# Patient Record
Sex: MALE | Race: OTHER | Employment: OTHER | URBAN - METROPOLITAN AREA
[De-identification: names, ages, dates, MRNs, and addresses within clinical notes are randomized per-mention and may not be internally consistent; named-entity substitution may affect disease eponyms.]

---

## 2017-12-13 ENCOUNTER — OFFICE VISIT (OUTPATIENT)
Dept: FAMILY MEDICINE CLINIC | Age: 57
End: 2017-12-13

## 2017-12-13 VITALS
TEMPERATURE: 97.5 F | HEIGHT: 70 IN | SYSTOLIC BLOOD PRESSURE: 138 MMHG | BODY MASS INDEX: 44.54 KG/M2 | WEIGHT: 311.13 LBS | RESPIRATION RATE: 12 BRPM | HEART RATE: 77 BPM | DIASTOLIC BLOOD PRESSURE: 88 MMHG

## 2017-12-13 DIAGNOSIS — G89.29 CHRONIC BILATERAL LOW BACK PAIN WITHOUT SCIATICA: ICD-10-CM

## 2017-12-13 DIAGNOSIS — L84 CALLUS OF FOOT: ICD-10-CM

## 2017-12-13 DIAGNOSIS — M54.50 CHRONIC BILATERAL LOW BACK PAIN WITHOUT SCIATICA: ICD-10-CM

## 2017-12-13 DIAGNOSIS — Z79.4 TYPE 2 DIABETES MELLITUS WITHOUT COMPLICATION, WITH LONG-TERM CURRENT USE OF INSULIN (HCC): Primary | ICD-10-CM

## 2017-12-13 DIAGNOSIS — I10 ESSENTIAL HYPERTENSION: ICD-10-CM

## 2017-12-13 DIAGNOSIS — Z12.11 SCREEN FOR COLON CANCER: ICD-10-CM

## 2017-12-13 DIAGNOSIS — I42.0 DILATED CARDIOMYOPATHY (HCC): ICD-10-CM

## 2017-12-13 DIAGNOSIS — E78.5 HYPERLIPIDEMIA, UNSPECIFIED HYPERLIPIDEMIA TYPE: ICD-10-CM

## 2017-12-13 DIAGNOSIS — E10.40 TYPE 1 DIABETES MELLITUS WITH DIABETIC NEUROPATHY (HCC): Primary | ICD-10-CM

## 2017-12-13 DIAGNOSIS — Z12.5 SCREENING PSA (PROSTATE SPECIFIC ANTIGEN): ICD-10-CM

## 2017-12-13 DIAGNOSIS — E11.9 TYPE 2 DIABETES MELLITUS WITHOUT COMPLICATION, WITH LONG-TERM CURRENT USE OF INSULIN (HCC): Primary | ICD-10-CM

## 2017-12-13 DIAGNOSIS — E11.9 DIABETES MELLITUS WITHOUT COMPLICATION (HCC): ICD-10-CM

## 2017-12-13 DIAGNOSIS — E11.42 DIABETIC POLYNEUROPATHY ASSOCIATED WITH TYPE 2 DIABETES MELLITUS (HCC): ICD-10-CM

## 2017-12-13 DIAGNOSIS — Z95.0 PACEMAKER: ICD-10-CM

## 2017-12-13 PROBLEM — E11.40 DIABETIC NEUROPATHY ASSOCIATED WITH TYPE 2 DIABETES MELLITUS (HCC): Status: ACTIVE | Noted: 2017-12-13

## 2017-12-13 PROBLEM — I42.9 CARDIOMYOPATHY (HCC): Status: ACTIVE | Noted: 2017-12-13

## 2017-12-13 PROCEDURE — G8484 FLU IMMUNIZE NO ADMIN: HCPCS | Performed by: FAMILY MEDICINE

## 2017-12-13 PROCEDURE — 1036F TOBACCO NON-USER: CPT | Performed by: FAMILY MEDICINE

## 2017-12-13 PROCEDURE — G8427 DOCREV CUR MEDS BY ELIG CLIN: HCPCS | Performed by: FAMILY MEDICINE

## 2017-12-13 PROCEDURE — G8417 CALC BMI ABV UP PARAM F/U: HCPCS | Performed by: FAMILY MEDICINE

## 2017-12-13 PROCEDURE — 3017F COLORECTAL CA SCREEN DOC REV: CPT | Performed by: FAMILY MEDICINE

## 2017-12-13 PROCEDURE — 3046F HEMOGLOBIN A1C LEVEL >9.0%: CPT | Performed by: FAMILY MEDICINE

## 2017-12-13 PROCEDURE — 99204 OFFICE O/P NEW MOD 45 MIN: CPT | Performed by: FAMILY MEDICINE

## 2017-12-13 RX ORDER — PRENATAL VIT 91/IRON/FOLIC/DHA 28-975-200
COMBINATION PACKAGE (EA) ORAL 2 TIMES DAILY
COMMUNITY
End: 2017-12-14 | Stop reason: SDUPTHER

## 2017-12-13 RX ORDER — CALCIUM CARB/VITAMIN D3/VIT K1 500-100-40
1 TABLET,CHEWABLE ORAL DAILY
COMMUNITY
End: 2017-12-14 | Stop reason: SDUPTHER

## 2017-12-13 RX ORDER — GLIPIZIDE 10 MG/1
10 TABLET, FILM COATED, EXTENDED RELEASE ORAL 4 TIMES DAILY
COMMUNITY
End: 2017-12-14 | Stop reason: SDUPTHER

## 2017-12-13 RX ORDER — SERTRALINE HYDROCHLORIDE 100 MG/1
100 TABLET, FILM COATED ORAL DAILY
COMMUNITY
End: 2017-12-14 | Stop reason: SDUPTHER

## 2017-12-13 RX ORDER — INSULIN GLARGINE 100 [IU]/ML
50 INJECTION, SOLUTION SUBCUTANEOUS NIGHTLY
Qty: 3 VIAL | Refills: 5 | Status: SHIPPED | OUTPATIENT
Start: 2017-12-13 | End: 2019-04-30

## 2017-12-13 RX ORDER — SIMVASTATIN 20 MG
20 TABLET ORAL NIGHTLY
COMMUNITY
End: 2017-12-14 | Stop reason: SDUPTHER

## 2017-12-13 RX ORDER — OMEPRAZOLE 20 MG/1
20 CAPSULE, DELAYED RELEASE ORAL DAILY
COMMUNITY
End: 2017-12-14 | Stop reason: SDUPTHER

## 2017-12-13 RX ORDER — ENALAPRIL MALEATE 20 MG/1
20 TABLET ORAL 2 TIMES DAILY
COMMUNITY
End: 2017-12-14 | Stop reason: SDUPTHER

## 2017-12-13 RX ORDER — GABAPENTIN 300 MG/1
300 CAPSULE ORAL 3 TIMES DAILY
COMMUNITY
End: 2017-12-14 | Stop reason: SDUPTHER

## 2017-12-13 RX ORDER — ALBUTEROL SULFATE 2.5 MG/3ML
2.5 SOLUTION RESPIRATORY (INHALATION) 4 TIMES DAILY
COMMUNITY
End: 2017-12-14 | Stop reason: SDUPTHER

## 2017-12-13 RX ORDER — METOPROLOL TARTRATE 50 MG/1
50 TABLET, FILM COATED ORAL 2 TIMES DAILY
COMMUNITY
End: 2017-12-14 | Stop reason: SDUPTHER

## 2017-12-13 RX ORDER — AMLODIPINE BESYLATE 10 MG/1
10 TABLET ORAL NIGHTLY
COMMUNITY
End: 2017-12-14 | Stop reason: SDUPTHER

## 2017-12-13 RX ORDER — FENOFIBRATE 160 MG/1
160 TABLET ORAL NIGHTLY
COMMUNITY
End: 2017-12-14 | Stop reason: SDUPTHER

## 2017-12-13 RX ORDER — FUROSEMIDE 40 MG/1
40 TABLET ORAL DAILY
COMMUNITY
End: 2017-12-14 | Stop reason: SDUPTHER

## 2017-12-13 RX ORDER — ASPIRIN 325 MG
325 TABLET ORAL DAILY
COMMUNITY
End: 2017-12-14 | Stop reason: SDUPTHER

## 2017-12-13 RX ORDER — HYDROXYZINE PAMOATE 50 MG/1
50 CAPSULE ORAL NIGHTLY
COMMUNITY
End: 2017-12-13 | Stop reason: SDUPTHER

## 2017-12-13 RX ORDER — HYDROXYZINE PAMOATE 50 MG/1
50 CAPSULE ORAL NIGHTLY
Qty: 30 CAPSULE | Refills: 5 | Status: SHIPPED | OUTPATIENT
Start: 2017-12-13 | End: 2018-03-14 | Stop reason: SDUPTHER

## 2017-12-13 RX ORDER — DIGOXIN 125 MCG
125 TABLET ORAL DAILY
COMMUNITY
End: 2017-12-14 | Stop reason: SDUPTHER

## 2017-12-13 RX ORDER — INSULIN GLARGINE 100 [IU]/ML
50 INJECTION, SOLUTION SUBCUTANEOUS NIGHTLY
COMMUNITY
End: 2017-12-13 | Stop reason: SDUPTHER

## 2017-12-13 RX ORDER — RANITIDINE 300 MG/1
300 TABLET ORAL DAILY
COMMUNITY
End: 2017-12-14 | Stop reason: SDUPTHER
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
## 2017-12-14 DIAGNOSIS — E11.42 DIABETIC POLYNEUROPATHY ASSOCIATED WITH TYPE 2 DIABETES MELLITUS (HCC): ICD-10-CM

## 2017-12-14 DIAGNOSIS — I10 ESSENTIAL HYPERTENSION: ICD-10-CM

## 2017-12-14 DIAGNOSIS — E11.9 TYPE 2 DIABETES MELLITUS WITHOUT COMPLICATION, WITH LONG-TERM CURRENT USE OF INSULIN (HCC): ICD-10-CM

## 2017-12-14 DIAGNOSIS — E78.5 HYPERLIPIDEMIA, UNSPECIFIED HYPERLIPIDEMIA TYPE: ICD-10-CM

## 2017-12-14 DIAGNOSIS — Z12.5 SCREENING PSA (PROSTATE SPECIFIC ANTIGEN): ICD-10-CM

## 2017-12-14 DIAGNOSIS — I42.0 DILATED CARDIOMYOPATHY (HCC): ICD-10-CM

## 2017-12-14 DIAGNOSIS — Z79.4 TYPE 2 DIABETES MELLITUS WITHOUT COMPLICATION, WITH LONG-TERM CURRENT USE OF INSULIN (HCC): ICD-10-CM

## 2017-12-14 LAB
ALBUMIN SERPL-MCNC: 4.2 G/DL (ref 3.9–4.9)
ALP BLD-CCNC: 82 U/L (ref 35–104)
ALT SERPL-CCNC: 18 U/L (ref 0–41)
ANION GAP SERPL CALCULATED.3IONS-SCNC: 16 MEQ/L (ref 7–13)
AST SERPL-CCNC: 14 U/L (ref 0–40)
BASOPHILS ABSOLUTE: 0.1 K/UL (ref 0–0.2)
BASOPHILS RELATIVE PERCENT: 1.2 %
BILIRUB SERPL-MCNC: 0.4 MG/DL (ref 0–1.2)
BUN BLDV-MCNC: 18 MG/DL (ref 6–20)
CALCIUM SERPL-MCNC: 10.1 MG/DL (ref 8.6–10.2)
CHLORIDE BLD-SCNC: 98 MEQ/L (ref 98–107)
CHOLESTEROL, TOTAL: 142 MG/DL (ref 0–199)
CO2: 29 MEQ/L (ref 22–29)
CREAT SERPL-MCNC: 1.24 MG/DL (ref 0.7–1.2)
CREATININE URINE: 157 MG/DL
DIGOXIN LEVEL: 1.2 NG/ML (ref 0.8–2)
EOSINOPHILS ABSOLUTE: 0.6 K/UL (ref 0–0.7)
EOSINOPHILS RELATIVE PERCENT: 6.3 %
GFR AFRICAN AMERICAN: >60
GFR NON-AFRICAN AMERICAN: 59.5
GLOBULIN: 2.7 G/DL (ref 2.3–3.5)
GLUCOSE BLD-MCNC: 160 MG/DL (ref 74–109)
HBA1C MFR BLD: 11.6 % (ref 4.8–5.9)
HCT VFR BLD CALC: 39.5 % (ref 42–52)
HDLC SERPL-MCNC: 30 MG/DL (ref 40–59)
HEMOGLOBIN: 13 G/DL (ref 14–18)
LDL CHOLESTEROL CALCULATED: 73 MG/DL (ref 0–129)
LYMPHOCYTES ABSOLUTE: 3.4 K/UL (ref 1–4.8)
LYMPHOCYTES RELATIVE PERCENT: 36.3 %
MCH RBC QN AUTO: 29.3 PG (ref 27–31.3)
MCHC RBC AUTO-ENTMCNC: 32.8 % (ref 33–37)
MCV RBC AUTO: 89.1 FL (ref 80–100)
MICROALBUMIN UR-MCNC: 14 MG/DL
MICROALBUMIN/CREAT UR-RTO: 89.2 MG/G (ref 0–30)
MONOCYTES ABSOLUTE: 0.7 K/UL (ref 0.2–0.8)
MONOCYTES RELATIVE PERCENT: 8 %
NEUTROPHILS ABSOLUTE: 4.5 K/UL (ref 1.4–6.5)
NEUTROPHILS RELATIVE PERCENT: 48.2 %
PDW BLD-RTO: 14.3 % (ref 11.5–14.5)
PLATELET # BLD: 376 K/UL (ref 130–400)
POTASSIUM SERPL-SCNC: 4.6 MEQ/L (ref 3.5–5.1)
PROSTATE SPECIFIC ANTIGEN: 0.38 NG/ML (ref 0–5.4)
RBC # BLD: 4.43 M/UL (ref 4.7–6.1)
SODIUM BLD-SCNC: 143 MEQ/L (ref 132–144)
TOTAL PROTEIN: 6.9 G/DL (ref 6.4–8.1)
TRIGL SERPL-MCNC: 197 MG/DL (ref 0–200)
WBC # BLD: 9.4 K/UL (ref 4.8–10.8)

## 2017-12-14 RX ORDER — GABAPENTIN 300 MG/1
300 CAPSULE ORAL 3 TIMES DAILY
Qty: 90 CAPSULE | Refills: 1 | Status: SHIPPED | OUTPATIENT
Start: 2017-12-14 | End: 2018-03-14 | Stop reason: SDUPTHER

## 2017-12-14 RX ORDER — GLIPIZIDE 10 MG/1
10 TABLET, FILM COATED, EXTENDED RELEASE ORAL 4 TIMES DAILY
Qty: 90 TABLET | Refills: 1 | Status: SHIPPED | OUTPATIENT
Start: 2017-12-14 | End: 2017-12-22 | Stop reason: SDUPTHER

## 2017-12-14 RX ORDER — ALBUTEROL SULFATE 2.5 MG/3ML
2.5 SOLUTION RESPIRATORY (INHALATION) 4 TIMES DAILY
Qty: 120 EACH | Refills: 5 | Status: SHIPPED | OUTPATIENT
Start: 2017-12-14 | End: 2018-03-14 | Stop reason: SDUPTHER

## 2017-12-14 RX ORDER — RANITIDINE 300 MG/1
300 TABLET ORAL DAILY
Qty: 90 TABLET | Refills: 1 | Status: SHIPPED | OUTPATIENT
Start: 2017-12-14 | End: 2018-03-14 | Stop reason: SDUPTHER

## 2017-12-14 RX ORDER — ENALAPRIL MALEATE 20 MG/1
20 TABLET ORAL 2 TIMES DAILY
Qty: 90 TABLET | Refills: 1 | Status: SHIPPED | OUTPATIENT
Start: 2017-12-14 | End: 2018-03-14 | Stop reason: SDUPTHER

## 2017-12-14 RX ORDER — FENOFIBRATE 160 MG/1
160 TABLET ORAL NIGHTLY
Qty: 90 TABLET | Refills: 1 | Status: SHIPPED | OUTPATIENT
Start: 2017-12-14 | End: 2018-03-14 | Stop reason: SDUPTHER

## 2017-12-14 RX ORDER — SERTRALINE HYDROCHLORIDE 100 MG/1
100 TABLET, FILM COATED ORAL DAILY
Qty: 90 TABLET | Refills: 1 | Status: SHIPPED | OUTPATIENT
Start: 2017-12-14 | End: 2018-03-14 | Stop reason: SDUPTHER

## 2017-12-14 RX ORDER — CALCIUM CARB/VITAMIN D3/VIT K1 500-100-40
TABLET,CHEWABLE ORAL
Qty: 100 EACH | Refills: 1 | Status: SHIPPED | OUTPATIENT
Start: 2017-12-14

## 2017-12-14 RX ORDER — DIGOXIN 125 MCG
125 TABLET ORAL DAILY
Qty: 90 TABLET | Refills: 1 | Status: SHIPPED | OUTPATIENT
Start: 2017-12-14 | End: 2018-03-14 | Stop reason: SDUPTHER

## 2017-12-14 RX ORDER — AMLODIPINE BESYLATE 10 MG/1
10 TABLET ORAL NIGHTLY
Qty: 90 TABLET | Refills: 1 | Status: SHIPPED | OUTPATIENT
Start: 2017-12-14 | End: 2018-03-14 | Stop reason: SDUPTHER

## 2017-12-14 RX ORDER — METOPROLOL TARTRATE 50 MG/1
50 TABLET, FILM COATED ORAL 2 TIMES DAILY
Qty: 180 TABLET | Refills: 1 | Status: SHIPPED | OUTPATIENT
Start: 2017-12-14 | End: 2018-03-14 | Stop reason: SDUPTHER

## 2017-12-14 RX ORDER — FUROSEMIDE 40 MG/1
40 TABLET ORAL DAILY
Qty: 180 TABLET | Refills: 1 | Status: SHIPPED | OUTPATIENT
Start: 2017-12-14 | End: 2018-03-14 | Stop reason: SDUPTHER

## 2017-12-14 RX ORDER — PRENATAL VIT 91/IRON/FOLIC/DHA 28-975-200
COMBINATION PACKAGE (EA) ORAL 2 TIMES DAILY
Qty: 1 TUBE | Refills: 3 | Status: SHIPPED | OUTPATIENT
Start: 2017-12-14 | End: 2018-03-14 | Stop reason: SDUPTHER

## 2017-12-14 RX ORDER — OMEPRAZOLE 20 MG/1
20 CAPSULE, DELAYED RELEASE ORAL DAILY
Qty: 90 CAPSULE | Refills: 1 | Status: SHIPPED | OUTPATIENT
Start: 2017-12-14 | End: 2018-03-14 | Stop reason: SDUPTHER

## 2017-12-14 RX ORDER — SIMVASTATIN 20 MG
20 TABLET ORAL NIGHTLY
Qty: 90 TABLET | Refills: 1 | Status: SHIPPED | OUTPATIENT
Start: 2017-12-14 | End: 2018-03-14 | Stop reason: SDUPTHER

## 2017-12-14 RX ORDER — ASPIRIN 325 MG
325 TABLET ORAL DAILY
Qty: 90 TABLET | Refills: 1 | Status: SHIPPED | OUTPATIENT
Start: 2017-12-14 | End: 2018-03-14 | Stop reason: SDUPTHER

## 2017-12-19 ENCOUNTER — OFFICE VISIT (OUTPATIENT)
Dept: CARDIOLOGY | Age: 57
End: 2017-12-19

## 2017-12-19 ENCOUNTER — HOSPITAL ENCOUNTER (OUTPATIENT)
Dept: CARDIOLOGY | Age: 57
Discharge: HOME | End: 2017-12-19
Payer: MEDICARE

## 2017-12-19 VITALS
RESPIRATION RATE: 16 BRPM | HEIGHT: 70 IN | OXYGEN SATURATION: 97 % | BODY MASS INDEX: 44.81 KG/M2 | DIASTOLIC BLOOD PRESSURE: 82 MMHG | SYSTOLIC BLOOD PRESSURE: 136 MMHG | HEART RATE: 73 BPM | WEIGHT: 313 LBS

## 2017-12-19 DIAGNOSIS — I10 ESSENTIAL HYPERTENSION: ICD-10-CM

## 2017-12-19 DIAGNOSIS — Z95.810 AICD (AUTOMATIC CARDIOVERTER/DEFIBRILLATOR) PRESENT: ICD-10-CM

## 2017-12-19 DIAGNOSIS — E66.01 MORBID OBESITY WITH BMI OF 40.0-44.9, ADULT (HCC): ICD-10-CM

## 2017-12-19 DIAGNOSIS — Z95.0 PACEMAKER: Primary | ICD-10-CM

## 2017-12-19 DIAGNOSIS — E78.2 MIXED HYPERLIPIDEMIA: ICD-10-CM

## 2017-12-19 DIAGNOSIS — E66.01 MORBID OBESITY (HCC): ICD-10-CM

## 2017-12-19 DIAGNOSIS — I42.0 DILATED CARDIOMYOPATHY (HCC): ICD-10-CM

## 2017-12-19 PROCEDURE — G8417 CALC BMI ABV UP PARAM F/U: HCPCS | Performed by: INTERNAL MEDICINE

## 2017-12-19 PROCEDURE — 93282 PRGRMG EVAL IMPLANTABLE DFB: CPT

## 2017-12-19 PROCEDURE — G8427 DOCREV CUR MEDS BY ELIG CLIN: HCPCS | Performed by: INTERNAL MEDICINE

## 2017-12-19 PROCEDURE — 93000 ELECTROCARDIOGRAM COMPLETE: CPT | Performed by: INTERNAL MEDICINE

## 2017-12-19 PROCEDURE — 3017F COLORECTAL CA SCREEN DOC REV: CPT | Performed by: INTERNAL MEDICINE

## 2017-12-19 PROCEDURE — G8484 FLU IMMUNIZE NO ADMIN: HCPCS | Performed by: INTERNAL MEDICINE

## 2017-12-19 PROCEDURE — 1036F TOBACCO NON-USER: CPT | Performed by: INTERNAL MEDICINE

## 2017-12-19 PROCEDURE — 99203 OFFICE O/P NEW LOW 30 MIN: CPT | Performed by: INTERNAL MEDICINE

## 2017-12-19 NOTE — PROGRESS NOTES
Chief Complaint   Patient presents with   Daron Correa Cardiologist     referred by Dr Izaiah Staton [employer]--[employer]       12-19-17: Patient presents for initial medical evaluation. Patient is followed on a regular basis by Dr. Moisés Bernal DO. Hx of NICMP s/p [employer] AICD placement in 2008 in [facility].   S/pECHO in 11-11-10 with EF of 25%, LV and LAE, abnormal DD. States he had a LHC 10 years ago and no PCI was performed. Denies any AICD shocks. states he had an AICD check 5-6 months ago and battery was at 35% he states. He has not had it checked since. Pt denies chest pain,   nausea, vomiting, diarrhea, constipation, motor weakness, insomnia, weight loss, syncope, dizziness, lightheadedness, palpitations, PND, orthopnea, or claudication. He is compliant with meds. No nitro use. BP and hr are good. CAD is stable. No LE discoloration or ulcers. No CHF type symptoms. Lipid profile is normal. Denies any SOB or fatigue. Does have LE edema.            Patient Active Problem List   Diagnosis    Type 2 diabetes mellitus without complication ([address]    Diabetes mellitus ([address]    Hyperlipidemia    Hypertension    Callus of foot    Chronic bilateral low back pain without sciatica    Cardiomyopathy ([address]    Pacemaker    Diabetic neuropathy associated with type 2 diabetes mellitus ([address]    Morbid obesity ([address]       Past Surgical History:   Procedure Laterality Date    CARDIAC DEFIBRILLATOR PLACEMENT  12/2008       Social History     Social History    Marital status:      Spouse name: N/A    Number of children: N/A    Years of education: N/A     Social History Main Topics    Smoking status: Former Smoker     Quit date: 12/1/2008    Smokeless tobacco: Never Used    Alcohol use No    Drug use: Unknown    Sexual activity: Not Asked     Other Topics Concern    None     Social History Narrative    None       Family History   Problem Relation Age of Onset    (VISTARIL) 50 MG capsule Take 1 capsule by mouth nightly 30 capsule 5     No current facility-administered medications for this visit. Coreg [carvedilol]    Review of Systems:  General ROS: negative  Psychological ROS: negative  Hematological and Lymphatic ROS: No history of blood clots or bleeding disorder. Respiratory ROS: no cough, shortness of breath, or wheezing  Cardiovascular ROS: no chest pain or dyspnea on exertion  Gastrointestinal ROS: no abdominal pain, change in bowel habits, or black or bloody stools  Genito-Urinary ROS: no dysuria, trouble voiding, or hematuria  Musculoskeletal ROS: negative  Neurological ROS: no TIA or stroke symptoms  Dermatological ROS: negative    VITALS:  Blood pressure 136/82, pulse 73, resp. rate 16, height 5' 10\" (1.778 m), weight (!) 313 lb (142 kg), SpO2 97 %. Body mass index is 44.91 kg/m². Physical Examination:  General appearance - alert, well appearing, and in no distress  Mental status - alert, oriented to person, place, and time  Neck - Neck is supple, no JVD or carotid bruits. No thyromegaly or adenopathy. Chest - clear to auscultation, no wheezes, rales or rhonchi, symmetric air entry  Heart - normal rate, regular rhythm, normal S1, S2, no murmurs, rubs, clicks or gallops  Abdomen - soft, nontender, nondistended, no masses or organomegaly  Neurological - alert, oriented, normal speech, no focal findings or movement disorder noted  Extremities - peripheral pulses normal, no pedal edema, no clubbing or cyanosis  Skin - normal coloration and turgor, no rashes, no suspicious skin lesions noted      EKG: normal sinus rhythm, nonspecific ST and T waves changes    Orders Placed This Encounter   Procedures    EKG 12 Lead       ASSESSMENT:    1. Pacemaker  EKG 12 Lead   2. Dilated cardiomyopathy ([address]     3. Essential hypertension     4. Mixed hyperlipidemia     5.  Morbid obesity ([address]           PLAN:     Patient will need to continue to follow up with you for their general medical care and return to see me in the office in 3 months. As always, aggressive risk factor modification is strongly recommended. We should adhere to the [address] guidelines for HTN management and the NCEP ATP III guidelines for LDL-C management. Cardiac diet is always recommended with low fat, cholesterol, calories and sodium. Continue medications at current doses. [facility]. Check EKG. The importance of daily weights, dietary sodium restriction, and contact with cardiology if weight is increased more than 3 lbs in any 48 hour period was stressed. The patient has been advised to contact us if they experience progressive SOB, orthopnea, PND or progressive edema. Patient was advised and encouraged to check blood pressure at home or at a pharmacy, maintain a logbook, and also call us back if blood pressure are above the target ranges or if it is low. Patient clearly understands and agrees to the instructions. We will need to continue to monitor muscle and liver enzymes, BUN, CR, and electrolytes. Details of medical condition explained and patient was warned about adverse consequences of uncontrolled medical conditions and possible side effects of prescribed medications.

## 2017-12-21 ENCOUNTER — TELEPHONE (OUTPATIENT)
Dept: FAMILY MEDICINE CLINIC | Age: 57
End: 2017-12-21

## 2017-12-21 NOTE — TELEPHONE ENCOUNTER
Unfortunately there is nothing I can do about that, he should take it twice a day before breakfast and dinner,, and increase his Lantus to 55 units each day, check his sugars twice a day and report back to us in in 2 weeks what his sugars are doing

## 2017-12-22 ENCOUNTER — TELEPHONE (OUTPATIENT)
Dept: FAMILY MEDICINE CLINIC | Age: 57
End: 2017-12-22

## 2017-12-22 DIAGNOSIS — E11.9 TYPE 2 DIABETES MELLITUS WITHOUT COMPLICATION, WITH LONG-TERM CURRENT USE OF INSULIN (HCC): ICD-10-CM

## 2017-12-22 DIAGNOSIS — Z79.4 TYPE 2 DIABETES MELLITUS WITHOUT COMPLICATION, WITH LONG-TERM CURRENT USE OF INSULIN (HCC): ICD-10-CM

## 2017-12-22 RX ORDER — GLIPIZIDE 10 MG/1
10 TABLET, FILM COATED, EXTENDED RELEASE ORAL 2 TIMES DAILY
Qty: 180 TABLET | Refills: 1 | Status: SHIPPED | OUTPATIENT
Start: 2017-12-22 | End: 2018-03-11

## 2018-01-11 ENCOUNTER — OFFICE VISIT (OUTPATIENT)
Dept: FAMILY MEDICINE CLINIC | Age: 58
End: 2018-01-11

## 2018-01-11 VITALS
TEMPERATURE: 96.6 F | SYSTOLIC BLOOD PRESSURE: 134 MMHG | BODY MASS INDEX: 45.03 KG/M2 | HEIGHT: 70 IN | HEART RATE: 63 BPM | OXYGEN SATURATION: 95 % | DIASTOLIC BLOOD PRESSURE: 84 MMHG | WEIGHT: 314.56 LBS | RESPIRATION RATE: 12 BRPM

## 2018-01-11 DIAGNOSIS — E66.01 MORBID OBESITY WITH BMI OF 45.0-49.9, ADULT (HCC): ICD-10-CM

## 2018-01-11 DIAGNOSIS — I10 ESSENTIAL HYPERTENSION: ICD-10-CM

## 2018-01-11 DIAGNOSIS — F41.9 ANXIETY: ICD-10-CM

## 2018-01-11 DIAGNOSIS — E78.2 MIXED HYPERLIPIDEMIA: ICD-10-CM

## 2018-01-11 DIAGNOSIS — E66.01 MORBID OBESITY (HCC): ICD-10-CM

## 2018-01-11 DIAGNOSIS — Z12.11 SCREEN FOR COLON CANCER: ICD-10-CM

## 2018-01-11 DIAGNOSIS — Z79.4 TYPE 2 DIABETES MELLITUS WITHOUT COMPLICATION, WITH LONG-TERM CURRENT USE OF INSULIN (HCC): Primary | ICD-10-CM

## 2018-01-11 DIAGNOSIS — E11.42 DIABETIC POLYNEUROPATHY ASSOCIATED WITH TYPE 2 DIABETES MELLITUS (HCC): ICD-10-CM

## 2018-01-11 DIAGNOSIS — E11.9 TYPE 2 DIABETES MELLITUS WITHOUT COMPLICATION, WITH LONG-TERM CURRENT USE OF INSULIN (HCC): Primary | ICD-10-CM

## 2018-01-11 DIAGNOSIS — I42.0 DILATED CARDIOMYOPATHY (HCC): ICD-10-CM

## 2018-01-11 LAB — HBA1C MFR BLD: 9.5 %

## 2018-01-11 PROCEDURE — 1036F TOBACCO NON-USER: CPT | Performed by: FAMILY MEDICINE

## 2018-01-11 PROCEDURE — G8417 CALC BMI ABV UP PARAM F/U: HCPCS | Performed by: FAMILY MEDICINE

## 2018-01-11 PROCEDURE — 83036 HEMOGLOBIN GLYCOSYLATED A1C: CPT | Performed by: FAMILY MEDICINE

## 2018-01-11 PROCEDURE — 3017F COLORECTAL CA SCREEN DOC REV: CPT | Performed by: FAMILY MEDICINE

## 2018-01-11 PROCEDURE — 99214 OFFICE O/P EST MOD 30 MIN: CPT | Performed by: FAMILY MEDICINE

## 2018-01-11 PROCEDURE — G8484 FLU IMMUNIZE NO ADMIN: HCPCS | Performed by: FAMILY MEDICINE

## 2018-01-11 PROCEDURE — 3046F HEMOGLOBIN A1C LEVEL >9.0%: CPT | Performed by: FAMILY MEDICINE

## 2018-01-11 PROCEDURE — G8427 DOCREV CUR MEDS BY ELIG CLIN: HCPCS | Performed by: FAMILY MEDICINE

## 2018-01-11 RX ORDER — LANCETS 30 GAUGE
EACH MISCELLANEOUS
Qty: 100 EACH | Refills: 3 | Status: SHIPPED | OUTPATIENT
Start: 2018-01-11 | End: 2018-10-11 | Stop reason: SDUPTHER

## 2018-01-11 NOTE — PROGRESS NOTES
(ZANTAC) 300 MG tablet Take 1 tablet by mouth daily 90 tablet 1    amLODIPine (NORVASC) 10 MG tablet Take 1 tablet by mouth nightly 90 tablet 1    metoprolol tartrate (LOPRESSOR) 50 MG tablet Take 1 tablet by mouth 2 times daily 180 tablet 1    simvastatin (ZOCOR) 20 MG tablet Take 1 tablet by mouth nightly 90 tablet 1    aspirin 325 MG tablet Take 1 tablet by mouth daily 90 tablet 1    albuterol (PROVENTIL) (2.5 MG/3ML) 0.083% nebulizer solution Take 3 mLs by nebulization 4 times daily 120 each 5    Insulin Syringe-Needle U-100 (INSULIN SYRINGE 1CC/31GX5/16\") 31G X 5/16\" 1 ML MISC Use once daily to administer Lantus 100 each 1    terbinafine (LAMISIL) 1 % cream Apply topically 2 times daily Apply topically 2 times daily. 1 Tube 3    insulin glargine (LANTUS) 100 UNIT/ML injection vial Inject 50 Units into the skin nightly 3 vial 5    hydrOXYzine (VISTARIL) 50 MG capsule Take 1 capsule by mouth nightly 30 capsule 5     No facility-administered encounter medications on file as of 1/11/2018. Social History     Social History    Marital status:      Spouse name: N/A    Number of children: N/A    Years of education: N/A     Occupational History    Not on file.      Social History Main Topics    Smoking status: Former Smoker     Quit date: 12/1/2008    Smokeless tobacco: Never Used    Alcohol use No    Drug use: Unknown    Sexual activity: Not on file     Other Topics Concern    Not on file     Social History Narrative    No narrative on file     Family History   Problem Relation Age of Onset    Heart Disease Mother     Kidney Disease Mother     Hypertension Mother     Diabetes Mother     No Known Problems Sister     No Known Problems Brother     No Known Problems Brother      Past Medical History:   Diagnosis Date    Diabetes mellitus ([address]     Hyperlipidemia     Hypertension     Morbid obesity ([address] 12/19/2017     Past Surgical History:   Procedure Laterality Date    CARDIAC DEFIBRILLATOR PLACEMENT  2008         REVIEW OF SYSTEMS:   Patient seen today for exam.  Denies any problems with hearing, headaches or vision. Denies any shortness of breath, chest pain, nausea or vomiting. No black stool, no blood in the stool. No heartburn. Denies any problems with constipation or diarrhea either. No dysuria type symptoms. Objective:     /84 (Site: Right Arm, Position: Sitting, Cuff Size: Large Adult)   Pulse 63   Temp 96.6 °F (35.9 °C) (Temporal)   Resp 12   Ht 5' 10\" (1.778 m)   Wt (!) 314 lb 9 oz (142.7 kg)   SpO2 95%   BMI 45.14 kg/m²     Physical Exam      O:  Alert and active male in no acute distress  HEENT:  TMs clear. Pharynx neg. Nares clear, no drainage noted  Neck supple/ no adenopathy   HEART:  RRR without murmur/ no carotid bruits  LUNGS:  Clear to auscultation bilaterally, no wheeze or rhonchi noted  THYROID: neg masses or nodularity  ABDOMEN:  Soft x4. Bowel sounds positive. No masses or organomegaly,  Negative tenderness, guarding or rebound. EXTR:  Without edema./ good pulses bilat    Neurologic exam unremarkable. DTRs in upper and lower extremities within normal limits. Full strength noted    Skin- no lesions noted       DIABETIC FOOT EXAM:  Bilateral feet were examined here today with normal pulses anteriorly and posteriorly at the dorsalis pedis. No callus, fissuring or sores noted. Sensation intact. Fine pin prick testing was decreased    Assessment:      1. Type 2 diabetes mellitus without complication, with long-term current use of insulin ([facility]) improving POCT glycosylated hemoglobin (Hb A1C)   2. Morbid obesity ([address] active    3. Dilated cardiomyopathy ([address] stable    4. Screen for colon cancer  COLOGUARD             Plan:        Orders Placed This Encounter   Medications    glucose blood VI test strips (TRUE METRIX BLOOD GLUCOSE TEST) strip     Si each by In Vitro route 3 times daily As needed.      Dispense:  100 each Refill:  3    Lancets MISC     Sig: Use TID     Dispense:  100 each     Refill:  3     Orders Placed This Encounter   Procedures    COLOGUARD     This test is performed by an external laboratory and is used for result attachment only. Please fill out the appropriate paperwork required by the processing laboratory. See [email or web address] for further information.      Standing Status:   Future     Standing Expiration Date:   1/11/2019    POCT glycosylated hemoglobin (Hb A1C)           Health Maintenance Due   Topic Date Due    Hepatitis C screen  1960    Diabetic foot exam  02/01/1968    Diabetic retinal exam  02/01/1968    HIV screen  02/01/1973    DTaP/Tdap/Td vaccine (1 - Tdap) 02/01/1977    Pneumococcal med risk (1 of 1 - PPSV23) 02/01/1977    Colon cancer screen colonoscopy  02/01/2008             Controlled Substances Monitoring:              If anything worsens or changes please call us at once , follow-up in the office as planned,

## 2018-01-29 ENCOUNTER — OFFICE VISIT (OUTPATIENT)
Dept: FAMILY MEDICINE CLINIC | Age: 58
End: 2018-01-29
Payer: MEDICARE

## 2018-01-29 VITALS
HEIGHT: 71 IN | WEIGHT: 308.8 LBS | SYSTOLIC BLOOD PRESSURE: 144 MMHG | HEART RATE: 75 BPM | BODY MASS INDEX: 43.23 KG/M2 | DIASTOLIC BLOOD PRESSURE: 80 MMHG | RESPIRATION RATE: 20 BRPM | OXYGEN SATURATION: 98 % | TEMPERATURE: 98.3 F

## 2018-01-29 DIAGNOSIS — J20.9 BRONCHITIS, ACUTE, WITH BRONCHOSPASM: Primary | ICD-10-CM

## 2018-01-29 DIAGNOSIS — L84 CALLUS OF FOOT: ICD-10-CM

## 2018-01-29 DIAGNOSIS — S91.311A FOOT LACERATION, RIGHT, INITIAL ENCOUNTER: ICD-10-CM

## 2018-01-29 PROCEDURE — G8484 FLU IMMUNIZE NO ADMIN: HCPCS | Performed by: NURSE PRACTITIONER

## 2018-01-29 PROCEDURE — G8417 CALC BMI ABV UP PARAM F/U: HCPCS | Performed by: NURSE PRACTITIONER

## 2018-01-29 PROCEDURE — G8427 DOCREV CUR MEDS BY ELIG CLIN: HCPCS | Performed by: NURSE PRACTITIONER

## 2018-01-29 PROCEDURE — 1036F TOBACCO NON-USER: CPT | Performed by: NURSE PRACTITIONER

## 2018-01-29 PROCEDURE — 99213 OFFICE O/P EST LOW 20 MIN: CPT | Performed by: NURSE PRACTITIONER

## 2018-01-29 PROCEDURE — 3017F COLORECTAL CA SCREEN DOC REV: CPT | Performed by: NURSE PRACTITIONER

## 2018-01-29 RX ORDER — BENZONATATE 100 MG/1
100 CAPSULE ORAL 3 TIMES DAILY PRN
Qty: 21 CAPSULE | Refills: 0 | Status: SHIPPED | OUTPATIENT
Start: 2018-01-29 | End: 2018-02-05

## 2018-01-29 RX ORDER — BLOOD-GLUCOSE METER
1 KIT MISCELLANEOUS 3 TIMES DAILY
Qty: 1 KIT | Refills: 5 | Status: SHIPPED | OUTPATIENT
Start: 2018-01-29 | End: 2018-10-11 | Stop reason: SDUPTHER

## 2018-01-29 RX ORDER — DOXYCYCLINE HYCLATE 100 MG
100 TABLET ORAL 2 TIMES DAILY
Qty: 14 TABLET | Refills: 0 | Status: SHIPPED | OUTPATIENT
Start: 2018-01-29 | End: 2018-02-05

## 2018-01-29 RX ORDER — METHYLPREDNISOLONE 4 MG/1
TABLET ORAL
Qty: 1 KIT | Refills: 0 | Status: SHIPPED | OUTPATIENT
Start: 2018-01-29 | End: 2018-04-11 | Stop reason: ALTCHOICE

## 2018-01-29 ASSESSMENT — ENCOUNTER SYMPTOMS
ABDOMINAL PAIN: 0
VOMITING: 0
SORE THROAT: 0
CHANGE IN BOWEL HABIT: 0
VISUAL CHANGE: 0
COUGH: 1
NAUSEA: 0
SWOLLEN GLANDS: 0

## 2018-01-29 NOTE — PROGRESS NOTES
Subjective  Naa Lemus, 59 y.o. male presents today with:  Chief Complaint   Patient presents with    Foot Injury     Right Foot, Broke open and has been bleeding Pain from toe to sore in the bend, Skin is so dry it cracked        Foot Pain   The current episode started yesterday. The problem occurs constantly. The problem has been unchanged. Associated symptoms include coughing (minor). Pertinent negatives include no abdominal pain, anorexia, arthralgias, change in bowel habit, chest pain, chills, congestion, diaphoresis, fatigue, fever, headaches, joint swelling, myalgias, nausea, neck pain, numbness, rash, sore throat, swollen glands, urinary symptoms, vertigo, visual change, vomiting or weakness. The symptoms are aggravated by walking. Treatments tried: triple antibiotic ointment. The treatment provided mild relief. Review of Systems   Constitutional: Negative for chills, diaphoresis, fatigue and fever. HENT: Negative for congestion and sore throat. Respiratory: Positive for cough (minor). Cardiovascular: Negative for chest pain. Gastrointestinal: Negative for abdominal pain, anorexia, change in bowel habit, nausea and vomiting. Musculoskeletal: Negative for arthralgias, joint swelling, myalgias and neck pain. Skin: Positive for wound (dry callus cracked and bleeding since yesterday). Negative for rash. Neurological: Negative for vertigo, weakness, numbness and headaches. Past Medical History:   Diagnosis Date    Diabetes mellitus ([facility] [address]     Hyperlipidemia     Hypertension     Morbid obesity ([facility] [address] 12/19/2017     Past Surgical History:   Procedure Laterality Date    CARDIAC DEFIBRILLATOR PLACEMENT  12/2008     Social History     Social History    Marital status:      Spouse name: N/A    Number of children: N/A    Years of education: N/A     Occupational History    Not on file.      Social History Main Topics    Smoking status: Former Smoker     Quit date: 12/1/2008    Smokeless tobacco: Never Used    Alcohol use No    Drug use: Unknown    Sexual activity: Not on file     Other Topics Concern    Not on file     Social History Narrative    No narrative on file     Family History   Problem Relation Age of Onset    Heart Disease Mother     Kidney Disease Mother     Hypertension Mother     Diabetes Mother     No Known Problems Sister     No Known Problems Brother     No Known Problems Brother      Allergies   Allergen Reactions    Coreg [Carvedilol] Other (See Comments)     \"hyper\", rapid pulse     Current Outpatient Prescriptions   Medication Sig Dispense Refill    Blood Glucose Monitoring Suppl (ONE TOUCH ULTRA MINI) w/Device KIT 1 kit by Does not apply route three times daily 1 kit 5    mupirocin (BACTROBAN) 2 % ointment Apply topically 2 times daily. 1 Tube 0    doxycycline hyclate (VIBRA-TABS) 100 MG tablet Take 1 tablet by mouth 2 times daily for 7 days 14 tablet 0    benzonatate (TESSALON) 100 MG capsule Take 1 capsule by mouth 3 times daily as needed for Cough 21 capsule 0    methylPREDNISolone (MEDROL DOSEPACK) 4 MG tablet Take by mouth. 1 kit 0    glucose blood VI test strips (TRUE METRIX BLOOD GLUCOSE TEST) strip 1 each by In Vitro route 3 times daily As needed.  100 each 3    Lancets MISC Use  each 3    glipiZIDE (GLUCOTROL XL) 10 MG extended release tablet Take 1 tablet by mouth 2 times daily 180 tablet 1    metFORMIN (GLUCOPHAGE) 1000 MG tablet Take 1 tablet by mouth 2 times daily (with meals) 180 tablet 1    enalapril (VASOTEC) 20 MG tablet Take 1 tablet by mouth 2 times daily 90 tablet 1    gabapentin (NEURONTIN) 300 MG capsule Take 1 capsule by mouth 3 times daily 90 capsule 1    omeprazole (PRILOSEC) 20 MG delayed release capsule Take 1 capsule by mouth daily 90 capsule 1    digoxin (LANOXIN) 125 MCG tablet Take 1 tablet by mouth daily 90 tablet 1    furosemide (LASIX) 40 MG tablet Take 1 tablet by mouth daily 180 tablet 1    sertraline (ZOLOFT) 100 MG tablet Take 1 tablet by mouth daily 90 tablet 1    fenofibrate 160 MG tablet Take 1 tablet by mouth nightly 90 tablet 1    ranitidine (ZANTAC) 300 MG tablet Take 1 tablet by mouth daily 90 tablet 1    amLODIPine (NORVASC) 10 MG tablet Take 1 tablet by mouth nightly 90 tablet 1    metoprolol tartrate (LOPRESSOR) 50 MG tablet Take 1 tablet by mouth 2 times daily 180 tablet 1    simvastatin (ZOCOR) 20 MG tablet Take 1 tablet by mouth nightly 90 tablet 1    aspirin 325 MG tablet Take 1 tablet by mouth daily 90 tablet 1    albuterol (PROVENTIL) (2.5 MG/3ML) 0.083% nebulizer solution Take 3 mLs by nebulization 4 times daily 120 each 5    Insulin Syringe-Needle U-100 (INSULIN SYRINGE 1CC/31GX5/16\") 31G X 5/16\" 1 ML MISC Use once daily to administer Lantus 100 each 1    terbinafine (LAMISIL) 1 % cream Apply topically 2 times daily Apply topically 2 times daily. 1 Tube 3    insulin glargine (LANTUS) 100 UNIT/ML injection vial Inject 50 Units into the skin nightly 3 vial 5    hydrOXYzine (VISTARIL) 50 MG capsule Take 1 capsule by mouth nightly 30 capsule 5     No current facility-administered medications for this visit. PMH, Surgical Hx, Family Hx, and Social Hx reviewed and updated. Health Maintenance reviewed. Objective    Vitals:    01/29/18 1337   BP: (!) 144/80   Pulse: 75   Resp: 20   Temp: 98.3 °F (36.8 °C)   SpO2: 98%   Weight: (!) 308 lb 12.8 oz (140.1 kg)   Height: 5' 11\" (1.803 m)       Physical Exam   Constitutional: He is oriented to person, place, and time. He appears well-developed and well-nourished. No distress. HENT:   Head: Normocephalic and atraumatic. Right Ear: Hearing, external ear and ear canal normal. Tympanic membrane is bulging. Left Ear: Hearing, external ear and ear canal normal. Tympanic membrane is bulging. Nose: Mucosal edema present.    Mouth/Throat: Uvula is midline and mucous membranes are normal. Posterior oropharyngeal erythema

## 2018-03-11 ENCOUNTER — HOSPITAL ENCOUNTER (EMERGENCY)
Age: 58
Discharge: HOME | End: 2018-03-12
Payer: MEDICARE

## 2018-03-11 ENCOUNTER — APPOINTMENT (OUTPATIENT)
Dept: GENERAL RADIOLOGY | Age: 58
End: 2018-03-11
Payer: MEDICARE

## 2018-03-11 DIAGNOSIS — Z79.4 TYPE 2 DIABETES MELLITUS WITHOUT COMPLICATION, WITH LONG-TERM CURRENT USE OF INSULIN (HCC): ICD-10-CM

## 2018-03-11 DIAGNOSIS — E11.9 TYPE 2 DIABETES MELLITUS WITHOUT COMPLICATION, WITH LONG-TERM CURRENT USE OF INSULIN (HCC): ICD-10-CM

## 2018-03-11 DIAGNOSIS — L03.115 CELLULITIS OF RIGHT FOOT: Primary | ICD-10-CM

## 2018-03-11 LAB
ALBUMIN SERPL-MCNC: 4.1 G/DL (ref 3.9–4.9)
ALP BLD-CCNC: 77 U/L (ref 35–104)
ALT SERPL-CCNC: 13 U/L (ref 0–41)
ANION GAP SERPL CALCULATED.3IONS-SCNC: 13 MEQ/L (ref 7–13)
AST SERPL-CCNC: 11 U/L (ref 0–40)
BASOPHILS ABSOLUTE: 0 K/UL (ref 0–0.2)
BASOPHILS RELATIVE PERCENT: 0.4 %
BILIRUB SERPL-MCNC: 0.2 MG/DL (ref 0–1.2)
BUN BLDV-MCNC: 17 MG/DL (ref 8–23)
CALCIUM SERPL-MCNC: 9.5 MG/DL (ref 8.6–10.2)
CHLORIDE BLD-SCNC: 98 MEQ/L (ref 98–107)
CO2: 26 MEQ/L (ref 22–29)
CREAT SERPL-MCNC: 1.17 MG/DL (ref 0.7–1.2)
EOSINOPHILS ABSOLUTE: 0.5 K/UL (ref 0–0.7)
EOSINOPHILS RELATIVE PERCENT: 4.5 %
GFR AFRICAN AMERICAN: >60
GFR NON-AFRICAN AMERICAN: >60
GLOBULIN: 2.6 G/DL (ref 2.3–3.5)
GLUCOSE BLD-MCNC: 288 MG/DL (ref 74–109)
HCT VFR BLD CALC: 36.6 % (ref 42–52)
HEMOGLOBIN: 12 G/DL (ref 14–18)
LYMPHOCYTES ABSOLUTE: 2.9 K/UL (ref 1–4.8)
LYMPHOCYTES RELATIVE PERCENT: 28.4 %
MCH RBC QN AUTO: 28.8 PG (ref 27–31.3)
MCHC RBC AUTO-ENTMCNC: 32.9 % (ref 33–37)
MCV RBC AUTO: 87.7 FL (ref 80–100)
MONOCYTES ABSOLUTE: 0.5 K/UL (ref 0.2–0.8)
MONOCYTES RELATIVE PERCENT: 5.1 %
NEUTROPHILS ABSOLUTE: 6.3 K/UL (ref 1.4–6.5)
NEUTROPHILS RELATIVE PERCENT: 61.6 %
PDW BLD-RTO: 15.5 % (ref 11.5–14.5)
PLATELET # BLD: 382 K/UL (ref 130–400)
POTASSIUM SERPL-SCNC: 4.6 MEQ/L (ref 3.5–5.1)
RBC # BLD: 4.18 M/UL (ref 4.7–6.1)
SODIUM BLD-SCNC: 137 MEQ/L (ref 132–144)
TOTAL PROTEIN: 6.7 G/DL (ref 6.4–8.1)
URIC ACID, SERUM: 5.6 MG/DL (ref 3.4–7)
WBC # BLD: 10.2 K/UL (ref 4.8–10.8)

## 2018-03-11 PROCEDURE — 96365 THER/PROPH/DIAG IV INF INIT: CPT

## 2018-03-11 PROCEDURE — 73630 X-RAY EXAM OF FOOT: CPT

## 2018-03-11 PROCEDURE — 36415 COLL VENOUS BLD VENIPUNCTURE: CPT

## 2018-03-11 PROCEDURE — 87040 BLOOD CULTURE FOR BACTERIA: CPT

## 2018-03-11 PROCEDURE — 6360000002 HC RX W HCPCS: Performed by: PHYSICIAN ASSISTANT

## 2018-03-11 PROCEDURE — 99283 EMERGENCY DEPT VISIT LOW MDM: CPT

## 2018-03-11 PROCEDURE — 96375 TX/PRO/DX INJ NEW DRUG ADDON: CPT

## 2018-03-11 PROCEDURE — 84550 ASSAY OF BLOOD/URIC ACID: CPT

## 2018-03-11 PROCEDURE — 2580000003 HC RX 258: Performed by: PHYSICIAN ASSISTANT

## 2018-03-11 PROCEDURE — 29540 STRAPPING ANKLE &/FOOT: CPT

## 2018-03-11 PROCEDURE — 80053 COMPREHEN METABOLIC PANEL: CPT

## 2018-03-11 PROCEDURE — 85025 COMPLETE CBC W/AUTO DIFF WBC: CPT

## 2018-03-11 RX ORDER — GINSENG 100 MG
CAPSULE ORAL
Qty: 1 TUBE | Refills: 0 | Status: ON HOLD | OUTPATIENT
Start: 2018-03-11 | End: 2020-04-23

## 2018-03-11 RX ORDER — GLIPIZIDE 10 MG/1
20 TABLET ORAL
Qty: 120 TABLET | Refills: 0 | Status: SHIPPED | OUTPATIENT
Start: 2018-03-11 | End: 2018-03-14 | Stop reason: SDUPTHER

## 2018-03-11 RX ORDER — SULFAMETHOXAZOLE AND TRIMETHOPRIM 800; 160 MG/1; MG/1
1 TABLET ORAL 2 TIMES DAILY
Qty: 20 TABLET | Refills: 0 | Status: SHIPPED | OUTPATIENT
Start: 2018-03-11 | End: 2018-03-21

## 2018-03-11 RX ORDER — ONDANSETRON 2 MG/ML
4 INJECTION INTRAMUSCULAR; INTRAVENOUS ONCE
Status: COMPLETED | OUTPATIENT
Start: 2018-03-11 | End: 2018-03-11

## 2018-03-11 RX ORDER — MORPHINE SULFATE 4 MG/ML
4 INJECTION, SOLUTION INTRAMUSCULAR; INTRAVENOUS ONCE
Status: COMPLETED | OUTPATIENT
Start: 2018-03-11 | End: 2018-03-11

## 2018-03-11 RX ORDER — HYDROCODONE BITARTRATE AND ACETAMINOPHEN 5; 325 MG/1; MG/1
1 TABLET ORAL EVERY 6 HOURS PRN
Qty: 12 TABLET | Refills: 0 | Status: SHIPPED | OUTPATIENT
Start: 2018-03-11 | End: 2018-03-14

## 2018-03-11 RX ADMIN — VANCOMYCIN HYDROCHLORIDE 1000 MG: 1 INJECTION, POWDER, LYOPHILIZED, FOR SOLUTION INTRAVENOUS at 22:03

## 2018-03-11 RX ADMIN — MORPHINE SULFATE 4 MG: 4 INJECTION, SOLUTION INTRAMUSCULAR; INTRAVENOUS at 22:03

## 2018-03-11 RX ADMIN — ONDANSETRON 4 MG: 2 INJECTION INTRAMUSCULAR; INTRAVENOUS at 22:03

## 2018-03-11 ASSESSMENT — ENCOUNTER SYMPTOMS
COLOR CHANGE: 0
ABDOMINAL PAIN: 0
APNEA: 0
EYE PAIN: 0
TROUBLE SWALLOWING: 0
ALLERGIC/IMMUNOLOGIC NEGATIVE: 1
SHORTNESS OF BREATH: 0

## 2018-03-11 ASSESSMENT — PAIN DESCRIPTION - ORIENTATION
ORIENTATION: RIGHT
ORIENTATION: RIGHT

## 2018-03-11 ASSESSMENT — PAIN DESCRIPTION - PAIN TYPE
TYPE: ACUTE PAIN
TYPE: ACUTE PAIN

## 2018-03-11 ASSESSMENT — PAIN DESCRIPTION - FREQUENCY: FREQUENCY: CONTINUOUS

## 2018-03-11 ASSESSMENT — PAIN DESCRIPTION - LOCATION
LOCATION: FOOT
LOCATION: FOOT

## 2018-03-11 ASSESSMENT — PAIN SCALES - GENERAL
PAINLEVEL_OUTOF10: 6

## 2018-03-11 ASSESSMENT — PAIN DESCRIPTION - DESCRIPTORS: DESCRIPTORS: CRAMPING;SHARP

## 2018-03-12 VITALS
HEIGHT: 71 IN | SYSTOLIC BLOOD PRESSURE: 166 MMHG | HEART RATE: 62 BPM | TEMPERATURE: 98.1 F | DIASTOLIC BLOOD PRESSURE: 80 MMHG | RESPIRATION RATE: 18 BRPM | OXYGEN SATURATION: 98 % | BODY MASS INDEX: 43.4 KG/M2 | WEIGHT: 310 LBS

## 2018-03-12 NOTE — ED PROVIDER NOTES
MEDICAL HISTORY     Past Medical History:   Diagnosis Date    Diabetes mellitus ([facility] [address]     Hyperlipidemia     Hypertension     Morbid obesity ([facility] [address] 12/19/2017         SURGICAL HISTORY       Past Surgical History:   Procedure Laterality Date    CARDIAC DEFIBRILLATOR PLACEMENT  12/2008         CURRENT MEDICATIONS       Previous Medications    ALBUTEROL (PROVENTIL) (2.5 MG/3ML) 0.083% NEBULIZER SOLUTION    Take 3 mLs by nebulization 4 times daily    AMLODIPINE (NORVASC) 10 MG TABLET    Take 1 tablet by mouth nightly    ASPIRIN 325 MG TABLET    Take 1 tablet by mouth daily    BLOOD GLUCOSE MONITORING SUPPL (ONE TOUCH ULTRA MINI) W/DEVICE KIT    1 kit by Does not apply route three times daily    DIGOXIN (LANOXIN) 125 MCG TABLET    Take 1 tablet by mouth daily    ENALAPRIL (VASOTEC) 20 MG TABLET    Take 1 tablet by mouth 2 times daily    FENOFIBRATE 160 MG TABLET    Take 1 tablet by mouth nightly    FUROSEMIDE (LASIX) 40 MG TABLET    Take 1 tablet by mouth daily    GABAPENTIN (NEURONTIN) 300 MG CAPSULE    Take 1 capsule by mouth 3 times daily    GLUCOSE BLOOD VI TEST STRIPS (TRUE METRIX BLOOD GLUCOSE TEST) STRIP    1 each by In Vitro route 3 times daily As needed. HYDROXYZINE (VISTARIL) 50 MG CAPSULE    Take 1 capsule by mouth nightly    INSULIN GLARGINE (LANTUS) 100 UNIT/ML INJECTION VIAL    Inject 50 Units into the skin nightly    INSULIN SYRINGE-NEEDLE U-100 (INSULIN SYRINGE 1CC/31GX5/16\") 31G X 5/16\" 1 ML MISC    Use once daily to administer Lantus    LANCETS MISC    Use TID    METFORMIN (GLUCOPHAGE) 1000 MG TABLET    Take 1 tablet by mouth 2 times daily (with meals)    METHYLPREDNISOLONE (MEDROL DOSEPACK) 4 MG TABLET    Take by mouth.     METOPROLOL TARTRATE (LOPRESSOR) 50 MG TABLET    Take 1 tablet by mouth 2 times daily    OMEPRAZOLE (PRILOSEC) 20 MG DELAYED RELEASE CAPSULE    Take 1 capsule by mouth daily    RANITIDINE (ZANTAC) 300 MG TABLET    Take 1 tablet by mouth daily    SERTRALINE (ZOLOFT) 100 MG referred to PCP for outpatient follow-up and advised to return for any fevers or constitutional symptoms    MDM    PROCEDURES:    Procedures      FINAL IMPRESSION      1. Cellulitis of right foot    2. Type 2 diabetes mellitus without complication, with long-term current use of insulin [facility])          DISPOSITION/PLAN   DISPOSITION Decision To Discharge 03/11/2018 11:35:52 PM      PATIENT REFERRED TO:  Tammi Bryson DO  [address] [address]  [phone]    Call in 1 day        DISCHARGE MEDICATIONS:  New Prescriptions    BACITRACIN 500 UNIT/GM OINTMENT    Apply topically 2 times daily. GLIPIZIDE (GLUCOTROL) 10 MG TABLET    Take 2 tablets by mouth 2 times daily (before meals)    HYDROCODONE-ACETAMINOPHEN (NORCO) 5-325 MG PER TABLET    Take 1 tablet by mouth every 6 hours as needed for Pain for up to 3 days . Take lowest dose possible to manage pain.     SULFAMETHOXAZOLE-TRIMETHOPRIM (BACTRIM DS) 800-160 MG PER TABLET    Take 1 tablet by mouth 2 times daily for 10 days       (Please note that portions of this note were completed with a voice recognition program.  Efforts were made to edit the dictations but occasionally words are mis-transcribed.)    MEIR Leon PA-C  03/11/18 [id number]

## 2018-03-14 ENCOUNTER — OFFICE VISIT (OUTPATIENT)
Dept: FAMILY MEDICINE CLINIC | Age: 58
End: 2018-03-14
Payer: MEDICARE

## 2018-03-14 VITALS
RESPIRATION RATE: 20 BRPM | SYSTOLIC BLOOD PRESSURE: 140 MMHG | WEIGHT: 315 LBS | HEART RATE: 67 BPM | TEMPERATURE: 97.9 F | DIASTOLIC BLOOD PRESSURE: 80 MMHG | BODY MASS INDEX: 43.93 KG/M2

## 2018-03-14 DIAGNOSIS — Z79.4 TYPE 2 DIABETES MELLITUS WITHOUT COMPLICATION, WITH LONG-TERM CURRENT USE OF INSULIN (HCC): ICD-10-CM

## 2018-03-14 DIAGNOSIS — L03.115 CELLULITIS OF RIGHT FOOT: Primary | ICD-10-CM

## 2018-03-14 DIAGNOSIS — E78.5 HYPERLIPIDEMIA, UNSPECIFIED HYPERLIPIDEMIA TYPE: ICD-10-CM

## 2018-03-14 DIAGNOSIS — E11.42 DIABETIC POLYNEUROPATHY ASSOCIATED WITH TYPE 2 DIABETES MELLITUS (HCC): ICD-10-CM

## 2018-03-14 DIAGNOSIS — I10 ESSENTIAL HYPERTENSION: ICD-10-CM

## 2018-03-14 DIAGNOSIS — I42.0 DILATED CARDIOMYOPATHY (HCC): ICD-10-CM

## 2018-03-14 DIAGNOSIS — E11.9 TYPE 2 DIABETES MELLITUS WITHOUT COMPLICATION, WITH LONG-TERM CURRENT USE OF INSULIN (HCC): ICD-10-CM

## 2018-03-14 LAB — HBA1C MFR BLD: 7.7 %

## 2018-03-14 PROCEDURE — 99214 OFFICE O/P EST MOD 30 MIN: CPT | Performed by: FAMILY MEDICINE

## 2018-03-14 PROCEDURE — G8417 CALC BMI ABV UP PARAM F/U: HCPCS | Performed by: FAMILY MEDICINE

## 2018-03-14 PROCEDURE — 3045F PR MOST RECENT HEMOGLOBIN A1C LEVEL 7.0-9.0%: CPT | Performed by: FAMILY MEDICINE

## 2018-03-14 PROCEDURE — 1036F TOBACCO NON-USER: CPT | Performed by: FAMILY MEDICINE

## 2018-03-14 PROCEDURE — G8484 FLU IMMUNIZE NO ADMIN: HCPCS | Performed by: FAMILY MEDICINE

## 2018-03-14 PROCEDURE — 3017F COLORECTAL CA SCREEN DOC REV: CPT | Performed by: FAMILY MEDICINE

## 2018-03-14 PROCEDURE — G8427 DOCREV CUR MEDS BY ELIG CLIN: HCPCS | Performed by: FAMILY MEDICINE

## 2018-03-14 PROCEDURE — 83036 HEMOGLOBIN GLYCOSYLATED A1C: CPT | Performed by: FAMILY MEDICINE

## 2018-03-14 RX ORDER — ALBUTEROL SULFATE 2.5 MG/3ML
2.5 SOLUTION RESPIRATORY (INHALATION) 4 TIMES DAILY
Qty: 120 EACH | Refills: 5 | Status: SHIPPED | OUTPATIENT
Start: 2018-03-14 | End: 2018-10-11 | Stop reason: SDUPTHER

## 2018-03-14 RX ORDER — GABAPENTIN 300 MG/1
300 CAPSULE ORAL 3 TIMES DAILY
Qty: 90 CAPSULE | Refills: 1 | Status: SHIPPED | OUTPATIENT
Start: 2018-03-14 | End: 2018-10-11 | Stop reason: SDUPTHER

## 2018-03-14 RX ORDER — SERTRALINE HYDROCHLORIDE 100 MG/1
100 TABLET, FILM COATED ORAL DAILY
Qty: 90 TABLET | Refills: 1 | Status: SHIPPED | OUTPATIENT
Start: 2018-03-14 | End: 2018-09-16 | Stop reason: SDUPTHER

## 2018-03-14 RX ORDER — PRENATAL VIT 91/IRON/FOLIC/DHA 28-975-200
COMBINATION PACKAGE (EA) ORAL 2 TIMES DAILY
Qty: 1 TUBE | Refills: 3 | Status: SHIPPED | OUTPATIENT
Start: 2018-03-14 | End: 2018-10-11 | Stop reason: SDUPTHER

## 2018-03-14 RX ORDER — RANITIDINE 300 MG/1
300 TABLET ORAL DAILY
Qty: 90 TABLET | Refills: 1 | Status: SHIPPED | OUTPATIENT
Start: 2018-03-14 | End: 2018-10-11 | Stop reason: SDUPTHER

## 2018-03-14 RX ORDER — FUROSEMIDE 40 MG/1
40 TABLET ORAL DAILY
Qty: 180 TABLET | Refills: 1 | Status: SHIPPED | OUTPATIENT
Start: 2018-03-14 | End: 2018-10-11 | Stop reason: SDUPTHER

## 2018-03-14 RX ORDER — OMEPRAZOLE 20 MG/1
20 CAPSULE, DELAYED RELEASE ORAL DAILY
Qty: 90 CAPSULE | Refills: 1 | Status: SHIPPED | OUTPATIENT
Start: 2018-03-14 | End: 2020-02-11 | Stop reason: SDUPTHER

## 2018-03-14 RX ORDER — CALCIUM CARB/VITAMIN D3/VIT K1 500-100-40
TABLET,CHEWABLE ORAL
Qty: 100 EACH | Refills: 1 | Status: CANCELLED | OUTPATIENT
Start: 2018-03-14

## 2018-03-14 RX ORDER — GLIPIZIDE 10 MG/1
20 TABLET ORAL
Qty: 360 TABLET | Refills: 1 | Status: SHIPPED | OUTPATIENT
Start: 2018-03-14 | End: 2018-09-06 | Stop reason: SDUPTHER

## 2018-03-14 RX ORDER — DIGOXIN 125 MCG
125 TABLET ORAL DAILY
Qty: 90 TABLET | Refills: 1 | Status: SHIPPED | OUTPATIENT
Start: 2018-03-14 | End: 2018-09-06 | Stop reason: SDUPTHER

## 2018-03-14 RX ORDER — HYDROXYZINE PAMOATE 50 MG/1
50 CAPSULE ORAL NIGHTLY
Qty: 90 CAPSULE | Refills: 2 | Status: SHIPPED | OUTPATIENT
Start: 2018-03-14 | End: 2018-10-11 | Stop reason: SDUPTHER

## 2018-03-14 RX ORDER — FENOFIBRATE 160 MG/1
160 TABLET ORAL NIGHTLY
Qty: 90 TABLET | Refills: 1 | Status: SHIPPED | OUTPATIENT
Start: 2018-03-14 | End: 2018-09-06 | Stop reason: SDUPTHER

## 2018-03-14 RX ORDER — INSULIN GLARGINE 100 [IU]/ML
50 INJECTION, SOLUTION SUBCUTANEOUS NIGHTLY
Qty: 3 VIAL | Refills: 5 | Status: CANCELLED | OUTPATIENT
Start: 2018-03-14

## 2018-03-14 RX ORDER — ENALAPRIL MALEATE 20 MG/1
20 TABLET ORAL 2 TIMES DAILY
Qty: 90 TABLET | Refills: 1 | Status: SHIPPED | OUTPATIENT
Start: 2018-03-14 | End: 2018-07-11 | Stop reason: SDUPTHER

## 2018-03-14 RX ORDER — METOPROLOL TARTRATE 50 MG/1
50 TABLET, FILM COATED ORAL 2 TIMES DAILY
Qty: 180 TABLET | Refills: 1 | Status: SHIPPED | OUTPATIENT
Start: 2018-03-14 | End: 2018-10-11 | Stop reason: SDUPTHER

## 2018-03-14 RX ORDER — AMOXICILLIN AND CLAVULANATE POTASSIUM 875; 125 MG/1; MG/1
1 TABLET, FILM COATED ORAL 2 TIMES DAILY
Qty: 20 TABLET | Refills: 0 | Status: SHIPPED | OUTPATIENT
Start: 2018-03-14 | End: 2018-03-24

## 2018-03-14 RX ORDER — AMLODIPINE BESYLATE 10 MG/1
10 TABLET ORAL NIGHTLY
Qty: 90 TABLET | Refills: 1 | Status: SHIPPED | OUTPATIENT
Start: 2018-03-14 | End: 2018-09-16 | Stop reason: SDUPTHER

## 2018-03-14 RX ORDER — SIMVASTATIN 20 MG
20 TABLET ORAL NIGHTLY
Qty: 90 TABLET | Refills: 1 | Status: SHIPPED | OUTPATIENT
Start: 2018-03-14 | End: 2018-10-11 | Stop reason: SDUPTHER

## 2018-03-14 RX ORDER — ASPIRIN 325 MG
325 TABLET ORAL DAILY
Qty: 90 TABLET | Refills: 1 | Status: SHIPPED | OUTPATIENT
Start: 2018-03-14 | End: 2019-05-07 | Stop reason: DRUGHIGH

## 2018-03-14 NOTE — PROGRESS NOTES
mouth nightly 90 tablet 1    metoprolol tartrate (LOPRESSOR) 50 MG tablet Take 1 tablet by mouth 2 times daily 180 tablet 1    simvastatin (ZOCOR) 20 MG tablet Take 1 tablet by mouth nightly 90 tablet 1    albuterol (PROVENTIL) (2.5 MG/3ML) 0.083% nebulizer solution Take 3 mLs by nebulization 4 times daily 120 each 5    aspirin 325 MG tablet Take 1 tablet by mouth daily 90 tablet 1    hydrOXYzine (VISTARIL) 50 MG capsule Take 1 capsule by mouth nightly 90 capsule 2    terbinafine (LAMISIL) 1 % cream Apply topically 2 times daily Apply topically 2 times daily. 1 Tube 3    amoxicillin-clavulanate (AUGMENTIN) 875-125 MG per tablet Take 1 tablet by mouth 2 times daily for 10 days 20 tablet 0    insulin glargine (LANTUS SOLOSTAR) 100 UNIT/ML injection pen Inject 55 Units into the skin nightly 10 pen 3    Insulin Pen Needle (B-D UF III MINI PEN NEEDLES) 31G X 5 MM MISC 1 each by Does not apply route daily 100 each 3    sulfamethoxazole-trimethoprim (BACTRIM DS) 800-160 MG per tablet Take 1 tablet by mouth 2 times daily for 10 days 20 tablet 0    [] HYDROcodone-acetaminophen (NORCO) 5-325 MG per tablet Take 1 tablet by mouth every 6 hours as needed for Pain for up to 3 days . Take lowest dose possible to manage pain. 12 tablet 0    bacitracin 500 UNIT/GM ointment Apply topically 2 times daily. 1 Tube 0    [DISCONTINUED] glipiZIDE (GLUCOTROL) 10 MG tablet Take 2 tablets by mouth 2 times daily (before meals) 120 tablet 0    Blood Glucose Monitoring Suppl (ONE TOUCH ULTRA MINI) w/Device KIT 1 kit by Does not apply route three times daily 1 kit 5    methylPREDNISolone (MEDROL DOSEPACK) 4 MG tablet Take by mouth. 1 kit 0    Lancets MISC Use  each 3    [DISCONTINUED] glucose blood VI test strips (TRUE METRIX BLOOD GLUCOSE TEST) strip 1 each by In Vitro route 3 times daily As needed.  100 each 3    Insulin Syringe-Needle U-100 (INSULIN SYRINGE 1CC/31GX5/16\") 31G X 5/16\" 1 ML MISC Use once daily to Number of children: N/A    Years of education: N/A     Occupational History    Not on file. Social History Main Topics    Smoking status: Former Smoker     Quit date: 12/1/2008    Smokeless tobacco: Never Used    Alcohol use No    Drug use: Unknown    Sexual activity: Not on file     Other Topics Concern    Not on file     Social History Narrative    No narrative on file     Family History   Problem Relation Age of Onset    Heart Disease Mother     Kidney Disease Mother     Hypertension Mother     Diabetes Mother     No Known Problems Sister     No Known Problems Brother     No Known Problems Brother      Past Medical History:   Diagnosis Date    [facility] mellitus ([facility] [address]     Hyperlipidemia     Hypertension     Morbid obesity ([facility] [address] 12/19/2017     Past Surgical History:   Procedure Laterality Date    CARDIAC DEFIBRILLATOR PLACEMENT  12/2008         REVIEW OF SYSTEMS:   Patient seen today for exam.  Denies any problems with hearing, headaches or vision. Denies any shortness of breath, chest pain, nausea or vomiting. No black stool, no blood in the stool. No heartburn. Denies any problems with constipation or diarrhea either. No dysuria type symptoms. Objective:     BP (!) 140/80   Pulse 67   Temp 97.9 °F (36.6 °C) (Temporal)   Resp 20   Wt (!) 315 lb (142.9 kg)   BMI 43.93 kg/m²     Physical Exam        O:  Alert and active male in no acute distress  HEENT:  TMs clear. Pharynx neg. Nares clear, no drainage noted  Neck supple/ no adenopathy   HEART:  RRR without murmur/ no carotid bruits  LUNGS:  Clear to auscultation bilaterally, no wheeze or rhonchi noted  THYROID: neg masses or nodularity  ABDOMEN:  Soft x4. Bowel sounds positive. No masses or organomegaly,  Negative tenderness, guarding or rebound. EXTR:  Without edema./ good pulses bilat    Neurologic exam unremarkable. DTRs in upper and lower extremities within normal limits.   Full strength noted    Skin- no PEN NEEDLES) 31G X 5 MM MISC     Si each by Does not apply route daily     Dispense:  100 each     Refill:  3     Orders Placed This Encounter   Procedures    POCT glycosylated hemoglobin (Hb A1C)           Health Maintenance Due   Topic Date Due    Hepatitis C screen  1958    Diabetic foot exam  1968    Diabetic retinal exam  1968    HIV screen  1973    DTaP/Tdap/Td vaccine (1 - Tdap) 1977    Pneumococcal med risk (1 of 1 - PPSV23) 1977    Shingles Vaccine (1 of 2 - 2 Dose Series) 2008    Colon cancer screen colonoscopy  2008             Controlled Substances Monitoring:                 at this time we'll go ahead and add Augmentin to his regimen to get complete coverage      His family does understand with her  here today for worsening emergent wants his sugars are under much better control                    If anything worsens or changes please call us at once , follow-up in the office as planned,

## 2018-03-17 LAB
BLOOD CULTURE, ROUTINE: NORMAL
CULTURE, BLOOD 2: NORMAL

## 2018-03-19 DIAGNOSIS — Z12.11 SCREEN FOR COLON CANCER: ICD-10-CM

## 2018-04-11 ENCOUNTER — OFFICE VISIT (OUTPATIENT)
Dept: FAMILY MEDICINE CLINIC | Age: 58
End: 2018-04-11
Payer: MEDICARE

## 2018-04-11 VITALS
OXYGEN SATURATION: 99 % | BODY MASS INDEX: 44.1 KG/M2 | WEIGHT: 315 LBS | HEART RATE: 77 BPM | SYSTOLIC BLOOD PRESSURE: 138 MMHG | RESPIRATION RATE: 15 BRPM | HEIGHT: 71 IN | DIASTOLIC BLOOD PRESSURE: 88 MMHG

## 2018-04-11 DIAGNOSIS — I10 ESSENTIAL HYPERTENSION: Primary | ICD-10-CM

## 2018-04-11 DIAGNOSIS — M79.671 FOOT PAIN, RIGHT: ICD-10-CM

## 2018-04-11 PROCEDURE — 1036F TOBACCO NON-USER: CPT | Performed by: FAMILY MEDICINE

## 2018-04-11 PROCEDURE — G8427 DOCREV CUR MEDS BY ELIG CLIN: HCPCS | Performed by: FAMILY MEDICINE

## 2018-04-11 PROCEDURE — 3017F COLORECTAL CA SCREEN DOC REV: CPT | Performed by: FAMILY MEDICINE

## 2018-04-11 PROCEDURE — 99213 OFFICE O/P EST LOW 20 MIN: CPT | Performed by: FAMILY MEDICINE

## 2018-04-11 PROCEDURE — G8417 CALC BMI ABV UP PARAM F/U: HCPCS | Performed by: FAMILY MEDICINE

## 2018-04-11 PROCEDURE — G8510 SCR DEP NEG, NO PLAN REQD: HCPCS | Performed by: FAMILY MEDICINE

## 2018-04-11 ASSESSMENT — PATIENT HEALTH QUESTIONNAIRE - PHQ9
2. FEELING DOWN, DEPRESSED OR HOPELESS: 0
SUM OF ALL RESPONSES TO PHQ QUESTIONS 1-9: 0
1. LITTLE INTEREST OR PLEASURE IN DOING THINGS: 0
SUM OF ALL RESPONSES TO PHQ9 QUESTIONS 1 & 2: 0

## 2018-04-16 ENCOUNTER — HOSPITAL ENCOUNTER (OUTPATIENT)
Dept: CARDIOLOGY | Age: 58
Discharge: HOME | End: 2018-04-16
Payer: MEDICARE

## 2018-04-16 PROCEDURE — 93282 PRGRMG EVAL IMPLANTABLE DFB: CPT

## 2018-04-17 ENCOUNTER — OFFICE VISIT (OUTPATIENT)
Dept: CARDIOLOGY CLINIC | Age: 58
End: 2018-04-17
Payer: MEDICARE

## 2018-04-17 VITALS
OXYGEN SATURATION: 96 % | WEIGHT: 315 LBS | HEART RATE: 68 BPM | HEIGHT: 71 IN | BODY MASS INDEX: 44.1 KG/M2 | SYSTOLIC BLOOD PRESSURE: 120 MMHG | DIASTOLIC BLOOD PRESSURE: 80 MMHG

## 2018-04-17 DIAGNOSIS — I42.0 DILATED CARDIOMYOPATHY (HCC): ICD-10-CM

## 2018-04-17 DIAGNOSIS — Z95.0 PACEMAKER: Primary | ICD-10-CM

## 2018-04-17 DIAGNOSIS — I10 ESSENTIAL HYPERTENSION: ICD-10-CM

## 2018-04-17 DIAGNOSIS — G47.33 OSA (OBSTRUCTIVE SLEEP APNEA): ICD-10-CM

## 2018-04-17 DIAGNOSIS — E78.2 MIXED HYPERLIPIDEMIA: ICD-10-CM

## 2018-04-17 DIAGNOSIS — E66.01 MORBID OBESITY (HCC): ICD-10-CM

## 2018-04-17 PROCEDURE — 1036F TOBACCO NON-USER: CPT | Performed by: INTERNAL MEDICINE

## 2018-04-17 PROCEDURE — 3017F COLORECTAL CA SCREEN DOC REV: CPT | Performed by: INTERNAL MEDICINE

## 2018-04-17 PROCEDURE — G8417 CALC BMI ABV UP PARAM F/U: HCPCS | Performed by: INTERNAL MEDICINE

## 2018-04-17 PROCEDURE — G8427 DOCREV CUR MEDS BY ELIG CLIN: HCPCS | Performed by: INTERNAL MEDICINE

## 2018-04-17 PROCEDURE — 99214 OFFICE O/P EST MOD 30 MIN: CPT | Performed by: INTERNAL MEDICINE

## 2018-07-11 ENCOUNTER — OFFICE VISIT (OUTPATIENT)
Dept: FAMILY MEDICINE CLINIC | Age: 58
End: 2018-07-11
Payer: MEDICARE

## 2018-07-11 VITALS
BODY MASS INDEX: 44.1 KG/M2 | HEIGHT: 71 IN | RESPIRATION RATE: 12 BRPM | WEIGHT: 315 LBS | TEMPERATURE: 96.3 F | DIASTOLIC BLOOD PRESSURE: 82 MMHG | HEART RATE: 69 BPM | SYSTOLIC BLOOD PRESSURE: 130 MMHG

## 2018-07-11 DIAGNOSIS — E66.01 MORBID OBESITY (HCC): ICD-10-CM

## 2018-07-11 DIAGNOSIS — I10 ESSENTIAL HYPERTENSION: ICD-10-CM

## 2018-07-11 DIAGNOSIS — E11.9 TYPE 2 DIABETES MELLITUS WITHOUT COMPLICATION, WITH LONG-TERM CURRENT USE OF INSULIN (HCC): Primary | ICD-10-CM

## 2018-07-11 DIAGNOSIS — H00.14 CHALAZION OF LEFT UPPER EYELID: ICD-10-CM

## 2018-07-11 DIAGNOSIS — Z79.4 TYPE 2 DIABETES MELLITUS WITHOUT COMPLICATION, WITH LONG-TERM CURRENT USE OF INSULIN (HCC): Primary | ICD-10-CM

## 2018-07-11 DIAGNOSIS — E78.2 MIXED HYPERLIPIDEMIA: ICD-10-CM

## 2018-07-11 PROCEDURE — 99214 OFFICE O/P EST MOD 30 MIN: CPT | Performed by: FAMILY MEDICINE

## 2018-07-11 PROCEDURE — 1036F TOBACCO NON-USER: CPT | Performed by: FAMILY MEDICINE

## 2018-07-11 PROCEDURE — G8417 CALC BMI ABV UP PARAM F/U: HCPCS | Performed by: FAMILY MEDICINE

## 2018-07-11 PROCEDURE — G8427 DOCREV CUR MEDS BY ELIG CLIN: HCPCS | Performed by: FAMILY MEDICINE

## 2018-07-11 PROCEDURE — 2022F DILAT RTA XM EVC RTNOPTHY: CPT | Performed by: FAMILY MEDICINE

## 2018-07-11 PROCEDURE — 3045F PR MOST RECENT HEMOGLOBIN A1C LEVEL 7.0-9.0%: CPT | Performed by: FAMILY MEDICINE

## 2018-07-11 PROCEDURE — 3017F COLORECTAL CA SCREEN DOC REV: CPT | Performed by: FAMILY MEDICINE

## 2018-07-11 RX ORDER — ENALAPRIL MALEATE 20 MG/1
20 TABLET ORAL 2 TIMES DAILY
Qty: 180 TABLET | Refills: 1 | Status: SHIPPED | OUTPATIENT
Start: 2018-07-11 | End: 2018-10-11 | Stop reason: SDUPTHER

## 2018-07-11 RX ORDER — POLYMYXIN B SULFATE AND TRIMETHOPRIM 1; 10000 MG/ML; [USP'U]/ML
1 SOLUTION OPHTHALMIC EVERY 6 HOURS
Qty: 1 BOTTLE | Refills: 1 | Status: SHIPPED | OUTPATIENT
Start: 2018-07-11 | End: 2018-07-18

## 2018-07-11 NOTE — PROGRESS NOTES
Subjective:      Patient ID: Paulo Schwab is a 59 y.o. male. Chief Complaint   Patient presents with    Diabetes     Taking all medications as prescribed. checks glucose at home daily. Tries to watch diet    Hypertension     Does not check BP at home. Watches salt intake    Hyperlipidemia     Walks for exercise    Foreign Body in Eye     left eye x 2 weeks. States it feels like there is sand in the eye. Mild discomfort present. HPI    Here today follow-up his diabetes doing well taking all his medications sugars have been 140s 150 range does try to watch his carbohydrates and sugar also is followed with blood pressure doing well with that does watch his salt intake      Take his medications as listed he is exercising a bit but his cholesterol comes down      Also his left eye last week selective something in there mild discomfort does not work contact has not done any work we could do anything in her no other shortness breath chest pain nausea vomiting                  Allergies   Allergen Reactions    Coreg [Carvedilol] Other (See Comments)     \"hyper\", rapid pulse     Outpatient Encounter Prescriptions as of 7/11/2018   Medication Sig Dispense Refill    enalapril (VASOTEC) 20 MG tablet Take 1 tablet by mouth 2 times daily 180 tablet 1    trimethoprim-polymyxin b (POLYTRIM) 58261-0.1 UNIT/ML-% ophthalmic solution Place 1 drop into both eyes every 6 hours for 7 days 1 Bottle 1    glipiZIDE (GLUCOTROL) 10 MG tablet Take 2 tablets by mouth 2 times daily (before meals) 360 tablet 1    glucose blood VI test strips (TRUE METRIX BLOOD GLUCOSE TEST) strip 1 each by In Vitro route 3 times daily As needed.  100 each 3    metFORMIN (GLUCOPHAGE) 1000 MG tablet Take 1 tablet by mouth 2 times daily (with meals) 180 tablet 1    omeprazole (PRILOSEC) 20 MG delayed release capsule Take 1 capsule by mouth daily 90 capsule 1    digoxin (LANOXIN) 125 MCG tablet Take 1 tablet by mouth daily 90 tablet 1    medications on file as of 7/11/2018. Social History     Social History    Marital status:      Spouse name: N/A    Number of children: N/A    Years of education: N/A     Occupational History    Not on file. Social History Main Topics    Smoking status: Former Smoker     Packs/day: 1.00     Years: 25.00     Quit date: 12/1/2008    Smokeless tobacco: Never Used    Alcohol use No    Drug use: Unknown    Sexual activity: Not on file     Other Topics Concern    Not on file     Social History Narrative    No narrative on file     Family History   Problem Relation Age of Onset    Heart Disease Mother     Kidney Disease Mother     Hypertension Mother     Diabetes Mother     No Known Problems Sister     No Known Problems Brother     No Known Problems Brother      Past Medical History:   Diagnosis Date    [facility] ([facility] [address]     Hyperlipidemia     Hypertension     Morbid obesity ([facility] [address] 12/19/2017     Past Surgical History:   Procedure Laterality Date    CARDIAC DEFIBRILLATOR PLACEMENT  12/2008         REVIEW OF SYSTEMS:   Patient seen today for exam.  Denies any problems with hearing, headaches or vision. Denies any shortness of breath, chest pain, nausea or vomiting. No black stool, no blood in the stool. No heartburn. Denies any problems with constipation or diarrhea either. No dysuria type symptoms. Objective:     /82 (Site: Right Arm, Position: Sitting, Cuff Size: Large Adult)   Pulse 69   Temp 96.3 °F (35.7 °C) (Temporal)   Resp 12   Ht 5' 11\" (1.803 m)   Wt (!) 315 lb 6 oz (143.1 kg)   BMI 43.99 kg/m²     Physical Exam        O:  Alert and active male in no acute distress  HEENT:  TMs clear. Pharynx neg. Nares clear, no drainage noted  Neck supple/ no adenopathy   HEART:  RRR without murmur/ no carotid bruits  LUNGS:  Clear to auscultation bilaterally, no wheeze or rhonchi noted  THYROID: neg masses or nodularity  ABDOMEN:  Soft x4.   Bowel sounds positive. No masses or organomegaly,  Negative tenderness, guarding or rebound. EXTR:  Without edema./ good pulses bilat    Neurologic exam unremarkable. DTRs in upper and lower extremities within normal limits. Full strength noted    Skin- no lesions noted    left eye unremarkable small chilies he noted     DIABETIC FOOT EXAM:  Bilateral feet were examined here today with normal pulses anteriorly and posteriorly at the dorsalis pedis. No callus, fissuring or sores noted. Sensation intact. Fine pin prick testing was within normal limits    Assessment:       Diagnosis Orders   1. Type 2 diabetes mellitus without complication, with long-term current use of insulin ([facility])  enalapril (VASOTEC) 20 MG tablet   2. Mixed hyperlipidemia     3. Essential hypertension     4. Morbid obesity ([address]     5. Chalazion of left upper eyelid               Plan:        Orders Placed This Encounter   Medications    enalapril (VASOTEC) 20 MG tablet     Sig: Take 1 tablet by mouth 2 times daily     Dispense:  180 tablet     Refill:  1    trimethoprim-polymyxin b (POLYTRIM) 94965-5.1 UNIT/ML-% ophthalmic solution     Sig: Place 1 drop into both eyes every 6 hours for 7 days     Dispense:  1 Bottle     Refill:  1     No orders of the defined types were placed in this encounter. Health Maintenance Due   Topic Date Due    Hepatitis C screen  1958    Diabetic foot exam  02/01/1968    Diabetic retinal exam  02/01/1968    HIV screen  02/01/1973    DTaP/Tdap/Td vaccine (1 - Tdap) 02/01/1977    Pneumococcal med risk (1 of 1 - PPSV23) 02/01/1977    Shingles Vaccine (1 of 2 - 2 Dose Series) 02/01/2008        will keep this area clean and dry as I will use some shampoo soaks is reassured her sugars are doing well with refills medications will continue things and see us in 4 months      Controlled Substances Monitoring:     No flowsheet data found.         If anything worsens or changes please call us at once , follow-up in the office as planned,

## 2018-08-15 ENCOUNTER — HOSPITAL ENCOUNTER (OUTPATIENT)
Dept: CARDIOLOGY | Age: 58
Discharge: HOME | End: 2018-08-15
Payer: MEDICARE

## 2018-08-15 PROCEDURE — 93282 PRGRMG EVAL IMPLANTABLE DFB: CPT

## 2018-09-06 DIAGNOSIS — Z79.4 TYPE 2 DIABETES MELLITUS WITHOUT COMPLICATION, WITH LONG-TERM CURRENT USE OF INSULIN (HCC): ICD-10-CM

## 2018-09-06 DIAGNOSIS — I42.0 DILATED CARDIOMYOPATHY (HCC): ICD-10-CM

## 2018-09-06 DIAGNOSIS — E11.9 TYPE 2 DIABETES MELLITUS WITHOUT COMPLICATION, WITH LONG-TERM CURRENT USE OF INSULIN (HCC): ICD-10-CM

## 2018-09-06 RX ORDER — GLIPIZIDE 10 MG/1
TABLET ORAL
Qty: 360 TABLET | Refills: 1 | Status: SHIPPED | OUTPATIENT
Start: 2018-09-06 | End: 2018-10-11 | Stop reason: SDUPTHER

## 2018-09-06 RX ORDER — DIGOXIN 125 UG/1
TABLET ORAL
Qty: 90 TABLET | Refills: 1 | Status: SHIPPED | OUTPATIENT
Start: 2018-09-06 | End: 2018-10-11 | Stop reason: SDUPTHER

## 2018-09-07 RX ORDER — FENOFIBRATE 160 MG/1
TABLET ORAL
Qty: 90 TABLET | Refills: 1 | Status: SHIPPED | OUTPATIENT
Start: 2018-09-07 | End: 2018-10-11 | Stop reason: SDUPTHER

## 2018-09-16 DIAGNOSIS — I42.0 DILATED CARDIOMYOPATHY (HCC): ICD-10-CM

## 2018-09-18 RX ORDER — AMLODIPINE BESYLATE 10 MG/1
10 TABLET ORAL NIGHTLY
Qty: 90 TABLET | Refills: 0 | Status: SHIPPED | OUTPATIENT
Start: 2018-09-18 | End: 2018-10-11 | Stop reason: SDUPTHER

## 2018-09-18 RX ORDER — SERTRALINE HYDROCHLORIDE 100 MG/1
100 TABLET, FILM COATED ORAL DAILY
Qty: 90 TABLET | Refills: 0 | Status: SHIPPED | OUTPATIENT
Start: 2018-09-18 | End: 2018-10-11 | Stop reason: SDUPTHER

## 2018-10-11 ENCOUNTER — OFFICE VISIT (OUTPATIENT)
Dept: FAMILY MEDICINE CLINIC | Age: 58
End: 2018-10-11
Payer: MEDICARE

## 2018-10-11 VITALS
OXYGEN SATURATION: 98 % | BODY MASS INDEX: 44.1 KG/M2 | WEIGHT: 315 LBS | RESPIRATION RATE: 16 BRPM | DIASTOLIC BLOOD PRESSURE: 88 MMHG | TEMPERATURE: 97.6 F | SYSTOLIC BLOOD PRESSURE: 134 MMHG | HEIGHT: 71 IN | HEART RATE: 72 BPM

## 2018-10-11 DIAGNOSIS — R06.83 SNORING: ICD-10-CM

## 2018-10-11 DIAGNOSIS — E11.9 TYPE 2 DIABETES MELLITUS WITHOUT COMPLICATION, WITH LONG-TERM CURRENT USE OF INSULIN (HCC): Primary | ICD-10-CM

## 2018-10-11 DIAGNOSIS — I10 ESSENTIAL HYPERTENSION: ICD-10-CM

## 2018-10-11 DIAGNOSIS — I42.0 DILATED CARDIOMYOPATHY (HCC): ICD-10-CM

## 2018-10-11 DIAGNOSIS — R53.83 MALAISE AND FATIGUE: ICD-10-CM

## 2018-10-11 DIAGNOSIS — R53.81 MALAISE AND FATIGUE: ICD-10-CM

## 2018-10-11 DIAGNOSIS — Z79.4 TYPE 2 DIABETES MELLITUS WITHOUT COMPLICATION, WITH LONG-TERM CURRENT USE OF INSULIN (HCC): Primary | ICD-10-CM

## 2018-10-11 DIAGNOSIS — E78.5 HYPERLIPIDEMIA, UNSPECIFIED HYPERLIPIDEMIA TYPE: ICD-10-CM

## 2018-10-11 DIAGNOSIS — E11.42 DIABETIC POLYNEUROPATHY ASSOCIATED WITH TYPE 2 DIABETES MELLITUS (HCC): ICD-10-CM

## 2018-10-11 PROCEDURE — 2022F DILAT RTA XM EVC RTNOPTHY: CPT | Performed by: FAMILY MEDICINE

## 2018-10-11 PROCEDURE — G8427 DOCREV CUR MEDS BY ELIG CLIN: HCPCS | Performed by: FAMILY MEDICINE

## 2018-10-11 PROCEDURE — 1036F TOBACCO NON-USER: CPT | Performed by: FAMILY MEDICINE

## 2018-10-11 PROCEDURE — 99214 OFFICE O/P EST MOD 30 MIN: CPT | Performed by: FAMILY MEDICINE

## 2018-10-11 PROCEDURE — G8417 CALC BMI ABV UP PARAM F/U: HCPCS | Performed by: FAMILY MEDICINE

## 2018-10-11 PROCEDURE — 3045F PR MOST RECENT HEMOGLOBIN A1C LEVEL 7.0-9.0%: CPT | Performed by: FAMILY MEDICINE

## 2018-10-11 PROCEDURE — 3017F COLORECTAL CA SCREEN DOC REV: CPT | Performed by: FAMILY MEDICINE

## 2018-10-11 PROCEDURE — G8484 FLU IMMUNIZE NO ADMIN: HCPCS | Performed by: FAMILY MEDICINE

## 2018-10-11 RX ORDER — INSULIN GLARGINE 100 [IU]/ML
50 INJECTION, SOLUTION SUBCUTANEOUS NIGHTLY
Qty: 3 VIAL | Refills: 5 | Status: CANCELLED | OUTPATIENT
Start: 2018-10-11

## 2018-10-11 RX ORDER — SIMVASTATIN 20 MG
20 TABLET ORAL NIGHTLY
Qty: 90 TABLET | Refills: 1 | Status: SHIPPED | OUTPATIENT
Start: 2018-10-11 | End: 2019-03-20 | Stop reason: SDUPTHER

## 2018-10-11 RX ORDER — CALCIUM CARB/VITAMIN D3/VIT K1 500-100-40
TABLET,CHEWABLE ORAL
Qty: 100 EACH | Refills: 1 | Status: CANCELLED | OUTPATIENT
Start: 2018-10-11

## 2018-10-11 RX ORDER — GINSENG 100 MG
CAPSULE ORAL
Qty: 1 TUBE | Refills: 0 | Status: CANCELLED | OUTPATIENT
Start: 2018-10-11

## 2018-10-11 RX ORDER — FUROSEMIDE 40 MG/1
40 TABLET ORAL DAILY
Qty: 180 TABLET | Refills: 1 | Status: SHIPPED | OUTPATIENT
Start: 2018-10-11 | End: 2019-05-07 | Stop reason: SDUPTHER

## 2018-10-11 RX ORDER — ALBUTEROL SULFATE 2.5 MG/3ML
2.5 SOLUTION RESPIRATORY (INHALATION) 4 TIMES DAILY
Qty: 120 EACH | Refills: 5 | Status: ON HOLD | OUTPATIENT
Start: 2018-10-11 | End: 2020-04-23

## 2018-10-11 RX ORDER — AMLODIPINE BESYLATE 10 MG/1
10 TABLET ORAL NIGHTLY
Qty: 90 TABLET | Refills: 0 | Status: SHIPPED | OUTPATIENT
Start: 2018-10-11 | End: 2019-03-04 | Stop reason: SDUPTHER

## 2018-10-11 RX ORDER — ENALAPRIL MALEATE 20 MG/1
20 TABLET ORAL 2 TIMES DAILY
Qty: 180 TABLET | Refills: 1 | Status: SHIPPED | OUTPATIENT
Start: 2018-10-11 | End: 2019-05-07 | Stop reason: SDUPTHER

## 2018-10-11 RX ORDER — GABAPENTIN 300 MG/1
300 CAPSULE ORAL 3 TIMES DAILY
Qty: 90 CAPSULE | Refills: 2 | Status: SHIPPED | OUTPATIENT
Start: 2018-10-11 | End: 2020-02-11

## 2018-10-11 RX ORDER — PRENATAL VIT 91/IRON/FOLIC/DHA 28-975-200
COMBINATION PACKAGE (EA) ORAL 2 TIMES DAILY
Qty: 1 TUBE | Refills: 3 | Status: ON HOLD | OUTPATIENT
Start: 2018-10-11

## 2018-10-11 RX ORDER — OMEPRAZOLE 20 MG/1
20 CAPSULE, DELAYED RELEASE ORAL DAILY
Qty: 90 CAPSULE | Refills: 1 | Status: CANCELLED | OUTPATIENT
Start: 2018-10-11

## 2018-10-11 RX ORDER — FENOFIBRATE 160 MG/1
TABLET ORAL
Qty: 90 TABLET | Refills: 1 | Status: SHIPPED | OUTPATIENT
Start: 2018-10-11 | End: 2019-11-06 | Stop reason: SDUPTHER

## 2018-10-11 RX ORDER — SERTRALINE HYDROCHLORIDE 100 MG/1
100 TABLET, FILM COATED ORAL DAILY
Qty: 90 TABLET | Refills: 0 | Status: SHIPPED | OUTPATIENT
Start: 2018-10-11 | End: 2019-03-04 | Stop reason: SDUPTHER

## 2018-10-11 RX ORDER — BLOOD-GLUCOSE METER
1 KIT MISCELLANEOUS 3 TIMES DAILY
Qty: 1 KIT | Refills: 5 | Status: ON HOLD | OUTPATIENT
Start: 2018-10-11

## 2018-10-11 RX ORDER — GABAPENTIN 300 MG/1
300 CAPSULE ORAL 3 TIMES DAILY
Qty: 90 CAPSULE | Refills: 1 | Status: SHIPPED | OUTPATIENT
Start: 2018-10-11 | End: 2018-10-11 | Stop reason: SDUPTHER

## 2018-10-11 RX ORDER — METOPROLOL TARTRATE 50 MG/1
50 TABLET, FILM COATED ORAL 2 TIMES DAILY
Qty: 180 TABLET | Refills: 1 | Status: SHIPPED | OUTPATIENT
Start: 2018-10-11 | End: 2019-05-07 | Stop reason: SDUPTHER

## 2018-10-11 RX ORDER — LANCETS 30 GAUGE
EACH MISCELLANEOUS
Qty: 100 EACH | Refills: 3 | Status: ON HOLD | OUTPATIENT
Start: 2018-10-11

## 2018-10-11 RX ORDER — HYDROXYZINE PAMOATE 50 MG/1
50 CAPSULE ORAL NIGHTLY
Qty: 90 CAPSULE | Refills: 2 | Status: SHIPPED | OUTPATIENT
Start: 2018-10-11 | End: 2020-02-11

## 2018-10-11 RX ORDER — RANITIDINE 300 MG/1
300 TABLET ORAL DAILY
Qty: 90 TABLET | Refills: 1 | Status: SHIPPED | OUTPATIENT
Start: 2018-10-11 | End: 2019-03-19

## 2018-10-11 RX ORDER — GLIPIZIDE 10 MG/1
TABLET ORAL
Qty: 360 TABLET | Refills: 1 | Status: SHIPPED | OUTPATIENT
Start: 2018-10-11 | End: 2019-08-15

## 2018-10-11 RX ORDER — DIGOXIN 125 MCG
TABLET ORAL
Qty: 90 TABLET | Refills: 1 | Status: SHIPPED | OUTPATIENT
Start: 2018-10-11 | End: 2019-05-07 | Stop reason: SDUPTHER

## 2018-10-11 NOTE — PROGRESS NOTES
of 10/11/2018. Social History     Social History    Marital status:      Spouse name: N/A    Number of children: N/A    Years of education: N/A     Occupational History    Not on file. Social History Main Topics    Smoking status: Former Smoker     Packs/day: 1.00     Years: 25.00     Quit date: 12/1/2008    Smokeless tobacco: Never Used    Alcohol use No    Drug use: Unknown    Sexual activity: Not on file     Other Topics Concern    Not on file     Social History Narrative    No narrative on file     Family History   Problem Relation Age of Onset    Heart Disease Mother     Kidney Disease Mother     Hypertension Mother     Diabetes Mother     No Known Problems Sister     No Known Problems Brother     No Known Problems Brother      Past Medical History:   Diagnosis Date    Diabetes mellitus ([facility] [address]     Hyperlipidemia     Hypertension     Morbid obesity ([facility] [address] 12/19/2017     Past Surgical History:   Procedure Laterality Date    CARDIAC DEFIBRILLATOR PLACEMENT  12/2008         REVIEW OF SYSTEMS:   Patient seen today for exam.  Denies any problems with hearing, headaches or vision. Denies any shortness of breath, chest pain, nausea or vomiting. No black stool, no blood in thestool. No heartburn. Denies any problems with constipation or diarrhea either. No dysuria type symptoms. Objective:     /88   Pulse 72   Temp 97.6 °F (36.4 °C) (Temporal)   Resp 16   Ht 5' 11\" (1.803 m)   Wt (!) 315 lb 6.4 oz (143.1 kg)   SpO2 98%   BMI 43.99 kg/m²     Physical Exam        O:  Alert and active male in no acute distress  HEENT:  TMs clear. Pharynx neg. Nares clear, no drainage noted  Neck supple/ no adenopathy   HEART:  RRR without murmur/ no carotid bruits  LUNGS:  Clear to auscultation bilaterally, no wheeze or rhonchi noted  THYROID: neg masses or nodularity  ABDOMEN:  Soft x4. Bowel sounds positive.  No masses or organomegaly,  Negative tenderness, guarding or rebound. EXTR:  Without edema./ good pulses bilat    Neurologic exam unremarkable. DTRs in upper and lower extremities within normal limits. Full strength noted    Skin- no lesions noted       DIABETIC FOOT EXAM:  Bilateral feet were examined here today with normal pulses anteriorly and posteriorly at the dorsalis pedis. No callus, fissuring or sores noted. Sensation intact. Fine pin prick testing was within normal limits    Assessment:       Diagnosis Orders   1. Type 2 diabetes mellitus without complication, with long-term current use of insulin ([facility])  metFORMIN (GLUCOPHAGE) 1000 MG tablet    enalapril (VASOTEC) 20 MG tablet    albuterol (PROVENTIL) (2.5 MG/3ML) 0.083% nebulizer solution    hydrOXYzine (VISTARIL) 50 MG capsule     DIABETES FOOT EXAM   2. Dilated cardiomyopathy ([facility])  amLODIPine (NORVASC) 10 MG tablet    sertraline (ZOLOFT) 100 MG tablet    fenofibrate 160 MG tablet    digoxin (DIGOX) 125 MCG tablet    furosemide (LASIX) 40 MG tablet    ranitidine (ZANTAC) 300 MG tablet   3. Diabetic polyneuropathy associated with type 2 diabetes mellitus ([facility])  terbinafine (LAMISIL) 1 % cream    gabapentin (NEURONTIN) 300 MG capsule    DISCONTINUED: gabapentin (NEURONTIN) 300 MG capsule   4. Essential hypertension  metoprolol tartrate (LOPRESSOR) 50 MG tablet   5. Hyperlipidemia, unspecified hyperlipidemia type  simvastatin (ZOCOR) 20 MG tablet   6. Snoring     7.  Malaise and fatigue               Plan:        Orders Placed This Encounter   Medications    amLODIPine (NORVASC) 10 MG tablet     Sig: Take 1 tablet by mouth nightly     Dispense:  90 tablet     Refill:  0    sertraline (ZOLOFT) 100 MG tablet     Sig: Take 1 tablet by mouth daily     Dispense:  90 tablet     Refill:  0    fenofibrate 160 MG tablet     Sig: TAKE 1 TABLET BY MOUTH EVERY NIGHT     Dispense:  90 tablet     Refill:  1    digoxin (DIGOX) 125 MCG tablet     Sig: TAKE 1 TABLET BY MOUTH ONCE DAILY     Dispense:  90 tablet Refill:  1    metFORMIN (GLUCOPHAGE) 1000 MG tablet     Sig: TAKE 1 TABLET BY MOUTH TWICE DAILY, WITH MEALS     Dispense:  180 tablet     Refill:  1    glipiZIDE (GLUCOTROL) 10 MG tablet     Sig: TAKE 2 TABLETS BY MOUTH TWICE DAILY BEFORE MEALS     Dispense:  360 tablet     Refill:  1    enalapril (VASOTEC) 20 MG tablet     Sig: Take 1 tablet by mouth 2 times daily     Dispense:  180 tablet     Refill:  1    blood glucose test strips (TRUE METRIX BLOOD GLUCOSE TEST) strip     Si each by In Vitro route 3 times daily As needed. Dispense:  100 each     Refill:  3    DISCONTD: gabapentin (NEURONTIN) 300 MG capsule     Sig: Take 1 capsule by mouth 3 times daily for 30 days. .     Dispense:  90 capsule     Refill:  1    furosemide (LASIX) 40 MG tablet     Sig: Take 1 tablet by mouth daily     Dispense:  180 tablet     Refill:  1    ranitidine (ZANTAC) 300 MG tablet     Sig: Take 1 tablet by mouth daily     Dispense:  90 tablet     Refill:  1    metoprolol tartrate (LOPRESSOR) 50 MG tablet     Sig: Take 1 tablet by mouth 2 times daily     Dispense:  180 tablet     Refill:  1    simvastatin (ZOCOR) 20 MG tablet     Sig: Take 1 tablet by mouth nightly     Dispense:  90 tablet     Refill:  1    albuterol (PROVENTIL) (2.5 MG/3ML) 0.083% nebulizer solution     Sig: Take 3 mLs by nebulization 4 times daily     Dispense:  120 each     Refill:  5    hydrOXYzine (VISTARIL) 50 MG capsule     Sig: Take 1 capsule by mouth nightly     Dispense:  90 capsule     Refill:  2    terbinafine (LAMISIL) 1 % cream     Sig: Apply topically 2 times daily Apply topically 2 times daily.      Dispense:  1 Tube     Refill:  3    insulin glargine (LANTUS SOLOSTAR) 100 UNIT/ML injection pen     Sig: Inject 55 Units into the skin nightly     Dispense:  10 pen     Refill:  3    Insulin Pen Needle (B-D UF III MINI PEN NEEDLES) 31G X 5 MM MISC     Si each by Does not apply route daily     Dispense:  100 each     Refill:  3    Blood

## 2018-11-02 ENCOUNTER — HOSPITAL ENCOUNTER (OUTPATIENT)
Dept: SLEEP CENTER | Age: 58
Discharge: HOME | End: 2018-11-04
Payer: MEDICARE

## 2018-11-02 PROCEDURE — 95811 POLYSOM 6/>YRS CPAP 4/> PARM: CPT | Performed by: INTERNAL MEDICINE

## 2018-11-02 PROCEDURE — 95811 POLYSOM 6/>YRS CPAP 4/> PARM: CPT

## 2018-11-21 ENCOUNTER — TELEPHONE (OUTPATIENT)
Dept: PHARMACY | Facility: CLINIC | Age: 58
End: 2018-11-21

## 2018-11-21 NOTE — TELEPHONE ENCOUNTER
CLINICAL PHARMACY: [facility]    Per [facility]:   ENALAPRIL TAB 20 MG was last filled on 10/7/18 for a 90-day supply billed thru patient's [place] insurance    No patient out reach planned at this time.     Laxmi Lagos, 2019 PharmD Candidate

## 2018-12-12 ENCOUNTER — HOSPITAL ENCOUNTER (OUTPATIENT)
Dept: CARDIOLOGY | Age: 58
Discharge: HOME | End: 2018-12-12
Payer: MEDICARE

## 2018-12-12 PROCEDURE — 93282 PRGRMG EVAL IMPLANTABLE DFB: CPT

## 2019-03-04 DIAGNOSIS — I42.0 DILATED CARDIOMYOPATHY (HCC): ICD-10-CM

## 2019-03-04 RX ORDER — SERTRALINE HYDROCHLORIDE 100 MG/1
100 TABLET, FILM COATED ORAL DAILY
Qty: 90 TABLET | Refills: 0 | Status: SHIPPED | OUTPATIENT
Start: 2019-03-04 | End: 2019-05-07 | Stop reason: SDUPTHER

## 2019-03-04 RX ORDER — AMLODIPINE BESYLATE 10 MG/1
TABLET ORAL
Qty: 90 TABLET | Refills: 0 | Status: SHIPPED | OUTPATIENT
Start: 2019-03-04 | End: 2019-05-07 | Stop reason: SDUPTHER

## 2019-03-14 ENCOUNTER — TELEPHONE (OUTPATIENT)
Dept: FAMILY MEDICINE CLINIC | Age: 59
End: 2019-03-14

## 2019-03-18 DIAGNOSIS — I42.0 DILATED CARDIOMYOPATHY (HCC): ICD-10-CM

## 2019-03-19 ENCOUNTER — TELEPHONE (OUTPATIENT)
Dept: FAMILY MEDICINE CLINIC | Age: 59
End: 2019-03-19

## 2019-03-19 RX ORDER — RANITIDINE 300 MG/1
300 TABLET ORAL DAILY
Qty: 90 TABLET | Refills: 0 | Status: SHIPPED | OUTPATIENT
Start: 2019-03-19 | End: 2019-08-23 | Stop reason: SDUPTHER

## 2019-03-20 ENCOUNTER — TELEPHONE (OUTPATIENT)
Dept: PHARMACY | Facility: CLINIC | Age: 59
End: 2019-03-20

## 2019-03-20 DIAGNOSIS — E78.5 HYPERLIPIDEMIA, UNSPECIFIED HYPERLIPIDEMIA TYPE: ICD-10-CM

## 2019-03-20 RX ORDER — SIMVASTATIN 20 MG
TABLET ORAL
Qty: 90 TABLET | Refills: 0 | Status: SHIPPED | OUTPATIENT
Start: 2019-03-20 | End: 2019-04-30

## 2019-03-27 ENCOUNTER — TELEPHONE (OUTPATIENT)
Dept: FAMILY MEDICINE CLINIC | Age: 59
End: 2019-03-27

## 2019-04-10 ENCOUNTER — HOSPITAL ENCOUNTER (OUTPATIENT)
Dept: CARDIOLOGY | Age: 59
Discharge: HOME | End: 2019-04-10
Payer: MEDICARE

## 2019-04-10 PROCEDURE — 93282 PRGRMG EVAL IMPLANTABLE DFB: CPT

## 2019-04-25 ENCOUNTER — HOSPITAL ENCOUNTER (EMERGENCY)
Age: 59
Discharge: HOME | End: 2019-04-25
Attending: EMERGENCY MEDICINE
Payer: MEDICARE

## 2019-04-25 VITALS
WEIGHT: 310 LBS | TEMPERATURE: 98.1 F | DIASTOLIC BLOOD PRESSURE: 79 MMHG | HEIGHT: 71 IN | SYSTOLIC BLOOD PRESSURE: 185 MMHG | HEART RATE: 65 BPM | OXYGEN SATURATION: 98 % | BODY MASS INDEX: 43.4 KG/M2 | RESPIRATION RATE: 18 BRPM

## 2019-04-25 DIAGNOSIS — L84 CALLUS OF FOOT: Primary | ICD-10-CM

## 2019-04-25 PROCEDURE — 99283 EMERGENCY DEPT VISIT LOW MDM: CPT

## 2019-04-25 RX ORDER — NAPROXEN 500 MG/1
500 TABLET ORAL 2 TIMES DAILY
Qty: 20 TABLET | Refills: 0 | Status: ON HOLD | OUTPATIENT
Start: 2019-04-25 | End: 2019-06-12 | Stop reason: HOSPADM

## 2019-04-25 RX ORDER — CEPHALEXIN 500 MG/1
1000 CAPSULE ORAL 2 TIMES DAILY
Qty: 40 CAPSULE | Refills: 0 | Status: SHIPPED | OUTPATIENT
Start: 2019-04-25 | End: 2019-04-30 | Stop reason: ALTCHOICE

## 2019-04-25 ASSESSMENT — PAIN DESCRIPTION - LOCATION: LOCATION: FOOT

## 2019-04-25 ASSESSMENT — ENCOUNTER SYMPTOMS
BACK PAIN: 0
COUGH: 0
SHORTNESS OF BREATH: 0
ABDOMINAL PAIN: 0

## 2019-04-25 ASSESSMENT — PAIN DESCRIPTION - ORIENTATION: ORIENTATION: RIGHT

## 2019-04-25 ASSESSMENT — PAIN DESCRIPTION - FREQUENCY: FREQUENCY: CONTINUOUS

## 2019-04-25 ASSESSMENT — PAIN SCALES - GENERAL: PAINLEVEL_OUTOF10: 9

## 2019-04-25 ASSESSMENT — PAIN DESCRIPTION - PAIN TYPE: TYPE: ACUTE PAIN

## 2019-04-25 ASSESSMENT — PAIN DESCRIPTION - DESCRIPTORS: DESCRIPTORS: ACHING;TENDER

## 2019-04-25 NOTE — ED TRIAGE NOTES
Pt has a callus on the inner aspect of th bottom of his right foot, c/o pain , no redness or edema noted sensation and movEment intact, pulses palpable, pt is ambulatory with discomfort. Kaye Couch

## 2019-04-25 NOTE — ED PROVIDER NOTES
[address] [facility] ED  eMERGENCY dEPARTMENT eNCOUnter      Pt Name: Paul Box  MRN: [id number]  [place] 1960  Date of evaluation: 4/25/2019  Provider: Mirta Severe, APRN - CNP      HISTORY OF PRESENT ILLNESS    Paul Box is a 62 y.o. male who presents to the Emergency Department with R great toe callus that has become painful with ambulation and touch x 2 days. He denies any fever, nausea. He is able to ambulate without difficulty. Pain is moderate with pressure on the foot only. REVIEW OF SYSTEMS       Review of Systems   Constitutional: Negative for fever. HENT: Negative for congestion. Respiratory: Negative for cough and shortness of breath. Cardiovascular: Negative for chest pain. Gastrointestinal: Negative for abdominal pain. Genitourinary: Negative for dysuria. Musculoskeletal: Negative for arthralgias and back pain. Skin: Negative for rash. Painful Callous to R foot   All other systems reviewed and are negative. PAST MEDICAL HISTORY     Past Medical History:   Diagnosis Date    Diabetes mellitus ([facility] [address]     Hyperlipidemia     Hypertension     Morbid obesity ([facility] [address] 12/19/2017         SURGICAL HISTORY       Past Surgical History:   Procedure Laterality Date    CARDIAC DEFIBRILLATOR PLACEMENT  12/2008         CURRENT MEDICATIONS       Previous Medications    ALBUTEROL (PROVENTIL) (2.5 MG/3ML) 0.083% NEBULIZER SOLUTION    Take 3 mLs by nebulization 4 times daily    AMLODIPINE (NORVASC) 10 MG TABLET    TAKE 1 TABLET BY MOUTH EVERY NIGHT    ASPIRIN 325 MG TABLET    Take 1 tablet by mouth daily    BACITRACIN 500 UNIT/GM OINTMENT    Apply topically 2 times daily. BLOOD GLUCOSE MONITORING SUPPL (ONE TOUCH ULTRA MINI) W/DEVICE KIT    1 kit by Does not apply route three times daily    BLOOD GLUCOSE TEST STRIPS (TRUE METRIX BLOOD GLUCOSE TEST) STRIP    1 each by In Vitro route 3 times daily As needed.     DIGOXIN ([address] [facility]) 125 MCG TABLET    TAKE 1 TABLET BY MOUTH ONCE DAILY    ENALAPRIL (VASOTEC) 20 MG TABLET    Take 1 tablet by mouth 2 times daily    FENOFIBRATE 160 MG TABLET    TAKE 1 TABLET BY MOUTH EVERY NIGHT    FUROSEMIDE (LASIX) 40 MG TABLET    Take 1 tablet by mouth daily    GABAPENTIN (NEURONTIN) 300 MG CAPSULE    Take 1 capsule by mouth 3 times daily for 30 days. Cydne Hari GLIPIZIDE (GLUCOTROL) 10 MG TABLET    TAKE 2 TABLETS BY MOUTH TWICE DAILY BEFORE MEALS    HYDROXYZINE (VISTARIL) 50 MG CAPSULE    Take 1 capsule by mouth nightly    INSULIN GLARGINE (LANTUS SOLOSTAR) 100 UNIT/ML INJECTION PEN    Inject 55 Units into the skin nightly    INSULIN GLARGINE (LANTUS) 100 UNIT/ML INJECTION VIAL    Inject 50 Units into the skin nightly    INSULIN PEN NEEDLE (B-D UF III MINI PEN NEEDLES) 31G X 5 MM MISC    1 each by Does not apply route daily    INSULIN SYRINGE-NEEDLE U-100 (INSULIN SYRINGE 1CC/31GX5/16\") 31G X 5/16\" 1 ML MISC    Use once daily to administer Lantus    LANCETS MISC    Use TID    METFORMIN (GLUCOPHAGE) 1000 MG TABLET    TAKE 1 TABLET BY MOUTH TWICE DAILY, WITH MEALS    METOPROLOL TARTRATE (LOPRESSOR) 50 MG TABLET    Take 1 tablet by mouth 2 times daily    OMEPRAZOLE (PRILOSEC) 20 MG DELAYED RELEASE CAPSULE    Take 1 capsule by mouth daily    RANITIDINE (ZANTAC) 300 MG TABLET    TAKE 1 TABLET BY MOUTH DAILY    SERTRALINE (ZOLOFT) 100 MG TABLET    TAKE 1 TABLET BY MOUTH DAILY    SIMVASTATIN (ZOCOR) 20 MG TABLET    TAKE 1 TABLET BY MOUTH EVERY NIGHT    TERBINAFINE (LAMISIL) 1 % CREAM    Apply topically 2 times daily Apply topically 2 times daily.        ALLERGIES     Coreg [carvedilol]    FAMILY HISTORY       Family History   Problem Relation Age of Onset    Heart Disease Mother     Kidney Disease Mother     Hypertension Mother     Diabetes Mother     No Known Problems Sister     No Known Problems Brother     No Known Problems Brother           SOCIAL HISTORY       Social History     Socioeconomic History    Marital status:      Spouse name: None    Number of children: None    Years of education: None    Highest education level: None   Occupational History    None   Social Needs    Financial resource strain: None    Food insecurity:     Worry: None     Inability: None    Transportation needs:     Medical: None     Non-medical: None   Tobacco Use    Smoking status: Former Smoker     Packs/day: 1.00     Years: 25.00     Pack years: 25.00     Last attempt to quit: 12/1/2008     Years since quitting: 10.4    Smokeless tobacco: Never Used   Substance and Sexual Activity    Alcohol use: No    Drug use: None    Sexual activity: None   Lifestyle    Physical activity:     Days per week: None     Minutes per session: None    Stress: None   Relationships    Social connections:     Talks on phone: None     Gets together: None     Attends Bahai service: None     Active member of club or organization: None     Attends meetings of clubs or organizations: None     Relationship status: None    Intimate partner violence:     Fear of current or ex partner: None     Emotionally abused: None     Physically abused: None     Forced sexual activity: None   Other Topics Concern    None   Social History Narrative    None       SCREENINGS      @FLOW([id number])@      PHYSICAL EXAM    (up to 7 for level 4, 8 or more for level 5)     ED Triage Vitals [04/25/19 1200]   BP Temp Temp Source Pulse Resp SpO2 Height Weight   (!) 185/79 98.1 °F (36.7 °C) Oral 65 18 98 % 5' 11\" (1.803 m) (!) 310 lb (140.6 kg)       Physical Exam   Constitutional: He is oriented to person, place, and time. He appears well-developed and well-nourished. HENT:   Head: Normocephalic and atraumatic. Right Ear: External ear normal.   Left Ear: External ear normal.   Mouth/Throat: Oropharynx is clear and moist.   Eyes: Pupils are equal, round, and reactive to light. Conjunctivae and EOM are normal.   Neck: Normal range of motion. Neck supple.    Cardiovascular: Normal rate and regular

## 2019-04-30 ENCOUNTER — HOSPITAL ENCOUNTER (OUTPATIENT)
Dept: GENERAL RADIOLOGY | Age: 59
Discharge: HOME | End: 2019-05-02
Payer: MEDICARE

## 2019-04-30 ENCOUNTER — OFFICE VISIT (OUTPATIENT)
Dept: FAMILY MEDICINE CLINIC | Age: 59
End: 2019-04-30
Payer: MEDICARE

## 2019-04-30 VITALS
BODY MASS INDEX: 44.1 KG/M2 | DIASTOLIC BLOOD PRESSURE: 84 MMHG | RESPIRATION RATE: 20 BRPM | OXYGEN SATURATION: 97 % | HEART RATE: 61 BPM | TEMPERATURE: 97.7 F | HEIGHT: 71 IN | SYSTOLIC BLOOD PRESSURE: 154 MMHG | WEIGHT: 315 LBS

## 2019-04-30 DIAGNOSIS — E11.628 DIABETIC FOOT INFECTION (HCC): Chronic | ICD-10-CM

## 2019-04-30 DIAGNOSIS — I42.0 DILATED CARDIOMYOPATHY (HCC): ICD-10-CM

## 2019-04-30 DIAGNOSIS — E11.9 TYPE 2 DIABETES MELLITUS WITHOUT COMPLICATION, WITH LONG-TERM CURRENT USE OF INSULIN (HCC): ICD-10-CM

## 2019-04-30 DIAGNOSIS — E11.42 DIABETIC POLYNEUROPATHY ASSOCIATED WITH TYPE 2 DIABETES MELLITUS (HCC): Primary | ICD-10-CM

## 2019-04-30 DIAGNOSIS — Z95.810 ICD (IMPLANTABLE CARDIOVERTER-DEFIBRILLATOR) IN PLACE: Chronic | ICD-10-CM

## 2019-04-30 DIAGNOSIS — I10 ESSENTIAL HYPERTENSION: ICD-10-CM

## 2019-04-30 DIAGNOSIS — F32.A ANXIETY AND DEPRESSION: Chronic | ICD-10-CM

## 2019-04-30 DIAGNOSIS — L08.9 DIABETIC FOOT INFECTION (HCC): Chronic | ICD-10-CM

## 2019-04-30 DIAGNOSIS — I50.22 CHRONIC SYSTOLIC HEART FAILURE (HCC): Chronic | ICD-10-CM

## 2019-04-30 DIAGNOSIS — Z79.4 TYPE 2 DIABETES MELLITUS WITHOUT COMPLICATION, WITH LONG-TERM CURRENT USE OF INSULIN (HCC): ICD-10-CM

## 2019-04-30 DIAGNOSIS — K21.9 GASTROESOPHAGEAL REFLUX DISEASE WITHOUT ESOPHAGITIS: Chronic | ICD-10-CM

## 2019-04-30 DIAGNOSIS — E78.5 HYPERLIPIDEMIA, UNSPECIFIED HYPERLIPIDEMIA TYPE: ICD-10-CM

## 2019-04-30 DIAGNOSIS — E66.01 MORBID OBESITY (HCC): ICD-10-CM

## 2019-04-30 DIAGNOSIS — F41.9 ANXIETY AND DEPRESSION: Chronic | ICD-10-CM

## 2019-04-30 LAB
ALBUMIN SERPL-MCNC: 3.9 G/DL (ref 3.5–4.6)
ALP BLD-CCNC: 77 U/L (ref 35–104)
ALT SERPL-CCNC: 17 U/L (ref 0–41)
ANION GAP SERPL CALCULATED.3IONS-SCNC: 12 MEQ/L (ref 9–15)
AST SERPL-CCNC: 14 U/L (ref 0–40)
BASOPHILS ABSOLUTE: 0.1 K/UL (ref 0–0.2)
BASOPHILS RELATIVE PERCENT: 1 %
BILIRUB SERPL-MCNC: <0.2 MG/DL (ref 0.2–0.7)
BUN BLDV-MCNC: 22 MG/DL (ref 8–23)
C-REACTIVE PROTEIN: 12.9 MG/L (ref 0–5)
CALCIUM SERPL-MCNC: 9.4 MG/DL (ref 8.5–9.9)
CHLORIDE BLD-SCNC: 100 MEQ/L (ref 95–107)
CHOLESTEROL, FASTING: 113 MG/DL (ref 0–199)
CO2: 28 MEQ/L (ref 20–31)
CREAT SERPL-MCNC: 1.49 MG/DL (ref 0.7–1.2)
CREATININE URINE: 104.8 MG/DL
EOSINOPHILS ABSOLUTE: 0.3 K/UL (ref 0–0.7)
EOSINOPHILS RELATIVE PERCENT: 3.8 %
GFR AFRICAN AMERICAN: 58
GFR NON-AFRICAN AMERICAN: 47.9
GLOBULIN: 2.9 G/DL (ref 2.3–3.5)
GLUCOSE BLD-MCNC: 165 MG/DL (ref 70–99)
HBA1C MFR BLD: 9.3 % (ref 4.8–5.9)
HCT VFR BLD CALC: 35.6 % (ref 42–52)
HDLC SERPL-MCNC: 27 MG/DL (ref 40–59)
HEMOGLOBIN: 11.6 G/DL (ref 14–18)
LDL CHOLESTEROL CALCULATED: 39 MG/DL (ref 0–129)
LYMPHOCYTES ABSOLUTE: 2.5 K/UL (ref 1–4.8)
LYMPHOCYTES RELATIVE PERCENT: 27.5 %
MCH RBC QN AUTO: 28.2 PG (ref 27–31.3)
MCHC RBC AUTO-ENTMCNC: 32.5 % (ref 33–37)
MCV RBC AUTO: 87 FL (ref 80–100)
MICROALBUMIN UR-MCNC: 106.9 MG/DL
MICROALBUMIN/CREAT UR-RTO: 1020 MG/G (ref 0–30)
MONOCYTES ABSOLUTE: 0.7 K/UL (ref 0.2–0.8)
MONOCYTES RELATIVE PERCENT: 7.9 %
NEUTROPHILS ABSOLUTE: 5.5 K/UL (ref 1.4–6.5)
NEUTROPHILS RELATIVE PERCENT: 59.8 %
PDW BLD-RTO: 15.4 % (ref 11.5–14.5)
PLATELET # BLD: 389 K/UL (ref 130–400)
POTASSIUM SERPL-SCNC: 4.4 MEQ/L (ref 3.4–4.9)
RBC # BLD: 4.1 M/UL (ref 4.7–6.1)
SEDIMENTATION RATE, ERYTHROCYTE: 15 MM (ref 0–20)
SODIUM BLD-SCNC: 140 MEQ/L (ref 135–144)
TOTAL PROTEIN: 6.8 G/DL (ref 6.3–8)
TRIGLYCERIDE, FASTING: 237 MG/DL (ref 0–150)
WBC # BLD: 9.2 K/UL (ref 4.8–10.8)

## 2019-04-30 PROCEDURE — 3017F COLORECTAL CA SCREEN DOC REV: CPT | Performed by: FAMILY MEDICINE

## 2019-04-30 PROCEDURE — G8417 CALC BMI ABV UP PARAM F/U: HCPCS | Performed by: FAMILY MEDICINE

## 2019-04-30 PROCEDURE — G8427 DOCREV CUR MEDS BY ELIG CLIN: HCPCS | Performed by: FAMILY MEDICINE

## 2019-04-30 PROCEDURE — 73630 X-RAY EXAM OF FOOT: CPT

## 2019-04-30 PROCEDURE — 1036F TOBACCO NON-USER: CPT | Performed by: FAMILY MEDICINE

## 2019-04-30 PROCEDURE — 2022F DILAT RTA XM EVC RTNOPTHY: CPT | Performed by: FAMILY MEDICINE

## 2019-04-30 PROCEDURE — 99215 OFFICE O/P EST HI 40 MIN: CPT | Performed by: FAMILY MEDICINE

## 2019-04-30 PROCEDURE — 3046F HEMOGLOBIN A1C LEVEL >9.0%: CPT | Performed by: FAMILY MEDICINE

## 2019-04-30 RX ORDER — FUROSEMIDE 40 MG/1
40 TABLET ORAL DAILY
Qty: 90 TABLET | Status: CANCELLED | OUTPATIENT
Start: 2019-04-30

## 2019-04-30 RX ORDER — GLIPIZIDE 10 MG/1
TABLET ORAL
Qty: 180 TABLET | Status: CANCELLED | OUTPATIENT
Start: 2019-04-30

## 2019-04-30 RX ORDER — HYDROXYZINE PAMOATE 50 MG/1
50 CAPSULE ORAL NIGHTLY
Qty: 90 CAPSULE | Status: CANCELLED | OUTPATIENT
Start: 2019-04-30

## 2019-04-30 RX ORDER — NAPROXEN 500 MG/1
500 TABLET ORAL 2 TIMES DAILY
Status: CANCELLED | OUTPATIENT
Start: 2019-04-30 | End: 2019-05-10

## 2019-04-30 RX ORDER — DIGOXIN 125 MCG
TABLET ORAL
Qty: 90 TABLET | Status: CANCELLED | OUTPATIENT
Start: 2019-04-30

## 2019-04-30 RX ORDER — FENOFIBRATE 160 MG/1
TABLET ORAL
Qty: 90 TABLET | Status: CANCELLED | OUTPATIENT
Start: 2019-04-30

## 2019-04-30 RX ORDER — CLINDAMYCIN HYDROCHLORIDE 150 MG/1
450 CAPSULE ORAL 3 TIMES DAILY
Qty: 63 CAPSULE | Refills: 0 | Status: SHIPPED | OUTPATIENT
Start: 2019-04-30 | End: 2019-05-07

## 2019-04-30 RX ORDER — SIMVASTATIN 20 MG
TABLET ORAL
Qty: 90 TABLET | Status: CANCELLED | OUTPATIENT
Start: 2019-04-30

## 2019-04-30 RX ORDER — ENALAPRIL MALEATE 20 MG/1
20 TABLET ORAL 2 TIMES DAILY
Qty: 180 TABLET | Status: CANCELLED | OUTPATIENT
Start: 2019-04-30

## 2019-04-30 RX ORDER — CIPROFLOXACIN 500 MG/1
500 TABLET, FILM COATED ORAL 2 TIMES DAILY
Qty: 14 TABLET | Refills: 0 | Status: SHIPPED | OUTPATIENT
Start: 2019-04-30 | End: 2019-05-07

## 2019-04-30 RX ORDER — METOPROLOL TARTRATE 50 MG/1
50 TABLET, FILM COATED ORAL 2 TIMES DAILY
Qty: 180 TABLET | Status: CANCELLED | OUTPATIENT
Start: 2019-04-30

## 2019-04-30 RX ORDER — AMLODIPINE BESYLATE 10 MG/1
TABLET ORAL
Qty: 90 TABLET | Status: CANCELLED | OUTPATIENT
Start: 2019-04-30

## 2019-04-30 RX ORDER — SERTRALINE HYDROCHLORIDE 100 MG/1
100 TABLET, FILM COATED ORAL DAILY
Qty: 90 TABLET | Status: CANCELLED | OUTPATIENT
Start: 2019-04-30

## 2019-04-30 RX ORDER — OMEPRAZOLE 20 MG/1
20 CAPSULE, DELAYED RELEASE ORAL DAILY
Qty: 90 CAPSULE | Status: CANCELLED | OUTPATIENT
Start: 2019-04-30

## 2019-04-30 RX ORDER — GABAPENTIN 300 MG/1
300 CAPSULE ORAL 3 TIMES DAILY
Status: CANCELLED | OUTPATIENT
Start: 2019-04-30 | End: 2019-05-30

## 2019-04-30 RX ORDER — ATORVASTATIN CALCIUM 40 MG/1
40 TABLET, FILM COATED ORAL DAILY
Qty: 30 TABLET | Refills: 3 | Status: SHIPPED | OUTPATIENT
Start: 2019-04-30 | End: 2019-09-22 | Stop reason: SDUPTHER

## 2019-04-30 RX ORDER — RANITIDINE 300 MG/1
300 TABLET ORAL DAILY
Qty: 90 TABLET | Status: CANCELLED | OUTPATIENT
Start: 2019-04-30

## 2019-04-30 NOTE — PROGRESS NOTES
Subjective  Carmelita Lemus, 62 y.o. male presents today with:  Chief Complaint   Patient presents with    Established New Doctor     This is a former patient of Dr. Ricky Anderson here to establish care. He states that he went to the ER 2 days ago for his right foot pain. They gave him two medications.  Diabetes     Patient is due for his 6 month follow up.  Foot Pain     Patient has pain in his right foot. He states he also has sores on the foot. HPI    This is a new patient to me. I have reviewed the past medical and surgical history, social history and family history provided. I have reviewed  medication, previous testing and working diagnoses. I have reviewed the allergies and health maintenance information and correlated it into my decision making for this patient for care, diagnostics, consultations and treatment for today's visit. Went to ER last Thursday for inflamed right foot. Was given Keflex and Naprosyn for DM foot infection. Now is draining malodorous, green pus. Had fever one day prior to going to ER. Has DM and his sugars have been 130-150 but a few weeks ago his sugars were in the 500s. States he takes his Glucotrol and metformin regularly and gives himself Lantus 45 units every night. No hypo-or hyperglycemic symptoms. Last A1c was checked over one year ago. States the language barrier is a significant impediment to him obtaining routine care. He has dilated cardiomyopathy with an ejection fraction of approximately 20%. He has not been seeing cardiology regularly though he states he's been taking his statin, antihypertensives, digoxin on a regular basis. He states he does not have chest pain, shortness of breath, palpitations, edema. Patient is here for f/u HTN. Is compliant with meds and has no side effects from them. Avoids added salt. Tries to eat healthy. Does not exercise.   No other questions and or concerns for today's visit      Review of Systems No fevers, chills, sweats. No unintended weight loss. No abdominal pain, nausea, vomiting, diarrhea, constipation, bloody stools, black tarry stools. No rashes. No swollen glands.         Past Medical History:   Diagnosis Date    Anxiety and depression 4/30/2019    Cardiomyopathy ([facility]     Chronic systolic heart failure ([facility] 4/30/2019    Diabetes mellitus ([facility]     Diabetic neuropathy associated with type 2 diabetes mellitus ([facility] 12/13/2017    Heart failure, NYHA class 4 ([facility]     Hyperlipidemia     Hypertension     ICD (implantable cardioverter-defibrillator) in place 4/30/2019    Morbid obesity ([facility] 12/19/2017    LON (obstructive sleep apnea)      Past Surgical History:   Procedure Laterality Date    CARDIAC DEFIBRILLATOR PLACEMENT  12/2008     Social History     Socioeconomic History    Marital status:      Spouse name: Not on file    Number of children: Not on file    Years of education: Not on file    Highest education level: Not on file   Occupational History    Not on file   Social Needs    Financial resource strain: Not on file    Food insecurity:     Worry: Not on file     Inability: Not on file    Transportation needs:     Medical: Not on file     Non-medical: Not on file   Tobacco Use    Smoking status: Former Smoker     Packs/day: 1.00     Years: 25.00     Pack years: 25.00     Last attempt to quit: 12/1/2008     Years since quitting: 10.4    Smokeless tobacco: Never Used   Substance and Sexual Activity    Alcohol use: No    Drug use: Not on file    Sexual activity: Not on file   Lifestyle    Physical activity:     Days per week: Not on file     Minutes per session: Not on file    Stress: Not on file   Relationships    Social connections:     Talks on phone: Not on file     Gets together: Not on file     Attends Sabianist service: Not on file     Active member of club or organization: Not on file     Attends meetings of clubs or organizations: Not on file MG tablet Take 1 tablet by mouth daily 180 tablet 1    metoprolol tartrate (LOPRESSOR) 50 MG tablet Take 1 tablet by mouth 2 times daily 180 tablet 1    albuterol (PROVENTIL) (2.5 MG/3ML) 0.083% nebulizer solution Take 3 mLs by nebulization 4 times daily 120 each 5    hydrOXYzine (VISTARIL) 50 MG capsule Take 1 capsule by mouth nightly 90 capsule 2    terbinafine (LAMISIL) 1 % cream Apply topically 2 times daily Apply topically 2 times daily. 1 Tube 3    insulin glargine (LANTUS SOLOSTAR) 100 UNIT/ML injection pen Inject 55 Units into the skin nightly 10 pen 3    Insulin Pen Needle (B-D UF III MINI PEN NEEDLES) 31G X 5 MM MISC 1 each by Does not apply route daily 100 each 3    Blood Glucose Monitoring Suppl (ONE TOUCH ULTRA MINI) w/Device KIT 1 kit by Does not apply route three times daily 1 kit 5    Lancets MISC Use  each 3    gabapentin (NEURONTIN) 300 MG capsule Take 1 capsule by mouth 3 times daily for 30 days. . 90 capsule 2    omeprazole (PRILOSEC) 20 MG delayed release capsule Take 1 capsule by mouth daily 90 capsule 1    aspirin 325 MG tablet Take 1 tablet by mouth daily (Patient taking differently: Take 81 mg by mouth daily ) 90 tablet 1    bacitracin 500 UNIT/GM ointment Apply topically 2 times daily. 1 Tube 0    Insulin Syringe-Needle U-100 (INSULIN SYRINGE 1CC/31GX5/16\") 31G X 5/16\" 1 ML MISC Use once daily to administer Lantus 100 each 1     No current facility-administered medications for this visit. PMH, Surgical Hx, Family Hx, and Social Hxreviewed and updated. Health Maintenance reviewed.     Objective    Vitals:    04/30/19 1435 04/30/19 1457   BP: (!) 146/86 (!) 154/84   Site: Left Upper Arm Left Upper Arm   Position: Sitting Sitting   Cuff Size: Large Adult Large Adult   Pulse: 61    Resp: 20    Temp: 97.7 °F (36.5 °C)    TempSrc: Temporal    SpO2: 97%    Weight: (!) 330 lb (149.7 kg)    Height: 5' 11\" (1.803 m)         Physical Exam   Constitutional: He is oriented to person, place, and time. Obese, no acute distress   HENT:   Head: Normocephalic and atraumatic. Eyes: Conjunctivae are normal.   Neck: Normal range of motion. Neck supple. Carotid bruit is not present. No thyromegaly present. Cardiovascular: Normal rate, regular rhythm, S1 normal, S2 normal and normal heart sounds. Sounds normal but somewhat distant   Pulmonary/Chest: Effort normal. He has no wheezes. He has no rales. Musculoskeletal: He exhibits no edema (BLE). Neurological: He is alert and oriented to person, place, and time. Skin: Skin is warm and dry. Right plantar 1st  MTP with 4cm x 4cm x 0.75 cm callus with some cracking and with intradermal, clear fluid collection and TTP lateral first MTP   Psychiatric: He has a normal mood and affect. Lab Results   Component Value Date    LABA1C 9.3 (H) 04/30/2019    LABA1C 7.7 03/14/2018    LABA1C 9.5 01/11/2018     Lab Results   Component Value Date    LABMICR 14.00 (H) 12/14/2017    CREATININE 1.49 (H) 04/30/2019     Lab Results   Component Value Date    ALT 17 04/30/2019    AST 14 04/30/2019     Lab Results   Component Value Date    CHOL 142 12/14/2017    TRIG 197 12/14/2017    HDL 27 (L) 04/30/2019    LDLCALC 39 04/30/2019        Assessment & Plan   Visit Diagnoses and Associated Orders     Diabetic polyneuropathy associated with type 2 diabetes mellitus ([facility]    -  Primary    Worse, poor control. Continue Lantus, Glucotrol, metformin until hemoglobin A1c the results are back. Reviewed healthy, low carb diet. Type 2 diabetes mellitus without complication, with long-term current use of insulin ([facility])        Worse, poor control. See above. Will continue Neurontin if refills are required. Meanwhile stressed importance of med compliance and routine follow-up.     Hemoglobin A1c [[id number] Custom]   - Future Order    Microalbumin / creatinine urine ratio [[id number] Custom]   - Future Order    Lipid, Fasting [[id number] Custom]   - Future Order Comprehensive Metabolic Panel [[id number] Custom]   - Future Order    atorvastatin (LIPITOR) 40 MG tablet [19177]           Essential hypertension        Stable and well-controlled on amlodipine and enalapril and metoprolol. Microalbumin / creatinine urine ratio [[id number] Custom]   - Future Order    Comprehensive Metabolic Panel [[id number] Custom]   - Future Order         Dilated cardiomyopathy ([facility]        Stable and well-controlled on antihypertensives as well as digoxin. Followed by cardiology who will refill cardiac meds. Digoxin level ordered. Digitoxin Level [[id number] Custom]   - Future Order         Hyperlipidemia, unspecified hyperlipidemia type        Changed to atorvastatin given need for moderate to high intensity dosing and limited med-med interactions. Lipid, Fasting [[id number] Custom]   - Future Order    Comprehensive Metabolic Panel [[id number] Custom]   - Future Order    atorvastatin (LIPITOR) 40 MG tablet [[id number]]           Morbid obesity ([facility]        Worse, poor control. Comprehensive Metabolic Panel [[id number] Custom]   - Future Order         Chronic systolic heart failure (HCC)        Stable, good control. See above. ICD (implantable cardioverter-defibrillator) in place        Has recently been interrogated. Patient believes he has at least 6 months of battery life left. Anxiety and depression        On Zoloft and hydroxyzine. Was prescribed long ago in [facility].         Diabetic foot infection ([facility]        Worse, fair condition. Given presumed uncontrolled diabetes and worsening condition on Keflex, will change to clindamycin and Cipro.   X-ray and referral to pod    Sedimentation Rate [[id number] Custom]   - Future Order    C-Reactive Protein [[id number] Custom]   - Future Order    CBC With Auto Differential [[id number] Custom]   - Future Order    XR FOOT RIGHT (MIN 3 VIEWS) [[id number] Custom]   - Future Order    clindamycin (CLEOCIN) 150 MG capsule [1740]      ciprofloxacin (CIPRO) 500 MG tablet [[id number]] TIMUR Stafford DPM, Podiatry, [place] [[id number] Custom]           Gastroesophageal reflux disease without esophagitis                   Reviewed complications of obesity. Discussed healthy, low carb diet and need for moderately rigorous exercise 45 minutes 5 days a week.     Orders Placed This Encounter   Procedures    XR FOOT RIGHT (MIN 3 VIEWS)     Standing Status:   Future     Number of Occurrences:   1     Standing Expiration Date:   4/30/2020     Order Specific Question:   Reason for exam:     Answer:   DM foopt infection plantar 1st MTP    Hemoglobin A1c     Standing Status:   Future     Number of Occurrences:   1     Standing Expiration Date:   4/29/2020    Microalbumin / creatinine urine ratio     Standing Status:   Future     Number of Occurrences:   1     Standing Expiration Date:   4/29/2020    Digitoxin Level     Standing Status:   Future     Number of Occurrences:   1     Standing Expiration Date:   4/30/2020    Lipid, Fasting     Standing Status:   Future     Number of Occurrences:   1     Standing Expiration Date:   4/30/2020    Comprehensive Metabolic Panel     Standing Status:   Future     Number of Occurrences:   1     Standing Expiration Date:   7/30/2019    Sedimentation Rate     Standing Status:   Future     Number of Occurrences:   1     Standing Expiration Date:   4/30/2020    C-Reactive Protein     Standing Status:   Future     Number of Occurrences:   1     Standing Expiration Date:   4/30/2020    CBC With Auto Differential     Standing Status:   Future     Number of Occurrences:   1     Standing Expiration Date:   4/30/2020    TIMUR Stafford DPM, [facility]     Referral Priority:   Urgent     Referral Type:   Eval and Treat     Referral Reason:   Specialty Services Required     Referred to Provider:   Adam Espinal DPM     Requested Specialty:   Podiatry     Number of Visits Requested:   1     Orders Placed This Encounter   Medications    atorvastatin (LIPITOR) 40 MG tablet     Sig: Take 1 tablet by mouth daily     Dispense:  30 tablet     Refill:  3    clindamycin (CLEOCIN) 150 MG capsule     Sig: Take 3 capsules by mouth 3 times daily for 7 days     Dispense:  63 capsule     Refill:  0    ciprofloxacin (CIPRO) 500 MG tablet     Sig: Take 1 tablet by mouth 2 times daily for 7 days     Dispense:  14 tablet     Refill:  0     Medications Discontinued During This Encounter   Medication Reason    insulin glargine (LANTUS) 100 UNIT/ML injection vial LIST CLEANUP    simvastatin (ZOCOR) 20 MG tablet LIST CLEANUP    cephALEXin (KEFLEX) 500 MG capsule Therapy completed     Return in about 1 week (around 5/7/2019) for DM, HTN, Obesity, HLD, Mood Disorder. Quality & Risk Score Accuracy    Last edited 04/30/19 20:25 EDT by Brianna Moore MD           Controlled Substances Monitoring:     No flowsheet data found.     Brianna Moore MD

## 2019-05-03 LAB — DIGITOXIN LVL: <9 NG/ML (ref 10–30)

## 2019-05-07 ENCOUNTER — OFFICE VISIT (OUTPATIENT)
Dept: CARDIOLOGY CLINIC | Age: 59
End: 2019-05-07
Payer: MEDICARE

## 2019-05-07 VITALS
DIASTOLIC BLOOD PRESSURE: 84 MMHG | OXYGEN SATURATION: 98 % | HEIGHT: 71 IN | RESPIRATION RATE: 20 BRPM | SYSTOLIC BLOOD PRESSURE: 128 MMHG | BODY MASS INDEX: 44.1 KG/M2 | HEART RATE: 65 BPM | WEIGHT: 315 LBS

## 2019-05-07 DIAGNOSIS — Z95.0 PACEMAKER: ICD-10-CM

## 2019-05-07 DIAGNOSIS — I50.22 CHRONIC SYSTOLIC HEART FAILURE (HCC): Chronic | ICD-10-CM

## 2019-05-07 DIAGNOSIS — E66.01 MORBID OBESITY (HCC): ICD-10-CM

## 2019-05-07 DIAGNOSIS — Z79.4 TYPE 2 DIABETES MELLITUS WITHOUT COMPLICATION, WITH LONG-TERM CURRENT USE OF INSULIN (HCC): ICD-10-CM

## 2019-05-07 DIAGNOSIS — I42.0 DILATED CARDIOMYOPATHY (HCC): ICD-10-CM

## 2019-05-07 DIAGNOSIS — I10 ESSENTIAL HYPERTENSION: Primary | ICD-10-CM

## 2019-05-07 DIAGNOSIS — L97.511 SKIN ULCER OF RIGHT FOOT, LIMITED TO BREAKDOWN OF SKIN (HCC): ICD-10-CM

## 2019-05-07 DIAGNOSIS — I10 ESSENTIAL HYPERTENSION: ICD-10-CM

## 2019-05-07 DIAGNOSIS — G47.33 OSA (OBSTRUCTIVE SLEEP APNEA): ICD-10-CM

## 2019-05-07 DIAGNOSIS — E11.9 TYPE 2 DIABETES MELLITUS WITHOUT COMPLICATION, WITH LONG-TERM CURRENT USE OF INSULIN (HCC): ICD-10-CM

## 2019-05-07 DIAGNOSIS — Z95.810 ICD (IMPLANTABLE CARDIOVERTER-DEFIBRILLATOR) IN PLACE: Chronic | ICD-10-CM

## 2019-05-07 PROCEDURE — 99214 OFFICE O/P EST MOD 30 MIN: CPT | Performed by: INTERNAL MEDICINE

## 2019-05-07 PROCEDURE — 3017F COLORECTAL CA SCREEN DOC REV: CPT | Performed by: INTERNAL MEDICINE

## 2019-05-07 PROCEDURE — 93000 ELECTROCARDIOGRAM COMPLETE: CPT | Performed by: INTERNAL MEDICINE

## 2019-05-07 PROCEDURE — G8427 DOCREV CUR MEDS BY ELIG CLIN: HCPCS | Performed by: INTERNAL MEDICINE

## 2019-05-07 PROCEDURE — 1036F TOBACCO NON-USER: CPT | Performed by: INTERNAL MEDICINE

## 2019-05-07 PROCEDURE — G8417 CALC BMI ABV UP PARAM F/U: HCPCS | Performed by: INTERNAL MEDICINE

## 2019-05-07 RX ORDER — ASPIRIN 81 MG/1
81 TABLET ORAL DAILY
Qty: 90 TABLET | Refills: 3 | Status: ON HOLD | OUTPATIENT
Start: 2019-05-07 | End: 2019-06-12 | Stop reason: HOSPADM

## 2019-05-07 RX ORDER — SERTRALINE HYDROCHLORIDE 100 MG/1
100 TABLET, FILM COATED ORAL DAILY
Qty: 90 TABLET | Refills: 3 | Status: SHIPPED | OUTPATIENT
Start: 2019-05-07 | End: 2020-02-11 | Stop reason: SDUPTHER

## 2019-05-07 RX ORDER — DIGOXIN 125 MCG
TABLET ORAL
Qty: 90 TABLET | Refills: 3 | Status: SHIPPED | OUTPATIENT
Start: 2019-05-07 | End: 2019-08-22

## 2019-05-07 RX ORDER — ENALAPRIL MALEATE 20 MG/1
20 TABLET ORAL 2 TIMES DAILY
Qty: 180 TABLET | Refills: 3 | Status: ON HOLD | OUTPATIENT
Start: 2019-05-07 | End: 2019-08-08 | Stop reason: HOSPADM

## 2019-05-07 RX ORDER — FUROSEMIDE 40 MG/1
40 TABLET ORAL DAILY
Qty: 90 TABLET | Refills: 3 | Status: ON HOLD | OUTPATIENT
Start: 2019-05-07 | End: 2019-08-08 | Stop reason: SDUPTHER

## 2019-05-07 RX ORDER — METOPROLOL TARTRATE 50 MG/1
50 TABLET, FILM COATED ORAL 2 TIMES DAILY
Qty: 180 TABLET | Refills: 3 | Status: SHIPPED | OUTPATIENT
Start: 2019-05-07 | End: 2019-05-16

## 2019-05-07 RX ORDER — MAGNESIUM HYDROXIDE 1200 MG/15ML
LIQUID ORAL
Refills: 5 | COMMUNITY
Start: 2019-05-02 | End: 2022-04-26

## 2019-05-07 RX ORDER — AMLODIPINE BESYLATE 10 MG/1
TABLET ORAL
Qty: 90 TABLET | Refills: 3 | Status: ON HOLD | OUTPATIENT
Start: 2019-05-07 | End: 2019-08-08 | Stop reason: SDUPTHER

## 2019-05-07 NOTE — PROGRESS NOTES
palpitations, PND, orthopnea, or claudication. No AICD shocks. BP and hr are good. CAD is stable. No LE discoloration or ulcers. No LE edema. No CHF type symptoms. Lipid profile is normal. No recent hospitalization. No change in meds. S/p AICD check which was normal in 4/19. States he got a sleep study.      EKG with NSR    Patient Active Problem List   Diagnosis    Diabetes mellitus ([facility]    Hyperlipidemia    Hypertension    Callus of foot    Chronic bilateral low back pain without sciatica    [facility]    Pacemaker    Diabetic neuropathy associated with type 2 diabetes mellitus ([facility]    Morbid obesity ([facility]    LON (obstructive sleep apnea)    Chronic systolic heart failure ([facility]    ICD (implantable cardioverter-defibrillator) in place    Anxiety and depression    Diabetic foot infection ([facility]    Gastroesophageal reflux disease without esophagitis       Past Surgical History:   Procedure Laterality Date    CARDIAC DEFIBRILLATOR PLACEMENT  12/2008       Social History     Socioeconomic History    Marital status:      Spouse name: None    Number of children: None    Years of education: None    Highest education level: None   Occupational History    None   Social Needs    Financial resource strain: None    Food insecurity:     Worry: None     Inability: None    Transportation needs:     Medical: None     Non-medical: None   Tobacco Use    Smoking status: Former Smoker     Packs/day: 1.00     Years: 25.00     Pack years: 25.00     Last attempt to quit: 12/1/2008     Years since quitting: 10.4    Smokeless tobacco: Never Used   Substance and Sexual Activity    Alcohol use: No    Drug use: None    Sexual activity: None   Lifestyle    Physical activity:     Days per week: None     Minutes per session: None    Stress: None   Relationships    Social connections:     Talks on phone: None     Gets together: None     Attends Anabaptist service: None     Active member of club or organization: None     Attends meetings of clubs or organizations: None     Relationship status: None    Intimate partner violence:     Fear of current or ex partner: None     Emotionally abused: None     Physically abused: None     Forced sexual activity: None   Other Topics Concern    None   Social History Narrative    None       Family History   Problem Relation Age of Onset    Heart Disease Mother     Kidney Disease Mother     Hypertension Mother     Diabetes Mother     No Known Problems Sister     No Known Problems Brother     No Known Problems Brother        Current Outpatient Medications   Medication Sig Dispense Refill    mupirocin (BACTROBAN) 2 % ointment APPLY TO WOUNDS D  3    sodium chloride 0.9 % irrigation CLEASE WOUND WITH DRESSING CHANGES  5    atorvastatin (LIPITOR) 40 MG tablet Take 1 tablet by mouth daily 30 tablet 3    clindamycin (CLEOCIN) 150 MG capsule Take 3 capsules by mouth 3 times daily for 7 days 63 capsule 0    ciprofloxacin (CIPRO) 500 MG tablet Take 1 tablet by mouth 2 times daily for 7 days 14 tablet 0    naproxen (NAPROSYN) 500 MG tablet Take 1 tablet by mouth 2 times daily for 20 doses 20 tablet 0    ranitidine (ZANTAC) 300 MG tablet TAKE 1 TABLET BY MOUTH DAILY 90 tablet 0    amLODIPine (NORVASC) 10 MG tablet TAKE 1 TABLET BY MOUTH EVERY NIGHT 90 tablet 0    sertraline (ZOLOFT) 100 MG tablet TAKE 1 TABLET BY MOUTH DAILY 90 tablet 0    fenofibrate 160 MG tablet TAKE 1 TABLET BY MOUTH EVERY NIGHT 90 tablet 1    digoxin (DIGOX) 125 MCG tablet TAKE 1 TABLET BY MOUTH ONCE DAILY 90 tablet 1    metFORMIN (GLUCOPHAGE) 1000 MG tablet TAKE 1 TABLET BY MOUTH TWICE DAILY, WITH MEALS 180 tablet 1    glipiZIDE (GLUCOTROL) 10 MG tablet TAKE 2 TABLETS BY MOUTH TWICE DAILY BEFORE MEALS 360 tablet 1    enalapril (VASOTEC) 20 MG tablet Take 1 tablet by mouth 2 times daily 180 tablet 1    blood glucose test strips (TRUE METRIX BLOOD GLUCOSE TEST) strip 1 each by In Vitro route 3 times daily As needed. 100 each 3    furosemide (LASIX) 40 MG tablet Take 1 tablet by mouth daily 180 tablet 1    metoprolol tartrate (LOPRESSOR) 50 MG tablet Take 1 tablet by mouth 2 times daily 180 tablet 1    albuterol (PROVENTIL) (2.5 MG/3ML) 0.083% nebulizer solution Take 3 mLs by nebulization 4 times daily 120 each 5    hydrOXYzine (VISTARIL) 50 MG capsule Take 1 capsule by mouth nightly 90 capsule 2    terbinafine (LAMISIL) 1 % cream Apply topically 2 times daily Apply topically 2 times daily. 1 Tube 3    insulin glargine (LANTUS SOLOSTAR) 100 UNIT/ML injection pen Inject 55 Units into the skin nightly 10 pen 3    Insulin Pen Needle (B-D UF III MINI PEN NEEDLES) 31G X 5 MM MISC 1 each by Does not apply route daily 100 each 3    Blood Glucose Monitoring Suppl (ONE TOUCH ULTRA MINI) w/Device KIT 1 kit by Does not apply route three times daily 1 kit 5    Lancets MISC Use  each 3    gabapentin (NEURONTIN) 300 MG capsule Take 1 capsule by mouth 3 times daily for 30 days. . 90 capsule 2    omeprazole (PRILOSEC) 20 MG delayed release capsule Take 1 capsule by mouth daily 90 capsule 1    aspirin 325 MG tablet Take 1 tablet by mouth daily (Patient taking differently: Take 81 mg by mouth daily ) 90 tablet 1    bacitracin 500 UNIT/GM ointment Apply topically 2 times daily. 1 Tube 0    Insulin Syringe-Needle U-100 (INSULIN SYRINGE 1CC/31GX5/16\") 31G X 5/16\" 1 ML MISC Use once daily to administer Lantus 100 each 1     No current facility-administered medications for this visit. Coreg [carvedilol]    Review of Systems:  General ROS: negative  Psychological ROS: negative  Hematological and Lymphatic ROS: No history of blood clots or bleeding disorder.    Respiratory ROS: no cough, shortness of breath, or wheezing  Cardiovascular ROS: no chest pain or dyspnea on exertion  Gastrointestinal ROS: no abdominal pain, change in bowel habits, or black or bloody stools  Genito-Urinary ROS: no dysuria, clinic for AICD checks    Check EKG    Multiple signs and symptoms suggestive of sleep apnea, will consider sleep study after cardiac evaluation is completed    Check 2D Echo for LV function, PA pressures, wall motion abnormalities and any significant valvular disease. Will obtain b/l lower extremity arterial dupplex ultrasound to rule out significant stenosis. The importance of daily weights, dietary sodium restriction, and contact with cardiology if weight is increased more than 3 lbs in any 48 hour period was stressed. The patient has been advised to contact us if they experience progressive SOB, orthopnea, PND or progressive edema. Patient was advised and encouraged to check blood pressure at home or at a pharmacy, maintain a logbook, and also call us back if blood pressure are above the target ranges or if it is low. Patient clearly understands and agrees to the instructions. We will need to continue to monitor muscle and liver enzymes, BUN, CR, and electrolytes. Details of medical condition explained and patient was warned about adverse consequences of uncontrolled medical conditions and possible side effects of prescribed medications.

## 2019-05-16 ENCOUNTER — HOSPITAL ENCOUNTER (OUTPATIENT)
Dept: WOUND CARE | Age: 59
Discharge: HOME | End: 2019-05-16
Payer: MEDICARE

## 2019-05-16 ENCOUNTER — OFFICE VISIT (OUTPATIENT)
Dept: FAMILY MEDICINE CLINIC | Age: 59
End: 2019-05-16
Payer: MEDICARE

## 2019-05-16 VITALS
OXYGEN SATURATION: 95 % | RESPIRATION RATE: 16 BRPM | HEIGHT: 71 IN | HEART RATE: 74 BPM | WEIGHT: 315 LBS | DIASTOLIC BLOOD PRESSURE: 78 MMHG | BODY MASS INDEX: 44.1 KG/M2 | SYSTOLIC BLOOD PRESSURE: 150 MMHG | TEMPERATURE: 98.5 F

## 2019-05-16 VITALS
SYSTOLIC BLOOD PRESSURE: 184 MMHG | TEMPERATURE: 98.4 F | DIASTOLIC BLOOD PRESSURE: 83 MMHG | RESPIRATION RATE: 18 BRPM | HEART RATE: 75 BPM

## 2019-05-16 DIAGNOSIS — Z79.4 TYPE 2 DIABETES MELLITUS WITH DIABETIC NEUROPATHY, WITH LONG-TERM CURRENT USE OF INSULIN (HCC): Primary | ICD-10-CM

## 2019-05-16 DIAGNOSIS — L97.511 NON-PRESSURE CHRONIC ULCER OF OTHER PART OF RIGHT FOOT LIMITED TO BREAKDOWN OF SKIN (HCC): ICD-10-CM

## 2019-05-16 DIAGNOSIS — E11.40 TYPE 2 DIABETES MELLITUS WITH DIABETIC NEUROPATHY, WITH LONG-TERM CURRENT USE OF INSULIN (HCC): Primary | ICD-10-CM

## 2019-05-16 DIAGNOSIS — E11.628 DIABETIC FOOT INFECTION (HCC): Chronic | ICD-10-CM

## 2019-05-16 DIAGNOSIS — Z23 NEED FOR PNEUMOCOCCAL VACCINATION: ICD-10-CM

## 2019-05-16 DIAGNOSIS — L08.9 DIABETIC FOOT INFECTION (HCC): Chronic | ICD-10-CM

## 2019-05-16 DIAGNOSIS — I10 ESSENTIAL HYPERTENSION: ICD-10-CM

## 2019-05-16 DIAGNOSIS — L85.1 KERATODERMA OF FOOT, ACQUIRED: ICD-10-CM

## 2019-05-16 PROCEDURE — 3046F HEMOGLOBIN A1C LEVEL >9.0%: CPT | Performed by: FAMILY MEDICINE

## 2019-05-16 PROCEDURE — 2022F DILAT RTA XM EVC RTNOPTHY: CPT | Performed by: FAMILY MEDICINE

## 2019-05-16 PROCEDURE — G8417 CALC BMI ABV UP PARAM F/U: HCPCS | Performed by: FAMILY MEDICINE

## 2019-05-16 PROCEDURE — 99214 OFFICE O/P EST MOD 30 MIN: CPT | Performed by: FAMILY MEDICINE

## 2019-05-16 PROCEDURE — 99211 OFF/OP EST MAY X REQ PHY/QHP: CPT

## 2019-05-16 PROCEDURE — G8427 DOCREV CUR MEDS BY ELIG CLIN: HCPCS | Performed by: FAMILY MEDICINE

## 2019-05-16 PROCEDURE — 3017F COLORECTAL CA SCREEN DOC REV: CPT | Performed by: FAMILY MEDICINE

## 2019-05-16 PROCEDURE — G0009 ADMIN PNEUMOCOCCAL VACCINE: HCPCS | Performed by: FAMILY MEDICINE

## 2019-05-16 PROCEDURE — 1036F TOBACCO NON-USER: CPT | Performed by: FAMILY MEDICINE

## 2019-05-16 PROCEDURE — 90732 PPSV23 VACC 2 YRS+ SUBQ/IM: CPT | Performed by: FAMILY MEDICINE

## 2019-05-16 RX ORDER — METOPROLOL TARTRATE 50 MG/1
75 TABLET, FILM COATED ORAL 2 TIMES DAILY
Qty: 180 TABLET | Refills: 3 | Status: ON HOLD | OUTPATIENT
Start: 2019-05-16 | End: 2019-08-08 | Stop reason: HOSPADM

## 2019-05-16 RX ORDER — AMMONIUM LACTATE 12 G/100G
CREAM TOPICAL
Qty: 385 G | Refills: 4 | Status: SHIPPED | OUTPATIENT
Start: 2019-05-16 | End: 2020-11-10

## 2019-05-16 ASSESSMENT — PATIENT HEALTH QUESTIONNAIRE - PHQ9
1. LITTLE INTEREST OR PLEASURE IN DOING THINGS: 1
2. FEELING DOWN, DEPRESSED OR HOPELESS: 1
SUM OF ALL RESPONSES TO PHQ QUESTIONS 1-9: 2
SUM OF ALL RESPONSES TO PHQ9 QUESTIONS 1 & 2: 2
SUM OF ALL RESPONSES TO PHQ QUESTIONS 1-9: 2

## 2019-05-16 NOTE — PROGRESS NOTES
[facility] [address]   Progress Note and Procedure Note      Hernando Lemus  MEDICAL RECORD NUMBER:  [id number]  AGE: 62 y.o. GENDER: male  : 1960  EPISODE DATE:  2019    Subjective:     Chief Complaint   Patient presents with    Wound Check     right foot wound         HISTORY of PRESENT ILLNESS HPI     Guy Manuel is a 62 y.o. male who presents today for wound/ulcer evaluation. History of Wound Context: Patient presents with Grade 0 diabetic ulceration sub first metatarsal head right foot. Previous treatment has consisted of mupiricin and a bandage. Open ulcer is now healed. States he has very little pain associated with it. States diabetes is good and last HgbA1c 9.4.   Wound/Ulcer Pain Timing/Severity: intermittent, mild  Quality of pain: aching  Severity:  1 / 10   Modifying Factors: None  Associated Signs/Symptoms: none    Ulcer Identification:  Ulcer Type: diabetic  Contributing Factors: diabetes    Wound: N/A        PAST MEDICAL HISTORY        Diagnosis Date    Anxiety and depression 2019    Cardiomyopathy ([address]     Chronic systolic heart failure ([address] 2019    Diabetes mellitus ([address]     Diabetic neuropathy associated with type 2 diabetes mellitus ([address] 2017    Heart failure, NYHA class 4 ([address]     Hyperlipidemia     Hypertension     ICD (implantable cardioverter-defibrillator) in place 2019    Morbid obesity ([address] 2017    LON (obstructive sleep apnea)        PAST SURGICAL HISTORY    Past Surgical History:   Procedure Laterality Date    CARDIAC DEFIBRILLATOR PLACEMENT  2008       FAMILY HISTORY    Family History   Problem Relation Age of Onset    Heart Disease Mother     Kidney Disease Mother     Hypertension Mother     Diabetes Mother     No Known Problems Sister     No Known Problems Brother     No Known Problems Brother        SOCIAL HISTORY    Social History     Tobacco Use    Smoking status: Former Smoker Packs/day: 1.00     Years: 25.00     Pack years: 25.00     Last attempt to quit: 12/1/2008     Years since quitting: 10.4    Smokeless tobacco: Never Used   Substance Use Topics    Alcohol use: No    Drug use: Not on file       ALLERGIES    Allergies   Allergen Reactions    Coreg [Carvedilol] Other (See Comments)     \"hyper\", rapid pulse       MEDICATIONS    Current Outpatient Medications on File Prior to Encounter   Medication Sig Dispense Refill    metoprolol tartrate (LOPRESSOR) 50 MG tablet Take 1.5 tablets by mouth 2 times daily 180 tablet 3    SITagliptin (JANUVIA) 100 MG tablet Take 1 tablet by mouth daily 90 tablet 3    mupirocin (BACTROBAN) 2 % ointment APPLY TO WOUNDS D  3    sodium chloride 0.9 % irrigation CLEASE WOUND WITH DRESSING CHANGES  5    amLODIPine (NORVASC) 10 MG tablet TAKE 1 TABLET BY MOUTH EVERY NIGHT 90 tablet 3    digoxin (DIGOX) 125 MCG tablet TAKE 1 TABLET BY MOUTH ONCE DAILY 90 tablet 3    enalapril (VASOTEC) 20 MG tablet Take 1 tablet by mouth 2 times daily 180 tablet 3    furosemide (LASIX) 40 MG tablet Take 1 tablet by mouth daily 90 tablet 3    sertraline (ZOLOFT) 100 MG tablet Take 1 tablet by mouth daily 90 tablet 3    aspirin EC 81 MG EC tablet Take 1 tablet by mouth daily 90 tablet 3    atorvastatin (LIPITOR) 40 MG tablet Take 1 tablet by mouth daily 30 tablet 3    naproxen (NAPROSYN) 500 MG tablet Take 1 tablet by mouth 2 times daily for 20 doses 20 tablet 0    ranitidine (ZANTAC) 300 MG tablet TAKE 1 TABLET BY MOUTH DAILY 90 tablet 0    fenofibrate 160 MG tablet TAKE 1 TABLET BY MOUTH EVERY NIGHT 90 tablet 1    metFORMIN (GLUCOPHAGE) 1000 MG tablet TAKE 1 TABLET BY MOUTH TWICE DAILY, WITH MEALS 180 tablet 1    glipiZIDE (GLUCOTROL) 10 MG tablet TAKE 2 TABLETS BY MOUTH TWICE DAILY BEFORE MEALS 360 tablet 1    blood glucose test strips (TRUE METRIX BLOOD GLUCOSE TEST) strip 1 each by In Vitro route 3 times daily As needed.  100 each 3    albuterol intact. Deep tendon reflexes of the lower extremities are intact and symmetrical bilaterally. Sensation normal to touch and vibration. Sensation intact to 10 gram monofilament in extremities. Musculoskeletal:  Gait and station stable. Muscle strength +5/5 to all extrinsic muscles to the foot bilateral.  Full range of motion of ankle, subtalar, and midtarsal joints noted without crepitation. No cyanosis, clubbing or edema noted. Dermatological:  Wound description noted in wound assessment. Otherwise, skin is warm, dry, and well-hydrated with normal turgor, texture, and pigmentation. Psychiatric:  Judgement and insight intact. Short and long term memory intact. No evidence of depression, anxiety, or agitation. Patient is calm, cooperative, and communicative. Appropriate interactions and affect. Assessment:      Patient Active Problem List   Diagnosis Code    Diabetes mellitus ([facility] E11.9    Hyperlipidemia E78.5    Hypertension I10    Callus of foot L84    Chronic bilateral low back pain without sciatica M54.5, G89.29    Cardiomyopathy ([facility]) I42.9    Pacemaker Z95.0    Diabetic neuropathy associated with type 2 diabetes mellitus ([facility] E11.40    Morbid obesity ([facility]) E66.01    LON (obstructive sleep apnea) G47.33    Chronic systolic heart failure ([facility]) I50.22    ICD (implantable cardioverter-defibrillator) in place Z95.810    Anxiety and depression F41.9, F32.9    Diabetic foot infection ([facility]) E11.628, L08.9    Gastroesophageal reflux disease without esophagitis K21.9    Keratoderma of foot, acquired L85.1    Non-pressure chronic ulcer of other part of right foot limited to breakdown of skin ([facility] L97.511        Procedure Note  Indications:  Based on my examination of this patient's wound(s)/ulcer(s) today, debridement is not required to promote healing and evaluate the wound base.       Plan:     Hyperkeratotic tissue debrided and no open ulceration is noted at this time.  Recommended using prescription cream twice daily to moisturize the skin. Treatment Note please see attached Discharge Instructions    In my professional opinion this patient would benefit from HBO Therapy: No    Written patient dismissal instructions given to patient and signed by patient or POA. Discharge Instructions       Wound Clinic Physician Orders and Discharge [address] [facility]  [address] [facility], [facility] [address] [place] [place]  Telephone: [phone]    NAME:  Jeffrey Alva OF BIRTH:  1960  MEDICAL RECORD NUMBER:  [id number]  DATE:  5/16/2019    Congratulations!! You have completed your treatment. 1. Return to your Primary Care Physician for all your health issues. 2. Resume your ordinary activities as tolerated. 3. Take your medications as prescribed by your primary care physician. 4. Check your skin daily for cracks, bruises, sores, or dryness. Use a moisturizer as needed. 5. Clean and dry your skin, using mild soap and warm water (not hot). 6. Avoid alcohol and caffeine and do not smoke. 7. Maintain a nutritious diet. 8. Avoid pressure on your wound site. Keep your legs elevated above the level of the heart whenever possible. 9. Continue to use wraps/stockings/compression as prescribed. 10. Replace compression stockings every four to six months as needed to ensure proper fit. 11. Wear well-fitting shoes and leg garments. 12. Use cream on callus area on right plantar foot twice daily    THANK YOU FOR ALLOWING US TO SERVE YOU.  PLEASE CALL IF YOU DEVELOP ANOTHER WOUND. [phone]     Electronically signed by Pina Carter DPM on 5/16/2019 at 2:58 PM                                   Electronically signed by Pina Carter DPM on 5/16/2019 at 2:59 PM

## 2019-05-16 NOTE — CODE DOCUMENTATION
[id number] [facility] Billing Sheet. Josr Lemus  AGE: 62 y.o.    GENDER: male  : 1960  TODAY'S DATE:  2019    ICD-10 CODES  Active Hospital Problems    Diagnosis Date Noted    Keratoderma of foot, acquired [L85.1] 2019    Non-pressure chronic ulcer of other part of right foot limited to breakdown of skin [facility]) [L97.511] 2019    Diabetic neuropathy associated with type 2 diabetes mellitus ([facility] [E11.40] 2017    Diabetes mellitus ([facility] [E11.9]        PHYSICIAN PROCEDURES    CPT CODE  87673      Electronically signed by Pina Carter DPM on 2019 at 3:04 PM

## 2019-05-16 NOTE — PROGRESS NOTES
Subjective  Waleska Lemus, 62 y.o. male presents today with:  Chief Complaint   Patient presents with    Diabetes     has an ulcer on the right foot, completed ATB would like a wound check.  Hypertension     denies chest pain and headache.  Hyperlipidemia    Obesity    Mood Swings           HPI    DM Foot infection - no pain or drainage; has completed abx and using 2 creams rx'd by podiatry who did partial debridement of callus and who will be seeing him again this afternoon. Patient is here for DM f/u. Sugars have been poorly controlled but patient has not been experiencing hyper- or hypoglycemic symptoms. Compliance with meds is good and there are no side effects. Patient exercises rarely and tries to eat healthy but acknowledges high consumption of carbs. Is up to date on foot and eye exams and immunizations. No other questions and or concerns for today's visit      Review of Systems  No fevers, chills, sweats. No unintended weight loss. No abdominal pain, nausea, vomiting, diarrhea, constipation, bloody stools, black tarry stools. No rashes. No swollen glands.       Past Medical History:   Diagnosis Date    Anxiety and depression 4/30/2019    Cardiomyopathy ([address]     Chronic systolic heart failure ([address] 4/30/2019    Diabetes mellitus ([address]     Diabetic neuropathy associated with type 2 diabetes mellitus ([address] 12/13/2017    Heart failure, NYHA class 4 ([address]     Hyperlipidemia     Hypertension     ICD (implantable cardioverter-defibrillator) in place 4/30/2019    Morbid obesity ([address] 12/19/2017    LON (obstructive sleep apnea)      Past Surgical History:   Procedure Laterality Date    CARDIAC DEFIBRILLATOR PLACEMENT  12/2008     Social History     Socioeconomic History    Marital status:      Spouse name: Not on file    Number of children: Not on file    Years of education: Not on file    Highest education level: Not on file   Occupational History    Not on file Social Needs    Financial resource strain: Not on file    Food insecurity:     Worry: Not on file     Inability: Not on file    Transportation needs:     Medical: Not on file     Non-medical: Not on file   Tobacco Use    Smoking status: Former Smoker     Packs/day: 1.00     Years: 25.00     Pack years: 25.00     Last attempt to quit: 12/1/2008     Years since quitting: 10.4    Smokeless tobacco: Never Used   Substance and Sexual Activity    Alcohol use: No    Drug use: Not on file    Sexual activity: Not on file   Lifestyle    Physical activity:     Days per week: Not on file     Minutes per session: Not on file    Stress: Not on file   Relationships    Social connections:     Talks on phone: Not on file     Gets together: Not on file     Attends Confucianism service: Not on file     Active member of club or organization: Not on file     Attends meetings of clubs or organizations: Not on file     Relationship status: Not on file    Intimate partner violence:     Fear of current or ex partner: Not on file     Emotionally abused: Not on file     Physically abused: Not on file     Forced sexual activity: Not on file   Other Topics Concern    Not on file   Social History Narrative    Not on file     Family History   Problem Relation Age of Onset    Heart Disease Mother     Kidney Disease Mother     Hypertension Mother     Diabetes Mother     No Known Problems Sister     No Known Problems Brother     No Known Problems Brother      Allergies   Allergen Reactions    Coreg [Carvedilol] Other (See Comments)     \"hyper\", rapid pulse     Current Outpatient Medications   Medication Sig Dispense Refill    metoprolol tartrate (LOPRESSOR) 50 MG tablet Take 1.5 tablets by mouth 2 times daily 180 tablet 3    SITagliptin (JANUVIA) 100 MG tablet Take 1 tablet by mouth daily 90 tablet 3    mupirocin (BACTROBAN) 2 % ointment APPLY TO WOUNDS D  3    sodium chloride 0.9 % irrigation CLEASE WOUND WITH DRESSING CHANGES  5    amLODIPine (NORVASC) 10 MG tablet TAKE 1 TABLET BY MOUTH EVERY NIGHT 90 tablet 3    digoxin (DIGOX) 125 MCG tablet TAKE 1 TABLET BY MOUTH ONCE DAILY 90 tablet 3    enalapril (VASOTEC) 20 MG tablet Take 1 tablet by mouth 2 times daily 180 tablet 3    furosemide (LASIX) 40 MG tablet Take 1 tablet by mouth daily 90 tablet 3    sertraline (ZOLOFT) 100 MG tablet Take 1 tablet by mouth daily 90 tablet 3    aspirin EC 81 MG EC tablet Take 1 tablet by mouth daily 90 tablet 3    atorvastatin (LIPITOR) 40 MG tablet Take 1 tablet by mouth daily 30 tablet 3    ranitidine (ZANTAC) 300 MG tablet TAKE 1 TABLET BY MOUTH DAILY 90 tablet 0    fenofibrate 160 MG tablet TAKE 1 TABLET BY MOUTH EVERY NIGHT 90 tablet 1    metFORMIN (GLUCOPHAGE) 1000 MG tablet TAKE 1 TABLET BY MOUTH TWICE DAILY, WITH MEALS 180 tablet 1    glipiZIDE (GLUCOTROL) 10 MG tablet TAKE 2 TABLETS BY MOUTH TWICE DAILY BEFORE MEALS 360 tablet 1    blood glucose test strips (TRUE METRIX BLOOD GLUCOSE TEST) strip 1 each by In Vitro route 3 times daily As needed. 100 each 3    albuterol (PROVENTIL) (2.5 MG/3ML) 0.083% nebulizer solution Take 3 mLs by nebulization 4 times daily 120 each 5    hydrOXYzine (VISTARIL) 50 MG capsule Take 1 capsule by mouth nightly 90 capsule 2    terbinafine (LAMISIL) 1 % cream Apply topically 2 times daily Apply topically 2 times daily. 1 Tube 3    insulin glargine (LANTUS SOLOSTAR) 100 UNIT/ML injection pen Inject 55 Units into the skin nightly 10 pen 3    Insulin Pen Needle (B-D UF III MINI PEN NEEDLES) 31G X 5 MM MISC 1 each by Does not apply route daily 100 each 3    Blood Glucose Monitoring Suppl (ONE TOUCH ULTRA MINI) w/Device KIT 1 kit by Does not apply route three times daily 1 kit 5    Lancets MISC Use  each 3    omeprazole (PRILOSEC) 20 MG delayed release capsule Take 1 capsule by mouth daily 90 capsule 1    bacitracin 500 UNIT/GM ointment Apply topically 2 times daily.  1 Tube 0    Insulin Syringe-Needle U-100 (INSULIN SYRINGE 1CC/31GX5/16\") 31G X 5/16\" 1 ML MISC Use once daily to administer Lantus 100 each 1    naproxen (NAPROSYN) 500 MG tablet Take 1 tablet by mouth 2 times daily for 20 doses 20 tablet 0    gabapentin (NEURONTIN) 300 MG capsule Take 1 capsule by mouth 3 times daily for 30 days. . 90 capsule 2     No current facility-administered medications for this visit. PMH, Surgical Hx, Family Hx, and Social Hxreviewed and updated. Health Maintenance reviewed. Objective    Vitals:    05/16/19 1012 05/16/19 1018   BP: (!) 154/80 (!) 150/78   Pulse: 74    Resp: 16    Temp: 98.5 °F (36.9 °C)    SpO2: 95%    Weight: (!) 323 lb (146.5 kg)    Height: 5' 11\" (1.803 m)         Physical Exam   Constitutional: He is oriented to person, place, and time. Obese, no acute distress   HENT:   Head: Normocephalic and atraumatic. Eyes: Conjunctivae are normal.   Neck: Normal range of motion. Neck supple. Carotid bruit is not present. No thyromegaly present. Cardiovascular: Normal rate, regular rhythm, S1 normal, S2 normal and normal heart sounds. Sounds normal but somewhat distant   Pulmonary/Chest: Effort normal. He has no wheezes. He has no rales. Musculoskeletal: He exhibits no edema (BLE). Neurological: He is alert and oriented to person, place, and time. Skin: Skin is warm and dry. Right plantar 1st  MTP with 4cm x 4cm x 0.75 cm callus with some cracking; blister resolved   Psychiatric: He has a normal mood and affect.          Lab Results   Component Value Date    LABA1C 9.3 (H) 04/30/2019    LABA1C 7.7 03/14/2018    LABA1C 9.5 01/11/2018     Lab Results   Component Value Date    LABMICR 106.90 (H) 04/30/2019    CREATININE 1.49 (H) 04/30/2019     Lab Results   Component Value Date    ALT 17 04/30/2019    AST 14 04/30/2019     Lab Results   Component Value Date    CHOL 142 12/14/2017    TRIG 197 12/14/2017    HDL 27 (L) 04/30/2019    LDLCALC 39 04/30/2019        Assessment & Plan Medication Reason    metoprolol tartrate (LOPRESSOR) 50 MG tablet      Return in about 1 month (around 6/13/2019) for HTN, DM. Controlled Substances Monitoring:     No flowsheet data found.     Rolanda Severe, MD

## 2019-05-29 ENCOUNTER — APPOINTMENT (OUTPATIENT)
Dept: GENERAL RADIOLOGY | Age: 59
DRG: 660 | End: 2019-05-29

## 2019-05-29 ENCOUNTER — HOSPITAL ENCOUNTER (INPATIENT)
Age: 59
LOS: 2 days | Discharge: HOME | DRG: 660 | End: 2019-05-31
Attending: INTERNAL MEDICINE | Admitting: INTERNAL MEDICINE
Payer: MEDICARE

## 2019-05-29 ENCOUNTER — APPOINTMENT (OUTPATIENT)
Dept: CT IMAGING | Age: 59
DRG: 660 | End: 2019-05-29

## 2019-05-29 DIAGNOSIS — R73.9 HYPERGLYCEMIA: ICD-10-CM

## 2019-05-29 DIAGNOSIS — E87.20 LACTIC ACIDOSIS: ICD-10-CM

## 2019-05-29 DIAGNOSIS — I42.0 DILATED CARDIOMYOPATHY (HCC): ICD-10-CM

## 2019-05-29 DIAGNOSIS — N20.1 URETERIC STONE: Primary | ICD-10-CM

## 2019-05-29 DIAGNOSIS — R00.0 TACHYCARDIA: ICD-10-CM

## 2019-05-29 DIAGNOSIS — N13.2 HYDRONEPHROSIS WITH URINARY OBSTRUCTION DUE TO URETERAL CALCULUS: ICD-10-CM

## 2019-05-29 PROBLEM — R42 DIZZINESS: Status: ACTIVE | Noted: 2019-05-29

## 2019-05-29 LAB
ALBUMIN SERPL-MCNC: 3.8 G/DL (ref 3.5–4.6)
ALP BLD-CCNC: 77 U/L (ref 35–104)
ALT SERPL-CCNC: 12 U/L (ref 0–41)
AMPHETAMINE SCREEN, URINE: NORMAL
ANION GAP SERPL CALCULATED.3IONS-SCNC: 12 MEQ/L (ref 9–15)
AST SERPL-CCNC: 12 U/L (ref 0–40)
BACTERIA: NEGATIVE /HPF
BARBITURATE SCREEN URINE: NORMAL
BASOPHILS ABSOLUTE: 0 K/UL (ref 0–0.2)
BASOPHILS RELATIVE PERCENT: 0.2 %
BENZODIAZEPINE SCREEN, URINE: NORMAL
BETA-HYDROXYBUTYRATE: 1.8 MG/DL (ref 0.2–2.8)
BILIRUB SERPL-MCNC: 0.3 MG/DL (ref 0.2–0.7)
BILIRUBIN URINE: NEGATIVE
BLOOD, URINE: NEGATIVE
BUN BLDV-MCNC: 30 MG/DL (ref 8–23)
CALCIUM SERPL-MCNC: 9.8 MG/DL (ref 8.5–9.9)
CANNABINOID SCREEN URINE: NORMAL
CHLORIDE BLD-SCNC: 101 MEQ/L (ref 95–107)
CHP ED QC CHECK: YES
CLARITY: CLEAR
CO2: 24 MEQ/L (ref 20–31)
COCAINE METABOLITE SCREEN URINE: NORMAL
COLOR: YELLOW
CREAT SERPL-MCNC: 1.79 MG/DL (ref 0.7–1.2)
D DIMER: 1.07 MG/L FEU (ref 0–0.5)
EKG ATRIAL RATE: 102 BPM
EKG ATRIAL RATE: 117 BPM
EKG Q-T INTERVAL: 330 MS
EKG Q-T INTERVAL: 332 MS
EKG QRS DURATION: 100 MS
EKG QRS DURATION: 102 MS
EKG QTC CALCULATION (BAZETT): 466 MS
EKG QTC CALCULATION (BAZETT): 494 MS
EKG R AXIS: -27 DEGREES
EKG R AXIS: -6 DEGREES
EKG T AXIS: 38 DEGREES
EKG T AXIS: 69 DEGREES
EKG VENTRICULAR RATE: 120 BPM
EKG VENTRICULAR RATE: 133 BPM
EOSINOPHILS ABSOLUTE: 0.6 K/UL (ref 0–0.7)
EOSINOPHILS RELATIVE PERCENT: 3.8 %
EPITHELIAL CELLS, UA: ABNORMAL /HPF (ref 0–5)
GFR AFRICAN AMERICAN: 46.9
GFR NON-AFRICAN AMERICAN: 38.8
GLOBULIN: 3.3 G/DL (ref 2.3–3.5)
GLUCOSE BLD-MCNC: 263 MG/DL
GLUCOSE BLD-MCNC: 263 MG/DL (ref 60–115)
GLUCOSE BLD-MCNC: 356 MG/DL (ref 70–99)
GLUCOSE URINE: >=1000 MG/DL
HCT VFR BLD CALC: 40.4 % (ref 42–52)
HEMOGLOBIN: 13.5 G/DL (ref 14–18)
HYALINE CASTS: ABNORMAL /HPF (ref 0–5)
KETONES, URINE: NEGATIVE MG/DL
LACTIC ACID: 1.7 MMOL/L (ref 0.5–2.2)
LACTIC ACID: 3.3 MMOL/L (ref 0.5–2.2)
LEUKOCYTE ESTERASE, URINE: NEGATIVE
LYMPHOCYTES ABSOLUTE: 3.5 K/UL (ref 1–4.8)
LYMPHOCYTES RELATIVE PERCENT: 24.1 %
Lab: NORMAL
MAGNESIUM: 2 MG/DL (ref 1.7–2.4)
MCH RBC QN AUTO: 28.8 PG (ref 27–31.3)
MCHC RBC AUTO-ENTMCNC: 33.4 % (ref 33–37)
MCV RBC AUTO: 86.1 FL (ref 80–100)
MONOCYTES ABSOLUTE: 1 K/UL (ref 0.2–0.8)
MONOCYTES RELATIVE PERCENT: 6.6 %
NEUTROPHILS ABSOLUTE: 9.6 K/UL (ref 1.4–6.5)
NEUTROPHILS RELATIVE PERCENT: 65.3 %
NITRITE, URINE: NEGATIVE
OPIATE SCREEN URINE: NORMAL
PDW BLD-RTO: 15.7 % (ref 11.5–14.5)
PERFORMED ON: ABNORMAL
PH UA: 5 (ref 5–9)
PHENCYCLIDINE SCREEN URINE: NORMAL
PLATELET # BLD: 417 K/UL (ref 130–400)
POTASSIUM SERPL-SCNC: 4.4 MEQ/L (ref 3.4–4.9)
PROTEIN UA: 100 MG/DL
RBC # BLD: 4.7 M/UL (ref 4.7–6.1)
RBC UA: ABNORMAL /HPF (ref 0–2)
SODIUM BLD-SCNC: 137 MEQ/L (ref 135–144)
SPECIFIC GRAVITY UA: 1.04 (ref 1–1.03)
TOTAL CK: 69 U/L (ref 0–190)
TOTAL PROTEIN: 7.1 G/DL (ref 6.3–8)
TROPONIN: <0.01 NG/ML (ref 0–0.01)
TSH REFLEX: 0.98 UIU/ML (ref 0.44–3.86)
URINE REFLEX TO CULTURE: YES
UROBILINOGEN, URINE: 0.2 E.U./DL
WBC # BLD: 14.6 K/UL (ref 4.8–10.8)
WBC UA: ABNORMAL /HPF (ref 0–5)

## 2019-05-29 PROCEDURE — 82550 ASSAY OF CK (CPK): CPT

## 2019-05-29 PROCEDURE — 2500000003 HC RX 250 WO HCPCS: Performed by: NURSE PRACTITIONER

## 2019-05-29 PROCEDURE — 1210000000 HC MED SURG R&B

## 2019-05-29 PROCEDURE — 96361 HYDRATE IV INFUSION ADD-ON: CPT

## 2019-05-29 PROCEDURE — 81001 URINALYSIS AUTO W/SCOPE: CPT

## 2019-05-29 PROCEDURE — 71045 X-RAY EXAM CHEST 1 VIEW: CPT

## 2019-05-29 PROCEDURE — 99285 EMERGENCY DEPT VISIT HI MDM: CPT

## 2019-05-29 PROCEDURE — 82010 KETONE BODYS QUAN: CPT

## 2019-05-29 PROCEDURE — 2580000003 HC RX 258: Performed by: INTERNAL MEDICINE

## 2019-05-29 PROCEDURE — 6360000004 HC RX CONTRAST MEDICATION: Performed by: NURSE PRACTITIONER

## 2019-05-29 PROCEDURE — 96365 THER/PROPH/DIAG IV INF INIT: CPT

## 2019-05-29 PROCEDURE — 83735 ASSAY OF MAGNESIUM: CPT

## 2019-05-29 PROCEDURE — 96375 TX/PRO/DX INJ NEW DRUG ADDON: CPT

## 2019-05-29 PROCEDURE — 6370000000 HC RX 637 (ALT 250 FOR IP): Performed by: PHYSICIAN ASSISTANT

## 2019-05-29 PROCEDURE — 93005 ELECTROCARDIOGRAM TRACING: CPT | Performed by: NURSE PRACTITIONER

## 2019-05-29 PROCEDURE — 84484 ASSAY OF TROPONIN QUANT: CPT

## 2019-05-29 PROCEDURE — 93010 ELECTROCARDIOGRAM REPORT: CPT | Performed by: INTERNAL MEDICINE

## 2019-05-29 PROCEDURE — 84443 ASSAY THYROID STIM HORMONE: CPT

## 2019-05-29 PROCEDURE — 94664 DEMO&/EVAL PT USE INHALER: CPT

## 2019-05-29 PROCEDURE — 74176 CT ABD & PELVIS W/O CONTRAST: CPT

## 2019-05-29 PROCEDURE — 6360000002 HC RX W HCPCS: Performed by: NURSE PRACTITIONER

## 2019-05-29 PROCEDURE — 85379 FIBRIN DEGRADATION QUANT: CPT

## 2019-05-29 PROCEDURE — 87040 BLOOD CULTURE FOR BACTERIA: CPT

## 2019-05-29 PROCEDURE — 6370000000 HC RX 637 (ALT 250 FOR IP): Performed by: INTERNAL MEDICINE

## 2019-05-29 PROCEDURE — 2580000003 HC RX 258: Performed by: NURSE PRACTITIONER

## 2019-05-29 PROCEDURE — 36415 COLL VENOUS BLD VENIPUNCTURE: CPT

## 2019-05-29 PROCEDURE — 85025 COMPLETE CBC W/AUTO DIFF WBC: CPT

## 2019-05-29 PROCEDURE — 80053 COMPREHEN METABOLIC PANEL: CPT

## 2019-05-29 PROCEDURE — 96374 THER/PROPH/DIAG INJ IV PUSH: CPT

## 2019-05-29 PROCEDURE — 87086 URINE CULTURE/COLONY COUNT: CPT

## 2019-05-29 PROCEDURE — 83605 ASSAY OF LACTIC ACID: CPT

## 2019-05-29 PROCEDURE — 80307 DRUG TEST PRSMV CHEM ANLYZR: CPT

## 2019-05-29 PROCEDURE — 71275 CT ANGIOGRAPHY CHEST: CPT

## 2019-05-29 RX ORDER — SODIUM CHLORIDE 0.9 % (FLUSH) 0.9 %
10 SYRINGE (ML) INJECTION PRN
Status: DISCONTINUED | OUTPATIENT
Start: 2019-05-29 | End: 2019-05-31 | Stop reason: HOSPADM

## 2019-05-29 RX ORDER — DEXTROSE MONOHYDRATE 50 MG/ML
100 INJECTION, SOLUTION INTRAVENOUS PRN
Status: DISCONTINUED | OUTPATIENT
Start: 2019-05-29 | End: 2019-05-31 | Stop reason: HOSPADM

## 2019-05-29 RX ORDER — PANTOPRAZOLE SODIUM 40 MG/1
40 TABLET, DELAYED RELEASE ORAL
Status: DISCONTINUED | OUTPATIENT
Start: 2019-05-29 | End: 2019-05-31 | Stop reason: HOSPADM

## 2019-05-29 RX ORDER — NICOTINE POLACRILEX 4 MG
15 LOZENGE BUCCAL PRN
Status: DISCONTINUED | OUTPATIENT
Start: 2019-05-29 | End: 2019-05-31 | Stop reason: HOSPADM

## 2019-05-29 RX ORDER — 0.9 % SODIUM CHLORIDE 0.9 %
1000 INTRAVENOUS SOLUTION INTRAVENOUS ONCE
Status: COMPLETED | OUTPATIENT
Start: 2019-05-29 | End: 2019-05-29

## 2019-05-29 RX ORDER — ENALAPRIL MALEATE 20 MG/1
20 TABLET ORAL 2 TIMES DAILY
Status: DISCONTINUED | OUTPATIENT
Start: 2019-05-29 | End: 2019-05-31 | Stop reason: HOSPADM

## 2019-05-29 RX ORDER — INSULIN GLARGINE 100 [IU]/ML
50 INJECTION, SOLUTION SUBCUTANEOUS NIGHTLY
Status: DISCONTINUED | OUTPATIENT
Start: 2019-05-29 | End: 2019-05-31 | Stop reason: HOSPADM

## 2019-05-29 RX ORDER — MORPHINE SULFATE 4 MG/ML
4 INJECTION, SOLUTION INTRAMUSCULAR; INTRAVENOUS EVERY 4 HOURS PRN
Status: DISCONTINUED | OUTPATIENT
Start: 2019-05-29 | End: 2019-05-29 | Stop reason: SDUPTHER

## 2019-05-29 RX ORDER — SODIUM CHLORIDE 0.9 % (FLUSH) 0.9 %
10 SYRINGE (ML) INJECTION PRN
Status: DISCONTINUED | OUTPATIENT
Start: 2019-05-29 | End: 2019-05-29 | Stop reason: SDUPTHER

## 2019-05-29 RX ORDER — DIGOXIN 125 MCG
125 TABLET ORAL DAILY
Status: DISCONTINUED | OUTPATIENT
Start: 2019-05-29 | End: 2019-05-31 | Stop reason: HOSPADM

## 2019-05-29 RX ORDER — HYDROCODONE BITARTRATE AND ACETAMINOPHEN 5; 325 MG/1; MG/1
2 TABLET ORAL EVERY 4 HOURS PRN
Status: DISCONTINUED | OUTPATIENT
Start: 2019-05-29 | End: 2019-05-31 | Stop reason: HOSPADM

## 2019-05-29 RX ORDER — ACETAMINOPHEN 325 MG/1
650 TABLET ORAL EVERY 4 HOURS PRN
Status: DISCONTINUED | OUTPATIENT
Start: 2019-05-29 | End: 2019-05-31 | Stop reason: HOSPADM

## 2019-05-29 RX ORDER — ALBUTEROL SULFATE 2.5 MG/3ML
2.5 SOLUTION RESPIRATORY (INHALATION) EVERY 4 HOURS PRN
Status: DISCONTINUED | OUTPATIENT
Start: 2019-05-29 | End: 2019-05-31 | Stop reason: HOSPADM

## 2019-05-29 RX ORDER — SODIUM CHLORIDE 0.9 % (FLUSH) 0.9 %
10 SYRINGE (ML) INJECTION EVERY 12 HOURS SCHEDULED
Status: DISCONTINUED | OUTPATIENT
Start: 2019-05-29 | End: 2019-05-29 | Stop reason: SDUPTHER

## 2019-05-29 RX ORDER — DEXTROSE MONOHYDRATE 25 G/50ML
12.5 INJECTION, SOLUTION INTRAVENOUS PRN
Status: DISCONTINUED | OUTPATIENT
Start: 2019-05-29 | End: 2019-05-31 | Stop reason: HOSPADM

## 2019-05-29 RX ORDER — MORPHINE SULFATE 2 MG/ML
2 INJECTION, SOLUTION INTRAMUSCULAR; INTRAVENOUS
Status: DISCONTINUED | OUTPATIENT
Start: 2019-05-29 | End: 2019-05-31 | Stop reason: HOSPADM

## 2019-05-29 RX ORDER — SODIUM CHLORIDE 9 MG/ML
INJECTION, SOLUTION INTRAVENOUS CONTINUOUS
Status: DISCONTINUED | OUTPATIENT
Start: 2019-05-29 | End: 2019-05-29 | Stop reason: SDUPTHER

## 2019-05-29 RX ORDER — GABAPENTIN 300 MG/1
300 CAPSULE ORAL 3 TIMES DAILY
Status: DISCONTINUED | OUTPATIENT
Start: 2019-05-29 | End: 2019-05-31 | Stop reason: HOSPADM

## 2019-05-29 RX ORDER — SODIUM CHLORIDE 9 MG/ML
INJECTION, SOLUTION INTRAVENOUS CONTINUOUS
Status: DISCONTINUED | OUTPATIENT
Start: 2019-05-29 | End: 2019-05-31

## 2019-05-29 RX ORDER — HYDROXYZINE PAMOATE 50 MG/1
50 CAPSULE ORAL NIGHTLY
Status: DISCONTINUED | OUTPATIENT
Start: 2019-05-29 | End: 2019-05-29 | Stop reason: CLARIF

## 2019-05-29 RX ORDER — HYDROCODONE BITARTRATE AND ACETAMINOPHEN 5; 325 MG/1; MG/1
1 TABLET ORAL EVERY 4 HOURS PRN
Status: DISCONTINUED | OUTPATIENT
Start: 2019-05-29 | End: 2019-05-31 | Stop reason: HOSPADM

## 2019-05-29 RX ORDER — METOPROLOL TARTRATE 5 MG/5ML
5 INJECTION INTRAVENOUS ONCE
Status: COMPLETED | OUTPATIENT
Start: 2019-05-29 | End: 2019-05-29

## 2019-05-29 RX ORDER — MORPHINE SULFATE 4 MG/ML
4 INJECTION, SOLUTION INTRAMUSCULAR; INTRAVENOUS
Status: DISCONTINUED | OUTPATIENT
Start: 2019-05-29 | End: 2019-05-31 | Stop reason: HOSPADM

## 2019-05-29 RX ORDER — ACETAMINOPHEN 325 MG/1
650 TABLET ORAL EVERY 4 HOURS PRN
Status: DISCONTINUED | OUTPATIENT
Start: 2019-05-29 | End: 2019-05-29 | Stop reason: SDUPTHER

## 2019-05-29 RX ORDER — ONDANSETRON 2 MG/ML
4 INJECTION INTRAMUSCULAR; INTRAVENOUS EVERY 6 HOURS PRN
Status: DISCONTINUED | OUTPATIENT
Start: 2019-05-29 | End: 2019-05-29 | Stop reason: SDUPTHER

## 2019-05-29 RX ORDER — OMEPRAZOLE 20 MG/1
20 CAPSULE, DELAYED RELEASE ORAL DAILY
Status: DISCONTINUED | OUTPATIENT
Start: 2019-05-29 | End: 2019-05-29 | Stop reason: CLARIF

## 2019-05-29 RX ORDER — ALBUTEROL SULFATE 2.5 MG/3ML
2.5 SOLUTION RESPIRATORY (INHALATION) 4 TIMES DAILY
Status: DISCONTINUED | OUTPATIENT
Start: 2019-05-29 | End: 2019-05-29

## 2019-05-29 RX ORDER — SODIUM CHLORIDE 0.9 % (FLUSH) 0.9 %
10 SYRINGE (ML) INJECTION EVERY 12 HOURS SCHEDULED
Status: DISCONTINUED | OUTPATIENT
Start: 2019-05-29 | End: 2019-05-31 | Stop reason: HOSPADM

## 2019-05-29 RX ORDER — ATORVASTATIN CALCIUM 40 MG/1
40 TABLET, FILM COATED ORAL DAILY
Status: DISCONTINUED | OUTPATIENT
Start: 2019-05-29 | End: 2019-05-31 | Stop reason: HOSPADM

## 2019-05-29 RX ORDER — SIMVASTATIN 20 MG
20 TABLET ORAL NIGHTLY
Status: ON HOLD | COMMUNITY
End: 2019-05-31 | Stop reason: HOSPADM

## 2019-05-29 RX ORDER — AMLODIPINE BESYLATE 10 MG/1
10 TABLET ORAL DAILY
Status: DISCONTINUED | OUTPATIENT
Start: 2019-05-29 | End: 2019-05-31 | Stop reason: HOSPADM

## 2019-05-29 RX ORDER — TAMSULOSIN HYDROCHLORIDE 0.4 MG/1
0.4 CAPSULE ORAL DAILY
Status: DISCONTINUED | OUTPATIENT
Start: 2019-05-29 | End: 2019-05-31 | Stop reason: HOSPADM

## 2019-05-29 RX ORDER — ONDANSETRON 2 MG/ML
4 INJECTION INTRAMUSCULAR; INTRAVENOUS EVERY 6 HOURS PRN
Status: DISCONTINUED | OUTPATIENT
Start: 2019-05-29 | End: 2019-05-31 | Stop reason: HOSPADM

## 2019-05-29 RX ORDER — POTASSIUM CHLORIDE 7.45 MG/ML
10 INJECTION INTRAVENOUS PRN
Status: DISCONTINUED | OUTPATIENT
Start: 2019-05-29 | End: 2019-05-31 | Stop reason: HOSPADM

## 2019-05-29 RX ORDER — HYDROXYZINE HYDROCHLORIDE 25 MG/1
50 TABLET, FILM COATED ORAL NIGHTLY
Status: DISCONTINUED | OUTPATIENT
Start: 2019-05-29 | End: 2019-05-31 | Stop reason: HOSPADM

## 2019-05-29 RX ORDER — SODIUM CHLORIDE 0.9 % (FLUSH) 0.9 %
10 SYRINGE (ML) INJECTION ONCE
Status: COMPLETED | OUTPATIENT
Start: 2019-05-29 | End: 2019-05-29

## 2019-05-29 RX ORDER — POTASSIUM CHLORIDE 1.5 G/1.77G
40 POWDER, FOR SOLUTION ORAL PRN
Status: DISCONTINUED | OUTPATIENT
Start: 2019-05-29 | End: 2019-05-31 | Stop reason: HOSPADM

## 2019-05-29 RX ORDER — 0.9 % SODIUM CHLORIDE 0.9 %
2000 INTRAVENOUS SOLUTION INTRAVENOUS ONCE
Status: COMPLETED | OUTPATIENT
Start: 2019-05-29 | End: 2019-05-29

## 2019-05-29 RX ORDER — POTASSIUM CHLORIDE 20 MEQ/1
40 TABLET, EXTENDED RELEASE ORAL PRN
Status: DISCONTINUED | OUTPATIENT
Start: 2019-05-29 | End: 2019-05-31 | Stop reason: HOSPADM

## 2019-05-29 RX ORDER — MAGNESIUM SULFATE 1 G/100ML
1 INJECTION INTRAVENOUS PRN
Status: DISCONTINUED | OUTPATIENT
Start: 2019-05-29 | End: 2019-05-31 | Stop reason: HOSPADM

## 2019-05-29 RX ADMIN — PANTOPRAZOLE SODIUM 40 MG: 40 TABLET, DELAYED RELEASE ORAL at 23:41

## 2019-05-29 RX ADMIN — DIGOXIN 125 MCG: 125 TABLET ORAL at 23:41

## 2019-05-29 RX ADMIN — METOPROLOL TARTRATE 5 MG: 1 INJECTION, SOLUTION INTRAVENOUS at 16:12

## 2019-05-29 RX ADMIN — SODIUM CHLORIDE: 9 INJECTION, SOLUTION INTRAVENOUS at 23:49

## 2019-05-29 RX ADMIN — SERTRALINE HYDROCHLORIDE 100 MG: 50 TABLET ORAL at 23:41

## 2019-05-29 RX ADMIN — AMLODIPINE BESYLATE 10 MG: 10 TABLET ORAL at 23:41

## 2019-05-29 RX ADMIN — GABAPENTIN 300 MG: 300 CAPSULE ORAL at 23:40

## 2019-05-29 RX ADMIN — CEFTRIAXONE SODIUM 1 G: 1 INJECTION, POWDER, FOR SOLUTION INTRAMUSCULAR; INTRAVENOUS at 18:18

## 2019-05-29 RX ADMIN — HYDROXYZINE HYDROCHLORIDE 50 MG: 25 TABLET, FILM COATED ORAL at 23:40

## 2019-05-29 RX ADMIN — ATORVASTATIN CALCIUM 40 MG: 40 TABLET, FILM COATED ORAL at 23:40

## 2019-05-29 RX ADMIN — TAMSULOSIN HYDROCHLORIDE 0.4 MG: 0.4 CAPSULE ORAL at 23:41

## 2019-05-29 RX ADMIN — METOPROLOL TARTRATE 75 MG: 25 TABLET, FILM COATED ORAL at 23:40

## 2019-05-29 RX ADMIN — IOPAMIDOL 100 ML: 755 INJECTION, SOLUTION INTRAVENOUS at 14:38

## 2019-05-29 RX ADMIN — ENALAPRIL MALEATE 20 MG: 20 TABLET ORAL at 23:42

## 2019-05-29 RX ADMIN — INSULIN GLARGINE 50 UNITS: 100 INJECTION, SOLUTION SUBCUTANEOUS at 23:42

## 2019-05-29 RX ADMIN — SODIUM CHLORIDE 1000 ML: 9 INJECTION, SOLUTION INTRAVENOUS at 15:31

## 2019-05-29 RX ADMIN — Medication 10 ML: at 14:39

## 2019-05-29 RX ADMIN — SODIUM CHLORIDE 1000 ML: 9 INJECTION, SOLUTION INTRAVENOUS at 13:28

## 2019-05-29 RX ADMIN — SODIUM CHLORIDE 2000 ML: 9 INJECTION, SOLUTION INTRAVENOUS at 16:37

## 2019-05-29 ASSESSMENT — ENCOUNTER SYMPTOMS
BACK PAIN: 0
RHINORRHEA: 0
VOMITING: 0
ABDOMINAL PAIN: 1
SORE THROAT: 0
PHOTOPHOBIA: 0
COUGH: 0
SHORTNESS OF BREATH: 0
EYE PAIN: 0
NAUSEA: 0
DIARRHEA: 0

## 2019-05-29 NOTE — ED TRIAGE NOTES
Arrived home feeling dizzy. He wanted to go to the bathroom and almost fainted. The blood sugar was not taken, but assuming it was low sugar, pt's family gave him a bunch of sugar. Pt has recently started [employer] [place] for his diabetes. He started to get a stomach ache and get sweaty. Per pt's family, pt looked like he was going to loose consciousness three times so pt's family kept giving him sugar until he came back.  phone used.

## 2019-05-29 NOTE — ED NOTES
Pt resting in bed with no signs of distress. Visitors at bedside.      Antonette Coronel RN  05/29/19 [id number]

## 2019-05-29 NOTE — ED NOTES
Per Erum Kern NP-  NO blood thinners, stent likely tomorrow with URO 5/30/19     Rakan Monterroso RN  05/29/19 [id number]

## 2019-05-29 NOTE — ED NOTES
Bed: 10  Expected date:   Expected time:   Means of arrival:   Comments:     Robson Serna RN  05/29/19 [id number]

## 2019-05-29 NOTE — ED PROVIDER NOTES
dysuria. Musculoskeletal: Negative for back pain. Skin: Negative for rash. Neurological: Positive for dizziness. Negative for light-headedness and headaches. Psychiatric/Behavioral: Negative. All other systems reviewed and are negative. Except as noted above the remainder of the review of systems was reviewed and negative.        PAST MEDICAL HISTORY     Past Medical History:   Diagnosis Date    Anxiety and depression 4/30/2019    Cardiomyopathy ([facility]     Chronic systolic heart failure ([facility] 4/30/2019    Diabetes mellitus ([facility]     Diabetic neuropathy associated with type 2 diabetes mellitus ([facility] 12/13/2017    Heart failure, NYHA class 4 ([facility]     Hyperlipidemia     Hypertension     ICD (implantable cardioverter-defibrillator) in place 4/30/2019    Morbid obesity ([facility] 12/19/2017    LON (obstructive sleep apnea)      Past Surgical History:   Procedure Laterality Date    CARDIAC DEFIBRILLATOR PLACEMENT  12/2008     Social History     Socioeconomic History    Marital status:      Spouse name: Not on file    Number of children: Not on file    Years of education: Not on file    Highest education level: Not on file   Occupational History    Not on file   Social Needs    Financial resource strain: Not on file    Food insecurity:     Worry: Not on file     Inability: Not on file    Transportation needs:     Medical: Not on file     Non-medical: Not on file   Tobacco Use    Smoking status: Former Smoker     Packs/day: 1.00     Years: 25.00     Pack years: 25.00     Last attempt to quit: 12/1/2008     Years since quitting: 10.4    Smokeless tobacco: Never Used   Substance and Sexual Activity    Alcohol use: No    Drug use: Not on file    Sexual activity: Not on file   Lifestyle    Physical activity:     Days per week: Not on file     Minutes per session: Not on file    Stress: Not on file   Relationships    Social connections:     Talks on phone: Not on file     Gets together: Not on file     Attends [employer] service: Not on file     Active member of club or organization: Not on file     Attends meetings of clubs or organizations: Not on file     Relationship status: Not on file    Intimate partner violence:     Fear of current or ex partner: Not on file     Emotionally abused: Not on file     Physically abused: Not on file     Forced sexual activity: Not on file   Other Topics Concern    Not on file   Social History Narrative    Not on file       SCREENINGS      @EMWL([id number])@      PHYSICAL EXAM    (up to 7 for level 4, 8 or more for level 5)     ED Triage Vitals   BP Temp Temp Source Pulse Resp SpO2 Height Weight   05/29/19 1232 05/29/19 1232 05/29/19 1232 05/29/19 1232 05/29/19 1546 05/29/19 1232 05/29/19 1232 05/29/19 1232   (!) 112/96 97.7 °F (36.5 °C) Oral 130 18 99 % 5' 11\" (1.803 m) (!) 323 lb (146.5 kg)       Physical Exam   Constitutional: He is oriented to person, place, and time. He appears well-developed and well-nourished. He is active. No distress. HENT:   Head: Normocephalic and atraumatic. Mouth/Throat: Mucous membranes are normal.   Eyes: Conjunctivae and lids are normal.   Neck: Normal range of motion. Neck supple. No JVD or Bruit. Cardiovascular: Regular rhythm, normal heart sounds, intact distal pulses and normal pulses. Tachycardia present. Pulmonary/Chest: Effort normal and breath sounds normal.   No respiratory distress. No conversational dyspnea. No stridor or grunting or accessory muscle usage. Lungs sounds are clear with good air exchange. No rhonchi rales or wheezes. Abdominal: Soft. Normal appearance and bowel sounds are normal. There is no tenderness. on exam the abdomen is soft, non-tender with good bowel sounds. There is no rebound, rigidity or guarding. There is no CVA tenderness. There is no abdominal or flank ecchymosis. Lymphadenopathy:     He has no cervical adenopathy.    Neurological: He is alert and oriented Result Value    WBC 14.6 (*)     Hemoglobin 13.5 (*)     Hematocrit 40.4 (*)     RDW 15.7 (*)     Platelets 206 (*)     Neutrophils # 9.6 (*)     Monocytes # 1.0 (*)     All other components within normal limits   COMPREHENSIVE METABOLIC PANEL - Abnormal; Notable for the following components:    Glucose 356 (*)     BUN 30 (*)     CREATININE 1.79 (*)     GFR Non- 38.8 (*)     GFR  46.9 (*)     All other components within normal limits   URINE RT REFLEX TO CULTURE - Abnormal; Notable for the following components:    Glucose, Ur >=1000 (*)     Protein,  (*)     All other components within normal limits   LACTIC ACID, PLASMA - Abnormal; Notable for the following components:    Lactic Acid 3.3 (*)     All other components within normal limits    Narrative:     CALL  [facility] tel. [phone],  Lactic acid results called to and read back by Haritha Mao, 05/29/2019  14:16, by Daphney Pereira   D-DIMER, QUANTITATIVE - Abnormal; Notable for the following components:    D-Dimer, Quant 1.07 (*)     All other components within normal limits    Narrative:     CALL  [facility] tel. [phone],  D dimer results called to and read back by Haritha Mao, 05/29/2019 14:15,  by [address] [place] [address] - Abnormal; Notable for the following components:    RBC, UA 3-5 (*)     All other components within normal limits   CULTURE BLOOD #2   CULTURE BLOOD #1   URINE CULTURE   BETA-HYDROXYBUTYRATE   TROPONIN   CK   MAGNESIUM   URINE DRUG SCREEN   TSH WITH REFLEX   LACTIC ACID, PLASMA   POCT GLUCOSE       All other labs were within normal range or not returned as of this dictation.     EMERGENCY DEPARTMENT COURSE and DIFFERENTIAL DIAGNOSIS/MDM:   Vitals:    Vitals:    05/29/19 1232 05/29/19 1546 05/29/19 1643 05/29/19 1750   BP: (!) 112/96 117/86  (!) 139/96   Pulse: 130  114 117   Resp:  18  18   Temp: 97.7 °F (36.5 °C)      TempSrc: Oral      SpO2: 99% 96%  97%   Weight: (!) 323 lb (146.5 kg) Height: 5' 11\" (1.803 m)               MDM pt reexamined. He does have leukocytosis, lactic acidosis, hyperglycemia but no DKA. He continues to be tachycardic. Dose of lopresor IV provided given missed dose this am.  Given the above findings I will obtain ct abdomen pelvis for evaluation. Abdomen is reexamined and without active complaints. Ct demonstrates ureteral stone of 1.3cm. This is discussed with Urology. They request no thinners due to likely stent tomorrow. I feel the patient given the above findings he would benefit from further evaluation and management. He agrees with POC. Dr. Sterling Kawasaki consulted and agrees to accept pt to his service. He is aware of urology plan for stent tomorrow and the request of no thinners. Patient will [facility] to [facility] or symptoms of distress or decompensation. He does remain tachycardic. CRITICAL CARE TIME       CONSULTS:  IP CONSULT TO UROLOGY  IP CONSULT TO HOSPITALIST    PROCEDURES:  Unless otherwise noted below, none     Procedures    FINAL IMPRESSION      1. Ureteric stone    2. Hydronephrosis with urinary obstruction due to ureteral calculus    3. Lactic acidosis    4. Hyperglycemia    5. Tachycardia          DISPOSITION/PLAN   DISPOSITION Decision To Admit 05/29/2019 05:51:46 PM      PATIENT REFERRED TO:  No follow-up provider specified.     DISCHARGE MEDICATIONS:  New Prescriptions    No medications on file          (Please notethat portions of this note were completed with a voice recognition program.  Efforts were made to edit the dictations but occasionally words are mis-transcribed.)    JESUS Claire CNP (electronically signed)  Attending Emergency Physician          JESUS Claire CNP  05/29/19 1412 [facility], APRN - CNP  05/29/19 1812

## 2019-05-29 NOTE — H&P
[place] [id number] MEDICINE    HISTORY AND PHYSICAL EXAM    PATIENT NAME:  Vern Lemus    MRN:  [id number]  SERVICE DATE:  5/29/2019   SERVICE TIME:  7:31 PM    Primary Care Physician: Saurav Delvalle MD         SUBJECTIVE  CHIEF COMPLAINT:  Presyncope, abd pain    HPI:  This is a 62 y.o. male who presents with sudden onset abd pain associated with dizziness and sweating. Pt denies any radiation of this pain to flank or back. He further denies hematochezia/melena, hematuria, CP,palp, SOB, FCD. CT abd reflects severe L hydroureteronephrosis and calculi present in L ureter  Urology consulted from ED. Adonay Avalos       PAST MEDICAL HISTORY:    Past Medical History:   Diagnosis Date    Anxiety and depression 4/30/2019    Cardiomyopathy ([address]     Chronic systolic heart failure ([address] 4/30/2019    Diabetes mellitus ([address]     Diabetic neuropathy associated with type 2 diabetes mellitus ([address] 12/13/2017    Heart failure, NYHA class 4 ([address]     Hyperlipidemia     Hypertension     ICD (implantable cardioverter-defibrillator) in place 4/30/2019    Morbid obesity ([address] 12/19/2017    LON (obstructive sleep apnea)      PAST SURGICAL HISTORY:    Past Surgical History:   Procedure Laterality Date    CARDIAC DEFIBRILLATOR PLACEMENT  12/2008     FAMILY HISTORY:    Family History   Problem Relation Age of Onset    Heart Disease Mother     Kidney Disease Mother     Hypertension Mother     Diabetes Mother     No Known Problems Sister     No Known Problems Brother     No Known Problems Brother      SOCIAL HISTORY:    Social History     Socioeconomic History    Marital status:      Spouse name: Not on file    Number of children: Not on file    Years of education: Not on file    Highest education level: Not on file   Occupational History    Not on file   Social Needs    Financial resource strain: Not on file    Food insecurity:     Worry: Not on file     Inability: Not on file   [place] needs: Medical: Not on file     Non-medical: Not on file   Tobacco Use    Smoking status: Former Smoker     Packs/day: 1.00     Years: 25.00     Pack years: 25.00     Last attempt to quit: 12/1/2008     Years since quitting: 10.4    Smokeless tobacco: Never Used   Substance and Sexual Activity    Alcohol use: No    Drug use: Not on file    Sexual activity: Not on file   Lifestyle    Physical activity:     Days per week: Not on file     Minutes per session: Not on file    Stress: Not on file   Relationships    Social connections:     Talks on phone: Not on file     Gets together: Not on file     Attends Christianity service: Not on file     Active member of club or organization: Not on file     Attends meetings of clubs or organizations: Not on file     Relationship status: Not on file    Intimate partner violence:     Fear of current or ex partner: Not on file     Emotionally abused: Not on file     Physically abused: Not on file     Forced sexual activity: Not on file   Other Topics Concern    Not on file   Social History Narrative    Not on file     MEDICATIONS:   Prior to Admission medications    Medication Sig Start Date End Date Taking?  Authorizing Provider   simvastatin (ZOCOR) 20 MG tablet Take 20 mg by mouth nightly   Yes Historical Provider, MD   metoprolol tartrate (LOPRESSOR) 50 MG tablet Take 1.5 tablets by mouth 2 times daily  Patient taking differently: Take 50 mg by mouth 2 times daily  5/16/19  Yes Glo Flores MD   SITagliptin (JANUVIA) 100 MG tablet Take 1 tablet by mouth daily 5/16/19  Yes Glo Flores MD   amLODIPine (NORVASC) 10 MG tablet TAKE 1 TABLET BY MOUTH EVERY NIGHT 5/7/19  Yes Norman Ly, DO   digoxin (DIGOX) 125 MCG tablet TAKE 1 TABLET BY MOUTH ONCE DAILY 5/7/19  Yes Norman Ly, DO   enalapril (VASOTEC) 20 MG tablet Take 1 tablet by mouth 2 times daily 5/7/19  Yes Norman Ly, DO   furosemide (LASIX) 40 MG tablet Take 1 tablet by mouth daily 5/7/19  Yes Norman Ly, DO   sertraline (ZOLOFT) 100 MG tablet Take 1 tablet by mouth daily 5/7/19  Yes Norman Ly, DO   ranitidine (ZANTAC) 300 MG tablet TAKE 1 TABLET BY MOUTH DAILY 3/19/19  Yes JESUS Machado CNP   fenofibrate 160 MG tablet TAKE 1 TABLET BY MOUTH EVERY NIGHT 10/11/18  Yes Shelton Bailey DO   metFORMIN (GLUCOPHAGE) 1000 MG tablet TAKE 1 TABLET BY MOUTH TWICE DAILY, WITH MEALS 10/11/18  Yes Shelton Bailey DO   glipiZIDE (GLUCOTROL) 10 MG tablet TAKE 2 TABLETS BY MOUTH TWICE DAILY BEFORE MEALS 10/11/18  Yes Shelton Bailey DO   insulin glargine (LANTUS SOLOSTAR) 100 UNIT/ML injection pen Inject 55 Units into the skin nightly  Patient taking differently: Inject 50 Units into the skin nightly  10/11/18  Yes Shelton Bailey DO   ammonium lactate (LAC-HYDRIN) 12 % cream Apply topically as needed. 5/16/19   Marielle Marsh DPM   mupirocin (BACTROBAN) 2 % ointment APPLY TO WOUNDS D 5/2/19   Historical Provider, MD   sodium chloride 0.9 % irrigation [place] CHANGES 5/2/19   Historical Provider, MD   aspirin EC 81 MG EC tablet Take 1 tablet by mouth daily  Patient taking differently: Take 325 mg by mouth daily  5/7/19   Norman Ly, DO   atorvastatin (LIPITOR) 40 MG tablet Take 1 tablet by mouth daily 4/30/19   Glo Flores MD   naproxen (NAPROSYN) 500 MG tablet Take 1 tablet by mouth 2 times daily for 20 doses 4/25/19 5/16/19  JESUS Spencer CNP   blood glucose test strips (TRUE METRIX BLOOD GLUCOSE TEST) strip 1 each by In Vitro route 3 times daily As needed. 10/11/18   Shelton Bailey DO   albuterol (PROVENTIL) (2.5 MG/3ML) 0.083% nebulizer solution Take 3 mLs by nebulization 4 times daily 10/11/18   Juan Antonio Brumfield DO   hydrOXYzine (VISTARIL) 50 MG capsule Take 1 capsule by mouth nightly 10/11/18   Juan Antonio Brumfield DO   terbinafine (LAMISIL) 1 % cream Apply topically 2 times daily Apply topically 2 times daily.  10/11/18 Shelton Bailey, DO   Insulin Pen Needle (B-D UF III MINI PEN NEEDLES) 31G X 5 MM MISC 1 each by Does not apply route daily 10/11/18   Joe Lagunas DO   Blood Glucose Monitoring Suppl (ONE TOUCH ULTRA MINI) w/Device KIT 1 kit by Does not apply route three times daily 10/11/18   Joe Lagunas DO   Lancets MISC Use TID 10/11/18   Joe Lagunas DO   gabapentin (NEURONTIN) 300 MG capsule Take 1 capsule by mouth 3 times daily for 30 days. . 10/11/18 5/16/19  Joe Lagunas DO   omeprazole (PRILOSEC) 20 MG delayed release capsule Take 1 capsule by mouth daily 3/14/18   Joe Lagunas DO   bacitracin 500 UNIT/GM ointment Apply topically 2 times daily. 3/11/18   Juliet Perez PA-C   Insulin Syringe-Needle U-100 (INSULIN SYRINGE 1CC/31GX5/16\") 31G X 5/16\" 1 ML MISC Use once daily to administer Lantus 12/14/17   Joe Lagunas DO       ALLERGIES: Coreg [carvedilol]    REVIEW OF SYSTEM:   ROS as noted in HPI, 12 point ROS reviewed and otherwise negative.     OBJECTIVE  PHYSICAL EXAM: BP (!) 139/96   Pulse 117   Temp 97.7 °F (36.5 °C) (Oral)   Resp 18   Ht 5' 11\" (1.803 m)   Wt (!) 323 lb (146.5 kg)   SpO2 97%   BMI 45.05 kg/m²     CONSTITUTIONAL:  alert and mild distress  EYES:  pupils equal, round and reactive to light, sclera clear and conjunctiva normal  ENT:  normocepalic, without obvious abnormality, atraumatic, poor dentition, oral pharynx with moist mucus membranes  NECK:  supple, symmetrical, trachea midline, skin normal and no carotid bruits  BACK:  symmetric, no curvature and no costal vertebral tenderness  LUNGS:  no increased work of breathing and clear to auscultation  CARDIOVASCULAR:  normal apical pulses and normal S1 and S2  ABDOMEN:  normal bowel sounds and non-tender  MUSCULOSKELETAL:  there is no redness, warmth, or swelling of the joints  full range of motion noted  NEUROLOGIC:  Mental Status Exam:  Level of Alertness:   awake  Orientation:   person, place, time  Cranial Nerves: cranial nerves II-XII are grossly intact  SKIN:  normal skin color, texture, turgor    DATA:     Diagnostic tests reviewed for today's visit:    Most recent labs and imaging results reviewed.      LABS:    Recent Results (from the past 24 hour(s))   Urinalysis Reflex to Culture    Collection Time: 05/29/19 12:45 PM   Result Value Ref Range    Color, UA Yellow Straw/Yellow    Clarity, UA Clear Clear    Glucose, Ur >=1000 (A) Negative mg/dL    Bilirubin Urine Negative Negative    Ketones, Urine Negative Negative mg/dL    Specific Gravity, UA 1.040 1.005 - 1.030    Blood, Urine Negative Negative    pH, UA 5.0 5.0 - 9.0    Protein,  (A) Negative mg/dL    Urobilinogen, Urine 0.2 <2.0 E.U./dL    Nitrite, Urine Negative Negative    Leukocyte Esterase, Urine Negative Negative    Urine Reflex to Culture YES    Microscopic Urinalysis    Collection Time: 05/29/19 12:45 PM   Result Value Ref Range    RBC, UA 3-5 (A) 0 - 2 /HPF    Bacteria, UA Negative /HPF    Hyaline Casts, UA 5-10 0 - 5 /HPF    WBC, UA 3-5 0 - 5 /HPF    Epi Cells 0-2 0 - 5 /HPF   EKG 12 Lead    Collection Time: 05/29/19  1:11 PM   Result Value Ref Range    Ventricular Rate 133 BPM    Atrial Rate 102 BPM    QRS Duration 100 ms    Q-T Interval 332 ms    QTc Calculation (Bazett) 494 ms    R Axis -27 degrees    T Axis 69 degrees   Beta-Hydroxybutyrate    Collection Time: 05/29/19  1:35 PM   Result Value Ref Range    Beta-Hydroxybutyrate 1.8 0.2 - 2.8 mg/dL   CBC Auto Differential    Collection Time: 05/29/19  1:36 PM   Result Value Ref Range    WBC 14.6 (H) 4.8 - 10.8 K/uL    RBC 4.70 4.70 - 6.10 M/uL    Hemoglobin 13.5 (L) 14.0 - 18.0 g/dL    Hematocrit 40.4 (L) 42.0 - 52.0 %    MCV 86.1 80.0 - 100.0 fL    MCH 28.8 27.0 - 31.3 pg    MCHC 33.4 33.0 - 37.0 %    RDW 15.7 (H) 11.5 - 14.5 %    Platelets 153 (H) 577 - 400 K/uL    Neutrophils % 65.3 %    Lymphocytes % 24.1 %    Monocytes % 6.6 %    Eosinophils % 3.8 %    Basophils % 0.2 %    Neutrophils # 9.6 (H) 1.4 - 6.5 K/uL    Lymphocytes # 3.5 1.0 - 4.8 K/uL    Monocytes # 1.0 (H) 0.2 - 0.8 K/uL    Eosinophils # 0.6 0.0 - 0.7 K/uL    Basophils # 0.0 0.0 - 0.2 K/uL   Comprehensive Metabolic Panel    Collection Time: 05/29/19  1:36 PM   Result Value Ref Range    Sodium 137 135 - 144 mEq/L    Potassium 4.4 3.4 - 4.9 mEq/L    Chloride 101 95 - 107 mEq/L    CO2 24 20 - 31 mEq/L    Anion Gap 12 9 - 15 mEq/L    Glucose 356 (H) 70 - 99 mg/dL    BUN 30 (H) 8 - 23 mg/dL    CREATININE 1.79 (H) 0.70 - 1.20 mg/dL    GFR Non-African American 38.8 (L) >60    GFR  46.9 (L) >60    Calcium 9.8 8.5 - 9.9 mg/dL    Total Protein 7.1 6.3 - 8.0 g/dL    Alb 3.8 3.5 - 4.6 g/dL    Total Bilirubin 0.3 0.2 - 0.7 mg/dL    Alkaline Phosphatase 77 35 - 104 U/L    ALT 12 0 - 41 U/L    AST 12 0 - 40 U/L    Globulin 3.3 2.3 - 3.5 g/dL   Troponin    Collection Time: 05/29/19  1:36 PM   Result Value Ref Range    Troponin <0.010 0.000 - 0.010 ng/mL   CK    Collection Time: 05/29/19  1:36 PM   Result Value Ref Range    Total CK 69 0 - 190 U/L   Magnesium    Collection Time: 05/29/19  1:36 PM   Result Value Ref Range    Magnesium 2.0 1.7 - 2.4 mg/dL   Lactic Acid, Plasma    Collection Time: 05/29/19  1:36 PM   Result Value Ref Range    Lactic Acid 3.3 (HH) 0.5 - 2.2 mmol/L   D-Dimer, Quantitative    Collection Time: 05/29/19  1:36 PM   Result Value Ref Range    D-Dimer, Quant 1.07 (HH) 0.00 - 0.50 mg/L FEU   TSH with Reflex    Collection Time: 05/29/19  1:36 PM   Result Value Ref Range    TSH 0.980 0.440 - 3.860 uIU/mL   Urine Drug Screen    Collection Time: 05/29/19  3:30 PM   Result Value Ref Range    Amphetamine Screen, Urine Neg Negative <1000 ng/mL    Barbiturate Screen, Ur Neg Negative < 200 ng/mL    Benzodiazepine Screen, Urine Neg Negative < 200 ng/mL    Cannabinoid Scrn, Ur Neg Negative < 50 ng/mL    Cocaine Metabolite Screen, Urine Neg Negative < 300 ng/mL    Opiate Scrn, Ur Neg Negative < 300 ng/mL    PCP Screen, Urine Neg Negative < 25 ng/mL    Drug Screen Comment: see below    Lactic Acid, Plasma    Collection Time: 05/29/19  5:21 PM   Result Value Ref Range    Lactic Acid 1.7 0.5 - 2.2 mmol/L   POCT Glucose    Collection Time: 05/29/19  5:39 PM   Result Value Ref Range    POC Glucose 263 (H) 60 - 115 mg/dl    Performed on ACCU-CHEK    POCT Glucose    Collection Time: 05/29/19  5:55 PM   Result Value Ref Range    Glucose 263 mg/dL    QC OK? yes        IMAGING:  Ct Abdomen Pelvis Wo Contrast Additional Contrast? None    Result Date: 5/29/2019  Examination: CT ABDOMEN PELVIS WO CONTRAST Indication:  Abdominal pain. Near syncope. Technique: Multiple serial axial images was performed through the abdomen and pelvis without intravenous or oral administration of contrast. However scan was obtained after patient had undergone CT PE study. .   Images were reconstructed in the axial and coronal and sagittal planes. Comparison:  No comparison is available. Findings: The visualized basal lungs show no focal parenchymal abnormalities. The liver, gallbladder, spleen, pancreas, adrenals, are unremarkable. The right kidney shows mild perinephric stranding. No hydronephrosis hydroureter. The pelvic calyceal system is old contrast. No pelvic caliectasis. There is left perinephric stranding. There is severe left hydronephrosis and hydroureter to the proximal third of the left ureter where there is a 1.3 cm cm calculi of approximately 1071 Hounsfield units. The bladder shows no gross filling defects. Large and small bowel show no sign of obstruction. The appendix is visualized. No periappendiceal stranding. No diverticulitis. There are small bilateral inguinal hernias containing mesenteric fat No free air. No free fluid. The visualized abdominal aorta is of normal size and caliber. No significant retroperitoneal adenopathy. Show multilevel degenerative change with bridging osteophytes.      1. THERE IS SEVERE LEFT-SIDED HYDRONEPHROSIS AND HYDROURETER WITH A CALCULI IN THE PROXIMAL THIRD OF THE LEFT URETER. OTHER FINDINGS AS DETAILED ABOVE All CT scans at this facility use dose modulation, iterative reconstruction, and/or weight based dosing when appropriate to reduce radiation dose to as low as reasonably achievable. Cta Chest W Wo Contrast    Result Date: 5/29/2019  EXAM: CT SCAN OF THE THORAX WITH CONTRAST/PE PROTOCOL/CTA CHEST COMPARISON: NONE AVAILABLE REASON FOR EXAMINATION:  DIABETIC PATIENT WITH DIZZINESS, NEAR SYNCOPE, DYSPNEA, EVALUATE FOR POSSIBLE PULMONARY EMBOLISM, HISTORY OF CARDIAC ARRHYTHMIA WITH ICD DEVICE IN SITU TECHNIQUE: Helical CTA was performed through the chest utilizing 100 cc of Isovue 370 intravenous contrast.  Images were obtained with bolus tracking in order to opacify the pulmonary arteries. Thick section coronal MIP 3D reconstructions were performed  on a separate workstation. FINDINGS:  There is no CTA evidence of a pulmonary embolism. There is no CTA evidence of a thoracic aorta aneurysm or dissection. There is an ICD device in the left anterior chest wall with electrodes in the region of the RA and RV. There is borderline cardiomegaly. There is no pericardial effusion. There is no mediastinal mass or adenopathy. There is no hilar adenopathy and there is no axillary adenopathy. There are no pulmonary infiltrates or pleural effusions. There is degenerative bone spurring throughout the thoracic spine. Remainder of the CTA scan of the thorax is unremarkable. 1. NEGATIVE CTA SCAN OF THE THORAX WITH CONTRAST. 2. NO CTA EVIDENCE OF A PULMONARY THROMBOEMBOLIC DISEASE. 3. NO CTA EVIDENCE OF A THORACIC AORTA ANEURYSM OR DISSECTION. 4. LUNGS APPEAR CLEAR WITHOUT A PULMONARY INFILTRATE OR PLEURAL EFFUSION. All CT scans at this facility use dose modulation, iterative reconstruction, and/or weight based dosing when appropriate to reduce radiation dose to as low as reasonably achievable.      Xr Chest Portable    Result Date: 5/29/2019  EXAMINATION: PORTABLE CHEST X-RAY FROM 5/29/2019 AT 13:06 HOURS CLINICAL HISTORY: HISTORY CARDIAC ARRHYTHMIA AND ICD DEVICE, DIZZINESS AND NEAR-SYNCOPE COMPARISONS: None available. FINDINGS: There is borderline cardiomegaly. There is an ICD device superimposed on the upper left hemithorax with electrodes in the RA and RV. Lungs appear clear without a pulmonary infiltrate or pleural effusion. There is pleural scarring in the left CP  angle. Visualized bony thorax and remainder of the chest unremarkable. 1. BORDERLINE CARDIOMEGALY WITH AN ICD DEVICE IN SITU. 2. LUNGS APPEAR CLEAR WITHOUT A PULMONARY INFILTRATE OR PLEURAL EFFUSION.       VTE Prophylaxis: SCDs    ASSESSMENT AND PLAN  Dizziness   Hydroureteronephrosis  HTN - poorly controlled  DMII  HTN  HLD  Chronic CHF/CMO s/p AICD  LON      Admit inpt w/tele  Consult urology  Ortho vs now and in am  Gentle hydration  flomax 0.4mg qd, strain all urine  Glucose checks w/SSI, check a1c  2decho   Prn hydralazine  Cbc, bmp, Mg in am          Plan of care discussed with: patient    SIGNATURE: Justina Hashimoto, [place]  DATE: May 29, 2019  TIME: 7:31 PM     Dr. Anu Flynn DO - supervising physician

## 2019-05-30 ENCOUNTER — APPOINTMENT (OUTPATIENT)
Dept: GENERAL RADIOLOGY | Age: 59
DRG: 660 | End: 2019-05-30

## 2019-05-30 ENCOUNTER — ANESTHESIA (OUTPATIENT)
Dept: OPERATING ROOM | Age: 59
DRG: 660 | End: 2019-05-30

## 2019-05-30 ENCOUNTER — ANESTHESIA EVENT (OUTPATIENT)
Dept: OPERATING ROOM | Age: 59
DRG: 660 | End: 2019-05-30

## 2019-05-30 VITALS — SYSTOLIC BLOOD PRESSURE: 98 MMHG | DIASTOLIC BLOOD PRESSURE: 56 MMHG | OXYGEN SATURATION: 98 % | TEMPERATURE: 96.3 F

## 2019-05-30 LAB
ALBUMIN SERPL-MCNC: 3.4 G/DL (ref 3.5–4.6)
ALP BLD-CCNC: 58 U/L (ref 35–104)
ALT SERPL-CCNC: 10 U/L (ref 0–41)
ANION GAP SERPL CALCULATED.3IONS-SCNC: 9 MEQ/L (ref 9–15)
AST SERPL-CCNC: 9 U/L (ref 0–40)
BASOPHILS ABSOLUTE: 0.2 K/UL (ref 0–0.2)
BASOPHILS RELATIVE PERCENT: 2.2 %
BILIRUB SERPL-MCNC: <0.2 MG/DL (ref 0.2–0.7)
BUN BLDV-MCNC: 24 MG/DL (ref 8–23)
CALCIUM SERPL-MCNC: 8.8 MG/DL (ref 8.5–9.9)
CHLORIDE BLD-SCNC: 110 MEQ/L (ref 95–107)
CO2: 24 MEQ/L (ref 20–31)
CREAT SERPL-MCNC: 1.55 MG/DL (ref 0.7–1.2)
DIGOXIN LEVEL: 0.8 NG/ML (ref 0.8–2)
EOSINOPHILS ABSOLUTE: 0.5 K/UL (ref 0–0.7)
EOSINOPHILS RELATIVE PERCENT: 5.3 %
GFR AFRICAN AMERICAN: 55.4
GFR NON-AFRICAN AMERICAN: 45.8
GLOBULIN: 2.4 G/DL (ref 2.3–3.5)
GLUCOSE BLD-MCNC: 120 MG/DL (ref 60–115)
GLUCOSE BLD-MCNC: 155 MG/DL (ref 70–99)
GLUCOSE BLD-MCNC: 259 MG/DL (ref 60–115)
GLUCOSE BLD-MCNC: 369 MG/DL (ref 60–115)
HCT VFR BLD CALC: 32.3 % (ref 42–52)
HEMOGLOBIN: 10.8 G/DL (ref 14–18)
LYMPHOCYTES ABSOLUTE: 3.4 K/UL (ref 1–4.8)
LYMPHOCYTES RELATIVE PERCENT: 37.1 %
MCH RBC QN AUTO: 29.4 PG (ref 27–31.3)
MCHC RBC AUTO-ENTMCNC: 33.6 % (ref 33–37)
MCV RBC AUTO: 87.7 FL (ref 80–100)
MONOCYTES ABSOLUTE: 0.8 K/UL (ref 0.2–0.8)
MONOCYTES RELATIVE PERCENT: 8.8 %
NEUTROPHILS ABSOLUTE: 4.2 K/UL (ref 1.4–6.5)
NEUTROPHILS RELATIVE PERCENT: 46.6 %
PDW BLD-RTO: 15.5 % (ref 11.5–14.5)
PERFORMED ON: ABNORMAL
PLATELET # BLD: 340 K/UL (ref 130–400)
POTASSIUM REFLEX MAGNESIUM: 3.8 MEQ/L (ref 3.4–4.9)
RBC # BLD: 3.68 M/UL (ref 4.7–6.1)
SODIUM BLD-SCNC: 143 MEQ/L (ref 135–144)
TOTAL PROTEIN: 5.8 G/DL (ref 6.3–8)
WBC # BLD: 9.1 K/UL (ref 4.8–10.8)

## 2019-05-30 PROCEDURE — C1758 CATHETER, URETERAL: HCPCS | Performed by: UROLOGY

## 2019-05-30 PROCEDURE — 2709999900 HC NON-CHARGEABLE SUPPLY: Performed by: UROLOGY

## 2019-05-30 PROCEDURE — 6370000000 HC RX 637 (ALT 250 FOR IP): Performed by: UROLOGY

## 2019-05-30 PROCEDURE — 52330 CYSTOSCOPY AND TREATMENT: CPT | Performed by: UROLOGY

## 2019-05-30 PROCEDURE — 1210000000 HC MED SURG R&B

## 2019-05-30 PROCEDURE — 2580000003 HC RX 258: Performed by: PHYSICIAN ASSISTANT

## 2019-05-30 PROCEDURE — C1894 INTRO/SHEATH, NON-LASER: HCPCS | Performed by: UROLOGY

## 2019-05-30 PROCEDURE — 2580000003 HC RX 258: Performed by: INTERNAL MEDICINE

## 2019-05-30 PROCEDURE — 85025 COMPLETE CBC W/AUTO DIFF WBC: CPT

## 2019-05-30 PROCEDURE — 80053 COMPREHEN METABOLIC PANEL: CPT

## 2019-05-30 PROCEDURE — 36415 COLL VENOUS BLD VENIPUNCTURE: CPT

## 2019-05-30 PROCEDURE — 0T778DZ DILATION OF LEFT URETER WITH INTRALUMINAL DEVICE, VIA NATURAL OR ARTIFICIAL OPENING ENDOSCOPIC: ICD-10-PCS | Performed by: UROLOGY

## 2019-05-30 PROCEDURE — 3600000004 HC SURGERY LEVEL 4 BASE: Performed by: UROLOGY

## 2019-05-30 PROCEDURE — 6360000002 HC RX W HCPCS: Performed by: STUDENT IN AN ORGANIZED HEALTH CARE EDUCATION/TRAINING PROGRAM

## 2019-05-30 PROCEDURE — 7100000000 HC PACU RECOVERY - FIRST 15 MIN: Performed by: UROLOGY

## 2019-05-30 PROCEDURE — 52332 CYSTOSCOPY AND TREATMENT: CPT | Performed by: UROLOGY

## 2019-05-30 PROCEDURE — 7100000001 HC PACU RECOVERY - ADDTL 15 MIN: Performed by: UROLOGY

## 2019-05-30 PROCEDURE — 6370000000 HC RX 637 (ALT 250 FOR IP): Performed by: PHYSICIAN ASSISTANT

## 2019-05-30 PROCEDURE — 2500000003 HC RX 250 WO HCPCS: Performed by: NURSE ANESTHETIST, CERTIFIED REGISTERED

## 2019-05-30 PROCEDURE — 3700000001 HC ADD 15 MINUTES (ANESTHESIA): Performed by: UROLOGY

## 2019-05-30 PROCEDURE — C2625 STENT, NON-COR, TEM W/DEL SY: HCPCS | Performed by: UROLOGY

## 2019-05-30 PROCEDURE — 3209999900 FLUORO FOR SURGICAL PROCEDURES

## 2019-05-30 PROCEDURE — 3700000000 HC ANESTHESIA ATTENDED CARE: Performed by: UROLOGY

## 2019-05-30 PROCEDURE — BT1FZZZ FLUOROSCOPY OF LEFT KIDNEY, URETER AND BLADDER: ICD-10-PCS | Performed by: UROLOGY

## 2019-05-30 PROCEDURE — 99221 1ST HOSP IP/OBS SF/LOW 40: CPT | Performed by: UROLOGY

## 2019-05-30 PROCEDURE — 6360000004 HC RX CONTRAST MEDICATION: Performed by: UROLOGY

## 2019-05-30 PROCEDURE — 80162 ASSAY OF DIGOXIN TOTAL: CPT

## 2019-05-30 PROCEDURE — 6370000000 HC RX 637 (ALT 250 FOR IP): Performed by: INTERNAL MEDICINE

## 2019-05-30 PROCEDURE — C1769 GUIDE WIRE: HCPCS | Performed by: UROLOGY

## 2019-05-30 PROCEDURE — 3600000014 HC SURGERY LEVEL 4 ADDTL 15MIN: Performed by: UROLOGY

## 2019-05-30 PROCEDURE — 6360000002 HC RX W HCPCS: Performed by: NURSE ANESTHETIST, CERTIFIED REGISTERED

## 2019-05-30 PROCEDURE — 93010 ELECTROCARDIOGRAM REPORT: CPT | Performed by: INTERNAL MEDICINE

## 2019-05-30 PROCEDURE — 2580000003 HC RX 258: Performed by: NURSE ANESTHETIST, CERTIFIED REGISTERED

## 2019-05-30 PROCEDURE — 2580000003 HC RX 258: Performed by: UROLOGY

## 2019-05-30 DEVICE — STENT URET 6FR L28CM POLYMER BLEND PH FREE COAT GRADUAL TAPR: Type: IMPLANTABLE DEVICE | Site: URETER | Status: FUNCTIONAL

## 2019-05-30 RX ORDER — 0.9 % SODIUM CHLORIDE 0.9 %
500 INTRAVENOUS SOLUTION INTRAVENOUS ONCE
Status: COMPLETED | OUTPATIENT
Start: 2019-05-30 | End: 2019-05-30

## 2019-05-30 RX ORDER — MAGNESIUM HYDROXIDE 1200 MG/15ML
LIQUID ORAL PRN
Status: DISCONTINUED | OUTPATIENT
Start: 2019-05-30 | End: 2019-05-30 | Stop reason: ALTCHOICE

## 2019-05-30 RX ORDER — ONDANSETRON 2 MG/ML
4 INJECTION INTRAMUSCULAR; INTRAVENOUS
Status: DISCONTINUED | OUTPATIENT
Start: 2019-05-30 | End: 2019-05-30 | Stop reason: HOSPADM

## 2019-05-30 RX ORDER — METOCLOPRAMIDE HYDROCHLORIDE 5 MG/ML
10 INJECTION INTRAMUSCULAR; INTRAVENOUS
Status: DISCONTINUED | OUTPATIENT
Start: 2019-05-30 | End: 2019-05-30 | Stop reason: HOSPADM

## 2019-05-30 RX ORDER — MIDAZOLAM HYDROCHLORIDE 1 MG/ML
INJECTION INTRAMUSCULAR; INTRAVENOUS PRN
Status: DISCONTINUED | OUTPATIENT
Start: 2019-05-30 | End: 2019-05-30 | Stop reason: SDUPTHER

## 2019-05-30 RX ORDER — LIDOCAINE HYDROCHLORIDE 20 MG/ML
INJECTION, SOLUTION INFILTRATION; PERINEURAL PRN
Status: DISCONTINUED | OUTPATIENT
Start: 2019-05-30 | End: 2019-05-30 | Stop reason: SDUPTHER

## 2019-05-30 RX ORDER — DEXAMETHASONE SODIUM PHOSPHATE 10 MG/ML
INJECTION INTRAMUSCULAR; INTRAVENOUS PRN
Status: DISCONTINUED | OUTPATIENT
Start: 2019-05-30 | End: 2019-05-30 | Stop reason: SDUPTHER

## 2019-05-30 RX ORDER — PROPOFOL 10 MG/ML
INJECTION, EMULSION INTRAVENOUS PRN
Status: DISCONTINUED | OUTPATIENT
Start: 2019-05-30 | End: 2019-05-30 | Stop reason: SDUPTHER

## 2019-05-30 RX ORDER — HYDRALAZINE HYDROCHLORIDE 20 MG/ML
10 INJECTION INTRAMUSCULAR; INTRAVENOUS EVERY 6 HOURS PRN
Status: DISCONTINUED | OUTPATIENT
Start: 2019-05-30 | End: 2019-05-31 | Stop reason: HOSPADM

## 2019-05-30 RX ORDER — FENTANYL CITRATE 50 UG/ML
50 INJECTION, SOLUTION INTRAMUSCULAR; INTRAVENOUS EVERY 10 MIN PRN
Status: DISCONTINUED | OUTPATIENT
Start: 2019-05-30 | End: 2019-05-30 | Stop reason: HOSPADM

## 2019-05-30 RX ORDER — DIPHENHYDRAMINE HYDROCHLORIDE 50 MG/ML
12.5 INJECTION INTRAMUSCULAR; INTRAVENOUS
Status: DISCONTINUED | OUTPATIENT
Start: 2019-05-30 | End: 2019-05-30 | Stop reason: HOSPADM

## 2019-05-30 RX ORDER — HYDROCODONE BITARTRATE AND ACETAMINOPHEN 5; 325 MG/1; MG/1
2 TABLET ORAL PRN
Status: DISCONTINUED | OUTPATIENT
Start: 2019-05-30 | End: 2019-05-30 | Stop reason: HOSPADM

## 2019-05-30 RX ORDER — FENTANYL CITRATE 50 UG/ML
INJECTION, SOLUTION INTRAMUSCULAR; INTRAVENOUS PRN
Status: DISCONTINUED | OUTPATIENT
Start: 2019-05-30 | End: 2019-05-30 | Stop reason: SDUPTHER

## 2019-05-30 RX ORDER — CHLORHEXIDINE GLUCONATE 4 G/100ML
SOLUTION TOPICAL PRN
Status: DISCONTINUED | OUTPATIENT
Start: 2019-05-30 | End: 2019-05-30 | Stop reason: ALTCHOICE

## 2019-05-30 RX ORDER — HYDROCODONE BITARTRATE AND ACETAMINOPHEN 5; 325 MG/1; MG/1
1 TABLET ORAL PRN
Status: DISCONTINUED | OUTPATIENT
Start: 2019-05-30 | End: 2019-05-30 | Stop reason: HOSPADM

## 2019-05-30 RX ORDER — ONDANSETRON 2 MG/ML
INJECTION INTRAMUSCULAR; INTRAVENOUS PRN
Status: DISCONTINUED | OUTPATIENT
Start: 2019-05-30 | End: 2019-05-30 | Stop reason: SDUPTHER

## 2019-05-30 RX ORDER — SODIUM CHLORIDE 9 MG/ML
INJECTION, SOLUTION INTRAVENOUS CONTINUOUS PRN
Status: DISCONTINUED | OUTPATIENT
Start: 2019-05-30 | End: 2019-05-30 | Stop reason: SDUPTHER

## 2019-05-30 RX ORDER — MEPERIDINE HYDROCHLORIDE 25 MG/ML
12.5 INJECTION INTRAMUSCULAR; INTRAVENOUS; SUBCUTANEOUS EVERY 5 MIN PRN
Status: DISCONTINUED | OUTPATIENT
Start: 2019-05-30 | End: 2019-05-30 | Stop reason: HOSPADM

## 2019-05-30 RX ADMIN — MIDAZOLAM HYDROCHLORIDE 2 MG: 1 INJECTION, SOLUTION INTRAMUSCULAR; INTRAVENOUS at 12:05

## 2019-05-30 RX ADMIN — ENALAPRIL MALEATE 20 MG: 20 TABLET ORAL at 21:42

## 2019-05-30 RX ADMIN — INSULIN LISPRO 3 UNITS: 100 INJECTION, SOLUTION INTRAVENOUS; SUBCUTANEOUS at 00:02

## 2019-05-30 RX ADMIN — INSULIN GLARGINE 50 UNITS: 100 INJECTION, SOLUTION SUBCUTANEOUS at 22:35

## 2019-05-30 RX ADMIN — GABAPENTIN 300 MG: 300 CAPSULE ORAL at 21:42

## 2019-05-30 RX ADMIN — HYDROCODONE BITARTRATE AND ACETAMINOPHEN 2 TABLET: 5; 325 TABLET ORAL at 15:41

## 2019-05-30 RX ADMIN — FENTANYL CITRATE 50 MCG: 50 INJECTION, SOLUTION INTRAMUSCULAR; INTRAVENOUS at 13:34

## 2019-05-30 RX ADMIN — SODIUM CHLORIDE 500 ML: 9 INJECTION, SOLUTION INTRAVENOUS at 21:40

## 2019-05-30 RX ADMIN — FENTANYL CITRATE 50 MCG: 50 INJECTION, SOLUTION INTRAMUSCULAR; INTRAVENOUS at 13:21

## 2019-05-30 RX ADMIN — Medication 10 ML: at 21:47

## 2019-05-30 RX ADMIN — INSULIN LISPRO 5 UNITS: 100 INJECTION, SOLUTION INTRAVENOUS; SUBCUTANEOUS at 22:35

## 2019-05-30 RX ADMIN — ONDANSETRON 4 MG: 2 INJECTION INTRAMUSCULAR; INTRAVENOUS at 12:31

## 2019-05-30 RX ADMIN — PROPOFOL 300 MG: 10 INJECTION, EMULSION INTRAVENOUS at 12:18

## 2019-05-30 RX ADMIN — METOPROLOL TARTRATE 75 MG: 25 TABLET, FILM COATED ORAL at 09:40

## 2019-05-30 RX ADMIN — DEXAMETHASONE SODIUM PHOSPHATE 4 MG: 10 INJECTION INTRAMUSCULAR; INTRAVENOUS at 12:20

## 2019-05-30 RX ADMIN — METOPROLOL TARTRATE 75 MG: 25 TABLET, FILM COATED ORAL at 21:42

## 2019-05-30 RX ADMIN — ATORVASTATIN CALCIUM 40 MG: 40 TABLET, FILM COATED ORAL at 21:42

## 2019-05-30 RX ADMIN — SODIUM CHLORIDE: 9 INJECTION, SOLUTION INTRAVENOUS at 12:05

## 2019-05-30 RX ADMIN — FENTANYL CITRATE 50 MCG: 50 INJECTION, SOLUTION INTRAMUSCULAR; INTRAVENOUS at 12:18

## 2019-05-30 RX ADMIN — HYDROXYZINE HYDROCHLORIDE 50 MG: 25 TABLET, FILM COATED ORAL at 21:41

## 2019-05-30 RX ADMIN — LIDOCAINE HYDROCHLORIDE 50 MG: 20 INJECTION, SOLUTION INFILTRATION; PERINEURAL at 12:18

## 2019-05-30 RX ADMIN — SODIUM CHLORIDE: 9 INJECTION, SOLUTION INTRAVENOUS at 10:25

## 2019-05-30 ASSESSMENT — PULMONARY FUNCTION TESTS
PIF_VALUE: 13
PIF_VALUE: 12
PIF_VALUE: 14
PIF_VALUE: 2
PIF_VALUE: 12
PIF_VALUE: 2
PIF_VALUE: 12
PIF_VALUE: 14
PIF_VALUE: 4
PIF_VALUE: 12
PIF_VALUE: 1
PIF_VALUE: 23
PIF_VALUE: 14
PIF_VALUE: 0
PIF_VALUE: 1
PIF_VALUE: 12
PIF_VALUE: 13
PIF_VALUE: 1
PIF_VALUE: 12
PIF_VALUE: 13
PIF_VALUE: 1
PIF_VALUE: 0
PIF_VALUE: 4
PIF_VALUE: 12
PIF_VALUE: 2
PIF_VALUE: 1
PIF_VALUE: 0
PIF_VALUE: 14
PIF_VALUE: 14
PIF_VALUE: 0
PIF_VALUE: 1
PIF_VALUE: 13
PIF_VALUE: 12
PIF_VALUE: 1
PIF_VALUE: 2
PIF_VALUE: 12
PIF_VALUE: 1
PIF_VALUE: 12

## 2019-05-30 ASSESSMENT — PAIN SCALES - GENERAL
PAINLEVEL_OUTOF10: 0
PAINLEVEL_OUTOF10: 5
PAINLEVEL_OUTOF10: 5
PAINLEVEL_OUTOF10: 7

## 2019-05-30 ASSESSMENT — PAIN - FUNCTIONAL ASSESSMENT: PAIN_FUNCTIONAL_ASSESSMENT: 0-10

## 2019-05-30 NOTE — CARE COORDINATION
JEAN-CLAUDEW spoke with pt and his wife regarding home needs. Pt says that he ambulates well inside and only uses a cane when he is out side or walking long distances. Pt's wife helps him as needed at home. He plans to go home at DC and does not anticipate any DC needs.

## 2019-05-30 NOTE — ANESTHESIA POSTPROCEDURE EVALUATION
Department of Anesthesiology  Postprocedure Note    Patient: Vin Lancaster  MRN: [id number]  YOB: 1960  Date of evaluation: 5/30/2019  Time:  12:56 PM     Procedure Summary     Date:  05/30/19 Room / Location:  [facility]    Anesthesia Start:  1209 Anesthesia Stop:      Procedure:  CYSTOSCOPY LEFT DOUBLE J STENT PLACEMENT ROOM 287 (Left ) Diagnosis:  (INPATIENT)    Surgeon:  Taylor Whitlock MD Responsible Provider:  Katelin Thomas MD    Anesthesia Type:  general ASA Status:  4          Anesthesia Type: general    Abbey Phase I:      Abbey Phase II:      Last vitals: Reviewed and per EMR flowsheets.        Anesthesia Post Evaluation    Patient location during evaluation: PACU  Patient participation: complete - patient participated  Level of consciousness: awake  Pain score: 0  Nausea & Vomiting: no vomiting and no nausea  Complications: no  Cardiovascular status: hemodynamically stable  Respiratory status: acceptable and face mask  Hydration status: stable

## 2019-05-30 NOTE — PLAN OF CARE
Problem:  Activity:  Goal: Risk for activity intolerance will decrease  Description  Risk for activity intolerance will decrease  Outcome: Ongoing     Problem: Coping:  Goal: Ability to adjust to condition or change in health will improve  Description  Ability to adjust to condition or change in health will improve  Outcome: Ongoing     Problem: Fluid Volume:  Goal: Ability to maintain a balanced intake and output will improve  Description  Ability to maintain a balanced intake and output will improve  Outcome: Ongoing     Problem: Health Behavior:  Goal: Ability to identify and utilize available resources and services will improve  Description  Ability to identify and utilize available resources and services will improve  Outcome: Ongoing  Goal: Ability to manage health-related needs will improve  Description  Ability to manage health-related needs will improve  Outcome: Ongoing  Goal: Will modify at least one risk factor affecting health status  Description  Will modify at least one risk factor affecting health status  Outcome: Ongoing  Goal: Identification of resources available to assist in meeting health care needs will improve  Description  Identification of resources available to assist in meeting health care needs will improve  Outcome: Ongoing     Problem: Metabolic:  Goal: Ability to maintain appropriate glucose levels will improve  Description  Ability to maintain appropriate glucose levels will improve  Outcome: Ongoing     Problem: Nutritional:  Goal: Maintenance of adequate nutrition will improve  Description  Maintenance of adequate nutrition will improve  Outcome: Ongoing  Goal: Progress toward achieving an optimal weight will improve  Description  Progress toward achieving an optimal weight will improve  Outcome: Ongoing     Problem: Physical Regulation:  Goal: Complications related to the disease process, condition or treatment will be avoided or minimized  Description  Complications related to the disease process, condition or treatment will be avoided or minimized  Outcome: Ongoing  Goal: Diagnostic test results will improve  Description  Diagnostic test results will improve  Outcome: Ongoing     Problem: Skin Integrity:  Goal: Risk for impaired skin integrity will decrease  Description  Risk for impaired skin integrity will decrease  Outcome: Ongoing     Problem: Tissue Perfusion:  Goal: Adequacy of tissue perfusion will improve  Description  Adequacy of tissue perfusion will improve  Outcome: Ongoing     Problem: Cardiac:  Goal: Ability to maintain vital signs within normal range will improve  Description  Ability to maintain vital signs within normal range will improve  Outcome: Ongoing  Goal: Cardiovascular alteration will improve  Description  Cardiovascular alteration will improve  Outcome: Ongoing

## 2019-05-30 NOTE — PROGRESS NOTES
[employer]  [id number] used for interpretation. Patient's questions were answered and he verbalized understanding.

## 2019-05-30 NOTE — ANESTHESIA PRE PROCEDURE
1 TABLET BY MOUTH TWICE DAILY, WITH MEALS 10/11/18  Yes Shelton Bailey DO   glipiZIDE (GLUCOTROL) 10 MG tablet TAKE 2 TABLETS BY MOUTH TWICE DAILY BEFORE MEALS 10/11/18  Yes Shelton Bailey DO   blood glucose test strips (TRUE METRIX BLOOD GLUCOSE TEST) strip 1 each by In Vitro route 3 times daily As needed. 10/11/18  Yes Shelton Bailey DO   albuterol (PROVENTIL) (2.5 MG/3ML) 0.083% nebulizer solution Take 3 mLs by nebulization 4 times daily 10/11/18  Yes Shelton Bailey DO   hydrOXYzine (VISTARIL) 50 MG capsule Take 1 capsule by mouth nightly 10/11/18  Yes Shelton Bailey DO   terbinafine (LAMISIL) 1 % cream Apply topically 2 times daily Apply topically 2 times daily. 10/11/18  Yes Shelton Bailey DO   insulin glargine (LANTUS SOLOSTAR) 100 UNIT/ML injection pen Inject 55 Units into the skin nightly  Patient taking differently: Inject 50 Units into the skin nightly  10/11/18  Yes Shelton Bailey DO   Insulin Pen Needle (B-D UF III MINI PEN NEEDLES) 31G X 5 MM MISC 1 each by Does not apply route daily 10/11/18  Yes Hanna Benoit, DO   Lancets MISC Use TID 10/11/18  Yes Shelton Bailey DO   omeprazole (PRILOSEC) 20 MG delayed release capsule Take 1 capsule by mouth daily 3/14/18  Yes Shelton Bailey DO   bacitracin 500 UNIT/GM ointment Apply topically 2 times daily.  3/11/18  Yes Rossana Bobby PA-C   Insulin Syringe-Needle U-100 (INSULIN SYRINGE 1CC/31GX5/16\") 31G X 5/16\" 1 ML MISC Use once daily to administer Lantus 12/14/17  Yes Hanna Benoit, DO   mupirocin (BACTROBAN) 2 % ointment APPLY TO WOUNDS D 5/2/19   Historical Provider, MD   sodium chloride 0.9 % irrigation [place] CHANGES 5/2/19   Historical Provider, MD   naproxen (NAPROSYN) 500 MG tablet Take 1 tablet by mouth 2 times daily for 20 doses 4/25/19 5/16/19  JESUS Doran Ala, CNP   Blood Glucose Monitoring Suppl (ONE TOUCH ULTRA MINI) w/Device KIT 1 kit by Does not apply route three times daily 10/11/18   Yesenia Long DO Leo   gabapentin (NEURONTIN) 300 MG capsule Take 1 capsule by mouth 3 times daily for 30 days. . 10/11/18 5/16/19  Willie Key DO       Current medications:    Current Facility-Administered Medications   Medication Dose Route Frequency Provider Last Rate Last Dose    hydrALAZINE (APRESOLINE) injection 10 mg  10 mg Intravenous Q6H PRN Pam Rising, DO        sodium chloride flush 0.9 % injection 10 mL  10 mL Intravenous 2 times per day Yazid R Jeromy, DO        sodium chloride flush 0.9 % injection 10 mL  10 mL Intravenous PRN Pam Rising, DO        magnesium hydroxide (MILK OF MAGNESIA) 400 MG/5ML suspension 30 mL  30 mL Oral Daily PRN [place] Jdir Jeromy, DO        ondansetron [facility]) injection 4 mg  4 mg Intravenous Q6H PRN Pam Rising, DO        magnesium sulfate 1 g in dextrose 5% 100 mL IVPB  1 g Intravenous PRN Pam Rising, DO        potassium chloride (KLOR-CON M) extended release tablet 40 mEq  40 mEq Oral PRN Pam Rising, DO        Or    potassium chloride (KLOR-CON) packet 40 mEq  40 mEq Oral PRN Pam Rising, DO        Or    potassium chloride 10 mEq/100 mL IVPB (Peripheral Line)  10 mEq Intravenous PRN Pam Rising, DO        acetaminophen (TYLENOL) tablet 650 mg  650 mg Oral Q4H PRN Uli Pinzon, DO        0.9 % sodium chloride infusion   Intravenous Continuous Pam Rising,  mL/hr at 05/30/19 1025      HYDROcodone-acetaminophen (NORCO) 5-325 MG per tablet 1 tablet  1 tablet Oral Q4H PRN Bradley Choe PA-C        Or    HYDROcodone-acetaminophen (NORCO) 5-325 MG per tablet 2 tablet  2 tablet Oral Q4H PRN Bradley Choe PA-C        morphine (PF) injection 2 mg  2 mg Intravenous Q2H PRN Bradley Choe PA-C        Or    morphine injection 4 mg  4 mg Intravenous Q2H PRN Bradley Choe PA-C        tamsulosin (FLOMAX) capsule 0.4 mg  0.4 mg Oral Daily Bradley Choe PA-C   0.4 mg at 05/29/19 1821    insulin lispro (HUMALOG) injection E78.5    Hypertension I10    Callus of foot L84    Chronic bilateral low back pain without sciatica M54.5, G89.29    Cardiomyopathy ([facility]) I42.9    Pacemaker Z95.0    Diabetic neuropathy associated with type 2 diabetes mellitus (HCC) E11.40    Morbid obesity ([facility]) E66.01    LON (obstructive sleep apnea) G47.33    Chronic systolic heart failure (HCC) I50.22    ICD (implantable cardioverter-defibrillator) in place Z95.810    Anxiety and depression F41.9, F32.9    Diabetic foot infection (HCC) E11.628, L08.9    Gastroesophageal reflux disease without esophagitis K21.9    Keratoderma of foot, acquired L85.1    Non-pressure chronic ulcer of other part of right foot limited to breakdown of skin ([address] L97.511    Dizziness R42       Past Medical History:        Diagnosis Date    Anxiety and depression 4/30/2019    Cardiomyopathy ([address]     Chronic systolic heart failure ([address] 4/30/2019    Diabetes mellitus ([address]     Diabetic neuropathy associated with type 2 diabetes mellitus ([address] 12/13/2017    Heart failure, NYHA class 4 ([address]     Hyperlipidemia     Hypertension     ICD (implantable cardioverter-defibrillator) in place 4/30/2019    Morbid obesity ([address] 12/19/2017    LON (obstructive sleep apnea)        Past Surgical History:        Procedure Laterality Date    CARDIAC DEFIBRILLATOR PLACEMENT  12/2008       Social History:    Social History     Tobacco Use    Smoking status: Former Smoker     Packs/day: 1.00     Years: 25.00     Pack years: 25.00     Last attempt to quit: 12/1/2008     Years since quitting: 10.4    Smokeless tobacco: Never Used   Substance Use Topics    Alcohol use:  No                                Counseling given: Not Answered      Vital Signs (Current):   Vitals:    05/29/19 2345 05/30/19 0615 05/30/19 1004 05/30/19 1025   BP: (!) 159/117  (!) 203/87 (!) 180/85   Pulse: 84  68    Resp: 16  20    Temp:   97 °F (36.1 °C)    TempSrc:   Temporal    SpO2:   97%    Weight:  (!) 323 lb (146.5 kg)     Height:                                                  BP Readings from Last 3 Encounters:   05/30/19 (!) 180/85   05/16/19 (!) 184/83   05/16/19 (!) 150/78       NPO Status: Time of last liquid consumption: 2300                        Time of last solid consumption: 2300                        Date of last liquid consumption: 05/22/19                        Date of last solid food consumption: 05/29/19    BMI:   Wt Readings from Last 3 Encounters:   05/30/19 (!) 323 lb (146.5 kg)   05/16/19 (!) 323 lb (146.5 kg)   05/07/19 (!) 328 lb (148.8 kg)     Body mass index is 45.05 kg/m².     CBC:   Lab Results   Component Value Date    WBC 9.1 05/30/2019    RBC 3.68 05/30/2019    HGB 10.8 05/30/2019    HCT 32.3 05/30/2019    MCV 87.7 05/30/2019    RDW 15.5 05/30/2019     05/30/2019       CMP:   Lab Results   Component Value Date     05/30/2019    K 3.8 05/30/2019     05/30/2019    CO2 24 05/30/2019    BUN 24 05/30/2019    CREATININE 1.55 05/30/2019    GFRAA 55.4 05/30/2019    LABGLOM 45.8 05/30/2019    GLUCOSE 155 05/30/2019    PROT 5.8 05/30/2019    CALCIUM 8.8 05/30/2019    BILITOT <0.2 05/30/2019    ALKPHOS 58 05/30/2019    AST 9 05/30/2019    ALT 10 05/30/2019       POC Tests:   Recent Labs     05/30/19  1021   POCGLU 120*       Coags: No results found for: PROTIME, INR, APTT    HCG (If Applicable): No results found for: PREGTESTUR, PREGSERUM, HCG, HCGQUANT     ABGs: No results found for: PHART, PO2ART, OWQ4WSL, YUS9TGB, BEART, [id number]     Type & Screen (If Applicable):  No results found for: LABABO, [address]    Anesthesia Evaluation  Patient summary reviewed and Nursing notes reviewed no history of anesthetic complications:   Airway: Mallampati: II  TM distance: >3 FB   Neck ROM: full  Mouth opening: > = 3 FB Dental: normal exam         Pulmonary:normal exam  breath sounds clear to auscultation  (+) sleep apnea:                             Cardiovascular:  Exercise tolerance: good (>4 METS),   (+) hypertension:, pacemaker: pacemaker and AICD, CHF:, hyperlipidemia      ECG reviewed  Rhythm: regular  Rate: normal           Beta Blocker:  Dose within 24 Hrs      ROS comment: Accelerated Junctional rhythm with retrograde conduction  Nonspecific ST abnormality  Abnormal ECG  When compared with ECG of 29-MAY-2019 13:11,  Junctional rhythm has replaced Sinus rhythm    Routine single lead ICD analysis. Denies syncopal episodes or shocks. 0 episodes since 12/2/18. Gd pacer capture, sense, and lead impedance. Normal VVI function. No changes made. Battery status OK. Scheduled for analysis three to four months per physician order. Neuro/Psych:   Negative Neuro/Psych ROS              GI/Hepatic/Renal:   (+) GERD:, renal disease: kidney stones, morbid obesity         ROS comment: BMI 45. Endo/Other:    (+) DiabetesType II DM, , blood dyscrasia: anemia:., .          Pt had PAT visit. Abdominal:           Vascular: negative vascular ROS. Anesthesia Plan      general     ASA 4     (LMA)  Induction: intravenous. MIPS: Postoperative opioids intended and Prophylactic antiemetics administered. Anesthetic plan and risks discussed with patient. Plan discussed with CRNA.     Attending anesthesiologist reviewed and agrees with Bernardo Aceves DO   5/30/2019

## 2019-05-30 NOTE — PROGRESS NOTES
[facility] AT [place] Respiratory Therapy Evaluation   Current Order:  Albuterol QID      Home Regimen: None      Ordering Physician: Jeromy  Re-evaluation Date:  N/A     Diagnosis: Kidney stone      Patient Status: Stable / Unstable + Physician notified    The following MDI Criteria must be met in order to convert aerosol to MDI with spacer. If unable to meet, MDI will be converted to aerosol:  []  Patient able to demonstrate the ability to use MDI effectively  []  Patient alert and cooperative  []  Patient able to take deep breath with 5-10 second hold  []  Medication(s) available in this delivery method   []  Peak flow greater than or equal to 200 ml/min            Current Order Substituted To  (same drug, same frequency)   Aerosol to MDI [] Albuterol Sulfate 0.083% unit dose by aerosol Albuterol Sulfate MDI 2 puffs by inhalation with spacer    [] Levalbuterol 1.25 mg unit dose by aerosol Levalbuterol MDI 2 puffs by inhalation with spacer    [] Levalbuterol 0.63 mg unit dose by aerosol Levalbuterol MDI 2 puffs by inhalation with spacer    [] Ipratropium Bromide 0.02% unit dose by aerosol Ipratropium Bromide MDI 2 puffs by inhalation with spacer    [] Duoneb (Ipratropium + Albuterol) unit dose by aerosol Ipratropium MDI + Albuterol MDI 2 puffs by inhalation w/spacer   MDI to Aerosol [] Albuterol Sulfate MDI Albuterol Sulfate 0.083% unit dose by aerosol    [] Levalbuterol MDI 2 puffs by inhalation Levalbuterol 1.25 mg unit dose by aerosol    [] Ipratropium Bromide MDI by inhalation Ipratropium Bromide 0.02% unit dose by aerosol    [] Combivent (Ipratropium + Albuterol) MDI by inhalation Duoneb (Ipratropium + Albuterol) unit dose by aerosol   Treatment Assessment [Frequency/Schedule]:  Change frequency to: _____Albuterol Q4 PRN_____________________________________________per Protocol, P&T, MEC      Points 0 1 2 3 4   Pulmonary Status  Non-Smoker  []   Smoking history   < 20 pack years  []   Smoking history  ?  20 pack

## 2019-05-30 NOTE — H&P
And was seen, examined and discussed with WU Quiros. He is Taiwanese-speaking gentleman who presented with the emergency room with dizziness. His labs and imaging are suggestive of dehydration with mild AK I due to possibly severe left-sided hydronephrosis. Vitals:    05/29/19 1750   BP: (!) 139/96   Pulse: 117   Resp: 18   Temp:    SpO2: 97%     RRR, clear lungs, neuro intact    Labs reviewed   Imaging reviewed    # Obstructive uropathy due to kidney stone/severe left-sided hydronephrosis  - urology consulted    - pain control     # WOOD POA  - IVF, he is making urine, no abd pain.  No arzate is needed    # UTI POA  - rocephin  - follow up urine culture     # elevated d dimer  - negative CTA chest     Full note to follow by WU Quiros

## 2019-05-30 NOTE — BRIEF OP NOTE
Brief Postoperative Note  ______________________________________________________________    Patient: Guy Manuel  YOB: 1960  MRN: [id number]  Date of Procedure: 5/30/2019    Pre-Op Diagnosis: INPATIENT    Post-Op Diagnosis: Same       Procedure(s):  CYSTOSCOPY/STONE MANIPULATION? LEFT DOUBLE J STENT PLACEMENT     Anesthesia: General    Surgeon(s):  Yolanda Cantu MD    Assistant:       Estimated Blood Loss (mL): less than 50     Complications: None    Specimens:   * No specimens in log *    Implants:  * No implants in log *      Drains: * No LDAs found *    Findings: obstructing stone    Yolanda Cantu MD  Date: 5/30/2019  Time: 12:43 PM

## 2019-05-30 NOTE — PROGRESS NOTES
1850: Patient arrived to floor from ER accompanied by wife. 1940: Patient is a Welsh speaking only gentleman, no family in the room at this time. Admission started using a  via the tablet. 2008: Dr Kia Walters at the bedside, communication with patient via interperter over the tablet. 2030: WU Anderson at the bedside. Communication with the patient via  over the tablet. 2103: Admission assessment complete. Patient is alert and oriented x 4. Calm and cooperative. Orthostatic BP complete. 0130: NPO orders received, explained why the patient has to be NPO to the patient and his wife. Also explained to the patient that we need to collect his urine to check for kidney stones and how we do this. Patient and wife verbalize understanding.

## 2019-05-31 VITALS
HEIGHT: 71 IN | TEMPERATURE: 97.3 F | BODY MASS INDEX: 44.1 KG/M2 | SYSTOLIC BLOOD PRESSURE: 168 MMHG | RESPIRATION RATE: 18 BRPM | HEART RATE: 69 BPM | DIASTOLIC BLOOD PRESSURE: 72 MMHG | OXYGEN SATURATION: 99 % | WEIGHT: 315 LBS

## 2019-05-31 LAB
ANION GAP SERPL CALCULATED.3IONS-SCNC: 8 MEQ/L (ref 9–15)
BASOPHILS ABSOLUTE: 0.1 K/UL (ref 0–0.2)
BASOPHILS RELATIVE PERCENT: 1.1 %
BUN BLDV-MCNC: 22 MG/DL (ref 8–23)
CALCIUM SERPL-MCNC: 8.6 MG/DL (ref 8.5–9.9)
CHLORIDE BLD-SCNC: 105 MEQ/L (ref 95–107)
CO2: 22 MEQ/L (ref 20–31)
CREAT SERPL-MCNC: 1.34 MG/DL (ref 0.7–1.2)
EOSINOPHILS ABSOLUTE: 0 K/UL (ref 0–0.7)
EOSINOPHILS RELATIVE PERCENT: 0.4 %
GFR AFRICAN AMERICAN: >60
GFR NON-AFRICAN AMERICAN: 54.1
GLUCOSE BLD-MCNC: 216 MG/DL (ref 60–115)
GLUCOSE BLD-MCNC: 255 MG/DL (ref 60–115)
GLUCOSE BLD-MCNC: 256 MG/DL (ref 60–115)
GLUCOSE BLD-MCNC: 267 MG/DL (ref 70–99)
HCT VFR BLD CALC: 30.7 % (ref 42–52)
HEMOGLOBIN: 10.5 G/DL (ref 14–18)
LV EF: 50 %
LVEF MODALITY: NORMAL
LYMPHOCYTES ABSOLUTE: 1.8 K/UL (ref 1–4.8)
LYMPHOCYTES RELATIVE PERCENT: 21.3 %
MCH RBC QN AUTO: 29.5 PG (ref 27–31.3)
MCHC RBC AUTO-ENTMCNC: 34.1 % (ref 33–37)
MCV RBC AUTO: 86.7 FL (ref 80–100)
MONOCYTES ABSOLUTE: 0.7 K/UL (ref 0.2–0.8)
MONOCYTES RELATIVE PERCENT: 8.6 %
NEUTROPHILS ABSOLUTE: 5.9 K/UL (ref 1.4–6.5)
NEUTROPHILS RELATIVE PERCENT: 68.6 %
PDW BLD-RTO: 15.2 % (ref 11.5–14.5)
PERFORMED ON: ABNORMAL
PLATELET # BLD: 334 K/UL (ref 130–400)
POTASSIUM REFLEX MAGNESIUM: 4.2 MEQ/L (ref 3.4–4.9)
RBC # BLD: 3.55 M/UL (ref 4.7–6.1)
SODIUM BLD-SCNC: 135 MEQ/L (ref 135–144)
URINE CULTURE, ROUTINE: NORMAL
WBC # BLD: 8.6 K/UL (ref 4.8–10.8)

## 2019-05-31 PROCEDURE — 80048 BASIC METABOLIC PNL TOTAL CA: CPT

## 2019-05-31 PROCEDURE — 2700000000 HC OXYGEN THERAPY PER DAY

## 2019-05-31 PROCEDURE — 96376 TX/PRO/DX INJ SAME DRUG ADON: CPT

## 2019-05-31 PROCEDURE — 6360000002 HC RX W HCPCS: Performed by: INTERNAL MEDICINE

## 2019-05-31 PROCEDURE — 96375 TX/PRO/DX INJ NEW DRUG ADDON: CPT

## 2019-05-31 PROCEDURE — 85025 COMPLETE CBC W/AUTO DIFF WBC: CPT

## 2019-05-31 PROCEDURE — 6370000000 HC RX 637 (ALT 250 FOR IP): Performed by: PHYSICIAN ASSISTANT

## 2019-05-31 PROCEDURE — 93306 TTE W/DOPPLER COMPLETE: CPT

## 2019-05-31 PROCEDURE — 6370000000 HC RX 637 (ALT 250 FOR IP): Performed by: INTERNAL MEDICINE

## 2019-05-31 PROCEDURE — 99222 1ST HOSP IP/OBS MODERATE 55: CPT | Performed by: INTERNAL MEDICINE

## 2019-05-31 PROCEDURE — 36415 COLL VENOUS BLD VENIPUNCTURE: CPT

## 2019-05-31 PROCEDURE — 2580000003 HC RX 258: Performed by: INTERNAL MEDICINE

## 2019-05-31 RX ORDER — HYDRALAZINE HYDROCHLORIDE 20 MG/ML
10 INJECTION INTRAMUSCULAR; INTRAVENOUS ONCE
Status: COMPLETED | OUTPATIENT
Start: 2019-05-31 | End: 2019-05-31

## 2019-05-31 RX ORDER — TAMSULOSIN HYDROCHLORIDE 0.4 MG/1
0.4 CAPSULE ORAL DAILY
Qty: 30 CAPSULE | Refills: 3 | Status: SHIPPED | OUTPATIENT
Start: 2019-06-01 | End: 2020-02-11 | Stop reason: ALTCHOICE

## 2019-05-31 RX ORDER — CEPHALEXIN 500 MG/1
500 CAPSULE ORAL 4 TIMES DAILY
Qty: 28 CAPSULE | Refills: 0 | Status: SHIPPED | OUTPATIENT
Start: 2019-05-31 | End: 2019-06-07

## 2019-05-31 RX ADMIN — ENALAPRIL MALEATE 20 MG: 20 TABLET ORAL at 11:34

## 2019-05-31 RX ADMIN — AMLODIPINE BESYLATE 10 MG: 10 TABLET ORAL at 11:43

## 2019-05-31 RX ADMIN — HYDRALAZINE HYDROCHLORIDE 10 MG: 20 INJECTION INTRAMUSCULAR; INTRAVENOUS at 16:14

## 2019-05-31 RX ADMIN — DIGOXIN 125 MCG: 125 TABLET ORAL at 11:38

## 2019-05-31 RX ADMIN — PANTOPRAZOLE SODIUM 40 MG: 40 TABLET, DELAYED RELEASE ORAL at 06:17

## 2019-05-31 RX ADMIN — METOPROLOL TARTRATE 75 MG: 25 TABLET, FILM COATED ORAL at 11:39

## 2019-05-31 RX ADMIN — SERTRALINE HYDROCHLORIDE 100 MG: 50 TABLET ORAL at 11:34

## 2019-05-31 RX ADMIN — SODIUM CHLORIDE: 9 INJECTION, SOLUTION INTRAVENOUS at 00:14

## 2019-05-31 RX ADMIN — GABAPENTIN 300 MG: 300 CAPSULE ORAL at 11:40

## 2019-05-31 RX ADMIN — HYDRALAZINE HYDROCHLORIDE 10 MG: 20 INJECTION INTRAMUSCULAR; INTRAVENOUS at 06:17

## 2019-05-31 RX ADMIN — Medication 10 ML: at 11:44

## 2019-05-31 RX ADMIN — TAMSULOSIN HYDROCHLORIDE 0.4 MG: 0.4 CAPSULE ORAL at 11:38

## 2019-05-31 ASSESSMENT — ENCOUNTER SYMPTOMS
CHEST TIGHTNESS: 0
RESPIRATORY NEGATIVE: 1
NAUSEA: 0
COUGH: 0
GASTROINTESTINAL NEGATIVE: 1
SHORTNESS OF BREATH: 0
WHEEZING: 0
EYES NEGATIVE: 1
BLOOD IN STOOL: 0
STRIDOR: 0

## 2019-05-31 NOTE — PLAN OF CARE
Nutrition Problem: Increased nutrient needs  Intervention: Food and/or Nutrient Delivery: Continue current diet  Nutritional Goals: Continued po intake >75% of meals

## 2019-05-31 NOTE — PROGRESS NOTES
Physician Progress Note    2019   12:04 PM    Name:  Guy Manuel  MRN:    [id number]     IP Day: 2     Admit Date: 2019 12:31 PM  PCP: Sage Gibbons MD    Code Status:  Full Code    Subjective:      no new events. Feels well.      Physical Examination:      Vitals:  BP (!) 199/73   Pulse 64   Temp 98.1 °F (36.7 °C) (Oral)   Resp 18   Ht 5' 11\" (1.803 m)   Wt (!) 323 lb (146.5 kg)   SpO2 96%   BMI 45.05 kg/m²   Temp (24hrs), Av.6 °F (35.9 °C), Min:96.3 °F (35.7 °C), Max:98.1 °F (36.7 °C)      General appearance: alert, cooperative and no distress  Mental Status: oriented to person, place and time and normal affect  Lungs: clear to auscultation bilaterally, normal effort  Heart: regular rate and rhythm, no murmur  Abdomen: soft, nontender, nondistended, bowel sounds present, no masses  Extremities: no edema, redness, tenderness in the calves  Skin: no gross lesions, rashes    Data:     Labs:  Recent Labs     19  0539 19  0605   WBC 9.1 8.6   HGB 10.8* 10.5*    334     Recent Labs     19  0539 19  0605    135   K 3.8 4.2   * 105   CO2 24 22   BUN 24* 22   CREATININE 1.55* 1.34*   GLUCOSE 155* 267*     Recent Labs     19  1336 19  0539   AST 12 9   ALT 12 10   BILITOT 0.3 <0.2   ALKPHOS 77 58       Current Facility-Administered Medications   Medication Dose Route Frequency Provider Last Rate Last Dose    hydrALAZINE (APRESOLINE) injection 10 mg  10 mg Intravenous Q6H PRN Pam Rising, DO   10 mg at 19 0617    sodium chloride flush 0.9 % injection 10 mL  10 mL Intravenous 2 times per day Pam Rising, DO   10 mL at 19 1144    sodium chloride flush 0.9 % injection 10 mL  10 mL Intravenous PRN Pam Rising, DO        magnesium hydroxide (MILK OF MAGNESIA) 400 MG/5ML suspension 30 mL  30 mL Oral Daily PRN Uli Pinzon DO        ondansetron [facility]) injection 4 mg  4 mg Intravenous Q6H PRN Uli Arreola Jeromy, DO        magnesium sulfate 1 g in dextrose 5% 100 mL IVPB  1 g Intravenous PRN Pitney Praveena, DO        potassium chloride (KLOR-CON M) extended release tablet 40 mEq  40 mEq Oral PRN Pitney Praveena, DO        Or    potassium chloride (KLOR-CON) packet 40 mEq  40 mEq Oral PRN Pitney Praveena, DO        Or    potassium chloride 10 mEq/100 mL IVPB (Peripheral Line)  10 mEq Intravenous PRN Pitney Praveena, DO        acetaminophen (TYLENOL) tablet 650 mg  650 mg Oral Q4H PRN Pitney Praveena, DO        HYDROcodone-acetaminophen (NORCO) 5-325 MG per tablet 1 tablet  1 tablet Oral Q4H PRN Dewayne Vázquez PA-C        Or    HYDROcodone-acetaminophen (NORCO) 5-325 MG per tablet 2 tablet  2 tablet Oral Q4H PRN Dewayne Vázquez PA-C   2 tablet at 05/30/19 1541    morphine (PF) injection 2 mg  2 mg Intravenous Q2H PRN Dewayne Vázquez PA-C        Or    morphine injection 4 mg  4 mg Intravenous Q2H PRN Dewayne Vázquez PA-C        tamsulosin (FLOMAX) capsule 0.4 mg  0.4 mg Oral Daily Dewayne Vázquez PA-C   0.4 mg at 05/31/19 1138    insulin lispro (HUMALOG) injection vial 0-12 Units  0-12 Units Subcutaneous TID WC Dewayne Vázquez PA-C        insulin lispro (HUMALOG) injection vial 0-6 Units  0-6 Units Subcutaneous Nightly Dewayne Vázquez PA-C   5 Units at 05/30/19 2235    glucose (GLUTOSE) 40 % oral gel 15 g  15 g Oral PRN Dewayne Vázquez PA-C        dextrose 50 % solution 12.5 g  12.5 g Intravenous PRN Dewyane Vázquez PA-C        glucagon (rDNA) injection 1 mg  1 mg Intramuscular PRN Dewayne Vázquez PA-C        dextrose 5 % solution  100 mL/hr Intravenous PRN Dewayne Vázquez PA-C        amLODIPine (NORVASC) tablet 10 mg  10 mg Oral Daily Pitney Praveena, DO   10 mg at 05/31/19 1143    atorvastatin (LIPITOR) tablet 40 mg  40 mg Oral Daily Pitney Praveena, DO   40 mg at 05/30/19 2142    digoxin (LANOXIN) tablet 125 mcg  125 mcg Oral Daily Jerry Grissom DO   125 mcg at 05/31/19 1135    enalapril (VASOTEC) tablet 20 mg  20 mg Oral BID Gabby Méndez DO   20 mg at 05/31/19 1134    gabapentin (NEURONTIN) capsule 300 mg  300 mg Oral TID Gabby Méndez DO   300 mg at 05/31/19 1140    insulin glargine (LANTUS) injection vial 50 Units  50 Units Subcutaneous Nightly Gabby Méndez DO   50 Units at 05/30/19 2235    metoprolol tartrate (LOPRESSOR) tablet 75 mg  75 mg Oral BID Gabby Méndez DO   75 mg at 05/31/19 1139    sertraline (ZOLOFT) tablet 100 mg  100 mg Oral Daily Gabby Méndez DO   100 mg at 05/31/19 1134    hydrOXYzine (ATARAX) tablet 50 mg  50 mg Oral Nightly Gabby Méndez DO   50 mg at 05/30/19 2141    pantoprazole (PROTONIX) tablet 40 mg  40 mg Oral QAM AC Yazid R Jeromy, DO   40 mg at 05/31/19 0617    albuterol (PROVENTIL) nebulizer solution 2.5 mg  2.5 mg Nebulization Q4H PRN Gabby Méndez DO         Assessment and Plan: Active problems:    1. S/p cystoscopy w/ureteral stent placement 2/2 L hydroureteronephrosis- follow up with Dr Ambrocio [facility] for 1 week, flomax   2. HTN, uncontrolled -  , continue monitoring, will dc if better   3. WOOD/CKD  - slightly improved/stable  4. Leukocytosis - resolved  5. DMII - improved  6. Dizziness - no reported dizziness since admission  7. CAD s/p AICD  8.   Hx chronic CHF         DISCHARGE PLANNING  Pending improvement in BP, dc later today if BP is better and cardiology ok with dc        Electronically signed by Gabby Méndez DO on 5/31/2019 at 12:04 PM

## 2019-05-31 NOTE — DISCHARGE SUMMARY
Alert,awake and oriented. NL CN. Symetrical tone and reflexes. Psychiatric: Alert and oriented, thought content appropriate, normal insight  Capillary Refill: Brisk,< 3 seconds   Peripheral Pulses: +2 palpable, equal bilaterally     Patient was seen by the following consultants while admitted to [facility]:   Consults:  [address] [facility] HOSPITALIST  IP CONSULT TO UROLOGY  IP CONSULT TO UROLOGY  IP CONSULT TO CARDIOLOGY    Significant Diagnostic Studies:    Ct Abdomen Pelvis Wo Contrast Additional Contrast? None    Result Date: 5/29/2019  Examination: CT ABDOMEN PELVIS WO CONTRAST Indication:  Abdominal pain. Near syncope. Technique: Multiple serial axial images was performed through the abdomen and pelvis without intravenous or oral administration of contrast. However scan was obtained after patient had undergone CT PE study. .   Images were reconstructed in the axial and coronal and sagittal planes. Comparison:  No comparison is available. Findings: The visualized basal lungs show no focal parenchymal abnormalities. The liver, gallbladder, spleen, pancreas, adrenals, are unremarkable. The right kidney shows mild perinephric stranding. No hydronephrosis hydroureter. The pelvic calyceal system is old contrast. No pelvic caliectasis. There is left perinephric stranding. There is severe left hydronephrosis and hydroureter to the proximal third of the left ureter where there is a 1.3 cm cm calculi of approximately 1071 Hounsfield units. The bladder shows no gross filling defects. Large and small bowel show no sign of obstruction. The appendix is visualized. No periappendiceal stranding. No diverticulitis. There are small bilateral inguinal hernias containing mesenteric fat No free air. No free fluid. The visualized abdominal aorta is of normal size and caliber. No significant retroperitoneal adenopathy. Show multilevel degenerative change with bridging osteophytes.      1. THERE IS SEVERE LEFT-SIDED HYDRONEPHROSIS AND HYDROURETER WITH A CALCULI IN THE PROXIMAL THIRD OF THE LEFT URETER. OTHER FINDINGS AS DETAILED ABOVE All CT scans at this facility use dose modulation, iterative reconstruction, and/or weight based dosing when appropriate to reduce radiation dose to as low as reasonably achievable. Cta Chest W Wo Contrast    Result Date: 5/29/2019  EXAM: CT SCAN OF THE THORAX WITH CONTRAST/PE PROTOCOL/CTA CHEST COMPARISON: NONE AVAILABLE REASON FOR EXAMINATION:  DIABETIC PATIENT WITH DIZZINESS, NEAR SYNCOPE, DYSPNEA, EVALUATE FOR POSSIBLE PULMONARY EMBOLISM, HISTORY OF CARDIAC ARRHYTHMIA WITH ICD DEVICE IN SITU TECHNIQUE: Helical CTA was performed through the chest utilizing 100 cc of Isovue 370 intravenous contrast.  Images were obtained with bolus tracking in order to opacify the pulmonary arteries. Thick section coronal MIP 3D reconstructions were performed  on a separate workstation. FINDINGS:  There is no CTA evidence of a pulmonary embolism. There is no CTA evidence of a thoracic aorta aneurysm or dissection. There is an ICD device in the left anterior chest wall with electrodes in the region of the RA and RV. There is borderline cardiomegaly. There is no pericardial effusion. There is no mediastinal mass or adenopathy. There is no hilar adenopathy and there is no axillary adenopathy. There are no pulmonary infiltrates or pleural effusions. There is degenerative bone spurring throughout the thoracic spine. Remainder of the CTA scan of the thorax is unremarkable. 1. NEGATIVE CTA SCAN OF THE THORAX WITH CONTRAST. 2. NO CTA EVIDENCE OF A PULMONARY THROMBOEMBOLIC DISEASE. 3. NO CTA EVIDENCE OF A THORACIC AORTA ANEURYSM OR DISSECTION. 4. LUNGS APPEAR CLEAR WITHOUT A PULMONARY INFILTRATE OR PLEURAL EFFUSION.  All CT scans at this facility use dose modulation, iterative reconstruction, and/or weight based dosing when appropriate to reduce radiation dose to as low as reasonably glucose test strips (TRUE METRIX BLOOD GLUCOSE TEST) strip  1 each by In Vitro route 3 times daily As needed. cephALEXin (KEFLEX) 500 MG capsule  Take 1 capsule by mouth 4 times daily for 7 days             digoxin (DIGOX) 125 MCG tablet  TAKE 1 TABLET BY MOUTH ONCE DAILY             enalapril (VASOTEC) 20 MG tablet  Take 1 tablet by mouth 2 times daily             fenofibrate 160 MG tablet  TAKE 1 TABLET BY MOUTH EVERY NIGHT             furosemide (LASIX) 40 MG tablet  Take 1 tablet by mouth daily             gabapentin (NEURONTIN) 300 MG capsule  Take 1 capsule by mouth 3 times daily for 30 days. Guadalupe Hollie              glipiZIDE (GLUCOTROL) 10 MG tablet  TAKE 2 TABLETS BY MOUTH TWICE DAILY BEFORE MEALS             hydrOXYzine (VISTARIL) 50 MG capsule  Take 1 capsule by mouth nightly             insulin glargine (LANTUS SOLOSTAR) 100 UNIT/ML injection pen  Inject 55 Units into the skin nightly             Insulin Pen Needle (B-D UF III MINI PEN NEEDLES) 31G X 5 MM MISC  1 each by Does not apply route daily             Insulin Syringe-Needle U-100 (INSULIN SYRINGE 1CC/31GX5/16\") 31G X 5/16\" 1 ML MISC  Use once daily to administer Lantus             Lancets MISC  Use TID             metFORMIN (GLUCOPHAGE) 1000 MG tablet  TAKE 1 TABLET BY MOUTH TWICE DAILY, WITH MEALS             metoprolol tartrate (LOPRESSOR) 50 MG tablet  Take 1.5 tablets by mouth 2 times daily             mupirocin (BACTROBAN) 2 % ointment  APPLY TO WOUNDS D             naproxen (NAPROSYN) 500 MG tablet  Take 1 tablet by mouth 2 times daily for 20 doses             omeprazole (PRILOSEC) 20 MG delayed release capsule  Take 1 capsule by mouth daily             ranitidine (ZANTAC) 300 MG tablet  TAKE 1 TABLET BY MOUTH DAILY             sertraline (ZOLOFT) 100 MG tablet  Take 1 tablet by mouth daily             SITagliptin (JANUVIA) 100 MG tablet  Take 1 tablet by mouth daily             sodium chloride 0.9 % irrigation  CLEASE WOUND WITH DRESSING CHANGES             tamsulosin (FLOMAX) 0.4 MG capsule  Take 1 capsule by mouth daily             terbinafine (LAMISIL) 1 % cream  Apply topically 2 times daily Apply topically 2 times daily. Disposition:   If discharged to Home, Any [facility] AT [facility] needs that were indicated and/or required as been addressed and set up by Social Work. Condition at discharge: Pt was medically stable at the time of discharge. Activity: activity as tolerated    Total time taken for discharging this patient: 40 minutes. Greater than 70% of time was spent focused exclusively on this patient. Time was taken to review chart, discuss plans with consultants, reconciling medications, discussing plan answering questions with patient.      Barbara Camp  5/31/2019, 3:31 PM  ----------------------------------------------------------------------------------------------------------------------    Bishop Vanessa Lemus

## 2019-05-31 NOTE — CONSULTS
[address]                      [address], [address] [facility]                                  CONSULTATION    PATIENT NAME: Maddie Coelho                 :        1960  MED REC NO:   [id number]                            ROOM:       E408  ACCOUNT NO:   [de-identified]                           ADMIT DATE: 2019  PROVIDER:     Sirisha Acosta MD    CONSULT DATE:  2019    REASON FOR CONSULTATION:  Left ureteral calculus with hydronephrosis. HISTORY OF PRESENT ILLNESS:  The patient is a 59-year-old male admitted  to the emergency room for severe left renal colic. The patient was  admitted for hydration and pain control. The patient is currently being  seen for possible stent placement. PAST MEDICAL HISTORY:  Significant for cardiomyopathy, anxiety,  depression, diabetes, heart failure, hypertension, morbid obesity and  obstructive sleep apnea. PAST SURGICAL HISTORY:  Significant for cardiac defibrillator placement. FAMILY HISTORY:  Significant for heart disease and kidney disease. SOCIAL HISTORY:  The patient is a former smoker and he quit in . MEDICATIONS:  Include Zocor, metoprolol, Januvia, Norvasc, Vasotec,  digoxin, sertraline, enalapril, metformin, glipizide, insulin, aspirin,  Lipitor, Naprosyn, Proventil, and omeprazole. ALLERGIES:  COREG. REVIEW OF SYSTEMS:  The patient has never had a kidney stone before. PHYSICAL EXAMINATION:  GENERAL:  Shows an elderly male not in distress. Speaking the patient  through . HEAD AND NECK:  Exam is within normal limits. LUNGS:  No evidence of shortness of breath. HEART:  Regular rhythm and rate. ABDOMEN:  Obese. MUSCULOSKELETAL:  Normal.  NEUROLOGIC:  The patient is intact. GENITOURINARY:  Penile exam shows uncircumcised penis with normal  meatus. Testes are within normal limits. Prostate exam shows enlarged  prostate 30 gm with no nodules.     CT scan shows

## 2019-05-31 NOTE — PROGRESS NOTES
Nutrition Assessment    Type and Reason for Visit: Initial, Positive Nutrition Screen(Weight loss, decreased appetite)    Nutrition Recommendations: Continue current diet    Nutrition Assessment: UTD nutrition status pta. Pt Greek speaking and receiving pt care at visit. Per EMR weights no weight loss. Not at nutrition risk due to good po intake (<75%). Will continue to monitor. Malnutrition Assessment:  · Malnutrition Status: No malnutrition  · Context: Chronic illness  · Findings of the 6 clinical characteristics of malnutrition (Minimum of 2 out of 6 clinical characteristics is required to make the diagnosis of moderate or severe Protein Calorie Malnutrition based on AND/ASPEN Guidelines):  1. Energy Intake-Unable to assess, Unable to assess    2. Weight Loss-No significant weight loss,    3. Fat Loss-No significant subcutaneous fat loss,    4. Muscle Loss-No significant muscle mass loss,    5. Fluid Accumulation-No significant fluid accumulation,    6.  Strength-Not measured    Nutrition Risk Level: Moderate    Nutrient Needs:  · Estimated Daily Total Kcal: 9994-7667 (12-13 kcals/kg)  · Estimated Daily Protein (g): 156-164 (2-2.1 g/kg)  · Estimated Daily Total Fluid (ml/day): 1900 mL (1 mL/kcal)    Nutrition Diagnosis:   · Problem: Increased nutrient needs  · Etiology: related to Catabolic illness     Signs and symptoms:  as evidenced by Other (Comment)(CHF)    Objective Information:  · Nutrition-Focused Physical Findings: 0.9% NaCl @100%. NO edema per nsg. BM 5/30. POC Glucose 120-369.   · Wound Type: None  · Current Nutrition Therapies:  · Oral Diet Orders: Cardiac, Carb Control 5 Carbs/Meal, 2gm Sodium   · Oral Diet intake: %  · Oral Nutrition Supplement (ONS) Orders: None  · Anthropometric Measures:  · Ht: 5' 11\" (180.3 cm)   · Current Body Wt: 323 lb (146.5 kg)(5/30)  · Admission Body Wt: 323 lb (146.5 kg)(5/29 Stated)  · Usual Body Wt: 310 lb (140.6 kg)(4/25 Stated; 10/2018 315 lbs.;

## 2019-05-31 NOTE — OP NOTE
[address]                      [address], [address] [facility]                                OPERATIVE REPORT    PATIENT NAME: Moisés Joya                 :        1960  MED REC NO:   [id number]                            ROOM:       [id number]  ACCOUNT NO:   [de-identified]                           ADMIT DATE: 2019  PROVIDER:     Dillon Johnston MD    DATE OF PROCEDURE:  2019    PREOPERATIVE DIAGNOSES:  Left mid ureteral calculus with severe  hydronephrosis and obstruction. POSTOPERATIVE DIAGNOSES:  Left mid ureteral calculus with severe  hydronephrosis and obstruction. OPERATION PERFORMED: Cystoscopy, manipulation of left ureteral stone  without removal, left retrograde pyelogram, left double-J stent  placement. SURGEON:  Dillon Johnston MD    ANESTHESIA:  General.    INDICATIONS:  The patient 68-year-old male admitted with severe nausea,  vomiting and severe renal colic secondary to a 9-mm stone mid ureter  with severe hydronephrosis noted on CT. The patient is being taken to  operating room due to intractable pain and severe hydronephrosis with  stent placement. The patient will undergo shockwave lithotripsy at a  future date. OPERATIVE NOTE:  The patient received preoperative IV antibiotics  Rocephin 1 gm IV. He was taken to the operating room, placed on the  operating room table in dorsal lithotomy position after initiation of  general anesthetic. Cystoscopy was performed. The penile urethra was  within normal limits. Prostatic urethra showed mild enlargement. Examination of bladder showed no evidence of tumors, stones, and/or  other abnormalities. The patient had a prominent median bar. Then a  left retrograde pyelogram was obtained which showed severe obstruction  secondary to a 9-mm stone in the proximal third of the ureter. Multiple  attempts at initially placing a wire failed due to impaction of stone.    Then the ureteral

## 2019-05-31 NOTE — DISCHARGE SUMMARY
Hospital Medicine Discharge Summary    Vin Lancaster  :  1960  MRN:  [id number]    Admit date:  2019  Discharge date:  2019    Admitting Physician:  Frederick Wilkinson DO  Primary Care Physician:  Sofia Glasgow MD      Discharge Diagnoses: Active Problems:    Dizziness    Ureteric stone  Resolved Problems:    * No resolved hospital problems. *    Chief Complaint   Patient presents with    Other     high/low blood sugar, abd pain, chest pain     Hospital Course:   Vin Lancaster is a 62 y.o. male that was admitted and treated at [facility] for the following medical issues: Active Problems:    Dizziness    Ureteric stone  Resolved Problems:    * No resolved hospital problems. *        ***  Pt was discharge in a stable condition. Exam on discharge: ***  BP (!) 199/73   Pulse 64   Temp 98.1 °F (36.7 °C) (Oral)   Resp 18   Ht 5' 11\" (1.803 m)   Wt (!) 323 lb (146.5 kg)   SpO2 96%   BMI 45.05 kg/m²   General appearance: No apparent distress, appears stated age and cooperative. HEENT: Pupils equal, round, and reactive to light. Conjunctivae/corneas clear. Neck: Supple, with full range of motion. No jugular venous distention. Trachea midline. Respiratory:  Normal respiratory effort. Clear to auscultation, bilaterally without Rales/Wheezes/Rhonchi. Cardiovascular: Regular rate and rhythm with normal S1/S2 without murmurs, rubs or gallops. Abdomen: Soft, non-tender, non-distended with normal bowel sounds. Musculoskeletal: No clubbing, cyanosis or edema bilaterally. Full range of motion without deformity. Skin: Skin color, texture, turgor normal.  No rashes or lesions. Neuro: Non Focal. Symetrical motor and tone. Nl Comprehension, Alert,awake and oriented. NL CN. Symetrical tone and reflexes.   Psychiatric: Alert and oriented, thought content appropriate, normal insight  Capillary Refill: Brisk,< 3 seconds   Peripheral Pulses: +2 palpable, equal reconstruction, and/or weight based dosing when appropriate to reduce radiation dose to as low as reasonably achievable. Cta Chest W Wo Contrast    Result Date: 5/29/2019  EXAM: CT SCAN OF THE THORAX WITH CONTRAST/PE PROTOCOL/CTA CHEST COMPARISON: NONE AVAILABLE REASON FOR EXAMINATION:  DIABETIC PATIENT WITH DIZZINESS, NEAR SYNCOPE, DYSPNEA, EVALUATE FOR POSSIBLE PULMONARY EMBOLISM, HISTORY OF CARDIAC ARRHYTHMIA WITH ICD DEVICE IN SITU TECHNIQUE: Helical CTA was performed through the chest utilizing 100 cc of Isovue 370 intravenous contrast.  Images were obtained with bolus tracking in order to opacify the pulmonary arteries. Thick section coronal MIP 3D reconstructions were performed  on a separate workstation. FINDINGS:  There is no CTA evidence of a pulmonary embolism. There is no CTA evidence of a thoracic aorta aneurysm or dissection. There is an ICD device in the left anterior chest wall with electrodes in the region of the RA and RV. There is borderline cardiomegaly. There is no pericardial effusion. There is no mediastinal mass or adenopathy. There is no hilar adenopathy and there is no axillary adenopathy. There are no pulmonary infiltrates or pleural effusions. There is degenerative bone spurring throughout the thoracic spine. Remainder of the CTA scan of the thorax is unremarkable. 1. NEGATIVE CTA SCAN OF THE THORAX WITH CONTRAST. 2. NO CTA EVIDENCE OF A PULMONARY THROMBOEMBOLIC DISEASE. 3. NO CTA EVIDENCE OF A THORACIC AORTA ANEURYSM OR DISSECTION. 4. LUNGS APPEAR CLEAR WITHOUT A PULMONARY INFILTRATE OR PLEURAL EFFUSION. All CT scans at this facility use dose modulation, iterative reconstruction, and/or weight based dosing when appropriate to reduce radiation dose to as low as reasonably achievable.      Xr Chest Portable    Result Date: 5/29/2019  EXAMINATION: PORTABLE CHEST X-RAY FROM 5/29/2019 AT 13:06 HOURS CLINICAL HISTORY: HISTORY CARDIAC ARRHYTHMIA AND ICD DEVICE, DIZZINESS AND enalapril (VASOTEC) 20 MG tablet  Take 1 tablet by mouth 2 times daily             fenofibrate 160 MG tablet  TAKE 1 TABLET BY MOUTH EVERY NIGHT             furosemide (LASIX) 40 MG tablet  Take 1 tablet by mouth daily             gabapentin (NEURONTIN) 300 MG capsule  Take 1 capsule by mouth 3 times daily for 30 days. Gaston Baptiste glipiZIDE (GLUCOTROL) 10 MG tablet  TAKE 2 TABLETS BY MOUTH TWICE DAILY BEFORE MEALS             hydrOXYzine (VISTARIL) 50 MG capsule  Take 1 capsule by mouth nightly             insulin glargine (LANTUS SOLOSTAR) 100 UNIT/ML injection pen  Inject 55 Units into the skin nightly             Insulin Pen Needle (B-D UF III MINI PEN NEEDLES) 31G X 5 MM MISC  1 each by Does not apply route daily             Insulin Syringe-Needle U-100 (INSULIN SYRINGE 1CC/31GX5/16\") 31G X 5/16\" 1 ML MISC  Use once daily to administer Lantus             Lancets MISC  Use TID             metFORMIN (GLUCOPHAGE) 1000 MG tablet  TAKE 1 TABLET BY MOUTH TWICE DAILY, WITH MEALS             metoprolol tartrate (LOPRESSOR) 50 MG tablet  Take 1.5 tablets by mouth 2 times daily             mupirocin (BACTROBAN) 2 % ointment  APPLY TO WOUNDS D             naproxen (NAPROSYN) 500 MG tablet  Take 1 tablet by mouth 2 times daily for 20 doses             omeprazole (PRILOSEC) 20 MG delayed release capsule  Take 1 capsule by mouth daily             ranitidine (ZANTAC) 300 MG tablet  TAKE 1 TABLET BY MOUTH DAILY             sertraline (ZOLOFT) 100 MG tablet  Take 1 tablet by mouth daily             SITagliptin (JANUVIA) 100 MG tablet  Take 1 tablet by mouth daily             sodium chloride 0.9 % irrigation  CLEASE WOUND WITH DRESSING CHANGES             tamsulosin (FLOMAX) 0.4 MG capsule  Take 1 capsule by mouth daily             terbinafine (LAMISIL) 1 % cream  Apply topically 2 times daily Apply topically 2 times daily.                  Disposition:   If discharged to Home, Any [facility] AT [facility] needs that were indicated and/or required as been addressed and set up by Social Work. Condition at discharge: Pt was medically stable at the time of discharge. Activity: {discharge activity:98319}    Total time taken for discharging this patient: 40 minutes. Greater than 70% of time was spent focused exclusively on this patient. Time was taken to review chart, discuss plans with consultants, reconciling medications, discussing plan answering questions with patient.      Marquise Porter  5/31/2019, 12:02 PM  ----------------------------------------------------------------------------------------------------------------------    Sarabjit Lemus

## 2019-05-31 NOTE — CONSULTS
Consults    Patient Name: Avery Rangel Date: 2019 12:31 PM  MR #: [id number]  : 1960    Attending Physician: Paris Bryant DO  Reason for consult:  Tachycardia    History of Presenting Illness:      Tash Lin is a 62 y.o. male on hospital day 2 with a history of . History Obtained From:  patient, electronic medical record    Admitted with Left Renal Stone and received Left Ureteral Stent. Feels much better. No Abd pain. Sitting up in bed eating breakfast.     Denies cp nor sob. Has been taking all CV meds    Has NICM and ICD in place. ECG shows Accelerated Junctional Tachycardia 120-130. Pt was unaware. He is in SR on Telemetry now.   History:      EKG:   Past Medical History:   Diagnosis Date    Anxiety and depression 2019    Cardiomyopathy ([address]     Chronic systolic heart failure ([address] 2019    Diabetes mellitus ([address]     Diabetic neuropathy associated with type 2 diabetes mellitus ([address] 2017    Heart failure, NYHA class 4 ([address]     Hyperlipidemia     Hypertension     ICD (implantable cardioverter-defibrillator) in place 2019    Morbid obesity ([address] 2017    LON (obstructive sleep apnea)      Past Surgical History:   Procedure Laterality Date    CARDIAC DEFIBRILLATOR PLACEMENT  2008       Family History  Family History   Problem Relation Age of Onset    Heart Disease Mother     Kidney Disease Mother     Hypertension Mother     Diabetes Mother     No Known Problems Sister     No Known Problems Brother     No Known Problems Brother      [] Unable to obtain due to ventilated and/ or neurologic status    Social History     Socioeconomic History    Marital status:      Spouse name: Not on file    Number of children: Not on file    Years of education: Not on file    Highest education level: Not on file   Occupational History    Not on file   Social Needs    Financial resource strain: Not on file    Food insecurity: Worry: Not on file     Inability: Not on file    Transportation needs:     Medical: Not on file     Non-medical: Not on file   Tobacco Use    Smoking status: Former Smoker     Packs/day: 1.00     Years: 25.00     Pack years: 25.00     Last attempt to quit: 12/1/2008     Years since quitting: 10.5    Smokeless tobacco: Never Used   Substance and Sexual Activity    Alcohol use: No    Drug use: Never    Sexual activity: Yes     Partners: Female   Lifestyle    Physical activity:     Days per week: Not on file     Minutes per session: Not on file    Stress: Not on file   Relationships    Social connections:     Talks on phone: Not on file     Gets together: Not on file     Attends [employer] service: Not on file     Active member of club or organization: Not on file     Attends meetings of clubs or organizations: Not on file     Relationship status: Not on file    Intimate partner violence:     Fear of current or ex partner: Not on file     Emotionally abused: Not on file     Physically abused: Not on file     Forced sexual activity: Not on file   Other Topics Concern    Not on file   Social History Narrative    Not on file      [] Unable to obtain due to ventilated and/ or neurologic status      Home Medications:      Medications Prior to Admission: simvastatin (ZOCOR) 20 MG tablet, Take 20 mg by mouth nightly  metoprolol tartrate (LOPRESSOR) 50 MG tablet, Take 1.5 tablets by mouth 2 times daily (Patient taking differently: Take 50 mg by mouth 2 times daily )  SITagliptin (JANUVIA) 100 MG tablet, Take 1 tablet by mouth daily  ammonium lactate (LAC-HYDRIN) 12 % cream, Apply topically as needed.   amLODIPine (NORVASC) 10 MG tablet, TAKE 1 TABLET BY MOUTH EVERY NIGHT  digoxin (DIGOX) 125 MCG tablet, TAKE 1 TABLET BY MOUTH ONCE DAILY  enalapril (VASOTEC) 20 MG tablet, Take 1 tablet by mouth 2 times daily  furosemide (LASIX) 40 MG tablet, Take 1 tablet by mouth daily  sertraline (ZOLOFT) 100 MG tablet, Take 1 tablet by mouth daily  aspirin EC 81 MG EC tablet, Take 1 tablet by mouth daily (Patient taking differently: Take 325 mg by mouth daily )  atorvastatin (LIPITOR) 40 MG tablet, Take 1 tablet by mouth daily  ranitidine (ZANTAC) 300 MG tablet, TAKE 1 TABLET BY MOUTH DAILY  fenofibrate 160 MG tablet, TAKE 1 TABLET BY MOUTH EVERY NIGHT  metFORMIN (GLUCOPHAGE) 1000 MG tablet, TAKE 1 TABLET BY MOUTH TWICE DAILY, WITH MEALS  glipiZIDE (GLUCOTROL) 10 MG tablet, TAKE 2 TABLETS BY MOUTH TWICE DAILY BEFORE MEALS  blood glucose test strips (TRUE METRIX BLOOD GLUCOSE TEST) strip, 1 each by In Vitro route 3 times daily As needed. albuterol (PROVENTIL) (2.5 MG/3ML) 0.083% nebulizer solution, Take 3 mLs by nebulization 4 times daily  hydrOXYzine (VISTARIL) 50 MG capsule, Take 1 capsule by mouth nightly  terbinafine (LAMISIL) 1 % cream, Apply topically 2 times daily Apply topically 2 times daily. insulin glargine (LANTUS SOLOSTAR) 100 UNIT/ML injection pen, Inject 55 Units into the skin nightly (Patient taking differently: Inject 50 Units into the skin nightly )  Insulin Pen Needle (B-D UF III MINI PEN NEEDLES) 31G X 5 MM MISC, 1 each by Does not apply route daily  Lancets MISC, Use TID  omeprazole (PRILOSEC) 20 MG delayed release capsule, Take 1 capsule by mouth daily  bacitracin 500 UNIT/GM ointment, Apply topically 2 times daily. Insulin Syringe-Needle U-100 (INSULIN SYRINGE 1CC/31GX5/16\") 31G X 5/16\" 1 ML MISC, Use once daily to administer Lantus  mupirocin (BACTROBAN) 2 % ointment, APPLY TO WOUNDS D  sodium chloride 0.9 % irrigation, CLEASE WOUND WITH DRESSING CHANGES  naproxen (NAPROSYN) 500 MG tablet, Take 1 tablet by mouth 2 times daily for 20 doses  Blood Glucose Monitoring Suppl (ONE TOUCH ULTRA MINI) w/Device KIT, 1 kit by Does not apply route three times daily  gabapentin (NEURONTIN) 300 MG capsule, Take 1 capsule by mouth 3 times daily for 30 days. .    Current Hospital Medications:     Scheduled Meds:   sodium chloride flush  10 mL Intravenous 2 times per day    tamsulosin  0.4 mg Oral Daily    insulin lispro  0-12 Units Subcutaneous TID WC    insulin lispro  0-6 Units Subcutaneous Nightly    amLODIPine  10 mg Oral Daily    atorvastatin  40 mg Oral Daily    digoxin  125 mcg Oral Daily    enalapril  20 mg Oral BID    gabapentin  300 mg Oral TID    insulin glargine  50 Units Subcutaneous Nightly    metoprolol tartrate  75 mg Oral BID    sertraline  100 mg Oral Daily    hydrOXYzine  50 mg Oral Nightly    pantoprazole  40 mg Oral QAM AC     Continuous Infusions:   sodium chloride 100 mL/hr at 05/31/19 0014    dextrose       PRN Meds:.hydrALAZINE, sodium chloride flush, magnesium hydroxide, ondansetron, magnesium sulfate, potassium chloride **OR** potassium alternative oral replacement **OR** potassium chloride, acetaminophen, HYDROcodone 5 mg - acetaminophen **OR** HYDROcodone 5 mg - acetaminophen, morphine **OR** morphine, glucose, dextrose, glucagon (rDNA), dextrose, albuterol  .  sodium chloride 100 mL/hr at 05/31/19 0014    dextrose          Allergies: Allergies   Allergen Reactions    Coreg [Carvedilol] Other (See Comments)     \"hyper\", rapid pulse        Review of Systems:       Review of Systems   Constitutional: Negative. Negative for diaphoresis and fatigue. HENT: Negative. Eyes: Negative. Respiratory: Negative. Negative for cough, chest tightness, shortness of breath, wheezing and stridor. Cardiovascular: Negative. Negative for chest pain, palpitations and leg swelling. Gastrointestinal: Negative. Negative for blood in stool and nausea. Genitourinary: Negative. Musculoskeletal: Negative. Skin: Negative. Neurological: Negative. Negative for dizziness, syncope, weakness and light-headedness. Hematological: Negative. Psychiatric/Behavioral: Negative.           Objective Findings:     Vitals:BP (!) 199/75   Pulse 79   Temp 98.1 °F (36.7 °C) (Oral)   Resp 18 Ht 5' 11\" (1.803 m)   Wt (!) 323 lb (146.5 kg)   SpO2 96%   BMI 45.05 kg/m²      Physical Examination:    Physical Exam   Constitutional: He appears healthy. No distress. HENT:   Normal cephalic and Atraumatic   Eyes: Pupils are equal, round, and reactive to light. Neck: Normal range of motion and thyroid normal. Neck supple. No JVD present. No neck adenopathy. No thyromegaly present. Cardiovascular: Normal rate, regular rhythm, normal heart sounds, intact distal pulses and normal pulses. Pulmonary/Chest: Effort normal and breath sounds normal. He has no wheezes. He has no rales. He exhibits no tenderness. Abdominal: Soft. Bowel sounds are normal. There is no tenderness. Musculoskeletal: Normal range of motion. He exhibits no edema or tenderness. Neurological: He is alert and oriented to person, place, and time. Skin: Skin is warm. No cyanosis. Nails show no clubbing.          Results/ Medications reviewed 5/31/2019, 8:50 AM     Laboratory, Microbiology, Pathology, Radiology, Cardiology, Medications and Transcriptions reviewed  Scheduled Meds:   sodium chloride flush  10 mL Intravenous 2 times per day    tamsulosin  0.4 mg Oral Daily    insulin lispro  0-12 Units Subcutaneous TID WC    insulin lispro  0-6 Units Subcutaneous Nightly    amLODIPine  10 mg Oral Daily    atorvastatin  40 mg Oral Daily    digoxin  125 mcg Oral Daily    enalapril  20 mg Oral BID    gabapentin  300 mg Oral TID    insulin glargine  50 Units Subcutaneous Nightly    metoprolol tartrate  75 mg Oral BID    sertraline  100 mg Oral Daily    hydrOXYzine  50 mg Oral Nightly    pantoprazole  40 mg Oral QAM AC     Continuous Infusions:   sodium chloride 100 mL/hr at 05/31/19 0014    dextrose         Recent Labs     05/29/19  1336 05/30/19  0539 05/31/19  0605   WBC 14.6* 9.1 8.6   HGB 13.5* 10.8* 10.5*   HCT 40.4* 32.3* 30.7*   MCV 86.1 87.7 86.7   * 340 334     Recent Labs     05/29/19  1336 05/30/19  0539 05/31/19  0605    143 135   K 4.4 3.8 4.2    110* 105   CO2 24 24 22   BUN 30* 24* 22   CREATININE 1.79* 1.55* 1.34*     Recent Labs     05/29/19  1336 05/30/19  0539   AST 12 9   ALT 12 10   BILITOT 0.3 <0.2   ALKPHOS 77 58     No results for input(s): LIPASE, AMYLASE in the last 72 hours. Recent Labs     05/29/19  1336 05/30/19  0539   PROT 7.1 5.8*     Ct Abdomen Pelvis Wo Contrast Additional Contrast? None    Result Date: 5/29/2019  Examination: CT ABDOMEN PELVIS WO CONTRAST Indication:  Abdominal pain. Near syncope. Technique: Multiple serial axial images was performed through the abdomen and pelvis without intravenous or oral administration of contrast. However scan was obtained after patient had undergone CT PE study. .   Images were reconstructed in the axial and coronal and sagittal planes. Comparison:  No comparison is available. Findings: The visualized basal lungs show no focal parenchymal abnormalities. The liver, gallbladder, spleen, pancreas, adrenals, are unremarkable. The right kidney shows mild perinephric stranding. No hydronephrosis hydroureter. The pelvic calyceal system is old contrast. No pelvic caliectasis. There is left perinephric stranding. There is severe left hydronephrosis and hydroureter to the proximal third of the left ureter where there is a 1.3 cm cm calculi of approximately 1071 Hounsfield units. The bladder shows no gross filling defects. Large and small bowel show no sign of obstruction. The appendix is visualized. No periappendiceal stranding. No diverticulitis. There are small bilateral inguinal hernias containing mesenteric fat No free air. No free fluid. The visualized abdominal aorta is of normal size and caliber. No significant retroperitoneal adenopathy. Show multilevel degenerative change with bridging osteophytes. 1. THERE IS SEVERE LEFT-SIDED HYDRONEPHROSIS AND HYDROURETER WITH A CALCULI IN THE PROXIMAL THIRD OF THE LEFT URETER.  OTHER FINDINGS AS DETAILED ABOVE All CT scans at this facility use dose modulation, iterative reconstruction, and/or weight based dosing when appropriate to reduce radiation dose to as low as reasonably achievable. Cta Chest W Wo Contrast    Result Date: 5/29/2019  EXAM: CT SCAN OF THE THORAX WITH CONTRAST/PE PROTOCOL/CTA CHEST COMPARISON: NONE AVAILABLE REASON FOR EXAMINATION:  DIABETIC PATIENT WITH DIZZINESS, NEAR SYNCOPE, DYSPNEA, EVALUATE FOR POSSIBLE PULMONARY EMBOLISM, HISTORY OF CARDIAC ARRHYTHMIA WITH ICD DEVICE IN SITU TECHNIQUE: Helical CTA was performed through the chest utilizing 100 cc of Isovue 370 intravenous contrast.  Images were obtained with bolus tracking in order to opacify the pulmonary arteries. Thick section coronal MIP 3D reconstructions were performed  on a separate workstation. FINDINGS:  There is no CTA evidence of a pulmonary embolism. There is no CTA evidence of a thoracic aorta aneurysm or dissection. There is an ICD device in the left anterior chest wall with electrodes in the region of the RA and RV. There is borderline cardiomegaly. There is no pericardial effusion. There is no mediastinal mass or adenopathy. There is no hilar adenopathy and there is no axillary adenopathy. There are no pulmonary infiltrates or pleural effusions. There is degenerative bone spurring throughout the thoracic spine. Remainder of the CTA scan of the thorax is unremarkable. 1. NEGATIVE CTA SCAN OF THE THORAX WITH CONTRAST. 2. NO CTA EVIDENCE OF A PULMONARY THROMBOEMBOLIC DISEASE. 3. NO CTA EVIDENCE OF A THORACIC AORTA ANEURYSM OR DISSECTION. 4. LUNGS APPEAR CLEAR WITHOUT A PULMONARY INFILTRATE OR PLEURAL EFFUSION. All CT scans at this facility use dose modulation, iterative reconstruction, and/or weight based dosing when appropriate to reduce radiation dose to as low as reasonably achievable.      Xr Chest Portable    Result Date: 5/29/2019  EXAMINATION: PORTABLE CHEST X-RAY FROM 5/29/2019 AT 13:06 HOURS CLINICAL HISTORY: HISTORY CARDIAC ARRHYTHMIA AND ICD DEVICE, DIZZINESS AND NEAR-SYNCOPE COMPARISONS: None available. FINDINGS: There is borderline cardiomegaly. There is an ICD device superimposed on the upper left hemithorax with electrodes in the RA and RV. Lungs appear clear without a pulmonary infiltrate or pleural effusion. There is pleural scarring in the left CP  angle. Visualized bony thorax and remainder of the chest unremarkable. 1. BORDERLINE CARDIOMEGALY WITH AN ICD DEVICE IN SITU. 2. LUNGS APPEAR CLEAR WITHOUT A PULMONARY INFILTRATE OR PLEURAL EFFUSION. Fluoro For Surgical Procedures    Result Date: 5/30/2019  FLUORO FOR SURGICAL PROCEDURES : 5/30/2019 7:15 AM CLINICAL HISTORY:  Stent Placement . COMPARISON: None available. Intraoperative fluoroscopy was provided for Dr. Pavan fofana. A total of 59.5 seconds of fluoroscopy was used, with 7 fluoroscopic stills saved. No diagnostic images were obtained. Please see Dr. Alvarado Abarca notes for completeness. Active Hospital Problems    Diagnosis Date Noted    Ureteric stone [N20.1]      Priority: Low    Dizziness [R42] 05/29/2019     Priority: Low         Impression/Plan:   1. Renal Calculi s/p Stent  2. WOOD- resolving  3. Hydronephrosis due to #1  4. Accelerated Junctional Tachycardia - resolved. 5. NICM -ICD  6. Echo ordered- will review. 7. HTN uncontrolled- resume home meds. Thank you for allowing us to participate in the care of this patient. Will continue to follow. Please call if questions or concerns arise.     Electronically signed by Marsha Salvador MD on 5/31/2019 at 8:50 AM

## 2019-06-03 LAB
BLOOD CULTURE, ROUTINE: NORMAL
CULTURE, BLOOD 2: NORMAL

## 2019-06-06 ENCOUNTER — OFFICE VISIT (OUTPATIENT)
Dept: UROLOGY | Age: 59
End: 2019-06-06
Payer: MEDICARE

## 2019-06-06 ENCOUNTER — HOSPITAL ENCOUNTER (OUTPATIENT)
Dept: GENERAL RADIOLOGY | Age: 59
Discharge: HOME | End: 2019-06-08
Payer: MEDICARE

## 2019-06-06 VITALS
HEART RATE: 79 BPM | BODY MASS INDEX: 44.1 KG/M2 | SYSTOLIC BLOOD PRESSURE: 144 MMHG | HEIGHT: 71 IN | DIASTOLIC BLOOD PRESSURE: 72 MMHG | WEIGHT: 315 LBS

## 2019-06-06 DIAGNOSIS — N20.0 CALCULUS OF KIDNEY: Primary | ICD-10-CM

## 2019-06-06 DIAGNOSIS — N20.0 CALCULUS OF KIDNEY: ICD-10-CM

## 2019-06-06 PROCEDURE — G8417 CALC BMI ABV UP PARAM F/U: HCPCS | Performed by: UROLOGY

## 2019-06-06 PROCEDURE — G8427 DOCREV CUR MEDS BY ELIG CLIN: HCPCS | Performed by: UROLOGY

## 2019-06-06 PROCEDURE — 99214 OFFICE O/P EST MOD 30 MIN: CPT | Performed by: UROLOGY

## 2019-06-06 PROCEDURE — 1036F TOBACCO NON-USER: CPT | Performed by: UROLOGY

## 2019-06-06 PROCEDURE — 1111F DSCHRG MED/CURRENT MED MERGE: CPT | Performed by: UROLOGY

## 2019-06-06 PROCEDURE — 3017F COLORECTAL CA SCREEN DOC REV: CPT | Performed by: UROLOGY

## 2019-06-06 PROCEDURE — 74018 RADEX ABDOMEN 1 VIEW: CPT

## 2019-06-06 NOTE — PROGRESS NOTES
1 % cream Apply topically 2 times daily Apply topically 2 times daily. 1 Tube 3    insulin glargine (LANTUS SOLOSTAR) 100 UNIT/ML injection pen Inject 55 Units into the skin nightly (Patient taking differently: Inject 50 Units into the skin nightly ) 10 pen 3    Insulin Pen Needle (B-D UF III MINI PEN NEEDLES) 31G X 5 MM MISC 1 each by Does not apply route daily 100 each 3    Lancets MISC Use  each 3    gabapentin (NEURONTIN) 300 MG capsule Take 1 capsule by mouth 3 times daily for 30 days. . 90 capsule 2    omeprazole (PRILOSEC) 20 MG delayed release capsule Take 1 capsule by mouth daily 90 capsule 1    Insulin Syringe-Needle U-100 (INSULIN SYRINGE 1CC/31GX5/16\") 31G X 5/16\" 1 ML MISC Use once daily to administer Lantus 100 each 1     No current facility-administered medications for this visit. Coreg [carvedilol]  All reviewed and verified by Dr William Garcia on today's visit    PSA   Date Value Ref Range Status   12/14/2017 0.38 0.00 - 5.40 ng/mL Final     Comment:     When the Total PSA is between 3.00 and 10.00 ng/mL, consider  requesting a Free PSA to aid in diagnosis. No results found for this visit on 06/06/19. Physical Exam  Vitals:    06/06/19 1549   BP: (!) 144/72   Pulse: 79   Weight: (!) 323 lb (146.5 kg)   Height: 5' 11\" (1.803 m)     Constitutional: patient is oriented to person, place, and time. patient appears well-developed. obese male communicated with through  on phone. Ears: Adequate hearing/no hearing loss  Head: Normocephalic. Atraumatic  Neck: Normal range of motion. Cardiovascular: Normal rate, BP reviewed. normal rhythm  Pulmonary/Chest: Normal respiratory effort  no shortness of breath  Abdominal: Not distended. No abdominal distention large pannus  Urologic Exam  CT reviewed. KUB reviewed shows stone mid ureter with stent in normal position. Urologic exam otherwise unchanged . Musculoskeletal: Normal range of motion. Ambulatory.   Extremities: No edema   Neurological: Intact no deficits   Skin: Skin is warm and dry. No lesions. No rashes   Psychiatric:  Pleasant and cooperative. Assessment/Medical Necessity-Decision Making  Left mid ureteral calculus  Plan  Left shockwave lithotripsy  All risks and benefits described in detail  No plan on removing stent until all fragments at past  Greater than 50% of 35 minutes spent consulting patient face-to-face  Orders Placed This Encounter   Procedures    XR ABDOMEN (KUB) (SINGLE AP VIEW)     Standing Status:   Future     Number of Occurrences:   1     Standing Expiration Date:   6/6/2020     Order Specific Question:   Reason for exam:     Answer:   left kidney     No orders of the defined types were placed in this encounter. Selina Duncan MD       Please note this report has been partially produced using speech recognition software  And may cause contain errors related to that system including grammar, punctuation and spelling as well as words and phrases that may seem inappropriate. If there are questions or concerns please feel free to contact me to clarify.

## 2019-06-12 ENCOUNTER — ANESTHESIA EVENT (OUTPATIENT)
Dept: OPERATING ROOM | Age: 59
End: 2019-06-12
Payer: MEDICARE

## 2019-06-12 ENCOUNTER — APPOINTMENT (OUTPATIENT)
Dept: GENERAL RADIOLOGY | Age: 59
End: 2019-06-12
Attending: UROLOGY
Payer: MEDICARE

## 2019-06-12 ENCOUNTER — TELEPHONE (OUTPATIENT)
Dept: UROLOGY | Age: 59
End: 2019-06-12

## 2019-06-12 ENCOUNTER — ANESTHESIA (OUTPATIENT)
Dept: OPERATING ROOM | Age: 59
End: 2019-06-12
Payer: MEDICARE

## 2019-06-12 ENCOUNTER — HOSPITAL ENCOUNTER (OUTPATIENT)
Age: 59
Setting detail: OUTPATIENT SURGERY
Discharge: HOME | End: 2019-06-12
Attending: UROLOGY | Admitting: UROLOGY
Payer: MEDICARE

## 2019-06-12 VITALS
HEIGHT: 71 IN | HEART RATE: 67 BPM | TEMPERATURE: 98 F | OXYGEN SATURATION: 94 % | BODY MASS INDEX: 44.1 KG/M2 | RESPIRATION RATE: 20 BRPM | SYSTOLIC BLOOD PRESSURE: 162 MMHG | WEIGHT: 315 LBS | DIASTOLIC BLOOD PRESSURE: 77 MMHG

## 2019-06-12 VITALS — DIASTOLIC BLOOD PRESSURE: 71 MMHG | OXYGEN SATURATION: 99 % | SYSTOLIC BLOOD PRESSURE: 135 MMHG | TEMPERATURE: 97.7 F

## 2019-06-12 DIAGNOSIS — N20.0 KIDNEY STONE: Primary | ICD-10-CM

## 2019-06-12 LAB
GLUCOSE BLD-MCNC: 157 MG/DL (ref 60–115)
GLUCOSE BLD-MCNC: 162 MG/DL (ref 60–115)
PERFORMED ON: ABNORMAL
PERFORMED ON: ABNORMAL

## 2019-06-12 PROCEDURE — 74018 RADEX ABDOMEN 1 VIEW: CPT

## 2019-06-12 PROCEDURE — 2580000003 HC RX 258: Performed by: NURSE PRACTITIONER

## 2019-06-12 PROCEDURE — 7100000001 HC PACU RECOVERY - ADDTL 15 MIN: Performed by: UROLOGY

## 2019-06-12 PROCEDURE — 6370000000 HC RX 637 (ALT 250 FOR IP): Performed by: ANESTHESIOLOGY

## 2019-06-12 PROCEDURE — 3700000001 HC ADD 15 MINUTES (ANESTHESIA): Performed by: UROLOGY

## 2019-06-12 PROCEDURE — 7100000011 HC PHASE II RECOVERY - ADDTL 15 MIN: Performed by: UROLOGY

## 2019-06-12 PROCEDURE — 3700000000 HC ANESTHESIA ATTENDED CARE: Performed by: UROLOGY

## 2019-06-12 PROCEDURE — 7100000000 HC PACU RECOVERY - FIRST 15 MIN: Performed by: UROLOGY

## 2019-06-12 PROCEDURE — 7100000010 HC PHASE II RECOVERY - FIRST 15 MIN: Performed by: UROLOGY

## 2019-06-12 PROCEDURE — 2500000003 HC RX 250 WO HCPCS: Performed by: NURSE ANESTHETIST, CERTIFIED REGISTERED

## 2019-06-12 PROCEDURE — 50590 FRAGMENTING OF KIDNEY STONE: CPT | Performed by: UROLOGY

## 2019-06-12 PROCEDURE — 2709999900 HC NON-CHARGEABLE SUPPLY: Performed by: UROLOGY

## 2019-06-12 PROCEDURE — 3600000014 HC SURGERY LEVEL 4 ADDTL 15MIN: Performed by: UROLOGY

## 2019-06-12 PROCEDURE — 6360000002 HC RX W HCPCS: Performed by: NURSE ANESTHETIST, CERTIFIED REGISTERED

## 2019-06-12 PROCEDURE — 3600000004 HC SURGERY LEVEL 4 BASE: Performed by: UROLOGY

## 2019-06-12 RX ORDER — MIDAZOLAM HYDROCHLORIDE 1 MG/ML
INJECTION INTRAMUSCULAR; INTRAVENOUS PRN
Status: DISCONTINUED | OUTPATIENT
Start: 2019-06-12 | End: 2019-06-12 | Stop reason: SDUPTHER

## 2019-06-12 RX ORDER — HYDROCODONE BITARTRATE AND ACETAMINOPHEN 5; 325 MG/1; MG/1
1 TABLET ORAL PRN
Status: COMPLETED | OUTPATIENT
Start: 2019-06-12 | End: 2019-06-12

## 2019-06-12 RX ORDER — HYDROCODONE BITARTRATE AND ACETAMINOPHEN 5; 325 MG/1; MG/1
2 TABLET ORAL PRN
Status: COMPLETED | OUTPATIENT
Start: 2019-06-12 | End: 2019-06-12

## 2019-06-12 RX ORDER — METOCLOPRAMIDE HYDROCHLORIDE 5 MG/ML
10 INJECTION INTRAMUSCULAR; INTRAVENOUS
Status: DISCONTINUED | OUTPATIENT
Start: 2019-06-12 | End: 2019-06-12 | Stop reason: HOSPADM

## 2019-06-12 RX ORDER — MEPERIDINE HYDROCHLORIDE 25 MG/ML
12.5 INJECTION INTRAMUSCULAR; INTRAVENOUS; SUBCUTANEOUS EVERY 5 MIN PRN
Status: DISCONTINUED | OUTPATIENT
Start: 2019-06-12 | End: 2019-06-12 | Stop reason: HOSPADM

## 2019-06-12 RX ORDER — DIPHENHYDRAMINE HYDROCHLORIDE 50 MG/ML
12.5 INJECTION INTRAMUSCULAR; INTRAVENOUS
Status: DISCONTINUED | OUTPATIENT
Start: 2019-06-12 | End: 2019-06-12 | Stop reason: HOSPADM

## 2019-06-12 RX ORDER — PROPOFOL 10 MG/ML
INJECTION, EMULSION INTRAVENOUS PRN
Status: DISCONTINUED | OUTPATIENT
Start: 2019-06-12 | End: 2019-06-12 | Stop reason: SDUPTHER

## 2019-06-12 RX ORDER — SODIUM CHLORIDE 0.9 % (FLUSH) 0.9 %
10 SYRINGE (ML) INJECTION PRN
Status: DISCONTINUED | OUTPATIENT
Start: 2019-06-12 | End: 2019-06-12 | Stop reason: HOSPADM

## 2019-06-12 RX ORDER — FENTANYL CITRATE 50 UG/ML
50 INJECTION, SOLUTION INTRAMUSCULAR; INTRAVENOUS EVERY 10 MIN PRN
Status: DISCONTINUED | OUTPATIENT
Start: 2019-06-12 | End: 2019-06-12 | Stop reason: HOSPADM

## 2019-06-12 RX ORDER — LIDOCAINE HYDROCHLORIDE 20 MG/ML
INJECTION, SOLUTION INTRAVENOUS PRN
Status: DISCONTINUED | OUTPATIENT
Start: 2019-06-12 | End: 2019-06-12 | Stop reason: SDUPTHER

## 2019-06-12 RX ORDER — SODIUM CHLORIDE 0.9 % (FLUSH) 0.9 %
10 SYRINGE (ML) INJECTION EVERY 12 HOURS SCHEDULED
Status: DISCONTINUED | OUTPATIENT
Start: 2019-06-12 | End: 2019-06-12 | Stop reason: HOSPADM

## 2019-06-12 RX ORDER — ONDANSETRON 2 MG/ML
4 INJECTION INTRAMUSCULAR; INTRAVENOUS
Status: DISCONTINUED | OUTPATIENT
Start: 2019-06-12 | End: 2019-06-12 | Stop reason: HOSPADM

## 2019-06-12 RX ORDER — SODIUM CHLORIDE, SODIUM LACTATE, POTASSIUM CHLORIDE, CALCIUM CHLORIDE 600; 310; 30; 20 MG/100ML; MG/100ML; MG/100ML; MG/100ML
INJECTION, SOLUTION INTRAVENOUS CONTINUOUS
Status: DISCONTINUED | OUTPATIENT
Start: 2019-06-12 | End: 2019-06-12 | Stop reason: HOSPADM

## 2019-06-12 RX ORDER — EPHEDRINE SULFATE/0.9% NACL/PF 50 MG/5 ML
SYRINGE (ML) INTRAVENOUS PRN
Status: DISCONTINUED | OUTPATIENT
Start: 2019-06-12 | End: 2019-06-12 | Stop reason: SDUPTHER

## 2019-06-12 RX ORDER — LIDOCAINE HYDROCHLORIDE 10 MG/ML
1 INJECTION, SOLUTION EPIDURAL; INFILTRATION; INTRACAUDAL; PERINEURAL
Status: DISCONTINUED | OUTPATIENT
Start: 2019-06-12 | End: 2019-06-12 | Stop reason: HOSPADM

## 2019-06-12 RX ORDER — ONDANSETRON 2 MG/ML
INJECTION INTRAMUSCULAR; INTRAVENOUS PRN
Status: DISCONTINUED | OUTPATIENT
Start: 2019-06-12 | End: 2019-06-12 | Stop reason: SDUPTHER

## 2019-06-12 RX ORDER — FENTANYL CITRATE 50 UG/ML
INJECTION, SOLUTION INTRAMUSCULAR; INTRAVENOUS PRN
Status: DISCONTINUED | OUTPATIENT
Start: 2019-06-12 | End: 2019-06-12 | Stop reason: SDUPTHER

## 2019-06-12 RX ADMIN — SODIUM CHLORIDE, POTASSIUM CHLORIDE, SODIUM LACTATE AND CALCIUM CHLORIDE: 600; 310; 30; 20 INJECTION, SOLUTION INTRAVENOUS at 07:38

## 2019-06-12 RX ADMIN — PROPOFOL 200 MG: 10 INJECTION, EMULSION INTRAVENOUS at 07:43

## 2019-06-12 RX ADMIN — SODIUM CHLORIDE, POTASSIUM CHLORIDE, SODIUM LACTATE AND CALCIUM CHLORIDE: 600; 310; 30; 20 INJECTION, SOLUTION INTRAVENOUS at 06:51

## 2019-06-12 RX ADMIN — LIDOCAINE HYDROCHLORIDE 100 MG: 20 INJECTION, SOLUTION INTRAVENOUS at 07:43

## 2019-06-12 RX ADMIN — Medication 5 MG: at 08:08

## 2019-06-12 RX ADMIN — MIDAZOLAM HYDROCHLORIDE 1 MG: 1 INJECTION, SOLUTION INTRAMUSCULAR; INTRAVENOUS at 07:38

## 2019-06-12 RX ADMIN — HYDROCODONE BITARTRATE AND ACETAMINOPHEN 2 TABLET: 5; 325 TABLET ORAL at 10:12

## 2019-06-12 RX ADMIN — FENTANYL CITRATE 25 MCG: 50 INJECTION, SOLUTION INTRAMUSCULAR; INTRAVENOUS at 08:37

## 2019-06-12 RX ADMIN — ONDANSETRON 4 MG: 2 INJECTION INTRAMUSCULAR; INTRAVENOUS at 07:54

## 2019-06-12 RX ADMIN — Medication 5 MG: at 08:26

## 2019-06-12 ASSESSMENT — PULMONARY FUNCTION TESTS
PIF_VALUE: 7
PIF_VALUE: 6
PIF_VALUE: 5
PIF_VALUE: 6
PIF_VALUE: 5
PIF_VALUE: 17
PIF_VALUE: 5
PIF_VALUE: 6
PIF_VALUE: 14
PIF_VALUE: 8
PIF_VALUE: 2
PIF_VALUE: 15
PIF_VALUE: 6
PIF_VALUE: 15
PIF_VALUE: 16
PIF_VALUE: 1
PIF_VALUE: 17
PIF_VALUE: 18
PIF_VALUE: 17
PIF_VALUE: 16
PIF_VALUE: 4
PIF_VALUE: 6
PIF_VALUE: 8
PIF_VALUE: 7
PIF_VALUE: 15
PIF_VALUE: 6
PIF_VALUE: 7
PIF_VALUE: 14
PIF_VALUE: 6
PIF_VALUE: 15
PIF_VALUE: 6
PIF_VALUE: 15
PIF_VALUE: 7
PIF_VALUE: 0
PIF_VALUE: 17
PIF_VALUE: 7
PIF_VALUE: 16
PIF_VALUE: 17
PIF_VALUE: 17
PIF_VALUE: 6
PIF_VALUE: 8
PIF_VALUE: 7
PIF_VALUE: 1
PIF_VALUE: 1
PIF_VALUE: 10
PIF_VALUE: 17
PIF_VALUE: 16
PIF_VALUE: 1
PIF_VALUE: 17
PIF_VALUE: 7
PIF_VALUE: 6
PIF_VALUE: 11
PIF_VALUE: 16
PIF_VALUE: 1
PIF_VALUE: 1
PIF_VALUE: 17

## 2019-06-12 ASSESSMENT — PAIN SCALES - GENERAL
PAINLEVEL_OUTOF10: 7
PAINLEVEL_OUTOF10: 0

## 2019-06-12 ASSESSMENT — PAIN DESCRIPTION - DESCRIPTORS: DESCRIPTORS: DISCOMFORT

## 2019-06-12 ASSESSMENT — PAIN - FUNCTIONAL ASSESSMENT: PAIN_FUNCTIONAL_ASSESSMENT: 0-10

## 2019-06-12 NOTE — ANESTHESIA PRE PROCEDURE
Department of Anesthesiology  Preprocedure Note       Name:  Bisi Delacruz   Age:  62 y.o.  :  1960                                          MRN:  [id number]         Date:  2019      Surgeon: Benson Perla):  Luis Felipe Bonilla MD    Procedure: LEFT ESWL, RECENT ADMISSION, KUB ON ADMIT Param Sherman # [phone]) (Left )    Medications prior to admission:   Prior to Admission medications    Medication Sig Start Date End Date Taking? Authorizing Provider   gabapentin (NEURONTIN) 300 MG capsule TK 1 C PO TID 19  Yes Historical Provider, MD   tamsulosin (FLOMAX) 0.4 MG capsule Take 1 capsule by mouth daily 19  Yes Inés Rendon,    metoprolol tartrate (LOPRESSOR) 50 MG tablet Take 1.5 tablets by mouth 2 times daily  Patient taking differently: Take 50 mg by mouth 2 times daily  19  Yes Rafaela Reyes MD   SITagliptin (JANUVIA) 100 MG tablet Take 1 tablet by mouth daily 19  Yes Rafaela Reyes MD   ammonium lactate (LAC-HYDRIN) 12 % cream Apply topically as needed.  19  Yes Gifty Wells DPM   mupirocin (BACTROBAN) 2 % ointment APPLY TO WOUNDS D 19  Yes Historical Provider, MD   amLODIPine (NORVASC) 10 MG tablet TAKE 1 TABLET BY MOUTH EVERY NIGHT 19  Yes Norman Ly, DO   digoxin (DIGOX) 125 MCG tablet TAKE 1 TABLET BY MOUTH ONCE DAILY 19  Yes Norman Ly, DO   enalapril (VASOTEC) 20 MG tablet Take 1 tablet by mouth 2 times daily 19  Yes Norman Ly, DO   furosemide (LASIX) 40 MG tablet Take 1 tablet by mouth daily 19  Yes Norman Ly, DO   atorvastatin (LIPITOR) 40 MG tablet Take 1 tablet by mouth daily 19  Yes Rafaela Reyes MD   naproxen (NAPROSYN) 500 MG tablet Take 1 tablet by mouth 2 times daily for 20 doses 19 Yes JESUS Doshi - CNP   ranitidine (ZANTAC) 300 MG tablet TAKE 1 TABLET BY MOUTH DAILY 3/19/19  Yes JESUS Mensah - CNP   fenofibrate 160 MG tablet TAKE 1 TABLET BY MOUTH EVERY NIGHT 10/11/18  Yes Shelton Bailey DO   metFORMIN (GLUCOPHAGE) 1000 MG tablet TAKE 1 TABLET BY MOUTH TWICE DAILY, WITH MEALS 10/11/18  Yes Shelton Bailey DO   glipiZIDE (GLUCOTROL) 10 MG tablet TAKE 2 TABLETS BY MOUTH TWICE DAILY BEFORE MEALS 10/11/18  Yes Shelton Bailey DO   blood glucose test strips (TRUE METRIX BLOOD GLUCOSE TEST) strip 1 each by In Vitro route 3 times daily As needed. 10/11/18  Yes Shelton Bailey DO   terbinafine (LAMISIL) 1 % cream Apply topically 2 times daily Apply topically 2 times daily. 10/11/18  Yes Shelton Bailey DO   insulin glargine (LANTUS SOLOSTAR) 100 UNIT/ML injection pen Inject 55 Units into the skin nightly  Patient taking differently: Inject 50 Units into the skin nightly  10/11/18  Yes Shelton Bailey DO   Insulin Pen Needle (B-D UF III MINI PEN NEEDLES) 31G X 5 MM MISC 1 each by Does not apply route daily 10/11/18  Yes Shelton Bailey DO   Blood Glucose Monitoring Suppl (ONE TOUCH ULTRA MINI) w/Device KIT 1 kit by Does not apply route three times daily 10/11/18  Yes Marlyn Rodarte, DO   Lancets MISC Use TID 10/11/18  Yes Shelton Bailey DO   omeprazole (PRILOSEC) 20 MG delayed release capsule Take 1 capsule by mouth daily 3/14/18  Yes Shelton Bailey DO   bacitracin 500 UNIT/GM ointment Apply topically 2 times daily.  3/11/18  Yes Janell Zhao PA-C   Insulin Syringe-Needle U-100 (INSULIN SYRINGE 1CC/31GX5/16\") 31G X 5/16\" 1 ML MISC Use once daily to administer Lantus 12/14/17  Yes Shelton Bailey, DO   sodium chloride 0.9 % irrigation [facility] CHANGES 5/2/19   Historical Provider, MD   sertraline (ZOLOFT) 100 MG tablet Take 1 tablet by mouth daily 5/7/19   Norman Ly, DO   aspirin EC 81 MG EC tablet Take 1 tablet by mouth daily  Patient taking differently: Take 325 mg by mouth daily  5/7/19   Norman Ly, DO   albuterol (PROVENTIL) (2.5 MG/3ML) 0.083% nebulizer solution Take 3 mLs by nebulization 4 times daily 10/11/18   Shelton Bailey DO   hydrOXYzine (VISTARIL) 50 MG capsule Take 1 capsule by mouth nightly 10/11/18   Will Sanders DO   gabapentin (NEURONTIN) 300 MG capsule Take 1 capsule by mouth 3 times daily for 30 days. . 10/11/18 5/16/19  Will Sanders DO       Current medications:    Current Facility-Administered Medications   Medication Dose Route Frequency Provider Last Rate Last Dose    lactated ringers infusion   Intravenous Continuous JESUS Cartwright -  mL/hr at 06/12/19 0651      sodium chloride flush 0.9 % injection 10 mL  10 mL Intravenous 2 times per day Peg Malcolm APRN - CNP        sodium chloride flush 0.9 % injection 10 mL  10 mL Intravenous PRN Peg Gold, APRN - CNP        lidocaine PF 1 % injection 1 mL  1 mL Intradermal Once PRN Peg Gold APRN - CNP           Allergies:     Allergies   Allergen Reactions    Coreg [Carvedilol] Other (See Comments)     \"hyper\", rapid pulse       Problem List:    Patient Active Problem List   Diagnosis Code    Diabetes mellitus ([address] E11.9    Hyperlipidemia E78.5    Hypertension I10    Callus of foot L84    Chronic bilateral low back pain without sciatica M54.5, G89.29    Cardiomyopathy ([address] I42.9    Pacemaker Z95.0    Diabetic neuropathy associated with type 2 diabetes mellitus ([address] E11.40    Morbid obesity ([address] E66.01    LON (obstructive sleep apnea) G47.33    Chronic systolic heart failure (HCC) I50.22    ICD (implantable cardioverter-defibrillator) in place Z95.810    Anxiety and depression F41.9, F32.9    Diabetic foot infection (HCC) E11.628, L08.9    Gastroesophageal reflux disease without esophagitis K21.9    Keratoderma of foot, acquired L85.1    Non-pressure chronic ulcer of other part of right foot limited to breakdown of skin ([address] L97.511    Dizziness R42    Ureteric stone N20.1       Past Medical History:        Diagnosis Date    Anxiety and depression 4/30/2019    Cardiomyopathy ([address]  Chronic systolic heart failure ([facility] 4/30/2019    Diabetes mellitus [facility]     Diabetic neuropathy associated with type 2 diabetes mellitus ([facility] 12/13/2017    Heart failure, NYHA class 4 ([facility]     Hyperlipidemia     Hypertension     ICD (implantable cardioverter-defibrillator) in place 4/30/2019    Morbid obesity ([facility] 12/19/2017    LON (obstructive sleep apnea)        Past Surgical History:        Procedure Laterality Date    CARDIAC DEFIBRILLATOR PLACEMENT  12/2008    CYSTOSCOPY INSERTION / REMOVAL STENT / STONE Left 5/30/2019    CYSTOSCOPY LEFT DOUBLE J STENT PLACEMENT ROOM 287 performed by Maximo Reyes MD at [place] [address] History:    Social History     Tobacco Use    Smoking status: Former Smoker     Packs/day: 1.00     Years: 25.00     Pack years: 25.00     Last attempt to quit: 12/1/2008     Years since quitting: 10.5    Smokeless tobacco: Never Used   Substance Use Topics    Alcohol use: No                                Counseling given: Not Answered      Vital Signs (Current):   Vitals:    06/12/19 0635   BP: (!) 176/88   Pulse: 79   Resp: 20   Temp: 98 °F (36.7 °C)   TempSrc: Temporal   SpO2: 97%   Weight: (!) 323 lb (146.5 kg)   Height: 5' 11\" (1.803 m)                                              BP Readings from Last 3 Encounters:   06/12/19 (!) 176/88   06/06/19 (!) 144/72   05/31/19 (!) 168/72       NPO Status: Time of last liquid consumption: 2230                        Time of last solid consumption: 1700                        Date of last liquid consumption: 06/11/19                        Date of last solid food consumption: 06/11/19    BMI:   Wt Readings from Last 3 Encounters:   06/12/19 (!) 323 lb (146.5 kg)   06/06/19 (!) 323 lb (146.5 kg)   05/30/19 (!) 323 lb (146.5 kg)     Body mass index is 45.05 kg/m².     CBC:   Lab Results   Component Value Date    WBC 8.6 05/31/2019    RBC 3.55 05/31/2019    HGB 10.5 05/31/2019    HCT 30.7 05/31/2019    MCV 86.7 05/31/2019 RDW 15.2 05/31/2019     05/31/2019       CMP:   Lab Results   Component Value Date     05/31/2019    K 4.2 05/31/2019     05/31/2019    CO2 22 05/31/2019    BUN 22 05/31/2019    CREATININE 1.34 05/31/2019    GFRAA >60.0 05/31/2019    LABGLOM 54.1 05/31/2019    GLUCOSE 267 05/31/2019    PROT 5.8 05/30/2019    CALCIUM 8.6 05/31/2019    BILITOT <0.2 05/30/2019    ALKPHOS 58 05/30/2019    AST 9 05/30/2019    ALT 10 05/30/2019       POC Tests:   Recent Labs     06/12/19  0644   POCGLU 162*       Coags: No results found for: PROTIME, INR, APTT    HCG (If Applicable): No results found for: PREGTESTUR, PREGSERUM, HCG, HCGQUANT     ABGs: No results found for: PHART, PO2ART, KRE9DZY, ZRH0NJS, BEART, N6ELPBEH     Type & Screen (If Applicable):  No results found for: LABABO, LABRH    Anesthesia Evaluation    Airway: Mallampati: II  TM distance: >3 FB   Neck ROM: full  Mouth opening: > = 3 FB Dental:      Comment: MANY MISSING TEETH    Pulmonary:normal exam    (+) sleep apnea: on CPAP,                             Cardiovascular:    (+) hypertension:, pacemaker: AICD,       ECG reviewed      Echocardiogram reviewed         Beta Blocker:  Dose within 24 Hrs      ROS comment: Hx OF cardiomyopathy; EF was 20%; now 50%     Neuro/Psych:   Negative Neuro/Psych ROS              GI/Hepatic/Renal:   (+) GERD: well controlled, renal disease: kidney stones and CRI,           Endo/Other:    (+) DiabetesType II DM, well controlled, using insulin, . Abdominal:           Vascular: negative vascular ROS. Anesthesia Plan      general     ASA 4     (LMA)  Induction: intravenous. MIPS: Postoperative opioids intended and Prophylactic antiemetics administered. Anesthetic plan and risks discussed with patient [facility] AN ). Plan discussed with CRNA.     Attending anesthesiologist reviewed and agrees with Pre Eval content              Dali Cordero MD 6/12/2019

## 2019-06-12 NOTE — BRIEF OP NOTE
Brief Postoperative Note  ______________________________________________________________    Patient: Celio Lemus  YOB: 1960  MRN: [id number]  Date of Procedure: 6/12/2019    Pre-Op Diagnosis: LEFT URETERAL CALCULUS    Post-Op Diagnosis: Same       Procedure(s):  LEFT ESWL (CONF # [id number])    Anesthesia: General    Surgeon(s):  Taylor Whitlock MD    Assistant:       Estimated Blood Loss (mL): less than 50     Complications: None    Specimens:   * No specimens in log *    Implants:  * No implants in log *      Drains: * No LDAs found *    Findings: good fragmentation stent left in    Taylor Whitlock MD  Date: 6/12/2019  Time: 8:47 AM

## 2019-06-12 NOTE — ANESTHESIA POSTPROCEDURE EVALUATION
Department of Anesthesiology  Postprocedure Note    Patient: Delon Bundy  MRN: [id number]  YOB: 1960  Date of evaluation: 6/12/2019  Time:  8:42 AM     Procedure Summary     Date:  06/12/19 Room / Location:  [facility] OR 08 / [facility] OR    Anesthesia Start:  0008 Anesthesia Stop:  6021    Procedure:  LEFT ESWL (CONF # [id number]) (Left ) Diagnosis:  (LEFT URETERAL CALCULUS)    Surgeon:  Minna Andrade MD Responsible Provider:  Caden Last MD    Anesthesia Type:  general ASA Status:  4          Anesthesia Type: general    Abbey Phase I:      Abbey Phase II:      Last vitals: Reviewed and per EMR flowsheets.        Anesthesia Post Evaluation    Patient location during evaluation: PACU  Patient participation: complete - patient participated  Level of consciousness: awake  Pain score: 0  Airway patency: patent  Nausea & Vomiting: no nausea and no vomiting  Complications: no  Cardiovascular status: hemodynamically stable  Respiratory status: acceptable  Hydration status: euvolemic

## 2019-06-13 NOTE — OP NOTE
Cherie [facility] [address] [place] [address]                                OPERATIVE REPORT    PATIENT NAME: Sherren Mars                 :        1960  MED REC NO:   [id number]                            ROOM:  ACCOUNT NO:   [de-identified]                           ADMIT DATE: 2019  PROVIDER:     Baldo Rey MD    DATE OF PROCEDURE:  2019    PREOPERATIVE DIAGNOSES:  Indwelling left double-J stent with mid left  ureteral calculus. POSTOPERATIVE DIAGNOSIS:  Indwelling left double-J stent with mid left  ureteral calculus. OPERATION PERFORMED:  Left shockwave lithotripsy. SURGEON:  Baldo Rey MD    ANESTHESIA:  General.    INDICATIONS:  The patient is a 78-year-old male who underwent emergent  double-J stent placement for mid ureteral calculus with hydronephrosis 9  mm in size on 2019. The patient now comes in for shockwave  lithotripsy. Our plan is to keep the stent in and evaluate the patient  after shockwave lithotripsy for stone burden. The stent will remain in  place. OPERATIVE NOTE:  The patient was brought into the operating room, placed  on the operating table in supine position. He was placed under general  anesthetic. Then the fluoroscopy was used to isolate the stone. The  lithotripsy treatment probe was placed on the left anterior abdomen. Then 3000 shocks of various kilovoltages were given with good  fragmentation of stone. The patient tolerated procedure well. The  patient's family was consulted after surgery with  service.         Umer Sotelo MD    D: 2019 8:51:42       T: 2019 8:58:04     [id number]  Job#: [id number]     Doc#: [id number]    CC:

## 2019-06-27 ENCOUNTER — OFFICE VISIT (OUTPATIENT)
Dept: UROLOGY | Age: 59
End: 2019-06-27
Payer: MEDICARE

## 2019-06-27 ENCOUNTER — HOSPITAL ENCOUNTER (OUTPATIENT)
Dept: GENERAL RADIOLOGY | Age: 59
Discharge: HOME | End: 2019-06-29
Payer: MEDICARE

## 2019-06-27 VITALS
SYSTOLIC BLOOD PRESSURE: 128 MMHG | HEART RATE: 75 BPM | DIASTOLIC BLOOD PRESSURE: 74 MMHG | WEIGHT: 315 LBS | HEIGHT: 71 IN | BODY MASS INDEX: 44.1 KG/M2

## 2019-06-27 DIAGNOSIS — N20.0 KIDNEY STONE: ICD-10-CM

## 2019-06-27 DIAGNOSIS — N20.0 KIDNEY STONE: Primary | ICD-10-CM

## 2019-06-27 LAB
BILIRUBIN, POC: ABNORMAL
BLOOD URINE, POC: ABNORMAL
CLARITY, POC: CLEAR
COLOR, POC: YELLOW
GLUCOSE URINE, POC: ABNORMAL
KETONES, POC: ABNORMAL
LEUKOCYTE EST, POC: ABNORMAL
NITRITE, POC: ABNORMAL
PH, POC: 5.5
PROTEIN, POC: ABNORMAL
SPECIFIC GRAVITY, POC: 1.01
UROBILINOGEN, POC: 1

## 2019-06-27 PROCEDURE — 81003 URINALYSIS AUTO W/O SCOPE: CPT | Performed by: UROLOGY

## 2019-06-27 PROCEDURE — 74018 RADEX ABDOMEN 1 VIEW: CPT

## 2019-06-27 PROCEDURE — 99024 POSTOP FOLLOW-UP VISIT: CPT | Performed by: UROLOGY

## 2019-06-27 NOTE — PROGRESS NOTES
Postop check after shockwave lithotripsy  Minimal stone fracturing  Continues to have large stone burden  Will undergo ureteroscopy with holmium laser 7/10/2019  Dr Brit Tatum

## 2019-07-10 ENCOUNTER — ANESTHESIA (OUTPATIENT)
Dept: OPERATING ROOM | Age: 59
End: 2019-07-10
Payer: MEDICARE

## 2019-07-10 ENCOUNTER — APPOINTMENT (OUTPATIENT)
Dept: GENERAL RADIOLOGY | Age: 59
End: 2019-07-10
Attending: UROLOGY
Payer: MEDICARE

## 2019-07-10 ENCOUNTER — ANESTHESIA EVENT (OUTPATIENT)
Dept: OPERATING ROOM | Age: 59
End: 2019-07-10
Payer: MEDICARE

## 2019-07-10 ENCOUNTER — HOSPITAL ENCOUNTER (OUTPATIENT)
Age: 59
Setting detail: OUTPATIENT SURGERY
Discharge: HOME | End: 2019-07-10
Attending: UROLOGY | Admitting: UROLOGY
Payer: MEDICARE

## 2019-07-10 VITALS — SYSTOLIC BLOOD PRESSURE: 115 MMHG | DIASTOLIC BLOOD PRESSURE: 77 MMHG | OXYGEN SATURATION: 94 %

## 2019-07-10 VITALS
DIASTOLIC BLOOD PRESSURE: 72 MMHG | HEIGHT: 71 IN | SYSTOLIC BLOOD PRESSURE: 142 MMHG | OXYGEN SATURATION: 98 % | TEMPERATURE: 97.9 F | HEART RATE: 66 BPM | BODY MASS INDEX: 44.1 KG/M2 | WEIGHT: 315 LBS | RESPIRATION RATE: 20 BRPM

## 2019-07-10 LAB
ANION GAP SERPL CALCULATED.3IONS-SCNC: 13 MEQ/L (ref 9–15)
BUN BLDV-MCNC: 31 MG/DL (ref 8–23)
CALCIUM SERPL-MCNC: 10 MG/DL (ref 8.5–9.9)
CHLORIDE BLD-SCNC: 103 MEQ/L (ref 95–107)
CO2: 24 MEQ/L (ref 20–31)
CREAT SERPL-MCNC: 1.84 MG/DL (ref 0.7–1.2)
GFR AFRICAN AMERICAN: 45.4
GFR NON-AFRICAN AMERICAN: 37.5
GLUCOSE BLD-MCNC: 126 MG/DL (ref 60–115)
GLUCOSE BLD-MCNC: 149 MG/DL (ref 70–99)
GLUCOSE BLD-MCNC: 155 MG/DL (ref 60–115)
HCT VFR BLD CALC: 37.4 % (ref 42–52)
HEMOGLOBIN: 12.4 G/DL (ref 14–18)
MCH RBC QN AUTO: 28.6 PG (ref 27–31.3)
MCHC RBC AUTO-ENTMCNC: 33.2 % (ref 33–37)
MCV RBC AUTO: 86.2 FL (ref 80–100)
PDW BLD-RTO: 15.6 % (ref 11.5–14.5)
PERFORMED ON: ABNORMAL
PERFORMED ON: ABNORMAL
PLATELET # BLD: 341 K/UL (ref 130–400)
POTASSIUM SERPL-SCNC: 4.3 MEQ/L (ref 3.4–4.9)
RBC # BLD: 4.34 M/UL (ref 4.7–6.1)
SODIUM BLD-SCNC: 140 MEQ/L (ref 135–144)
WBC # BLD: 9.5 K/UL (ref 4.8–10.8)

## 2019-07-10 PROCEDURE — 6370000000 HC RX 637 (ALT 250 FOR IP): Performed by: ANESTHESIOLOGY

## 2019-07-10 PROCEDURE — C1894 INTRO/SHEATH, NON-LASER: HCPCS | Performed by: UROLOGY

## 2019-07-10 PROCEDURE — C1769 GUIDE WIRE: HCPCS | Performed by: UROLOGY

## 2019-07-10 PROCEDURE — 80048 BASIC METABOLIC PNL TOTAL CA: CPT

## 2019-07-10 PROCEDURE — 3600000014 HC SURGERY LEVEL 4 ADDTL 15MIN: Performed by: UROLOGY

## 2019-07-10 PROCEDURE — 82365 CALCULUS SPECTROSCOPY: CPT

## 2019-07-10 PROCEDURE — 2580000003 HC RX 258: Performed by: UROLOGY

## 2019-07-10 PROCEDURE — 7100000000 HC PACU RECOVERY - FIRST 15 MIN: Performed by: UROLOGY

## 2019-07-10 PROCEDURE — 2709999900 HC NON-CHARGEABLE SUPPLY: Performed by: UROLOGY

## 2019-07-10 PROCEDURE — 85027 COMPLETE CBC AUTOMATED: CPT

## 2019-07-10 PROCEDURE — 3209999900 FLUORO FOR SURGICAL PROCEDURES

## 2019-07-10 PROCEDURE — 6360000002 HC RX W HCPCS: Performed by: NURSE ANESTHETIST, CERTIFIED REGISTERED

## 2019-07-10 PROCEDURE — C2625 STENT, NON-COR, TEM W/DEL SY: HCPCS | Performed by: UROLOGY

## 2019-07-10 PROCEDURE — 3700000000 HC ANESTHESIA ATTENDED CARE: Performed by: UROLOGY

## 2019-07-10 PROCEDURE — 2500000003 HC RX 250 WO HCPCS: Performed by: NURSE ANESTHETIST, CERTIFIED REGISTERED

## 2019-07-10 PROCEDURE — 2580000003 HC RX 258: Performed by: NURSE PRACTITIONER

## 2019-07-10 PROCEDURE — 6360000002 HC RX W HCPCS: Performed by: UROLOGY

## 2019-07-10 PROCEDURE — 7100000001 HC PACU RECOVERY - ADDTL 15 MIN: Performed by: UROLOGY

## 2019-07-10 PROCEDURE — 7100000010 HC PHASE II RECOVERY - FIRST 15 MIN: Performed by: UROLOGY

## 2019-07-10 PROCEDURE — 52356 CYSTO/URETERO W/LITHOTRIPSY: CPT | Performed by: UROLOGY

## 2019-07-10 PROCEDURE — 3600000004 HC SURGERY LEVEL 4 BASE: Performed by: UROLOGY

## 2019-07-10 PROCEDURE — 74018 RADEX ABDOMEN 1 VIEW: CPT

## 2019-07-10 PROCEDURE — 6360000004 HC RX CONTRAST MEDICATION: Performed by: UROLOGY

## 2019-07-10 PROCEDURE — 3700000001 HC ADD 15 MINUTES (ANESTHESIA): Performed by: UROLOGY

## 2019-07-10 PROCEDURE — 7100000011 HC PHASE II RECOVERY - ADDTL 15 MIN: Performed by: UROLOGY

## 2019-07-10 PROCEDURE — 2720000010 HC SURG SUPPLY STERILE: Performed by: UROLOGY

## 2019-07-10 PROCEDURE — C1758 CATHETER, URETERAL: HCPCS | Performed by: UROLOGY

## 2019-07-10 PROCEDURE — 6370000000 HC RX 637 (ALT 250 FOR IP): Performed by: UROLOGY

## 2019-07-10 DEVICE — STENT URET 6FR L26CM POLYMER BLEND PH FREE COAT GRADUAL TAPR: Type: IMPLANTABLE DEVICE | Site: URETER | Status: FUNCTIONAL

## 2019-07-10 RX ORDER — ROCURONIUM BROMIDE 10 MG/ML
INJECTION, SOLUTION INTRAVENOUS PRN
Status: DISCONTINUED | OUTPATIENT
Start: 2019-07-10 | End: 2019-07-10 | Stop reason: SDUPTHER

## 2019-07-10 RX ORDER — PROPOFOL 10 MG/ML
INJECTION, EMULSION INTRAVENOUS PRN
Status: DISCONTINUED | OUTPATIENT
Start: 2019-07-10 | End: 2019-07-10 | Stop reason: SDUPTHER

## 2019-07-10 RX ORDER — SODIUM CHLORIDE 0.9 % (FLUSH) 0.9 %
10 SYRINGE (ML) INJECTION PRN
Status: DISCONTINUED | OUTPATIENT
Start: 2019-07-10 | End: 2019-07-10 | Stop reason: HOSPADM

## 2019-07-10 RX ORDER — FENTANYL CITRATE 50 UG/ML
INJECTION, SOLUTION INTRAMUSCULAR; INTRAVENOUS PRN
Status: DISCONTINUED | OUTPATIENT
Start: 2019-07-10 | End: 2019-07-10 | Stop reason: SDUPTHER

## 2019-07-10 RX ORDER — MAGNESIUM HYDROXIDE 1200 MG/15ML
LIQUID ORAL PRN
Status: DISCONTINUED | OUTPATIENT
Start: 2019-07-10 | End: 2019-07-10 | Stop reason: ALTCHOICE

## 2019-07-10 RX ORDER — ONDANSETRON 2 MG/ML
INJECTION INTRAMUSCULAR; INTRAVENOUS PRN
Status: DISCONTINUED | OUTPATIENT
Start: 2019-07-10 | End: 2019-07-10 | Stop reason: SDUPTHER

## 2019-07-10 RX ORDER — HYDROCODONE BITARTRATE AND ACETAMINOPHEN 5; 325 MG/1; MG/1
1 TABLET ORAL PRN
Status: COMPLETED | OUTPATIENT
Start: 2019-07-10 | End: 2019-07-10

## 2019-07-10 RX ORDER — ONDANSETRON 2 MG/ML
4 INJECTION INTRAMUSCULAR; INTRAVENOUS
Status: DISCONTINUED | OUTPATIENT
Start: 2019-07-10 | End: 2019-07-10 | Stop reason: HOSPADM

## 2019-07-10 RX ORDER — HYDROCODONE BITARTRATE AND ACETAMINOPHEN 5; 325 MG/1; MG/1
2 TABLET ORAL PRN
Status: COMPLETED | OUTPATIENT
Start: 2019-07-10 | End: 2019-07-10

## 2019-07-10 RX ORDER — FENTANYL CITRATE 50 UG/ML
50 INJECTION, SOLUTION INTRAMUSCULAR; INTRAVENOUS EVERY 10 MIN PRN
Status: DISCONTINUED | OUTPATIENT
Start: 2019-07-10 | End: 2019-07-10 | Stop reason: HOSPADM

## 2019-07-10 RX ORDER — METOCLOPRAMIDE HYDROCHLORIDE 5 MG/ML
10 INJECTION INTRAMUSCULAR; INTRAVENOUS
Status: DISCONTINUED | OUTPATIENT
Start: 2019-07-10 | End: 2019-07-10 | Stop reason: HOSPADM

## 2019-07-10 RX ORDER — LIDOCAINE HYDROCHLORIDE 10 MG/ML
1 INJECTION, SOLUTION EPIDURAL; INFILTRATION; INTRACAUDAL; PERINEURAL
Status: DISCONTINUED | OUTPATIENT
Start: 2019-07-10 | End: 2019-07-10 | Stop reason: HOSPADM

## 2019-07-10 RX ORDER — SODIUM CHLORIDE 0.9 % (FLUSH) 0.9 %
10 SYRINGE (ML) INJECTION EVERY 12 HOURS SCHEDULED
Status: DISCONTINUED | OUTPATIENT
Start: 2019-07-10 | End: 2019-07-10 | Stop reason: HOSPADM

## 2019-07-10 RX ORDER — SODIUM CHLORIDE, SODIUM LACTATE, POTASSIUM CHLORIDE, CALCIUM CHLORIDE 600; 310; 30; 20 MG/100ML; MG/100ML; MG/100ML; MG/100ML
INJECTION, SOLUTION INTRAVENOUS CONTINUOUS
Status: DISCONTINUED | OUTPATIENT
Start: 2019-07-10 | End: 2019-07-10 | Stop reason: HOSPADM

## 2019-07-10 RX ORDER — MEPERIDINE HYDROCHLORIDE 25 MG/ML
12.5 INJECTION INTRAMUSCULAR; INTRAVENOUS; SUBCUTANEOUS EVERY 5 MIN PRN
Status: DISCONTINUED | OUTPATIENT
Start: 2019-07-10 | End: 2019-07-10 | Stop reason: HOSPADM

## 2019-07-10 RX ORDER — LIDOCAINE HYDROCHLORIDE 20 MG/ML
INJECTION, SOLUTION INFILTRATION; PERINEURAL PRN
Status: DISCONTINUED | OUTPATIENT
Start: 2019-07-10 | End: 2019-07-10 | Stop reason: SDUPTHER

## 2019-07-10 RX ORDER — DIPHENHYDRAMINE HYDROCHLORIDE 50 MG/ML
12.5 INJECTION INTRAMUSCULAR; INTRAVENOUS
Status: DISCONTINUED | OUTPATIENT
Start: 2019-07-10 | End: 2019-07-10 | Stop reason: HOSPADM

## 2019-07-10 RX ORDER — DEXAMETHASONE SODIUM PHOSPHATE 4 MG/ML
INJECTION, SOLUTION INTRA-ARTICULAR; INTRALESIONAL; INTRAMUSCULAR; INTRAVENOUS; SOFT TISSUE PRN
Status: DISCONTINUED | OUTPATIENT
Start: 2019-07-10 | End: 2019-07-10 | Stop reason: SDUPTHER

## 2019-07-10 RX ORDER — GLYCOPYRROLATE 1 MG/5 ML
SYRINGE (ML) INTRAVENOUS PRN
Status: DISCONTINUED | OUTPATIENT
Start: 2019-07-10 | End: 2019-07-10 | Stop reason: SDUPTHER

## 2019-07-10 RX ORDER — CHLORHEXIDINE GLUCONATE 4 G/100ML
SOLUTION TOPICAL PRN
Status: DISCONTINUED | OUTPATIENT
Start: 2019-07-10 | End: 2019-07-10 | Stop reason: ALTCHOICE

## 2019-07-10 RX ORDER — CEPHALEXIN 500 MG/1
500 CAPSULE ORAL EVERY 12 HOURS
Qty: 20 CAPSULE | Refills: 0 | Status: SHIPPED | OUTPATIENT
Start: 2019-07-10 | End: 2019-08-02 | Stop reason: ALTCHOICE

## 2019-07-10 RX ORDER — MIDAZOLAM HYDROCHLORIDE 1 MG/ML
INJECTION INTRAMUSCULAR; INTRAVENOUS PRN
Status: DISCONTINUED | OUTPATIENT
Start: 2019-07-10 | End: 2019-07-10 | Stop reason: SDUPTHER

## 2019-07-10 RX ADMIN — SUGAMMADEX 300 MG: 100 INJECTION, SOLUTION INTRAVENOUS at 11:35

## 2019-07-10 RX ADMIN — SODIUM CHLORIDE, POTASSIUM CHLORIDE, SODIUM LACTATE AND CALCIUM CHLORIDE: 600; 310; 30; 20 INJECTION, SOLUTION INTRAVENOUS at 07:31

## 2019-07-10 RX ADMIN — SODIUM CHLORIDE, POTASSIUM CHLORIDE, SODIUM LACTATE AND CALCIUM CHLORIDE: 600; 310; 30; 20 INJECTION, SOLUTION INTRAVENOUS at 10:51

## 2019-07-10 RX ADMIN — ROCURONIUM BROMIDE 10 MG: 10 INJECTION INTRAVENOUS at 11:19

## 2019-07-10 RX ADMIN — PHENYLEPHRINE HYDROCHLORIDE 50 MCG: 10 INJECTION INTRAVENOUS at 11:18

## 2019-07-10 RX ADMIN — ROCURONIUM BROMIDE 50 MG: 10 INJECTION INTRAVENOUS at 09:59

## 2019-07-10 RX ADMIN — FENTANYL CITRATE 50 MCG: 50 INJECTION, SOLUTION INTRAMUSCULAR; INTRAVENOUS at 09:59

## 2019-07-10 RX ADMIN — HYDROCODONE BITARTRATE AND ACETAMINOPHEN 2 TABLET: 5; 325 TABLET ORAL at 13:20

## 2019-07-10 RX ADMIN — PROPOFOL 200 MG: 10 INJECTION, EMULSION INTRAVENOUS at 09:59

## 2019-07-10 RX ADMIN — ONDANSETRON 4 MG: 2 INJECTION INTRAMUSCULAR; INTRAVENOUS at 11:30

## 2019-07-10 RX ADMIN — PROPOFOL 50 MG: 10 INJECTION, EMULSION INTRAVENOUS at 10:59

## 2019-07-10 RX ADMIN — ROCURONIUM BROMIDE 40 MG: 10 INJECTION INTRAVENOUS at 11:00

## 2019-07-10 RX ADMIN — LIDOCAINE HYDROCHLORIDE 60 MG: 20 INJECTION, SOLUTION INFILTRATION; PERINEURAL at 09:59

## 2019-07-10 RX ADMIN — PHENYLEPHRINE HYDROCHLORIDE 50 MCG: 10 INJECTION INTRAVENOUS at 11:24

## 2019-07-10 RX ADMIN — Medication 0.2 MG: at 10:48

## 2019-07-10 RX ADMIN — GENTAMICIN SULFATE 120 MG: 40 INJECTION, SOLUTION INTRAMUSCULAR; INTRAVENOUS at 10:04

## 2019-07-10 RX ADMIN — FENTANYL CITRATE 50 MCG: 50 INJECTION, SOLUTION INTRAMUSCULAR; INTRAVENOUS at 10:59

## 2019-07-10 RX ADMIN — DEXAMETHASONE SODIUM PHOSPHATE 4 MG: 4 INJECTION, SOLUTION INTRA-ARTICULAR; INTRALESIONAL; INTRAMUSCULAR; INTRAVENOUS; SOFT TISSUE at 10:04

## 2019-07-10 RX ADMIN — MIDAZOLAM HYDROCHLORIDE 2 MG: 1 INJECTION, SOLUTION INTRAMUSCULAR; INTRAVENOUS at 09:55

## 2019-07-10 ASSESSMENT — PULMONARY FUNCTION TESTS
PIF_VALUE: 26
PIF_VALUE: 26
PIF_VALUE: 28
PIF_VALUE: 1
PIF_VALUE: 26
PIF_VALUE: 28
PIF_VALUE: 1
PIF_VALUE: 27
PIF_VALUE: 21
PIF_VALUE: 26
PIF_VALUE: 26
PIF_VALUE: 1
PIF_VALUE: 1
PIF_VALUE: 28
PIF_VALUE: 29
PIF_VALUE: 27
PIF_VALUE: 28
PIF_VALUE: 25
PIF_VALUE: 26
PIF_VALUE: 26
PIF_VALUE: 27
PIF_VALUE: 26
PIF_VALUE: 21
PIF_VALUE: 26
PIF_VALUE: 27
PIF_VALUE: 12
PIF_VALUE: 5
PIF_VALUE: 29
PIF_VALUE: 26
PIF_VALUE: 33
PIF_VALUE: 29
PIF_VALUE: 25
PIF_VALUE: 30
PIF_VALUE: 19
PIF_VALUE: 26
PIF_VALUE: 29
PIF_VALUE: 26
PIF_VALUE: 30
PIF_VALUE: 28
PIF_VALUE: 26
PIF_VALUE: 28
PIF_VALUE: 26
PIF_VALUE: 28
PIF_VALUE: 26
PIF_VALUE: 28
PIF_VALUE: 26
PIF_VALUE: 27
PIF_VALUE: 26
PIF_VALUE: 25
PIF_VALUE: 19
PIF_VALUE: 26
PIF_VALUE: 34
PIF_VALUE: 26
PIF_VALUE: 28
PIF_VALUE: 26
PIF_VALUE: 25
PIF_VALUE: 29
PIF_VALUE: 26
PIF_VALUE: 28
PIF_VALUE: 26
PIF_VALUE: 26
PIF_VALUE: 28
PIF_VALUE: 1
PIF_VALUE: 26
PIF_VALUE: 25
PIF_VALUE: 1
PIF_VALUE: 25
PIF_VALUE: 28
PIF_VALUE: 7
PIF_VALUE: 26
PIF_VALUE: 26
PIF_VALUE: 29
PIF_VALUE: 25
PIF_VALUE: 26
PIF_VALUE: 25
PIF_VALUE: 26
PIF_VALUE: 25
PIF_VALUE: 35
PIF_VALUE: 30
PIF_VALUE: 26
PIF_VALUE: 27
PIF_VALUE: 26
PIF_VALUE: 28
PIF_VALUE: 1
PIF_VALUE: 29
PIF_VALUE: 27
PIF_VALUE: 26
PIF_VALUE: 28
PIF_VALUE: 26
PIF_VALUE: 25

## 2019-07-10 ASSESSMENT — PAIN - FUNCTIONAL ASSESSMENT: PAIN_FUNCTIONAL_ASSESSMENT: 0-10

## 2019-07-10 ASSESSMENT — PAIN SCALES - GENERAL
PAINLEVEL_OUTOF10: 6
PAINLEVEL_OUTOF10: 4

## 2019-07-10 ASSESSMENT — PAIN DESCRIPTION - PAIN TYPE: TYPE: SURGICAL PAIN

## 2019-07-10 NOTE — ANESTHESIA POSTPROCEDURE EVALUATION
Department of Anesthesiology  Postprocedure Note    Patient: Gavin Silva  MRN: [id number]  YOB: 1960  Date of evaluation: 7/10/2019  Time:  11:52 AM     Procedure Summary     Date:  07/10/19 Room / Location:  [facility]    Anesthesia Start:  8108 Anesthesia Stop:      Procedures:       LEFT FLEXIBLE URETEROSCOPY, CYSTOSCOPY RETROGRADE (Left )      HOLMIUM LASER LITHOTRIPSY, STONE EXTRACTION, LT URETERAL STENT INSERTION. (N/A ) Diagnosis:  (LEFT URETERAL CALCULUS WITH STENT)    Surgeon:  Orlin Vilchis MD Responsible Provider:  Patsy Garnica DO    Anesthesia Type:  general ASA Status:  4          Anesthesia Type: general    Abbey Phase I: Abbey Score: 8    Abbey Phase II:      Last vitals: Reviewed and per EMR flowsheets.        Anesthesia Post Evaluation    Patient location during evaluation: PACU  Patient participation: complete - patient participated  Level of consciousness: awake and awake and alert  Pain score: 0  Airway patency: patent  Nausea & Vomiting: no nausea and no vomiting  Complications: no  Cardiovascular status: blood pressure returned to baseline and hemodynamically stable  Respiratory status: acceptable  Hydration status: euvolemic

## 2019-07-10 NOTE — ANESTHESIA PRE PROCEDURE
MOUTH DAILY 3/19/19   JESUS Ornelas - CNP   fenofibrate 160 MG tablet TAKE 1 TABLET BY MOUTH EVERY NIGHT 10/11/18   Jean Hill DO   metFORMIN (GLUCOPHAGE) 1000 MG tablet TAKE 1 TABLET BY MOUTH TWICE DAILY, WITH MEALS 10/11/18   Shelton Bailey DO   glipiZIDE (GLUCOTROL) 10 MG tablet TAKE 2 TABLETS BY MOUTH TWICE DAILY BEFORE MEALS 10/11/18   Shelton Bailey DO   blood glucose test strips (TRUE METRIX BLOOD GLUCOSE TEST) strip 1 each by In Vitro route 3 times daily As needed. 10/11/18   Shelton Bailey DO   albuterol (PROVENTIL) (2.5 MG/3ML) 0.083% nebulizer solution Take 3 mLs by nebulization 4 times daily 10/11/18   Jean Hill DO   hydrOXYzine (VISTARIL) 50 MG capsule Take 1 capsule by mouth nightly 10/11/18   Jean Hill DO   terbinafine (LAMISIL) 1 % cream Apply topically 2 times daily Apply topically 2 times daily. 10/11/18   Shelton Bailey DO   insulin glargine (LANTUS SOLOSTAR) 100 UNIT/ML injection pen Inject 55 Units into the skin nightly  Patient taking differently: Inject 50 Units into the skin nightly  10/11/18   Jean Hill DO   Insulin Pen Needle (B-D UF III MINI PEN NEEDLES) 31G X 5 MM MISC 1 each by Does not apply route daily 10/11/18   Jean Hill DO   Blood Glucose Monitoring Suppl (ONE TOUCH ULTRA MINI) w/Device KIT 1 kit by Does not apply route three times daily 10/11/18   Jean Hill DO   Lancets MISC Use TID 10/11/18   Jean Hill DO   gabapentin (NEURONTIN) 300 MG capsule Take 1 capsule by mouth 3 times daily for 30 days. . 10/11/18 5/16/19  Jean Hill DO   omeprazole (PRILOSEC) 20 MG delayed release capsule Take 1 capsule by mouth daily 3/14/18   Jean Hill DO   bacitracin 500 UNIT/GM ointment Apply topically 2 times daily.  3/11/18   David Simmons PA-C   Insulin Syringe-Needle U-100 (INSULIN SYRINGE 1CC/31GX5/16\") 31G X 5/16\" 1 ML MISC Use once daily to administer Lantus 12/14/17   Jean Hill DO       Current 07/10/2019     07/10/2019       CMP:   Lab Results   Component Value Date     05/31/2019    K 4.2 05/31/2019     05/31/2019    CO2 22 05/31/2019    BUN 22 05/31/2019    CREATININE 1.34 05/31/2019    GFRAA >60.0 05/31/2019    LABGLOM 54.1 05/31/2019    GLUCOSE 267 05/31/2019    PROT 5.8 05/30/2019    CALCIUM 8.6 05/31/2019    BILITOT <0.2 05/30/2019    ALKPHOS 58 05/30/2019    AST 9 05/30/2019    ALT 10 05/30/2019       POC Tests:   Recent Labs     07/10/19  0725   POCGLU 155*       Coags: No results found for: PROTIME, INR, APTT    HCG (If Applicable): No results found for: PREGTESTUR, PREGSERUM, HCG, HCGQUANT     ABGs: No results found for: PHART, PO2ART, AUN9RIL, FQB0DMW, BEART, Q3UUVVMM     Type & Screen (If Applicable):  No results found for: LABABO, LABRH    Anesthesia Evaluation    Airway: Mallampati: II  TM distance: >3 FB   Neck ROM: full  Mouth opening: > = 3 FB Dental:          Pulmonary: breath sounds clear to auscultation  (+) sleep apnea:                             Cardiovascular:    (+) hypertension:, pacemaker:,       NYHA Classification: II  ECG reviewed  Rhythm: regular  Rate: normal           Beta Blocker:  Dose within 24 Hrs         Neuro/Psych:               GI/Hepatic/Renal:   (+) GERD:,           Endo/Other:    (+) Diabetes, . Abdominal:           Vascular:                                        Anesthesia Plan      general     ASA 4       Induction: intravenous. MIPS: Prophylactic antiemetics administered. Plan discussed with CRNA.                   MANE Rodney 106, DO   7/10/2019

## 2019-07-11 NOTE — OP NOTE
Cherie Wolfe [address]                      [address] [place], [address] [facility]                                OPERATIVE REPORT    PATIENT NAME: Sherrian Sandifer                 :        1960  MED REC NO:   [id number]                            ROOM:  ACCOUNT NO:   [de-identified]                           ADMIT DATE: 07/10/2019  PROVIDER:     Javi Arredondo MD    DATE OF PROCEDURE:  07/10/2019    PREOPERATIVE DIAGNOSIS:  Left mid ureteral calculus with hydronephrosis. POSTOPERATIVE DIAGNOSIS:  Left mid ureteral calculus with  hydronephrosis. OPERATION PERFORMED:  Left ureteroscopy with holmium laser lithotripsy,  and extraction of calculi, left double-J stent placement, cystoscopy  (complex case greater than 90 minutes). SURGEON:  Javi Arredondo MD    ANESTHESIA:  General.    INDICATIONS:  The patient is a 61-year-old male, morbidly obese, which  poses significant technical problems for treatment of a stone in the mid  ureter. The patient had an emergent double J stent placement  approximately 1 month ago and was treated with shockwave lithotripsy. However, due to the patient's habitus, there was minimal success with  this shockwave lithotripsy. There was significant stone burden after  treatment with shockwave lithotripsy. The patient will undergo  ureteroscopy with holmium laser lithotripsy and extraction of calculi. The patient understands risks and benefits as explained to him through  the  in the office and will proceed. OPERATIVE NOTE:  The patient received p.o. antibiotics prior to  procedure and IV antibiotics on admission to short stay. He was taken  back to the operating room, placed on operating table in dorsal  lithotomy position after initiation of general anesthetic. A 21-Faroese  cystoscope was used to perform a cystoscopy and removal of the left  double-J stent.   At this time, ureteral work sheath was introduced into  the distal ureter

## 2019-07-12 ENCOUNTER — TELEPHONE (OUTPATIENT)
Dept: UROLOGY | Age: 59
End: 2019-07-12

## 2019-07-12 DIAGNOSIS — N20.0 KIDNEY STONE: Primary | ICD-10-CM

## 2019-07-13 LAB
CALCULI COMPOSITION: NORMAL
MASS: 129 MG
STONE DESCRIPTION: NORMAL
STONE NUMBER: NORMAL
STONE SIZE: NORMAL MM

## 2019-07-29 ENCOUNTER — HOSPITAL ENCOUNTER (OUTPATIENT)
Dept: GENERAL RADIOLOGY | Age: 59
Discharge: HOME | End: 2019-07-31
Payer: MEDICARE

## 2019-07-29 DIAGNOSIS — N20.0 KIDNEY STONE: ICD-10-CM

## 2019-07-29 PROCEDURE — 74018 RADEX ABDOMEN 1 VIEW: CPT

## 2019-08-02 ENCOUNTER — PROCEDURE VISIT (OUTPATIENT)
Dept: UROLOGY | Age: 59
End: 2019-08-02
Payer: MEDICARE

## 2019-08-02 VITALS
BODY MASS INDEX: 44.1 KG/M2 | SYSTOLIC BLOOD PRESSURE: 138 MMHG | DIASTOLIC BLOOD PRESSURE: 78 MMHG | HEART RATE: 73 BPM | HEIGHT: 71 IN | WEIGHT: 315 LBS

## 2019-08-02 DIAGNOSIS — N20.0 KIDNEY STONE: Primary | ICD-10-CM

## 2019-08-02 LAB
BILIRUBIN, POC: ABNORMAL
BLOOD URINE, POC: ABNORMAL
CLARITY, POC: CLEAR
COLOR, POC: YELLOW
GLUCOSE URINE, POC: ABNORMAL
KETONES, POC: ABNORMAL
LEUKOCYTE EST, POC: ABNORMAL
NITRITE, POC: ABNORMAL
PH, POC: 5.5
PROTEIN, POC: 100
SPECIFIC GRAVITY, POC: 1.01
UROBILINOGEN, POC: 1

## 2019-08-02 PROCEDURE — 52310 CYSTOSCOPY AND TREATMENT: CPT | Performed by: UROLOGY

## 2019-08-02 PROCEDURE — 81003 URINALYSIS AUTO W/O SCOPE: CPT | Performed by: UROLOGY

## 2019-08-02 RX ORDER — SULFAMETHOXAZOLE AND TRIMETHOPRIM 400; 80 MG/1; MG/1
1 TABLET ORAL ONCE
Qty: 1 TABLET | Refills: 0 | COMMUNITY
Start: 2019-08-02 | End: 2019-08-02

## 2019-08-04 ENCOUNTER — APPOINTMENT (OUTPATIENT)
Dept: CT IMAGING | Age: 59
DRG: 690 | End: 2019-08-04
Payer: MEDICARE

## 2019-08-04 ENCOUNTER — OFFICE VISIT (OUTPATIENT)
Dept: FAMILY MEDICINE CLINIC | Age: 59
End: 2019-08-04
Payer: MEDICARE

## 2019-08-04 ENCOUNTER — HOSPITAL ENCOUNTER (INPATIENT)
Age: 59
LOS: 3 days | Discharge: HOME | DRG: 690 | End: 2019-08-08
Attending: INTERNAL MEDICINE | Admitting: INTERNAL MEDICINE
Payer: MEDICARE

## 2019-08-04 VITALS
HEIGHT: 71 IN | OXYGEN SATURATION: 98 % | TEMPERATURE: 100.5 F | DIASTOLIC BLOOD PRESSURE: 74 MMHG | SYSTOLIC BLOOD PRESSURE: 134 MMHG | BODY MASS INDEX: 44.1 KG/M2 | HEART RATE: 84 BPM | RESPIRATION RATE: 18 BRPM | WEIGHT: 315 LBS

## 2019-08-04 DIAGNOSIS — R10.9 FLANK PAIN: ICD-10-CM

## 2019-08-04 DIAGNOSIS — R30.0 DYSURIA: ICD-10-CM

## 2019-08-04 DIAGNOSIS — N39.0 URINARY TRACT INFECTION WITH HEMATURIA, SITE UNSPECIFIED: Primary | ICD-10-CM

## 2019-08-04 DIAGNOSIS — N10 ACUTE PYELONEPHRITIS: Primary | ICD-10-CM

## 2019-08-04 DIAGNOSIS — D72.829 LEUKOCYTOSIS, UNSPECIFIED TYPE: ICD-10-CM

## 2019-08-04 DIAGNOSIS — R31.9 URINARY TRACT INFECTION WITH HEMATURIA, SITE UNSPECIFIED: Primary | ICD-10-CM

## 2019-08-04 DIAGNOSIS — I42.0 DILATED CARDIOMYOPATHY (HCC): ICD-10-CM

## 2019-08-04 DIAGNOSIS — N12 PYELONEPHRITIS: ICD-10-CM

## 2019-08-04 LAB
ALBUMIN SERPL-MCNC: 4 G/DL (ref 3.5–4.6)
ALP BLD-CCNC: 65 U/L (ref 35–104)
ALT SERPL-CCNC: 15 U/L (ref 0–41)
ANION GAP SERPL CALCULATED.3IONS-SCNC: 11 MEQ/L (ref 9–15)
AST SERPL-CCNC: 14 U/L (ref 0–40)
BACTERIA: NEGATIVE /HPF
BANDED NEUTROPHILS RELATIVE PERCENT: 14 %
BASOPHILS ABSOLUTE: 0.3 K/UL (ref 0–0.2)
BASOPHILS RELATIVE PERCENT: 1 %
BILIRUB SERPL-MCNC: 0.6 MG/DL (ref 0.2–0.7)
BILIRUBIN URINE: NEGATIVE
BILIRUBIN, POC: NEGATIVE
BLOOD URINE, POC: ABNORMAL
BLOOD, URINE: ABNORMAL
BUN BLDV-MCNC: 28 MG/DL (ref 8–23)
CALCIUM SERPL-MCNC: 9.6 MG/DL (ref 8.5–9.9)
CHLORIDE BLD-SCNC: 99 MEQ/L (ref 95–107)
CHP ED QC CHECK: YES
CLARITY, POC: ABNORMAL
CLARITY: ABNORMAL
CO2: 25 MEQ/L (ref 20–31)
COLOR, POC: ABNORMAL
COLOR: YELLOW
CREAT SERPL-MCNC: 1.93 MG/DL (ref 0.7–1.2)
DOHLE BODIES: ABNORMAL
EOSINOPHILS ABSOLUTE: 0 K/UL (ref 0–0.7)
EOSINOPHILS RELATIVE PERCENT: 0 %
EPITHELIAL CELLS, UA: ABNORMAL /HPF (ref 0–5)
GFR AFRICAN AMERICAN: 43
GFR NON-AFRICAN AMERICAN: 35.5
GLOBULIN: 3.2 G/DL (ref 2.3–3.5)
GLUCOSE BLD-MCNC: 183 MG/DL (ref 60–115)
GLUCOSE BLD-MCNC: 184 MG/DL
GLUCOSE BLD-MCNC: 190 MG/DL (ref 70–99)
GLUCOSE URINE, POC: NEGATIVE
GLUCOSE URINE: NEGATIVE MG/DL
HCT VFR BLD CALC: 34.6 % (ref 42–52)
HEMOGLOBIN: 11.6 G/DL (ref 14–18)
HYALINE CASTS: ABNORMAL /HPF (ref 0–5)
KETONES, POC: NEGATIVE
KETONES, URINE: NEGATIVE MG/DL
LACTIC ACID: 1.7 MMOL/L (ref 0.5–2.2)
LACTIC ACID: 1.9 MMOL/L (ref 0.5–2.2)
LEUKOCYTE EST, POC: ABNORMAL
LEUKOCYTE ESTERASE, URINE: ABNORMAL
LIPASE: 34 U/L (ref 12–95)
LYMPHOCYTES ABSOLUTE: 1.7 K/UL (ref 1–4.8)
LYMPHOCYTES RELATIVE PERCENT: 6 %
MCH RBC QN AUTO: 28.7 PG (ref 27–31.3)
MCHC RBC AUTO-ENTMCNC: 33.5 % (ref 33–37)
MCV RBC AUTO: 85.5 FL (ref 80–100)
METAMYELOCYTES RELATIVE PERCENT: 1 %
MONOCYTES ABSOLUTE: 0.6 K/UL (ref 0.2–0.8)
MONOCYTES RELATIVE PERCENT: 1.8 %
NEUTROPHILS ABSOLUTE: 25.1 K/UL (ref 1.4–6.5)
NEUTROPHILS RELATIVE PERCENT: 76 %
NITRITE, POC: NEGATIVE
NITRITE, URINE: NEGATIVE
PDW BLD-RTO: 15.4 % (ref 11.5–14.5)
PERFORMED ON: ABNORMAL
PH UA: 5.5 (ref 5–9)
PH, POC: 5.5
PLATELET # BLD: 313 K/UL (ref 130–400)
PLATELET SLIDE REVIEW: NORMAL
POC CREATININE WHOLE BLOOD: 2.1
POTASSIUM SERPL-SCNC: 4.8 MEQ/L (ref 3.4–4.9)
PROTEIN UA: >=300 MG/DL
PROTEIN, POC: ABNORMAL
RBC # BLD: 4.04 M/UL (ref 4.7–6.1)
RBC # BLD: NORMAL 10*6/UL
RBC UA: >100 /HPF (ref 0–5)
SMUDGE CELLS: 1.8
SODIUM BLD-SCNC: 135 MEQ/L (ref 135–144)
SPECIFIC GRAVITY UA: 1.02 (ref 1–1.03)
SPECIFIC GRAVITY, POC: 1.01
TOTAL PROTEIN: 7.2 G/DL (ref 6.3–8)
URINE REFLEX TO CULTURE: YES
UROBILINOGEN, POC: NEGATIVE
UROBILINOGEN, URINE: 1 E.U./DL
VACUOLATED NEUTROPHILS: PRESENT
WBC # BLD: 27.6 K/UL (ref 4.8–10.8)
WBC UA: >100 /HPF (ref 0–5)

## 2019-08-04 PROCEDURE — 83605 ASSAY OF LACTIC ACID: CPT

## 2019-08-04 PROCEDURE — 80053 COMPREHEN METABOLIC PANEL: CPT

## 2019-08-04 PROCEDURE — 6360000002 HC RX W HCPCS: Performed by: NURSE PRACTITIONER

## 2019-08-04 PROCEDURE — 81003 URINALYSIS AUTO W/O SCOPE: CPT | Performed by: NURSE PRACTITIONER

## 2019-08-04 PROCEDURE — G0378 HOSPITAL OBSERVATION PER HR: HCPCS

## 2019-08-04 PROCEDURE — 96367 TX/PROPH/DG ADDL SEQ IV INF: CPT

## 2019-08-04 PROCEDURE — 81001 URINALYSIS AUTO W/SCOPE: CPT

## 2019-08-04 PROCEDURE — 2580000003 HC RX 258: Performed by: NURSE PRACTITIONER

## 2019-08-04 PROCEDURE — 85025 COMPLETE CBC W/AUTO DIFF WBC: CPT

## 2019-08-04 PROCEDURE — 83690 ASSAY OF LIPASE: CPT

## 2019-08-04 PROCEDURE — 87086 URINE CULTURE/COLONY COUNT: CPT

## 2019-08-04 PROCEDURE — 36415 COLL VENOUS BLD VENIPUNCTURE: CPT

## 2019-08-04 PROCEDURE — 74176 CT ABD & PELVIS W/O CONTRAST: CPT

## 2019-08-04 PROCEDURE — 99285 EMERGENCY DEPT VISIT HI MDM: CPT

## 2019-08-04 PROCEDURE — 96365 THER/PROPH/DIAG IV INF INIT: CPT

## 2019-08-04 PROCEDURE — 96375 TX/PRO/DX INJ NEW DRUG ADDON: CPT

## 2019-08-04 PROCEDURE — 87040 BLOOD CULTURE FOR BACTERIA: CPT

## 2019-08-04 PROCEDURE — 99212 OFFICE O/P EST SF 10 MIN: CPT | Performed by: NURSE PRACTITIONER

## 2019-08-04 RX ORDER — 0.9 % SODIUM CHLORIDE 0.9 %
1000 INTRAVENOUS SOLUTION INTRAVENOUS ONCE
Status: COMPLETED | OUTPATIENT
Start: 2019-08-04 | End: 2019-08-04

## 2019-08-04 RX ORDER — KETOROLAC TROMETHAMINE 30 MG/ML
30 INJECTION, SOLUTION INTRAMUSCULAR; INTRAVENOUS ONCE
Status: COMPLETED | OUTPATIENT
Start: 2019-08-04 | End: 2019-08-04

## 2019-08-04 RX ORDER — CIPROFLOXACIN 2 MG/ML
400 INJECTION, SOLUTION INTRAVENOUS ONCE
Status: COMPLETED | OUTPATIENT
Start: 2019-08-04 | End: 2019-08-05

## 2019-08-04 RX ORDER — ONDANSETRON 2 MG/ML
4 INJECTION INTRAMUSCULAR; INTRAVENOUS ONCE
Status: COMPLETED | OUTPATIENT
Start: 2019-08-04 | End: 2019-08-04

## 2019-08-04 RX ADMIN — CEFTRIAXONE SODIUM 1 G: 1 INJECTION, POWDER, FOR SOLUTION INTRAMUSCULAR; INTRAVENOUS at 21:13

## 2019-08-04 RX ADMIN — SODIUM CHLORIDE 1000 ML: 9 INJECTION, SOLUTION INTRAVENOUS at 19:03

## 2019-08-04 RX ADMIN — ONDANSETRON 4 MG: 2 INJECTION INTRAMUSCULAR; INTRAVENOUS at 19:02

## 2019-08-04 RX ADMIN — CIPROFLOXACIN 400 MG: 2 INJECTION, SOLUTION INTRAVENOUS at 22:43

## 2019-08-04 RX ADMIN — KETOROLAC TROMETHAMINE 30 MG: 30 INJECTION, SOLUTION INTRAMUSCULAR at 19:02

## 2019-08-04 ASSESSMENT — ENCOUNTER SYMPTOMS
BACK PAIN: 0
COUGH: 0
EYES NEGATIVE: 1
TROUBLE SWALLOWING: 0
NAUSEA: 0
VOMITING: 0
ABDOMINAL PAIN: 1
VOMITING: 0
ABDOMINAL DISTENTION: 0
SHORTNESS OF BREATH: 0
COLOR CHANGE: 0
WHEEZING: 0
SORE THROAT: 0
CHEST TIGHTNESS: 0
DIARRHEA: 0
SORE THROAT: 0
DIARRHEA: 0
GASTROINTESTINAL NEGATIVE: 1
WHEEZING: 0
ABDOMINAL PAIN: 0
BACK PAIN: 1
NAUSEA: 0
RESPIRATORY NEGATIVE: 1
CONSTIPATION: 0
RHINORRHEA: 0
SHORTNESS OF BREATH: 0
CONSTIPATION: 0

## 2019-08-04 ASSESSMENT — PAIN SCALES - GENERAL
PAINLEVEL_OUTOF10: 0
PAINLEVEL_OUTOF10: 6
PAINLEVEL_OUTOF10: 6

## 2019-08-04 NOTE — ED TRIAGE NOTES
PT to ed from home via traige with c/o bilateral flank pain, radiating to adbomen. Pt is Surinamese speaking, son at the bedside. PT reports that he has pain with urination and blood in his urine, onset last night. Pt is AOx3, calm and cooperative. Abdomen is large, soft and non destended. Pt resps are even and unlabored. Skin is WDI.  MSP's intact

## 2019-08-05 DIAGNOSIS — N39.0 URINARY TRACT INFECTION WITH HEMATURIA, SITE UNSPECIFIED: ICD-10-CM

## 2019-08-05 DIAGNOSIS — R31.9 URINARY TRACT INFECTION WITH HEMATURIA, SITE UNSPECIFIED: ICD-10-CM

## 2019-08-05 LAB
ANION GAP SERPL CALCULATED.3IONS-SCNC: 11 MEQ/L (ref 9–15)
BASOPHILS ABSOLUTE: 0.3 K/UL (ref 0–0.2)
BASOPHILS RELATIVE PERCENT: 1.4 %
BUN BLDV-MCNC: 36 MG/DL (ref 8–23)
CALCIUM SERPL-MCNC: 8.9 MG/DL (ref 8.5–9.9)
CHLORIDE BLD-SCNC: 97 MEQ/L (ref 95–107)
CO2: 23 MEQ/L (ref 20–31)
CREAT SERPL-MCNC: 2.54 MG/DL (ref 0.7–1.2)
EOSINOPHILS ABSOLUTE: 0.1 K/UL (ref 0–0.7)
EOSINOPHILS RELATIVE PERCENT: 0.3 %
GFR AFRICAN AMERICAN: 31.3
GFR AFRICAN AMERICAN: 39
GFR NON-AFRICAN AMERICAN: 25.9
GFR NON-AFRICAN AMERICAN: 32
GLUCOSE BLD-MCNC: 250 MG/DL (ref 60–115)
GLUCOSE BLD-MCNC: 258 MG/DL (ref 60–115)
GLUCOSE BLD-MCNC: 300 MG/DL (ref 60–115)
GLUCOSE BLD-MCNC: 323 MG/DL (ref 70–99)
GLUCOSE BLD-MCNC: 366 MG/DL (ref 60–115)
GLUCOSE BLD-MCNC: 419 MG/DL (ref 60–115)
HBA1C MFR BLD: 9 % (ref 4.8–5.9)
HCT VFR BLD CALC: 30.2 % (ref 42–52)
HEMOGLOBIN: 10.1 G/DL (ref 14–18)
LYMPHOCYTES ABSOLUTE: 1.2 K/UL (ref 1–4.8)
LYMPHOCYTES RELATIVE PERCENT: 4.7 %
MCH RBC QN AUTO: 28.8 PG (ref 27–31.3)
MCHC RBC AUTO-ENTMCNC: 33.4 % (ref 33–37)
MCV RBC AUTO: 86.3 FL (ref 80–100)
MONOCYTES ABSOLUTE: 1.2 K/UL (ref 0.2–0.8)
MONOCYTES RELATIVE PERCENT: 5 %
NEUTROPHILS ABSOLUTE: 21.6 K/UL (ref 1.4–6.5)
NEUTROPHILS RELATIVE PERCENT: 88.6 %
PDW BLD-RTO: 16 % (ref 11.5–14.5)
PERFORMED ON: ABNORMAL
PLATELET # BLD: 283 K/UL (ref 130–400)
POC CREATININE: 2.1 MG/DL (ref 0.8–1.3)
POC SAMPLE TYPE: ABNORMAL
POTASSIUM SERPL-SCNC: 4.4 MEQ/L (ref 3.4–4.9)
RBC # BLD: 3.5 M/UL (ref 4.7–6.1)
SODIUM BLD-SCNC: 131 MEQ/L (ref 135–144)
WBC # BLD: 24.4 K/UL (ref 4.8–10.8)

## 2019-08-05 PROCEDURE — 6370000000 HC RX 637 (ALT 250 FOR IP): Performed by: INTERNAL MEDICINE

## 2019-08-05 PROCEDURE — 94760 N-INVAS EAR/PLS OXIMETRY 1: CPT

## 2019-08-05 PROCEDURE — 6360000002 HC RX W HCPCS: Performed by: INTERNAL MEDICINE

## 2019-08-05 PROCEDURE — 1210000000 HC MED SURG R&B

## 2019-08-05 PROCEDURE — 99222 1ST HOSP IP/OBS MODERATE 55: CPT | Performed by: UROLOGY

## 2019-08-05 PROCEDURE — 80048 BASIC METABOLIC PNL TOTAL CA: CPT

## 2019-08-05 PROCEDURE — 36415 COLL VENOUS BLD VENIPUNCTURE: CPT

## 2019-08-05 PROCEDURE — 2580000003 HC RX 258: Performed by: INTERNAL MEDICINE

## 2019-08-05 PROCEDURE — 96372 THER/PROPH/DIAG INJ SC/IM: CPT

## 2019-08-05 PROCEDURE — 85025 COMPLETE CBC W/AUTO DIFF WBC: CPT

## 2019-08-05 PROCEDURE — 83036 HEMOGLOBIN GLYCOSYLATED A1C: CPT

## 2019-08-05 RX ORDER — NICOTINE POLACRILEX 4 MG
15 LOZENGE BUCCAL PRN
Status: DISCONTINUED | OUTPATIENT
Start: 2019-08-05 | End: 2019-08-08 | Stop reason: HOSPADM

## 2019-08-05 RX ORDER — RANITIDINE 300 MG/1
300 TABLET ORAL DAILY
Status: DISCONTINUED | OUTPATIENT
Start: 2019-08-05 | End: 2019-08-08 | Stop reason: HOSPADM

## 2019-08-05 RX ORDER — MORPHINE SULFATE 2 MG/ML
0.5 INJECTION, SOLUTION INTRAMUSCULAR; INTRAVENOUS EVERY 4 HOURS PRN
Status: DISCONTINUED | OUTPATIENT
Start: 2019-08-05 | End: 2019-08-08 | Stop reason: HOSPADM

## 2019-08-05 RX ORDER — ATORVASTATIN CALCIUM 40 MG/1
40 TABLET, FILM COATED ORAL DAILY
Status: DISCONTINUED | OUTPATIENT
Start: 2019-08-05 | End: 2019-08-08 | Stop reason: HOSPADM

## 2019-08-05 RX ORDER — PANTOPRAZOLE SODIUM 40 MG/1
40 TABLET, DELAYED RELEASE ORAL
Status: DISCONTINUED | OUTPATIENT
Start: 2019-08-05 | End: 2019-08-08 | Stop reason: HOSPADM

## 2019-08-05 RX ORDER — DEXTROSE MONOHYDRATE 25 G/50ML
12.5 INJECTION, SOLUTION INTRAVENOUS PRN
Status: DISCONTINUED | OUTPATIENT
Start: 2019-08-05 | End: 2019-08-08 | Stop reason: HOSPADM

## 2019-08-05 RX ORDER — TAMSULOSIN HYDROCHLORIDE 0.4 MG/1
0.4 CAPSULE ORAL DAILY
Status: DISCONTINUED | OUTPATIENT
Start: 2019-08-05 | End: 2019-08-08 | Stop reason: HOSPADM

## 2019-08-05 RX ORDER — ACETAMINOPHEN 325 MG/1
650 TABLET ORAL EVERY 6 HOURS PRN
Status: DISCONTINUED | OUTPATIENT
Start: 2019-08-05 | End: 2019-08-08 | Stop reason: HOSPADM

## 2019-08-05 RX ORDER — FENOFIBRATE 160 MG/1
160 TABLET ORAL DAILY
Status: COMPLETED | OUTPATIENT
Start: 2019-08-05 | End: 2019-08-05

## 2019-08-05 RX ORDER — ENALAPRIL MALEATE 20 MG/1
20 TABLET ORAL 2 TIMES DAILY
Status: DISCONTINUED | OUTPATIENT
Start: 2019-08-05 | End: 2019-08-08 | Stop reason: HOSPADM

## 2019-08-05 RX ORDER — INSULIN GLARGINE 100 [IU]/ML
50 INJECTION, SOLUTION SUBCUTANEOUS NIGHTLY
Status: DISCONTINUED | OUTPATIENT
Start: 2019-08-05 | End: 2019-08-08 | Stop reason: HOSPADM

## 2019-08-05 RX ORDER — METOPROLOL TARTRATE 50 MG/1
50 TABLET, FILM COATED ORAL 2 TIMES DAILY
Status: DISCONTINUED | OUTPATIENT
Start: 2019-08-05 | End: 2019-08-08 | Stop reason: HOSPADM

## 2019-08-05 RX ORDER — DEXTROSE MONOHYDRATE 50 MG/ML
100 INJECTION, SOLUTION INTRAVENOUS PRN
Status: DISCONTINUED | OUTPATIENT
Start: 2019-08-05 | End: 2019-08-08 | Stop reason: HOSPADM

## 2019-08-05 RX ORDER — FAMOTIDINE 20 MG/1
40 TABLET, FILM COATED ORAL DAILY
Status: DISCONTINUED | OUTPATIENT
Start: 2019-08-05 | End: 2019-08-05

## 2019-08-05 RX ORDER — DIGOXIN 125 MCG
125 TABLET ORAL DAILY
Status: DISCONTINUED | OUTPATIENT
Start: 2019-08-05 | End: 2019-08-08 | Stop reason: HOSPADM

## 2019-08-05 RX ORDER — GABAPENTIN 300 MG/1
300 CAPSULE ORAL 3 TIMES DAILY
Status: DISCONTINUED | OUTPATIENT
Start: 2019-08-05 | End: 2019-08-08 | Stop reason: HOSPADM

## 2019-08-05 RX ORDER — AMLODIPINE BESYLATE 10 MG/1
10 TABLET ORAL NIGHTLY
Status: DISCONTINUED | OUTPATIENT
Start: 2019-08-05 | End: 2019-08-08 | Stop reason: HOSPADM

## 2019-08-05 RX ORDER — ONDANSETRON 2 MG/ML
4 INJECTION INTRAMUSCULAR; INTRAVENOUS EVERY 6 HOURS PRN
Status: DISCONTINUED | OUTPATIENT
Start: 2019-08-05 | End: 2019-08-08 | Stop reason: HOSPADM

## 2019-08-05 RX ORDER — SERTRALINE HYDROCHLORIDE 100 MG/1
100 TABLET, FILM COATED ORAL DAILY
Status: DISCONTINUED | OUTPATIENT
Start: 2019-08-05 | End: 2019-08-08 | Stop reason: HOSPADM

## 2019-08-05 RX ADMIN — GABAPENTIN 300 MG: 300 CAPSULE ORAL at 21:22

## 2019-08-05 RX ADMIN — METOPROLOL TARTRATE 50 MG: 50 TABLET, FILM COATED ORAL at 10:11

## 2019-08-05 RX ADMIN — METOPROLOL TARTRATE 50 MG: 50 TABLET, FILM COATED ORAL at 21:22

## 2019-08-05 RX ADMIN — ENALAPRIL MALEATE 20 MG: 20 TABLET ORAL at 21:47

## 2019-08-05 RX ADMIN — DIGOXIN 125 MCG: 125 TABLET ORAL at 10:20

## 2019-08-05 RX ADMIN — ATORVASTATIN CALCIUM 40 MG: 40 TABLET, FILM COATED ORAL at 09:56

## 2019-08-05 RX ADMIN — ACETAMINOPHEN 650 MG: 325 TABLET ORAL at 12:04

## 2019-08-05 RX ADMIN — ENOXAPARIN SODIUM 40 MG: 40 INJECTION SUBCUTANEOUS at 10:06

## 2019-08-05 RX ADMIN — INSULIN LISPRO 6 UNITS: 100 INJECTION, SOLUTION INTRAVENOUS; SUBCUTANEOUS at 09:57

## 2019-08-05 RX ADMIN — ENALAPRIL MALEATE 20 MG: 20 TABLET ORAL at 09:56

## 2019-08-05 RX ADMIN — INSULIN GLARGINE 50 UNITS: 100 INJECTION, SOLUTION SUBCUTANEOUS at 21:46

## 2019-08-05 RX ADMIN — ACETAMINOPHEN 650 MG: 325 TABLET ORAL at 03:37

## 2019-08-05 RX ADMIN — SERTRALINE HYDROCHLORIDE 100 MG: 100 TABLET, FILM COATED ORAL at 09:57

## 2019-08-05 RX ADMIN — INSULIN LISPRO 12 UNITS: 100 INJECTION, SOLUTION INTRAVENOUS; SUBCUTANEOUS at 17:32

## 2019-08-05 RX ADMIN — FENOFIBRATE 160 MG: 160 TABLET ORAL at 10:11

## 2019-08-05 RX ADMIN — GABAPENTIN 300 MG: 300 CAPSULE ORAL at 09:56

## 2019-08-05 RX ADMIN — RANITIDINE 300 MG: 300 TABLET ORAL at 09:56

## 2019-08-05 RX ADMIN — PANTOPRAZOLE SODIUM 40 MG: 40 TABLET, DELAYED RELEASE ORAL at 06:10

## 2019-08-05 RX ADMIN — TAMSULOSIN HYDROCHLORIDE 0.4 MG: 0.4 CAPSULE ORAL at 09:57

## 2019-08-05 RX ADMIN — CEFTRIAXONE SODIUM 1 G: 1 INJECTION, POWDER, FOR SOLUTION INTRAMUSCULAR; INTRAVENOUS at 21:15

## 2019-08-05 RX ADMIN — AMLODIPINE BESYLATE 10 MG: 10 TABLET ORAL at 21:21

## 2019-08-05 RX ADMIN — MORPHINE SULFATE 0.5 MG: 2 INJECTION, SOLUTION INTRAMUSCULAR; INTRAVENOUS at 21:45

## 2019-08-05 RX ADMIN — GABAPENTIN 300 MG: 300 CAPSULE ORAL at 15:36

## 2019-08-05 ASSESSMENT — PAIN DESCRIPTION - PAIN TYPE
TYPE: ACUTE PAIN
TYPE: ACUTE PAIN

## 2019-08-05 ASSESSMENT — PAIN DESCRIPTION - ORIENTATION
ORIENTATION: RIGHT;LEFT;LOWER
ORIENTATION: LOWER
ORIENTATION: LOWER;RIGHT;LEFT
ORIENTATION: LOWER

## 2019-08-05 ASSESSMENT — PAIN DESCRIPTION - DIRECTION
RADIATING_TOWARDS: ABD
RADIATING_TOWARDS: ABDOMEN

## 2019-08-05 ASSESSMENT — PAIN SCALES - GENERAL
PAINLEVEL_OUTOF10: 7
PAINLEVEL_OUTOF10: 6
PAINLEVEL_OUTOF10: 6
PAINLEVEL_OUTOF10: 7
PAINLEVEL_OUTOF10: 6

## 2019-08-05 ASSESSMENT — PAIN DESCRIPTION - LOCATION
LOCATION: BACK

## 2019-08-05 NOTE — ED PROVIDER NOTES
ROOM 287 performed by Jose Mendez MD at [address] [place] [address] LITHOTRIPSY Left 6/12/2019    LEFT ESWL (CONF # [id number]) performed by Jose Mendez MD at [address] [facility] LITHOTRIPSY N/A 7/10/2019    HOLMIUM LASER LITHOTRIPSY, STONE EXTRACTION, LT URETERAL STENT INSERTION.  performed by Jose Mendez MD at [address] Left 7/10/2019    LEFT FLEXIBLE URETEROSCOPY, CYSTOSCOPY RETROGRADE performed by Jose Mendez MD at [address] [place] [address] History     Socioeconomic History    Marital status:      Spouse name: None    Number of children: None    Years of education: None    Highest education level: None   Occupational History    None   Social Needs    Financial resource strain: None    Food insecurity:     Worry: None     Inability: None    Transportation needs:     Medical: None     Non-medical: None   Tobacco Use    Smoking status: Former Smoker     Packs/day: 1.00     Years: 25.00     Pack years: 25.00     Last attempt to quit: 12/1/2008     Years since quitting: 10.6    Smokeless tobacco: Never Used   Substance and Sexual Activity    Alcohol use: No    Drug use: Never    Sexual activity: Yes     Partners: Female   Lifestyle    Physical activity:     Days per week: None     Minutes per session: None    Stress: None   Relationships    Social connections:     Talks on phone: None     Gets together: None     Attends Scientology service: None     Active member of club or organization: None     Attends meetings of clubs or organizations: None     Relationship status: None    Intimate partner violence:     Fear of current or ex partner: None     Emotionally abused: None     Physically abused: None     Forced sexual activity: None   Other Topics Concern    None   Social History Narrative    None       SCREENINGS             PHYSICAL EXAM    (up to 7 for level 4, 8 or more for level 5)     ED Triage Vitals [08/04/19 1709]   BP Temp Temp Source Pulse Resp SpO2 Height Weight   (!) 145/104 Temp:       TempSrc:       SpO2: 98%   96%   Weight:       Height:                MDM patient presents is afebrile nontoxic appearance complaining of fevers symptoms with chills and sweats no tachycardia obvious discomfort in the bilateral flanks low back and lower abdomen. This is reproducible on palpation. Due to patient having recent procedure labs ordered for further evaluation as well as a CT of the abdomen, IV contrast was removed as the patient's creatinine level was elevated. Patient's lab work is significant for finding of a significant leukocytosis with bands cells as well as a CT finding of a marked hydronephrosis and ureteral dilatation on the left. Patient was given fluids and IV Toradol and states that his pain was significantly reduced by this medication and he is feeling much better. I advised the patient of the lab findings and concern for bilateral pyelonephrosis as well as left hydronephrosis. Call was placed to the urologist Dr Vera López, and after further consideration since the patient could be admitted requested to be admitted to hospitalist and he will remain on consult. I believe this to be the patient's best benefit as the leukocytosis is of great concern as well as the hydronephrosis and concern for patient becoming more uncomfortable. Patient states that he agrees he would like to stay in the hospital as he is starting to not feel well again feeling somewhat feverish but there is no notable fever this time. He states that the pain is mild and does not yet need anything for pain but he is concerned that this will continue to increase overnight. Plan is to admit to hospitalist for further evaluation and treatment for appearance of bilateral acute pyelonephritis as well as left hydronephrosis and concern for the possible sepsis reaction. Patient provided with IV fluids as well as IV antibiotic Rocephin and Cipro.   Hospitalist has accepted this patient for acute pyelonephritis

## 2019-08-05 NOTE — H&P
Hospital Medicine  History and Physical    Patient:  Jacob Pereira  MRN: [id number]    CHIEF COMPLAINT:    Chief Complaint   Patient presents with    Back Pain     low right sided  back pain that radiates to the right abdomen since yesterday. fever and chill. denies n/v/d. blood in  urine with burning       History Obtained From:  patient, electronic medical record  Primary Care Physician: Kirit Wilson MD    HISTORY OF PRESENT ILLNESS: Translation services used for HPI  The patient is a 62 y.o. male with history of unknown cardiomyopathy status post AICD placement, type 2 diabetes complicated by neuropathy, hypertension, hyperlipidemia, LON, morbid obesity, previous obstructive uropathy presenting to the emergency department with complaints of fevers back pain suprapubic pain starting 3 days ago. Patient was treated approximately 1 month ago with ureteral stenting for retained ureteral calculus, the patient also received lithotripsy and eventual stone extraction by urology. The left ureteral stent was removed 3 days ago by urology without apparent complications. Patient notes that after he went home he developed worsening flank pain which radiated to the suprapubic area he also noted fevers chills and fatigue. Symptoms progressively worsened over the next 3 days so he came back to the emergency department for evaluation. On arrival to emergency department in the emergency department he is afebrile. Vitals are stable basic metabolic panel shows a BUN of 28 creatinine 1.93 glucose 184 white count is noted 27.6 with significant bandemia, hemoglobin 11.6 platelets 670. CT scan was performed in the emergency department showing marked hydronephrosis on the left with ureteral dilatation with flat fat stranding around the kidneys bilaterally suggesting pyelonephritis.   Urology was notified and did clear the patient for discharge home with outpatient follow-up however the patient's worsening 10 MG tablet TAKE 1 TABLET BY MOUTH EVERY NIGHT 5/7/19  Yes Norman Ly, DO   enalapril (VASOTEC) 20 MG tablet Take 1 tablet by mouth 2 times daily 5/7/19  Yes Norman Ly, DO   furosemide (LASIX) 40 MG tablet Take 1 tablet by mouth daily 5/7/19  Yes Norman TRENT Ly, DO   sertraline (ZOLOFT) 100 MG tablet Take 1 tablet by mouth daily 5/7/19  Yes Norman TRENT Ly, DO   atorvastatin (LIPITOR) 40 MG tablet Take 1 tablet by mouth daily 4/30/19  Yes Hector Soliman MD   ranitidine (ZANTAC) 300 MG tablet TAKE 1 TABLET BY MOUTH DAILY 3/19/19  Yes JESUS Amato - CNP   fenofibrate 160 MG tablet TAKE 1 TABLET BY MOUTH EVERY NIGHT 10/11/18  Yes Shelton Bailey DO   metFORMIN (GLUCOPHAGE) 1000 MG tablet TAKE 1 TABLET BY MOUTH TWICE DAILY, WITH MEALS 10/11/18  Yes Shelton Bailey DO   glipiZIDE (GLUCOTROL) 10 MG tablet TAKE 2 TABLETS BY MOUTH TWICE DAILY BEFORE MEALS 10/11/18  Yes Shelton Bailey DO   insulin glargine (LANTUS SOLOSTAR) 100 UNIT/ML injection pen Inject 55 Units into the skin nightly  Patient taking differently: Inject 50 Units into the skin nightly  10/11/18  Yes Shelton Bailey DO   ammonium lactate (LAC-HYDRIN) 12 % cream Apply topically as needed. 5/16/19   Nic Saldana DPM   mupirocin (BACTROBAN) 2 % ointment APPLY TO WOUNDS D 5/2/19   Historical Provider, MD   sodium chloride 0.9 % irrigation [place] CHANGES 5/2/19   Historical Provider, MD   digoxin (DIGOX) 125 MCG tablet TAKE 1 TABLET BY MOUTH ONCE DAILY 5/7/19   Norman Ly, DO   blood glucose test strips (TRUE METRIX BLOOD GLUCOSE TEST) strip 1 each by In Vitro route 3 times daily As needed.  10/11/18   Shelton Bailey DO   albuterol (PROVENTIL) (2.5 MG/3ML) 0.083% nebulizer solution Take 3 mLs by nebulization 4 times daily 10/11/18   Uma Curiel DO   hydrOXYzine (VISTARIL) 50 MG capsule Take 1 capsule by mouth nightly 10/11/18   Shelton Bailey DO   terbinafine (LAMISIL) 1 % cream Apply topically 2 times daily Apply topically 2 times daily. 10/11/18   Shelton Bailey, DO   Insulin Pen Needle (B-D UF III MINI PEN NEEDLES) 31G X 5 MM MISC 1 each by Does not apply route daily 10/11/18   [facility], DO   Blood Glucose Monitoring Suppl (ONE TOUCH ULTRA MINI) w/Device KIT 1 kit by Does not apply route three times daily 10/11/18   Yoshiie Crete, DO   Lancets MISC Use TID 10/11/18   [facility], DO   gabapentin (NEURONTIN) 300 MG capsule Take 1 capsule by mouth 3 times daily for 30 days. . 10/11/18 5/16/19  Dorsie Preston, DO   omeprazole (PRILOSEC) 20 MG delayed release capsule Take 1 capsule by mouth daily 3/14/18   [facility], DO   bacitracin 500 UNIT/GM ointment Apply topically 2 times daily. 3/11/18   Libby Oliveira PA-C   Insulin Syringe-Needle U-100 (INSULIN SYRINGE 1CC/31GX5/16\") 31G X 5/16\" 1 ML MISC Use once daily to administer Lantus 12/14/17   Dorsie Preston, DO       Allergies:  Coreg [carvedilol]    Social History:   TOBACCO:   reports that he quit smoking about 10 years ago. He has a 25.00 pack-year smoking history. He has never used smokeless tobacco.  ETOH:   reports that he does not drink alcohol. OCCUPATION: none    Family History:       Problem Relation Age of Onset    Heart Disease Mother     Kidney Disease Mother     Hypertension Mother     Diabetes Mother     No Known Problems Sister     No Known Problems Brother     No Known Problems Brother        REVIEW OF SYSTEMS:  Ten systems reviewed and negative except for as above. Physical Exam:    Vitals: BP (!) 152/58   Pulse 89   Temp 98.1 °F (36.7 °C) (Oral)   Resp 18   Ht 5' 11\" (1.803 m)   Wt (!) 321 lb 12.8 oz (146 kg)   SpO2 96%   BMI 44.88 kg/m²   Constitutional: alert, appears stated age and cooperative. Morbidly obese. Skin: Skin color, texture, turgor normal. No rashes or lesions  Eyes:Eye: Normal external eye, conjunctiva, VANESSA.   ENT: Head: Normocephalic, no lesions, without obvious abnormality. Neck: no adenopathy, no carotid bruit, no JVD, supple, symmetrical, trachea midline and thyroid not enlarged, symmetric, no tenderness/mass/nodules  Respiratory: clear to auscultation bilaterally habitus mildly obscures exam but no wheezes rales or rhonchi  Cardiovascular: regular rate and rhythm, S1, S2 normal, no murmur, click, rub or gallop  Gastrointestinal: soft, non-tender; bowel sounds normal; no masses, habitus does mildly obscure exam  Genitourinary: Deferred  Musculoskeletal:extremities normal, atraumatic, no cyanosis or edema  Neurologic: Mental status AAOx3 No facial asymmetry or droop. Normal muscle strength b/l. Psychiatric: Appropriate mood and affect. Good insight and judgement  Hematologic: No obvious bruising or bleeding    Recent Labs     08/04/19  1830   WBC 27.6*   HGB 11.6*        Recent Labs     08/04/19  1830 08/04/19  1852 08/04/19  1857     --   --    K 4.8  --   --    CL 99  --   --    CO2 25  --   --    BUN 28*  --   --    CREATININE 1.93*  --  2.1*   GLUCOSE 190* 184  --    AST 14  --   --    ALT 15  --   --    BILITOT 0.6  --   --    ALKPHOS 65  --   --      URINALYSIS:  Recent Labs     08/04/19  1526 08/04/19  1743 08/04/19  1754   NITRITE negative  --   --    COLORU dark yellow Yellow  --    PHUR 5.5 5.5  --    WBCUA  --   --  >100*   RBCUA  --   --  >100*   BACTERIA  --   --  Negative   CLARITYU cloudy CLOUDY*  --    SPECGRAV 1.015 1.020  --    LEUKOCYTESUR 3+* LARGE*  --    UROBILINOGEN  --  1.0  --    BILIRUBINUR negative Negative  --    BLOODU 3+* LARGE*  --    GLUCOSEU negative Negative  --      -----------------------------------------------------------------   No results found. Assessment and Plan   1. Intractable lower back and flank pain following stent removal: Pain improved with IV Toradol x1. No further doses due to CKD, morphine 0.5 mg IV as needed for pain. Consult urology.   Ceftriaxone coverage until microscopic urinalysis and urine

## 2019-08-06 LAB
ANION GAP SERPL CALCULATED.3IONS-SCNC: 10 MEQ/L (ref 9–15)
BASOPHILS ABSOLUTE: 0.1 K/UL (ref 0–0.2)
BASOPHILS RELATIVE PERCENT: 0.7 %
BUN BLDV-MCNC: 38 MG/DL (ref 8–23)
CALCIUM SERPL-MCNC: 9 MG/DL (ref 8.5–9.9)
CHLORIDE BLD-SCNC: 103 MEQ/L (ref 95–107)
CO2: 23 MEQ/L (ref 20–31)
CREAT SERPL-MCNC: 2.57 MG/DL (ref 0.7–1.2)
EOSINOPHILS ABSOLUTE: 0.6 K/UL (ref 0–0.7)
EOSINOPHILS RELATIVE PERCENT: 2.7 %
GFR AFRICAN AMERICAN: 30.9
GFR NON-AFRICAN AMERICAN: 25.5
GLUCOSE BLD-MCNC: 233 MG/DL (ref 60–115)
GLUCOSE BLD-MCNC: 252 MG/DL (ref 70–99)
GLUCOSE BLD-MCNC: 271 MG/DL (ref 60–115)
GLUCOSE BLD-MCNC: 321 MG/DL (ref 60–115)
GLUCOSE BLD-MCNC: 328 MG/DL (ref 60–115)
HCT VFR BLD CALC: 29.9 % (ref 42–52)
HEMOGLOBIN: 10.1 G/DL (ref 14–18)
LACTIC ACID: 1 MMOL/L (ref 0.5–2.2)
LYMPHOCYTES ABSOLUTE: 1.8 K/UL (ref 1–4.8)
LYMPHOCYTES RELATIVE PERCENT: 8.8 %
MCH RBC QN AUTO: 29.3 PG (ref 27–31.3)
MCHC RBC AUTO-ENTMCNC: 33.8 % (ref 33–37)
MCV RBC AUTO: 86.7 FL (ref 80–100)
MONOCYTES ABSOLUTE: 1 K/UL (ref 0.2–0.8)
MONOCYTES RELATIVE PERCENT: 4.7 %
NEUTROPHILS ABSOLUTE: 16.9 K/UL (ref 1.4–6.5)
NEUTROPHILS RELATIVE PERCENT: 83.1 %
PDW BLD-RTO: 16 % (ref 11.5–14.5)
PERFORMED ON: ABNORMAL
PLATELET # BLD: 265 K/UL (ref 130–400)
POTASSIUM REFLEX MAGNESIUM: 4.2 MEQ/L (ref 3.4–4.9)
RBC # BLD: 3.45 M/UL (ref 4.7–6.1)
SODIUM BLD-SCNC: 136 MEQ/L (ref 135–144)
URINE CULTURE, ROUTINE: NORMAL
WBC # BLD: 20.3 K/UL (ref 4.8–10.8)

## 2019-08-06 PROCEDURE — 83605 ASSAY OF LACTIC ACID: CPT

## 2019-08-06 PROCEDURE — 36415 COLL VENOUS BLD VENIPUNCTURE: CPT

## 2019-08-06 PROCEDURE — 85025 COMPLETE CBC W/AUTO DIFF WBC: CPT

## 2019-08-06 PROCEDURE — 1210000000 HC MED SURG R&B

## 2019-08-06 PROCEDURE — 2580000003 HC RX 258: Performed by: INTERNAL MEDICINE

## 2019-08-06 PROCEDURE — 6360000002 HC RX W HCPCS: Performed by: INTERNAL MEDICINE

## 2019-08-06 PROCEDURE — 6370000000 HC RX 637 (ALT 250 FOR IP): Performed by: INTERNAL MEDICINE

## 2019-08-06 PROCEDURE — 80048 BASIC METABOLIC PNL TOTAL CA: CPT

## 2019-08-06 RX ORDER — SODIUM CHLORIDE 9 MG/ML
INJECTION, SOLUTION INTRAVENOUS CONTINUOUS
Status: DISCONTINUED | OUTPATIENT
Start: 2019-08-06 | End: 2019-08-08 | Stop reason: HOSPADM

## 2019-08-06 RX ADMIN — PANTOPRAZOLE SODIUM 40 MG: 40 TABLET, DELAYED RELEASE ORAL at 11:51

## 2019-08-06 RX ADMIN — GABAPENTIN 300 MG: 300 CAPSULE ORAL at 18:28

## 2019-08-06 RX ADMIN — INSULIN LISPRO 8 UNITS: 100 INJECTION, SOLUTION INTRAVENOUS; SUBCUTANEOUS at 18:26

## 2019-08-06 RX ADMIN — METOPROLOL TARTRATE 50 MG: 50 TABLET, FILM COATED ORAL at 11:53

## 2019-08-06 RX ADMIN — AMLODIPINE BESYLATE 10 MG: 10 TABLET ORAL at 20:49

## 2019-08-06 RX ADMIN — CEFTRIAXONE SODIUM 1 G: 1 INJECTION, POWDER, FOR SOLUTION INTRAMUSCULAR; INTRAVENOUS at 20:42

## 2019-08-06 RX ADMIN — ENOXAPARIN SODIUM 40 MG: 40 INJECTION SUBCUTANEOUS at 11:51

## 2019-08-06 RX ADMIN — DIGOXIN 125 MCG: 125 TABLET ORAL at 12:04

## 2019-08-06 RX ADMIN — METOPROLOL TARTRATE 50 MG: 50 TABLET, FILM COATED ORAL at 20:49

## 2019-08-06 RX ADMIN — ATORVASTATIN CALCIUM 40 MG: 40 TABLET, FILM COATED ORAL at 20:52

## 2019-08-06 RX ADMIN — GABAPENTIN 300 MG: 300 CAPSULE ORAL at 11:52

## 2019-08-06 RX ADMIN — SERTRALINE HYDROCHLORIDE 100 MG: 100 TABLET, FILM COATED ORAL at 11:51

## 2019-08-06 RX ADMIN — ACETAMINOPHEN 650 MG: 325 TABLET ORAL at 18:27

## 2019-08-06 RX ADMIN — ACETAMINOPHEN 650 MG: 325 TABLET ORAL at 12:04

## 2019-08-06 RX ADMIN — RANITIDINE 300 MG: 300 TABLET ORAL at 11:53

## 2019-08-06 RX ADMIN — INSULIN GLARGINE 50 UNITS: 100 INJECTION, SOLUTION SUBCUTANEOUS at 20:42

## 2019-08-06 RX ADMIN — GABAPENTIN 300 MG: 300 CAPSULE ORAL at 20:51

## 2019-08-06 RX ADMIN — SODIUM CHLORIDE: 9 INJECTION, SOLUTION INTRAVENOUS at 12:29

## 2019-08-06 RX ADMIN — INSULIN LISPRO 6 UNITS: 100 INJECTION, SOLUTION INTRAVENOUS; SUBCUTANEOUS at 12:05

## 2019-08-06 RX ADMIN — TAMSULOSIN HYDROCHLORIDE 0.4 MG: 0.4 CAPSULE ORAL at 11:54

## 2019-08-06 ASSESSMENT — PAIN SCALES - GENERAL
PAINLEVEL_OUTOF10: 5
PAINLEVEL_OUTOF10: 8
PAINLEVEL_OUTOF10: 0

## 2019-08-06 NOTE — CARE COORDINATION
PATIENT FROM HOME WITH S.O.- PLAN REMAINS TO RETURN THE SAME.  BUN/ CREAT HAVE ELEVATED AND PATIENT NOW ON IV FLUIDS/ NO D/C TODAY PER MD/ LH

## 2019-08-07 LAB
ANION GAP SERPL CALCULATED.3IONS-SCNC: 11 MEQ/L (ref 9–15)
BASOPHILS ABSOLUTE: 0.1 K/UL (ref 0–0.2)
BASOPHILS RELATIVE PERCENT: 0.7 %
BUN BLDV-MCNC: 35 MG/DL (ref 8–23)
CALCIUM SERPL-MCNC: 9.5 MG/DL (ref 8.5–9.9)
CHLORIDE BLD-SCNC: 104 MEQ/L (ref 95–107)
CO2: 23 MEQ/L (ref 20–31)
CREAT SERPL-MCNC: 2.33 MG/DL (ref 0.7–1.2)
EOSINOPHILS ABSOLUTE: 0.6 K/UL (ref 0–0.7)
EOSINOPHILS RELATIVE PERCENT: 5.4 %
GFR AFRICAN AMERICAN: 34.6
GFR NON-AFRICAN AMERICAN: 28.6
GLUCOSE BLD-MCNC: 193 MG/DL (ref 70–99)
GLUCOSE BLD-MCNC: 213 MG/DL (ref 60–115)
GLUCOSE BLD-MCNC: 221 MG/DL (ref 60–115)
GLUCOSE BLD-MCNC: 283 MG/DL (ref 60–115)
GLUCOSE BLD-MCNC: 293 MG/DL (ref 60–115)
HCT VFR BLD CALC: 33 % (ref 42–52)
HEMOGLOBIN: 11 G/DL (ref 14–18)
LYMPHOCYTES ABSOLUTE: 1.9 K/UL (ref 1–4.8)
LYMPHOCYTES RELATIVE PERCENT: 16.4 %
MCH RBC QN AUTO: 29.1 PG (ref 27–31.3)
MCHC RBC AUTO-ENTMCNC: 33.3 % (ref 33–37)
MCV RBC AUTO: 87.2 FL (ref 80–100)
MONOCYTES ABSOLUTE: 0.8 K/UL (ref 0.2–0.8)
MONOCYTES RELATIVE PERCENT: 7 %
NEUTROPHILS ABSOLUTE: 8.3 K/UL (ref 1.4–6.5)
NEUTROPHILS RELATIVE PERCENT: 70.5 %
PDW BLD-RTO: 16.1 % (ref 11.5–14.5)
PERFORMED ON: ABNORMAL
PLATELET # BLD: 330 K/UL (ref 130–400)
POTASSIUM REFLEX MAGNESIUM: 4.7 MEQ/L (ref 3.4–4.9)
RBC # BLD: 3.79 M/UL (ref 4.7–6.1)
SODIUM BLD-SCNC: 138 MEQ/L (ref 135–144)
URINE CULTURE, ROUTINE: NORMAL
WBC # BLD: 11.8 K/UL (ref 4.8–10.8)

## 2019-08-07 PROCEDURE — 85025 COMPLETE CBC W/AUTO DIFF WBC: CPT

## 2019-08-07 PROCEDURE — 6370000000 HC RX 637 (ALT 250 FOR IP): Performed by: INTERNAL MEDICINE

## 2019-08-07 PROCEDURE — 80048 BASIC METABOLIC PNL TOTAL CA: CPT

## 2019-08-07 PROCEDURE — 6360000002 HC RX W HCPCS: Performed by: INTERNAL MEDICINE

## 2019-08-07 PROCEDURE — 2580000003 HC RX 258: Performed by: INTERNAL MEDICINE

## 2019-08-07 PROCEDURE — 36415 COLL VENOUS BLD VENIPUNCTURE: CPT

## 2019-08-07 PROCEDURE — 1210000000 HC MED SURG R&B

## 2019-08-07 RX ADMIN — MORPHINE SULFATE 0.5 MG: 2 INJECTION, SOLUTION INTRAMUSCULAR; INTRAVENOUS at 10:28

## 2019-08-07 RX ADMIN — INSULIN LISPRO 6 UNITS: 100 INJECTION, SOLUTION INTRAVENOUS; SUBCUTANEOUS at 17:54

## 2019-08-07 RX ADMIN — RANITIDINE 300 MG: 300 TABLET ORAL at 10:05

## 2019-08-07 RX ADMIN — SERTRALINE HYDROCHLORIDE 100 MG: 100 TABLET, FILM COATED ORAL at 09:52

## 2019-08-07 RX ADMIN — METOPROLOL TARTRATE 50 MG: 50 TABLET, FILM COATED ORAL at 22:48

## 2019-08-07 RX ADMIN — ENOXAPARIN SODIUM 40 MG: 40 INJECTION SUBCUTANEOUS at 10:03

## 2019-08-07 RX ADMIN — SODIUM CHLORIDE: 9 INJECTION, SOLUTION INTRAVENOUS at 22:45

## 2019-08-07 RX ADMIN — INSULIN LISPRO 4 UNITS: 100 INJECTION, SOLUTION INTRAVENOUS; SUBCUTANEOUS at 12:49

## 2019-08-07 RX ADMIN — DIGOXIN 125 MCG: 125 TABLET ORAL at 13:40

## 2019-08-07 RX ADMIN — AMLODIPINE BESYLATE 10 MG: 10 TABLET ORAL at 22:35

## 2019-08-07 RX ADMIN — ATORVASTATIN CALCIUM 40 MG: 40 TABLET, FILM COATED ORAL at 10:02

## 2019-08-07 RX ADMIN — MORPHINE SULFATE 0.5 MG: 2 INJECTION, SOLUTION INTRAMUSCULAR; INTRAVENOUS at 22:18

## 2019-08-07 RX ADMIN — METOPROLOL TARTRATE 50 MG: 50 TABLET, FILM COATED ORAL at 10:04

## 2019-08-07 RX ADMIN — SODIUM CHLORIDE: 9 INJECTION, SOLUTION INTRAVENOUS at 13:22

## 2019-08-07 RX ADMIN — INSULIN LISPRO 4 UNITS: 100 INJECTION, SOLUTION INTRAVENOUS; SUBCUTANEOUS at 10:07

## 2019-08-07 RX ADMIN — INSULIN GLARGINE 50 UNITS: 100 INJECTION, SOLUTION SUBCUTANEOUS at 22:40

## 2019-08-07 RX ADMIN — SODIUM CHLORIDE: 9 INJECTION, SOLUTION INTRAVENOUS at 01:30

## 2019-08-07 RX ADMIN — PANTOPRAZOLE SODIUM 40 MG: 40 TABLET, DELAYED RELEASE ORAL at 06:31

## 2019-08-07 RX ADMIN — GABAPENTIN 300 MG: 300 CAPSULE ORAL at 10:00

## 2019-08-07 RX ADMIN — CEFTRIAXONE SODIUM 1 G: 1 INJECTION, POWDER, FOR SOLUTION INTRAMUSCULAR; INTRAVENOUS at 22:35

## 2019-08-07 RX ADMIN — TAMSULOSIN HYDROCHLORIDE 0.4 MG: 0.4 CAPSULE ORAL at 10:07

## 2019-08-07 RX ADMIN — GABAPENTIN 300 MG: 300 CAPSULE ORAL at 22:37

## 2019-08-07 RX ADMIN — GABAPENTIN 300 MG: 300 CAPSULE ORAL at 13:41

## 2019-08-07 ASSESSMENT — PAIN DESCRIPTION - FREQUENCY: FREQUENCY: CONTINUOUS

## 2019-08-07 ASSESSMENT — PAIN SCALES - GENERAL
PAINLEVEL_OUTOF10: 5
PAINLEVEL_OUTOF10: 2
PAINLEVEL_OUTOF10: 8
PAINLEVEL_OUTOF10: 6

## 2019-08-07 ASSESSMENT — ENCOUNTER SYMPTOMS
EYE DISCHARGE: 0
ABDOMINAL DISTENTION: 0
APNEA: 0

## 2019-08-07 ASSESSMENT — PAIN DESCRIPTION - PAIN TYPE: TYPE: ACUTE PAIN

## 2019-08-07 ASSESSMENT — PAIN DESCRIPTION - ORIENTATION: ORIENTATION: RIGHT;LEFT

## 2019-08-07 ASSESSMENT — PAIN DESCRIPTION - LOCATION: LOCATION: ABDOMEN

## 2019-08-07 ASSESSMENT — PAIN DESCRIPTION - DESCRIPTORS: DESCRIPTORS: CONSTANT;STABBING

## 2019-08-07 NOTE — CONSULTS
terbinafine (LAMISIL) 1 % cream Apply topically 2 times daily Apply topically 2 times daily. 1 Tube 3    Insulin Pen Needle (B-D UF III MINI PEN NEEDLES) 31G X 5 MM MISC 1 each by Does not apply route daily 100 each 3    Blood Glucose Monitoring Suppl (ONE TOUCH ULTRA MINI) w/Device KIT 1 kit by Does not apply route three times daily 1 kit 5    Lancets MISC Use  each 3    gabapentin (NEURONTIN) 300 MG capsule Take 1 capsule by mouth 3 times daily for 30 days. . 90 capsule 2    omeprazole (PRILOSEC) 20 MG delayed release capsule Take 1 capsule by mouth daily 90 capsule 1    bacitracin 500 UNIT/GM ointment Apply topically 2 times daily.  1 Tube 0    Insulin Syringe-Needle U-100 (INSULIN SYRINGE 1CC/31GX5/16\") 31G X 5/16\" 1 ML MISC Use once daily to administer Lantus 100 each 1       Allergies:  Coreg [carvedilol]    Social History:    Social History     Socioeconomic History    Marital status:      Spouse name: Not on file    Number of children: Not on file    Years of education: Not on file    Highest education level: Not on file   Occupational History    Not on file   Social Needs    Financial resource strain: Not on file    Food insecurity:     Worry: Not on file     Inability: Not on file    Transportation needs:     Medical: Not on file     Non-medical: Not on file   Tobacco Use    Smoking status: Former Smoker     Packs/day: 1.00     Years: 25.00     Pack years: 25.00     Last attempt to quit: 12/1/2008     Years since quitting: 10.6    Smokeless tobacco: Never Used   Substance and Sexual Activity    Alcohol use: No    Drug use: Never    Sexual activity: Yes     Partners: Female   Lifestyle    Physical activity:     Days per week: Not on file     Minutes per session: Not on file    Stress: Not on file   Relationships    Social connections:     Talks on phone: Not on file     Gets together: Not on file     Attends Muslim service: Not on file     Active member of club or organization: Not on file     Attends meetings of clubs or organizations: Not on file     Relationship status: Not on file    Intimate partner violence:     Fear of current or ex partner: Not on file     Emotionally abused: Not on file     Physically abused: Not on file     Forced sexual activity: Not on file   Other Topics Concern    Not on file   Social History Narrative    Not on file       Family History:   Family History   Problem Relation Age of Onset    Heart Disease Mother     Kidney Disease Mother     Hypertension Mother     Diabetes Mother     No Known Problems Sister     No Known Problems Brother     No Known Problems Brother        Review of Systems:   Review of Systems   Constitutional: Negative for activity change. HENT: Negative for congestion. Eyes: Negative for discharge. Respiratory: Negative for apnea. Cardiovascular: Negative for chest pain. Gastrointestinal: Negative for abdominal distention. Genitourinary: Positive for difficulty urinating. Musculoskeletal: Negative for arthralgias. Allergic/Immunologic: Negative for environmental allergies. Neurological: Negative for dizziness. Hematological: Negative for adenopathy. Psychiatric/Behavioral: Negative for agitation. Physical exam:   Constitutional:  VITALS:  BP (!) 168/77   Pulse 67   Temp 98.4 °F (36.9 °C) (Oral)   Resp 18   Ht 5' 11\" (1.803 m)   Wt (!) 321 lb 12.8 oz (146 kg)   SpO2 96%   BMI 44.88 kg/m²   Gen: alert, awake, nad  Skin: no rash, turgor wnl  Heent:  eomi, mmm  Neck: no bruits or jvd noted, thyroid normal  Lungs:  Normal expansion. Clear to auscultation. No rales, rhonchi, or wheezing. Heart:  Heart sounds are normal.  Regular rate and rhythm without murmur, gallop or rub. Abdomen:  +bs, soft, nt, nd, no hepatosplenomegaly   Extremities: Extremities warm to touch, pink, with no edema.   Psychiatric: mood and affect appropriate; judgement and insight intact  Musculoskeletal:

## 2019-08-08 VITALS
BODY MASS INDEX: 44.1 KG/M2 | DIASTOLIC BLOOD PRESSURE: 67 MMHG | RESPIRATION RATE: 18 BRPM | HEART RATE: 65 BPM | OXYGEN SATURATION: 97 % | WEIGHT: 315 LBS | TEMPERATURE: 98.2 F | SYSTOLIC BLOOD PRESSURE: 167 MMHG | HEIGHT: 71 IN

## 2019-08-08 LAB
ANION GAP SERPL CALCULATED.3IONS-SCNC: 10 MEQ/L (ref 9–15)
BASOPHILS ABSOLUTE: 0.1 K/UL (ref 0–0.2)
BASOPHILS RELATIVE PERCENT: 1 %
BUN BLDV-MCNC: 27 MG/DL (ref 8–23)
CALCIUM SERPL-MCNC: 8.9 MG/DL (ref 8.5–9.9)
CHLORIDE BLD-SCNC: 108 MEQ/L (ref 95–107)
CO2: 23 MEQ/L (ref 20–31)
CREAT SERPL-MCNC: 1.77 MG/DL (ref 0.7–1.2)
EOSINOPHILS ABSOLUTE: 0.5 K/UL (ref 0–0.7)
EOSINOPHILS RELATIVE PERCENT: 5.9 %
GFR AFRICAN AMERICAN: 47.5
GFR NON-AFRICAN AMERICAN: 39.2
GLUCOSE BLD-MCNC: 184 MG/DL (ref 70–99)
GLUCOSE BLD-MCNC: 193 MG/DL (ref 60–115)
GLUCOSE BLD-MCNC: 208 MG/DL (ref 60–115)
GLUCOSE BLD-MCNC: 228 MG/DL (ref 60–115)
HCT VFR BLD CALC: 29.9 % (ref 42–52)
HEMOGLOBIN: 10.2 G/DL (ref 14–18)
LYMPHOCYTES ABSOLUTE: 1.8 K/UL (ref 1–4.8)
LYMPHOCYTES RELATIVE PERCENT: 19.5 %
MCH RBC QN AUTO: 29.6 PG (ref 27–31.3)
MCHC RBC AUTO-ENTMCNC: 34 % (ref 33–37)
MCV RBC AUTO: 87.1 FL (ref 80–100)
MONOCYTES ABSOLUTE: 0.9 K/UL (ref 0.2–0.8)
MONOCYTES RELATIVE PERCENT: 9.8 %
NEUTROPHILS ABSOLUTE: 5.8 K/UL (ref 1.4–6.5)
NEUTROPHILS RELATIVE PERCENT: 63.8 %
PDW BLD-RTO: 16 % (ref 11.5–14.5)
PERFORMED ON: ABNORMAL
PLATELET # BLD: 332 K/UL (ref 130–400)
POTASSIUM REFLEX MAGNESIUM: 4.6 MEQ/L (ref 3.4–4.9)
RBC # BLD: 3.43 M/UL (ref 4.7–6.1)
SODIUM BLD-SCNC: 141 MEQ/L (ref 135–144)
WBC # BLD: 9.1 K/UL (ref 4.8–10.8)

## 2019-08-08 PROCEDURE — 80048 BASIC METABOLIC PNL TOTAL CA: CPT

## 2019-08-08 PROCEDURE — 85025 COMPLETE CBC W/AUTO DIFF WBC: CPT

## 2019-08-08 PROCEDURE — 36415 COLL VENOUS BLD VENIPUNCTURE: CPT

## 2019-08-08 PROCEDURE — 6360000002 HC RX W HCPCS: Performed by: INTERNAL MEDICINE

## 2019-08-08 PROCEDURE — 6370000000 HC RX 637 (ALT 250 FOR IP): Performed by: INTERNAL MEDICINE

## 2019-08-08 RX ORDER — FUROSEMIDE 40 MG/1
40 TABLET ORAL DAILY
Qty: 90 TABLET | Refills: 3 | Status: SHIPPED | OUTPATIENT
Start: 2019-08-08 | End: 2020-02-11 | Stop reason: SDUPTHER

## 2019-08-08 RX ORDER — HYDROCODONE BITARTRATE AND ACETAMINOPHEN 5; 325 MG/1; MG/1
1 TABLET ORAL EVERY 6 HOURS PRN
Qty: 20 TABLET | Refills: 0 | Status: SHIPPED | OUTPATIENT
Start: 2019-08-08 | End: 2019-08-13

## 2019-08-08 RX ORDER — CIPROFLOXACIN 250 MG/1
250 TABLET, FILM COATED ORAL 2 TIMES DAILY
Qty: 6 TABLET | Refills: 0 | Status: SHIPPED | OUTPATIENT
Start: 2019-08-08 | End: 2019-08-11

## 2019-08-08 RX ORDER — AMLODIPINE BESYLATE 10 MG/1
10 TABLET ORAL DAILY
Qty: 90 TABLET | Refills: 3
Start: 2019-08-08 | End: 2020-02-11 | Stop reason: SDUPTHER

## 2019-08-08 RX ORDER — METOPROLOL TARTRATE 50 MG/1
50 TABLET, FILM COATED ORAL 2 TIMES DAILY
Qty: 60 TABLET | Refills: 3 | Status: SHIPPED | OUTPATIENT
Start: 2019-08-08 | End: 2020-06-24 | Stop reason: SDUPTHER

## 2019-08-08 RX ADMIN — DIGOXIN 125 MCG: 125 TABLET ORAL at 10:43

## 2019-08-08 RX ADMIN — SERTRALINE HYDROCHLORIDE 100 MG: 100 TABLET, FILM COATED ORAL at 10:44

## 2019-08-08 RX ADMIN — ATORVASTATIN CALCIUM 40 MG: 40 TABLET, FILM COATED ORAL at 10:49

## 2019-08-08 RX ADMIN — MORPHINE SULFATE 0.5 MG: 2 INJECTION, SOLUTION INTRAMUSCULAR; INTRAVENOUS at 11:09

## 2019-08-08 RX ADMIN — INSULIN LISPRO 2 UNITS: 100 INJECTION, SOLUTION INTRAVENOUS; SUBCUTANEOUS at 13:28

## 2019-08-08 RX ADMIN — TAMSULOSIN HYDROCHLORIDE 0.4 MG: 0.4 CAPSULE ORAL at 10:47

## 2019-08-08 RX ADMIN — INSULIN LISPRO 4 UNITS: 100 INJECTION, SOLUTION INTRAVENOUS; SUBCUTANEOUS at 10:47

## 2019-08-08 RX ADMIN — INSULIN LISPRO 4 UNITS: 100 INJECTION, SOLUTION INTRAVENOUS; SUBCUTANEOUS at 17:12

## 2019-08-08 RX ADMIN — RANITIDINE 300 MG: 300 TABLET ORAL at 10:49

## 2019-08-08 RX ADMIN — PANTOPRAZOLE SODIUM 40 MG: 40 TABLET, DELAYED RELEASE ORAL at 07:42

## 2019-08-08 RX ADMIN — METOPROLOL TARTRATE 50 MG: 50 TABLET, FILM COATED ORAL at 10:49

## 2019-08-08 RX ADMIN — ENOXAPARIN SODIUM 40 MG: 40 INJECTION SUBCUTANEOUS at 10:44

## 2019-08-08 RX ADMIN — GABAPENTIN 300 MG: 300 CAPSULE ORAL at 10:47

## 2019-08-08 RX ADMIN — GABAPENTIN 300 MG: 300 CAPSULE ORAL at 16:39

## 2019-08-08 ASSESSMENT — PAIN SCALES - GENERAL: PAINLEVEL_OUTOF10: 8

## 2019-08-08 NOTE — PROGRESS NOTES
PRN Earmon Yosef Sedar, DO        dextrose 5 % solution  100 mL/hr Intravenous PRN Earmon [place] Sedar, DO        insulin lispro (HUMALOG) injection vial 0-12 Units  0-12 Units Subcutaneous TID WC Earmon [place] Sedar, DO   8 Units at 08/06/19 1826    insulin lispro (HUMALOG) injection vial 0-6 Units  0-6 Units Subcutaneous Nightly Earmon [place] Sedar, DO   4 Units at 08/06/19 2043    cefTRIAXone (ROCEPHIN) 1 g IVPB in 50 mL D5W minibag  1 g Intravenous Q24H Earmon Yosef Sedar, DO   Stopped at 08/06/19 2212    acetaminophen (TYLENOL) tablet 650 mg  650 mg Oral Q6H PRN Earmon Yosef Sedar, DO   650 mg at 08/06/19 1827    morphine (PF) injection 0.5 mg  0.5 mg Intravenous Q4H PRN Earmon Yosef Sedar, DO   0.5 mg at 08/05/19 2145    amLODIPine (NORVASC) tablet 10 mg  10 mg Oral Nightly Earmon [place] Sedar, DO   10 mg at 08/06/19 2049    atorvastatin (LIPITOR) tablet 40 mg  40 mg Oral Daily Eyvonne Punt D Sedar, DO   40 mg at 08/06/19 2052    digoxin (LANOXIN) tablet 125 mcg  125 mcg Oral Daily Earmon Yosef Sedar, DO   125 mcg at 08/06/19 1204    [Held by provider] enalapril (VASOTEC) tablet 20 mg  20 mg Oral BID Earmon [place] Sedar, DO   20 mg at 08/05/19 2147    gabapentin (NEURONTIN) capsule 300 mg  300 mg Oral TID Earmon Yosef Sedar, DO   300 mg at 08/06/19 2051    insulin glargine (LANTUS) injection vial 50 Units  50 Units Subcutaneous Nightly Earmon Yosef Sedar, DO   50 Units at 08/06/19 2042    pantoprazole (PROTONIX) tablet 40 mg  40 mg Oral QAM AC Josue D Sedar, DO   40 mg at 08/07/19 0631    sertraline (ZOLOFT) tablet 100 mg  100 mg Oral Daily Earmon [place] Sedar, DO   100 mg at 08/06/19 1151    tamsulosin (FLOMAX) capsule 0.4 mg  0.4 mg Oral Daily Earmon Yosef Sedar, DO   0.4 mg at 08/06/19 1154    metoprolol tartrate (LOPRESSOR) tablet 50 mg  50 mg Oral BID Earmon [place] Sedar, DO   50 mg at 08/06/19 2049    ranitidine (ZANTAC) tablet 300 mg  300 mg Oral Daily Abril NIEVES Sedar, DO   300 mg at 08/06/19 1153    SITagliptin (JANUVIA) tablet 100 mg  100 mg Oral Daily Earmon [place] Sedar, DO   100 mg at 08/06/19
Active problems:    # pyelonephritis   - resume Rocephin. F/u urine culture. Doing better, no fever, back pain improved     # WOOD on CKD3  - start mild hydration, monitor for resp symptoms given h/o CMP  - recheck in am     # s/p removal or recent ureteral stent   - urology consulted     # DM  - resume insulin, monitor     # BPH  - resume home meds     # h/p CMP  - euvolemic.  Resume home meds     DVT/PPx        DISCHARGE PLANNING  Dc home tomorrow if Cr is better, baseline Cr 1.7        Electronically signed by Jerrell Ross DO on 8/6/2019 at 10:17 AM
rubs  Abdomen: soft, non-tender, non-distended  Ext: no cyanosis, no peripheral edema  Neuro: alert and oriented, no gross abnormalities  Psych: normal mood and affect  Skin: no rash      Electronically signed by Mandie Doty MD

## 2019-08-08 NOTE — DISCHARGE SUMMARY
Glucose Monitoring Suppl (ONE TOUCH ULTRA MINI) w/Device KIT 1 kit by Does not apply route three times daily  Qty: 1 kit, Refills: 5      Lancets MISC Use TID  Qty: 100 each, Refills: 3      gabapentin (NEURONTIN) 300 MG capsule Take 1 capsule by mouth 3 times daily for 30 days. Bevelyn Bolaños: 90 capsule, Refills: 2    Associated Diagnoses: Diabetic polyneuropathy associated with type 2 diabetes mellitus (HCC)      omeprazole (PRILOSEC) 20 MG delayed release capsule Take 1 capsule by mouth daily  Qty: 90 capsule, Refills: 1    Associated Diagnoses: Diabetic polyneuropathy associated with type 2 diabetes mellitus (HCC)      bacitracin 500 UNIT/GM ointment Apply topically 2 times daily. Qty: 1 Tube, Refills: 0      Insulin Syringe-Needle U-100 (INSULIN SYRINGE 1CC/31GX5/16\") 31G X 5/16\" 1 ML MISC Use once daily to administer Lantus  Qty: 100 each, Refills: 1    Associated Diagnoses: Type 2 diabetes mellitus without complication, with long-term current use of insulin ([facility])         STOP taking these medications       gabapentin (NEURONTIN) 300 MG capsule Comments:   Reason for Stopping:         enalapril (VASOTEC) 20 MG tablet Comments:   Reason for Stopping:         metFORMIN (GLUCOPHAGE) 1000 MG tablet Comments:   Reason for Stopping:             Activity: activity as tolerated  Diet: diabetic diet  Wound Care: none needed    Follow-up with Dr. Casey Donald in 1 week, nephrology 1 month.     DC time 35 minutes    Signed:  Electronically signed by Kendall Escalona MD on 8/8/2019 at 3:52 PM

## 2019-08-09 ENCOUNTER — CARE COORDINATION (OUTPATIENT)
Dept: CASE MANAGEMENT | Age: 59
End: 2019-08-09

## 2019-08-10 LAB
BLOOD CULTURE, ROUTINE: NORMAL
CULTURE, BLOOD 2: NORMAL

## 2019-08-15 ENCOUNTER — HOSPITAL ENCOUNTER (OUTPATIENT)
Dept: ULTRASOUND IMAGING | Age: 59
Discharge: HOME | End: 2019-08-17
Payer: MEDICARE

## 2019-08-15 ENCOUNTER — OFFICE VISIT (OUTPATIENT)
Dept: FAMILY MEDICINE CLINIC | Age: 59
End: 2019-08-15
Payer: MEDICARE

## 2019-08-15 VITALS
HEIGHT: 71 IN | BODY MASS INDEX: 44.1 KG/M2 | WEIGHT: 315 LBS | TEMPERATURE: 98.5 F | HEART RATE: 70 BPM | SYSTOLIC BLOOD PRESSURE: 136 MMHG | RESPIRATION RATE: 15 BRPM | OXYGEN SATURATION: 98 % | DIASTOLIC BLOOD PRESSURE: 82 MMHG

## 2019-08-15 DIAGNOSIS — Z79.4 TYPE 2 DIABETES MELLITUS WITH DIABETIC NEUROPATHY, WITH LONG-TERM CURRENT USE OF INSULIN (HCC): ICD-10-CM

## 2019-08-15 DIAGNOSIS — E11.22 CKD STAGE 3 DUE TO TYPE 2 DIABETES MELLITUS (HCC): Chronic | ICD-10-CM

## 2019-08-15 DIAGNOSIS — N12 PYELONEPHRITIS: Primary | ICD-10-CM

## 2019-08-15 DIAGNOSIS — F32.A ANXIETY AND DEPRESSION: Chronic | ICD-10-CM

## 2019-08-15 DIAGNOSIS — I10 ESSENTIAL HYPERTENSION: ICD-10-CM

## 2019-08-15 DIAGNOSIS — N18.9 ACUTE KIDNEY INJURY SUPERIMPOSED ON CKD (HCC): ICD-10-CM

## 2019-08-15 DIAGNOSIS — N17.9 ACUTE KIDNEY INJURY SUPERIMPOSED ON CKD (HCC): ICD-10-CM

## 2019-08-15 DIAGNOSIS — N18.30 CKD STAGE 3 DUE TO TYPE 2 DIABETES MELLITUS (HCC): Chronic | ICD-10-CM

## 2019-08-15 DIAGNOSIS — E11.40 TYPE 2 DIABETES MELLITUS WITH DIABETIC NEUROPATHY, WITH LONG-TERM CURRENT USE OF INSULIN (HCC): ICD-10-CM

## 2019-08-15 DIAGNOSIS — F41.9 ANXIETY AND DEPRESSION: Chronic | ICD-10-CM

## 2019-08-15 DIAGNOSIS — L97.511 SKIN ULCER OF RIGHT FOOT, LIMITED TO BREAKDOWN OF SKIN (HCC): ICD-10-CM

## 2019-08-15 LAB
ANION GAP SERPL CALCULATED.3IONS-SCNC: 9 MEQ/L (ref 9–15)
BUN BLDV-MCNC: 21 MG/DL (ref 8–23)
CALCIUM SERPL-MCNC: 9.8 MG/DL (ref 8.5–9.9)
CHLORIDE BLD-SCNC: 100 MEQ/L (ref 95–107)
CO2: 28 MEQ/L (ref 20–31)
CREAT SERPL-MCNC: 1.56 MG/DL (ref 0.7–1.2)
GFR AFRICAN AMERICAN: 54.9
GFR NON-AFRICAN AMERICAN: 45.4
GLUCOSE BLD-MCNC: 354 MG/DL (ref 70–99)
POTASSIUM SERPL-SCNC: 4.7 MEQ/L (ref 3.4–4.9)
SODIUM BLD-SCNC: 137 MEQ/L (ref 135–144)

## 2019-08-15 PROCEDURE — 93925 LOWER EXTREMITY STUDY: CPT

## 2019-08-15 PROCEDURE — 99496 TRANSJ CARE MGMT HIGH F2F 7D: CPT | Performed by: FAMILY MEDICINE

## 2019-08-15 PROCEDURE — 1111F DSCHRG MED/CURRENT MED MERGE: CPT | Performed by: FAMILY MEDICINE

## 2019-08-15 RX ORDER — GLIPIZIDE 10 MG/1
TABLET ORAL
Qty: 180 TABLET | Refills: 3 | Status: SHIPPED | OUTPATIENT
Start: 2019-08-15 | End: 2020-02-11 | Stop reason: SDUPTHER

## 2019-08-15 NOTE — PROGRESS NOTES
Therapy completed    glipiZIDE (GLUCOTROL) 10 MG tablet     insulin glargine (LANTUS SOLOSTAR) 100 UNIT/ML injection pen      Return in about 2 months (around 10/15/2019) for DM. Quality & Risk Score Accuracy    Last edited 08/15/19 14:56 EDT by Lisbeth Boothe MD           Controlled Substance Monitoring:    Acute and Chronic Pain Monitoring:   No flowsheet data found.         Lisbeth Boothe MD

## 2019-08-22 ENCOUNTER — OFFICE VISIT (OUTPATIENT)
Dept: CARDIOLOGY CLINIC | Age: 59
End: 2019-08-22
Payer: MEDICARE

## 2019-08-22 VITALS
WEIGHT: 315 LBS | BODY MASS INDEX: 44.1 KG/M2 | SYSTOLIC BLOOD PRESSURE: 140 MMHG | HEIGHT: 71 IN | DIASTOLIC BLOOD PRESSURE: 80 MMHG | OXYGEN SATURATION: 96 % | HEART RATE: 70 BPM

## 2019-08-22 DIAGNOSIS — I50.22 CHRONIC SYSTOLIC HEART FAILURE (HCC): Primary | Chronic | ICD-10-CM

## 2019-08-22 DIAGNOSIS — E11.22 CKD STAGE 3 DUE TO TYPE 2 DIABETES MELLITUS (HCC): Chronic | ICD-10-CM

## 2019-08-22 DIAGNOSIS — E78.2 MIXED HYPERLIPIDEMIA: ICD-10-CM

## 2019-08-22 DIAGNOSIS — E66.01 MORBID OBESITY (HCC): ICD-10-CM

## 2019-08-22 DIAGNOSIS — G47.33 OSA (OBSTRUCTIVE SLEEP APNEA): ICD-10-CM

## 2019-08-22 DIAGNOSIS — Z95.0 PACEMAKER: ICD-10-CM

## 2019-08-22 DIAGNOSIS — E11.628 DIABETIC FOOT INFECTION (HCC): Chronic | ICD-10-CM

## 2019-08-22 DIAGNOSIS — N18.30 CKD STAGE 3 DUE TO TYPE 2 DIABETES MELLITUS (HCC): Chronic | ICD-10-CM

## 2019-08-22 DIAGNOSIS — I10 ESSENTIAL HYPERTENSION: ICD-10-CM

## 2019-08-22 DIAGNOSIS — L08.9 DIABETIC FOOT INFECTION (HCC): Chronic | ICD-10-CM

## 2019-08-22 PROCEDURE — 99214 OFFICE O/P EST MOD 30 MIN: CPT | Performed by: INTERNAL MEDICINE

## 2019-08-22 PROCEDURE — G8427 DOCREV CUR MEDS BY ELIG CLIN: HCPCS | Performed by: INTERNAL MEDICINE

## 2019-08-22 PROCEDURE — 1036F TOBACCO NON-USER: CPT | Performed by: INTERNAL MEDICINE

## 2019-08-22 PROCEDURE — 2022F DILAT RTA XM EVC RTNOPTHY: CPT | Performed by: INTERNAL MEDICINE

## 2019-08-22 PROCEDURE — 1111F DSCHRG MED/CURRENT MED MERGE: CPT | Performed by: INTERNAL MEDICINE

## 2019-08-22 PROCEDURE — G8417 CALC BMI ABV UP PARAM F/U: HCPCS | Performed by: INTERNAL MEDICINE

## 2019-08-22 PROCEDURE — 3017F COLORECTAL CA SCREEN DOC REV: CPT | Performed by: INTERNAL MEDICINE

## 2019-08-22 PROCEDURE — 3045F PR MOST RECENT HEMOGLOBIN A1C LEVEL 7.0-9.0%: CPT | Performed by: INTERNAL MEDICINE

## 2019-08-22 RX ORDER — ISOSORBIDE MONONITRATE 30 MG/1
30 TABLET, EXTENDED RELEASE ORAL DAILY
Qty: 30 TABLET | Refills: 5 | Status: SHIPPED | OUTPATIENT
Start: 2019-08-22 | End: 2020-03-09

## 2019-08-22 NOTE — PROGRESS NOTES
kidney injury superimposed on CKD [facility])       Past Surgical History:   Procedure Laterality Date    CARDIAC DEFIBRILLATOR PLACEMENT  12/2008    CYSTOSCOPY INSERTION / REMOVAL STENT / STONE Left 5/30/2019    CYSTOSCOPY LEFT DOUBLE J STENT PLACEMENT ROOM 287 performed by Kelsey Kohler MD at [address] [place] [address] LITHOTRIPSY Left 6/12/2019    LEFT ESWL (CONF # [id number]) performed by Kelsey Kohler MD at [address] [place] [address] LITHOTRIPSY N/A 7/10/2019    HOLMIUM LASER LITHOTRIPSY, STONE EXTRACTION, LT URETERAL STENT INSERTION.  performed by Kelsey Kohler MD at [address] [place] [address] Left 7/10/2019    LEFT FLEXIBLE URETEROSCOPY, CYSTOSCOPY RETROGRADE [facility] History     Socioeconomic History    Marital status:      Spouse name: Not on file    Number of children: Not on file    Years of education: Not on file    Highest education level: Not on file   Occupational History    Not on file   Social Needs    Financial resource strain: Not on file    Food insecurity:     Worry: Not on file     Inability: Not on file    Transportation needs:     Medical: Not on file     Non-medical: Not on file   Tobacco Use    Smoking status: Former Smoker     Packs/day: 1.00     Years: 25.00     Pack years: 25.00     Last attempt to quit: 12/1/2008     Years since quitting: 10.7    Smokeless tobacco: Never Used   Substance and Sexual Activity    Alcohol use: No    Drug use: Never    Sexual activity: Yes     Partners: Female   Lifestyle    Physical activity:     Days per week: Not on file     Minutes per session: Not on file    Stress: Not on file   Relationships    Social connections:     Talks on phone: Not on file     Gets together: Not on file     Attends [employer] service: Not on file     Active member of club or organization: Not on file     Attends meetings of clubs or organizations: Not on file     Relationship status: Not on file    Intimate partner violence:     Fear 3 times daily As needed. 100 each 3    albuterol (PROVENTIL) (2.5 MG/3ML) 0.083% nebulizer solution Take 3 mLs by nebulization 4 times daily 120 each 5    hydrOXYzine (VISTARIL) 50 MG capsule Take 1 capsule by mouth nightly 90 capsule 2    terbinafine (LAMISIL) 1 % cream Apply topically 2 times daily Apply topically 2 times daily. 1 Tube 3    Insulin Pen Needle (B-D UF III MINI PEN NEEDLES) 31G X 5 MM MISC 1 each by Does not apply route daily 100 each 3    Blood Glucose Monitoring Suppl (ONE TOUCH ULTRA MINI) w/Device KIT 1 kit by Does not apply route three times daily 1 kit 5    Lancets MISC Use  each 3    omeprazole (PRILOSEC) 20 MG delayed release capsule Take 1 capsule by mouth daily 90 capsule 1    bacitracin 500 UNIT/GM ointment Apply topically 2 times daily. 1 Tube 0    Insulin Syringe-Needle U-100 (INSULIN SYRINGE 1CC/31GX5/16\") 31G X 5/16\" 1 ML MISC Use once daily to administer Lantus 100 each 1    gabapentin (NEURONTIN) 300 MG capsule Take 1 capsule by mouth 3 times daily for 30 days. . 90 capsule 2     No current facility-administered medications for this visit. Coreg [carvedilol]    Review of Systems:  General ROS: negative  Psychological ROS: negative  Hematological and Lymphatic ROS: No history of blood clots or bleeding disorder. Respiratory ROS: no cough, shortness of breath, or wheezing  Cardiovascular ROS: no chest pain or dyspnea on exertion  Gastrointestinal ROS: no abdominal pain, change in bowel habits, or black or bloody stools  Genito-Urinary ROS: no dysuria, trouble voiding, or hematuria  Musculoskeletal ROS: negative  Neurological ROS: no TIA or stroke symptoms  Dermatological ROS: negative    VITALS:  Blood pressure (!) 140/80, pulse 70, height 5' 11\" (1.803 m), weight (!) 317 lb (143.8 kg), SpO2 96 %. Body mass index is 44.21 kg/m².     Physical Examination:  General appearance - alert, well appearing, and in no distress  Mental status - alert, oriented to person,

## 2019-08-23 DIAGNOSIS — I42.0 DILATED CARDIOMYOPATHY (HCC): ICD-10-CM

## 2019-08-26 RX ORDER — RANITIDINE 300 MG/1
TABLET ORAL
Qty: 90 TABLET | Refills: 0 | Status: SHIPPED | OUTPATIENT
Start: 2019-08-26 | End: 2019-11-21 | Stop reason: SDUPTHER

## 2019-10-04 ENCOUNTER — HOSPITAL ENCOUNTER (OUTPATIENT)
Dept: CARDIOLOGY | Age: 59
Discharge: HOME | End: 2019-10-04
Payer: MEDICARE

## 2019-10-04 PROCEDURE — 93282 PRGRMG EVAL IMPLANTABLE DFB: CPT

## 2019-11-05 DIAGNOSIS — I42.0 DILATED CARDIOMYOPATHY (HCC): ICD-10-CM

## 2019-11-06 RX ORDER — FENOFIBRATE 160 MG/1
TABLET ORAL
Qty: 90 TABLET | Refills: 4 | Status: SHIPPED | OUTPATIENT
Start: 2019-11-06 | End: 2020-02-11 | Stop reason: SDUPTHER

## 2020-01-15 ENCOUNTER — HOSPITAL ENCOUNTER (OUTPATIENT)
Dept: CARDIOLOGY | Age: 60
Discharge: HOME | End: 2020-01-15
Payer: MEDICARE

## 2020-01-15 PROCEDURE — 93282 PRGRMG EVAL IMPLANTABLE DFB: CPT

## 2020-01-17 ENCOUNTER — HOSPITAL ENCOUNTER (OUTPATIENT)
Dept: WOUND CARE | Age: 60
Discharge: HOME | End: 2020-01-17
Payer: MEDICARE

## 2020-01-17 ENCOUNTER — HOSPITAL ENCOUNTER (OUTPATIENT)
Dept: GENERAL RADIOLOGY | Age: 60
Discharge: HOME | End: 2020-01-19
Payer: MEDICARE

## 2020-01-17 VITALS
TEMPERATURE: 97.8 F | SYSTOLIC BLOOD PRESSURE: 160 MMHG | RESPIRATION RATE: 20 BRPM | DIASTOLIC BLOOD PRESSURE: 84 MMHG | HEART RATE: 67 BPM

## 2020-01-17 PROBLEM — E11.621 DIABETIC ULCER OF RIGHT MIDFOOT ASSOCIATED WITH TYPE 2 DIABETES MELLITUS, WITH BONE INVOLVEMENT WITHOUT EVIDENCE OF NECROSIS (HCC): Status: ACTIVE | Noted: 2020-01-17

## 2020-01-17 PROBLEM — L97.416 DIABETIC ULCER OF RIGHT MIDFOOT ASSOCIATED WITH TYPE 2 DIABETES MELLITUS, WITH BONE INVOLVEMENT WITHOUT EVIDENCE OF NECROSIS (HCC): Status: ACTIVE | Noted: 2020-01-17

## 2020-01-17 PROBLEM — L97.519 TYPE 2 DIABETES MELLITUS WITH RIGHT DIABETIC FOOT ULCER (HCC): Status: ACTIVE | Noted: 2020-01-17

## 2020-01-17 PROBLEM — E11.621 TYPE 2 DIABETES MELLITUS WITH RIGHT DIABETIC FOOT ULCER (HCC): Status: ACTIVE | Noted: 2020-01-17

## 2020-01-17 PROCEDURE — 71046 X-RAY EXAM CHEST 2 VIEWS: CPT

## 2020-01-17 PROCEDURE — 99204 OFFICE O/P NEW MOD 45 MIN: CPT | Performed by: SURGERY

## 2020-01-17 PROCEDURE — 99213 OFFICE O/P EST LOW 20 MIN: CPT

## 2020-01-17 NOTE — PLAN OF CARE
Problem: Blood Glucose:  Goal: Ability to maintain appropriate glucose levels will improve  Description  Ability to maintain appropriate glucose levels will improve  Outcome: Ongoing     Problem: Wound:  Goal: Will show signs of wound healing; wound closure and no evidence of infection  Description  Will show signs of wound healing; wound closure and no evidence of infection  Outcome: Ongoing

## 2020-01-17 NOTE — PROGRESS NOTES
smoking history. He has never used smokeless tobacco. He reports that he does not drink alcohol or use drugs. His current medication list consists of     Current Outpatient Medications on File Prior to Encounter   Medication Sig Dispense Refill    ranitidine (ZANTAC) 300 MG tablet TAKE 1 TABLET BY MOUTH EVERY DAY 30 tablet 0    fenofibrate 160 MG tablet TAKE 1 TABLET BY MOUTH EVERY NIGHT 90 tablet 4    atorvastatin (LIPITOR) 40 MG tablet TAKE 1 TABLET BY MOUTH DAILY 30 tablet 5    isosorbide mononitrate (IMDUR) 30 MG extended release tablet Take 1 tablet by mouth daily 30 tablet 5    SITagliptin (JANUVIA) 50 MG tablet Take 1 tablet by mouth daily 90 tablet 3    glipiZIDE (GLUCOTROL) 10 MG tablet TAKE 1 TABLET BY MOUTH TWICE DAILY BEFORE MEALS 180 tablet 3    insulin glargine (LANTUS SOLOSTAR) 100 UNIT/ML injection pen Inject 55 Units into the skin nightly 10 pen 3    metoprolol tartrate (LOPRESSOR) 50 MG tablet Take 1 tablet by mouth 2 times daily 60 tablet 3    amLODIPine (NORVASC) 10 MG tablet Take 1 tablet by mouth daily TAKE 1 TABLET BY MOUTH EVERY NIGHT 90 tablet 3    furosemide (LASIX) 40 MG tablet Take 1 tablet by mouth daily Hold until seen by PCP for followup 90 tablet 3    tamsulosin (FLOMAX) 0.4 MG capsule Take 1 capsule by mouth daily 30 capsule 3    ammonium lactate (LAC-HYDRIN) 12 % cream Apply topically as needed. 385 g 4    mupirocin (BACTROBAN) 2 % ointment APPLY TO WOUNDS D  3    sodium chloride 0.9 % irrigation CLEASE WOUND WITH DRESSING CHANGES  5    sertraline (ZOLOFT) 100 MG tablet Take 1 tablet by mouth daily 90 tablet 3    albuterol (PROVENTIL) (2.5 MG/3ML) 0.083% nebulizer solution Take 3 mLs by nebulization 4 times daily 120 each 5    hydrOXYzine (VISTARIL) 50 MG capsule Take 1 capsule by mouth nightly 90 capsule 2    terbinafine (LAMISIL) 1 % cream Apply topically 2 times daily Apply topically 2 times daily.  1 Tube 3    gabapentin (NEURONTIN) 300 MG capsule Take 1 capsule by mouth 3 times daily for 30 days. . 90 capsule 2    omeprazole (PRILOSEC) 20 MG delayed release capsule Take 1 capsule by mouth daily 90 capsule 1    bacitracin 500 UNIT/GM ointment Apply topically 2 times daily. 1 Tube 0    blood glucose test strips (TRUE METRIX BLOOD GLUCOSE TEST) strip 1 each by In Vitro route 3 times daily As needed. 100 each 3    Insulin Pen Needle (B-D UF III MINI PEN NEEDLES) 31G X 5 MM MISC 1 each by Does not apply route daily 100 each 3    Blood Glucose Monitoring Suppl (ONE TOUCH ULTRA MINI) w/Device KIT 1 kit by Does not apply route three times daily 1 kit 5    Lancets MISC Use  each 3    Insulin Syringe-Needle U-100 (INSULIN SYRINGE 1CC/31GX5/16\") 31G X 5/16\" 1 ML MISC Use once daily to administer Lantus 100 each 1     No current facility-administered medications on file prior to encounter. Allergies: Coreg [carvedilol]    Pertinent items from the review of systems are discussed in the HPI; the remainder of the ROS was reviewed and is negative.      Objective:     Vitals:    01/17/20 1330   BP: (!) 160/84   Pulse: 67   Resp: 20   Temp: 97.8 °F (36.6 °C)     General Appearance: alert and oriented to person, place and time, well developed and well- nourished, in no acute distress  Skin: warm and dry, no rash or erythema  Head: normocephalic and atraumatic  Eyes: pupils equal, round, and reactive to light, extraocular eye movements intact, conjunctivae normal  ENT: tympanic membrane, external ear and ear canal normal bilaterally, nose without deformity, nasal mucosa and turbinates normal without polyps  Neck: supple and non-tender without mass, no thyromegaly or thyroid nodules, no cervical lymphadenopathy  Pulmonary/Chest: clear to auscultation bilaterally- no wheezes, rales or rhonchi, normal air movement, no respiratory distress  Cardiovascular: normal rate, regular rhythm, normal S1 and S2, no murmurs, rubs, clicks, or gallops, distal pulses intact, no carotid bruits  Abdomen: soft, non-tender, non-distended, normal bowel sounds, no masses or organomegaly  Extremities: no cyanosis, clubbing or edema  Musculoskeletal: normal range of motion, no joint swelling, deformity or tenderness  Neurologic: reflexes normal and symmetric, no cranial nerve deficit, gait, coordination and speech normal.   Wound(s): clean. Wound/Ulcer measurements are in the wound/ulcer documentation flowsheet. Right foot ram grade 3 diabetic ulcer that probes to bone.      Wound Care Documentation:  Wound 01/17/20 Foot Right;Plantar #1  (Active)   Wound Image   1/17/2020  1:33 PM   Wound Diabetic Ram 3 1/17/2020  1:33 PM   Offloading for Diabetic Foot Ulcers Post op shoe 1/17/2020  2:46 PM   Wound Cleansed Rinsed/Irrigated with saline 1/17/2020  1:33 PM   Wound Length (cm) 1.2 cm 1/17/2020  1:33 PM   Wound Width (cm) 1 cm 1/17/2020  1:33 PM   Wound Depth (cm) 0.5 cm 1/17/2020  1:33 PM   Wound Surface Area (cm^2) 1.2 cm^2 1/17/2020  1:33 PM   Wound Volume (cm^3) 0.6 cm^3 1/17/2020  1:33 PM   Undermining Starts ___ O'Clock 12 1/17/2020  1:33 PM   Undermining Ends___ O'Clock 12 1/17/2020  1:33 PM   Undermining Maxium Distance (cm) 0.5 1/17/2020  1:33 PM   Wound Assessment Red 1/17/2020  1:33 PM   Drainage Amount Moderate 1/17/2020  1:33 PM   Drainage Description Serosanguinous 1/17/2020  1:33 PM   Odor None 1/17/2020  1:33 PM   Margins Defined edges 1/17/2020  1:33 PM   Mei-wound Assessment Calloused 1/17/2020  1:33 PM   Non-staged Wound Description Full thickness 1/17/2020  1:33 PM   Red%Wound Bed 100 1/17/2020  1:33 PM   Number of days: 0       CXR: No  EKG: No  LABS:  CBC:   Lab Results   Component Value Date    WBC 8.5 09/06/2019    RBC 4.10 09/06/2019    HGB 11.6 09/06/2019    HCT 36.0 09/06/2019    MCV 87.8 09/06/2019    MCH 28.4 09/06/2019    MCHC 32.3 09/06/2019    RDW 16.2 09/06/2019     09/06/2019     CMP:    Lab Results   Component Value Date     09/06/2019    K 4.1 Items Addressed This Visit     Type 2 diabetes mellitus with right diabetic foot ulcer ([address]    Diabetic ulcer of right midfoot associated with type 2 diabetes mellitus, with bone involvement without evidence of necrosis ([address]      Other Visit Diagnoses     At risk for shortness of breath    -  Primary    Relevant Orders    XR CHEST (SINGLE VIEW FRONTAL)          We also discussed the inherent risks of HBOT, including confinement anxiety, otic-dental-sinus barotrauma, hypoglycemia in diabetes, seizure, progressive but usually reversible myopia, round-window or oval-window blowout during a vigorous Valsalva maneuver, ulnar nerve paresthesias, pulmonary oxygen toxicity if inter-treatment FIO2 is > 40%, gas embolism, respiratory arrest in CO2 retainers, pneumothorax, cardiac arrest, and fire & explosion. Plan:   I believe HBOT is indicated as an important adjunctive therapy in this case because of Mr. Jovana Lemus's refractory condition, to speed healing and to decrease the chance of serious complications. Due to the potential adverse effects of HBO therapy, I reviewed some particular aspects of Mr. Jovana Lemus's medical history. There is no history of idiopathic epilepsy, secondary seizures, stroke, CNS surgery or bleeding, recent head trauma, frequent hypoglycemia, diabetic retinopathy or retinal detachment surgery, untreated cataracts, optic neuritis, bleomycin-associated pulmonary fibrosis or recent bleomycin use, recent pulmonary lobectomy or pneumonectomy, obstructive or bullous lung disease, TB, radiation therapy to the head and neck, otosclerosis surgery, congenital spherocytosis or vitamin E deficiency.  In addition there is no current fever, upper respiratory infection or sinus obstruction, tracheostomy, decompensated CHF, decompensated asthma, untreated pneumothorax or untreated hyperthyroidism; no current use of high-doses of narcotic analgesics, corticosteroids, parenteral beta-lactams, vitamin C

## 2020-01-27 RX ORDER — ATORVASTATIN CALCIUM 40 MG/1
40 TABLET, FILM COATED ORAL DAILY
Qty: 30 TABLET | Refills: 0 | Status: SHIPPED | OUTPATIENT
Start: 2020-01-27 | End: 2020-02-11 | Stop reason: SDUPTHER

## 2020-01-30 ENCOUNTER — HOSPITAL ENCOUNTER (OUTPATIENT)
Dept: WOUND CARE | Age: 60
Discharge: HOME | End: 2020-01-30
Payer: MEDICARE

## 2020-01-30 ENCOUNTER — HOSPITAL ENCOUNTER (OUTPATIENT)
Dept: GENERAL RADIOLOGY | Age: 60
Discharge: HOME | End: 2020-02-01
Payer: MEDICARE

## 2020-01-30 VITALS
RESPIRATION RATE: 20 BRPM | TEMPERATURE: 97.8 F | HEART RATE: 70 BPM | SYSTOLIC BLOOD PRESSURE: 159 MMHG | DIASTOLIC BLOOD PRESSURE: 83 MMHG

## 2020-01-30 PROBLEM — L03.115 CELLULITIS OF RIGHT FOOT: Status: ACTIVE | Noted: 2020-01-30

## 2020-01-30 PROBLEM — L97.512 NON-PRESSURE CHRONIC ULCER OF OTHER PART OF RIGHT FOOT WITH FAT LAYER EXPOSED (HCC): Status: ACTIVE | Noted: 2020-01-30

## 2020-01-30 PROCEDURE — 87205 SMEAR GRAM STAIN: CPT

## 2020-01-30 PROCEDURE — 87186 SC STD MICRODIL/AGAR DIL: CPT

## 2020-01-30 PROCEDURE — 11042 DBRDMT SUBQ TIS 1ST 20SQCM/<: CPT

## 2020-01-30 PROCEDURE — 87070 CULTURE OTHR SPECIMN AEROBIC: CPT

## 2020-01-30 PROCEDURE — 87077 CULTURE AEROBIC IDENTIFY: CPT

## 2020-01-30 PROCEDURE — 73630 X-RAY EXAM OF FOOT: CPT

## 2020-01-30 PROCEDURE — 99213 OFFICE O/P EST LOW 20 MIN: CPT

## 2020-01-30 RX ORDER — MAGNESIUM HYDROXIDE 1200 MG/15ML
LIQUID ORAL
Qty: 1000 ML | Refills: 5 | Status: SHIPPED | OUTPATIENT
Start: 2020-01-30 | End: 2020-02-11 | Stop reason: ALTCHOICE

## 2020-01-30 RX ORDER — GENTAMICIN SULFATE 1 MG/G
OINTMENT TOPICAL
Qty: 15 G | Refills: 3 | Status: SHIPPED | OUTPATIENT
Start: 2020-01-30 | End: 2022-04-26

## 2020-01-30 NOTE — CODE DOCUMENTATION
[address] [employer] [facility] Billing Sheet. Josr Lemus  AGE: 62 y.o.    GENDER: male  : 1960  TODAY'S DATE:  2020    ICD-10  [facility] Problems    Diagnosis Date Noted    Non-pressure chronic ulcer of other part of right foot with fat layer exposed ([facility] [L97.512] 2020    Cellulitis of right foot [L03.115] 2020    Type 2 diabetes mellitus with right diabetic foot ulcer ([facility] [U95.183, L97.519] 2020       PHYSICIAN PROCEDURES    CPT CODE  67610  Mod 25  85116      Electronically signed by Jeny Cole DPM on 2020 at 3:04 PM

## 2020-01-30 NOTE — PROGRESS NOTES
[facility]                                                   Progress Note and Procedure Note      Annel Lemus  MEDICAL RECORD NUMBER:  [id number]  AGE: 62 y.o. GENDER: male  : 1960  EPISODE DATE:  2020    Subjective:     Chief Complaint   Patient presents with    Wound Check     right plantar foot         HISTORY of PRESENT ILLNESS HPI     Meryle Cozier is a 62 y.o. male who presents today for wound/ulcer evaluation. History of Wound Context: Patient presents for follow-up of right foot diabetic ulceration. He has seen Dr. Leila Tobin 2 weeks ago and is scheduled for HBO starting next week. He is concerned about the cost of his co-pays. Denies fever, chills, nausea or vomiting. States diabetes is controlled and last HgbA1c was 9. He has been in [place] and has just been using an ointment to treat the wound.   Wound/Ulcer Pain Timing/Severity: intermittent, mild  Quality of pain: burning  Severity:  1 / 10   Modifying Factors: None  Associated Signs/Symptoms: none    Ulcer Identification:  Ulcer Type: diabetic  Contributing Factors: diabetes and chronic pressure    Wound: N/A        PAST MEDICAL HISTORY        Diagnosis Date    Anxiety and depression 2019    Cardiomyopathy ([address]     Chronic systolic heart failure ([address] 2019    Diabetes mellitus ([address]     Diabetic neuropathy associated with type 2 diabetes mellitus ([address] 2017    Heart failure, NYHA class 4 ([address]     Hyperlipidemia     Hypertension     ICD (implantable cardioverter-defibrillator) in place 2019    Morbid obesity ([address] 2017    LON (obstructive sleep apnea)        PAST SURGICAL HISTORY    Past Surgical History:   Procedure Laterality Date    CARDIAC DEFIBRILLATOR PLACEMENT  2008    CYSTOSCOPY INSERTION / REMOVAL STENT / STONE Left 2019    CYSTOSCOPY LEFT DOUBLE J STENT PLACEMENT ROOM 287 performed by Gerhard Valero MD at [address] [place] [address] LITHOTRIPSY Left 2019    LEFT ESWL (CONF # [id number]) performed by Neil Diaz MD at [address] [facility] LITHOTRIPSY N/A 7/10/2019    HOLMIUM LASER LITHOTRIPSY, STONE EXTRACTION, LT URETERAL STENT INSERTION.  performed by Neil Diaz MD at [address] Left 7/10/2019    LEFT FLEXIBLE URETEROSCOPY, CYSTOSCOPY RETROGRADE performed by Neil Diaz MD at [address] [facility] HISTORY    Family History   Problem Relation Age of Onset    Heart Disease Mother     Kidney Disease Mother     Hypertension Mother     Diabetes Mother     No Known Problems Sister     No Known Problems Brother     No Known Problems Brother        SOCIAL HISTORY    Social History     Tobacco Use    Smoking status: Former Smoker     Packs/day: 1.00     Years: 25.00     Pack years: 25.00     Last attempt to quit: 2008     Years since quittin.1    Smokeless tobacco: Never Used   Substance Use Topics    Alcohol use: No    Drug use: Never       ALLERGIES    Allergies   Allergen Reactions    Coreg [Carvedilol] Other (See Comments)     \"hyper\", rapid pulse       MEDICATIONS    Current Outpatient Medications on File Prior to Encounter   Medication Sig Dispense Refill    atorvastatin (LIPITOR) 40 MG tablet TAKE 1 TABLET BY MOUTH DAILY 30 tablet 0    ranitidine (ZANTAC) 300 MG tablet TAKE 1 TABLET BY MOUTH EVERY DAY 30 tablet 0    fenofibrate 160 MG tablet TAKE 1 TABLET BY MOUTH EVERY NIGHT 90 tablet 4    isosorbide mononitrate (IMDUR) 30 MG extended release tablet Take 1 tablet by mouth daily 30 tablet 5    SITagliptin (JANUVIA) 50 MG tablet Take 1 tablet by mouth daily 90 tablet 3    glipiZIDE (GLUCOTROL) 10 MG tablet TAKE 1 TABLET BY MOUTH TWICE DAILY BEFORE MEALS 180 tablet 3    insulin glargine (LANTUS SOLOSTAR) 100 UNIT/ML injection pen Inject 55 Units into the skin nightly 10 pen 3    metoprolol tartrate (LOPRESSOR) 50 MG tablet Take 1 tablet by mouth 2 times daily 60 tablet 3    amLODIPine (NORVASC) 10 MG tablet Take 1 tablet by mouth daily TAKE 1 TABLET BY MOUTH EVERY NIGHT 90 tablet 3    furosemide (LASIX) 40 MG tablet Take 1 tablet by mouth daily Hold until seen by PCP for followup 90 tablet 3    tamsulosin (FLOMAX) 0.4 MG capsule Take 1 capsule by mouth daily 30 capsule 3    ammonium lactate (LAC-HYDRIN) 12 % cream Apply topically as needed. 385 g 4    mupirocin (BACTROBAN) 2 % ointment APPLY TO WOUNDS D  3    sodium chloride 0.9 % irrigation CLEASE WOUND WITH DRESSING CHANGES  5    sertraline (ZOLOFT) 100 MG tablet Take 1 tablet by mouth daily 90 tablet 3    blood glucose test strips (TRUE METRIX BLOOD GLUCOSE TEST) strip 1 each by In Vitro route 3 times daily As needed. 100 each 3    albuterol (PROVENTIL) (2.5 MG/3ML) 0.083% nebulizer solution Take 3 mLs by nebulization 4 times daily 120 each 5    hydrOXYzine (VISTARIL) 50 MG capsule Take 1 capsule by mouth nightly 90 capsule 2    terbinafine (LAMISIL) 1 % cream Apply topically 2 times daily Apply topically 2 times daily. 1 Tube 3    Insulin Pen Needle (B-D UF III MINI PEN NEEDLES) 31G X 5 MM MISC 1 each by Does not apply route daily 100 each 3    Blood Glucose Monitoring Suppl (ONE TOUCH ULTRA MINI) w/Device KIT 1 kit by Does not apply route three times daily 1 kit 5    Lancets MISC Use  each 3    gabapentin (NEURONTIN) 300 MG capsule Take 1 capsule by mouth 3 times daily for 30 days. . 90 capsule 2    omeprazole (PRILOSEC) 20 MG delayed release capsule Take 1 capsule by mouth daily 90 capsule 1    bacitracin 500 UNIT/GM ointment Apply topically 2 times daily. 1 Tube 0    Insulin Syringe-Needle U-100 (INSULIN SYRINGE 1CC/31GX5/16\") 31G X 5/16\" 1 ML MISC Use once daily to administer Lantus 100 each 1     No current facility-administered medications on file prior to encounter. REVIEW OF SYSTEMS    Pertinent items are noted in HPI.     Objective:      BP (!) 159/83   Pulse 70   Temp 97.8 °F (36.6 °C) (Temporal)   Resp 20     Wt Readings from Last 3 Encounters:   08/22/19 (!) 317 lb (143.8 kg)   08/15/19 (!) 318 lb 12.8 oz (144.6 kg)   08/05/19 (!) 321 lb 12.8 oz (146 kg)       PHYSICAL EXAM    Constitutional:   Well nourished and well developed. Appears neat and clean. Patient is alert, oriented x3, and in no apparent distress. Respiratory:  Respiratory effort is easy and symmetric bilaterally. Rate is normal at rest and on room air. Vascular:  Pedal Pulses is palpable and audible with doppler. Capillary refill is <3 sec to digits bilateral.  Extremities negative for pitting edema. Neurological:  Cranial nerves grossly intact. Deep tendon reflexes of the lower extremities are intact and symmetrical bilaterally. Sensation diminished to touch and vibration. Sensation absent to 10 gram monofilament in extremities. Musculoskeletal:  Gait and station stable. Muscle strength +5/5 to all extrinsic muscles to the foot bilateral.  Full range of motion of ankle, subtalar, and midtarsal joints noted without crepitation. No cyanosis, clubbing or edema noted. Dermatological:  Wound description noted in wound assessment. Ulcer has mixed granular and fibrous base with large hyperkeratotic rim. Small serous drainage noted with some malodor. The wound does not probe deep today and there is no sign of deep abscess. Otherwise, skin is warm, dry, and well-hydrated with normal turgor, texture, and pigmentation. Psychiatric:  Judgement and insight intact. Short and long term memory intact. No evidence of depression, anxiety, or agitation. Patient is calm, cooperative, and communicative. Appropriate interactions and affect.         Assessment:      Patient Active Problem List   Diagnosis Code    Diabetes mellitus ([facility] E11.9    Hyperlipidemia E78.5    Hypertension I10    Callus of foot L84    Chronic bilateral low back pain without sciatica M54.5, G89.29    Cardiomyopathy (HCC) I42.9    Pacemaker Z95.0    Diabetic neuropathy associated with

## 2020-02-01 LAB
GRAM STAIN RESULT: ABNORMAL
ORGANISM: ABNORMAL
WOUND/ABSCESS: ABNORMAL
WOUND/ABSCESS: ABNORMAL

## 2020-02-06 RX ORDER — CIPROFLOXACIN 500 MG/1
500 TABLET, FILM COATED ORAL 2 TIMES DAILY
Qty: 20 TABLET | Refills: 0 | Status: SHIPPED | OUTPATIENT
Start: 2020-02-06 | End: 2020-02-16

## 2020-02-11 ENCOUNTER — OFFICE VISIT (OUTPATIENT)
Dept: FAMILY MEDICINE CLINIC | Age: 60
End: 2020-02-11
Payer: MEDICARE

## 2020-02-11 VITALS
SYSTOLIC BLOOD PRESSURE: 132 MMHG | BODY MASS INDEX: 44.1 KG/M2 | WEIGHT: 315 LBS | HEART RATE: 67 BPM | TEMPERATURE: 97.1 F | DIASTOLIC BLOOD PRESSURE: 78 MMHG | HEIGHT: 71 IN | RESPIRATION RATE: 16 BRPM | OXYGEN SATURATION: 98 %

## 2020-02-11 DIAGNOSIS — Z79.4 TYPE 2 DIABETES MELLITUS WITH DIABETIC NEUROPATHY, WITH LONG-TERM CURRENT USE OF INSULIN (HCC): ICD-10-CM

## 2020-02-11 DIAGNOSIS — E11.40 TYPE 2 DIABETES MELLITUS WITH DIABETIC NEUROPATHY, WITH LONG-TERM CURRENT USE OF INSULIN (HCC): ICD-10-CM

## 2020-02-11 PROBLEM — E55.9 VITAMIN D DEFICIENCY: Chronic | Status: ACTIVE | Noted: 2020-02-11

## 2020-02-11 PROBLEM — N12 PYELONEPHRITIS: Status: RESOLVED | Noted: 2019-08-04 | Resolved: 2020-02-11

## 2020-02-11 PROBLEM — N17.9 ACUTE KIDNEY INJURY SUPERIMPOSED ON CKD (HCC): Status: RESOLVED | Noted: 2019-08-15 | Resolved: 2020-02-11

## 2020-02-11 PROBLEM — N18.9 ACUTE KIDNEY INJURY SUPERIMPOSED ON CKD (HCC): Status: RESOLVED | Noted: 2019-08-15 | Resolved: 2020-02-11

## 2020-02-11 LAB — HBA1C MFR BLD: 7.4 % (ref 4.8–5.9)

## 2020-02-11 PROCEDURE — 3017F COLORECTAL CA SCREEN DOC REV: CPT | Performed by: FAMILY MEDICINE

## 2020-02-11 PROCEDURE — G8427 DOCREV CUR MEDS BY ELIG CLIN: HCPCS | Performed by: FAMILY MEDICINE

## 2020-02-11 PROCEDURE — 2022F DILAT RTA XM EVC RTNOPTHY: CPT | Performed by: FAMILY MEDICINE

## 2020-02-11 PROCEDURE — G8484 FLU IMMUNIZE NO ADMIN: HCPCS | Performed by: FAMILY MEDICINE

## 2020-02-11 PROCEDURE — 1036F TOBACCO NON-USER: CPT | Performed by: FAMILY MEDICINE

## 2020-02-11 PROCEDURE — 99215 OFFICE O/P EST HI 40 MIN: CPT | Performed by: FAMILY MEDICINE

## 2020-02-11 PROCEDURE — G8417 CALC BMI ABV UP PARAM F/U: HCPCS | Performed by: FAMILY MEDICINE

## 2020-02-11 PROCEDURE — 3046F HEMOGLOBIN A1C LEVEL >9.0%: CPT | Performed by: FAMILY MEDICINE

## 2020-02-11 RX ORDER — LISINOPRIL 10 MG/1
TABLET ORAL
Qty: 30 TABLET | Status: CANCELLED | OUTPATIENT
Start: 2020-02-11

## 2020-02-11 RX ORDER — METOPROLOL TARTRATE 50 MG/1
50 TABLET, FILM COATED ORAL 2 TIMES DAILY
Qty: 60 TABLET | Refills: 3 | Status: CANCELLED | OUTPATIENT
Start: 2020-02-11

## 2020-02-11 RX ORDER — FENOFIBRATE 160 MG/1
TABLET ORAL
Qty: 90 TABLET | Refills: 4 | Status: SHIPPED | OUTPATIENT
Start: 2020-02-11 | End: 2020-08-10 | Stop reason: SDUPTHER

## 2020-02-11 RX ORDER — FUROSEMIDE 40 MG/1
40 TABLET ORAL DAILY
Qty: 90 TABLET | Refills: 4 | Status: SHIPPED | OUTPATIENT
Start: 2020-02-11 | End: 2020-08-10 | Stop reason: SDUPTHER

## 2020-02-11 RX ORDER — DIGOXIN 125 MCG
TABLET ORAL
COMMUNITY
Start: 2020-01-20 | End: 2020-02-11 | Stop reason: ALTCHOICE

## 2020-02-11 RX ORDER — SERTRALINE HYDROCHLORIDE 100 MG/1
100 TABLET, FILM COATED ORAL DAILY
Qty: 90 TABLET | Refills: 2 | Status: SHIPPED | OUTPATIENT
Start: 2020-02-11 | End: 2020-08-10 | Stop reason: SDUPTHER

## 2020-02-11 RX ORDER — OMEPRAZOLE 20 MG/1
20 CAPSULE, DELAYED RELEASE ORAL DAILY
Qty: 90 CAPSULE | Refills: 4 | Status: SHIPPED | OUTPATIENT
Start: 2020-02-11 | End: 2020-08-10 | Stop reason: SDUPTHER

## 2020-02-11 RX ORDER — HYDROXYZINE PAMOATE 50 MG/1
50 CAPSULE ORAL NIGHTLY
Qty: 90 CAPSULE | Refills: 2 | Status: CANCELLED | OUTPATIENT
Start: 2020-02-11

## 2020-02-11 RX ORDER — TAMSULOSIN HYDROCHLORIDE 0.4 MG/1
0.4 CAPSULE ORAL DAILY
Qty: 30 CAPSULE | Refills: 3 | Status: CANCELLED | OUTPATIENT
Start: 2020-02-11

## 2020-02-11 RX ORDER — AMLODIPINE BESYLATE 10 MG/1
10 TABLET ORAL DAILY
Qty: 90 TABLET | Refills: 4 | Status: SHIPPED | OUTPATIENT
Start: 2020-02-11 | End: 2020-08-06

## 2020-02-11 RX ORDER — ENALAPRIL MALEATE 20 MG/1
TABLET ORAL
COMMUNITY
Start: 2020-01-20 | End: 2020-02-11

## 2020-02-11 RX ORDER — LISINOPRIL 10 MG/1
10 TABLET ORAL DAILY
COMMUNITY
Start: 2019-11-22 | End: 2020-11-10

## 2020-02-11 RX ORDER — ATORVASTATIN CALCIUM 40 MG/1
40 TABLET, FILM COATED ORAL DAILY
Qty: 90 TABLET | Refills: 4 | Status: SHIPPED | OUTPATIENT
Start: 2020-02-11 | End: 2020-08-10 | Stop reason: SDUPTHER

## 2020-02-11 RX ORDER — ISOSORBIDE MONONITRATE 30 MG/1
30 TABLET, EXTENDED RELEASE ORAL DAILY
Qty: 30 TABLET | Refills: 5 | Status: CANCELLED | OUTPATIENT
Start: 2020-02-11

## 2020-02-11 RX ORDER — RANITIDINE 300 MG/1
300 TABLET ORAL NIGHTLY
Qty: 90 TABLET | Refills: 4 | Status: ON HOLD | OUTPATIENT
Start: 2020-02-11 | End: 2020-04-23

## 2020-02-11 RX ORDER — ERGOCALCIFEROL 1.25 MG/1
50000 CAPSULE ORAL WEEKLY
Status: ON HOLD | COMMUNITY
Start: 2020-02-07

## 2020-02-11 RX ORDER — GABAPENTIN 300 MG/1
300 CAPSULE ORAL 3 TIMES DAILY
Qty: 90 CAPSULE | Refills: 2 | Status: CANCELLED | OUTPATIENT
Start: 2020-02-11 | End: 2020-03-12

## 2020-02-11 RX ORDER — GLIPIZIDE 10 MG/1
TABLET ORAL
Qty: 180 TABLET | Refills: 4 | Status: SHIPPED | OUTPATIENT
Start: 2020-02-11 | End: 2020-08-10 | Stop reason: SDUPTHER

## 2020-02-11 NOTE — PROGRESS NOTES
obesity ([address] 2017    LON (obstructive sleep apnea)     Pyelonephritis 2019    Ureteral calculus     Ureteric stone      Past Surgical History:   Procedure Laterality Date    CARDIAC DEFIBRILLATOR PLACEMENT  2008    CYSTOSCOPY INSERTION / REMOVAL STENT / STONE Left 2019    CYSTOSCOPY LEFT DOUBLE J STENT PLACEMENT ROOM 287 performed by Van Cortez MD at [address] [place] [address] LITHOTRIPSY Left 2019    LEFT ESWL (CONF # [id number]) performed by Van Cortez MD at [address] [place] [address] LITHOTRIPSY N/A 7/10/2019    HOLMIUM LASER LITHOTRIPSY, STONE EXTRACTION, LT URETERAL STENT INSERTION.  performed by Van Cortez MD at [address] [place] [address] Left 7/10/2019    LEFT FLEXIBLE URETEROSCOPY, CYSTOSCOPY RETROGRADE performed by Van Cortez MD at [address] [place] [address] History     Socioeconomic History    Marital status:      Spouse name: Not on file    Number of children: Not on file    Years of education: Not on file    Highest education level: Not on file   Occupational History    Not on file   Social Needs    Financial resource strain: Not on file    Food insecurity:     Worry: Not on file     Inability: Not on file    Transportation needs:     Medical: Not on file     Non-medical: Not on file   Tobacco Use    Smoking status: Former Smoker     Packs/day: 1.00     Years: 25.00     Pack years: 25.00     Last attempt to quit: 2008     Years since quittin.2    Smokeless tobacco: Never Used   Substance and Sexual Activity    Alcohol use: No    Drug use: Never    Sexual activity: Yes     Partners: Female   Lifestyle    Physical activity:     Days per week: Not on file     Minutes per session: Not on file    Stress: Not on file   Relationships    Social connections:     Talks on phone: Not on file     Gets together: Not on file     Attends Jew service: Not on file     Active member of club or organization: Not on file     Attends meetings of clubs or organizations: Not tablet by mouth daily     Dispense:  90 tablet     Refill:  2     NEEDS APPOINTMENT FOR REFILLS    SITagliptin (JANUVIA) 50 MG tablet     Sig: Take 1 tablet by mouth daily     Dispense:  90 tablet     Refill:  4    atorvastatin (LIPITOR) 40 MG tablet     Sig: Take 1 tablet by mouth daily     Dispense:  90 tablet     Refill:  4    amLODIPine (NORVASC) 10 MG tablet     Sig: Take 1 tablet by mouth daily TAKE 1 TABLET BY MOUTH EVERY NIGHT     Dispense:  90 tablet     Refill:  4     NEEDS APPOINTMENT FOR REFILLS    fenofibrate 160 MG tablet     Sig: TAKE 1 TABLET BY MOUTH EVERY NIGHT     Dispense:  90 tablet     Refill:  4    furosemide (LASIX) 40 MG tablet     Sig: Take 1 tablet by mouth daily Hold until seen by PCP for followup     Dispense:  90 tablet     Refill:  4     Medications Discontinued During This Encounter   Medication Reason    hydrOXYzine (VISTARIL) 50 MG capsule LIST CLEANUP    digoxin (LANOXIN) 125 MCG tablet Therapy completed    sodium chloride 0.9 % irrigation Therapy completed    tamsulosin (FLOMAX) 0.4 MG capsule Therapy completed    gabapentin (NEURONTIN) 300 MG capsule LIST CLEANUP    glipiZIDE (GLUCOTROL) 10 MG tablet REORDER    insulin glargine (LANTUS SOLOSTAR) 100 UNIT/ML injection pen REORDER    omeprazole (PRILOSEC) 20 MG delayed release capsule REORDER    ranitidine (ZANTAC) 300 MG tablet REORDER    sertraline (ZOLOFT) 100 MG tablet REORDER    SITagliptin (JANUVIA) 50 MG tablet REORDER    atorvastatin (LIPITOR) 40 MG tablet REORDER    amLODIPine (NORVASC) 10 MG tablet REORDER    fenofibrate 160 MG tablet REORDER    furosemide (LASIX) 40 MG tablet REORDER    enalapril (VASOTEC) 20 MG tablet LIST CLEANUP     Reviewed with the patient: all disease processes, current clinical status, medications, activities and diet.      Side effects, adverse effects of the medication prescribed today, as well as treatment plan/ rationale and result expectations have been discussed with the

## 2020-02-13 ENCOUNTER — HOSPITAL ENCOUNTER (OUTPATIENT)
Dept: WOUND CARE | Age: 60
Discharge: HOME | End: 2020-02-13
Payer: MEDICARE

## 2020-02-13 VITALS
TEMPERATURE: 98.2 F | WEIGHT: 315 LBS | BODY MASS INDEX: 44.1 KG/M2 | HEIGHT: 71 IN | RESPIRATION RATE: 16 BRPM | DIASTOLIC BLOOD PRESSURE: 83 MMHG | HEART RATE: 68 BPM | SYSTOLIC BLOOD PRESSURE: 154 MMHG

## 2020-02-13 PROCEDURE — 11042 DBRDMT SUBQ TIS 1ST 20SQCM/<: CPT

## 2020-02-13 NOTE — PROGRESS NOTES
Kilo Ferrera MD at [address] [place] [address] LITHOTRIPSY Left 2019    LEFT ESWL (CONF # [id number]) performed by Phill Gottron, MD at [address] [place] [address] LITHOTRIPSY N/A 7/10/2019    HOLMIUM LASER LITHOTRIPSY, STONE EXTRACTION, LT URETERAL STENT INSERTION.  performed by Phill Gottron, MD at [facility] Left 7/10/2019    LEFT FLEXIBLE URETEROSCOPY, CYSTOSCOPY RETROGRADE performed by Phill Gottron, MD at [address] [place] [address] HISTORY    Family History   Problem Relation Age of Onset    Heart Disease Mother     Kidney Disease Mother     Hypertension Mother     Diabetes Mother     No Known Problems Sister     No Known Problems Brother     No Known Problems Brother        SOCIAL HISTORY    Social History     Tobacco Use    Smoking status: Former Smoker     Packs/day: 1.00     Years: 25.00     Pack years: 25.00     Last attempt to quit: 2008     Years since quittin.2    Smokeless tobacco: Never Used   Substance Use Topics    Alcohol use: No    Drug use: Never       ALLERGIES    Allergies   Allergen Reactions    Coreg [Carvedilol] Other (See Comments)     \"hyper\", rapid pulse       MEDICATIONS    Current Outpatient Medications on File Prior to Encounter   Medication Sig Dispense Refill    lisinopril (PRINIVIL;ZESTRIL) 10 MG tablet TK 1 T PO QD      vitamin D (ERGOCALCIFEROL) 1.25 MG (04680 UT) CAPS capsule TK 1 C PO 1 TIME A WK      glipiZIDE (GLUCOTROL) 10 MG tablet TAKE 1 TABLET BY MOUTH TWICE DAILY BEFORE MEALS 180 tablet 4    insulin glargine (LANTUS SOLOSTAR) 100 UNIT/ML injection pen Inject 55 Units into the skin nightly 10 pen 4    omeprazole (PRILOSEC) 20 MG delayed release capsule Take 1 capsule by mouth daily 90 capsule 4    ranitidine (ZANTAC) 300 MG tablet Take 1 tablet by mouth nightly 90 tablet 4    sertraline (ZOLOFT) 100 MG tablet Take 1 tablet by mouth daily 90 tablet 2    SITagliptin (JANUVIA) 50 MG tablet Take 1 tablet by mouth daily 90 tablet 4    atorvastatin (LIPITOR) 40 E11.40    Morbid obesity ([facility] E66.01    LON (obstructive sleep apnea) G47.33    Chronic systolic heart failure ([facility]) I50.22    ICD (implantable cardioverter-defibrillator) in place Z95.810    Anxiety and depression F41.9, F32.9    Diabetic foot infection ([facility]) E11.628, L08.9    Gastroesophageal reflux disease without esophagitis K21.9    Keratoderma of foot, acquired L85.1    Non-pressure chronic ulcer of other part of right foot limited to breakdown of skin ([address] L97.511    Dizziness R42    CKD stage 3 due to type 2 diabetes mellitus ([facility]) E11.22, N18.3    Type 2 diabetes mellitus with right diabetic foot ulcer ([facility]) E11.621, L97.519    Diabetic ulcer of right midfoot associated with type 2 diabetes mellitus, with bone involvement without evidence of necrosis ([facility]) E11.621, L97.416    Non-pressure chronic ulcer of other part of right foot with fat layer exposed ([address] L97.512    Cellulitis of right foot L03.115    Vitamin D deficiency E55.9        Procedure Note  Indications:  Based on my examination of this patient's wound(s)/ulcer(s) today, debridement is required to promote healing and evaluate the wound base. Performed by: Jo-Ann Eid DPM    Consent obtained:  Yes    Time out taken:  Yes    Pain Control:         Debridement:Excisional Debridement    Using #15 blade scalpel the wound(s)/ulcer(s) was/were sharply debrided down through and including the removal of subcutaneous tissue.         Devitalized Tissue Debrided:  fibrin, necrotic/eschar and callus    Pre Debridement Measurements:  Are located in the [place]  Documentation Flow Sheet    Wound/Ulcer #: 1    Post Debridement Measurements:  Wound/Ulcer Descriptions are Pre Debridement except measurements:    Wound 01/17/20 Foot Right;Plantar #1  (Active)   Wound Image   2/13/2020  2:06 PM   Wound Diabetic Ram 2 2/13/2020  2:06 PM   Offloading for Diabetic Foot Ulcers Post op shoe 1/17/2020  2:46 PM   Dressing/Treatment Antibacterial ointment;Collagen with Ag;Dry dressing 1/30/2020  2:50 PM   Wound Cleansed Rinsed/Irrigated with saline 2/13/2020  2:06 PM   Wound Length (cm) 1 cm 2/13/2020  2:06 PM   Wound Width (cm) 0.9 cm 2/13/2020  2:06 PM   Wound Depth (cm) 0.1 cm 2/13/2020  2:06 PM   Wound Surface Area (cm^2) 0.9 cm^2 2/13/2020  2:06 PM   Change in Wound Size % (l*w) 25 2/13/2020  2:06 PM   Wound Volume (cm^3) 0.09 cm^3 2/13/2020  2:06 PM   Wound Healing % 85 2/13/2020  2:06 PM   Post-Procedure Length (cm) 1 cm 2/13/2020  2:26 PM   Post-Procedure Width (cm) 0.9 cm 2/13/2020  2:26 PM   Post-Procedure Depth (cm) 0.1 cm 2/13/2020  2:26 PM   Post-Procedure Surface Area (cm^2) 0.9 cm^2 2/13/2020  2:26 PM   Post-Procedure Volume (cm^3) 0.09 cm^3 2/13/2020  2:26 PM   Undermining Starts ___ O'Clock 12 2/13/2020  2:06 PM   Undermining Ends___ O'Clock 11.9 2/13/2020  2:06 PM   Undermining Maxium Distance (cm) 0.1 2/13/2020  2:06 PM   Wound Assessment Red 1/17/2020  1:33 PM   Drainage Amount Scant 2/13/2020  2:06 PM   Drainage Description Serosanguinous 2/13/2020  2:06 PM   Odor None 2/13/2020  2:06 PM   Margins Defined edges 2/13/2020  2:06 PM   Mei-wound Assessment Calloused 2/13/2020  2:06 PM   Non-staged Wound Description Full thickness 2/13/2020  2:06 PM   Red%Wound Bed 100 2/13/2020  2:06 PM   Number of days: 27       Incision 06/12/19 Back Left (Active)   Number of days: 246       Incision 07/10/19 Penis (Active)   Number of days: 218       Percent of Wound/Ulcer Debrided: 100%    Total Surface Area Debrided:  0.09 sq cm     Diabetic/Pressure/Non Pressure Ulcers:  Ulcer: Diabetic ulcer, fat layer exposed      Bleeding:  Minimal    Hemostasis Achieved:  by pressure    Procedural Pain:  0  / 10     Post Procedural Pain:  0 / 10     Response to treatment:  Well tolerated by patient. Plan: Will continue with gentamicin and Paula every other day since he is improving. New plastazote added to surgical shoe.   Will JOSSELINE Wells on 2/13/2020 at 2:34 PM          Electronically signed by Maranda Garner DPM on 2/13/2020 at 2:35 PM

## 2020-02-13 NOTE — CODE DOCUMENTATION
[address] [facility] Billing Sheet. Josr Lemus  AGE: 56 y.o.    GENDER: male  : 1960  TODAY'S DATE:  2020    ICD-10  [facility] Problems    Diagnosis Date Noted    Non-pressure chronic ulcer of other part of right foot with fat layer exposed ([facility] [L97.512] 2020    Type 2 diabetes mellitus with right diabetic foot ulcer ([facility] [O98.264, L97.519] 2020    Diabetic neuropathy associated with type 2 diabetes mellitus ([address] [E11.40] 2017    Diabetes mellitus ([address] [E11.9]        PHYSICIAN PROCEDURES    CPT CODE  82822      Electronically signed by Ryan Ash DPM on 2020 at 2:41 PM

## 2020-02-20 ENCOUNTER — HOSPITAL ENCOUNTER (OUTPATIENT)
Dept: CARDIOLOGY | Age: 60
Discharge: HOME | End: 2020-02-20
Payer: MEDICARE

## 2020-02-20 PROCEDURE — 93282 PRGRMG EVAL IMPLANTABLE DFB: CPT

## 2020-02-27 ENCOUNTER — HOSPITAL ENCOUNTER (OUTPATIENT)
Dept: WOUND CARE | Age: 60
Discharge: HOME | End: 2020-02-27
Payer: MEDICARE

## 2020-02-27 VITALS
DIASTOLIC BLOOD PRESSURE: 74 MMHG | BODY MASS INDEX: 44.1 KG/M2 | HEIGHT: 71 IN | WEIGHT: 315 LBS | SYSTOLIC BLOOD PRESSURE: 139 MMHG | HEART RATE: 77 BPM | RESPIRATION RATE: 16 BRPM | TEMPERATURE: 96.6 F

## 2020-02-27 PROCEDURE — 11042 DBRDMT SUBQ TIS 1ST 20SQCM/<: CPT

## 2020-02-27 NOTE — DISCHARGE INSTR - COC
Continuity of Care Form    Patient Name: Snehal Ruff   :  1960  MRN:  [id number]    Admit date:  2020  Discharge date:  ***    Code Status Order: Prior   Advance Directives:   [facility] Documentation     Date/Time Healthcare Directive Type of Healthcare Directive Copy in [facility] [address] Agent's Name Healthcare Agent's Phone Number    20 1430  No, patient does not have an advance directive for healthcare treatment -- -- -- -- --          Admitting Physician:  No admitting provider for patient encounter. PCP: Tomasz Quinones MD    Discharging Nurse: [facility] Unit/Room#: No information available for this encounter. Discharging Unit Phone Number: ***    Emergency Contact:   Extended Emergency Contact Information  Primary Emergency Contact: Annel Briones, [address] Phone: [phone]  Work Phone: [phone]  Mobile Phone: [phone]  Relation: Other  Secondary Emergency Contact: [address] [place] [address] Phone: [phone]  Mobile Phone: [phone]  Relation: Other   needed? No    Past Surgical History:  Past Surgical History:   Procedure Laterality Date    CARDIAC DEFIBRILLATOR PLACEMENT  2008    CYSTOSCOPY INSERTION / REMOVAL STENT / STONE Left 2019    CYSTOSCOPY LEFT DOUBLE J STENT PLACEMENT ROOM [facility] performed by Demian Avery MD at [address] [place] [address] LITHOTRIPSY Left 2019    LEFT ESWL (CONF # [id number]) performed by Demian Avery MD at [address] [place] [address] LITHOTRIPSY N/A 7/10/2019    HOLMIUM LASER LITHOTRIPSY, STONE EXTRACTION, LT URETERAL STENT INSERTION.  performed by Demian Avery MD at [address] [place] [address] Left 7/10/2019    LEFT FLEXIBLE URETEROSCOPY, CYSTOSCOPY RETROGRADE performed by Demian A[facility] MD at [facility]       Immunization History:   Immunization History   Administered Date(s) Administered    Pneumococcal Polysaccharide ([id number]) 05/16/2019       Active Problems:  Patient Active Problem List   Diagnosis Code    Diabetes mellitus ([facility] E11.9    Hyperlipidemia E78.5    Hypertension I10    Chronic bilateral low back pain without sciatica M54.5, G89.29    Cardiomyopathy ([facility] I42.9    Pacemaker Z95.0    Diabetic neuropathy associated with type 2 diabetes mellitus ([facility] E11.40    Morbid obesity ([facility]) E66.01    LON (obstructive sleep apnea) G47.33    Chronic systolic heart failure ([facility]) I50.22    ICD (implantable cardioverter-defibrillator) in place Z95.810    Anxiety and depression F41.9, F32.9    Diabetic foot infection ([facility]) E11.628, L08.9    Gastroesophageal reflux disease without esophagitis K21.9    Keratoderma of foot, acquired L85.1    Non-pressure chronic ulcer of other part of right foot limited to breakdown of skin ([facility]) L97.511    Dizziness R42    CKD stage 3 due to type 2 diabetes mellitus ([facility]) E11.22, N18.3    Type 2 diabetes mellitus with right diabetic foot ulcer ([facility] E11.621, L97.519    Diabetic ulcer of right midfoot associated with type 2 diabetes mellitus, with bone involvement without evidence of necrosis ([facility]) E11.621, L97.416    Non-pressure chronic ulcer of other part of right foot with fat layer exposed ([facility] L97.512    Cellulitis of right foot L03.115    Vitamin D deficiency E55.9       Isolation/Infection:   Isolation          No Isolation        Patient Infection Status     None to display          Nurse Assessment:  Last Vital Signs: /74   Pulse 77   Temp 96.6 °F (35.9 °C) (Temporal)   Resp 16   Ht 5' 11\" (1.803 m)   Wt (!) 340 lb (154.2 kg)   BMI 47.42 kg/m²     Last documented pain score (0-10 scale):    Last Weight:   Wt Readings from Last 1 Encounters:   02/27/20 (!) 340 lb (154.2 kg)     Mental Status:  {IP PT MENTAL STATUS:20030}    IV Access:  {[facility] IV ACCESS:[id number]}    Nursing Mobility/ADLs:  Walking   {[facility] BNQS:[id number]}  Transfer  {[facility]

## 2020-02-27 NOTE — PROGRESS NOTES
[facility] [address]                                                   Progress Note and Procedure Note      Jonothan Olszewski [place]  MEDICAL RECORD NUMBER:  [id number]  AGE: 56 y.o. GENDER: male  : 1960  EPISODE DATE:  2020    Subjective:     Chief Complaint   Patient presents with    Wound Check     right foot         HISTORY of PRESENT ILLNESS HPI     Carolina Cornell is a 56 y.o. male who presents today with his son for wound/ulcer evaluation. History of Wound Context: Diabetic grade 2 ulcer sub 1st Metatarsal head right foot is improving. He is using gentamicin and halie every other day and using plastazote in post-op shoe. Denies fever, chills, nausea or vomiting. States diabetes has been controlled but was 200mg/dl this morning.   Wound/Ulcer Pain Timing/Severity: none  Quality of pain: N/A  Severity:  0 / 10   Modifying Factors: None  Associated Signs/Symptoms: none    Ulcer Identification:  Ulcer Type: diabetic  Contributing Factors: diabetes, poor glucose control and chronic pressure    Wound: N/A        PAST MEDICAL HISTORY        Diagnosis Date    Acute kidney injury superimposed on CKD ([address] 8/15/2019    Anxiety and depression 2019    Cardiomyopathy ([address]     Chronic systolic heart failure ([address] 2019    Diabetes mellitus ([address]     Diabetic neuropathy associated with type 2 diabetes mellitus ([address] 2017    Heart failure, NYHA class 4 ([address]     Hyperlipidemia     Hypertension     ICD (implantable cardioverter-defibrillator) in place 2019    Left ureteral calculus     Morbid obesity ([address] 2017    LON (obstructive sleep apnea)     Pyelonephritis 2019    Ureteral calculus     Ureteric stone        PAST SURGICAL HISTORY    Past Surgical History:   Procedure Laterality Date    CARDIAC DEFIBRILLATOR PLACEMENT  2008    CYSTOSCOPY INSERTION / REMOVAL STENT / STONE Left 2019    CYSTOSCOPY LEFT DOUBLE J STENT PLACEMENT ROOM 287 performed by failure ([address] I50.22    ICD (implantable cardioverter-defibrillator) in place Z95.810    Anxiety and depression F41.9, F32.9    Diabetic foot infection (HCC) E11.628, L08.9    Gastroesophageal reflux disease without esophagitis K21.9    Keratoderma of foot, acquired L85.1    Non-pressure chronic ulcer of other part of right foot limited to breakdown of skin ([address] L97.511    Dizziness R42    CKD stage 3 due to type 2 diabetes mellitus (HCC) E11.22, N18.3    Type 2 diabetes mellitus with right diabetic foot ulcer ([address] E11.621, L97.519    Diabetic ulcer of right midfoot associated with type 2 diabetes mellitus, with bone involvement without evidence of necrosis (HCC) E11.621, L97.416    Non-pressure chronic ulcer of other part of right foot with fat layer exposed ([address] L97.512    Cellulitis of right foot L03.115    Vitamin D deficiency E55.9        Procedure Note  Indications:  Based on my examination of this patient's wound(s)/ulcer(s) today, debridement is required to promote healing and evaluate the wound base. Performed by: Elie Riley DPM    Consent obtained:  Yes    Time out taken:  Yes    Pain Control: Anesthetic  Anesthetic: None       Debridement:Excisional Debridement    Using #15 blade scalpel the wound(s)/ulcer(s) was/were sharply debrided down through and including the removal of subcutaneous tissue.         Devitalized Tissue Debrided:  fibrin, necrotic/eschar and callus    Pre Debridement Measurements:  Are located in the [place]  Documentation Flow Sheet    Wound/Ulcer #: 1    Post Debridement Measurements:  Wound/Ulcer Descriptions are Pre Debridement except measurements:    Wound 01/17/20 Foot Right;Plantar #1  (Active)   Wound Image   2/27/2020  2:31 PM   Wound Diabetic Ram 2 2/27/2020  2:31 PM   Offloading for Diabetic Foot Ulcers Post op shoe 2/27/2020  2:55 PM   Dressing/Treatment Antibacterial ointment;Collagen with Ag 2/27/2020  2:55 PM   Wound Cleansed Rinsed/Irrigated with saline 2/27/2020  2:31 PM   Wound Length (cm) 0.6 cm 2/27/2020  2:31 PM   Wound Width (cm) 0.6 cm 2/27/2020  2:31 PM   Wound Depth (cm) 0.1 cm 2/27/2020  2:31 PM   Wound Surface Area (cm^2) 0.36 cm^2 2/27/2020  2:31 PM   Change in Wound Size % (l*w) 70 2/27/2020  2:31 PM   Wound Volume (cm^3) 0.04 cm^3 2/27/2020  2:31 PM   Wound Healing % 93 2/27/2020  2:31 PM   Post-Procedure Length (cm) 0.6 cm 2/27/2020  2:46 PM   Post-Procedure Width (cm) 0.6 cm 2/27/2020  2:46 PM   Post-Procedure Depth (cm) 0.1 cm 2/27/2020  2:46 PM   Post-Procedure Surface Area (cm^2) 0.36 cm^2 2/27/2020  2:46 PM   Post-Procedure Volume (cm^3) 0.04 cm^3 2/27/2020  2:46 PM   Undermining Starts ___ O'Clock 12 2/27/2020  2:31 PM   Undermining Ends___ O'Clock 11.9 2/27/2020  2:31 PM   Undermining Maxium Distance (cm) 0.2 2/27/2020  2:31 PM   Wound Assessment Red 1/17/2020  1:33 PM   Drainage Amount Scant 2/27/2020  2:31 PM   Drainage Description Serosanguinous 2/27/2020  2:31 PM   Odor None 2/27/2020  2:31 PM   Margins Defined edges 2/27/2020  2:31 PM   Mei-wound Assessment Calloused;Dry 2/27/2020  2:31 PM   Non-staged Wound Description Full thickness 2/27/2020  2:31 PM   Red%Wound Bed 100 2/27/2020  2:31 PM   Number of days: 41       Incision 06/12/19 Back Left (Active)   Number of days: 260       Incision 07/10/19 Penis (Active)   Number of days: 232       Percent of Wound/Ulcer Debrided: 100%    Total Surface Area Debrided:  0.36 sq cm     Diabetic/Pressure/Non Pressure Ulcers:  Ulcer: Diabetic ulcer, fat layer exposed      Bleeding:  Minimal    Hemostasis Achieved:  by pressure    Procedural Pain:  0  / 10     Post Procedural Pain:  0 / 10     Response to treatment:  Well tolerated by patient. Plan: Will continue with current dressing changes using gentamicin ointment and halie. Will re-evaluate in 2 weeks. Plastazote re-inforced today to better off-load the area.       Treatment Note please see attached Discharge Instructions    In my professional opinion this patient would benefit from HBO Therapy: No    Written patient dismissal instructions given to patient and signed by patient or POA. Discharge Instructions         Discharge Instructions           Discharge [facility]   Physician Orders and Discharge [address]  [facility] [address] [place] [address]  Telephone: [phone]      [phone]        NAME: Juve Lemus  DATE OF BIRTH:  1960  MEDICAL RECORD NUMBER:  [id number]     Home Care/Facility:     Wound Location:  Right Plantar Foot     Dressing orders: 1. Cleanse wound(s) with normal saline.  Thin layer of Gentamicin  2. Apply dry KARISHMA  Or equivalent to wound bed. 3. Moisten KARISHMA with a few drops of normal saline. 4. Cover with 4x4's and wrap with gauze (ghulam or kerlix)  5. Change  Every other day or Monday, Wednesday, and Friday     Compression:     Offloading Device: Surgical Shoe with plastizote to offload     Other Instructions:       Keep all dressings clean, dry and intact.  Keep pressure off the wound(s) at all times.      Follow up visit   2 Weeks  March 12, 2020 @  2:30     Please give 24 hour notice if unable to keep appointment. [phone]     If you experience any of the following, please call the Wound Care Service at  [phone] or go to the nearest emergency room.        *Increase in pain         *Temperature over 101           *Increase in drainage from your wound or a foul odor  *Uncontrolled swelling            *Need for compression bandage changes due to slippage, breakthrough drainage     PLEASE NOTE: IF YOU ARE UNABLE TO OBTAIN WOUND SUPPLIES, CONTINUE TO USE THE SUPPLIES YOU HAVE AVAILABLE UNTIL YOU ARE ABLE TO [facility].  IT IS MOST IMPORTANT TO KEEP THE WOUND COVERED AT ALL TIMES  Electronically signed by Mirta Maher DPM on 2/27/2020 at 2:54

## 2020-03-06 ENCOUNTER — OFFICE VISIT (OUTPATIENT)
Dept: CARDIOLOGY CLINIC | Age: 60
End: 2020-03-06
Payer: MEDICARE

## 2020-03-06 ENCOUNTER — HOSPITAL ENCOUNTER (OUTPATIENT)
Dept: CARDIOLOGY | Age: 60
Discharge: HOME | End: 2020-03-06
Payer: MEDICARE

## 2020-03-06 VITALS
OXYGEN SATURATION: 96 % | SYSTOLIC BLOOD PRESSURE: 130 MMHG | WEIGHT: 315 LBS | BODY MASS INDEX: 47.7 KG/M2 | HEART RATE: 70 BPM | DIASTOLIC BLOOD PRESSURE: 60 MMHG

## 2020-03-06 PROCEDURE — 1036F TOBACCO NON-USER: CPT | Performed by: INTERNAL MEDICINE

## 2020-03-06 PROCEDURE — G8484 FLU IMMUNIZE NO ADMIN: HCPCS | Performed by: INTERNAL MEDICINE

## 2020-03-06 PROCEDURE — 3051F HG A1C>EQUAL 7.0%<8.0%: CPT | Performed by: INTERNAL MEDICINE

## 2020-03-06 PROCEDURE — 93282 PRGRMG EVAL IMPLANTABLE DFB: CPT

## 2020-03-06 PROCEDURE — G8427 DOCREV CUR MEDS BY ELIG CLIN: HCPCS | Performed by: INTERNAL MEDICINE

## 2020-03-06 PROCEDURE — G8417 CALC BMI ABV UP PARAM F/U: HCPCS | Performed by: INTERNAL MEDICINE

## 2020-03-06 PROCEDURE — 2022F DILAT RTA XM EVC RTNOPTHY: CPT | Performed by: INTERNAL MEDICINE

## 2020-03-06 PROCEDURE — 99214 OFFICE O/P EST MOD 30 MIN: CPT | Performed by: INTERNAL MEDICINE

## 2020-03-06 PROCEDURE — 3017F COLORECTAL CA SCREEN DOC REV: CPT | Performed by: INTERNAL MEDICINE

## 2020-03-06 NOTE — PROGRESS NOTES
Chief Complaint   Patient presents with    6 Month Follow-Up    Cardiomyopathy    Congestive Heart Failure    Pacemaker Problem     PATIENT NEEDS BATTERY REPLACED PER PACEMAKER CLINIC -- THEY ARE SENDING REPORT TO [facility]       12-19-17: Patient presents for initial medical evaluation. Patient is followed on a regular basis by Dr. Niraj Mary MD. Hx of NICMP s/p [employer] AICD placement in 2008 in [place]. S/pECHO in 11-11-10 with EF of 25%, LV and LAE, abnormal DD. States he had a LHC 10 years ago and no PCI was performed. Denies any AICD shocks. states he had an AICD check 5-6 months ago and battery was at 35% he states. He has not had it checked since. Pt denies chest pain,   nausea, vomiting, diarrhea, constipation, motor weakness, insomnia, weight loss, syncope, dizziness, lightheadedness, palpitations, PND, orthopnea, or claudication. He is compliant with meds. No nitro use. BP and hr are good. CAD is stable. No LE discoloration or ulcers. No CHF type symptoms. Lipid profile is normal. Denies any SOB or fatigue. Does have LE edema. 4-17-18: Pt denies chest pain, dyspnea, dyspnea on exertion, change in exercise capacity, fatigue,  nausea, vomiting, diarrhea, constipation, motor weakness, insomnia, weight loss, syncope, dizziness, lightheadedness, palpitations, PND, orthopnea, or claudication. No nitro use. BP and hr are good. CAD is stable. No LE discoloration or ulcers. No LE edema. No CHF type symptoms. Lipid profile is normal. No recent hospitalization. No change in meds. s/p AICD check and no issues. Sleeps on 2-3 pillows which is normal for him he does snore loud at night per wife. No hx of sleep study. On ASA, statin, bblocker, ACEI, digoxin, lasix. 5-7-19: has a right foot plantar foot infection for the past week, following with dr. Mary Anne hector. He was given abx. No hx of PAD or LE vascular procedures.  Pt denies chest pain, dyspnea, dyspnea on exertion, change in exercise capacity, fatigue,  nausea, vomiting, diarrhea, constipation, motor weakness, insomnia, weight loss, syncope, dizziness, lightheadedness, palpitations, PND, orthopnea, or claudication. No AICD shocks. BP and hr are good. CAD is stable. No LE discoloration or ulcers. No LE edema. No CHF type symptoms. Lipid profile is normal. No recent hospitalization. No change in meds. S/p AICD check which was normal in 4/19. States he got a sleep study. EKG with NSR    8-22-19: was hospitalized at [facility] 3 times recently for renal stones, uncontrolled DM/BP. Was taken off of ACEI. Has stage III renal failure. Has right foot pain, will follow up with podiatry. No open wounds. Pt denies chest pain, dyspnea, nausea, vomiting, diarrhea, constipation, motor weakness, insomnia, weight loss, syncope, dizziness, lightheadedness, palpitations, PND, orthopnea. 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 Hx of NICMP, s/p AICD, no shocks. Now has recovered CMP per most recent echo. ECHO 5/19  Summary   ICD wire noted in RA/RV   Normal left ventricle structure and function.   Left ventricular ejection fraction is visually estimated at 50%.  E/A flow reversal noted. Suggestive of diastolic dysfunction. 3-6-20: S/P AICD check and device is at [facility], he was hearing beeping. Has right foot pain, will follow up with podiatry. 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 States wound is doing ok but still not completely healed for over a year now. his BS is under control. Hx of NICMP, s/p AICD, no shocks. Now has recovered CMP per most recent echo. Pt denies chest pain, dyspnea, dyspnea on exertion, change in exercise capacity, fatigue,  nausea, vomiting, diarrhea, constipation, motor weakness, insomnia, weight loss, syncope, dizziness, lightheadedness, palpitations, PND, orthopnea, or claudication. No nitro use. BP and hr are good.  No LE Spouse name: Not on file    Number of children: Not on file    Years of education: Not on file    Highest education level: Not on file   Occupational History    Not on file   Social Needs    Financial resource strain: Not on file    Food insecurity:     Worry: Not on file     Inability: Not on file    Transportation needs:     Medical: Not on file     Non-medical: Not on file   Tobacco Use    Smoking status: Former Smoker     Packs/day: 1.00     Years: 25.00     Pack years: 25.00     Last attempt to quit: 2008     Years since quittin.2    Smokeless tobacco: Never Used   Substance and Sexual Activity    Alcohol use: No    Drug use: Never    Sexual activity: Yes     Partners: Female   Lifestyle    Physical activity:     Days per week: Not on file     Minutes per session: Not on file    Stress: Not on file   Relationships    Social connections:     Talks on phone: Not on file     Gets together: Not on file     Attends [employer] service: Not on file     Active member of club or organization: Not on file     Attends meetings of clubs or organizations: Not on file     Relationship status: Not on file    Intimate partner violence:     Fear of current or ex partner: Not on file     Emotionally abused: Not on file     Physically abused: Not on file     Forced sexual activity: Not on file   Other Topics Concern    Not on file   Social History Narrative    Not on file       Family History   Problem Relation Age of Onset    Heart Disease Mother     Kidney Disease Mother     Hypertension Mother     Diabetes Mother     No Known Problems Sister     No Known Problems Brother     No Known Problems Brother        Current Outpatient Medications   Medication Sig Dispense Refill    lisinopril (PRINIVIL;ZESTRIL) 10 MG tablet TK 1 T PO QD      vitamin D (ERGOCALCIFEROL) 1.25 MG (20205 UT) CAPS capsule TK 1 C PO 1 TIME A WK      glipiZIDE (GLUCOTROL) 10 MG tablet TAKE 1 TABLET BY MOUTH TWICE DAILY 1. ICD (implantable cardioverter-defibrillator) in place  External Referral to Cardiology   2. Mixed hyperlipidemia     3. CKD stage 3 due to type 2 diabetes mellitus ([facility]     4. LON (obstructive sleep apnea)     5. Morbid obesity ([facility]     6. Dilated cardiomyopathy ([facility]     7. Chronic systolic heart failure ([facility]     8. PAD (peripheral artery disease) (HCC)  IR Angiogram Extremity Bilateral         PLAN:     Patient will need to continue to follow up with you for their general medical care and return to see me in the office in 3 months. As always, aggressive risk factor modification is strongly recommended. We should adhere to the [address] VII guidelines for HTN management and the NCEP ATP III guidelines for LDL-C management. Cardiac diet is always recommended with low fat, cholesterol, calories and sodium. Continue medications at current doses. Will schedule patient for bilateral lower extremity angiogram to rule out obstructive PAD given non healing right foot wound. Will need fluid hydration. Continue with device clinic for AICD checks    Check EKG    Multiple signs and symptoms suggestive of sleep apnea, will consider sleep study after cardiac evaluation is completed    PAD monitoring in future    Follow up with podiatry. No nephrotoxic agents. Refer to EP for AICD replacement. The importance of daily weights, dietary sodium restriction, and contact with cardiology if weight is increased more than 3 lbs in any 48 hour period was stressed. The patient has been advised to contact us if they experience progressive SOB, orthopnea, PND or progressive edema. Patient was advised and encouraged to check blood pressure at home or at a pharmacy, maintain a logbook, and also call us back if blood pressure are above the target ranges or if it is low. Patient clearly understands and agrees to the instructions.      We will need to continue to monitor muscle and liver enzymes, BUN, CR, and electrolytes. Details of medical condition explained and patient was warned about adverse consequences of uncontrolled medical conditions and possible side effects of prescribed medications.

## 2020-03-09 ENCOUNTER — CLINICAL DOCUMENTATION (OUTPATIENT)
Dept: CARDIOLOGY CLINIC | Age: 60
End: 2020-03-09

## 2020-03-09 NOTE — TELEPHONE ENCOUNTER
Pharmacy requesting medication refill.  Please approve or deny this request.    Rx requested:  Requested Prescriptions     Pending Prescriptions Disp Refills    isosorbide mononitrate (IMDUR) 30 MG extended release tablet [Pharmacy Med Name: ISOSORBIDE MONONITRATE 30MG ER TABS] 30 tablet 5     Sig: TAKE 1 TABLET BY MOUTH DAILY         Last Office Visit:   3/6/2020      Next Visit Date:  Future Appointments   Date Time Provider [facility]   3/12/2020  2:30 PM Bessy Shahid, [address] [place] [address]   3/16/2020 12:30 PM [place] ULTRASOUND 1 [facility] ULTRA [facility] Fac RAD   3/16/2020 12:30 PM [facility] CATH LAB RM 2 [address] [place] [address]   5/5/2020  5:30 PM Yesy Corrigan MD [address] [place] [address]

## 2020-03-10 RX ORDER — ISOSORBIDE MONONITRATE 30 MG/1
30 TABLET, EXTENDED RELEASE ORAL DAILY
Qty: 90 TABLET | Refills: 3 | Status: SHIPPED | OUTPATIENT
Start: 2020-03-10 | End: 2021-02-01 | Stop reason: SDUPTHER

## 2020-03-12 ENCOUNTER — HOSPITAL ENCOUNTER (OUTPATIENT)
Dept: WOUND CARE | Age: 60
Discharge: HOME | End: 2020-03-12
Payer: MEDICARE

## 2020-03-12 VITALS
SYSTOLIC BLOOD PRESSURE: 156 MMHG | TEMPERATURE: 98.7 F | DIASTOLIC BLOOD PRESSURE: 72 MMHG | HEART RATE: 73 BPM | RESPIRATION RATE: 18 BRPM

## 2020-03-12 PROCEDURE — 11042 DBRDMT SUBQ TIS 1ST 20SQCM/<: CPT

## 2020-03-12 NOTE — PLAN OF CARE
Problem: Blood Glucose:  Goal: Ability to maintain appropriate glucose levels will improve  Description: Ability to maintain appropriate glucose levels will improve  Outcome: Ongoing     Problem: Wound:  Goal: Will show signs of wound healing; wound closure and no evidence of infection  Description: Will show signs of wound healing; wound closure and no evidence of infection  Outcome: Ongoing

## 2020-03-12 NOTE — CODE DOCUMENTATION
[id number] [facility] Billing Sheet. Josr Lemus  AGE: 56 y.o.    GENDER: male  : 1960  TODAY'S DATE:  3/12/2020    ICD-10  [facility] Street Problems    Diagnosis Date Noted    Non-pressure chronic ulcer of other part of right foot with fat layer exposed ([facility] [L97.512] 2020    Type 2 diabetes mellitus with right diabetic foot ulcer ([facility] [O59.761, L97.519] 2020    Diabetic neuropathy associated with type 2 diabetes mellitus ([facility] [E11.40] 2017       PHYSICIAN PROCEDURES    CPT CODE  86343      Electronically signed by Shahzad Baker DPM on 3/12/2020 at 3:11 PM

## 2020-03-12 NOTE — PROGRESS NOTES
[facility] [address]                                                   Progress Note and Procedure Note      Sunny Lemus  MEDICAL RECORD NUMBER:  [id number]  AGE: 56 y.o. GENDER: male  : 1960  EPISODE DATE:  3/12/2020    Subjective:     Chief Complaint   Patient presents with    Wound Check     right foot         HISTORY of PRESENT ILLNESS HPI     Barbara Garcia is a 56 y.o. male who presents today for wound/ulcer evaluation. History of Wound Context: Grade 2 diabetic right foot ulcer is much improved today. Denies fever, chills, nausea or vomiting. He is using gentamicin ointment and Paula every other day and using his surgical shoe with plastazote insert to off-load the area.   Wound/Ulcer Pain Timing/Severity: none  Quality of pain: N/A  Severity:  0 / 10   Modifying Factors: None  Associated Signs/Symptoms: none    Ulcer Identification:  Ulcer Type: diabetic  Contributing Factors: diabetes and chronic pressure    Wound: N/A        PAST MEDICAL HISTORY        Diagnosis Date    Acute kidney injury superimposed on CKD ([address] 8/15/2019    Anxiety and depression 2019    Cardiomyopathy ([address]     Chronic systolic heart failure ([address] 2019    Diabetes mellitus ([address]     Diabetic neuropathy associated with type 2 diabetes mellitus ([address] 2017    Heart failure, NYHA class 4 ([address]     Hyperlipidemia     Hypertension     ICD (implantable cardioverter-defibrillator) in place 2019    Left ureteral calculus     Morbid obesity ([address] 2017    LON (obstructive sleep apnea)     Pyelonephritis 2019    Ureteral calculus     Ureteric stone        PAST SURGICAL HISTORY    Past Surgical History:   Procedure Laterality Date    CARDIAC DEFIBRILLATOR PLACEMENT  2008    CYSTOSCOPY INSERTION / REMOVAL STENT / STONE Left 2019    CYSTOSCOPY LEFT DOUBLE J STENT PLACEMENT ROOM 287 performed by Adrianna Sidhu MD at [address] [place] [address] LITHOTRIPSY Left 2019    LEFT ESWL [facility] # [id number]) performed by Kristine Sheehan MD at [address] LITHOTRIPSY N/A 7/10/2019    HOLMIUM LASER LITHOTRIPSY, STONE EXTRACTION, LT URETERAL STENT INSERTION.  performed by Kristine Sheehan MD at [address] Left 7/10/2019    LEFT FLEXIBLE URETEROSCOPY, CYSTOSCOPY RETROGRADE performed by Kristine Sheehan MD at [facility] HISTORY    Family History   Problem Relation Age of Onset    Heart Disease Mother     Kidney Disease Mother     Hypertension Mother     Diabetes Mother     No Known Problems Sister     No Known Problems Brother     No Known Problems Brother        SOCIAL HISTORY    Social History     Tobacco Use    Smoking status: Former Smoker     Packs/day: 1.00     Years: 25.00     Pack years: 25.00     Last attempt to quit: 2008     Years since quittin.2    Smokeless tobacco: Never Used   Substance Use Topics    Alcohol use: No    Drug use: Never       ALLERGIES    Allergies   Allergen Reactions    Coreg [Carvedilol] Other (See Comments)     \"hyper\", rapid pulse       MEDICATIONS    Current Outpatient Medications on File Prior to Encounter   Medication Sig Dispense Refill    isosorbide mononitrate (IMDUR) 30 MG extended release tablet TAKE 1 TABLET BY MOUTH DAILY 90 tablet 3    lisinopril (PRINIVIL;ZESTRIL) 10 MG tablet TK 1 T PO QD      vitamin D (ERGOCALCIFEROL) 1.25 MG (49771 UT) CAPS capsule TK 1 C PO 1 TIME A WK      glipiZIDE (GLUCOTROL) 10 MG tablet TAKE 1 TABLET BY MOUTH TWICE DAILY BEFORE MEALS 180 tablet 4    insulin glargine (LANTUS SOLOSTAR) 100 UNIT/ML injection pen Inject 55 Units into the skin nightly 10 pen 4    omeprazole (PRILOSEC) 20 MG delayed release capsule Take 1 capsule by mouth daily 90 capsule 4    ranitidine (ZANTAC) 300 MG tablet Take 1 tablet by mouth nightly 90 tablet 4    sertraline (ZOLOFT) 100 MG tablet Take 1 tablet by mouth daily 90 tablet 2    SITagliptin (JANUVIA) 50 MG tablet Take 1 tablet by mouth daily 90 340 lb (154.2 kg)   02/13/20 (!) 340 lb (154.2 kg)       PHYSICAL EXAM    Constitutional:   Well nourished and well developed. Appears neat and clean. Patient is alert, oriented x3, and in no apparent distress. Respiratory:  Respiratory effort is easy and symmetric bilaterally. Rate is normal at rest and on room air. Vascular:  Pedal Pulses is not palpable and audible with doppler. Capillary refill is <3 sec to digits bilateral.  Extremities negative for pitting edema. Neurological:  Cranial nerves grossly intact. Deep tendon reflexes of the lower extremities are intact and symmetrical bilaterally. Sensation normal to touch and vibration. Sensation intact to 10 gram monofilament in extremities. Musculoskeletal:  Gait and station stable. Muscle strength +5/5 to all extrinsic muscles to the foot bilateral.  Full range of motion of ankle, subtalar, and midtarsal joints noted without crepitation. No cyanosis, clubbing or edema noted. Dermatological:  Wound description noted in wound assessment. Wound margins rina with granular base and hyperkeratotic rim. Otherwise, skin is warm, dry, and well-hydrated with normal turgor, texture, and pigmentation. Psychiatric:  Judgement and insight intact. Short and long term memory intact. No evidence of depression, anxiety, or agitation. Patient is calm, cooperative, and communicative. Appropriate interactions and affect.         Assessment:      Patient Active Problem List   Diagnosis Code    Diabetes mellitus ([address] E11.9    Hyperlipidemia E78.5    Hypertension I10    Chronic bilateral low back pain without sciatica M54.5, G89.29    Cardiomyopathy ([address] I42.9    Pacemaker Z95.0    Diabetic neuropathy associated with type 2 diabetes mellitus ([facility] E11.40    Morbid obesity (HCC) E66.01    LON (obstructive sleep apnea) G47.33    Chronic systolic heart failure (HCC) I50.22    ICD (implantable cardioverter-defibrillator) in place Z95.810  Anxiety and depression F41.9, F32.9    Diabetic foot infection (HCC) E11.628, L08.9    Gastroesophageal reflux disease without esophagitis K21.9    Keratoderma of foot, acquired L85.1    Non-pressure chronic ulcer of other part of right foot limited to breakdown of skin ([address] L97.511    Dizziness R42    CKD stage 3 due to type 2 diabetes mellitus ([facility]) E11.22, N18.3    Type 2 diabetes mellitus with right diabetic foot ulcer ([address] E11.621, L97.519    Diabetic ulcer of right midfoot associated with type 2 diabetes mellitus, with bone involvement without evidence of necrosis ([facility]) E11.621, L97.416    Non-pressure chronic ulcer of other part of right foot with fat layer exposed ([address] L97.512    Cellulitis of right foot L03.115    Vitamin D deficiency E55.9        Procedure Note  Indications:  Based on my examination of this patient's wound(s)/ulcer(s) today, debridement is required to promote healing and evaluate the wound base. Performed by: Dnona Solis DPM    Consent obtained:  Yes    Time out taken:  Yes    Pain Control:         Debridement:Excisional Debridement    Using #15 blade scalpel the wound(s)/ulcer(s) was/were sharply debrided down through and including the removal of subcutaneous tissue.         Devitalized Tissue Debrided:  fibrin, slough and callus    Pre Debridement Measurements:  Are located in the [facility]  Documentation Flow Sheet    Wound/Ulcer #: 1    Post Debridement Measurements:  Wound/Ulcer Descriptions are Pre Debridement except measurements:    Wound 01/17/20 Foot Right;Plantar #1  (Active)   Wound Image   3/12/2020  2:44 PM   Wound Diabetic Ram 2 3/12/2020  2:44 PM   Offloading for Diabetic Foot Ulcers Post op shoe 2/27/2020  2:55 PM   Dressing/Treatment Antibacterial ointment;Collagen with Ag 2/27/2020  2:55 PM   Wound Cleansed Rinsed/Irrigated with saline 3/12/2020  2:44 PM   Wound Length (cm) 0.4 cm 3/12/2020  2:44 PM   Wound Width (cm) 0.3 cm 3/12/2020  2:44 PM Wound Depth (cm) 0.2 cm 3/12/2020  2:44 PM   Wound Surface Area (cm^2) 0.12 cm^2 3/12/2020  2:44 PM   Change in Wound Size % (l*w) 90 3/12/2020  2:44 PM   Wound Volume (cm^3) 0.02 cm^3 3/12/2020  2:44 PM   Wound Healing % 97 3/12/2020  2:44 PM   Post-Procedure Length (cm) 0.4 cm 3/12/2020  2:59 PM   Post-Procedure Width (cm) 0.3 cm 3/12/2020  2:59 PM   Post-Procedure Depth (cm) 0.1 cm 3/12/2020  2:59 PM   Post-Procedure Surface Area (cm^2) 0.12 cm^2 3/12/2020  2:59 PM   Post-Procedure Volume (cm^3) 0.01 cm^3 3/12/2020  2:59 PM   Undermining Starts ___ O'Clock 12 2/27/2020  2:31 PM   Undermining Ends___ O'Clock 11.9 2/27/2020  2:31 PM   Undermining Maxium Distance (cm) 0.2 2/27/2020  2:31 PM   Wound Assessment Red 1/17/2020  1:33 PM   Drainage Amount Scant 3/12/2020  2:44 PM   Drainage Description Serosanguinous 3/12/2020  2:44 PM   Odor None 3/12/2020  2:44 PM   Margins Defined edges 3/12/2020  2:44 PM   Mei-wound Assessment Calloused;Dry 3/12/2020  2:44 PM   Non-staged Wound Description Full thickness 2/27/2020  2:31 PM   Red%Wound Bed 100 3/12/2020  2:44 PM   Number of days: 55       Incision 06/12/19 Back Left (Active)   Number of days: 274       Incision 07/10/19 Penis (Active)   Number of days: 246       Percent of Wound/Ulcer Debrided: 100%    Total Surface Area Debrided:  0.12 sq cm     Diabetic/Pressure/Non Pressure Ulcers:  Ulcer: Diabetic ulcer, fat layer exposed      Bleeding:  Minimal    Hemostasis Achieved:  by pressure    Procedural Pain:  0  / 10     Post Procedural Pain:  0 / 10     Response to treatment:  Well tolerated by patient. Plan: Will continue with Paula and gentamicin every other day. Will re-evaluate in 2 weeks. Treatment Note please see attached Discharge Instructions    In my professional opinion this patient would benefit from HBO Therapy: No    Written patient dismissal instructions given to patient and signed by patient or POA.          Discharge Instructions

## 2020-04-23 ENCOUNTER — APPOINTMENT (OUTPATIENT)
Dept: GENERAL RADIOLOGY | Age: 60
End: 2020-04-23
Attending: INTERNAL MEDICINE
Payer: MEDICARE

## 2020-04-23 ENCOUNTER — HOSPITAL ENCOUNTER (OUTPATIENT)
Dept: CARDIAC CATH/INVASIVE PROCEDURES | Age: 60
Setting detail: OBSERVATION
Discharge: HOME | End: 2020-04-24
Attending: INTERNAL MEDICINE | Admitting: INTERNAL MEDICINE
Payer: MEDICARE

## 2020-04-23 PROBLEM — I50.43 CHF (CONGESTIVE HEART FAILURE), NYHA CLASS III, ACUTE ON CHRONIC, COMBINED (HCC): Status: ACTIVE | Noted: 2020-04-23

## 2020-04-23 LAB
EKG ATRIAL RATE: 67 BPM
EKG P AXIS: 43 DEGREES
EKG P-R INTERVAL: 206 MS
EKG Q-T INTERVAL: 428 MS
EKG QRS DURATION: 112 MS
EKG QTC CALCULATION (BAZETT): 452 MS
EKG R AXIS: -9 DEGREES
EKG T AXIS: 61 DEGREES
EKG VENTRICULAR RATE: 67 BPM
GLUCOSE BLD-MCNC: 153 MG/DL (ref 60–115)
GLUCOSE BLD-MCNC: 213 MG/DL (ref 60–115)
PERFORMED ON: ABNORMAL
PERFORMED ON: ABNORMAL

## 2020-04-23 PROCEDURE — 2580000003 HC RX 258: Performed by: INTERNAL MEDICINE

## 2020-04-23 PROCEDURE — 6360000002 HC RX W HCPCS: Performed by: INTERNAL MEDICINE

## 2020-04-23 PROCEDURE — C1898 LEAD, PMKR, OTHER THAN TRANS: HCPCS

## 2020-04-23 PROCEDURE — 33241 REMOVE PULSE GENERATOR: CPT | Performed by: INTERNAL MEDICINE

## 2020-04-23 PROCEDURE — G0378 HOSPITAL OBSERVATION PER HR: HCPCS

## 2020-04-23 PROCEDURE — 2580000003 HC RX 258

## 2020-04-23 PROCEDURE — 33249 INSJ/RPLCMT DEFIB W/LEAD(S): CPT | Performed by: INTERNAL MEDICINE

## 2020-04-23 PROCEDURE — 2780000010 HC IMPLANT OTHER

## 2020-04-23 PROCEDURE — 93005 ELECTROCARDIOGRAM TRACING: CPT

## 2020-04-23 PROCEDURE — 6360000004 HC RX CONTRAST MEDICATION: Performed by: INTERNAL MEDICINE

## 2020-04-23 PROCEDURE — 33244 REMOVE ELCTRD TRANSVENOUSLY: CPT | Performed by: INTERNAL MEDICINE

## 2020-04-23 PROCEDURE — 2500000003 HC RX 250 WO HCPCS

## 2020-04-23 PROCEDURE — 6370000000 HC RX 637 (ALT 250 FOR IP): Performed by: INTERNAL MEDICINE

## 2020-04-23 PROCEDURE — 71045 X-RAY EXAM CHEST 1 VIEW: CPT

## 2020-04-23 PROCEDURE — C1721 AICD, DUAL CHAMBER: HCPCS

## 2020-04-23 PROCEDURE — 6370000000 HC RX 637 (ALT 250 FOR IP)

## 2020-04-23 PROCEDURE — 6360000002 HC RX W HCPCS

## 2020-04-23 PROCEDURE — 2709999900 HC NON-CHARGEABLE SUPPLY

## 2020-04-23 RX ORDER — METOPROLOL TARTRATE 50 MG/1
50 TABLET, FILM COATED ORAL 2 TIMES DAILY
Status: DISCONTINUED | OUTPATIENT
Start: 2020-04-23 | End: 2020-04-24 | Stop reason: HOSPADM

## 2020-04-23 RX ORDER — AMLODIPINE BESYLATE 10 MG/1
10 TABLET ORAL DAILY
Status: DISCONTINUED | OUTPATIENT
Start: 2020-04-23 | End: 2020-04-24 | Stop reason: HOSPADM

## 2020-04-23 RX ORDER — SODIUM CHLORIDE 0.9 % (FLUSH) 0.9 %
10 SYRINGE (ML) INJECTION PRN
Status: DISCONTINUED | OUTPATIENT
Start: 2020-04-23 | End: 2020-04-24 | Stop reason: HOSPADM

## 2020-04-23 RX ORDER — ACETAMINOPHEN 325 MG/1
650 TABLET ORAL EVERY 4 HOURS PRN
Status: DISCONTINUED | OUTPATIENT
Start: 2020-04-23 | End: 2020-04-24 | Stop reason: HOSPADM

## 2020-04-23 RX ORDER — SODIUM CHLORIDE 450 MG/100ML
INJECTION, SOLUTION INTRAVENOUS CONTINUOUS
Status: DISCONTINUED | OUTPATIENT
Start: 2020-04-23 | End: 2020-04-24 | Stop reason: HOSPADM

## 2020-04-23 RX ORDER — SERTRALINE HYDROCHLORIDE 100 MG/1
100 TABLET, FILM COATED ORAL DAILY
Status: DISCONTINUED | OUTPATIENT
Start: 2020-04-23 | End: 2020-04-24 | Stop reason: HOSPADM

## 2020-04-23 RX ORDER — ISOSORBIDE MONONITRATE 30 MG/1
30 TABLET, EXTENDED RELEASE ORAL DAILY
Status: DISCONTINUED | OUTPATIENT
Start: 2020-04-23 | End: 2020-04-24 | Stop reason: HOSPADM

## 2020-04-23 RX ORDER — SODIUM CHLORIDE 0.9 % (FLUSH) 0.9 %
10 SYRINGE (ML) INJECTION EVERY 12 HOURS SCHEDULED
Status: DISCONTINUED | OUTPATIENT
Start: 2020-04-23 | End: 2020-04-24 | Stop reason: HOSPADM

## 2020-04-23 RX ORDER — ATORVASTATIN CALCIUM 40 MG/1
40 TABLET, FILM COATED ORAL NIGHTLY
Status: DISCONTINUED | OUTPATIENT
Start: 2020-04-23 | End: 2020-04-24 | Stop reason: HOSPADM

## 2020-04-23 RX ORDER — ONDANSETRON 2 MG/ML
4 INJECTION INTRAMUSCULAR; INTRAVENOUS EVERY 6 HOURS PRN
Status: DISCONTINUED | OUTPATIENT
Start: 2020-04-23 | End: 2020-04-24 | Stop reason: HOSPADM

## 2020-04-23 RX ORDER — KETOROLAC TROMETHAMINE 30 MG/ML
30 INJECTION, SOLUTION INTRAMUSCULAR; INTRAVENOUS EVERY 6 HOURS
Status: DISCONTINUED | OUTPATIENT
Start: 2020-04-23 | End: 2020-04-24 | Stop reason: HOSPADM

## 2020-04-23 RX ORDER — PANTOPRAZOLE SODIUM 40 MG/1
40 TABLET, DELAYED RELEASE ORAL
Status: DISCONTINUED | OUTPATIENT
Start: 2020-04-24 | End: 2020-04-24 | Stop reason: HOSPADM

## 2020-04-23 RX ORDER — LISINOPRIL 10 MG/1
10 TABLET ORAL DAILY
Status: DISCONTINUED | OUTPATIENT
Start: 2020-04-23 | End: 2020-04-24 | Stop reason: HOSPADM

## 2020-04-23 RX ORDER — FUROSEMIDE 40 MG/1
40 TABLET ORAL DAILY
Status: DISCONTINUED | OUTPATIENT
Start: 2020-04-23 | End: 2020-04-24 | Stop reason: HOSPADM

## 2020-04-23 RX ORDER — FENOFIBRATE 160 MG/1
160 TABLET ORAL DAILY
Status: DISCONTINUED | OUTPATIENT
Start: 2020-04-23 | End: 2020-04-24 | Stop reason: HOSPADM

## 2020-04-23 RX ORDER — GLIPIZIDE 10 MG/1
10 TABLET ORAL
Status: DISCONTINUED | OUTPATIENT
Start: 2020-04-23 | End: 2020-04-24 | Stop reason: HOSPADM

## 2020-04-23 RX ORDER — INSULIN GLARGINE 100 [IU]/ML
55 INJECTION, SOLUTION SUBCUTANEOUS NIGHTLY
Status: DISCONTINUED | OUTPATIENT
Start: 2020-04-23 | End: 2020-04-24 | Stop reason: HOSPADM

## 2020-04-23 RX ORDER — ALOGLIPTIN 12.5 MG/1
12.5 TABLET, FILM COATED ORAL DAILY
Status: DISCONTINUED | OUTPATIENT
Start: 2020-04-23 | End: 2020-04-24 | Stop reason: HOSPADM

## 2020-04-23 RX ORDER — ERGOCALCIFEROL 1.25 MG/1
50000 CAPSULE ORAL WEEKLY
Status: DISCONTINUED | OUTPATIENT
Start: 2020-04-23 | End: 2020-04-24 | Stop reason: HOSPADM

## 2020-04-23 RX ADMIN — KETOROLAC TROMETHAMINE 30 MG: 30 INJECTION, SOLUTION INTRAMUSCULAR at 18:27

## 2020-04-23 RX ADMIN — ATORVASTATIN CALCIUM 40 MG: 40 TABLET, FILM COATED ORAL at 20:27

## 2020-04-23 RX ADMIN — INSULIN GLARGINE 55 UNITS: 100 INJECTION, SOLUTION SUBCUTANEOUS at 20:27

## 2020-04-23 RX ADMIN — IOVERSOL 30 ML: 678 INJECTION INTRA-ARTERIAL; INTRAVENOUS at 09:02

## 2020-04-23 RX ADMIN — Medication 10 ML: at 20:34

## 2020-04-23 RX ADMIN — GLIPIZIDE 10 MG: 10 TABLET ORAL at 16:18

## 2020-04-23 RX ADMIN — METOPROLOL TARTRATE 50 MG: 50 TABLET, FILM COATED ORAL at 20:27

## 2020-04-23 RX ADMIN — VANCOMYCIN HYDROCHLORIDE 1000 MG: 1 INJECTION, POWDER, LYOPHILIZED, FOR SOLUTION INTRAVENOUS at 07:48

## 2020-04-23 RX ADMIN — SODIUM CHLORIDE: 4.5 INJECTION, SOLUTION INTRAVENOUS at 07:48

## 2020-04-23 RX ADMIN — KETOROLAC TROMETHAMINE 30 MG: 30 INJECTION, SOLUTION INTRAMUSCULAR at 13:12

## 2020-04-23 ASSESSMENT — PAIN SCALES - GENERAL
PAINLEVEL_OUTOF10: 3
PAINLEVEL_OUTOF10: 2
PAINLEVEL_OUTOF10: 0

## 2020-04-23 ASSESSMENT — PAIN DESCRIPTION - PAIN TYPE: TYPE: SURGICAL PAIN

## 2020-04-23 ASSESSMENT — PAIN DESCRIPTION - LOCATION: LOCATION: CHEST

## 2020-04-23 ASSESSMENT — PAIN DESCRIPTION - ORIENTATION: ORIENTATION: LEFT

## 2020-04-24 ENCOUNTER — APPOINTMENT (OUTPATIENT)
Dept: GENERAL RADIOLOGY | Age: 60
End: 2020-04-24
Attending: INTERNAL MEDICINE
Payer: MEDICARE

## 2020-04-24 VITALS
BODY MASS INDEX: 44.1 KG/M2 | WEIGHT: 315 LBS | HEART RATE: 66 BPM | TEMPERATURE: 98.2 F | SYSTOLIC BLOOD PRESSURE: 180 MMHG | RESPIRATION RATE: 19 BRPM | OXYGEN SATURATION: 100 % | HEIGHT: 71 IN | DIASTOLIC BLOOD PRESSURE: 89 MMHG

## 2020-04-24 LAB
GLUCOSE BLD-MCNC: 116 MG/DL (ref 60–115)
PERFORMED ON: ABNORMAL

## 2020-04-24 PROCEDURE — 6360000002 HC RX W HCPCS: Performed by: INTERNAL MEDICINE

## 2020-04-24 PROCEDURE — 2580000003 HC RX 258: Performed by: INTERNAL MEDICINE

## 2020-04-24 PROCEDURE — 96376 TX/PRO/DX INJ SAME DRUG ADON: CPT

## 2020-04-24 PROCEDURE — 6370000000 HC RX 637 (ALT 250 FOR IP): Performed by: INTERNAL MEDICINE

## 2020-04-24 PROCEDURE — 96374 THER/PROPH/DIAG INJ IV PUSH: CPT

## 2020-04-24 PROCEDURE — G0378 HOSPITAL OBSERVATION PER HR: HCPCS

## 2020-04-24 PROCEDURE — 71046 X-RAY EXAM CHEST 2 VIEWS: CPT

## 2020-04-24 RX ADMIN — FUROSEMIDE 40 MG: 40 TABLET ORAL at 08:14

## 2020-04-24 RX ADMIN — KETOROLAC TROMETHAMINE 30 MG: 30 INJECTION, SOLUTION INTRAMUSCULAR at 06:38

## 2020-04-24 RX ADMIN — METOPROLOL TARTRATE 50 MG: 50 TABLET, FILM COATED ORAL at 08:14

## 2020-04-24 RX ADMIN — ALOGLIPTIN 12.5 MG: 12.5 TABLET, FILM COATED ORAL at 08:14

## 2020-04-24 RX ADMIN — Medication 10 ML: at 08:15

## 2020-04-24 RX ADMIN — FENOFIBRATE 160 MG: 160 TABLET ORAL at 08:16

## 2020-04-24 RX ADMIN — AMLODIPINE BESYLATE 10 MG: 10 TABLET ORAL at 08:14

## 2020-04-24 RX ADMIN — KETOROLAC TROMETHAMINE 30 MG: 30 INJECTION, SOLUTION INTRAMUSCULAR at 00:17

## 2020-04-24 RX ADMIN — PANTOPRAZOLE SODIUM 40 MG: 40 TABLET, DELAYED RELEASE ORAL at 06:38

## 2020-04-24 RX ADMIN — VANCOMYCIN HYDROCHLORIDE 1000 MG: 1 INJECTION, POWDER, LYOPHILIZED, FOR SOLUTION INTRAVENOUS at 08:14

## 2020-04-24 RX ADMIN — ISOSORBIDE MONONITRATE 30 MG: 30 TABLET, EXTENDED RELEASE ORAL at 08:14

## 2020-04-24 RX ADMIN — GLIPIZIDE 10 MG: 10 TABLET ORAL at 06:38

## 2020-04-24 RX ADMIN — SERTRALINE 100 MG: 100 TABLET, FILM COATED ORAL at 08:14

## 2020-04-24 RX ADMIN — LISINOPRIL 10 MG: 10 TABLET ORAL at 08:14

## 2020-04-24 ASSESSMENT — PAIN SCALES - GENERAL
PAINLEVEL_OUTOF10: 0
PAINLEVEL_OUTOF10: 0
PAINLEVEL_OUTOF10: 4

## 2020-04-24 NOTE — OP NOTE
[address]                      [address] [place], [address] [facility]                                OPERATIVE REPORT    PATIENT NAME: Roel Vanessa                 :        1960  MED REC NO:   [id number]                            ROOM:       W168  ACCOUNT NO:   [de-identified]                           ADMIT DATE: 2020  PROVIDER:     Rosmery Daniel MD    DATE OF PROCEDURE:  2020    The procedure was performed in the Electrophysiology Laboratory On  2020. PROCEDURES PERFORMED:  1. Fluoroscopy. 2.  Left upper extremity venogram.  3.  Pocket opening. 4.  Single-chamber ICD explantation. 5.  Implantation of a new right atrial lead. 6.  Implantation of a new dual-chamber ICD. 7.  Pocket refashion. 8.  Device testing. 9.  DFT testing. 10.  Pre and post ICD interrogation and reprogramming. PATIENT HISTORY:  Please refer to the history and physical in the  patient's medical chart. History of cardiomyopathy - _____ ICD battery  at [facility] as well as sinus node dysfunction. The patient is scheduled for  upgrade of his device to a dual-chamber ICD system. PROCEDURE NARRATIVE (COMPLEX PROCEDURE): The device was interrogated  and reprogrammed with all therapies turned off. The procedure, risks,  benefits, and possible complications were explained to the patient and  informed consent was obtained. The patient was in a fasting state. A  grounding pad was placed. Self-adhesive anterior and posterior  defibrillation pads were applied. A defibrillator waveform was set to  biphasic. The patient was set up for continuous monitoring of 12-lead  EKG and pulse oximetry. Blood pressure was monitored. The procedure  was performed under IV conscious sedation. The upper chest was prepped  and draped in the usual sterile fashion.   After preoperative IV  antibiotic was completely infused, subcutaneous tissues medial to the  left deltopectoral area were

## 2020-04-25 ENCOUNTER — CARE COORDINATION (OUTPATIENT)
Dept: CARE COORDINATION | Age: 60
End: 2020-04-25

## 2020-04-27 ENCOUNTER — CARE COORDINATION (OUTPATIENT)
Dept: CARE COORDINATION | Age: 60
End: 2020-04-27

## 2020-04-27 NOTE — CARE COORDINATION
Patient contacted regarding recent discharge and COVID-19 risk   Care Transition Nurse/ Ambulatory Care Manager contacted the  by telephone to perform post discharge assessment. Verified name and  with  as identifiers. Spoke with Jackson Smith. Patient has following risk factors of: heart failure, diabetes and chronic kidney disease. CTN/ACM reviewed discharge instructions, medical action plan and red flags related to discharge diagnosis. Reviewed and educated them on any new and changed medications related to discharge diagnosis. Advised obtaining a 90-day supply of all daily and as-needed medications. Education provided regarding infection prevention, and signs and symptoms of COVID-19 and when to seek medical attention with  who verbalized understanding. Discussed exposure protocols and quarantine from [address] you at higher risk for severe illness  and given an opportunity for questions and concerns. The  agrees to contact the COVID-19 hotline [phone] or PCP office for questions related to their healthcare. CTN/ACM provided contact information for future reference. From CDC: Are you at higher risk for severe illness?  Wash your hands often.  Avoid close contact (6 feet, which is about two arm lengths) with people who are sick.  Put distance between yourself and other people if COVID-19 is spreading in your community.  Clean and disinfect frequently touched surfaces.  Avoid all cruise travel and non-essential air travel.  Call your healthcare professional if you have concerns about COVID-19 and your underlying condition or if you are sick. For more information on steps you can take to protect yourself, see CDC's How to [id number] [place] [address] for follow-up call in 7-14 days based on severity of symptoms and risk factors  .

## 2020-04-28 ENCOUNTER — VIRTUAL VISIT (OUTPATIENT)
Dept: CARDIOLOGY CLINIC | Age: 60
End: 2020-04-28
Payer: MEDICARE

## 2020-04-28 PROCEDURE — 3017F COLORECTAL CA SCREEN DOC REV: CPT | Performed by: INTERNAL MEDICINE

## 2020-04-28 PROCEDURE — 1036F TOBACCO NON-USER: CPT | Performed by: INTERNAL MEDICINE

## 2020-04-28 PROCEDURE — 3051F HG A1C>EQUAL 7.0%<8.0%: CPT | Performed by: INTERNAL MEDICINE

## 2020-04-28 PROCEDURE — G8428 CUR MEDS NOT DOCUMENT: HCPCS | Performed by: INTERNAL MEDICINE

## 2020-04-28 PROCEDURE — 2022F DILAT RTA XM EVC RTNOPTHY: CPT | Performed by: INTERNAL MEDICINE

## 2020-04-28 PROCEDURE — G8417 CALC BMI ABV UP PARAM F/U: HCPCS | Performed by: INTERNAL MEDICINE

## 2020-04-28 PROCEDURE — 99213 OFFICE O/P EST LOW 20 MIN: CPT | Performed by: INTERNAL MEDICINE

## 2020-04-28 ASSESSMENT — ENCOUNTER SYMPTOMS
SHORTNESS OF BREATH: 0
VOMITING: 0
ABDOMINAL PAIN: 0
NAUSEA: 0
WHEEZING: 0
GASTROINTESTINAL NEGATIVE: 1
EYES NEGATIVE: 1
ALLERGIC/IMMUNOLOGIC NEGATIVE: 1

## 2020-04-28 NOTE — PROGRESS NOTES
appearing [x] Sclera clear  [] Lips are cyanotic      [x] Breathing appears normal  [] Appears tachypneic      [] Rash on visible skin    [] Cranial Nerves II-XII grossly intact    [] Motor grossly intact in visible upper extremities    [] Motor grossly intact in visible lower extremities    [x] Normal Mood  [] Anxious appearing    [] Depressed appearing  [] Confused appearing      [] Poor short term memory  [] Poor long term memory    [] OTHER:      Due to this being a TeleHealth encounter, evaluation of the following organ systems is limited: Vitals/Constitutional/EENT/Resp/CV/GI//MS/Neuro/Skin/Heme-Lymph-Imm. ASSESSMENT:        Diagnosis Orders   1. Chronic systolic heart failure ([facility]     2. CKD stage 3 due to type 2 diabetes mellitus ([facility]     3. Diabetic foot infection ([facility]     4. Essential hypertension     5. Mixed hyperlipidemia     6. Morbid obesity ([facility]         PLAN:        PLAN:      Patient will need to continue to follow up with you for their general medical care and return to see me in the office in 3 months.      As always, aggressive risk factor modification is strongly recommended. We should adhere to the JNC VII guidelines for HTN management and the NCEP ATP III guidelines for LDL-C management.     Cardiac diet is always recommended with low fat, cholesterol, calories and sodium.     Continue medications at current doses.      Will schedule patient for bilateral lower extremity angiogram to rule out obstructive PAD given non healing right foot wound.  Will need fluid hydration.      Continue with device clinic for AICD checks     Check EKG     Multiple signs and symptoms suggestive of sleep apnea, will consider sleep study after cardiac evaluation is completed     PAD monitoring in future     Follow up with podiatry.      No nephrotoxic agents.        The importance of daily weights, dietary sodium restriction, and contact with cardiology if weight is increased more than 3 lbs in any 48 hour

## 2020-05-05 ENCOUNTER — VIRTUAL VISIT (OUTPATIENT)
Dept: FAMILY MEDICINE CLINIC | Age: 60
End: 2020-05-05
Payer: MEDICARE

## 2020-05-05 DIAGNOSIS — E11.40 TYPE 2 DIABETES MELLITUS WITH DIABETIC NEUROPATHY, WITH LONG-TERM CURRENT USE OF INSULIN (HCC): ICD-10-CM

## 2020-05-05 DIAGNOSIS — Z79.4 TYPE 2 DIABETES MELLITUS WITH DIABETIC NEUROPATHY, WITH LONG-TERM CURRENT USE OF INSULIN (HCC): ICD-10-CM

## 2020-05-05 DIAGNOSIS — N18.30 CKD STAGE 3 DUE TO TYPE 2 DIABETES MELLITUS (HCC): Chronic | ICD-10-CM

## 2020-05-05 DIAGNOSIS — E11.22 CKD STAGE 3 DUE TO TYPE 2 DIABETES MELLITUS (HCC): Chronic | ICD-10-CM

## 2020-05-05 LAB
ANION GAP SERPL CALCULATED.3IONS-SCNC: 14 MEQ/L (ref 9–15)
BUN BLDV-MCNC: 27 MG/DL (ref 8–23)
CALCIUM SERPL-MCNC: 9.1 MG/DL (ref 8.5–9.9)
CHLORIDE BLD-SCNC: 101 MEQ/L (ref 95–107)
CO2: 22 MEQ/L (ref 20–31)
CREAT SERPL-MCNC: 1.83 MG/DL (ref 0.7–1.2)
GFR AFRICAN AMERICAN: 45.6
GFR NON-AFRICAN AMERICAN: 37.7
GLUCOSE BLD-MCNC: 224 MG/DL (ref 70–99)
HBA1C MFR BLD: 8.2 % (ref 4.8–5.9)
POTASSIUM SERPL-SCNC: 4.7 MEQ/L (ref 3.4–4.9)
SODIUM BLD-SCNC: 137 MEQ/L (ref 135–144)

## 2020-05-05 PROCEDURE — 3017F COLORECTAL CA SCREEN DOC REV: CPT | Performed by: FAMILY MEDICINE

## 2020-05-05 PROCEDURE — 99214 OFFICE O/P EST MOD 30 MIN: CPT | Performed by: FAMILY MEDICINE

## 2020-05-05 PROCEDURE — G8427 DOCREV CUR MEDS BY ELIG CLIN: HCPCS | Performed by: FAMILY MEDICINE

## 2020-05-05 PROCEDURE — 3051F HG A1C>EQUAL 7.0%<8.0%: CPT | Performed by: FAMILY MEDICINE

## 2020-05-05 PROCEDURE — 2022F DILAT RTA XM EVC RTNOPTHY: CPT | Performed by: FAMILY MEDICINE

## 2020-05-05 NOTE — PROGRESS NOTES
Normocephalic, atraumatic. External Ears [x] Normal  [] Abnormal-     Neck: [x] No visualized mass     Pulmonary/Chest: [x] Respiratory effort normal.  [x] No visualized signs of difficulty breathing or respiratory distress        [] Abnormal-      Musculoskeletal:   [x] Normal gait with no signs of ataxia         [x] Normal range of motion of neck        [] Abnormal-       Neurological:        [x] No Facial Asymmetry (Cranial nerve 7 motor function) (limited exam to video visit)          [x] No gaze palsy        [] Abnormal-         Skin:        [x] No significant exanthematous lesions or discoloration noted on facial skin         [] Abnormal-            Psychiatric:       [x] Normal Affect [] No Hallucinations        [] Abnormal-     Other pertinent observable physical exam findings-     ASSESSMENT/PLAN:  Neva Bui was seen today for hypertension and diabetes. Diagnoses and all orders for this visit:    Chronic systolic heart failure ([address]  Comments:  Stable, fair control on current meds. Essential hypertension  Comments:  Stable and well-controlled per patient report. CHF (congestive heart failure), NYHA class III, acute on chronic, combined ([address]  Comments:  Stable, well controlled. Diabetic foot infection ([address]  Comments:  Per patient report, stable, fair condition. Under care of podiatrist.  Undergoing PAD evaluation per Dr. Kayleigh Pérez. CKD stage 3 due to type 2 diabetes mellitus (HCC)  Comments:  Stable, fair control. Follow labs. Orders:  -     Basic Metabolic Panel; Future    Type 2 diabetes mellitus with diabetic neuropathy, with long-term current use of insulin ([address]  Comments:  Stable, well controlled. Reviewed importance of diet. Exercise limited. Orders:  -     Hemoglobin A1C; Future        The importance of daily weights, dietary sodium restriction, and contact this office if weight is increased more than 3 lbs in any 48 hour period was stressed.  The patient has been advised to

## 2020-05-07 ENCOUNTER — CARE COORDINATION (OUTPATIENT)
Dept: CARE COORDINATION | Age: 60
End: 2020-05-07

## 2020-05-14 ENCOUNTER — CARE COORDINATION (OUTPATIENT)
Dept: CARE COORDINATION | Age: 60
End: 2020-05-14

## 2020-05-20 ENCOUNTER — HOSPITAL ENCOUNTER (OUTPATIENT)
Dept: CARDIAC CATH/INVASIVE PROCEDURES | Age: 60
Discharge: HOME | End: 2020-05-20
Attending: INTERNAL MEDICINE | Admitting: INTERNAL MEDICINE
Payer: MEDICARE

## 2020-05-20 ENCOUNTER — APPOINTMENT (OUTPATIENT)
Dept: ULTRASOUND IMAGING | Age: 60
End: 2020-05-20
Payer: MEDICARE

## 2020-05-20 VITALS
WEIGHT: 315 LBS | HEART RATE: 63 BPM | RESPIRATION RATE: 19 BRPM | DIASTOLIC BLOOD PRESSURE: 63 MMHG | OXYGEN SATURATION: 98 % | SYSTOLIC BLOOD PRESSURE: 133 MMHG | TEMPERATURE: 97.6 F | BODY MASS INDEX: 48.12 KG/M2

## 2020-05-20 PROBLEM — L97.519 DIABETIC ULCER OF RIGHT FOOT ASSOCIATED WITH TYPE 2 DIABETES MELLITUS (HCC): Status: ACTIVE | Noted: 2020-01-17

## 2020-05-20 LAB
ABO/RH: NORMAL
ABO/RH: NORMAL
ANION GAP SERPL CALCULATED.3IONS-SCNC: 12 MEQ/L (ref 9–15)
ANTIBODY SCREEN: NORMAL
BUN BLDV-MCNC: 23 MG/DL (ref 8–23)
CALCIUM SERPL-MCNC: 9.6 MG/DL (ref 8.5–9.9)
CHLORIDE BLD-SCNC: 99 MEQ/L (ref 95–107)
CO2: 23 MEQ/L (ref 20–31)
CREAT SERPL-MCNC: 1.87 MG/DL (ref 0.7–1.2)
GFR AFRICAN AMERICAN: 44.4
GFR NON-AFRICAN AMERICAN: 36.7
GLUCOSE BLD-MCNC: 438 MG/DL (ref 70–99)
HCT VFR BLD CALC: 40.4 % (ref 42–52)
HEMOGLOBIN: 13 G/DL (ref 14–18)
MCH RBC QN AUTO: 28 PG (ref 27–31.3)
MCHC RBC AUTO-ENTMCNC: 32.1 % (ref 33–37)
MCV RBC AUTO: 87.2 FL (ref 80–100)
PDW BLD-RTO: 16.1 % (ref 11.5–14.5)
PLATELET # BLD: 340 K/UL (ref 130–400)
POTASSIUM SERPL-SCNC: 4.3 MEQ/L (ref 3.4–4.9)
POTASSIUM SERPL-SCNC: 4.9 MEQ/L (ref 3.4–4.9)
RBC # BLD: 4.63 M/UL (ref 4.7–6.1)
SODIUM BLD-SCNC: 134 MEQ/L (ref 135–144)
WBC # BLD: 8.8 K/UL (ref 4.8–10.8)

## 2020-05-20 PROCEDURE — 86850 RBC ANTIBODY SCREEN: CPT

## 2020-05-20 PROCEDURE — 75716 ARTERY X-RAYS ARMS/LEGS: CPT | Performed by: INTERNAL MEDICINE

## 2020-05-20 PROCEDURE — 6370000000 HC RX 637 (ALT 250 FOR IP): Performed by: INTERNAL MEDICINE

## 2020-05-20 PROCEDURE — 86901 BLOOD TYPING SEROLOGIC RH(D): CPT

## 2020-05-20 PROCEDURE — 2709999900 HC NON-CHARGEABLE SUPPLY

## 2020-05-20 PROCEDURE — 6360000004 HC RX CONTRAST MEDICATION: Performed by: INTERNAL MEDICINE

## 2020-05-20 PROCEDURE — 84132 ASSAY OF SERUM POTASSIUM: CPT

## 2020-05-20 PROCEDURE — 2580000003 HC RX 258

## 2020-05-20 PROCEDURE — C1894 INTRO/SHEATH, NON-LASER: HCPCS

## 2020-05-20 PROCEDURE — 85027 COMPLETE CBC AUTOMATED: CPT

## 2020-05-20 PROCEDURE — 2500000003 HC RX 250 WO HCPCS

## 2020-05-20 PROCEDURE — 36246 INS CATH ABD/L-EXT ART 2ND: CPT | Performed by: INTERNAL MEDICINE

## 2020-05-20 PROCEDURE — 6360000002 HC RX W HCPCS

## 2020-05-20 PROCEDURE — 86900 BLOOD TYPING SEROLOGIC ABO: CPT

## 2020-05-20 PROCEDURE — C1769 GUIDE WIRE: HCPCS

## 2020-05-20 PROCEDURE — 36247 INS CATH ABD/L-EXT ART 3RD: CPT | Performed by: INTERNAL MEDICINE

## 2020-05-20 PROCEDURE — 80048 BASIC METABOLIC PNL TOTAL CA: CPT

## 2020-05-20 RX ORDER — LABETALOL 20 MG/4 ML (5 MG/ML) INTRAVENOUS SYRINGE
10 EVERY 30 MIN PRN
Status: DISCONTINUED | OUTPATIENT
Start: 2020-05-20 | End: 2020-05-20 | Stop reason: HOSPADM

## 2020-05-20 RX ORDER — HYDRALAZINE HYDROCHLORIDE 20 MG/ML
10 INJECTION INTRAMUSCULAR; INTRAVENOUS EVERY 10 MIN PRN
Status: DISCONTINUED | OUTPATIENT
Start: 2020-05-20 | End: 2020-05-20 | Stop reason: HOSPADM

## 2020-05-20 RX ORDER — ONDANSETRON 2 MG/ML
4 INJECTION INTRAMUSCULAR; INTRAVENOUS EVERY 6 HOURS PRN
Status: DISCONTINUED | OUTPATIENT
Start: 2020-05-20 | End: 2020-05-20 | Stop reason: HOSPADM

## 2020-05-20 RX ORDER — SODIUM CHLORIDE 9 MG/ML
INJECTION, SOLUTION INTRAVENOUS CONTINUOUS
Status: DISCONTINUED | OUTPATIENT
Start: 2020-05-20 | End: 2020-05-20 | Stop reason: HOSPADM

## 2020-05-20 RX ORDER — ACETAMINOPHEN 325 MG/1
650 TABLET ORAL EVERY 4 HOURS PRN
Status: DISCONTINUED | OUTPATIENT
Start: 2020-05-20 | End: 2020-05-20 | Stop reason: HOSPADM

## 2020-05-20 RX ORDER — SODIUM CHLORIDE 0.9 % (FLUSH) 0.9 %
10 SYRINGE (ML) INJECTION PRN
Status: DISCONTINUED | OUTPATIENT
Start: 2020-05-20 | End: 2020-05-20 | Stop reason: HOSPADM

## 2020-05-20 RX ORDER — IODIXANOL 320 MG/ML
100 INJECTION, SOLUTION INTRAVASCULAR ONCE
Status: COMPLETED | OUTPATIENT
Start: 2020-05-20 | End: 2020-05-20

## 2020-05-20 RX ORDER — SODIUM CHLORIDE 9 MG/ML
INJECTION, SOLUTION INTRAVENOUS CONTINUOUS
Status: ACTIVE | OUTPATIENT
Start: 2020-05-20 | End: 2020-05-20

## 2020-05-20 RX ORDER — SODIUM CHLORIDE 0.9 % (FLUSH) 0.9 %
10 SYRINGE (ML) INJECTION EVERY 12 HOURS SCHEDULED
Status: DISCONTINUED | OUTPATIENT
Start: 2020-05-20 | End: 2020-05-20 | Stop reason: HOSPADM

## 2020-05-20 RX ADMIN — IODIXANOL 30 ML: 320 INJECTION, SOLUTION INTRAVASCULAR at 11:21

## 2020-05-20 RX ADMIN — INSULIN HUMAN 12 UNITS: 100 INJECTION, SOLUTION PARENTERAL at 12:12

## 2020-05-20 NOTE — PROGRESS NOTES
Up to bath room and tolerated activity well. No complaints of dizziness . No bleeding or hematoma noted to left femoral site. Continues to take liquids well and tolerating. Color is good and skin is warm and dry to touch . No complaints of discomfort at this time.

## 2020-05-20 NOTE — PROGRESS NOTES
Received into Pre/Post Cath Lab. No complaints offered at present. Is alert and oriented times three. Does have a wound to the bottom the the right foot.

## 2020-05-26 NOTE — PROCEDURES
[address]                      [address], [address] [facility]                                 PROCEDURE NOTE    PATIENT NAME: COCO Ibrahim                 :        1960  MED REC NO:   [id number]                            ROOM:  ACCOUNT NO:   [de-identified]                           ADMIT DATE: 2020  PROVIDER:     Curtis Ly DO    DATE OF PROCEDURE:  2020    PROCEDURE PERFORMED:  Bilateral extremities angiography. PROCEDURE PERFORMED BY:  Norman Ly DO    INDICATIONS:  Right lower extremity nonhealing wound and abnormal  noninvasive testing. COMPLICATIONS:  None. ACCESS SITE:  Left common femoral artery. DESCRIPTION OF PROCEDURE:  The patient was brought to the cardiac cath  lab suite, where he was sterilely prepped and draped in the usual  fashion. 1% lidocaine was used to anesthetize the left inguinal area  and a 4-Honduran arterial sheath was placed without any difficulty. Next,  a 4-Honduran GALLO was engaged in the right common iliac artery and right  lower extremity angiogram with runoff was performed. The catheter was  then brought down to the level of the right popliteal artery and right  below the knee angiogram under DSA was obtained. Catheter was then  pulled back to the left common iliac artery and left lower extremity  angiogram with runoff was performed. The catheter was removed from the  patient. The patient was transferred to the post-cath holding area in  stable and satisfactory condition. FINDINGS:  1. Right lower extremity:  The right common iliac artery, internal  iliac artery, external iliac artery, common femoral artery and profunda  are widely patent. The right SFA has mild 20%-30% distal Valeriy's canal  stenosis calcified. Right popliteal artery, anterior tibial artery, TP  trunk, peroneal and posterior tibial artery are all patent with mild  diffuse disease and FATEMEH-2 flow.   2.  Left lower extremity: The left common iliac artery, internal iliac  artery, external iliac artery, common femoral artery and profunda are  patent. The left SFA is patent with mild diffuse disease. The left  popliteal artery is patent. Left below the knee vessels were not well  visualized, appeared to be patent in the proximal segment of the  anterior tibial artery, TP trunk, peroneal and posterior tibial artery. ASSESSMENT:  1.  No significant obstructive peripheral arterial disease seen. 2.  FATEMEH-2 flow. PLAN:  1. Postprocedure care as usual.  2.  Maximize medical therapy.         Gila Castro DO    D: 05/26/2020 16:44:18       T: 05/26/2020 17:23:32     [email or web address]  Job#: [id number]     Doc#: [id number]    CC:

## 2020-06-24 RX ORDER — METOPROLOL TARTRATE 50 MG/1
50 TABLET, FILM COATED ORAL 2 TIMES DAILY
Qty: 60 TABLET | Refills: 6 | Status: SHIPPED | OUTPATIENT
Start: 2020-06-24 | End: 2021-02-15 | Stop reason: SDUPTHER

## 2020-07-30 ENCOUNTER — TELEPHONE (OUTPATIENT)
Dept: FAMILY MEDICINE CLINIC | Age: 60
End: 2020-07-30

## 2020-07-30 NOTE — TELEPHONE ENCOUNTER
Josr's wife Vanessa Diggs called asking to speak with Dr. Chandana Escobar regarding some possible medication adjustments due to cost. Josr's wife states that his insurance is no longer covering his medications and they are paying out of pocket because he has reached his yearly limit. His wife would like to know if there is a generic medication or alternative for Januvia or for the Lantus? Please give Josr's wife a call in regards to her concerns at [phone]. Thank You.

## 2020-08-04 NOTE — TELEPHONE ENCOUNTER
PLease check to see what samples we have of insulin, Januvia, Janumet, Ozempic, Trulicity. Please let wife know twice a day of three time a day insulin may be less expensive. Also metformin is generic and less expensive.

## 2020-08-06 RX ORDER — AMLODIPINE BESYLATE 10 MG/1
TABLET ORAL
Qty: 90 TABLET | Refills: 3 | Status: SHIPPED | OUTPATIENT
Start: 2020-08-06 | End: 2020-11-10

## 2020-08-06 NOTE — TELEPHONE ENCOUNTER
requesting medication refill.  Please approve or deny this request.    Rx requested:  Requested Prescriptions     Pending Prescriptions Disp Refills    amLODIPine (NORVASC) 10 MG tablet [Pharmacy Med Name: AMLODIPINE BESYLATE 10MG TABLETS] 90 tablet 4     Sig: TAKE 1 TABLET BY MOUTH EVERY NIGHT         Last Office Visit:   4/28/2020      Next Visit Date:  Future Appointments   Date Time Provider Kathryn Figueroa   8/10/2020 11:00 AM Manuelito Herrera MD [place] [address] [place], [place] [address]   9/3/2020 11:00 AM [address] [place] [address]

## 2020-08-08 NOTE — TELEPHONE ENCOUNTER
I recommend we start Trulicity at 8.01 mg weekly.  ANd recoommend endo consult with Evelin Diaz to grace NGUYEN

## 2020-08-10 ENCOUNTER — VIRTUAL VISIT (OUTPATIENT)
Dept: FAMILY MEDICINE CLINIC | Age: 60
End: 2020-08-10
Payer: MEDICARE

## 2020-08-10 PROCEDURE — G8427 DOCREV CUR MEDS BY ELIG CLIN: HCPCS | Performed by: FAMILY MEDICINE

## 2020-08-10 PROCEDURE — 3052F HG A1C>EQUAL 8.0%<EQUAL 9.0%: CPT | Performed by: FAMILY MEDICINE

## 2020-08-10 PROCEDURE — 2022F DILAT RTA XM EVC RTNOPTHY: CPT | Performed by: FAMILY MEDICINE

## 2020-08-10 PROCEDURE — 99214 OFFICE O/P EST MOD 30 MIN: CPT | Performed by: FAMILY MEDICINE

## 2020-08-10 PROCEDURE — 3017F COLORECTAL CA SCREEN DOC REV: CPT | Performed by: FAMILY MEDICINE

## 2020-08-10 RX ORDER — OMEPRAZOLE 20 MG/1
20 CAPSULE, DELAYED RELEASE ORAL DAILY
Qty: 90 CAPSULE | Refills: 4 | Status: ON HOLD | OUTPATIENT
Start: 2020-08-10

## 2020-08-10 RX ORDER — FENOFIBRATE 160 MG/1
TABLET ORAL
Qty: 90 TABLET | Refills: 4 | Status: SHIPPED | OUTPATIENT
Start: 2020-08-10 | End: 2020-11-24 | Stop reason: SDUPTHER

## 2020-08-10 RX ORDER — FUROSEMIDE 40 MG/1
40 TABLET ORAL DAILY
Qty: 90 TABLET | Refills: 4 | Status: SHIPPED | OUTPATIENT
Start: 2020-08-10 | End: 2020-11-19

## 2020-08-10 RX ORDER — INSULIN GLARGINE 100 [IU]/ML
54 INJECTION, SOLUTION SUBCUTANEOUS NIGHTLY
Qty: 15 PEN | Refills: 4 | Status: SHIPPED | OUTPATIENT
Start: 2020-08-10 | End: 2020-12-01

## 2020-08-10 RX ORDER — GLIPIZIDE 10 MG/1
TABLET ORAL
Qty: 180 TABLET | Refills: 4 | Status: ON HOLD | OUTPATIENT
Start: 2020-08-10

## 2020-08-10 RX ORDER — SERTRALINE HYDROCHLORIDE 100 MG/1
100 TABLET, FILM COATED ORAL DAILY
Qty: 90 TABLET | Refills: 2 | Status: SHIPPED | OUTPATIENT
Start: 2020-08-10 | End: 2021-04-21 | Stop reason: SDUPTHER

## 2020-08-10 RX ORDER — ATORVASTATIN CALCIUM 40 MG/1
40 TABLET, FILM COATED ORAL DAILY
Qty: 90 TABLET | Refills: 4 | Status: SHIPPED | OUTPATIENT
Start: 2020-08-10 | End: 2020-11-10

## 2020-08-10 NOTE — PROGRESS NOTES
8/10/2020    TELEHEALTH EVALUATION -- Audio/Visual (During ZZNOF-42 public health emergency)    HPI:    Ginny Lemus (:  1960) has requested an audio/video evaluation for the following concern(s):    Patient is here for DM f/u. Sugars have been moderately well-controlled and patient has not been experiencing hyper- or hypoglycemic symptoms. Compliance with meds is fair and there are no side effects. PPatient does not exercise. He is trying to eat better. He has losst some weight. Patient is here for hyperlipidemia. Is compliant with medications and has no apparent side effects from them. GERD. Well-controlled with PPI, caffeine restriction, diet restriction. No weight loss. No abdominal pain. No bloody or black tarry stools. Patient is being treated for depression and has been compliant with meds which do not cause side effects. Mood is improved. No suicidal ideation. Sleep is normal.    Patient is here for follow-up of CAD. No chest pain, shortness of breath, palpitations, edema, paroxysmal nocturnal dyspnea, orthopnea. Is compliant with meds which do not cause significant side effects. Avoids added salt; exercises occasionally and tries to eat a healthy diet. Review of Systems    Prior to Visit Medications    Medication Sig Taking?  Authorizing Provider   amLODIPine (NORVASC) 10 MG tablet TAKE 1 TABLET BY MOUTH EVERY NIGHT  Norman NEWMAN Holiday, DO   metoprolol tartrate (LOPRESSOR) 50 MG tablet Take 1 tablet by mouth 2 times daily  Norman NEWMAN Holiday, DO   isosorbide mononitrate (IMDUR) 30 MG extended release tablet TAKE 1 TABLET BY MOUTH DAILY  Norman NEWMAN Holiday, DO   lisinopril (PRINIVIL;ZESTRIL) 10 MG tablet Take 10 mg by mouth daily   Historical Provider, MD   vitamin D (ERGOCALCIFEROL) 1.25 MG (67713 UT) CAPS capsule Take 50,000 Units by mouth once a week   Historical Provider, MD   glipiZIDE (GLUCOTROL) 10 MG tablet TAKE 1 TABLET BY MOUTH TWICE DAILY BEFORE MEALS  Patient taking differently: Take 10 mg by mouth 2 times daily (before meals) TAKE 1 TABLET BY MOUTH TWICE DAILY BEFORE MEALS  Som Owusu MD   insulin glargine (LANTUS SOLOSTAR) 100 UNIT/ML injection pen Inject 55 Units into the skin nightly  Som Owusu MD   omeprazole (PRILOSEC) 20 MG delayed release capsule Take 1 capsule by mouth daily  Som Owusu MD   sertraline (ZOLOFT) 100 MG tablet Take 1 tablet by mouth daily  Som Owusu MD   SITagliptin (JANUVIA) 50 MG tablet Take 1 tablet by mouth daily  Som Owusu MD   atorvastatin (LIPITOR) 40 MG tablet Take 1 tablet by mouth daily  Som Owusu MD   fenofibrate 160 MG tablet TAKE 1 TABLET BY MOUTH EVERY NIGHT  Patient taking differently: Take 160 mg by mouth daily TAKE 1 TABLET BY MOUTH EVERY NIGHT  Som Owusu MD   furosemide (LASIX) 40 MG tablet Take 1 tablet by mouth daily Hold until seen by PCP for followup  Patient taking differently: Take 40 mg by mouth daily   Som Owusu MD   gentamicin (GARAMYCIN) 0.1 % ointment Apply topically 3 times daily. Patient taking differently: Apply topically 3 times daily Apply topically 3 times daily. Miguel Wells DPM   ammonium lactate (LAC-HYDRIN) 12 % cream Apply topically as needed. Patient taking differently: Apply topically Apply topically twice daily. Miguel Wells DPM   mupirocin (BACTROBAN) 2 % ointment Apply topically as needed   Historical Provider, MD   sodium chloride 0.9 % irrigation [address] [facility]  Historical Provider, MD   blood glucose test strips (TRUE METRIX BLOOD GLUCOSE TEST) strip 1 each by In Vitro route 3 times daily As needed. Shelton Bailey,    terbinafine (LAMISIL) 1 % cream Apply topically 2 times daily Apply topically 2 times daily. Patient taking differently: Apply topically 2 times daily Apply topically 2-3 times daily.   Shelton Bailey DO   Insulin Pen Needle (B-D UF III MINI PEN NEEDLES) 31G X 5 MM MISC 1 each by Does not apply route daily  Shelton Bailey, DO   Blood Glucose Monitoring Suppl (ONE TOUCH ULTRA MINI) w/Device KIT 1 kit by Does not apply route three times daily  Sea Senna, DO   Lancets MISC Use TID  Sea Senna, DO   Insulin Syringe-Needle U-100 (INSULIN SYRINGE 1CC/31GX5/16\") 31G X 5/16\" 1 ML MISC Use once daily to administer Lantus  Sea Senna, DO       Social History     Tobacco Use    Smoking status: Former Smoker     Packs/day: 1.00     Years: 25.00     Pack years: 25.00     Last attempt to quit: 2008     Years since quittin.6    Smokeless tobacco: Never Used   Substance Use Topics    Alcohol use: No    Drug use: Never        Allergies   Allergen Reactions    Coreg [Carvedilol] Other (See Comments)     \"hyper\", rapid pulse   ,   Past Medical History:   Diagnosis Date    Acute kidney injury superimposed on CKD ([address] 8/15/2019    Anxiety and depression 2019    Cardiomyopathy ([address]     Chronic systolic heart failure (HCC) 2019    Diabetes mellitus ([address]     Diabetic neuropathy associated with type 2 diabetes mellitus ([address] 2017    Heart failure, NYHA class 4 (HCC)     Hyperlipidemia     Hypertension     ICD (implantable cardioverter-defibrillator) in place 2019    Left ureteral calculus     Morbid obesity ([address] 2017    LON (obstructive sleep apnea)     Pyelonephritis 2019    Ureteral calculus     Ureteric stone    ,   Past Surgical History:   Procedure Laterality Date    CARDIAC DEFIBRILLATOR PLACEMENT  2008    CYSTOSCOPY INSERTION / REMOVAL STENT / STONE Left 2019    CYSTOSCOPY LEFT DOUBLE J STENT PLACEMENT ROOM 287 performed by Virginia Craig MD at [address] [place] [address] LITHOTRIPSY Left 2019    LEFT ESWL (CONF # [id number]) performed by Virginia Craig MD at [address] [place] [address] LITHOTRIPSY N/A 7/10/2019    HOLMIUM LASER LITHOTRIPSY, STONE EXTRACTION, LT URETERAL STENT INSERTION.  performed by Kathie Thompson MD at [address] [place] [address] Left 7/10/2019    LEFT FLEXIBLE URETEROSCOPY, CYSTOSCOPY RETROGRADE performed by Kathie Thompson MD at [facility]   ,   Social History     Tobacco Use    Smoking status: Former Smoker     Packs/day: 1.00     Years: 25.00     Pack years: 25.00     Last attempt to quit: 2008     Years since quittin.7    Smokeless tobacco: Never Used   Substance Use Topics    Alcohol use: No    Drug use: Never   ,   Family History   Problem Relation Age of Onset    Heart Disease Mother     Kidney Disease Mother     Hypertension Mother     Diabetes Mother     No Known Problems Sister     No Known Problems Brother     No Known Problems Brother    ,   Immunization History   Administered Date(s) Administered    Pneumococcal Polysaccharide ([id number]) 2019       PHYSICAL EXAMINATION:  [ INSTRUCTIONS:  \"[x]\" Indicates a positive item  \"[]\" Indicates a negative item  -- DELETE ALL ITEMS NOT EXAMINED]  Vital Signs: (As obtained by patient/caregiver or practitioner observation)    Blood pressure-  Heart rate-    Respiratory rate-    Temperature-  Pulse oximetry-     Constitutional: [] Appears well-developed and well-nourished [x] No apparent distress      [x] Abnormal- obeseMental status  [x] Alert and awake  [x] Oriented to person/place/time [x]Able to follow commands      Eyes:  EOM    [x]  Normal  [] Abnormal-  Sclera  [x]  Normal  [] Abnormal -         Discharge [x]  None visible  [] Abnormal -    HENT:   [x] Normocephalic, atraumatic.   [] Abnormal   [x] Mouth/Throat: Mucous membranes are moist.     External Ears [x] Normal  [] Abnormal-     Neck: [x] No visualized mass     Pulmonary/Chest: [x] Respiratory effort normal.  [x] No visualized signs of difficulty breathing or respiratory distress        [] Abnormal-      Musculoskeletal:   [x] Normal gait with no signs of ataxia         [x] Normal range of motion of neck        [] Abnormal-       Neurological: need for moderate to high intensity dosing and limited med-med interactions. Follow labs. Orders:  -     atorvastatin (LIPITOR) 40 MG tablet; Take 1 tablet by mouth daily    Anxiety and depression  Comments:  Stable and well-controlled on sertraline    Morbid obesity ([facility]  Comments:  Improving, poor control. Encouraged healthy, low-carb diet and increased activity. CHF (congestive heart failure), NYHA class III, acute on chronic, combined ([address]  Comments:  Stable, well-controlled based on patient perception of wellness. Orders:  -     Basic Metabolic Panel; Future  -     CBC With Auto Differential; Future          No follow-ups on file. Dg Valdes is a 56 y.o. male being evaluated by a Virtual Visit (video visit) encounter to address concerns as mentioned above. A caregiver was present when appropriate. Due to this being a TeleHealth encounter (During RVYPX-82 public health emergency), evaluation of the following organ systems was limited: Vitals/Constitutional/EENT/Resp/CV/GI//MS/Neuro/Skin/Heme-Lymph-Imm. Pursuant to the emergency declaration under the [facility], [address] [place] [address] authority and the [employer] and [employer] Act, this Virtual Visit was conducted with patient's (and/or legal guardian's) consent, to reduce the patient's risk of exposure to COVID-19 and provide necessary medical care. The patient (and/or legal guardian) has also been advised to contact this office for worsening conditions or problems, and seek emergency medical treatment and/or call 911 if deemed necessary. Patient identification was verified at the start of the visit: Yes    Total time spent on this encounter: Not billed by time    Services were provided through a video synchronous discussion virtually to substitute for in-person clinic visit. Patient and provider were located at their individual homes.     --Yesi MARTINEZ MD on 8/10/2020 at 11:41 AM    An electronic signature was used to authenticate this note.

## 2020-08-26 DIAGNOSIS — I50.43 CHF (CONGESTIVE HEART FAILURE), NYHA CLASS III, ACUTE ON CHRONIC, COMBINED (HCC): ICD-10-CM

## 2020-08-26 DIAGNOSIS — Z79.4 TYPE 2 DIABETES MELLITUS WITH DIABETIC NEUROPATHY, WITH LONG-TERM CURRENT USE OF INSULIN (HCC): ICD-10-CM

## 2020-08-26 DIAGNOSIS — E11.40 TYPE 2 DIABETES MELLITUS WITH DIABETIC NEUROPATHY, WITH LONG-TERM CURRENT USE OF INSULIN (HCC): ICD-10-CM

## 2020-08-26 LAB
ANION GAP SERPL CALCULATED.3IONS-SCNC: 15 MEQ/L (ref 9–15)
BASOPHILS ABSOLUTE: 0.1 K/UL (ref 0–0.2)
BASOPHILS RELATIVE PERCENT: 1.5 %
BUN BLDV-MCNC: 19 MG/DL (ref 8–23)
CALCIUM SERPL-MCNC: 9.9 MG/DL (ref 8.5–9.9)
CHLORIDE BLD-SCNC: 105 MEQ/L (ref 95–107)
CO2: 23 MEQ/L (ref 20–31)
CREAT SERPL-MCNC: 1.72 MG/DL (ref 0.7–1.2)
EOSINOPHILS ABSOLUTE: 0.4 K/UL (ref 0–0.7)
EOSINOPHILS RELATIVE PERCENT: 3.7 %
GFR AFRICAN AMERICAN: 48.9
GFR NON-AFRICAN AMERICAN: 40.4
GLUCOSE BLD-MCNC: 91 MG/DL (ref 70–99)
HBA1C MFR BLD: 8.7 % (ref 4.8–5.9)
HCT VFR BLD CALC: 39.7 % (ref 42–52)
HEMOGLOBIN: 13.1 G/DL (ref 14–18)
LYMPHOCYTES ABSOLUTE: 2.6 K/UL (ref 1–4.8)
LYMPHOCYTES RELATIVE PERCENT: 26.2 %
MCH RBC QN AUTO: 28.5 PG (ref 27–31.3)
MCHC RBC AUTO-ENTMCNC: 32.9 % (ref 33–37)
MCV RBC AUTO: 86.6 FL (ref 80–100)
MONOCYTES ABSOLUTE: 0.7 K/UL (ref 0.2–0.8)
MONOCYTES RELATIVE PERCENT: 6.8 %
NEUTROPHILS ABSOLUTE: 6.1 K/UL (ref 1.4–6.5)
NEUTROPHILS RELATIVE PERCENT: 61.8 %
PDW BLD-RTO: 17.2 % (ref 11.5–14.5)
PLATELET # BLD: 307 K/UL (ref 130–400)
POTASSIUM SERPL-SCNC: 4 MEQ/L (ref 3.4–4.9)
RBC # BLD: 4.59 M/UL (ref 4.7–6.1)
SODIUM BLD-SCNC: 143 MEQ/L (ref 135–144)
WBC # BLD: 9.8 K/UL (ref 4.8–10.8)

## 2020-08-26 NOTE — TELEPHONE ENCOUNTER
Rx request   Requested Prescriptions     Pending Prescriptions Disp Refills    Insulin Pen Needle (B-D UF III MINI PEN NEEDLES) 31G X 5 MM MISC 100 each 3     Si each by Does not apply route daily     LOV 8/10/2020  Next Visit Date:  Future Appointments   Date Time Provider Kathryn Figueroa   9/3/2020 11:00 AM [facility]   11/10/2020 11:30 AM Manuelito Herrera MD [facility]   11/10/2020 12:00 PM Manuelito Herrera MD [address] [place] [address]

## 2020-08-28 RX ORDER — PEN NEEDLE, DIABETIC 31 GX5/16"
1 NEEDLE, DISPOSABLE MISCELLANEOUS DAILY
Qty: 100 EACH | Refills: 3 | Status: SHIPPED | OUTPATIENT
Start: 2020-08-28 | End: 2020-11-10 | Stop reason: SDUPTHER

## 2020-09-03 ENCOUNTER — HOSPITAL ENCOUNTER (OUTPATIENT)
Dept: CARDIOLOGY | Age: 60
Discharge: HOME | End: 2020-09-03
Payer: MEDICARE

## 2020-09-03 PROCEDURE — 93296 REM INTERROG EVL PM/IDS: CPT

## 2020-09-17 ENCOUNTER — HOSPITAL ENCOUNTER (OUTPATIENT)
Dept: WOUND CARE | Age: 60
Discharge: HOME | End: 2020-09-17
Payer: MEDICARE

## 2020-09-17 VITALS
HEART RATE: 85 BPM | RESPIRATION RATE: 16 BRPM | TEMPERATURE: 98.1 F | SYSTOLIC BLOOD PRESSURE: 116 MMHG | DIASTOLIC BLOOD PRESSURE: 58 MMHG

## 2020-09-17 PROCEDURE — 99211 OFF/OP EST MAY X REQ PHY/QHP: CPT

## 2020-09-17 RX ORDER — AMMONIUM LACTATE 12 G/100G
CREAM TOPICAL
Qty: 385 G | Refills: 4 | Status: ON HOLD | OUTPATIENT
Start: 2020-09-17

## 2020-09-17 NOTE — CODE DOCUMENTATION
[id number] [facility] Billing Sheet. Josr W[place]ordell  AGE: 56 y.o.    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

## 2020-09-17 NOTE — PROGRESS NOTES
Lisandra Shaffer [address]   Progress Note and Procedure Note      Trsitan Lemus  MEDICAL RECORD NUMBER:  [id number]  AGE: 56 y.o. GENDER: male  : 1960  EPISODE DATE:  2020    Subjective:     Chief Complaint   Patient presents with    Wound Check         HISTORY of PRESENT ILLNESS HPI     Tiffanie Israel is a 56 y.o. male who presents today for wound/ulcer evaluation. History of Wound Context: Patient present with painful lesion sub 1st metatarsal head right foot. States pain is increased with walking and shoes.   Wound/Ulcer Pain Timing/Severity: constant  Quality of pain: sharp  Severity:  2 / 10   Modifying Factors: Pain worsens with walking  Associated Signs/Symptoms: none    Ulcer Identification:  Ulcer Type: pressure  Contributing Factors: diabetes, chronic pressure and shear force    Wound: N/A        PAST MEDICAL HISTORY        Diagnosis Date    Acute kidney injury superimposed on CKD ([address] 8/15/2019    Anxiety and depression 2019    Cardiomyopathy ([address]     Chronic systolic heart failure ([address] 2019    Diabetes mellitus ([address]     Diabetic neuropathy associated with type 2 diabetes mellitus ([address] 2017    Heart failure, NYHA class 4 ([address]     Hyperlipidemia     Hypertension     ICD (implantable cardioverter-defibrillator) in place 2019    Left ureteral calculus     Morbid obesity ([address] 2017    LON (obstructive sleep apnea)     Pyelonephritis 2019    Ureteral calculus     Ureteric stone        PAST SURGICAL HISTORY    Past Surgical History:   Procedure Laterality Date    CARDIAC DEFIBRILLATOR PLACEMENT  2008    CYSTOSCOPY INSERTION / REMOVAL STENT / STONE Left 2019    CYSTOSCOPY LEFT DOUBLE J STENT PLACEMENT ROOM 287 performed by Donal Jeans, MD at [address] [place] [address] LITHOTRIPSY Left 2019    LEFT ESWL (CONF # [id number]) performed by Donal Jeans, MD at [address] [place] [address] LITHOTRIPSY N/A 7/10/2019    HOLMIUM LASER LITHOTRIPSY, STONE EXTRACTION, LT URETERAL STENT INSERTION.  performed by Gely Lin MD at [address] Left 7/10/2019    LEFT FLEXIBLE URETEROSCOPY, CYSTOSCOPY RETROGRADE performed by Gely Lin MD at [address] [place] [address] HISTORY    Family History   Problem Relation Age of Onset    Heart Disease Mother     Kidney Disease Mother     Hypertension Mother     Diabetes Mother     No Known Problems Sister     No Known Problems Brother     No Known Problems Brother        SOCIAL HISTORY    Social History     Tobacco Use    Smoking status: Former Smoker     Packs/day: 1.00     Years: 25.00     Pack years: 25.00     Last attempt to quit: 2008     Years since quittin.8    Smokeless tobacco: Never Used   Substance Use Topics    Alcohol use: No    Drug use: Never       ALLERGIES    Allergies   Allergen Reactions    Coreg [Carvedilol] Other (See Comments)     \"hyper\", rapid pulse       MEDICATIONS    Current Outpatient Medications on File Prior to Encounter   Medication Sig Dispense Refill    Insulin Pen Needle (B-D UF III MINI PEN NEEDLES) 31G X 5 MM MISC 1 each by Does not apply route daily 100 each 3    glipiZIDE (GLUCOTROL) 10 MG tablet TAKE 1 TABLET BY MOUTH TWICE DAILY BEFORE MEALS 180 tablet 4    omeprazole (PRILOSEC) 20 MG delayed release capsule Take 1 capsule by mouth daily 90 capsule 4    sertraline (ZOLOFT) 100 MG tablet Take 1 tablet by mouth daily 90 tablet 2    atorvastatin (LIPITOR) 40 MG tablet Take 1 tablet by mouth daily 90 tablet 4    fenofibrate (TRIGLIDE) 160 MG tablet TAKE 1 TABLET BY MOUTH EVERY NIGHT 90 tablet 4    furosemide (LASIX) 40 MG tablet Take 1 tablet by mouth daily Hold until seen by PCP for followup 90 tablet 4    insulin glargine (BASAGLAR KWIKPEN) 100 UNIT/ML injection pen Inject 54 Units into the skin nightly 15 pen 4    Dulaglutide 0.75 MG/0.5ML SOPN Inject 0.75 mg into the skin every 7 days 1 pen 0    Dulaglutide 0.75 MG/0.5ML SOPN Inject 0.75 mg into the skin every 7 days 15 pen 4    amLODIPine (NORVASC) 10 MG tablet TAKE 1 TABLET BY MOUTH EVERY NIGHT 90 tablet 3    metoprolol tartrate (LOPRESSOR) 50 MG tablet Take 1 tablet by mouth 2 times daily 60 tablet 6    isosorbide mononitrate (IMDUR) 30 MG extended release tablet TAKE 1 TABLET BY MOUTH DAILY 90 tablet 3    lisinopril (PRINIVIL;ZESTRIL) 10 MG tablet Take 10 mg by mouth daily       vitamin D (ERGOCALCIFEROL) 1.25 MG (92632 UT) CAPS capsule Take 50,000 Units by mouth once a week       gentamicin (GARAMYCIN) 0.1 % ointment Apply topically 3 times daily. (Patient taking differently: Apply topically 3 times daily Apply topically 3 times daily. ) 15 g 3    ammonium lactate (LAC-HYDRIN) 12 % cream Apply topically as needed. (Patient taking differently: Apply topically Apply topically twice daily.) 385 g 4    mupirocin (BACTROBAN) 2 % ointment Apply topically as needed   3    sodium chloride 0.9 % irrigation CLEASE WOUND WITH DRESSING CHANGES  5    blood glucose test strips (TRUE METRIX BLOOD GLUCOSE TEST) strip 1 each by In Vitro route 3 times daily As needed. 100 each 3    terbinafine (LAMISIL) 1 % cream Apply topically 2 times daily Apply topically 2 times daily. (Patient taking differently: Apply topically 2 times daily Apply topically 2-3 times daily. ) 1 Tube 3    Blood Glucose Monitoring Suppl (ONE TOUCH ULTRA MINI) w/Device KIT 1 kit by Does not apply route three times daily 1 kit 5    Lancets MISC Use  each 3    Insulin Syringe-Needle U-100 (INSULIN SYRINGE 1CC/31GX5/16\") 31G X 5/16\" 1 ML MISC Use once daily to administer Lantus 100 each 1     No current facility-administered medications on file prior to encounter. REVIEW OF SYSTEMS    Pertinent items are noted in HPI.     Objective:      BP (!) 116/58   Pulse 85   Temp 98.1 °F (36.7 °C)   Resp 16     Wt Readings from Last 3 Encounters:   05/20/20 (!) 345 lb (156.5 kg)   04/23/20 (!) 343 lb (155.6 kg)   03/06/20 (!) 342 lb (155.1 kg)       PHYSICAL EXAM    Constitutional:   Well nourished and well developed. Appears neat and clean. Patient is alert, oriented x3, and in no apparent distress. Respiratory:  Respiratory effort is easy and symmetric bilaterally. Rate is normal at rest and on room air. Vascular:  Pedal Pulses is palpable and audible with doppler. Capillary refill is <3 sec to digits bilateral.  Extremities negative for pitting edema. Neurological:  Cranial nerves grossly intact. Deep tendon reflexes of the lower extremities are intact and symmetrical bilaterally. Sensation normal to touch and vibration. Sensation intact to 10 gram monofilament in extremities. Musculoskeletal:  Gait and station stable. Muscle strength +5/5 to all extrinsic muscles to the foot bilateral.  Full range of motion of ankle, subtalar, and midtarsal joints noted without crepitation. No cyanosis, clubbing or edema noted. Dermatological:  Wound description noted in wound assessment. Otherwise, skin is warm, dry, and well-hydrated with normal turgor, texture, and pigmentation. Psychiatric:  Judgement and insight intact. Short and long term memory intact. No evidence of depression, anxiety, or agitation. Patient is calm, cooperative, and communicative. Appropriate interactions and affect.     Wound 09/17/20 Foot Plantar;Right #1 (Active)   Wound Image   09/17/20 1514   Wound Cleansed Rinsed/Irrigated with saline 09/17/20 1514   Drainage Amount None 09/17/20 1514   Odor None 09/17/20 1514   Mei-wound Assessment Dry;Calloused 09/17/20 1514   Number of days: 0       Incision 06/12/19 Back Left (Active)   Number of days: 463       Incision 07/10/19 Penis (Active)   Number of days: 435       Assessment:      Patient Active Problem List   Diagnosis Code    Diabetes mellitus ([facility] E11.9    Hyperlipidemia E78.5    Hypertension I10    Chronic bilateral low back pain without sciatica M54.5, G89.29    Cardiomyopathy ([address] I42.9    Pacemaker Z95.0    Diabetic neuropathy associated with type 2 diabetes mellitus ([address] E11.40    Morbid obesity ([facility]) E66.01    LON (obstructive sleep apnea) G47.33    Chronic systolic heart failure (HCC) I50.22    ICD (implantable cardioverter-defibrillator) in place Z95.810    Anxiety and depression F41.9, F32.9    Diabetic foot infection (HCC) E11.628, L08.9    Gastroesophageal reflux disease without esophagitis K21.9    Keratoderma of foot, acquired L85.1    Non-pressure chronic ulcer of other part of right foot limited to breakdown of skin ([address] L97.511    Dizziness R42    CKD stage 3 due to type 2 diabetes mellitus (HCC) E11.22, N18.3    Type 2 diabetes mellitus with right diabetic foot ulcer (HCC) E11.621, L97.519    Diabetic ulcer of right foot associated with type 2 diabetes mellitus ([facility]) E11.621, L97.519    Non-pressure chronic ulcer of other part of right foot with fat layer exposed ([address] L97.512    Cellulitis of right foot L03.115    Vitamin D deficiency E55.9    CHF (congestive heart failure), NYHA class III, acute on chronic, combined ([address] I50.43        Procedure Note  Indications:  Based on my examination of this patient's wound(s)/ulcer(s) today, debridement is not required to promote healing and evaluate the wound base. Plan:     Hyperkeratotic lesion debrided today with #15 blade. Prescribed ammonium lactate for daily use. Recommended diabetic shoes and inserts and referred to my office to get measured. Will follow-up in my office as he does not have an open wound at this time. Treatment Note please see attached Discharge Instructions    In my professional opinion this patient would benefit from HBO Therapy: No    Written patient dismissal instructions given to patient and signed by patient or POA.          Discharge Instructions       Wound Clinic Physician Orders and Discharge 7028 [address] [address]  Telephone: [phone]    NAME:  Carlos Barboza OF BIRTH:  1960  MEDICAL RECORD NUMBER:  [id number]  DATE:  9/17/2020    Congratulations!! You have completed your treatment. 1. Return to your Primary Care Physician for all your health issues. 2. Resume your ordinary activities as tolerated. 3. Take your medications as prescribed by your primary care physician. 4. Check your skin daily for cracks, bruises, sores, or dryness. Use a moisturizer as needed. 5. Clean and dry your skin, using mild soap and warm water (not hot). 6. Avoid alcohol and caffeine and do not smoke. 7. Maintain a nutritious diet. 8. Avoid pressure on your wound site. Keep your legs elevated above the level of the heart whenever possible. 9. Continue to use wraps/stockings/compression as prescribed. 10. Replace compression stockings every four to six months as needed to ensure proper fit. 11. Wear well-fitting shoes and leg garments. 12.  cream at pharmacy for callus area. Follow up in Dr Joon Lorenzo office for foot care. Call to see Garrison Araujo in Dr Joon Lorenzo office for diabetic shoes    [facility]  PLEASE CALL IF YOU DEVELOP ANOTHER WOUND. [phone]           Electronically signed by Adolfo Germain DPM on 9/17/2020 at 3:49 PM             Electronically signed by Adolfo Germain DPM on 9/17/2020 at 3:51 PM

## 2020-10-28 LAB
HCT VFR BLD CALC: 40.8 % (ref 42–52)
HEMOGLOBIN: 13.3 G/DL (ref 14–18)
MCH RBC QN AUTO: 28.3 PG (ref 27–31.3)
MCHC RBC AUTO-ENTMCNC: 32.6 % (ref 33–37)
MCV RBC AUTO: 86.8 FL (ref 80–100)
PDW BLD-RTO: 17.2 % (ref 11.5–14.5)
PLATELET # BLD: 398 K/UL (ref 130–400)
RBC # BLD: 4.7 M/UL (ref 4.7–6.1)
WBC # BLD: 10.3 K/UL (ref 4.8–10.8)

## 2020-11-10 ENCOUNTER — VIRTUAL VISIT (OUTPATIENT)
Dept: FAMILY MEDICINE CLINIC | Age: 60
End: 2020-11-10
Payer: MEDICARE

## 2020-11-10 PROBLEM — L97.519 TYPE 2 DIABETES MELLITUS WITH RIGHT DIABETIC FOOT ULCER (HCC): Status: RESOLVED | Noted: 2020-01-17 | Resolved: 2020-11-10

## 2020-11-10 PROBLEM — E11.628 DIABETIC FOOT INFECTION (HCC): Chronic | Status: RESOLVED | Noted: 2019-04-30 | Resolved: 2020-11-10

## 2020-11-10 PROBLEM — M25.512 CHRONIC LEFT SHOULDER PAIN: Status: ACTIVE | Noted: 2020-11-10

## 2020-11-10 PROBLEM — L97.519 DIABETIC ULCER OF RIGHT FOOT ASSOCIATED WITH TYPE 2 DIABETES MELLITUS (HCC): Status: RESOLVED | Noted: 2020-01-17 | Resolved: 2020-11-10

## 2020-11-10 PROBLEM — E11.621 TYPE 2 DIABETES MELLITUS WITH RIGHT DIABETIC FOOT ULCER (HCC): Status: RESOLVED | Noted: 2020-01-17 | Resolved: 2020-11-10

## 2020-11-10 PROBLEM — L03.115 CELLULITIS OF RIGHT FOOT: Status: RESOLVED | Noted: 2020-01-30 | Resolved: 2020-11-10

## 2020-11-10 PROBLEM — L97.511 NON-PRESSURE CHRONIC ULCER OF OTHER PART OF RIGHT FOOT LIMITED TO BREAKDOWN OF SKIN (HCC): Status: RESOLVED | Noted: 2019-05-16 | Resolved: 2020-11-10

## 2020-11-10 PROBLEM — G89.29 CHRONIC LEFT SHOULDER PAIN: Status: ACTIVE | Noted: 2020-11-10

## 2020-11-10 PROBLEM — R42 DIZZINESS: Status: RESOLVED | Noted: 2019-05-29 | Resolved: 2020-11-10

## 2020-11-10 PROBLEM — L08.9 DIABETIC FOOT INFECTION (HCC): Chronic | Status: RESOLVED | Noted: 2019-04-30 | Resolved: 2020-11-10

## 2020-11-10 PROBLEM — E11.621 DIABETIC ULCER OF RIGHT FOOT ASSOCIATED WITH TYPE 2 DIABETES MELLITUS (HCC): Status: RESOLVED | Noted: 2020-01-17 | Resolved: 2020-11-10

## 2020-11-10 PROBLEM — L97.512 NON-PRESSURE CHRONIC ULCER OF OTHER PART OF RIGHT FOOT WITH FAT LAYER EXPOSED (HCC): Status: RESOLVED | Noted: 2020-01-30 | Resolved: 2020-11-10

## 2020-11-10 PROCEDURE — G8427 DOCREV CUR MEDS BY ELIG CLIN: HCPCS | Performed by: FAMILY MEDICINE

## 2020-11-10 PROCEDURE — 3052F HG A1C>EQUAL 8.0%<EQUAL 9.0%: CPT | Performed by: FAMILY MEDICINE

## 2020-11-10 PROCEDURE — 2022F DILAT RTA XM EVC RTNOPTHY: CPT | Performed by: FAMILY MEDICINE

## 2020-11-10 PROCEDURE — 3017F COLORECTAL CA SCREEN DOC REV: CPT | Performed by: FAMILY MEDICINE

## 2020-11-10 PROCEDURE — 99214 OFFICE O/P EST MOD 30 MIN: CPT | Performed by: FAMILY MEDICINE

## 2020-11-10 RX ORDER — LISINOPRIL 20 MG/1
20 TABLET ORAL DAILY
COMMUNITY
Start: 2020-11-04 | End: 2020-11-10

## 2020-11-10 RX ORDER — ATORVASTATIN CALCIUM 40 MG/1
TABLET, FILM COATED ORAL
COMMUNITY
Start: 2019-04-30 | End: 2022-04-26 | Stop reason: SDUPTHER

## 2020-11-10 RX ORDER — AMLODIPINE BESYLATE 10 MG/1
TABLET ORAL
COMMUNITY
Start: 2019-08-08 | End: 2021-04-21 | Stop reason: SDUPTHER

## 2020-11-10 RX ORDER — GABAPENTIN 300 MG/1
CAPSULE ORAL NIGHTLY
Status: ON HOLD | COMMUNITY
Start: 2018-10-11

## 2020-11-10 RX ORDER — PEN NEEDLE, DIABETIC 31 GX5/16"
1 NEEDLE, DISPOSABLE MISCELLANEOUS DAILY
Qty: 100 EACH | Refills: 3 | Status: SHIPPED | OUTPATIENT
Start: 2020-11-10 | End: 2021-04-21 | Stop reason: SDUPTHER

## 2020-11-10 RX ORDER — ALBUTEROL SULFATE 2.5 MG/3ML
SOLUTION RESPIRATORY (INHALATION)
Status: ON HOLD | COMMUNITY
Start: 2018-10-11

## 2020-11-10 RX ORDER — AMMONIUM LACTATE 12 G/100G
CREAM TOPICAL
COMMUNITY
Start: 2019-05-16 | End: 2020-11-10

## 2020-11-10 RX ORDER — HYDROXYZINE PAMOATE 50 MG/1
50 CAPSULE ORAL
COMMUNITY
Start: 2018-10-11 | End: 2022-04-26 | Stop reason: SDUPTHER

## 2020-11-10 RX ORDER — LISINOPRIL 10 MG/1
TABLET ORAL
COMMUNITY
Start: 2019-11-22 | End: 2021-04-21 | Stop reason: DRUGHIGH

## 2020-11-21 RX ORDER — FUROSEMIDE 40 MG/1
TABLET ORAL
Qty: 90 TABLET | Refills: 3 | Status: SHIPPED | OUTPATIENT
Start: 2020-11-21 | End: 2021-09-01

## 2020-11-24 RX ORDER — FENOFIBRATE 160 MG/1
TABLET ORAL
Qty: 90 TABLET | Refills: 4 | Status: ON HOLD | OUTPATIENT
Start: 2020-11-24

## 2020-11-24 NOTE — TELEPHONE ENCOUNTER
Rx request   Requested Prescriptions     Pending Prescriptions Disp Refills    fenofibrate (TRIGLIDE) 160 MG tablet 90 tablet 4     Sig: TAKE 1 TABLET BY MOUTH EVERY NIGHT     LOV 11/10/2020  Next Visit Date:  Future Appointments   Date Time Provider Kathryn Figueroa   12/3/2020 11:00 AM [facility]   3/11/2021 12:30 PM Elizabeth Adkins MD [address] [place] [address]

## 2020-11-30 NOTE — TELEPHONE ENCOUNTER
Rx request   Requested Prescriptions     Pending Prescriptions Disp Refills    LANTUS SOLOSTAR 100 UNIT/ML injection pen [Pharmacy Med Name: Luis Flores PEN INJ 3ML] 30 mL      Sig: ADMINISTER 54 UNITS UNDER THE SKIN EVERY NIGHT     LOV 11/10/2020  Next Visit Date:  Future Appointments   Date Time Provider Kathryn Figueroa   12/3/2020 11:00 AM [facility]   3/11/2021 12:30 PM Ciera Cardenas MD [address] [place] [address]

## 2020-11-30 NOTE — TELEPHONE ENCOUNTER
Patient was recently prescribed Basaglar. Please clarify which one his insurance is covering and therefore which when he needs to be taking.

## 2020-12-01 RX ORDER — INSULIN GLARGINE 100 [IU]/ML
INJECTION, SOLUTION SUBCUTANEOUS
Qty: 30 ML | Refills: 12 | Status: SHIPPED | OUTPATIENT
Start: 2020-12-01 | End: 2021-04-21 | Stop reason: SDUPTHER

## 2020-12-01 NOTE — TELEPHONE ENCOUNTER
Spoke with patients counselor, and she isnt sure which insulin he prefers (financially) She will have him call back this afternoon to discuss (12/1/20)

## 2020-12-16 ENCOUNTER — HOSPITAL ENCOUNTER (OUTPATIENT)
Dept: CARDIOLOGY | Age: 60
Discharge: HOME | End: 2020-12-16
Payer: MEDICARE

## 2020-12-16 PROCEDURE — 93296 REM INTERROG EVL PM/IDS: CPT

## 2021-02-01 DIAGNOSIS — I42.0 DILATED CARDIOMYOPATHY (HCC): Primary | ICD-10-CM

## 2021-02-01 DIAGNOSIS — I50.22 CHRONIC SYSTOLIC HEART FAILURE (HCC): Chronic | ICD-10-CM

## 2021-02-01 RX ORDER — ISOSORBIDE MONONITRATE 30 MG/1
30 TABLET, EXTENDED RELEASE ORAL DAILY
Qty: 90 TABLET | Refills: 0 | Status: SHIPPED | OUTPATIENT
Start: 2021-02-01 | End: 2021-05-03

## 2021-02-01 NOTE — TELEPHONE ENCOUNTER
Medicare Annual Wellness Visit  Name: Maria L Lamar Date: 2018   MRN: [id number] Sex: Female   Age: 74 y.o. Ethnicity: Non-/Non    : 1945 Race: [place] is here for Medicare AWV    Screenings for behavioral, psychosocial and functional/safety risks, and cognitive dysfunction are all negative except as indicated below. These results, as well as other patient data from the [address] form, are documented in Flowsheets linked to this Encounter. overal doing well. Just got back from the [place] and planning to be in [place] starting after Dec 31, 2018 for the subsequent 6 month. Has a new , that has been a health relationship. No complaints. Blood sugars less than 150 on metformin. Allergies   Allergen Reactions    Sulfa Antibiotics Nausea Only and Nausea And Vomiting       Prior to Visit Medications    Medication Sig Taking?  Authorizing Provider   estropipate (OGEN) 0.75 MG tablet TAKE 1 TABLET BY MOUTH ONCE DAILY Yes Leelee Gil MD   aspirin 325 MG tablet Take 325 mg by mouth Yes Historical Provider, MD   ONE TOUCH ULTRA TEST strip USE ONE STRIP IN VITRO TO CHECK GLUCOSE UP TO ONCE DAILY AS NEEDED Yes Leelee Gil MD   metFORMIN (GLUCOPHAGE) 500 MG tablet Take 1 tablet by mouth every 12 hours Take with food Yes Leelee Gil MD   fluticasone (FLONASE) 50 MCG/ACT nasal spray 1 spray by Nasal route daily Yes Leelee Gil MD   atorvastatin (LIPITOR) 20 MG tablet Take 1 tablet by mouth daily Yes Leelee Gil MD   aspirin EC 81 MG EC tablet Take 1 tablet by mouth daily Yes Leelee Gil MD   Blood Glucose Monitoring Suppl (BLOOD GLUCOSE SYSTEM TAYLOR) KIT To use to check blood sugars up to twice daily Yes Leelee Gil MD   Lancets MISC To test twice daily Yes Leelee Gil MD   hydrocortisone requesting medication refill.  Please approve or deny this request.    Rx requested:  Requested Prescriptions     Pending Prescriptions Disp Refills    isosorbide mononitrate (IMDUR) 30 MG extended release tablet 90 tablet 3     Sig: Take 1 tablet by mouth daily         Last Office Visit:   4/28/2020      Next Visit Date:  Future Appointments   Date Time Provider Kathryn Figueroa   3/11/2021 12:30 PM Max Monsivais MD [address] [place] [address]

## 2021-02-12 DIAGNOSIS — I42.0 DILATED CARDIOMYOPATHY (HCC): Primary | ICD-10-CM

## 2021-02-12 RX ORDER — ASPIRIN 81 MG/1
81 TABLET ORAL DAILY
Qty: 90 TABLET | Refills: 0 | Status: SHIPPED | OUTPATIENT
Start: 2021-02-12 | End: 2021-05-05

## 2021-02-15 DIAGNOSIS — I10 ESSENTIAL HYPERTENSION: ICD-10-CM

## 2021-02-16 RX ORDER — METOPROLOL TARTRATE 50 MG/1
TABLET, FILM COATED ORAL
Qty: 180 TABLET | OUTPATIENT
Start: 2021-02-16

## 2021-02-16 RX ORDER — METOPROLOL TARTRATE 50 MG/1
50 TABLET, FILM COATED ORAL 2 TIMES DAILY
Qty: 60 TABLET | Refills: 5 | Status: SHIPPED | OUTPATIENT
Start: 2021-02-16 | End: 2021-07-28

## 2021-04-19 LAB — HBA1C MFR BLD: 9.2 % (ref 4.8–5.9)

## 2021-04-21 ENCOUNTER — VIRTUAL VISIT (OUTPATIENT)
Dept: FAMILY MEDICINE CLINIC | Age: 61
End: 2021-04-21
Payer: MEDICARE

## 2021-04-21 DIAGNOSIS — I42.0 DILATED CARDIOMYOPATHY (HCC): ICD-10-CM

## 2021-04-21 DIAGNOSIS — F41.9 ANXIETY AND DEPRESSION: Chronic | ICD-10-CM

## 2021-04-21 DIAGNOSIS — F32.A ANXIETY AND DEPRESSION: Chronic | ICD-10-CM

## 2021-04-21 DIAGNOSIS — I10 ESSENTIAL HYPERTENSION: ICD-10-CM

## 2021-04-21 DIAGNOSIS — E11.40 TYPE 2 DIABETES MELLITUS WITH DIABETIC NEUROPATHY, WITH LONG-TERM CURRENT USE OF INSULIN (HCC): ICD-10-CM

## 2021-04-21 DIAGNOSIS — Z79.4 TYPE 2 DIABETES MELLITUS WITH DIABETIC NEUROPATHY, WITH LONG-TERM CURRENT USE OF INSULIN (HCC): ICD-10-CM

## 2021-04-21 DIAGNOSIS — I50.22 CHRONIC SYSTOLIC HEART FAILURE (HCC): Primary | Chronic | ICD-10-CM

## 2021-04-21 DIAGNOSIS — E66.01 MORBID OBESITY (HCC): ICD-10-CM

## 2021-04-21 PROBLEM — I50.43 CHF (CONGESTIVE HEART FAILURE), NYHA CLASS III, ACUTE ON CHRONIC, COMBINED (HCC): Status: RESOLVED | Noted: 2020-04-23 | Resolved: 2021-04-21

## 2021-04-21 PROCEDURE — 3017F COLORECTAL CA SCREEN DOC REV: CPT | Performed by: FAMILY MEDICINE

## 2021-04-21 PROCEDURE — 3046F HEMOGLOBIN A1C LEVEL >9.0%: CPT | Performed by: FAMILY MEDICINE

## 2021-04-21 PROCEDURE — G8427 DOCREV CUR MEDS BY ELIG CLIN: HCPCS | Performed by: FAMILY MEDICINE

## 2021-04-21 PROCEDURE — 2022F DILAT RTA XM EVC RTNOPTHY: CPT | Performed by: FAMILY MEDICINE

## 2021-04-21 PROCEDURE — 99214 OFFICE O/P EST MOD 30 MIN: CPT | Performed by: FAMILY MEDICINE

## 2021-04-21 RX ORDER — AMLODIPINE BESYLATE 10 MG/1
10 TABLET ORAL DAILY
Qty: 90 TABLET | Refills: 4 | Status: SHIPPED | OUTPATIENT
Start: 2021-04-21 | End: 2022-04-26 | Stop reason: SDUPTHER

## 2021-04-21 RX ORDER — PEN NEEDLE, DIABETIC 31 GX5/16"
1 NEEDLE, DISPOSABLE MISCELLANEOUS DAILY
Qty: 100 EACH | Refills: 4 | Status: ON HOLD | OUTPATIENT
Start: 2021-04-21

## 2021-04-21 RX ORDER — LISINOPRIL 20 MG/1
20 TABLET ORAL DAILY
Qty: 90 TABLET | Refills: 4 | Status: SHIPPED | OUTPATIENT
Start: 2021-04-21 | End: 2022-04-26 | Stop reason: SDUPTHER

## 2021-04-21 RX ORDER — INSULIN GLARGINE 100 [IU]/ML
INJECTION, SOLUTION SUBCUTANEOUS
Qty: 30 ML | Refills: 12 | Status: ON HOLD | OUTPATIENT
Start: 2021-04-21

## 2021-04-21 RX ORDER — SERTRALINE HYDROCHLORIDE 100 MG/1
100 TABLET, FILM COATED ORAL DAILY
Qty: 90 TABLET | Refills: 4 | Status: ON HOLD | OUTPATIENT
Start: 2021-04-21

## 2021-04-21 ASSESSMENT — PATIENT HEALTH QUESTIONNAIRE - PHQ9
SUM OF ALL RESPONSES TO PHQ QUESTIONS 1-9: 0
2. FEELING DOWN, DEPRESSED OR HOPELESS: 0

## 2021-04-21 NOTE — PATIENT INSTRUCTIONS
En un plato de 9 pulgadas, la mitad debe kris verduras frescas. Si no es fresco, [place]. Jonah Hershey parte del plato debe contener proteÃnas, cerdo, timothy, pescado, huevos. En quin cuarta parte de los platos de comida debe kris carbohidratos saludables conchita arÃ¡ndanos frescos, moras, frambuesas, fresas. Si no varÃa, entonces los cereales integrales conchita el arroz integral o la quinua. Evite los alimentos de color nevarez o marrÃ³n vanesa que no heavenly coliflor, SANDEFGERTRUDIS, [place]. Evite las bebidas azucaradas. Evite todos los jugos. Evite los alimentos muy procesados MAK fritos. </SPAN>

## 2021-04-21 NOTE — PROGRESS NOTES
2021    TELEHEALTH EVALUATION -- Audio/Visual (During CIPKY-99 public health emergency)    HPI:    Caryle Sicks Curet (:  1960) has requested an audio/video evaluation for the following concern(s):    Patient is here for DM f/u. Sugars have been moderately well-controlled and patient has not been experiencing hyper- or hypoglycemic symptoms. Compliance with meds is good and there are no side effects. Patient exercises occasionally and tries to eat healthy. Is up to date on foot and eye exams and immunizations.     Patient is here for follow-up of CAD, cardiomyopathy, CHF, HTN. No chest pain, shortness of breath, palpitations, edema, paroxysmal nocturnal dyspnea, orthopnea. Is compliant with meds which do not cause significant side effects. Avoids added salt; exercises occasionally and eating a much healthier diet.      Patient is here for DM with neuropathy f/u. Sugars have been very well-controlled and patient has not been experiencing hyper- or hypoglycemic symptoms. Compliance with meds is good but had to stop Trulicity because it seemed to give him severe back pain which has not recurred since stopping the med. Patient exercises very rarely. Has reduced rice intake and increased vegetables. Has lost 41 pounds. Sugars are less than 120 in general..    Patient is being treated for depression and has been compliant with meds which do not cause side effects. Mood is improved. No suicidal ideation. Sleep is normal.       Ulcers - remain healed well.     LON. Using CPAP.     Left shoulder pain. No injury. Did not f/u with ortho     GERD. Well-controlled with PPI, caffeine restriction, diet restriction. No weight loss. No abdominal pain. No bloody or black tarry stools. Prior to Visit Medications    Medication Sig Taking?  Authorizing Provider   lisinopril (PRINIVIL;ZESTRIL) 20 MG tablet Take 1 tablet by mouth daily Yes Loyd Ramon MD   SITagliptin (JANUVIA) 100 MG tablet Take 1 tablet by mouth daily Yes Juliana Bazzi MD   amLODIPine (NORVASC) 10 MG tablet Take 1 tablet by mouth daily Yes Juliana Bazzi MD   sertraline (ZOLOFT) 100 MG tablet Take 1 tablet by mouth daily Yes Juliana Bazzi MD   insulin glargine (LANTUS SOLOSTAR) 100 UNIT/ML injection pen ADMINISTER 55 UNITS UNDER THE SKIN EVERY NIGHT Yes Juliana Bazzi MD   Insulin Pen Needle (B-D UF III MINI PEN NEEDLES) 31G X 5 MM MISC 1 each by Does not apply route daily Yes Juliana Bazzi MD   metoprolol tartrate (LOPRESSOR) 50 MG tablet Take 1 tablet by mouth 2 times daily  Norman J Holiday, DO   aspirin EC 81 MG EC tablet Take 1 tablet by mouth daily  Norman J Holiday, DO   isosorbide mononitrate (IMDUR) 30 MG extended release tablet Take 1 tablet by mouth daily  Norman J Holiday, DO   fenofibrate (TRIGLIDE) 160 MG tablet TAKE 1 TABLET BY MOUTH EVERY NIGHT  Juliana Bazzi MD   furosemide (LASIX) 40 MG tablet TAKE 1 TABLET BY MOUTH DAILY  Norman J Holiday, DO   albuterol (PROVENTIL) (2.5 MG/3ML) 0.083% nebulizer solution Albuterol albuterol (PROVENTIL) (2.5 MG/3ML) 0.083% nebulizer solution Indications: Type 2 diabetes mellitus without complication, with long-term current use of insulin (HCC) Take 3 mLs by nebulization 4 times daily 120 each 5 10/11/2018 Active 10-  [facility] ([id number]  Historical Provider, MD   gabapentin (NEURONTIN) 300 MG capsule Take by mouth. Historical Provider, MD   hydrOXYzine (VISTARIL) 50 MG capsule Take 50 mg by mouth  Historical Provider, MD   atorvastatin (LIPITOR) 40 MG tablet Take by mouth  Historical Provider, MD   ammonium lactate (LAC-HYDRIN) 12 % cream Apply topically as needed.   Abbey Wells DPM   glipiZIDE (GLUCOTROL) 10 MG tablet TAKE 1 TABLET BY MOUTH TWICE DAILY BEFORE MEALS  Juliana Bazzi MD   omeprazole (PRILOSEC) 20 MG delayed release capsule Take 1 capsule by mouth daily  Juliana Bazzi MD   vitamin D (ERGOCALCIFEROL) 1.25 MG (03970 UT) CAPS capsule Take 50,000 Units by mouth once a week   Historical Provider, MD   gentamicin (GARAMYCIN) 0.1 % ointment Apply topically 3 times daily. Patient taking differently: Apply topically 3 times daily Apply topically 3 times daily. Madyson Wells DPM   mupirocin (BACTROBAN) 2 % ointment Apply topically as needed   Historical Provider, MD   sodium chloride 0.9 % irrigation [address]  Historical Provider, MD   blood glucose test strips (TRUE METRIX BLOOD GLUCOSE TEST) strip 1 each by In Vitro route 3 times daily As needed. Shelton Bailey, DO   terbinafine (LAMISIL) 1 % cream Apply topically 2 times daily Apply topically 2 times daily. Patient taking differently: Apply topically 2 times daily Apply topically 2-3 times daily. Shelton Bailey, DO   Blood Glucose Monitoring Suppl (ONE TOUCH ULTRA MINI) w/Device KIT 1 kit by Does not apply route three times daily  Natale Sandifer, DO   Lancets MISC Use TID  Natale Sandifer, DO   Insulin Syringe-Needle U-100 (INSULIN SYRINGE 1CC/31GX5/16\") 31G X 5/16\" 1 ML MISC Use once daily to administer Lantus  Natale Sandifer, DO       Social History     Tobacco Use    Smoking status: Former Smoker     Packs/day: 1.00     Years: 25.00     Pack years: 25.00     Quit date: 2008     Years since quittin.3    Smokeless tobacco: Never Used   Substance Use Topics    Alcohol use: No    Drug use: Never            PHYSICAL EXAMINATION:  [ INSTRUCTIONS:  \"[x]\" Indicates a positive item  \"[]\" Indicates a negative item  -- DELETE ALL ITEMS NOT EXAMINED]  Vital Signs: (As obtained by patient/caregiver or practitioner observation)  Patient appears to be alert and oriented to person, place, time, situation and is in no acute distress. Mood appears stable and speech and thought are grossly normal.    ASSESSMENT/PLAN:  Ruddy Tinajero was seen today for coronary artery disease and depression.     Diagnoses and that this is a billable service. Verbal consent to proceed has been obtained within the past 12 months. The visit was conducted pursuant to the emergency declaration under the [facility], [address] [place] [address] authority and the [employer] and [employer] General Act. Patient identification was verified, and a caregiver was present when appropriate. The patient was located in a state where the provider was credentialed to provide care. Total time spent on this encounter: Not billed by time    --Veronica Martin MD on 4/21/2021 at 2:28 PM    An electronic signature was used to authenticate this note.

## 2021-05-02 DIAGNOSIS — I50.22 CHRONIC SYSTOLIC HEART FAILURE (HCC): Chronic | ICD-10-CM

## 2021-05-02 DIAGNOSIS — I42.0 DILATED CARDIOMYOPATHY (HCC): ICD-10-CM

## 2021-05-03 RX ORDER — ISOSORBIDE MONONITRATE 30 MG/1
30 TABLET, EXTENDED RELEASE ORAL DAILY
Qty: 90 TABLET | Refills: 0 | Status: SHIPPED | OUTPATIENT
Start: 2021-05-03 | End: 2021-07-26

## 2021-05-03 NOTE — TELEPHONE ENCOUNTER
requesting medication refill.  Please approve or deny this request.    Rx requested:  Requested Prescriptions     Pending Prescriptions Disp Refills    isosorbide mononitrate (IMDUR) 30 MG extended release tablet [Pharmacy Med Name: ISOSORBIDE MONONITRATE 30MG ER TABS] 90 tablet 0     Sig: TAKE 1 TABLET BY MOUTH DAILY         Last Office Visit:   4/28/2020      Next Visit Date:  Future Appointments   Date Time Provider Kathryn Figueroa   6/10/2021  9:30 AM [address]   8/3/2021 12:00 PM Treva Hanson MD [address] [place] [address]

## 2021-05-05 DIAGNOSIS — I42.0 DILATED CARDIOMYOPATHY (HCC): ICD-10-CM

## 2021-05-05 RX ORDER — ASPIRIN 81 MG/1
TABLET, COATED ORAL
Qty: 90 TABLET | Refills: 0 | Status: SHIPPED | OUTPATIENT
Start: 2021-05-05 | End: 2021-08-02

## 2021-05-05 NOTE — TELEPHONE ENCOUNTER
requesting medication refill.  Please approve or deny this request.    Rx requested:  Requested Prescriptions     Pending Prescriptions Disp Refills    ASPIRIN LOW DOSE 81 MG EC tablet [Pharmacy Med Name: ASPIRIN 81MG EC LOW DOSE TABLETS] 90 tablet 0     Sig: TAKE 1 TABLET BY MOUTH DAILY         Last Office Visit:   4/28/2020      Next Visit Date:  Future Appointments   Date Time Provider Kathryn Figueroa   6/10/2021  9:30 AM [address]   8/3/2021 12:00 PM James Mitchell MD [address] [place] [address]

## 2021-06-10 ENCOUNTER — HOSPITAL ENCOUNTER (OUTPATIENT)
Dept: CARDIOLOGY | Age: 61
Discharge: HOME | End: 2021-06-10
Payer: MEDICARE

## 2021-06-10 PROCEDURE — 93296 REM INTERROG EVL PM/IDS: CPT

## 2021-07-25 DIAGNOSIS — I42.0 DILATED CARDIOMYOPATHY (HCC): ICD-10-CM

## 2021-07-25 DIAGNOSIS — I50.22 CHRONIC SYSTOLIC HEART FAILURE (HCC): Chronic | ICD-10-CM

## 2021-07-26 RX ORDER — ISOSORBIDE MONONITRATE 30 MG/1
30 TABLET, EXTENDED RELEASE ORAL DAILY
Qty: 90 TABLET | Refills: 3 | Status: SHIPPED | OUTPATIENT
Start: 2021-07-26 | End: 2022-04-26 | Stop reason: SDUPTHER

## 2021-07-28 DIAGNOSIS — I10 ESSENTIAL HYPERTENSION: ICD-10-CM

## 2021-07-28 RX ORDER — METOPROLOL TARTRATE 50 MG/1
TABLET, FILM COATED ORAL
Qty: 60 TABLET | Refills: 5 | Status: SHIPPED | OUTPATIENT
Start: 2021-07-28 | End: 2022-01-04

## 2021-07-31 ENCOUNTER — HOSPITAL ENCOUNTER (EMERGENCY)
Age: 61
Discharge: HOME | End: 2021-08-01
Attending: EMERGENCY MEDICINE
Payer: COMMERCIAL

## 2021-07-31 ENCOUNTER — APPOINTMENT (OUTPATIENT)
Dept: GENERAL RADIOLOGY | Age: 61
End: 2021-07-31
Payer: COMMERCIAL

## 2021-07-31 DIAGNOSIS — I50.20 SYSTOLIC CONGESTIVE HEART FAILURE, UNSPECIFIED HF CHRONICITY (HCC): Primary | ICD-10-CM

## 2021-07-31 DIAGNOSIS — I42.0 DILATED CARDIOMYOPATHY (HCC): ICD-10-CM

## 2021-07-31 LAB
BASOPHILS ABSOLUTE: 0.2 K/UL (ref 0–0.2)
BASOPHILS RELATIVE PERCENT: 1.3 %
EOSINOPHILS ABSOLUTE: 0.2 K/UL (ref 0–0.7)
EOSINOPHILS RELATIVE PERCENT: 1.6 %
HCT VFR BLD CALC: 37.4 % (ref 42–52)
HEMOGLOBIN: 12.4 G/DL (ref 14–18)
LYMPHOCYTES ABSOLUTE: 2.2 K/UL (ref 1–4.8)
LYMPHOCYTES RELATIVE PERCENT: 17.9 %
MCH RBC QN AUTO: 29.2 PG (ref 27–31.3)
MCHC RBC AUTO-ENTMCNC: 33.2 % (ref 33–37)
MCV RBC AUTO: 87.9 FL (ref 80–100)
MONOCYTES ABSOLUTE: 0.8 K/UL (ref 0.2–0.8)
MONOCYTES RELATIVE PERCENT: 7 %
NEUTROPHILS ABSOLUTE: 8.7 K/UL (ref 1.4–6.5)
NEUTROPHILS RELATIVE PERCENT: 72.2 %
PDW BLD-RTO: 16 % (ref 11.5–14.5)
PLATELET # BLD: 339 K/UL (ref 130–400)
RBC # BLD: 4.25 M/UL (ref 4.7–6.1)
WBC # BLD: 12 K/UL (ref 4.8–10.8)

## 2021-07-31 PROCEDURE — 71045 X-RAY EXAM CHEST 1 VIEW: CPT

## 2021-07-31 PROCEDURE — 84484 ASSAY OF TROPONIN QUANT: CPT

## 2021-07-31 PROCEDURE — 80053 COMPREHEN METABOLIC PANEL: CPT

## 2021-07-31 PROCEDURE — 6360000002 HC RX W HCPCS: Performed by: EMERGENCY MEDICINE

## 2021-07-31 PROCEDURE — 99282 EMERGENCY DEPT VISIT SF MDM: CPT

## 2021-07-31 PROCEDURE — 85025 COMPLETE CBC W/AUTO DIFF WBC: CPT

## 2021-07-31 PROCEDURE — 83880 ASSAY OF NATRIURETIC PEPTIDE: CPT

## 2021-07-31 PROCEDURE — 96374 THER/PROPH/DIAG INJ IV PUSH: CPT

## 2021-07-31 PROCEDURE — 36415 COLL VENOUS BLD VENIPUNCTURE: CPT

## 2021-07-31 PROCEDURE — 93005 ELECTROCARDIOGRAM TRACING: CPT | Performed by: EMERGENCY MEDICINE

## 2021-07-31 RX ORDER — FUROSEMIDE 10 MG/ML
40 INJECTION INTRAMUSCULAR; INTRAVENOUS ONCE
Status: COMPLETED | OUTPATIENT
Start: 2021-07-31 | End: 2021-07-31

## 2021-07-31 RX ADMIN — FUROSEMIDE 40 MG: 10 INJECTION, SOLUTION INTRAMUSCULAR; INTRAVENOUS at 22:55

## 2021-07-31 ASSESSMENT — ENCOUNTER SYMPTOMS
PHOTOPHOBIA: 0
SORE THROAT: 0
COUGH: 0
EYE DISCHARGE: 0
CHEST TIGHTNESS: 0
ABDOMINAL DISTENTION: 0
SHORTNESS OF BREATH: 1
WHEEZING: 0
VOMITING: 0
ABDOMINAL PAIN: 0
NAUSEA: 0

## 2021-07-31 ASSESSMENT — PAIN DESCRIPTION - LOCATION: LOCATION: HEAD

## 2021-07-31 ASSESSMENT — PAIN DESCRIPTION - PAIN TYPE: TYPE: ACUTE PAIN

## 2021-07-31 ASSESSMENT — PAIN SCALES - GENERAL: PAINLEVEL_OUTOF10: 5

## 2021-08-01 VITALS
BODY MASS INDEX: 44.1 KG/M2 | RESPIRATION RATE: 17 BRPM | SYSTOLIC BLOOD PRESSURE: 145 MMHG | HEIGHT: 71 IN | HEART RATE: 74 BPM | TEMPERATURE: 98.3 F | DIASTOLIC BLOOD PRESSURE: 78 MMHG | WEIGHT: 315 LBS | OXYGEN SATURATION: 98 %

## 2021-08-01 LAB
ALBUMIN SERPL-MCNC: 4.1 G/DL (ref 3.5–4.6)
ALP BLD-CCNC: 110 U/L (ref 35–104)
ALT SERPL-CCNC: 17 U/L (ref 0–41)
ANION GAP SERPL CALCULATED.3IONS-SCNC: 13 MEQ/L (ref 9–15)
AST SERPL-CCNC: 24 U/L (ref 0–40)
BILIRUB SERPL-MCNC: 0.3 MG/DL (ref 0.2–0.7)
BUN BLDV-MCNC: 32 MG/DL (ref 8–23)
CALCIUM SERPL-MCNC: 10.1 MG/DL (ref 8.5–9.9)
CHLORIDE BLD-SCNC: 103 MEQ/L (ref 95–107)
CO2: 24 MEQ/L (ref 20–31)
CREAT SERPL-MCNC: 2.31 MG/DL (ref 0.7–1.2)
GFR AFRICAN AMERICAN: 34.7
GFR NON-AFRICAN AMERICAN: 28.7
GLOBULIN: 3.2 G/DL (ref 2.3–3.5)
GLUCOSE BLD-MCNC: 183 MG/DL (ref 70–99)
POTASSIUM SERPL-SCNC: 4.4 MEQ/L (ref 3.4–4.9)
PRO-BNP: 1831 PG/ML
SODIUM BLD-SCNC: 140 MEQ/L (ref 135–144)
TOTAL PROTEIN: 7.3 G/DL (ref 6.3–8)
TROPONIN: <0.01 NG/ML (ref 0–0.01)

## 2021-08-01 NOTE — ED TRIAGE NOTES
Patient presents to the er with complaints of sob that started approx 15 minutes ago   Pt diaphoretic   Oxygen 89-90% on RA  placed on 2L now 96-98%  Denies chest pain   Complains of headache

## 2021-08-01 NOTE — ED PROVIDER NOTES
Hematological: Negative for adenopathy. Psychiatric/Behavioral: Negative for agitation and hallucinations. The patient is not nervous/anxious. All other systems reviewed and are negative. Except as noted above the remainder of the review of systems was reviewed and negative. PAST MEDICAL HISTORY     Past Medical History:   Diagnosis Date    Acute kidney injury superimposed on CKD ([address] 8/15/2019    Anxiety and depression 4/30/2019    Cardiomyopathy [facility]     Chronic systolic heart failure ([facility] 4/30/2019    Diabetes mellitus ([address]     Diabetic neuropathy associated with type 2 diabetes mellitus ([address] 12/13/2017    Heart failure, NYHA class 4 ([facility]     Hyperlipidemia     Hypertension     ICD (implantable cardioverter-defibrillator) in place 4/30/2019    Left ureteral calculus     Morbid obesity ([facility] [address] 12/19/2017    LON (obstructive sleep apnea)     Pyelonephritis 8/4/2019    Ureteral calculus     Ureteric stone          SURGICALHISTORY       Past Surgical History:   Procedure Laterality Date    CARDIAC DEFIBRILLATOR PLACEMENT  12/2008    CYSTOSCOPY INSERTION / REMOVAL STENT / STONE Left 5/30/2019    CYSTOSCOPY LEFT DOUBLE J STENT PLACEMENT ROOM 287 performed by Carrie Parker MD at [address] [place] [address] LITHOTRIPSY Left 6/12/2019    LEFT ESWL (CONF # [id number]) performed by Carrie Parker MD at [address] [place] [address] LITHOTRIPSY N/A 7/10/2019    HOLMIUM LASER LITHOTRIPSY, STONE EXTRACTION, LT URETERAL STENT INSERTION.  performed by Carrie Parker MD at [address] [place] [address] Left 7/10/2019    LEFT FLEXIBLE URETEROSCOPY, CYSTOSCOPY RETROGRADE performed by Carrie Parker MD at [address] [place] [address]       Previous Medications    ALBUTEROL (PROVENTIL) (2.5 MG/3ML) 0.083% NEBULIZER SOLUTION    Albuterol albuterol (PROVENTIL) (2.5 MG/3ML) 0.083% nebulizer solution Indications: Type 2 diabetes mellitus without complication, with long-term current use of insulin (HCC) Take 3 mLs by nebulization 4 times daily 120 each 5 10/11/2018 Active 10-  [facility] ([id number])    AMLODIPINE (NORVASC) 10 MG TABLET    Take 1 tablet by mouth daily    AMMONIUM LACTATE (LAC-HYDRIN) 12 % CREAM    Apply topically as needed. ASPIRIN LOW DOSE 81 MG EC TABLET    TAKE 1 TABLET BY MOUTH DAILY    ATORVASTATIN (LIPITOR) 40 MG TABLET    Take by mouth    BLOOD GLUCOSE MONITORING SUPPL (ONE TOUCH ULTRA MINI) W/DEVICE KIT    1 kit by Does not apply route three times daily    BLOOD GLUCOSE TEST STRIPS (TRUE METRIX BLOOD GLUCOSE TEST) STRIP    1 each by In Vitro route 3 times daily As needed. FENOFIBRATE (TRIGLIDE) 160 MG TABLET    TAKE 1 TABLET BY MOUTH EVERY NIGHT    FUROSEMIDE (LASIX) 40 MG TABLET    TAKE 1 TABLET BY MOUTH DAILY    GABAPENTIN (NEURONTIN) 300 MG CAPSULE    Take by mouth. GENTAMICIN (GARAMYCIN) 0.1 % OINTMENT    Apply topically 3 times daily.     GLIPIZIDE (GLUCOTROL) 10 MG TABLET    TAKE 1 TABLET BY MOUTH TWICE DAILY BEFORE MEALS    HYDROXYZINE (VISTARIL) 50 MG CAPSULE    Take 50 mg by mouth    INSULIN GLARGINE (LANTUS SOLOSTAR) 100 UNIT/ML INJECTION PEN    ADMINISTER 55 UNITS UNDER THE SKIN EVERY NIGHT    INSULIN PEN NEEDLE (B-D UF III MINI PEN NEEDLES) 31G X 5 MM MISC    1 each by Does not apply route daily    INSULIN SYRINGE-NEEDLE U-100 (INSULIN SYRINGE 1CC/31GX5/16\") 31G X 5/16\" 1 ML MISC    Use once daily to administer Lantus    ISOSORBIDE MONONITRATE (IMDUR) 30 MG EXTENDED RELEASE TABLET    TAKE 1 TABLET BY MOUTH DAILY    LANCETS MISC    Use TID    LISINOPRIL (PRINIVIL;ZESTRIL) 20 MG TABLET    Take 1 tablet by mouth daily    METOPROLOL TARTRATE (LOPRESSOR) 50 MG TABLET    TAKE 1 TABLET BY MOUTH TWICE DAILY    MUPIROCIN (BACTROBAN) 2 % OINTMENT    Apply topically as needed     OMEPRAZOLE (PRILOSEC) 20 MG DELAYED RELEASE CAPSULE    Take 1 capsule by mouth daily    SERTRALINE (ZOLOFT) 100 MG TABLET    Take 1 tablet by mouth daily    SITAGLIPTIN (JANUVIA) 100 MG TABLET    Take 1 tablet by mouth daily    SODIUM CHLORIDE 0.9 % IRRIGATION    CLEASE WOUND WITH DRESSING CHANGES    TERBINAFINE (LAMISIL) 1 % CREAM    Apply topically 2 times daily Apply topically 2 times daily. VITAMIN D (ERGOCALCIFEROL) 1.25 MG (70658 UT) CAPS CAPSULE    Take 50,000 Units by mouth once a week        ALLERGIES     Coreg [carvedilol]    FAMILY HISTORY       Family History   Problem Relation Age of Onset    Heart Disease Mother     Kidney Disease Mother     Hypertension Mother     Diabetes Mother     No Known Problems Sister     No Known Problems Brother     No Known Problems Brother           SOCIAL HISTORY       Social History     Socioeconomic History    Marital status: Legally      Spouse name: None    Number of children: None    Years of education: None    Highest education level: None   Occupational History    None   Tobacco Use    Smoking status: Former Smoker     Packs/day: 1.00     Years: 25.00     Pack years: 25.00     Quit date: 2008     Years since quittin.6    Smokeless tobacco: Never Used   Substance and Sexual Activity    Alcohol use: No    Drug use: Never    Sexual activity: Yes     Partners: Female   Other Topics Concern    None   Social History Narrative    None     Social Determinants of Health     Financial Resource Strain:     Difficulty of Paying Living Expenses:    Food Insecurity:     Worried About Running Out of Food in the Last Year:     [address] in the Last Year:    Transportation Needs:     Lack of Transportation (Medical):      Lack of Transportation (Non-Medical):    Physical Activity:     Days of Exercise per Week:     Minutes of Exercise per Session:    Stress:     Feeling of Stress :    Social Connections:     Frequency of Communication with Friends and Family:     Frequency of Social Gatherings with Friends and Family:     Attends Sikh Services:     Active Member of Clubs or Organizations:     Attends Club or Organization Meetings:     Marital Status:    Intimate Partner Violence:     Fear of Current or Ex-Partner:     Emotionally Abused:     Physically Abused:     Sexually Abused:        SCREENINGS      @FLOW([id number])@      PHYSICAL EXAM    (up to 7 for level 4, 8 or more for level 5)     ED Triage Vitals [07/31/21 2219]   BP Temp Temp Source Pulse Resp SpO2 Height Weight   (!) 155/78 98.3 °F (36.8 °C) Oral 84 24 95 % 5' 11\" (1.803 m) (!) 340 lb (154.2 kg)       Physical Exam    DIAGNOSTIC RESULTS     EKG: All EKG's are interpreted by the Emergency Department Physician who either signs or Co-signsthis chart in the absence of a cardiologist.  EKG shows a normal sinus rhythm rate of 79. No acute ST-T wave changes. Normal axis. Normal EKG      RADIOLOGY:   Non-plain filmimages such as CT, Ultrasound and MRI are read by the radiologist. Plain radiographic images are visualized and preliminarily interpreted by the emergency physician with the below findings:      Interpretation per the Radiologist below, if available at the time ofthis note:    XR CHEST PORTABLE    (Results Pending)         ED BEDSIDE ULTRASOUND:   Performed by ED Physician - none    LABS:  Labs Reviewed   COMPREHENSIVE METABOLIC PANEL - Abnormal; Notable for the following components:       Result Value    Glucose 183 (*)     BUN 32 (*)     CREATININE 2.31 (*)     GFR Non- 28.7 (*)     GFR  34.7 (*)     Calcium 10.1 (*)     Alkaline Phosphatase 110 (*)     All other components within normal limits   CBC WITH AUTO DIFFERENTIAL - Abnormal; Notable for the following components:    WBC 12.0 (*)     RBC 4.25 (*)     Hemoglobin 12.4 (*)     Hematocrit 37.4 (*)     RDW 16.0 (*)     Neutrophils Absolute 8.7 (*)     All other components within normal limits   TROPONIN   BRAIN NATRIURETIC PEPTIDE       All other labs were within normal range or not returned as of this dictation.     EMERGENCY DEPARTMENT COURSE and DIFFERENTIAL DIAGNOSIS/MDM:   Vitals:    Vitals:    07/31/21 2219 07/31/21 2300   BP: (!) 155/78 (!) 142/94   Pulse: 84 79   Resp: 24 18   Temp: 98.3 °F (36.8 °C)    TempSrc: Oral    SpO2: 95% 97%   Weight: (!) 340 lb (154.2 kg)    Height: 5' 11\" (1.803 m)         MDM had a reasonable chest x-ray. Reviewing his chart he has been seen for cardiomyopathy with Dr. Oskar Peterson. He diuresed some here and is feeling much better on recheck. Based on his past medical history and acute hypoxia with the mild CHF I am recommending admission. The patient is adamantly against staying in the hospital.  I turned off his oxygen and had him ambulate and minutes later. He was able to maintain his pulse ox above 92% on room air with ambulation. He is pain-free and has remained pain-free the whole time here. His family understands his high risk nature but wants to honor his request to be discharged home. He is told he may return here at any time for reevaluation. Discharged home to follow-up with Dr. Oskar Peterson in the next 36 hours. CONSULTS:  None    PROCEDURES:  Unless otherwise noted below, none     Procedures    FINAL IMPRESSION      1. Systolic congestive heart failure, unspecified HF chronicity ([facility] [address]          DISPOSITION/PLAN   DISPOSITION Decision To Discharge 08/01/2021 12:50:49 AM      PATIENT REFERRED TO:  No follow-up provider specified.     DISCHARGE MEDICATIONS:  New Prescriptions    No medications on file          (Please note that portions of this note were completed with a voice recognition program.  Efforts were made to edit the dictations but occasionally words are mis-transcribed.)    Marek Leslie MD (electronically signed)  Attending Emergency Physician          Marek Leslie MD  08/01/21 6747

## 2021-08-02 RX ORDER — ASPIRIN 81 MG/1
TABLET ORAL
Qty: 90 TABLET | Refills: 3 | Status: SHIPPED | OUTPATIENT
Start: 2021-08-02 | End: 2022-04-26 | Stop reason: SDUPTHER

## 2021-08-03 LAB
EKG ATRIAL RATE: 79 BPM
EKG P AXIS: 44 DEGREES
EKG P-R INTERVAL: 198 MS
EKG Q-T INTERVAL: 388 MS
EKG QRS DURATION: 102 MS
EKG QTC CALCULATION (BAZETT): 444 MS
EKG R AXIS: -22 DEGREES
EKG T AXIS: 63 DEGREES
EKG VENTRICULAR RATE: 79 BPM

## 2021-08-03 PROCEDURE — 93010 ELECTROCARDIOGRAM REPORT: CPT | Performed by: INTERNAL MEDICINE

## 2021-08-30 DIAGNOSIS — I42.0 DILATED CARDIOMYOPATHY (HCC): ICD-10-CM

## 2021-08-31 NOTE — TELEPHONE ENCOUNTER
Pharamcy requesting medication refill. Please approve or deny this request.    Rx requested:  Requested Prescriptions     Pending Prescriptions Disp Refills    furosemide (LASIX) 40 MG tablet [Pharmacy Med Name: FUROSEMIDE 40MG TABLETS] 90 tablet 3     Sig: TAKE 1 TABLET BY MOUTH EVERY DAY.  HOLD UNTIL SEEN BY PCP FOR FOLLOWUP         Last Office Visit:   4/21/2021      Next Visit Date:  Future Appointments   Date Time Provider Kathryn Figueroa   9/3/2021 12:30 PM DO Cecilio Loera   9/13/2021 10:45  [address]

## 2021-09-01 RX ORDER — FUROSEMIDE 40 MG/1
TABLET ORAL
Qty: 90 TABLET | Refills: 3 | Status: SHIPPED | OUTPATIENT
Start: 2021-09-01 | End: 2022-04-26 | Stop reason: SDUPTHER

## 2021-09-03 ENCOUNTER — OFFICE VISIT (OUTPATIENT)
Dept: CARDIOLOGY CLINIC | Age: 61
End: 2021-09-03
Payer: MEDICARE

## 2021-09-03 VITALS
WEIGHT: 315 LBS | DIASTOLIC BLOOD PRESSURE: 72 MMHG | SYSTOLIC BLOOD PRESSURE: 146 MMHG | BODY MASS INDEX: 47 KG/M2 | HEART RATE: 76 BPM | RESPIRATION RATE: 20 BRPM | OXYGEN SATURATION: 98 %

## 2021-09-03 DIAGNOSIS — I10 ESSENTIAL HYPERTENSION: Primary | ICD-10-CM

## 2021-09-03 DIAGNOSIS — I50.22 CHRONIC SYSTOLIC HEART FAILURE (HCC): Chronic | ICD-10-CM

## 2021-09-03 DIAGNOSIS — I50.22 CHRONIC SYSTOLIC HEART FAILURE (HCC): ICD-10-CM

## 2021-09-03 DIAGNOSIS — Z95.810 ICD (IMPLANTABLE CARDIOVERTER-DEFIBRILLATOR) IN PLACE: Chronic | ICD-10-CM

## 2021-09-03 DIAGNOSIS — I42.0 DILATED CARDIOMYOPATHY (HCC): ICD-10-CM

## 2021-09-03 PROCEDURE — 99214 OFFICE O/P EST MOD 30 MIN: CPT | Performed by: INTERNAL MEDICINE

## 2021-09-03 RX ORDER — LISINOPRIL 20 MG/1
20 TABLET ORAL DAILY
Qty: 90 TABLET | Refills: 4 | Status: CANCELLED | OUTPATIENT
Start: 2021-09-03

## 2021-09-03 ASSESSMENT — ENCOUNTER SYMPTOMS
NAUSEA: 0
WHEEZING: 0
VOMITING: 0
SHORTNESS OF BREATH: 0
ALLERGIC/IMMUNOLOGIC NEGATIVE: 1
EYES NEGATIVE: 1
GASTROINTESTINAL NEGATIVE: 1
ABDOMINAL PAIN: 0
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
## 2021-09-13 ENCOUNTER — HOSPITAL ENCOUNTER (OUTPATIENT)
Dept: CARDIOLOGY | Age: 61
Discharge: HOME | End: 2021-09-13
Payer: MEDICARE

## 2021-09-13 PROCEDURE — 93296 REM INTERROG EVL PM/IDS: CPT

## 2021-10-20 DIAGNOSIS — E11.40 TYPE 2 DIABETES MELLITUS WITH DIABETIC NEUROPATHY, WITH LONG-TERM CURRENT USE OF INSULIN (HCC): ICD-10-CM

## 2021-10-20 DIAGNOSIS — I50.22 CHRONIC SYSTOLIC HEART FAILURE (HCC): Chronic | ICD-10-CM

## 2021-10-20 DIAGNOSIS — E66.01 MORBID OBESITY (HCC): ICD-10-CM

## 2021-10-20 DIAGNOSIS — Z79.4 TYPE 2 DIABETES MELLITUS WITH DIABETIC NEUROPATHY, WITH LONG-TERM CURRENT USE OF INSULIN (HCC): ICD-10-CM

## 2021-10-20 DIAGNOSIS — I10 ESSENTIAL HYPERTENSION: ICD-10-CM

## 2021-10-20 LAB
BASOPHILS ABSOLUTE: 0.1 K/UL (ref 0–0.2)
BASOPHILS RELATIVE PERCENT: 0.9 %
EOSINOPHILS ABSOLUTE: 0.3 K/UL (ref 0–0.7)
EOSINOPHILS RELATIVE PERCENT: 3.2 %
HBA1C MFR BLD: 7.9 % (ref 4.8–5.9)
HCT VFR BLD CALC: 39.1 % (ref 42–52)
HEMOGLOBIN: 13.1 G/DL (ref 14–18)
LYMPHOCYTES ABSOLUTE: 2.4 K/UL (ref 1–4.8)
LYMPHOCYTES RELATIVE PERCENT: 25.3 %
MCH RBC QN AUTO: 29.5 PG (ref 27–31.3)
MCHC RBC AUTO-ENTMCNC: 33.5 % (ref 33–37)
MCV RBC AUTO: 88.2 FL (ref 80–100)
MONOCYTES ABSOLUTE: 0.8 K/UL (ref 0.2–0.8)
MONOCYTES RELATIVE PERCENT: 8 %
NEUTROPHILS ABSOLUTE: 5.9 K/UL (ref 1.4–6.5)
NEUTROPHILS RELATIVE PERCENT: 62.6 %
PDW BLD-RTO: 16.7 % (ref 11.5–14.5)
PLATELET # BLD: 363 K/UL (ref 130–400)
RBC # BLD: 4.43 M/UL (ref 4.7–6.1)
WBC # BLD: 9.4 K/UL (ref 4.8–10.8)

## 2021-12-13 ENCOUNTER — HOSPITAL ENCOUNTER (OUTPATIENT)
Dept: CARDIOLOGY | Age: 61
Discharge: HOME | End: 2021-12-13
Payer: MEDICARE

## 2021-12-13 PROCEDURE — 93296 REM INTERROG EVL PM/IDS: CPT

## 2022-01-04 DIAGNOSIS — I10 ESSENTIAL HYPERTENSION: ICD-10-CM

## 2022-01-04 RX ORDER — METOPROLOL TARTRATE 50 MG/1
TABLET, FILM COATED ORAL
Qty: 180 TABLET | Refills: 5 | Status: SHIPPED | OUTPATIENT
Start: 2022-01-04 | End: 2022-04-26 | Stop reason: SDUPTHER

## 2022-01-04 NOTE — TELEPHONE ENCOUNTER
Please approve or deny this refill request. The order is pended. Thank you.     LOV 9/3/2021    Next Visit Date:  Future Appointments   Date Time Provider Kathryn Figueroa   3/4/2022 12:30 PM Wes Arvis Blizzard, DO [address] [place] [address]

## 2022-01-04 NOTE — TELEPHONE ENCOUNTER
Please approve or deny this refill request. The order is pended. Thank you.     LOV 9/3/2021    Next Visit Date:  Future Appointments   Date Time Provider Kathryn Figueroa   3/4/2022 12:30 PM Norman Narayanan DO [address] [place] [address]

## 2022-01-06 RX ORDER — METOPROLOL TARTRATE 50 MG/1
TABLET, FILM COATED ORAL
Qty: 180 TABLET | Refills: 3 | Status: SHIPPED | OUTPATIENT
Start: 2022-01-06 | End: 2022-04-26 | Stop reason: SDUPTHER

## 2022-03-14 ENCOUNTER — HOSPITAL ENCOUNTER (OUTPATIENT)
Dept: CARDIOLOGY | Age: 62
Discharge: HOME | End: 2022-03-14
Payer: MEDICARE

## 2022-03-14 PROCEDURE — 93283 PRGRMG EVAL IMPLANTABLE DFB: CPT

## 2022-04-26 ENCOUNTER — OFFICE VISIT (OUTPATIENT)
Dept: CARDIOLOGY CLINIC | Age: 62
End: 2022-04-26
Payer: MEDICARE

## 2022-04-26 VITALS
OXYGEN SATURATION: 97 % | HEART RATE: 71 BPM | HEIGHT: 71 IN | WEIGHT: 315 LBS | SYSTOLIC BLOOD PRESSURE: 132 MMHG | DIASTOLIC BLOOD PRESSURE: 84 MMHG | BODY MASS INDEX: 44.1 KG/M2

## 2022-04-26 DIAGNOSIS — I50.22 CHRONIC SYSTOLIC HEART FAILURE (HCC): Chronic | ICD-10-CM

## 2022-04-26 DIAGNOSIS — I42.0 DILATED CARDIOMYOPATHY (HCC): ICD-10-CM

## 2022-04-26 DIAGNOSIS — I10 ESSENTIAL HYPERTENSION: ICD-10-CM

## 2022-04-26 DIAGNOSIS — I10 ESSENTIAL HYPERTENSION: Primary | ICD-10-CM

## 2022-04-26 DIAGNOSIS — N18.32 STAGE 3B CHRONIC KIDNEY DISEASE (HCC): ICD-10-CM

## 2022-04-26 DIAGNOSIS — I50.22 CHRONIC SYSTOLIC HEART FAILURE (HCC): ICD-10-CM

## 2022-04-26 PROBLEM — N25.81 SECONDARY HYPERPARATHYROIDISM (HCC): Status: ACTIVE | Noted: 2022-04-26

## 2022-04-26 PROBLEM — I50.9 CONGESTIVE HEART FAILURE (HCC): Status: ACTIVE | Noted: 2022-04-26

## 2022-04-26 PROBLEM — F41.9 ANXIETY: Status: ACTIVE | Noted: 2019-04-30

## 2022-04-26 PROCEDURE — 93000 ELECTROCARDIOGRAM COMPLETE: CPT | Performed by: INTERNAL MEDICINE

## 2022-04-26 PROCEDURE — G8427 DOCREV CUR MEDS BY ELIG CLIN: HCPCS | Performed by: INTERNAL MEDICINE

## 2022-04-26 PROCEDURE — 1036F TOBACCO NON-USER: CPT | Performed by: INTERNAL MEDICINE

## 2022-04-26 PROCEDURE — G8417 CALC BMI ABV UP PARAM F/U: HCPCS | Performed by: INTERNAL MEDICINE

## 2022-04-26 PROCEDURE — 3017F COLORECTAL CA SCREEN DOC REV: CPT | Performed by: INTERNAL MEDICINE

## 2022-04-26 PROCEDURE — 99214 OFFICE O/P EST MOD 30 MIN: CPT | Performed by: INTERNAL MEDICINE

## 2022-04-26 RX ORDER — METOPROLOL TARTRATE 50 MG/1
TABLET, FILM COATED ORAL
Qty: 180 TABLET | Refills: 5 | Status: ON HOLD | OUTPATIENT
Start: 2022-04-26

## 2022-04-26 RX ORDER — FUROSEMIDE 40 MG/1
TABLET ORAL
Qty: 90 TABLET | Refills: 3 | Status: ON HOLD | OUTPATIENT
Start: 2022-04-26

## 2022-04-26 RX ORDER — AMLODIPINE BESYLATE 10 MG/1
10 TABLET ORAL DAILY
Qty: 90 TABLET | Refills: 4 | Status: ON HOLD | OUTPATIENT
Start: 2022-04-26

## 2022-04-26 RX ORDER — MELOXICAM 15 MG/1
TABLET ORAL
COMMUNITY
Start: 2022-02-23 | End: 2022-04-26

## 2022-04-26 RX ORDER — ATORVASTATIN CALCIUM 40 MG/1
40 TABLET, FILM COATED ORAL NIGHTLY
Qty: 90 TABLET | Refills: 3 | Status: ON HOLD | OUTPATIENT
Start: 2022-04-26

## 2022-04-26 RX ORDER — ISOSORBIDE MONONITRATE 30 MG/1
30 TABLET, EXTENDED RELEASE ORAL DAILY
Qty: 90 TABLET | Refills: 3 | Status: ON HOLD | OUTPATIENT
Start: 2022-04-26

## 2022-04-26 RX ORDER — ASPIRIN 81 MG/1
TABLET ORAL
Qty: 90 TABLET | Refills: 3 | Status: ON HOLD | OUTPATIENT
Start: 2022-04-26

## 2022-04-26 RX ORDER — LISINOPRIL 20 MG/1
40 TABLET ORAL DAILY
Qty: 90 TABLET | Refills: 3 | Status: SHIPPED | OUTPATIENT
Start: 2022-04-26 | End: 2022-04-27

## 2022-04-26 RX ORDER — HYDROXYZINE PAMOATE 50 MG/1
50 CAPSULE ORAL 3 TIMES DAILY PRN
Qty: 30 CAPSULE | Refills: 1 | Status: ON HOLD | OUTPATIENT
Start: 2022-04-26

## 2022-04-26 ASSESSMENT — ENCOUNTER SYMPTOMS
SHORTNESS OF BREATH: 0
NAUSEA: 0
GASTROINTESTINAL NEGATIVE: 1
ALLERGIC/IMMUNOLOGIC NEGATIVE: 1
ABDOMINAL PAIN: 0
WHEEZING: 0
VOMITING: 0
EYES NEGATIVE: 1

## 2022-04-26 NOTE — TELEPHONE ENCOUNTER
Please approve or deny this refill request. The order is pended. Thank you.     LOV 4/26/2022    Next Visit Date:  Future Appointments   Date Time Provider Kathryn Figueroa   5/4/2022 12:00 PM Carmen Yap [facility]   10/25/2022 11:15 AM Norman Wilburn, DO [address]
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
## 2022-04-27 RX ORDER — LISINOPRIL 20 MG/1
40 TABLET ORAL DAILY
Qty: 180 TABLET | Refills: 3 | Status: ON HOLD | OUTPATIENT
Start: 2022-04-27

## 2022-04-29 DIAGNOSIS — I42.0 DILATED CARDIOMYOPATHY (HCC): ICD-10-CM

## 2022-04-29 DIAGNOSIS — I50.22 CHRONIC SYSTOLIC HEART FAILURE (HCC): Chronic | ICD-10-CM

## 2022-04-29 LAB
ANION GAP SERPL CALCULATED.3IONS-SCNC: 8 MEQ/L (ref 9–15)
BUN BLDV-MCNC: 32 MG/DL (ref 8–23)
CALCIUM SERPL-MCNC: 9.9 MG/DL (ref 8.5–9.9)
CHLORIDE BLD-SCNC: 104 MEQ/L (ref 95–107)
CO2: 28 MEQ/L (ref 20–31)
CREAT SERPL-MCNC: 2.32 MG/DL (ref 0.7–1.2)
GFR AFRICAN AMERICAN: 34.4
GFR NON-AFRICAN AMERICAN: 28.5
GLUCOSE BLD-MCNC: 80 MG/DL (ref 70–99)
POTASSIUM SERPL-SCNC: 4.3 MEQ/L (ref 3.4–4.9)
SODIUM BLD-SCNC: 140 MEQ/L (ref 135–144)

## 2022-05-16 DIAGNOSIS — Z79.4 TYPE 2 DIABETES MELLITUS WITH DIABETIC NEUROPATHY, WITH LONG-TERM CURRENT USE OF INSULIN (HCC): ICD-10-CM

## 2022-05-16 DIAGNOSIS — E11.40 TYPE 2 DIABETES MELLITUS WITH DIABETIC NEUROPATHY, WITH LONG-TERM CURRENT USE OF INSULIN (HCC): ICD-10-CM

## 2022-05-16 RX ORDER — PEN NEEDLE, DIABETIC 31 GX3/16"
NEEDLE, DISPOSABLE MISCELLANEOUS
Qty: 100 EACH | Refills: 4 | OUTPATIENT
Start: 2022-05-16

## 2022-05-16 RX ORDER — SITAGLIPTIN 100 MG/1
TABLET, FILM COATED ORAL
Qty: 90 TABLET | Refills: 4 | OUTPATIENT
Start: 2022-05-16

## 2022-06-13 ENCOUNTER — HOSPITAL ENCOUNTER (OUTPATIENT)
Dept: CARDIOLOGY | Age: 62
Discharge: HOME | End: 2022-06-13
Payer: MEDICARE

## 2022-06-13 PROCEDURE — 93296 REM INTERROG EVL PM/IDS: CPT

## 2022-09-13 ENCOUNTER — HOSPITAL ENCOUNTER (OUTPATIENT)
Dept: CARDIOLOGY | Age: 62
Discharge: HOME | End: 2022-09-13
Payer: MEDICARE

## 2022-09-13 PROCEDURE — 93283 PRGRMG EVAL IMPLANTABLE DFB: CPT

## 2022-10-13 ENCOUNTER — APPOINTMENT (OUTPATIENT)
Dept: GENERAL RADIOLOGY | Age: 62
End: 2022-10-13
Payer: MEDICARE

## 2022-10-13 ENCOUNTER — HOSPITAL ENCOUNTER (EMERGENCY)
Age: 62
Discharge: HOME | End: 2022-10-13
Payer: MEDICARE

## 2022-10-13 VITALS
OXYGEN SATURATION: 94 % | DIASTOLIC BLOOD PRESSURE: 80 MMHG | RESPIRATION RATE: 18 BRPM | HEART RATE: 81 BPM | SYSTOLIC BLOOD PRESSURE: 140 MMHG | TEMPERATURE: 100.5 F

## 2022-10-13 DIAGNOSIS — U07.1 COVID-19: ICD-10-CM

## 2022-10-13 DIAGNOSIS — U07.1 COVID-19: Primary | ICD-10-CM

## 2022-10-13 DIAGNOSIS — S16.1XXA STRAIN OF NECK MUSCLE, INITIAL ENCOUNTER: ICD-10-CM

## 2022-10-13 LAB
ALBUMIN SERPL-MCNC: 4.1 G/DL (ref 3.5–4.6)
ALP BLD-CCNC: 113 U/L (ref 35–104)
ALT SERPL-CCNC: 19 U/L (ref 0–41)
ANION GAP SERPL CALCULATED.3IONS-SCNC: 11 MEQ/L (ref 9–15)
AST SERPL-CCNC: 29 U/L (ref 0–40)
BASOPHILS ABSOLUTE: 0.1 K/UL (ref 0–0.2)
BASOPHILS RELATIVE PERCENT: 1.1 %
BILIRUB SERPL-MCNC: 0.5 MG/DL (ref 0.2–0.7)
BUN BLDV-MCNC: 28 MG/DL (ref 8–23)
CALCIUM SERPL-MCNC: 9.2 MG/DL (ref 8.5–9.9)
CHLORIDE BLD-SCNC: 102 MEQ/L (ref 95–107)
CO2: 24 MEQ/L (ref 20–31)
CREAT SERPL-MCNC: 2.25 MG/DL (ref 0.7–1.2)
EOSINOPHILS ABSOLUTE: 0.2 K/UL (ref 0–0.7)
EOSINOPHILS RELATIVE PERCENT: 2.2 %
GFR AFRICAN AMERICAN: 35.6
GFR NON-AFRICAN AMERICAN: 29.4
GLOBULIN: 3.2 G/DL (ref 2.3–3.5)
GLUCOSE BLD-MCNC: 142 MG/DL (ref 70–99)
HCT VFR BLD CALC: 40.3 % (ref 42–52)
HEMOGLOBIN: 12.9 G/DL (ref 14–18)
INFLUENZA A BY PCR: NEGATIVE
INFLUENZA B BY PCR: NEGATIVE
LACTIC ACID: 0.9 MMOL/L (ref 0.5–2.2)
LYMPHOCYTES ABSOLUTE: 0.9 K/UL (ref 1–4.8)
LYMPHOCYTES RELATIVE PERCENT: 11.4 %
MAGNESIUM: 2.2 MG/DL (ref 1.7–2.4)
MCH RBC QN AUTO: 27.8 PG (ref 27–31.3)
MCHC RBC AUTO-ENTMCNC: 32.1 % (ref 33–37)
MCV RBC AUTO: 86.7 FL (ref 80–100)
MONOCYTES ABSOLUTE: 1.1 K/UL (ref 0.2–0.8)
MONOCYTES RELATIVE PERCENT: 13.8 %
NEUTROPHILS ABSOLUTE: 5.7 K/UL (ref 1.4–6.5)
NEUTROPHILS RELATIVE PERCENT: 71.5 %
PDW BLD-RTO: 16.4 % (ref 11.5–14.5)
PLATELET # BLD: 322 K/UL (ref 130–400)
POTASSIUM SERPL-SCNC: 4.5 MEQ/L (ref 3.4–4.9)
PRO-BNP: 3458 PG/ML
PROCALCITONIN: 0.19 NG/ML (ref 0–0.15)
RBC # BLD: 4.65 M/UL (ref 4.7–6.1)
SARS-COV-2, NAAT: DETECTED
SODIUM BLD-SCNC: 137 MEQ/L (ref 135–144)
TOTAL PROTEIN: 7.3 G/DL (ref 6.3–8)
TROPONIN: <0.01 NG/ML (ref 0–0.01)
WBC # BLD: 8 K/UL (ref 4.8–10.8)

## 2022-10-13 PROCEDURE — 93005 ELECTROCARDIOGRAM TRACING: CPT | Performed by: STUDENT IN AN ORGANIZED HEALTH CARE EDUCATION/TRAINING PROGRAM

## 2022-10-13 PROCEDURE — 96375 TX/PRO/DX INJ NEW DRUG ADDON: CPT

## 2022-10-13 PROCEDURE — 87635 SARS-COV-2 COVID-19 AMP PRB: CPT

## 2022-10-13 PROCEDURE — 96374 THER/PROPH/DIAG INJ IV PUSH: CPT

## 2022-10-13 PROCEDURE — 83880 ASSAY OF NATRIURETIC PEPTIDE: CPT

## 2022-10-13 PROCEDURE — 87502 INFLUENZA DNA AMP PROBE: CPT

## 2022-10-13 PROCEDURE — 83735 ASSAY OF MAGNESIUM: CPT

## 2022-10-13 PROCEDURE — 80053 COMPREHEN METABOLIC PANEL: CPT

## 2022-10-13 PROCEDURE — 71045 X-RAY EXAM CHEST 1 VIEW: CPT

## 2022-10-13 PROCEDURE — 99285 EMERGENCY DEPT VISIT HI MDM: CPT

## 2022-10-13 PROCEDURE — 6360000002 HC RX W HCPCS: Performed by: STUDENT IN AN ORGANIZED HEALTH CARE EDUCATION/TRAINING PROGRAM

## 2022-10-13 PROCEDURE — 84484 ASSAY OF TROPONIN QUANT: CPT

## 2022-10-13 PROCEDURE — 83605 ASSAY OF LACTIC ACID: CPT

## 2022-10-13 PROCEDURE — 36415 COLL VENOUS BLD VENIPUNCTURE: CPT

## 2022-10-13 PROCEDURE — 85025 COMPLETE CBC W/AUTO DIFF WBC: CPT

## 2022-10-13 PROCEDURE — 84145 PROCALCITONIN (PCT): CPT

## 2022-10-13 PROCEDURE — 6370000000 HC RX 637 (ALT 250 FOR IP): Performed by: STUDENT IN AN ORGANIZED HEALTH CARE EDUCATION/TRAINING PROGRAM

## 2022-10-13 RX ORDER — AZITHROMYCIN 250 MG/1
250 TABLET, FILM COATED ORAL DAILY
Qty: 4 TABLET | Refills: 0 | Status: SHIPPED | OUTPATIENT
Start: 2022-10-13 | End: 2022-10-17

## 2022-10-13 RX ORDER — TRAMADOL HYDROCHLORIDE 50 MG/1
50 TABLET ORAL EVERY 4 HOURS PRN
Qty: 18 TABLET | Refills: 0 | Status: SHIPPED | OUTPATIENT
Start: 2022-10-13 | End: 2022-10-13 | Stop reason: SDUPTHER

## 2022-10-13 RX ORDER — ONDANSETRON 2 MG/ML
4 INJECTION INTRAMUSCULAR; INTRAVENOUS ONCE
Status: COMPLETED | OUTPATIENT
Start: 2022-10-13 | End: 2022-10-13

## 2022-10-13 RX ORDER — DEXAMETHASONE SODIUM PHOSPHATE 10 MG/ML
6 INJECTION INTRAMUSCULAR; INTRAVENOUS ONCE
Status: COMPLETED | OUTPATIENT
Start: 2022-10-13 | End: 2022-10-13

## 2022-10-13 RX ORDER — TRAMADOL HYDROCHLORIDE 50 MG/1
50 TABLET ORAL EVERY 4 HOURS PRN
Qty: 18 TABLET | Refills: 0 | Status: SHIPPED | OUTPATIENT
Start: 2022-10-13 | End: 2022-10-16

## 2022-10-13 RX ORDER — AZITHROMYCIN 250 MG/1
250 TABLET, FILM COATED ORAL DAILY
Qty: 4 TABLET | Refills: 0 | Status: SHIPPED | OUTPATIENT
Start: 2022-10-13 | End: 2022-10-13 | Stop reason: SDUPTHER

## 2022-10-13 RX ORDER — MORPHINE SULFATE 2 MG/ML
2 INJECTION, SOLUTION INTRAMUSCULAR; INTRAVENOUS
Status: DISCONTINUED | OUTPATIENT
Start: 2022-10-13 | End: 2022-10-13 | Stop reason: HOSPADM

## 2022-10-13 RX ORDER — KETOROLAC TROMETHAMINE 15 MG/ML
15 INJECTION, SOLUTION INTRAMUSCULAR; INTRAVENOUS ONCE
Status: COMPLETED | OUTPATIENT
Start: 2022-10-13 | End: 2022-10-13

## 2022-10-13 RX ORDER — AZITHROMYCIN 500 MG/1
500 TABLET, FILM COATED ORAL ONCE
Status: COMPLETED | OUTPATIENT
Start: 2022-10-13 | End: 2022-10-13

## 2022-10-13 RX ADMIN — DEXAMETHASONE SODIUM PHOSPHATE 6 MG: 10 INJECTION INTRAMUSCULAR; INTRAVENOUS at 20:27

## 2022-10-13 RX ADMIN — MORPHINE SULFATE 2 MG: 2 INJECTION, SOLUTION INTRAMUSCULAR; INTRAVENOUS at 19:36

## 2022-10-13 RX ADMIN — KETOROLAC TROMETHAMINE 15 MG: 15 INJECTION, SOLUTION INTRAMUSCULAR; INTRAVENOUS at 19:35

## 2022-10-13 RX ADMIN — AZITHROMYCIN MONOHYDRATE 500 MG: 500 TABLET ORAL at 20:27

## 2022-10-13 RX ADMIN — ONDANSETRON 4 MG: 2 INJECTION INTRAMUSCULAR; INTRAVENOUS at 19:36

## 2022-10-13 ASSESSMENT — ENCOUNTER SYMPTOMS
DIARRHEA: 0
SHORTNESS OF BREATH: 1
SORE THROAT: 0
NAUSEA: 0
EYE PAIN: 0
BACK PAIN: 0
CHEST TIGHTNESS: 0

## 2022-10-13 ASSESSMENT — PAIN DESCRIPTION - LOCATION
LOCATION: NECK

## 2022-10-13 ASSESSMENT — PAIN SCALES - GENERAL
PAINLEVEL_OUTOF10: 9
PAINLEVEL_OUTOF10: 5
PAINLEVEL_OUTOF10: 9

## 2022-10-13 NOTE — ED PROVIDER NOTES
[facility] ED  eMERGENCYdEPARTMENT eNCOUnter      Pt Name: Sharonda Lin  MRN: [id number]  [place] 1960  Date of evaluation: 10/13/2022  Provider:Inocente Jacob PA-C    CHIEF COMPLAINT           HPI  Sharonda Lin is a 57 y.o. male per chart review has a h/o CHF, GERD, CKD, diabetes presenting with high blood pressure and neck pain. Patient reports sudden onset, severe, sharp, radiating down his back and neck pain that began 48 hours ago upon waking. Patient states he tried to control his pain with ibuprofen unsuccessfully for the last 2 days. Patient is Upper sorbian-speaking and was translated by . Patient also noted to have a SPO2 of 92% and is clearly working hard to breathe on presentation. Patient temp is 100.5. He does state he recently has had worsening congestion. Denies chest pain, abdominal pain, weakness, chills. ROS  Review of Systems   Constitutional:  Negative for chills, fatigue and fever. HENT:  Positive for congestion. Negative for ear pain, hearing loss and sore throat. Eyes:  Negative for pain and visual disturbance. Respiratory:  Positive for shortness of breath. Negative for chest tightness. Cardiovascular:  Negative for chest pain. Gastrointestinal:  Negative for diarrhea and nausea. Endocrine: Negative for cold intolerance. Genitourinary:  Negative for hematuria. Musculoskeletal:  Positive for neck pain. Negative for back pain. Skin:  Negative for rash and wound. Neurological:  Negative for dizziness and headaches. Psychiatric/Behavioral:  Negative for behavioral problems and confusion. Except as noted above the remainder of the review of systems was reviewed and negative.        PAST MEDICAL HISTORY     Past Medical History:   Diagnosis Date    Acute kidney injury superimposed on CKD ([address] 8/15/2019    Anxiety and depression 4/30/2019    Cardiomyopathy ([address]     Chronic systolic heart failure ([address] 4/30/2019    Diabetes mellitus [facility]     Diabetic neuropathy associated with type 2 diabetes mellitus ([address] 12/13/2017    Heart failure, NYHA class 4 ([address]     Hyperlipidemia     Hypertension     ICD (implantable cardioverter-defibrillator) in place 4/30/2019    Left ureteral calculus     Morbid obesity ([address] 12/19/2017    LON (obstructive sleep apnea)     Pyelonephritis 8/4/2019    Ureteral calculus     Ureteric stone          SURGICAL HISTORY       Past Surgical History:   Procedure Laterality Date    CARDIAC DEFIBRILLATOR PLACEMENT  12/2008    CYSTOSCOPY INSERTION / REMOVAL STENT / STONE Left 5/30/2019    CYSTOSCOPY LEFT DOUBLE J STENT PLACEMENT ROOM 287 performed by Gomez Vo MD at [facility]    LITHOTRIPSY Left 6/12/2019    LEFT ESWL (CONF # [id number]) performed by Gomez Vo MD at [facility]    LITHOTRIPSY N/A 7/10/2019    HOLMIUM LASER LITHOTRIPSY, STONE EXTRACTION, LT URETERAL STENT INSERTION. performed by Gomez Vo MD at [facility] Left 7/10/2019    LEFT FLEXIBLE URETEROSCOPY, CYSTOSCOPY RETROGRADE performed by Gomez Vo MD at [facility]       Current Discharge Medication List        CONTINUE these medications which have NOT CHANGED    Details   lisinopril (PRINIVIL;ZESTRIL) 20 MG tablet TAKE 2 TABLETS BY MOUTH DAILY  Qty: 180 tablet, Refills: 3    Comments: **Patient requests 90 days supply**  Associated Diagnoses: Chronic systolic heart failure (HCC)      metoprolol tartrate (LOPRESSOR) 50 MG tablet TAKE 1 TABLET BY MOUTH TWICE DAILY  Qty: 180 tablet, Refills: 5    Associated Diagnoses: Essential hypertension      furosemide (LASIX) 40 MG tablet TAKE 1 TABLET BY MOUTH EVERY DAY.  HOLD UNTIL SEEN BY PCP FOR FOLLOWUP  Qty: 90 tablet, Refills: 3    Associated Diagnoses: Dilated cardiomyopathy (HCC)      aspirin (ASPIRIN ADULT LOW STRENGTH) 81 MG EC tablet TAKE 1 TABLET BY MOUTH DAILY  Qty: 90 tablet, Refills: 3    Associated Diagnoses: Dilated cardiomyopathy ([address] isosorbide mononitrate (IMDUR) 30 MG extended release tablet Take 1 tablet by mouth daily  Qty: 90 tablet, Refills: 3    Associated Diagnoses: Dilated cardiomyopathy ([address]  Chronic systolic heart failure (HCC)      amLODIPine (NORVASC) 10 MG tablet Take 1 tablet by mouth daily  Qty: 90 tablet, Refills: 4    Associated Diagnoses: Essential hypertension      hydrOXYzine (VISTARIL) 50 MG capsule Take 1 capsule by mouth 3 times daily as needed for Itching  Qty: 30 capsule, Refills: 1      atorvastatin (LIPITOR) 40 MG tablet Take 1 tablet by mouth at bedtime  Qty: 90 tablet, Refills: 3      SITagliptin (JANUVIA) 100 MG tablet Take 1 tablet by mouth daily  Qty: 90 tablet, Refills: 4    Associated Diagnoses: Type 2 diabetes mellitus with diabetic neuropathy, with long-term current use of insulin ([facility])      sertraline (ZOLOFT) 100 MG tablet Take 1 tablet by mouth daily  Qty: 90 tablet, Refills: 4    Comments: NEEDS APPOINTMENT FOR REFILLS  Associated Diagnoses: Dilated cardiomyopathy (HCC)      insulin glargine (LANTUS SOLOSTAR) 100 UNIT/ML injection pen ADMINISTER 55 UNITS UNDER THE SKIN EVERY NIGHT  Qty: 30 mL, Refills: 12    Associated Diagnoses: Type 2 diabetes mellitus with diabetic neuropathy, with long-term current use of insulin ([facility])      Insulin Pen Needle (B-D UF III MINI PEN NEEDLES) 31G X 5 MM MISC 1 each by Does not apply route daily  Qty: 100 each, Refills: 4    Associated Diagnoses: Type 2 diabetes mellitus with diabetic neuropathy, with long-term current use of insulin ([facility])      fenofibrate (TRIGLIDE) 160 MG tablet TAKE 1 TABLET BY MOUTH EVERY NIGHT  Qty: 90 tablet, Refills: 4    Associated Diagnoses: Dilated cardiomyopathy (HCC)      albuterol (PROVENTIL) (2.5 MG/3ML) 0.083% nebulizer solution Albuterol albuterol (PROVENTIL) (2.5 MG/3ML) 0.083% nebulizer solution Indications: Type 2 diabetes mellitus without complication, with long-term current use of insulin ([facility]) Take 3 mLs by nebulization 4 times daily 120 each 5 10/11/2018 Active 10-  [address] 28 (53762)      gabapentin (NEURONTIN) 300 MG capsule Take by mouth. ammonium lactate (LAC-HYDRIN) 12 % cream Apply topically as needed. Qty: 385 g, Refills: 4      glipiZIDE (GLUCOTROL) 10 MG tablet TAKE 1 TABLET BY MOUTH TWICE DAILY BEFORE MEALS  Qty: 180 tablet, Refills: 4    Associated Diagnoses: Type 2 diabetes mellitus with diabetic neuropathy, with long-term current use of insulin ([facility])      omeprazole (PRILOSEC) 20 MG delayed release capsule Take 1 capsule by mouth daily  Qty: 90 capsule, Refills: 4    Associated Diagnoses: Gastroesophageal reflux disease without esophagitis      vitamin D (ERGOCALCIFEROL) 1.25 MG (86321 UT) CAPS capsule Take 50,000 Units by mouth once a week     Associated Diagnoses: Vitamin D deficiency      mupirocin (BACTROBAN) 2 % ointment Apply topically as needed   Refills: 3      blood glucose test strips (TRUE METRIX BLOOD GLUCOSE TEST) strip 1 each by In Vitro route 3 times daily As needed. Qty: 100 each, Refills: 3      terbinafine (LAMISIL) 1 % cream Apply topically 2 times daily Apply topically 2 times daily.   Qty: 1 Tube, Refills: 3    Associated Diagnoses: Diabetic polyneuropathy associated with type 2 diabetes mellitus ([facility])      Blood Glucose Monitoring Suppl (ONE TOUCH ULTRA MINI) w/Device KIT 1 kit by Does not apply route three times daily  Qty: 1 kit, Refills: 5      Lancets MISC Use TID  Qty: 100 each, Refills: 3      Insulin Syringe-Needle U-100 (INSULIN SYRINGE 1CC/31GX5/16\") 31G X 5/16\" 1 ML MISC Use once daily to administer Lantus  Qty: 100 each, Refills: 1    Associated Diagnoses: Type 2 diabetes mellitus without complication, with long-term current use of insulin ([facility])             ALLERGIES     Coreg [carvedilol]    FAMILY HISTORY       Family History   Problem Relation Age of Onset    Heart Disease Mother     Kidney Disease Mother     Hypertension Mother     Diabetes Mother     No Known Problems Sister     No Known Problems Brother     No Known Problems Brother           SOCIAL HISTORY       Social History     Socioeconomic History    Marital status: Legally      Spouse name: None    Number of children: None    Years of education: None    Highest education level: None   Tobacco Use    Smoking status: Former     Packs/day: 1.00     Years: 25.00     Pack years: 25.00     Types: Cigarettes     Quit date: 2008     Years since quittin.8    Smokeless tobacco: Never   Substance and Sexual Activity    Alcohol use: No    Drug use: Never    Sexual activity: Yes     Partners: Female         PHYSICAL EXAM       ED Triage Vitals [10/13/22 1826]   BP Temp Temp Source Heart Rate Resp SpO2 Height Weight   (!) 167/86 (!) 100.5 °F (38.1 °C) Oral 85 16 92 % -- --       Physical Exam  Constitutional:       Appearance: Normal appearance. HENT:      Head: Normocephalic and atraumatic. Nose: Nose normal.      Mouth/Throat:      Mouth: Mucous membranes are moist.      Pharynx: No oropharyngeal exudate or posterior oropharyngeal erythema. Eyes:      Extraocular Movements: Extraocular movements intact. Conjunctiva/sclera: Conjunctivae normal.      Pupils: Pupils are equal, round, and reactive to light. Cardiovascular:      Rate and Rhythm: Normal rate and regular rhythm. Heart sounds: Normal heart sounds. Pulmonary:      Effort: Pulmonary effort is normal.      Breath sounds: Decreased breath sounds present. No wheezing or rhonchi. Comments: Patient sitting on the edge of the bed, refusing to lie down, takes deep breaths in between every 3 or 4 words. Abdominal:      General: Bowel sounds are normal.      Palpations: Abdomen is soft. Tenderness: There is no abdominal tenderness. There is no guarding. Musculoskeletal:         General: No deformity. Normal range of motion. Arms:       Cervical back: Normal range of motion and neck supple.    Skin:     General: Skin is warm and dry. Coloration: Skin is not jaundiced. Neurological:      General: No focal deficit present. Mental Status: He is alert and oriented to person, place, and time. Psychiatric:         Mood and Affect: Mood normal.         Behavior: Behavior normal.         MDM  Is a 49-year-old male presenting with shortness of breath and neck pain. Patient is febrile at 100.5 and hemodynamically stable. Patient given IV Toradol, IV morphine, IV Zofran. SPO2 at 92% on room air. EKG shows NSR with HR 86, normal axis, normal intervals, no ST changes. Labs consistent with CKD. COVID-positive. Spoke with pharmacy who recommend monoclonal antibody due to CKD not a Paxil to the patient. Patient given thorough instructions show up and call for injections for monoclonal antibodies tomorrow. Patient's English-speaking brother confirms understanding. They will return with any change or worsening symptoms. FINAL IMPRESSION      1. COVID-19    2.  Strain of neck muscle, initial encounter          DISPOSITION/PLAN   DISPOSITION Decision To Discharge 10/13/2022 08:42:12 PM        DISCHARGE MEDICATIONS:  [unfilled]         Chetna Staley PA-C(electronically signed)  Attending Emergency Physician           Chetna Staley PA-C  10/13/22 2050

## 2022-10-14 ENCOUNTER — HOSPITAL ENCOUNTER (OUTPATIENT)
Dept: INFUSION THERAPY | Age: 62
Setting detail: INFUSION SERIES
Discharge: HOME | End: 2022-10-14
Payer: MEDICARE

## 2022-10-14 VITALS
RESPIRATION RATE: 18 BRPM | SYSTOLIC BLOOD PRESSURE: 147 MMHG | TEMPERATURE: 97.6 F | HEART RATE: 69 BPM | DIASTOLIC BLOOD PRESSURE: 76 MMHG | OXYGEN SATURATION: 97 %

## 2022-10-14 DIAGNOSIS — U07.1 COVID-19: Primary | ICD-10-CM

## 2022-10-14 PROCEDURE — 6360000002 HC RX W HCPCS: Performed by: STUDENT IN AN ORGANIZED HEALTH CARE EDUCATION/TRAINING PROGRAM

## 2022-10-14 PROCEDURE — 96374 THER/PROPH/DIAG INJ IV PUSH: CPT

## 2022-10-14 RX ORDER — SODIUM CHLORIDE 0.9 % (FLUSH) 0.9 %
5-40 SYRINGE (ML) INJECTION ONCE
Status: DISCONTINUED | OUTPATIENT
Start: 2022-10-14 | End: 2022-10-15 | Stop reason: HOSPADM

## 2022-10-14 RX ORDER — BEBTELOVIMAB 87.5 MG/ML
175 INJECTION, SOLUTION INTRAVENOUS ONCE
Status: COMPLETED | OUTPATIENT
Start: 2022-10-14 | End: 2022-10-14

## 2022-10-14 RX ADMIN — BEBTELOVIMAB 175 MG: 87.5 INJECTION, SOLUTION INTRAVENOUS at 15:48

## 2022-10-14 NOTE — FLOWSHEET NOTE
No side effects noted. Patient left the unit via wheelchair with his son. All equipment used in the care for this patient has been cleaned.

## 2022-10-14 NOTE — FLOWSHEET NOTE
Patient to the floor via wheelchair with his son for the MAB injection. . Vital signs taken. Education given in both [facility] and [address] [place] [address] and given both verbal and handout regarding this treatment. Aware that this treatment is experimental and given emergency use. Agrees to proceed. Call light within reach.

## 2022-10-14 NOTE — LETTER
[facility]  [address]  [place] [address]  Phone: [phone]          May 31, 2019     Patient: Gary Mirza   YOB: 1960   Date of Visit: 5/29/2019       To Whom It May Concern:    Please excuse Mr Viviana Mart from work from 5/29/19 to 5/31/19. If you have any questions or concerns, please don't hesitate to call.     Sincerely,        Juan Dockery, [facility] 250

## 2022-10-15 LAB
EKG ATRIAL RATE: 86 BPM
EKG P AXIS: 47 DEGREES
EKG P-R INTERVAL: 194 MS
EKG Q-T INTERVAL: 382 MS
EKG QRS DURATION: 108 MS
EKG QTC CALCULATION (BAZETT): 457 MS
EKG R AXIS: -24 DEGREES
EKG T AXIS: 82 DEGREES
EKG VENTRICULAR RATE: 86 BPM

## 2022-10-15 PROCEDURE — 93010 ELECTROCARDIOGRAM REPORT: CPT | Performed by: INTERNAL MEDICINE

## 2022-11-02 ENCOUNTER — APPOINTMENT (OUTPATIENT)
Dept: GENERAL RADIOLOGY | Age: 62
DRG: 189 | End: 2022-11-02
Payer: MEDICARE

## 2022-11-02 ENCOUNTER — HOSPITAL ENCOUNTER (INPATIENT)
Age: 62
LOS: 4 days | Discharge: HOME | DRG: 189 | End: 2022-11-07
Attending: EMERGENCY MEDICINE | Admitting: INTERNAL MEDICINE
Payer: MEDICARE

## 2022-11-02 DIAGNOSIS — J96.01 ACUTE RESPIRATORY FAILURE WITH HYPOXIA (HCC): ICD-10-CM

## 2022-11-02 DIAGNOSIS — Z79.4 TYPE 2 DIABETES MELLITUS WITH DIABETIC NEUROPATHY, WITH LONG-TERM CURRENT USE OF INSULIN (HCC): ICD-10-CM

## 2022-11-02 DIAGNOSIS — E11.40 TYPE 2 DIABETES MELLITUS WITH DIABETIC NEUROPATHY, WITH LONG-TERM CURRENT USE OF INSULIN (HCC): ICD-10-CM

## 2022-11-02 DIAGNOSIS — N18.9 CHRONIC KIDNEY DISEASE, UNSPECIFIED CKD STAGE: ICD-10-CM

## 2022-11-02 DIAGNOSIS — R06.02 SHORTNESS OF BREATH: Primary | ICD-10-CM

## 2022-11-02 LAB
ALBUMIN SERPL-MCNC: 3.7 G/DL (ref 3.5–4.6)
ALP BLD-CCNC: 139 U/L (ref 35–104)
ALT SERPL-CCNC: 18 U/L (ref 0–41)
ANION GAP SERPL CALCULATED.3IONS-SCNC: 10 MEQ/L (ref 9–15)
AST SERPL-CCNC: 19 U/L (ref 0–40)
BASE EXCESS ARTERIAL: -3 (ref -3–3)
BASOPHILS ABSOLUTE: 0.1 K/UL (ref 0–0.2)
BASOPHILS RELATIVE PERCENT: 0.8 %
BILIRUB SERPL-MCNC: 0.4 MG/DL (ref 0.2–0.7)
BUN BLDV-MCNC: 31 MG/DL (ref 8–23)
CALCIUM IONIZED: 1.21 MMOL/L (ref 1.12–1.32)
CALCIUM SERPL-MCNC: 8.6 MG/DL (ref 8.5–9.9)
CHLORIDE BLD-SCNC: 101 MEQ/L (ref 95–107)
CO2: 27 MEQ/L (ref 20–31)
CREAT SERPL-MCNC: 2.15 MG/DL (ref 0.7–1.2)
EOSINOPHILS ABSOLUTE: 0.2 K/UL (ref 0–0.7)
EOSINOPHILS RELATIVE PERCENT: 2.5 %
GFR SERPL CREATININE-BSD FRML MDRD: 33.4 ML/MIN/{1.73_M2}
GFR SERPL CREATININE-BSD FRML MDRD: 34 ML/MIN/{1.73_M2}
GLOBULIN: 3.5 G/DL (ref 2.3–3.5)
GLUCOSE BLD-MCNC: 344 MG/DL (ref 70–99)
GLUCOSE BLD-MCNC: 373 MG/DL (ref 70–99)
HCO3 ARTERIAL: 22.5 MMOL/L (ref 21–29)
HCT VFR BLD CALC: 39.2 % (ref 42–52)
HEMOGLOBIN: 12.9 G/DL (ref 14–18)
HEMOGLOBIN: 15 GM/DL (ref 13.5–17.5)
LACTATE: 0.87 MMOL/L (ref 0.4–2)
LIPASE: 45 U/L (ref 12–95)
LYMPHOCYTES ABSOLUTE: 1.5 K/UL (ref 1–4.8)
LYMPHOCYTES RELATIVE PERCENT: 17.4 %
MCH RBC QN AUTO: 28.5 PG (ref 27–31.3)
MCHC RBC AUTO-ENTMCNC: 32.9 % (ref 33–37)
MCV RBC AUTO: 86.6 FL (ref 79–92.2)
MONOCYTES ABSOLUTE: 0.6 K/UL (ref 0.2–0.8)
MONOCYTES RELATIVE PERCENT: 6.4 %
NEUTROPHILS ABSOLUTE: 6.3 K/UL (ref 1.4–6.5)
NEUTROPHILS RELATIVE PERCENT: 72.9 %
O2 SAT, ARTERIAL: 89 % (ref 93–100)
PCO2 ARTERIAL: 40 MM HG (ref 35–45)
PDW BLD-RTO: 16.4 % (ref 11.5–14.5)
PERFORMED ON: ABNORMAL
PH ARTERIAL: 7.36 (ref 7.35–7.45)
PLATELET # BLD: 330 K/UL (ref 130–400)
PO2 ARTERIAL: 59 MM HG (ref 75–108)
POC CHLORIDE: 105 MEQ/L (ref 99–110)
POC CREATININE: 2.1 MG/DL (ref 0.8–1.3)
POC FIO2: 5
POC HEMATOCRIT: 44 % (ref 41–53)
POC POTASSIUM: 4.1 MEQ/L (ref 3.5–5.1)
POC SAMPLE TYPE: ABNORMAL
POC SODIUM: 138 MEQ/L (ref 136–145)
POTASSIUM REFLEX MAGNESIUM: 4.2 MEQ/L (ref 3.4–4.9)
RBC # BLD: 4.53 M/UL (ref 4.7–6.1)
SARS-COV-2, NAAT: NOT DETECTED
SODIUM BLD-SCNC: 138 MEQ/L (ref 135–144)
TCO2 ARTERIAL: 24 MMOL/L (ref 21–32)
TOTAL PROTEIN: 7.2 G/DL (ref 6.3–8)
WBC # BLD: 8.7 K/UL (ref 4.8–10.8)

## 2022-11-02 PROCEDURE — 94640 AIRWAY INHALATION TREATMENT: CPT

## 2022-11-02 PROCEDURE — 36600 WITHDRAWAL OF ARTERIAL BLOOD: CPT

## 2022-11-02 PROCEDURE — 85379 FIBRIN DEGRADATION QUANT: CPT

## 2022-11-02 PROCEDURE — 87635 SARS-COV-2 COVID-19 AMP PRB: CPT

## 2022-11-02 PROCEDURE — 82435 ASSAY OF BLOOD CHLORIDE: CPT

## 2022-11-02 PROCEDURE — 85025 COMPLETE CBC W/AUTO DIFF WBC: CPT

## 2022-11-02 PROCEDURE — 99285 EMERGENCY DEPT VISIT HI MDM: CPT

## 2022-11-02 PROCEDURE — 82565 ASSAY OF CREATININE: CPT

## 2022-11-02 PROCEDURE — 94761 N-INVAS EAR/PLS OXIMETRY MLT: CPT

## 2022-11-02 PROCEDURE — 83690 ASSAY OF LIPASE: CPT

## 2022-11-02 PROCEDURE — 93005 ELECTROCARDIOGRAM TRACING: CPT | Performed by: EMERGENCY MEDICINE

## 2022-11-02 PROCEDURE — 6370000000 HC RX 637 (ALT 250 FOR IP): Performed by: EMERGENCY MEDICINE

## 2022-11-02 PROCEDURE — 85730 THROMBOPLASTIN TIME PARTIAL: CPT

## 2022-11-02 PROCEDURE — 80053 COMPREHEN METABOLIC PANEL: CPT

## 2022-11-02 PROCEDURE — 83880 ASSAY OF NATRIURETIC PEPTIDE: CPT

## 2022-11-02 PROCEDURE — 6360000002 HC RX W HCPCS: Performed by: EMERGENCY MEDICINE

## 2022-11-02 PROCEDURE — 84295 ASSAY OF SERUM SODIUM: CPT

## 2022-11-02 PROCEDURE — 83605 ASSAY OF LACTIC ACID: CPT

## 2022-11-02 PROCEDURE — 82330 ASSAY OF CALCIUM: CPT

## 2022-11-02 PROCEDURE — 85014 HEMATOCRIT: CPT

## 2022-11-02 PROCEDURE — 96375 TX/PRO/DX INJ NEW DRUG ADDON: CPT

## 2022-11-02 PROCEDURE — 96365 THER/PROPH/DIAG IV INF INIT: CPT

## 2022-11-02 PROCEDURE — 87040 BLOOD CULTURE FOR BACTERIA: CPT

## 2022-11-02 PROCEDURE — 84484 ASSAY OF TROPONIN QUANT: CPT

## 2022-11-02 PROCEDURE — 82803 BLOOD GASES ANY COMBINATION: CPT

## 2022-11-02 PROCEDURE — 85610 PROTHROMBIN TIME: CPT

## 2022-11-02 PROCEDURE — 84132 ASSAY OF SERUM POTASSIUM: CPT

## 2022-11-02 PROCEDURE — 36415 COLL VENOUS BLD VENIPUNCTURE: CPT

## 2022-11-02 PROCEDURE — 71045 X-RAY EXAM CHEST 1 VIEW: CPT

## 2022-11-02 RX ORDER — MAGNESIUM SULFATE IN WATER 40 MG/ML
2000 INJECTION, SOLUTION INTRAVENOUS ONCE
Status: COMPLETED | OUTPATIENT
Start: 2022-11-02 | End: 2022-11-03

## 2022-11-02 RX ORDER — FUROSEMIDE 10 MG/ML
40 INJECTION INTRAMUSCULAR; INTRAVENOUS ONCE
Status: COMPLETED | OUTPATIENT
Start: 2022-11-02 | End: 2022-11-02

## 2022-11-02 RX ORDER — ALBUTEROL SULFATE 90 UG/1
2 AEROSOL, METERED RESPIRATORY (INHALATION) EVERY 6 HOURS PRN
Status: DISCONTINUED | OUTPATIENT
Start: 2022-11-02 | End: 2022-11-03

## 2022-11-02 RX ORDER — DEXAMETHASONE SODIUM PHOSPHATE 10 MG/ML
6 INJECTION INTRAMUSCULAR; INTRAVENOUS ONCE
Status: COMPLETED | OUTPATIENT
Start: 2022-11-02 | End: 2022-11-02

## 2022-11-02 RX ADMIN — DEXAMETHASONE SODIUM PHOSPHATE 6 MG: 10 INJECTION INTRAMUSCULAR; INTRAVENOUS at 23:18

## 2022-11-02 RX ADMIN — MAGNESIUM SULFATE HEPTAHYDRATE 2000 MG: 40 INJECTION, SOLUTION INTRAVENOUS at 23:19

## 2022-11-02 RX ADMIN — ALBUTEROL SULFATE 2 PUFF: 90 AEROSOL, METERED RESPIRATORY (INHALATION) at 23:46

## 2022-11-02 RX ADMIN — FUROSEMIDE 40 MG: 10 INJECTION, SOLUTION INTRAMUSCULAR; INTRAVENOUS at 23:18

## 2022-11-02 ASSESSMENT — PAIN - FUNCTIONAL ASSESSMENT: PAIN_FUNCTIONAL_ASSESSMENT: 0-10

## 2022-11-02 ASSESSMENT — ENCOUNTER SYMPTOMS
SHORTNESS OF BREATH: 1
COUGH: 1
VOMITING: 0
ABDOMINAL PAIN: 0

## 2022-11-02 ASSESSMENT — PAIN DESCRIPTION - PAIN TYPE: TYPE: ACUTE PAIN

## 2022-11-02 ASSESSMENT — PAIN DESCRIPTION - FREQUENCY: FREQUENCY: INTERMITTENT

## 2022-11-02 ASSESSMENT — PAIN DESCRIPTION - LOCATION: LOCATION: CHEST

## 2022-11-02 ASSESSMENT — PAIN DESCRIPTION - DESCRIPTORS: DESCRIPTORS: ACHING;SHARP

## 2022-11-02 ASSESSMENT — PAIN SCALES - GENERAL: PAINLEVEL_OUTOF10: 8

## 2022-11-02 ASSESSMENT — PAIN DESCRIPTION - ORIENTATION: ORIENTATION: RIGHT;LEFT

## 2022-11-02 NOTE — Clinical Note
son accompanied Narcisa Landon to the emergency department on 11/2/2022. They may return to school on 11/03/2022. If you have any questions or concerns, please don't hesitate to call.       Corazon Sotelo MD

## 2022-11-02 NOTE — Clinical Note
daughter accompanied Ursula Landa to the emergency department on 11/2/2022. They may return to work on 11/03/2022. If you have any questions or concerns, please don't hesitate to call.       Lm Brown MD

## 2022-11-02 NOTE — Clinical Note
Narcisa Landon was seen and treated in our emergency department on 11/2/2022. He may return to work on 11/07/2022. If you have any questions or concerns, please don't hesitate to call.       Corazon Sotelo MD

## 2022-11-03 ENCOUNTER — APPOINTMENT (OUTPATIENT)
Dept: NUCLEAR MEDICINE | Age: 62
DRG: 189 | End: 2022-11-03
Payer: MEDICARE

## 2022-11-03 ENCOUNTER — APPOINTMENT (OUTPATIENT)
Dept: CT IMAGING | Age: 62
DRG: 189 | End: 2022-11-03
Payer: MEDICARE

## 2022-11-03 PROBLEM — R06.02 SOB (SHORTNESS OF BREATH): Status: ACTIVE | Noted: 2022-11-03

## 2022-11-03 PROBLEM — Z86.16 HISTORY OF COVID-19: Status: ACTIVE | Noted: 2022-11-03

## 2022-11-03 PROBLEM — R09.02 HYPOXIA: Status: ACTIVE | Noted: 2022-11-03

## 2022-11-03 PROBLEM — J96.01 ACUTE RESPIRATORY FAILURE WITH HYPOXIA (HCC): Status: ACTIVE | Noted: 2022-11-03

## 2022-11-03 LAB
ALBUMIN SERPL-MCNC: 3.8 G/DL (ref 3.5–4.6)
ALP BLD-CCNC: 134 U/L (ref 35–104)
ALT SERPL-CCNC: 17 U/L (ref 0–41)
ANION GAP SERPL CALCULATED.3IONS-SCNC: 15 MEQ/L (ref 9–15)
APTT: 38.2 SEC (ref 24.4–36.8)
AST SERPL-CCNC: 17 U/L (ref 0–40)
BASOPHILS ABSOLUTE: 0 K/UL (ref 0–0.2)
BASOPHILS RELATIVE PERCENT: 0.3 %
BILIRUB SERPL-MCNC: 0.5 MG/DL (ref 0.2–0.7)
BUN BLDV-MCNC: 32 MG/DL (ref 8–23)
CALCIUM SERPL-MCNC: 9.4 MG/DL (ref 8.5–9.9)
CHLORIDE BLD-SCNC: 102 MEQ/L (ref 95–107)
CO2: 22 MEQ/L (ref 20–31)
CREAT SERPL-MCNC: 2.28 MG/DL (ref 0.7–1.2)
D DIMER: 1.96 MG/L FEU (ref 0–0.5)
EKG ATRIAL RATE: 80 BPM
EKG ATRIAL RATE: 88 BPM
EKG P AXIS: 14 DEGREES
EKG P AXIS: 78 DEGREES
EKG P-R INTERVAL: 196 MS
EKG P-R INTERVAL: 200 MS
EKG Q-T INTERVAL: 402 MS
EKG Q-T INTERVAL: 416 MS
EKG QRS DURATION: 100 MS
EKG QRS DURATION: 110 MS
EKG QTC CALCULATION (BAZETT): 463 MS
EKG QTC CALCULATION (BAZETT): 503 MS
EKG R AXIS: -18 DEGREES
EKG R AXIS: -21 DEGREES
EKG T AXIS: 51 DEGREES
EKG T AXIS: 56 DEGREES
EKG VENTRICULAR RATE: 80 BPM
EKG VENTRICULAR RATE: 88 BPM
EOSINOPHILS ABSOLUTE: 0 K/UL (ref 0–0.7)
EOSINOPHILS RELATIVE PERCENT: 0.1 %
GFR SERPL CREATININE-BSD FRML MDRD: 31.1 ML/MIN/{1.73_M2}
GLOBULIN: 3.8 G/DL (ref 2.3–3.5)
GLUCOSE BLD-MCNC: 198 MG/DL (ref 70–99)
GLUCOSE BLD-MCNC: 211 MG/DL (ref 70–99)
GLUCOSE BLD-MCNC: 241 MG/DL (ref 70–99)
GLUCOSE BLD-MCNC: 255 MG/DL (ref 70–99)
GLUCOSE BLD-MCNC: 257 MG/DL (ref 70–99)
GLUCOSE BLD-MCNC: 284 MG/DL (ref 70–99)
GLUCOSE BLD-MCNC: 353 MG/DL (ref 70–99)
HBA1C MFR BLD: 10.7 % (ref 4.8–5.9)
HCT VFR BLD CALC: 41.7 % (ref 42–52)
HEMOGLOBIN: 13.6 G/DL (ref 14–18)
INR BLD: 1
LACTATE DEHYDROGENASE: 273 U/L (ref 135–225)
LV EF: 45 %
LVEF MODALITY: NORMAL
LYMPHOCYTES ABSOLUTE: 0.7 K/UL (ref 1–4.8)
LYMPHOCYTES RELATIVE PERCENT: 6.8 %
MAGNESIUM: 2.8 MG/DL (ref 1.7–2.4)
MCH RBC QN AUTO: 28.5 PG (ref 27–31.3)
MCHC RBC AUTO-ENTMCNC: 32.6 % (ref 33–37)
MCV RBC AUTO: 87.4 FL (ref 79–92.2)
MONOCYTES ABSOLUTE: 0.1 K/UL (ref 0.2–0.8)
MONOCYTES RELATIVE PERCENT: 1.3 %
NEUTROPHILS ABSOLUTE: 9.1 K/UL (ref 1.4–6.5)
NEUTROPHILS RELATIVE PERCENT: 91.5 %
PDW BLD-RTO: 16.4 % (ref 11.5–14.5)
PERFORMED ON: ABNORMAL
PLATELET # BLD: 360 K/UL (ref 130–400)
POTASSIUM SERPL-SCNC: 5 MEQ/L (ref 3.4–4.9)
PRO-BNP: 1199 PG/ML
PROTHROMBIN TIME: 13.7 SEC (ref 12.3–14.9)
RBC # BLD: 4.77 M/UL (ref 4.7–6.1)
SODIUM BLD-SCNC: 139 MEQ/L (ref 135–144)
TOTAL PROTEIN: 7.6 G/DL (ref 6.3–8)
TROPONIN: 0.09 NG/ML (ref 0–0.01)
TROPONIN: 0.1 NG/ML (ref 0–0.01)
TROPONIN: 0.1 NG/ML (ref 0–0.01)
WBC # BLD: 10 K/UL (ref 4.8–10.8)

## 2022-11-03 PROCEDURE — 1210000000 HC MED SURG R&B

## 2022-11-03 PROCEDURE — 99222 1ST HOSP IP/OBS MODERATE 55: CPT | Performed by: INTERNAL MEDICINE

## 2022-11-03 PROCEDURE — 2580000003 HC RX 258: Performed by: NURSE PRACTITIONER

## 2022-11-03 PROCEDURE — 6370000000 HC RX 637 (ALT 250 FOR IP): Performed by: NURSE PRACTITIONER

## 2022-11-03 PROCEDURE — 83735 ASSAY OF MAGNESIUM: CPT

## 2022-11-03 PROCEDURE — 3430000000 HC RX DIAGNOSTIC RADIOPHARMACEUTICAL: Performed by: NURSE PRACTITIONER

## 2022-11-03 PROCEDURE — 6360000002 HC RX W HCPCS: Performed by: NURSE PRACTITIONER

## 2022-11-03 PROCEDURE — 84484 ASSAY OF TROPONIN QUANT: CPT

## 2022-11-03 PROCEDURE — 6370000000 HC RX 637 (ALT 250 FOR IP): Performed by: EMERGENCY MEDICINE

## 2022-11-03 PROCEDURE — 83615 LACTATE (LD) (LDH) ENZYME: CPT

## 2022-11-03 PROCEDURE — 85025 COMPLETE CBC W/AUTO DIFF WBC: CPT

## 2022-11-03 PROCEDURE — 6370000000 HC RX 637 (ALT 250 FOR IP): Performed by: INTERNAL MEDICINE

## 2022-11-03 PROCEDURE — 99223 1ST HOSP IP/OBS HIGH 75: CPT | Performed by: INTERNAL MEDICINE

## 2022-11-03 PROCEDURE — 93010 ELECTROCARDIOGRAM REPORT: CPT | Performed by: INTERNAL MEDICINE

## 2022-11-03 PROCEDURE — 99233 SBSQ HOSP IP/OBS HIGH 50: CPT | Performed by: INTERNAL MEDICINE

## 2022-11-03 PROCEDURE — 80053 COMPREHEN METABOLIC PANEL: CPT

## 2022-11-03 PROCEDURE — 94660 CPAP INITIATION&MGMT: CPT

## 2022-11-03 PROCEDURE — 93005 ELECTROCARDIOGRAM TRACING: CPT | Performed by: NURSE PRACTITIONER

## 2022-11-03 PROCEDURE — 71250 CT THORAX DX C-: CPT

## 2022-11-03 PROCEDURE — C8929 TTE W OR WO FOL WCON,DOPPLER: HCPCS

## 2022-11-03 PROCEDURE — A9540 TC99M MAA: HCPCS | Performed by: NURSE PRACTITIONER

## 2022-11-03 PROCEDURE — 36415 COLL VENOUS BLD VENIPUNCTURE: CPT

## 2022-11-03 PROCEDURE — 78582 LUNG VENTILAT&PERFUS IMAGING: CPT

## 2022-11-03 PROCEDURE — 94761 N-INVAS EAR/PLS OXIMETRY MLT: CPT

## 2022-11-03 PROCEDURE — 93283 PRGRMG EVAL IMPLANTABLE DFB: CPT

## 2022-11-03 PROCEDURE — A9539 TC99M PENTETATE: HCPCS | Performed by: NURSE PRACTITIONER

## 2022-11-03 PROCEDURE — 83036 HEMOGLOBIN GLYCOSYLATED A1C: CPT

## 2022-11-03 RX ORDER — INSULIN LISPRO 100 [IU]/ML
0-16 INJECTION, SOLUTION INTRAVENOUS; SUBCUTANEOUS
Status: DISCONTINUED | OUTPATIENT
Start: 2022-11-03 | End: 2022-11-03

## 2022-11-03 RX ORDER — ONDANSETRON 2 MG/ML
4 INJECTION INTRAMUSCULAR; INTRAVENOUS EVERY 6 HOURS PRN
Status: DISCONTINUED | OUTPATIENT
Start: 2022-11-03 | End: 2022-11-07 | Stop reason: HOSPADM

## 2022-11-03 RX ORDER — FUROSEMIDE 10 MG/ML
20 INJECTION INTRAMUSCULAR; INTRAVENOUS DAILY
Status: DISCONTINUED | OUTPATIENT
Start: 2022-11-03 | End: 2022-11-03

## 2022-11-03 RX ORDER — ASPIRIN 81 MG/1
324 TABLET, CHEWABLE ORAL ONCE
Status: COMPLETED | OUTPATIENT
Start: 2022-11-03 | End: 2022-11-03

## 2022-11-03 RX ORDER — DEXTROSE MONOHYDRATE 100 MG/ML
INJECTION, SOLUTION INTRAVENOUS CONTINUOUS PRN
Status: DISCONTINUED | OUTPATIENT
Start: 2022-11-03 | End: 2022-11-07 | Stop reason: HOSPADM

## 2022-11-03 RX ORDER — SODIUM CHLORIDE 0.9 % (FLUSH) 0.9 %
5-40 SYRINGE (ML) INJECTION EVERY 12 HOURS SCHEDULED
Status: DISCONTINUED | OUTPATIENT
Start: 2022-11-03 | End: 2022-11-07 | Stop reason: HOSPADM

## 2022-11-03 RX ORDER — FUROSEMIDE 10 MG/ML
40 INJECTION INTRAMUSCULAR; INTRAVENOUS DAILY
Status: DISCONTINUED | OUTPATIENT
Start: 2022-11-04 | End: 2022-11-04

## 2022-11-03 RX ORDER — GABAPENTIN 300 MG/1
300 CAPSULE ORAL NIGHTLY
Status: DISCONTINUED | OUTPATIENT
Start: 2022-11-03 | End: 2022-11-07 | Stop reason: HOSPADM

## 2022-11-03 RX ORDER — INSULIN GLARGINE 100 [IU]/ML
5 INJECTION, SOLUTION SUBCUTANEOUS 2 TIMES DAILY
Status: DISCONTINUED | OUTPATIENT
Start: 2022-11-03 | End: 2022-11-03

## 2022-11-03 RX ORDER — POLYETHYLENE GLYCOL 3350 17 G/17G
17 POWDER, FOR SOLUTION ORAL DAILY PRN
Status: DISCONTINUED | OUTPATIENT
Start: 2022-11-03 | End: 2022-11-07 | Stop reason: HOSPADM

## 2022-11-03 RX ORDER — KIT FOR THE PREPARATION OF TECHNETIUM TC 99M PENTETATE 20 MG/1
1 INJECTION, POWDER, LYOPHILIZED, FOR SOLUTION INTRAVENOUS; RESPIRATORY (INHALATION)
Status: COMPLETED | OUTPATIENT
Start: 2022-11-03 | End: 2022-11-03

## 2022-11-03 RX ORDER — SODIUM CHLORIDE 0.9 % (FLUSH) 0.9 %
5-40 SYRINGE (ML) INJECTION PRN
Status: DISCONTINUED | OUTPATIENT
Start: 2022-11-03 | End: 2022-11-07 | Stop reason: HOSPADM

## 2022-11-03 RX ORDER — INSULIN GLARGINE 100 [IU]/ML
20 INJECTION, SOLUTION SUBCUTANEOUS 2 TIMES DAILY
Status: DISCONTINUED | OUTPATIENT
Start: 2022-11-03 | End: 2022-11-07 | Stop reason: HOSPADM

## 2022-11-03 RX ORDER — ENOXAPARIN SODIUM 150 MG/ML
1 INJECTION SUBCUTANEOUS 2 TIMES DAILY
Status: DISCONTINUED | OUTPATIENT
Start: 2022-11-03 | End: 2022-11-06

## 2022-11-03 RX ORDER — INSULIN LISPRO 100 [IU]/ML
0-16 INJECTION, SOLUTION INTRAVENOUS; SUBCUTANEOUS
Status: DISCONTINUED | OUTPATIENT
Start: 2022-11-03 | End: 2022-11-07 | Stop reason: HOSPADM

## 2022-11-03 RX ORDER — ONDANSETRON 4 MG/1
4 TABLET, ORALLY DISINTEGRATING ORAL EVERY 8 HOURS PRN
Status: DISCONTINUED | OUTPATIENT
Start: 2022-11-03 | End: 2022-11-07 | Stop reason: HOSPADM

## 2022-11-03 RX ORDER — ASPIRIN 81 MG/1
81 TABLET, CHEWABLE ORAL DAILY
Status: DISCONTINUED | OUTPATIENT
Start: 2022-11-03 | End: 2022-11-07 | Stop reason: HOSPADM

## 2022-11-03 RX ORDER — HYDROXYZINE PAMOATE 50 MG/1
50 CAPSULE ORAL 3 TIMES DAILY PRN
Status: DISCONTINUED | OUTPATIENT
Start: 2022-11-03 | End: 2022-11-07 | Stop reason: HOSPADM

## 2022-11-03 RX ORDER — INSULIN LISPRO 100 [IU]/ML
10 INJECTION, SOLUTION INTRAVENOUS; SUBCUTANEOUS ONCE
Status: COMPLETED | OUTPATIENT
Start: 2022-11-03 | End: 2022-11-03

## 2022-11-03 RX ORDER — SODIUM CHLORIDE 0.9 % (FLUSH) 0.9 %
10 SYRINGE (ML) INJECTION PRN
Status: DISCONTINUED | OUTPATIENT
Start: 2022-11-03 | End: 2022-11-07 | Stop reason: HOSPADM

## 2022-11-03 RX ORDER — ACETAMINOPHEN 650 MG/1
650 SUPPOSITORY RECTAL EVERY 6 HOURS PRN
Status: DISCONTINUED | OUTPATIENT
Start: 2022-11-03 | End: 2022-11-07 | Stop reason: HOSPADM

## 2022-11-03 RX ORDER — ACETAMINOPHEN 325 MG/1
650 TABLET ORAL EVERY 6 HOURS PRN
Status: DISCONTINUED | OUTPATIENT
Start: 2022-11-03 | End: 2022-11-07 | Stop reason: HOSPADM

## 2022-11-03 RX ORDER — SODIUM CHLORIDE 9 MG/ML
INJECTION, SOLUTION INTRAVENOUS PRN
Status: DISCONTINUED | OUTPATIENT
Start: 2022-11-03 | End: 2022-11-07 | Stop reason: HOSPADM

## 2022-11-03 RX ORDER — INSULIN LISPRO 100 [IU]/ML
0-4 INJECTION, SOLUTION INTRAVENOUS; SUBCUTANEOUS NIGHTLY
Status: DISCONTINUED | OUTPATIENT
Start: 2022-11-03 | End: 2022-11-03

## 2022-11-03 RX ORDER — INSULIN LISPRO 100 [IU]/ML
8 INJECTION, SOLUTION INTRAVENOUS; SUBCUTANEOUS
Status: DISCONTINUED | OUTPATIENT
Start: 2022-11-03 | End: 2022-11-07 | Stop reason: HOSPADM

## 2022-11-03 RX ORDER — ATORVASTATIN CALCIUM 40 MG/1
40 TABLET, FILM COATED ORAL NIGHTLY
Status: DISCONTINUED | OUTPATIENT
Start: 2022-11-03 | End: 2022-11-07 | Stop reason: HOSPADM

## 2022-11-03 RX ADMIN — EMPAGLIFLOZIN 10 MG: 10 TABLET, FILM COATED ORAL at 09:37

## 2022-11-03 RX ADMIN — INSULIN LISPRO 8 UNITS: 100 INJECTION, SOLUTION INTRAVENOUS; SUBCUTANEOUS at 18:04

## 2022-11-03 RX ADMIN — KIT FOR THE PREPARATION OF TECHNETIUM TC 99M PENTETATE 1 MILLICURIE: 20 INJECTION, POWDER, LYOPHILIZED, FOR SOLUTION INTRAVENOUS; RESPIRATORY (INHALATION) at 11:53

## 2022-11-03 RX ADMIN — ASPIRIN 81 MG 324 MG: 81 TABLET ORAL at 00:47

## 2022-11-03 RX ADMIN — ASPIRIN 81 MG: 81 TABLET, CHEWABLE ORAL at 09:37

## 2022-11-03 RX ADMIN — SODIUM CHLORIDE, PRESERVATIVE FREE 10 ML: 5 INJECTION INTRAVENOUS at 21:45

## 2022-11-03 RX ADMIN — INSULIN GLARGINE 5 UNITS: 100 INJECTION, SOLUTION SUBCUTANEOUS at 09:23

## 2022-11-03 RX ADMIN — INSULIN LISPRO 10 UNITS: 100 INJECTION, SOLUTION INTRAVENOUS; SUBCUTANEOUS at 09:23

## 2022-11-03 RX ADMIN — INSULIN GLARGINE 20 UNITS: 100 INJECTION, SOLUTION SUBCUTANEOUS at 21:51

## 2022-11-03 RX ADMIN — ATORVASTATIN CALCIUM 40 MG: 40 TABLET, FILM COATED ORAL at 21:44

## 2022-11-03 RX ADMIN — ENOXAPARIN SODIUM 150 MG: 150 INJECTION SUBCUTANEOUS at 09:37

## 2022-11-03 RX ADMIN — METOPROLOL TARTRATE 12.5 MG: 25 TABLET, FILM COATED ORAL at 21:44

## 2022-11-03 RX ADMIN — INSULIN LISPRO 8 UNITS: 100 INJECTION, SOLUTION INTRAVENOUS; SUBCUTANEOUS at 13:35

## 2022-11-03 RX ADMIN — ENOXAPARIN SODIUM 150 MG: 150 INJECTION SUBCUTANEOUS at 00:45

## 2022-11-03 RX ADMIN — Medication 5.3 MILLICURIE: at 11:51

## 2022-11-03 RX ADMIN — ENOXAPARIN SODIUM 150 MG: 150 INJECTION SUBCUTANEOUS at 21:45

## 2022-11-03 RX ADMIN — METOPROLOL TARTRATE 12.5 MG: 25 TABLET, FILM COATED ORAL at 09:37

## 2022-11-03 RX ADMIN — FUROSEMIDE 20 MG: 10 INJECTION, SOLUTION INTRAMUSCULAR; INTRAVENOUS at 09:26

## 2022-11-03 RX ADMIN — INSULIN LISPRO 16 UNITS: 100 INJECTION, SOLUTION INTRAVENOUS; SUBCUTANEOUS at 09:23

## 2022-11-03 RX ADMIN — GABAPENTIN 300 MG: 300 CAPSULE ORAL at 21:44

## 2022-11-03 RX ADMIN — INSULIN LISPRO 4 UNITS: 100 INJECTION, SOLUTION INTRAVENOUS; SUBCUTANEOUS at 18:04

## 2022-11-03 ASSESSMENT — ENCOUNTER SYMPTOMS
APNEA: 0
RHINORRHEA: 0
ABDOMINAL PAIN: 0
COLOR CHANGE: 0
PHOTOPHOBIA: 0
COUGH: 1
EYE REDNESS: 0
EYES NEGATIVE: 1
SHORTNESS OF BREATH: 1
GASTROINTESTINAL NEGATIVE: 1
CONSTIPATION: 0
CHEST TIGHTNESS: 0
BACK PAIN: 0
VOMITING: 0
DIARRHEA: 0
WHEEZING: 0
SORE THROAT: 0
COUGH: 0
NAUSEA: 0

## 2022-11-03 NOTE — PROGRESS NOTES
Bedside device check completed at this time. Pt has a dual lead ICD , presenting EGM displays AS/VS 90 bpm in AAIR=DDDR mode Pacing : AP 13.7%   <0.1%. NO Alerts , shocks or arrhythmias to report since 9/13/22. OptiVol fluid index WNL at this time. Battery has 8 years, RA/RV lead impedance, sensing and capture thresholds WNL. Interpretation tool utilized during testing , Pt's RN at bedside and aware of results. Paper copy of the report placed  in the bedside chart.

## 2022-11-03 NOTE — FLOWSHEET NOTE
11/3/22 @ [phone] Notified Infectious Disease answering service of consult # [phone]    11/3/22 @ 0845 Notified Dr. Robert Mathew of Endocrinology consult via [address] [place] [address]

## 2022-11-03 NOTE — SIGNIFICANT EVENT
Received results call directly from radiologist Dr. Mary Martinez on 11/3/2022 at 1328 hrs regarding VQ lung perfusion scan: \"High probability of acute pulmonary embolism, right side >left side. \"    Patient is already on full treatment-dose weight based lovenox, and is not demonstrating any acute respiratory worsening or S/Sx of any obstructive shock at present. Discussed results with nursing as well as potential red flag symptoms which could occur in the event of worsening.       Electronically signed by Larissa Urena DO on 11/3/2022 at 1:30 PM

## 2022-11-03 NOTE — ED TRIAGE NOTES
Additional Notes: Don’t do flu shots
Per family pt has been having SOB x3 days. Pt had covid dx x1 week ago. Pt has been having increased SOB and work of breathing since then but it has increased x3 days ago. Family states that they have been taking SPO2 at home and it has been in he 66's, pt arrive to triage with 84% reading.  Pt was placed on o2 at 4 lpm via n.c. and it increased to 89%
Detail Level: Detailed
Quality 131: Pain Assessment And Follow-Up: Pain assessment using a standardized tool is documented as negative, no follow-up plan required
Quality 110: Preventive Care And Screening: Influenza Immunization: Influenza Immunization Ordered or Recommended, but not Administered due to system reason

## 2022-11-03 NOTE — PROGRESS NOTES
11/03/22    From: HOME W/FAMILY. HAS CANE. CPAP W/O O2    Admit:ACUTE RESP FAILURE W/HYPOXIA     PMH: CHF, ANXIETY, CKD, DM2, DEPRESSION, HTN, HLD, GERD, RECENT COVID 19 INFECTION 10/13, FORMER TOBACCO USER, NONISCHEMIC CARDIOMYOPATHY S/P ICD PLACEMENT    Anticipated Discharge Disposition: HOME [address] IF INDICATED.  MAY BENEFIT FROM [facility] FOR CHF ED    Patient Mobility or PT/OT ordered:  Consults: PULM, CARDIO, ENDOCRINE, ID    Clinical: SPO2 92% 10L HIGH FLOW BUBBLER, D DIM 1.96, K+ 5.0, TROP 0.102, BUN/CR 32/2.28,     Barriers to Discharge: VQ SCAN, 2D ECHO W/DOPPLER & COLOR    Assessments: CMI DONE

## 2022-11-03 NOTE — CONSULTS
Cardiology Consult Note  Patient: Agnes Ellington Curet  Unit/Bed: Q764/J018-55  YOB: 1960  MRN: [id number]  Acct: [de-identified]   Admitting Diagnosis: Acute respiratory failure with hypoxia [facility]) [J96.01]  Chronic kidney disease, unspecified CKD stage [N18.9]  Date:  11/2/2022  Hospital Day: 0      Chief Complaint:  SOB    History of Present Illness:  33-year-old male well-known to our cardiology service as patient follows up in clinic with Dr. Farnaz Hendrix, patient has history of nonischemic cardiomyopathy status post ICD placement,, diabetes, hypertension, hyperlipidemia, CKD, morbidly obese, LON who was admitted to the hospital for shortness of breath  Cardiology consulted for management of positive troponins    Shortness of breath going on for 3 days  Troponins 0.1 down trended to 0.8  EKG showing sinus rhythm with nonspecific T wave abnormalities in the lateral leads      Allergies   Allergen Reactions    Coreg [Carvedilol] Other (See Comments)     \"hyper\", rapid pulse       Current Facility-Administered Medications   Medication Dose Route Frequency Provider Last Rate Last Admin    enoxaparin (LOVENOX) injection 150 mg  1 mg/kg SubCUTAneous BID Nicoletto Bolzan-Roche, APRN - CNP   150 mg at 11/03/22 0045    glucose chewable tablet 16 g  4 tablet Oral PRN Nicoletto Bolzan-Roche, APRN - CNP        dextrose bolus 10% 125 mL  125 mL IntraVENous PRN Nicoletto Bolzan-Roche, APRN - CNP        Or    dextrose bolus 10% 250 mL  250 mL IntraVENous PRN Nicoletto Bolzan-Roche, APRN - CNP        glucagon (rDNA) injection 1 mg  1 mg SubCUTAneous PRN Nicoletto Bolzan-Roche, APRN - CNP        dextrose 10 % infusion   IntraVENous Continuous PRN Nicoletto Bolzan-Roche, APRN - CNP        sodium chloride flush 0.9 % injection 5-40 mL  5-40 mL IntraVENous 2 times per day Nicoletto Bolzan-Roche, APRN - CNP        sodium chloride flush 0.9 % injection 5-40 mL  5-40 mL IntraVENous PRN Nicoletto Bolzan-Roche, APRN - CNP        0.9 % sodium chloride infusion   IntraVENous PRN Nicoletto Bolzan-Roche, APRN - CNP        ondansetron (ZOFRAN-ODT) disintegrating tablet 4 mg  4 mg Oral Q8H PRN Nicoletto Bolzan-Roche, APRN - CNP        Or    ondansetron (ZOFRAN) injection 4 mg  4 mg IntraVENous Q6H PRN Nicoletto Bolzan-Roche, APRN - CNP        polyethylene glycol (GLYCOLAX) packet 17 g  17 g Oral Daily PRN Nicoletto Bolzan-Roche, APRN - CNP        acetaminophen (TYLENOL) tablet 650 mg  650 mg Oral Q6H PRN Nicoletto Bolzan-Roche, APRN - CNP        Or    acetaminophen (TYLENOL) suppository 650 mg  650 mg Rectal Q6H PRN Nicoletto Bolzan-Roche, APRN - CNP        furosemide (LASIX) injection 20 mg  20 mg IntraVENous Daily Nicoletto Bolzan-Roche, APRN - CNP        insulin lispro (HUMALOG) injection vial 0-16 Units  0-16 Units SubCUTAneous 4x Daily AC & HS Ayala Rivero,         insulin glargine (LANTUS) injection vial 5 Units  5 Units SubCUTAneous BID Stew Chana, DO        insulin lispro (HUMALOG) injection vial 10 Units  10 Units SubCUTAneous Once Stew Chana, DO           PMHx:  Past Medical History:   Diagnosis Date    Acute kidney injury superimposed on CKD ([address] 8/15/2019    Anxiety and depression 4/30/2019    Cardiomyopathy ([address]     Chronic systolic heart failure ([address] 4/30/2019    Diabetes mellitus ([address]     Diabetic neuropathy associated with type 2 diabetes mellitus ([address] 12/13/2017    Heart failure, NYHA class 4 ([address]     Hyperlipidemia     Hypertension     ICD (implantable cardioverter-defibrillator) in place 4/30/2019    Left ureteral calculus     Morbid obesity ([address] 12/19/2017    LON (obstructive sleep apnea)     Pyelonephritis 8/4/2019    Ureteral calculus     Ureteric stone        PSHx:  Past Surgical History:   Procedure Laterality Date    CARDIAC DEFIBRILLATOR PLACEMENT  12/2008    CYSTOSCOPY INSERTION / REMOVAL STENT / STONE Left 5/30/2019    CYSTOSCOPY LEFT DOUBLE J STENT PLACEMENT ROOM 287 performed by Carmela Mccoy MD at [facility]    LITHOTRIPSY Left 2019    LEFT ESWL (CONF # [id number]) performed by Quentin Merrill MD at [facility]    LITHOTRIPSY N/A 7/10/2019    HOLMIUM LASER LITHOTRIPSY, STONE EXTRACTION, LT URETERAL STENT INSERTION. performed by Quentin Merrill MD at [address] Left 7/10/2019    LEFT FLEXIBLE URETEROSCOPY, CYSTOSCOPY RETROGRADE performed by Quentin Merrill MD at [facility] Hx:  Social History     Socioeconomic History    Marital status: Legally      Spouse name: None    Number of children: None    Years of education: None    Highest education level: None   Tobacco Use    Smoking status: Former     Packs/day: 1.00     Years: 25.00     Pack years: 25.00     Types: Cigarettes     Quit date: 2008     Years since quittin.9    Smokeless tobacco: Never   Substance and Sexual Activity    Alcohol use: No    Drug use: Never    Sexual activity: Yes     Partners: Female       Family Hx:  Family History   Problem Relation Age of Onset    Heart Disease Mother     Kidney Disease Mother     Hypertension Mother     Diabetes Mother     No Known Problems Sister     No Known Problems Brother     No Known Problems Brother        Review of Systems:   Review of Systems   Constitutional:  Negative for activity change, chills, diaphoresis and fever. HENT:  Negative for congestion, ear pain, nosebleeds and rhinorrhea. Eyes:  Negative for redness and visual disturbance. Respiratory:  Positive for shortness of breath. Negative for apnea, cough, chest tightness and wheezing. Cardiovascular:  Negative for chest pain, palpitations and leg swelling. Gastrointestinal:  Negative for abdominal pain, constipation, diarrhea, nausea and vomiting. Genitourinary:  Negative for difficulty urinating and dysuria. Musculoskeletal: Negative. Negative for joint swelling. Skin:  Negative for color change, rash and wound.    Neurological:  Negative for dizziness, syncope, weakness, numbness and headaches. Psychiatric/Behavioral: Negative. Physical Examination:    BP (!) 118/54   Pulse 88   Temp 98 °F (36.7 °C) (Oral)   Resp 24   Ht 5' 11\" (1.803 m)   Wt (!) 320 lb 9.6 oz (145.4 kg)   SpO2 92%   BMI 44.71 kg/m²    Physical Exam  Vitals and nursing note reviewed. Constitutional:       Appearance: Normal appearance. He is obese. HENT:      Head: Normocephalic and atraumatic. Mouth/Throat:      Mouth: Mucous membranes are moist.      Pharynx: Oropharynx is clear. Eyes:      Extraocular Movements: Extraocular movements intact. Conjunctiva/sclera: Conjunctivae normal.      Pupils: Pupils are equal, round, and reactive to light. Cardiovascular:      Rate and Rhythm: Normal rate and regular rhythm. Pulses: Normal pulses. Heart sounds: Normal heart sounds. Pulmonary:      Effort: Pulmonary effort is normal.      Breath sounds: Rales present. Abdominal:      General: Abdomen is flat. Bowel sounds are normal.      Palpations: Abdomen is soft. Musculoskeletal:         General: Normal range of motion. Cervical back: Normal range of motion and neck supple. Skin:     General: Skin is warm. Neurological:      General: No focal deficit present. Mental Status: He is alert and oriented to person, place, and time. Mental status is at baseline.    Psychiatric:         Mood and Affect: Mood normal.       LABS:  CBC:  Lab Results   Component Value Date/Time    WBC 10.0 11/03/2022 04:48 AM    RBC 4.77 11/03/2022 04:48 AM    HGB 13.6 11/03/2022 04:48 AM    HCT 41.7 11/03/2022 04:48 AM    MCV 87.4 11/03/2022 04:48 AM    MCH 28.5 11/03/2022 04:48 AM    MCHC 32.6 11/03/2022 04:48 AM    RDW 16.4 11/03/2022 04:48 AM     11/03/2022 04:48 AM     CBC with Differential:   Lab Results   Component Value Date/Time    WBC 10.0 11/03/2022 04:48 AM    RBC 4.77 11/03/2022 04:48 AM    HGB 13.6 11/03/2022 04:48 AM    HCT 41.7 11/03/2022 04:48 AM     11/03/2022 04:48 AM MCV 87.4 11/03/2022 04:48 AM    MCH 28.5 11/03/2022 04:48 AM    MCHC 32.6 11/03/2022 04:48 AM    RDW 16.4 11/03/2022 04:48 AM    BANDSPCT 14 08/04/2019 06:30 PM    METASPCT 1 08/04/2019 06:30 PM    LYMPHOPCT 6.8 11/03/2022 04:48 AM    MONOPCT 1.3 11/03/2022 04:48 AM    BASOPCT 0.3 11/03/2022 04:48 AM    MONOSABS 0.1 11/03/2022 04:48 AM    LYMPHSABS 0.7 11/03/2022 04:48 AM    EOSABS 0.0 11/03/2022 04:48 AM    BASOSABS 0.0 11/03/2022 04:48 AM     CMP:    Lab Results   Component Value Date/Time     11/03/2022 04:48 AM    K 5.0 11/03/2022 04:48 AM    K 4.2 11/02/2022 11:00 PM     11/03/2022 04:48 AM    CO2 22 11/03/2022 04:48 AM    BUN 32 11/03/2022 04:48 AM    CREATININE 2.28 11/03/2022 04:48 AM    GFRAA 35.6 10/13/2022 06:45 PM    LABGLOM 31.1 11/03/2022 04:48 AM    GLUCOSE 284 11/03/2022 04:48 AM    GLUCOSE 140 04/17/2020 09:49 AM    PROT 7.6 11/03/2022 04:48 AM    LABALBU 3.8 11/03/2022 04:48 AM    LABALBU 4.0 04/17/2020 09:49 AM    CALCIUM 9.4 11/03/2022 04:48 AM    BILITOT 0.5 11/03/2022 04:48 AM    ALKPHOS 134 11/03/2022 04:48 AM    AST 17 11/03/2022 04:48 AM    ALT 17 11/03/2022 04:48 AM     BMP:    Lab Results   Component Value Date/Time     11/03/2022 04:48 AM    K 5.0 11/03/2022 04:48 AM    K 4.2 11/02/2022 11:00 PM     11/03/2022 04:48 AM    CO2 22 11/03/2022 04:48 AM    BUN 32 11/03/2022 04:48 AM    LABALBU 3.8 11/03/2022 04:48 AM    LABALBU 4.0 04/17/2020 09:49 AM    CREATININE 2.28 11/03/2022 04:48 AM    CALCIUM 9.4 11/03/2022 04:48 AM    GFRAA 35.6 10/13/2022 06:45 PM    LABGLOM 31.1 11/03/2022 04:48 AM    GLUCOSE 284 11/03/2022 04:48 AM    GLUCOSE 140 04/17/2020 09:49 AM     Magnesium:    Lab Results   Component Value Date/Time    MG 2.8 11/03/2022 04:48 AM     Troponin:    Lab Results   Component Value Date/Time    TROPONINI 0.089 11/03/2022 04:48 AM       EKG: normal sinus rhythm      Assessment:    Active Hospital Problems    Diagnosis Date Noted    Acute respiratory failure with hypoxia ([facility] [J96.01] 11/03/2022     Priority: Medium     Positive troponins. Troponins peaked at 0.1 in the setting of CKD, EKG without signs of ischemia, patient denies chest pain  Acute on chronic decompensated heart failure with improved ejection fraction status post ICD placement. EF 50% by TTE 2019  LON  Morbid obese  Diabetes  Hypertension  Hyperlipidemia  PAD    TTE 2019 EF 50%        Plan:  Continue telemetry  Obtain an echocardiogram  Continue Lasix 40 mg IV daily  Strict ins and outs and daily weights  Agree with pulmonology consult for management of shortness of breath obesity hypoventilation syndrome versus COPD  Aspirin and atorvastatin  Continue with Jardiance  Continue metoprolol  Hold ACE or ARB   Ischemic evaluation as an outpatient  Please keep potassium between 4 and 5 magnesium above 2  Please keep hemoglobin above 8        This report was transcribed using voice recognition software. Every effort was made to ensure accuracy; however, inadvertent computerized transcription errors may be present.     Electronically signed by Bashir Stover MD on 11/3/2022 at 8:45 AM

## 2022-11-03 NOTE — CONSULTS
Pulmonary and Critical Care Medicine  Consult Note  Encounter Date: 11/3/2022 9:40 AM    Mr. Braulio Vick is a 57 y.o. male  : 1960  Requesting Provider: Francine Plunkett DO    Reason for request: Shortness of breath post COVID-19            HISTORY OF PRESENT ILLNESS:    Patient is 57 y.o. presents with worsening shortness of breath, symptoms started 2 weeks ago, after diagnosis of COVID-19 infection, he did not require oxygen, however since then he has been having shortness of breath, also reported cough productive of minimal phlegm, patient does not know the color, he reported bilateral lower ribs chest pain, mainly with coughing, along with tenderness, no fever no chills, no worsening lower extremity edema, he has history of sleep apnea he received his CPAP less than a week ago and he is trying to get used to it.           Past Medical History:        Diagnosis Date    Acute kidney injury superimposed on CKD ([address] 8/15/2019    Anxiety and depression 2019    Cardiomyopathy ([address]     Chronic systolic heart failure ([address] 2019    Diabetes mellitus ([address]     Diabetic neuropathy associated with type 2 diabetes mellitus ([address] 2017    Heart failure, NYHA class 4 ([address]     Hyperlipidemia     Hypertension     ICD (implantable cardioverter-defibrillator) in place 2019    Left ureteral calculus     Morbid obesity ([address] 2017    LON (obstructive sleep apnea)     Pyelonephritis 2019    Ureteral calculus     Ureteric stone        Past Surgical History:        Procedure Laterality Date    CARDIAC DEFIBRILLATOR PLACEMENT  2008    CYSTOSCOPY INSERTION / REMOVAL STENT / STONE Left 2019    CYSTOSCOPY LEFT DOUBLE J STENT PLACEMENT ROOM 287 performed by Adelina Walton MD at [place] Left 2019    LEFT ESWL (CONF # [id number]) performed by Adelina Walton MD at [facility]    LITHOTRIPSY N/A 7/10/2019    HOLMIUM LASER LITHOTRIPSY, STONE EXTRACTION, LT URETERAL STENT INSERTION. performed by Tonja Ulrich MD at [address] Left 7/10/2019    LEFT FLEXIBLE URETEROSCOPY, CYSTOSCOPY RETROGRADE performed by Tonja Ulrich MD at [facility] History:     reports that he quit smoking about 13 years ago. He has a 25.00 pack-year smoking history. He has never used smokeless tobacco. He reports that he does not drink alcohol and does not use drugs. Family History:       Problem Relation Age of Onset    Heart Disease Mother     Kidney Disease Mother     Hypertension Mother     Diabetes Mother     No Known Problems Sister     No Known Problems Brother     No Known Problems Brother        Allergies:  Coreg [carvedilol]        MEDICATIONS during current hospitalization:    Continuous Infusions:   dextrose      sodium chloride         Scheduled Meds:   enoxaparin  1 mg/kg SubCUTAneous BID    sodium chloride flush  5-40 mL IntraVENous 2 times per day    insulin lispro  0-16 Units SubCUTAneous 4x Daily AC & HS    insulin glargine  5 Units SubCUTAneous BID    insulin lispro  10 Units SubCUTAneous Once    [START ON 11/4/2022] furosemide  40 mg IntraVENous Daily    empagliflozin  10 mg Oral Daily    metoprolol tartrate  12.5 mg Oral BID    atorvastatin  40 mg Oral Nightly    aspirin  81 mg Oral Daily       PRN Meds:glucose, dextrose bolus **OR** dextrose bolus, glucagon (rDNA), dextrose, sodium chloride flush, sodium chloride, ondansetron **OR** ondansetron, polyethylene glycol, acetaminophen **OR** acetaminophen        REVIEW OF SYSTEMS:  ROS: 10 organs review of system is done including general, psychological, ENT, hematological, endocrine, respiratory, cardiovascular, gastrointestinal, musculoskeletal, neurological,  allergy and Immunology is done and is otherwise negative.     PHYSICAL EXAM:    Vitals:  BP (!) 118/54   Pulse 95   Temp 98 °F (36.7 °C) (Oral)   Resp 24   Ht 5' 11\" (1.803 m)   Wt (!) 320 lb 9.6 oz (145.4 kg)   SpO2 93%   BMI 44.71 kg/m² General: alert, cooperative, no distress  Head: normocephalic, atraumatic  Eyes:No gross abnormalities. ENT:  MMM no lesions  Neck:  supple and no masses  Chest : clear to auscultation bilaterally- no wheezes, rales or rhonchi, normal air movement, no respiratory distress, tenderness bilaterally reproducible of same pain noted in HPI  Heart[de-identified] Heart sounds are normal.  Regular rate and rhythm without murmur, gallop or rub. ABD:  symmetric, soft, non-tender  Musculoskeletal : no cyanosis, no clubbing, and no edema  Neuro:  Grossly normal  Skin: No rashes or nodules noted. Lymph node:  no cervical nodes  Urology: No Bob   Psychiatric: appropriate        Data Review  Recent Labs     11/02/22  2245 11/02/22  2339 11/03/22  0448   WBC 8.7  --  10.0   HGB 12.9* 15.0 13.6*   HCT 39.2*  --  41.7*     --  360      Recent Labs     11/02/22  2300 11/02/22  2339 11/03/22  0448     --  139   K 4.2  --  5.0*     --  102   CO2 27  --  22   BUN 31*  --  32*   CREATININE 2.15* 2.1* 2.28*   GLUCOSE 373*  --  284*       MV Settings: ABGs:   Recent Labs     11/02/22 2339   PHART 7.363   FTS4OQF 40   PO2ART 59*   PYJ6LPP 22.5   BEART -3   U4RIUSWL 89*   OHY3JDM 24     O2 Device: High flow nasal cannula  O2 Flow Rate (L/min): 10 L/min  Lab Results   Component Value Date/Time    LACTA 0.9 10/13/2022 06:45 PM    LACTA 1.0 08/06/2019 05:34 AM    LACTA 1.7 08/04/2019 07:04 PM       Radiology  I personally reviewed imaging studies and chest x-ray no acute infiltrate, possible multiple nodular lesions        Assessment, plan:   Patient is at risk due to    Shortness of breath, etiology is not clear, probably due to viral induced cardiomyopathy, VTE is less likely however D-dimer is increased probably due to COVID-19.   Recent covid 19 infection   Abnormal chest x-ray could be due to recent COVID-19 infection  LON  Chronic kidney disease     Recommendation  VQ scan pending  Echo  CT chest  O2 to keep sat 90 to 92%  CPAP while asleep  Watch volume status and avoid overload  Appreciate cardiology  Trend troponin  Thank you for consultation    Electronically signed by Rodrick Holbrook MD, FCCP,  on 11/3/2022 at 9:40 AM

## 2022-11-03 NOTE — CARE COORDINATION
Utilizing  service, definition of CHF discussed with patient, spouse and son. Symptoms of heart failure and decompensation reviewed: weight gain of >3 #, edema, difficulty breathing, cough, issues with appetite, fatigue, or difficulty with sleep. Common causes of CHF reviewed: CAD, arrhythmias, MI, HTN, valve dz., infection,  ETOH or drug abuse, or genetic abnormalities. Importance of daily weight and B/P monitoring discussed. Pt to use a calender or notebook to record daily weight and call physician immediately with 3 # weight gain. Low sodium diet and fluid intake discussed. Pt taught about a fluid restriction and advised to discuss this with the cardiologist prior to limiting oral intake. Shown how to read labels for sodium levels, recommended food list provided. I emphasized the importance of following their physician's orders for medication administration. Importance of flu and pneumonia vaccinations reinforced. Common CHF medications reviewed as well as avoiding certain other meds (decongestants, NSAIDS)  Instructed to discuss activity recommendations with physician. Pt. quit smoking. Sample CHF weight documentation form provided. CHF Zones discussed. Importance of staying in \"green\" area stressed. Pt verbalized understanding to call MD ASAP when he reaches the yellow zone, and to call 911 when reaching the red zone. Booklet and zone pamphlet provided to the pt. Patient and family deny any further questions at this time.    Electronically signed by Charity Molina RN on 11/3/2022 at 2:17 PM

## 2022-11-03 NOTE — CARE COORDINATION
[facility] [place] Case Management Initial Discharge Assessment    Met with Patient to discuss discharge plan. PCP: Luís Conley DO                                Date of Last Visit: [address] Patient: No        [facility] Notified: no    If no PCP, list provided? N/A    Discharge Planning    Living Arrangements: independently at home    Who do you live with? SON AND DIL     Who helps you with your care:  self    If lives at home:     Do you have any barriers navigating in your home? yes - 3 STEPS     Patient can perform ADL? Yes    Current Services (outpatient and in home) :  None    Dialysis: No    Is transportation available to get to your appointments? Yes    DME Equipment:  yes - [employer]     Respiratory equipment: CPAP without O2, NEBULIZATION MACHINE. OWNS BOTH     Respiratory provider:  no     Pharmacy:  yes - [address] with Medication Assistance Program?  No      Patient agreeable to [address]? TBD IF INDICATED. WILL NEED LIST     Patient agreeable to SNF/Rehab? Declined    Other discharge needs identified? N/A    Does Patient Have a High-Risk for Readmission Diagnosis (CHF, PN, MI, COPD)? Yes, see care coordinator assessment    If Yes,    Consult with pulmonologist? Yes  Consult with cardiologist? Yes  Cardiac Rehab referral if EF <35%? N/A  Consult with Pharmacy for medication assessment prior to discharge? No  Consult with Behavioral health to aid in depression, anxiety, or coping issues? N/A  Palliative Care Consult? No  Pulmonary Rehab order for COPD, PN, and CHF (if EF > 35%)? Yes   Does patient have a reliable scale and know how to read it (for CHF)? Yes  Nutrition consult for CHF? Yes  Respiratory therapy consult that includes bedside instruction on administration of nebulizers and/or inhalers, and assessment of oxygen and equipment needs in the home?  Yes    Initial Discharge Plan? (MET W/PT WITH  LYNDSEY # 0241 TO ASSESS NEEDS AND DISCUSS DISCHARGE PLAN. PT IS AGREEABLE TO [facility]/PT/OT IF INDICATED. CM TO PROVIDE LIST AND F/U ON CHOICES. WILL FOLLOW CLINICAL COURSE AND ASSESS NEEDS. PT IS IS FROM HOME W/FAMILY AND IS INDEPENDENT AT BASELINE. Note: please see concurrent daily documentation for any updates after initial note).         Readmission Risk              Risk of Unplanned Readmission:  17         Electronically signed by Allana Hatchet, RN on 11/3/2022 at 10:31 AM

## 2022-11-03 NOTE — FLOWSHEET NOTE
[address] [facility] [address]. Aldana Redo rounding on unit and advised of Critical troponin 0.101. No new orders at this time.

## 2022-11-03 NOTE — PROGRESS NOTES
Lab called with a critical d dimer of 1.96. NP aware. VQ scan ordered. Will continue to monitor.  Electronically signed by Kris Singh RN on 11/3/2022 at 3:20 AM

## 2022-11-03 NOTE — CONSULTS
Infectious Disease     Patient Name: Julio Varma  Date: 11/3/2022  YOB: 1960  Medical Record Number: [id number]      History of COVID  Hypoxia        History of Present Illness:  Cardiomyopathy diabetes hyperlipidemia hypertension kidney stones morbid obesity history of pyelonephritis  Prior acute chronic renal insufficiency      Presented 11/3/2021 shortness of breath with hypoxia increasing shortness of breath for 3 days especially with exertion cough nonproductive no blood  Diagnosed with COVID-19 on 10/13/2022    Normal CBC  COVID-19 + 10/13/2022 COVID-19 - 11/2/2022  Elevated creatinine elevated alk phos  D-dimer elevated at 1.96      EXAMINATION:   ONE XRAY VIEW OF THE CHEST       11/2/2022 10:47 pm       COMPARISON:   None. HISTORY:   ORDERING SYSTEM PROVIDED HISTORY: SOB   TECHNOLOGIST PROVIDED HISTORY:   Reason for exam:->SOB   What reading provider will be dictating this exam?->CRC       FINDINGS:   The lungs are without acute focal process. There is no effusion or   pneumothorax. Cardiomegaly. Left-sided transvenous pacer device. The   osseous structures are without acute process. Degenerative changes left   glenohumeral joint. Impression   No acute process. Cardiomegaly. 93% saturation on 10 L nasal cannula    Review of Systems   Constitutional:  Negative for chills, diaphoresis, fatigue and fever. HENT: Negative. Eyes: Negative. Respiratory:  Positive for cough and shortness of breath. Cardiovascular: Negative. Gastrointestinal: Negative. Endocrine: Negative. Genitourinary: Negative. Musculoskeletal: Negative. Skin: Negative. Neurological: Negative.       Review of Systems: All 14 review of systems negative other than as stated above    Social History     Tobacco Use    Smoking status: Former     Packs/day: 1.00     Years: 25.00     Pack years: 25.00     Types: Cigarettes     Quit date: 12/1/2008     Years since quitting: 13.9    Smokeless tobacco: Never   Substance Use Topics    Alcohol use: No    Drug use: Never         Past Medical History:   Diagnosis Date    Acute kidney injury superimposed on CKD ([facility] 8/15/2019    Anxiety and depression 4/30/2019    Cardiomyopathy ([facility]     Chronic systolic heart failure ([facility] 4/30/2019    Diabetes mellitus ([facility]     Diabetic neuropathy associated with type 2 diabetes mellitus ([facility] 12/13/2017    Heart failure, NYHA class 4 ([facility]     Hyperlipidemia     Hypertension     ICD (implantable cardioverter-defibrillator) in place 4/30/2019    Left ureteral calculus     Morbid obesity ([facility] 12/19/2017    LON (obstructive sleep apnea)     Pyelonephritis 8/4/2019    Ureteral calculus     Ureteric stone            Past Surgical History:   Procedure Laterality Date    CARDIAC DEFIBRILLATOR PLACEMENT  12/2008    CYSTOSCOPY INSERTION / REMOVAL STENT / STONE Left 5/30/2019    CYSTOSCOPY LEFT DOUBLE J STENT PLACEMENT ROOM 287 performed by Quentin Merrill MD at [place] Left 6/12/2019    LEFT ESWL (CONF # [id number]) performed by Quentin Merrill MD at [facility]    LITHOTRIPSY N/A 7/10/2019    HOLMIUM LASER LITHOTRIPSY, STONE EXTRACTION, LT URETERAL STENT INSERTION. performed by Quentin Merrill MD at [address] [place] [address] Left 7/10/2019    LEFT FLEXIBLE URETEROSCOPY, CYSTOSCOPY RETROGRADE performed by Quentin Merrill MD at [facility]         No current facility-administered medications on file prior to encounter. Current Outpatient Medications on File Prior to Encounter   Medication Sig Dispense Refill    lisinopril (PRINIVIL;ZESTRIL) 20 MG tablet TAKE 2 TABLETS BY MOUTH DAILY 180 tablet 3    metoprolol tartrate (LOPRESSOR) 50 MG tablet TAKE 1 TABLET BY MOUTH TWICE DAILY 180 tablet 5    furosemide (LASIX) 40 MG tablet TAKE 1 TABLET BY MOUTH EVERY DAY.  HOLD UNTIL SEEN BY PCP FOR FOLLOWUP 90 tablet 3    aspirin (ASPIRIN ADULT LOW STRENGTH) 81 MG EC tablet TAKE 1 TABLET BY MOUTH DAILY 90 tablet 3    isosorbide mononitrate (IMDUR) 30 MG extended release tablet Take 1 tablet by mouth daily 90 tablet 3    amLODIPine (NORVASC) 10 MG tablet Take 1 tablet by mouth daily 90 tablet 4    hydrOXYzine (VISTARIL) 50 MG capsule Take 1 capsule by mouth 3 times daily as needed for Itching 30 capsule 1    atorvastatin (LIPITOR) 40 MG tablet Take 1 tablet by mouth at bedtime 90 tablet 3    SITagliptin (JANUVIA) 100 MG tablet Take 1 tablet by mouth daily 90 tablet 4    sertraline (ZOLOFT) 100 MG tablet Take 1 tablet by mouth daily 90 tablet 4    insulin glargine (LANTUS SOLOSTAR) 100 UNIT/ML injection pen ADMINISTER 55 UNITS UNDER THE SKIN EVERY NIGHT 30 mL 12    Insulin Pen Needle (B-D UF III MINI PEN NEEDLES) 31G X 5 MM MISC 1 each by Does not apply route daily 100 each 4    fenofibrate (TRIGLIDE) 160 MG tablet TAKE 1 TABLET BY MOUTH EVERY NIGHT 90 tablet 4    albuterol (PROVENTIL) (2.5 MG/3ML) 0.083% nebulizer solution Albuterol albuterol (PROVENTIL) (2.5 MG/3ML) 0.083% nebulizer solution Indications: Type 2 diabetes mellitus without complication, with long-term current use of insulin (HCC) Take 3 mLs by nebulization 4 times daily 120 each 5 10/11/2018 Active 10-  [address] (66565)      gabapentin (NEURONTIN) 300 MG capsule Take by mouth. ammonium lactate (LAC-HYDRIN) 12 % cream Apply topically as needed. 385 g 4    glipiZIDE (GLUCOTROL) 10 MG tablet TAKE 1 TABLET BY MOUTH TWICE DAILY BEFORE MEALS 180 tablet 4    omeprazole (PRILOSEC) 20 MG delayed release capsule Take 1 capsule by mouth daily 90 capsule 4    vitamin D (ERGOCALCIFEROL) 1.25 MG (42055 UT) CAPS capsule Take 50,000 Units by mouth once a week       mupirocin (BACTROBAN) 2 % ointment Apply topically as needed   3    blood glucose test strips (TRUE METRIX BLOOD GLUCOSE TEST) strip 1 each by In Vitro route 3 times daily As needed. 100 each 3    terbinafine (LAMISIL) 1 % cream Apply topically 2 times daily Apply topically 2 times daily. (Patient taking differently: Apply topically 2 times daily Apply topically 2-3 times daily. ) 1 Tube 3    Blood Glucose Monitoring Suppl (ONE TOUCH ULTRA MINI) w/Device KIT 1 kit by Does not apply route three times daily 1 kit 5    Lancets MISC Use  each 3    Insulin Syringe-Needle U-100 (INSULIN SYRINGE 1CC/31GX5/16\") 31G X 5/16\" 1 ML MISC Use once daily to administer Lantus 100 each 1       Allergies   Allergen Reactions    Coreg [Carvedilol] Other (See Comments)     \"hyper\", rapid pulse         Family History   Problem Relation Age of Onset    Heart Disease Mother     Kidney Disease Mother     Hypertension Mother     Diabetes Mother     No Known Problems Sister     No Known Problems Brother     No Known Problems Brother          Physical Exam:      Physical Exam  Constitutional:       Appearance: He is obese. He is not ill-appearing. HENT:      Nose: Congestion present. Eyes:      Extraocular Movements: Extraocular movements intact. Conjunctiva/sclera: Conjunctivae normal.      Pupils: Pupils are equal, round, and reactive to light. Neck:      Vascular: No carotid bruit. Cardiovascular:      Heart sounds: Normal heart sounds. No murmur heard. Pulmonary:      Effort: Pulmonary effort is normal. No respiratory distress. Breath sounds: Normal breath sounds. No stridor. No wheezing, rhonchi or rales. Chest:      Chest wall: No tenderness. Abdominal:      General: Abdomen is flat. Bowel sounds are normal. There is no distension. Palpations: There is no mass. Tenderness: There is no abdominal tenderness. There is no right CVA tenderness, left CVA tenderness, guarding or rebound. Hernia: No hernia is present. Musculoskeletal:         General: No swelling, tenderness, deformity or signs of injury. Cervical back: Normal range of motion and neck supple. No rigidity or tenderness. Right lower leg: No edema. Left lower leg: No edema.    Lymphadenopathy: Cervical: No cervical adenopathy. Skin:     General: Skin is warm and dry. Coloration: Skin is not jaundiced or pale. Findings: No bruising, erythema, lesion or rash. Neurological:      General: No focal deficit present. Blood pressure (!) 118/54, pulse 88, temperature 98 °F (36.7 °C), temperature source Oral, resp. rate 24, height 5' 11\" (1.803 m), weight (!) 320 lb 9.6 oz (145.4 kg), SpO2 92 %.       .   Lab Results   Component Value Date    WBC 10.0 11/03/2022    HGB 13.6 (L) 11/03/2022    HCT 41.7 (L) 11/03/2022    MCV 87.4 11/03/2022     11/03/2022     Lab Results   Component Value Date/Time     11/03/2022 04:48 AM    K 5.0 11/03/2022 04:48 AM    K 4.2 11/02/2022 11:00 PM     11/03/2022 04:48 AM    CO2 22 11/03/2022 04:48 AM    BUN 32 11/03/2022 04:48 AM    CREATININE 2.28 11/03/2022 04:48 AM    GLUCOSE 284 11/03/2022 04:48 AM    GLUCOSE 140 04/17/2020 09:49 AM    CALCIUM 9.4 11/03/2022 04:48 AM                ASSESSMENT:  Patient Active Problem List   Diagnosis    Diabetes mellitus ([address]    Hyperlipidemia    Hypertension    Chronic bilateral low back pain without sciatica    Cardiomyopathy ([address]    Pacemaker    Diabetic neuropathy associated with type 2 diabetes mellitus ([address]    Morbid obesity (HCC)    LON (obstructive sleep apnea)    Chronic systolic heart failure ([address]    ICD (implantable cardioverter-defibrillator) in place    Anxiety and depression    Gastroesophageal reflux disease without esophagitis    Keratoderma of foot, acquired    CKD stage 3 due to type 2 diabetes mellitus ([address]    Vitamin D deficiency    Chronic left shoulder pain    Congestive heart failure ([address]    Anxiety    Secondary hyperparathyroidism ([address]    COVID-19    Acute respiratory failure with hypoxia ([address]         PLAN:  History of COVID  Hypoxia    No evidence of pneumonia  No antibiotics no antivirals    Steroids can be used if pulmonology believes may be helpful      VQ scan is pending evaluating for PE

## 2022-11-03 NOTE — H&P
[place] [address] MEDICINE    HISTORY AND PHYSICAL EXAM    PATIENT NAME:  Guerrero Lemus    MRN:  [id number]  SERVICE DATE:  11/3/2022   SERVICE TIME:  12:43 AM    Primary Care Physician: Martir Blanc DO         SUBJECTIVE  CHIEF COMPLAINT:  Shortness of Breath     HPI: Patient being admitted for respiratory failure with hypoxia. Patient is a pleasant, alert and oriented x1 70-year-old male. Patient reports he has been short of breath for the past 3 days. Patient reports he has worsening shortness of breath with exertion. Patient denies orthopnea. Patient denies chest pain but states that his lower ribs hurt bilaterally when he coughs. Patient also reports he has a nonproductive cough. Patient was recently diagnosed with COVID-19 on October 13. Patient received MAB infusions. Patient denies any fever, abdominal pain, nausea, leg swelling, or vomiting. Patient's other past medical history includes CKD, DM 2 with insulin use, HF with cardiomyopathy, HTN, HLD, depression with anxiety, and GERD. Patient denies use of tobacco, alcohol, or illicit drugs.     PAST MEDICAL HISTORY:    Past Medical History:   Diagnosis Date    Acute kidney injury superimposed on CKD ([address] 8/15/2019    Anxiety and depression 4/30/2019    Cardiomyopathy ([address]     Chronic systolic heart failure ([address] 4/30/2019    Diabetes mellitus ([address]     Diabetic neuropathy associated with type 2 diabetes mellitus ([address] 12/13/2017    Heart failure, NYHA class 4 ([address]     Hyperlipidemia     Hypertension     ICD (implantable cardioverter-defibrillator) in place 4/30/2019    Left ureteral calculus     Morbid obesity ([address] 12/19/2017    LON (obstructive sleep apnea)     Pyelonephritis 8/4/2019    Ureteral calculus     Ureteric stone      PAST SURGICAL HISTORY:    Past Surgical History:   Procedure Laterality Date    CARDIAC DEFIBRILLATOR PLACEMENT  12/2008    CYSTOSCOPY INSERTION / REMOVAL STENT / STONE Left 5/30/2019    CYSTOSCOPY LEFT DOUBLE J STENT PLACEMENT ROOM 287 performed by Daylin Garcia MD at [address] Left 2019    LEFT ESWL (CONF # [id number]) performed by Daylin Garcia MD at [facility]    LITHOTRIPSY N/A 7/10/2019    HOLMIUM LASER LITHOTRIPSY, STONE EXTRACTION, LT URETERAL STENT INSERTION. performed by Daylin Garcia MD at [address] Left 7/10/2019    LEFT FLEXIBLE URETEROSCOPY, CYSTOSCOPY RETROGRADE performed by Daylin Garcia MD at [address]:    Family History   Problem Relation Age of Onset    Heart Disease Mother     Kidney Disease Mother     Hypertension Mother     Diabetes Mother     No Known Problems Sister     No Known Problems Brother     No Known Problems Brother      SOCIAL HISTORY:    Social History     Socioeconomic History    Marital status: Legally      Spouse name: Not on file    Number of children: Not on file    Years of education: Not on file    Highest education level: Not on file   Occupational History    Not on file   Tobacco Use    Smoking status: Former     Packs/day: 1.00     Years: 25.00     Pack years: 25.00     Types: Cigarettes     Quit date: 2008     Years since quittin.9    Smokeless tobacco: Never   Substance and Sexual Activity    Alcohol use: No    Drug use: Never    Sexual activity: Yes     Partners: Female   Other Topics Concern    Not on file   Social History Narrative    Not on file     Social Determinants of Health     Financial Resource Strain: Not on file   Food Insecurity: Not on file   Transportation Needs: Not on file   Physical Activity: Not on file   Stress: Not on file   Social Connections: Not on file   Intimate Partner Violence: Not on file   Housing Stability: Not on file     MEDICATIONS:   Prior to Admission medications    Medication Sig Start Date End Date Taking?  Authorizing Provider   lisinopril (PRINIVIL;ZESTRIL) 20 MG tablet TAKE 2 TABLETS BY MOUTH DAILY 22   JESUS Dye - GINNA   metoprolol tartrate (LOPRESSOR) 50 MG tablet TAKE 1 TABLET BY MOUTH TWICE DAILY 4/26/22   [facility] Holiday, DO   furosemide (LASIX) 40 MG tablet TAKE 1 TABLET BY MOUTH EVERY DAY.  HOLD UNTIL SEEN BY PCP FOR FOLLOWUP 4/26/22   [facility] Holiday, DO   aspirin (ASPIRIN ADULT LOW STRENGTH) 81 MG EC tablet TAKE 1 TABLET BY MOUTH DAILY 4/26/22   [facility] Holiday, DO   isosorbide mononitrate (IMDUR) 30 MG extended release tablet Take 1 tablet by mouth daily 4/26/22   [facility]   amLODIPine (NORVASC) 10 MG tablet Take 1 tablet by mouth daily 4/26/22   [facility]   hydrOXYzine (VISTARIL) 50 MG capsule Take 1 capsule by mouth 3 times daily as needed for Itching 4/26/22   [facility] Holiday, DO   atorvastatin (LIPITOR) 40 MG tablet Take 1 tablet by mouth at bedtime 4/26/22   [facility]   SITagliptin (JANUVIA) 100 MG tablet Take 1 tablet by mouth daily 4/21/21   Rama Hernandez MD   sertraline (ZOLOFT) 100 MG tablet Take 1 tablet by mouth daily 4/21/21   Rama Hernandez MD   insulin glargine (LANTUS SOLOSTAR) 100 UNIT/ML injection pen ADMINISTER 55 UNITS UNDER THE SKIN EVERY NIGHT 4/21/21   Rama Hernandez MD   Insulin Pen Needle (B-D UF III MINI PEN NEEDLES) 31G X 5 MM MISC 1 each by Does not apply route daily 4/21/21   Rama Hernandez MD   fenofibrate (TRIGLIDE) 160 MG tablet TAKE 1 TABLET BY MOUTH EVERY NIGHT 11/24/20   Rama Hernandez MD   albuterol (PROVENTIL) (2.5 MG/3ML) 0.083% nebulizer solution Albuterol albuterol (PROVENTIL) (2.5 MG/3ML) 0.083% nebulizer solution Indications: Type 2 diabetes mellitus without complication, with long-term current use of insulin (HCC) Take 3 mLs by nebulization 4 times daily 120 each 5 10/11/2018 Active 10-  [address] [place] [address] ([id number]) 10/11/18   Historical Provider, MD   gabapentin (NEURONTIN) 300 MG capsule Take by mouth. 10/11/18   Historical Provider, MD   ammonium lactate (LAC-HYDRIN) 12 % cream Apply topically as needed. 9/17/20   Avelino Landau, DPM   glipiZIDE (GLUCOTROL) 10 MG tablet TAKE 1 TABLET BY MOUTH TWICE DAILY BEFORE MEALS 8/10/20   Dominic Garcia MD   omeprazole (PRILOSEC) 20 MG delayed release capsule Take 1 capsule by mouth daily 8/10/20   Dominic Garcia MD   vitamin D (ERGOCALCIFEROL) 1.25 MG (91563 UT) CAPS capsule Take 50,000 Units by mouth once a week  2/7/20   Historical Provider, MD   mupirocin (BACTROBAN) 2 % ointment Apply topically as needed  5/2/19   Historical Provider, MD   blood glucose test strips (TRUE METRIX BLOOD GLUCOSE TEST) strip 1 each by In Vitro route 3 times daily As needed. 10/11/18   Shelton Bailey, DO   terbinafine (LAMISIL) 1 % cream Apply topically 2 times daily Apply topically 2 times daily. Patient taking differently: Apply topically 2 times daily Apply topically 2-3 times daily. 10/11/18   Shelton Bailey, DO   Blood Glucose Monitoring Suppl (ONE TOUCH ULTRA MINI) w/Device KIT 1 kit by Does not apply route three times daily 10/11/18   Kaia Ca, DO   Lancets MISC Use TID 10/11/18   Kaia Ca, DO   Insulin Syringe-Needle U-100 (INSULIN SYRINGE 1CC/31GX5/16\") 31G X 5/16\" 1 ML MISC Use once daily to administer Lantus 12/14/17   Kaia Ca, DO       ALLERGIES: Coreg [carvedilol]    REVIEW OF SYSTEM:   Review of Systems   Constitutional:  Negative for activity change, chills, fatigue and fever. HENT:  Negative for congestion, ear pain, rhinorrhea and sore throat. Eyes:  Negative for photophobia and visual disturbance. Respiratory:  Positive for cough and shortness of breath. Negative for wheezing. Cardiovascular:  Negative for chest pain. Gastrointestinal:  Negative for abdominal pain, diarrhea, nausea and vomiting. Endocrine: Negative for polydipsia, polyphagia and polyuria. Genitourinary:  Negative for dysuria, flank pain, hematuria and urgency. Musculoskeletal:  Negative for back pain, myalgias and neck stiffness. Skin:  Negative for rash and wound. Allergic/Immunologic: Negative for immunocompromised state. Neurological:  Negative for dizziness and headaches. Psychiatric/Behavioral:  Negative for behavioral problems and confusion. OBJECTIVE  PHYSICAL EXAM: /87   Pulse 82   Temp 97.9 °F (36.6 °C) (Oral)   Resp 20   Ht 5' 11\" (1.803 m)   Wt (!) 340 lb (154.2 kg)   SpO2 94%   BMI 47.42 kg/m²     Physical Exam  Vitals and nursing note reviewed. Constitutional:       Appearance: He is morbidly obese. HENT:      Head: Normocephalic and atraumatic. Nose: Nose normal.      Mouth/Throat:      Mouth: Mucous membranes are moist.   Eyes:      Pupils: Pupils are equal, round, and reactive to light. Cardiovascular:      Rate and Rhythm: Normal rate and regular rhythm. Pulses: Normal pulses. Heart sounds: Normal heart sounds. Pulmonary:      Effort: Pulmonary effort is normal. No respiratory distress. Breath sounds: Normal breath sounds. No wheezing or rales. Abdominal:      General: Bowel sounds are normal.      Palpations: Abdomen is soft. There is no mass. Tenderness: There is no abdominal tenderness. Musculoskeletal:         General: Normal range of motion. Cervical back: Normal range of motion. Right lower le+ Edema present. Left lower le+ Edema present. Lymphadenopathy:      Cervical: No cervical adenopathy. Skin:     General: Skin is warm and dry. Capillary Refill: Capillary refill takes less than 2 seconds. Neurological:      Mental Status: He is alert and oriented to person, place, and time. Deep Tendon Reflexes: Reflexes normal.   Psychiatric:         Thought Content: Thought content normal.       DATA:     Diagnostic tests reviewed for today's visit:    Most recent labs and imaging results reviewed.      LABS:    Recent Results (from the past 24 hour(s))   CBC with Auto Differential    Collection Time: 22 10:45 PM Result Value Ref Range    WBC 8.7 4.8 - 10.8 K/uL    RBC 4.53 (L) 4.70 - 6.10 M/uL    Hemoglobin 12.9 (L) 14.0 - 18.0 g/dL    Hematocrit 39.2 (L) 42.0 - 52.0 %    MCV 86.6 79.0 - 92.2 fL    MCH 28.5 27.0 - 31.3 pg    MCHC 32.9 (L) 33.0 - 37.0 %    RDW 16.4 (H) 11.5 - 14.5 %    Platelets 796 162 - 052 K/uL    Neutrophils % 72.9 %    Lymphocytes % 17.4 %    Monocytes % 6.4 %    Eosinophils % 2.5 %    Basophils % 0.8 %    Neutrophils Absolute 6.3 1.4 - 6.5 K/uL    Lymphocytes Absolute 1.5 1.0 - 4.8 K/uL    Monocytes Absolute 0.6 0.2 - 0.8 K/uL    Eosinophils Absolute 0.2 0.0 - 0.7 K/uL    Basophils Absolute 0.1 0.0 - 0.2 K/uL   Protime-INR    Collection Time: 11/02/22 10:45 PM   Result Value Ref Range    Protime 13.7 12.3 - 14.9 sec    INR 1.0    APTT    Collection Time: 11/02/22 10:45 PM   Result Value Ref Range    aPTT 38.2 (H) 24.4 - 36.8 sec   Comprehensive Metabolic Panel w/ Reflex to MG    Collection Time: 11/02/22 11:00 PM   Result Value Ref Range    Sodium 138 135 - 144 mEq/L    Potassium reflex Magnesium 4.2 3.4 - 4.9 mEq/L    Chloride 101 95 - 107 mEq/L    CO2 27 20 - 31 mEq/L    Anion Gap 10 9 - 15 mEq/L    Glucose 373 (H) 70 - 99 mg/dL    BUN 31 (H) 8 - 23 mg/dL    Creatinine 2.15 (H) 0.70 - 1.20 mg/dL    Est, Glom Filt Rate 33.4 (L) >60    Calcium 8.6 8.5 - 9.9 mg/dL    Total Protein 7.2 6.3 - 8.0 g/dL    Albumin 3.7 3.5 - 4.6 g/dL    Total Bilirubin 0.4 0.2 - 0.7 mg/dL    Alkaline Phosphatase 139 (H) 35 - 104 U/L    ALT 18 0 - 41 U/L    AST 19 0 - 40 U/L    Globulin 3.5 2.3 - 3.5 g/dL   Lipase    Collection Time: 11/02/22 11:00 PM   Result Value Ref Range    Lipase 45 12 - 95 U/L   Troponin    Collection Time: 11/02/22 11:00 PM   Result Value Ref Range    Troponin 0.102 (HH) 0.000 - 0.010 ng/mL   Brain Natriuretic Peptide    Collection Time: 11/02/22 11:00 PM   Result Value Ref Range    Pro-BNP 1,199 pg/mL   COVID-19, Rapid    Collection Time: 11/02/22 11:22 PM    Specimen: Nasopharyngeal Swab   Result cardiology. We will keep patient on telemetry monitoring. Active problems:  Heart failure: HF with cardiomyopathy. Last known LVEF 50%. Presence of AICD. Patient on BB and diuretic. We will resume home meds. Patient will remain on telemetry monitoring. We will monitor respiratory and fluid status closely. We will get echo. CKD: Creatinine 2.15 at baseline. History of CKD 3. Will monitor with CMP daily. Will avoid nephrotoxic agents whenever possible. DM2: Insulin-dependent. Will monitor and cover with SSI before meals and at bedtime. We will resume long-acting insulin. HTN: PT on home meds to control. Will resume home meds, as tolerated. HLD: Patient on statin. Will resume home meds  Depression with anxiety: PT on home meds to control. Will resume home meds, as tolerated.     Plan of care discussed with: patient and family    SIGNATURE: JESUS Barr - CNP  DATE: November 3, 2022  TIME: 12:43 AM     Trevor Amin MD - supervising physician

## 2022-11-03 NOTE — ED PROVIDER NOTES
[address] [facility] ED  EMERGENCY DEPARTMENT ENCOUNTER      Pt Name: Raymond Do  MRN: [id number]  [place] 1960  Date of evaluation: 11/2/2022  Provider: Mayte Londono MD    CHIEF COMPLAINT       Chief Complaint   Patient presents with    Shortness of Breath     X3 days          HISTORY OF PRESENT ILLNESS   (Location/Symptom, Timing/Onset, Context/Setting, Quality, Duration, Modifying Factors, Severity)  Note limiting factors. Raymond Do is a 57 y.o. male who presents to the emergency department via private vehicle for evaluation of shortness of breath. History of CHF status post ICD on lasix, anxiety, CKD, diabetes, pacemaker placement, NOT home O2 dependence. Reports that more than 1 week ago he was having cough, diagnosed with COVID, got infusions for it. Over the past 3 days he has had worsening shortness of breath, especially with exertion, reports sharp, reproducible, nonradiating, nonexertional rib discomfort bilaterally when he breathes or coughs. No syncope, neck or jaw pain, calf pain. Reports chronic lower extremity edema. HPI    Nursing Notes were reviewed. REVIEW OF SYSTEMS    (2-9 systems for level 4, 10 or more for level 5)     Review of Systems   Constitutional:  Negative for fever. Respiratory:  Positive for cough and shortness of breath. Cardiovascular:  Positive for chest pain and leg swelling. Gastrointestinal:  Negative for abdominal pain and vomiting. Musculoskeletal:  Negative for neck pain. Neurological:  Negative for syncope. All other systems reviewed and are negative. Except as noted above the remainder of the review of systems was reviewed and negative.        PAST MEDICAL HISTORY     Past Medical History:   Diagnosis Date    Acute kidney injury superimposed on CKD ([address] 8/15/2019    Anxiety and depression 4/30/2019    Cardiomyopathy ([address]     Chronic systolic heart failure ([address] 4/30/2019    Diabetes mellitus ([address]     Diabetic neuropathy associated with type 2 diabetes mellitus ([facility] 12/13/2017    Heart failure, NYHA class 4 ([facility]     Hyperlipidemia     Hypertension     ICD (implantable cardioverter-defibrillator) in place 4/30/2019    Left ureteral calculus     Morbid obesity ([facility] 12/19/2017    LON (obstructive sleep apnea)     Pyelonephritis 8/4/2019    Ureteral calculus     Ureteric stone          SURGICAL HISTORY       Past Surgical History:   Procedure Laterality Date    CARDIAC DEFIBRILLATOR PLACEMENT  12/2008    CYSTOSCOPY INSERTION / REMOVAL STENT / STONE Left 5/30/2019    CYSTOSCOPY LEFT DOUBLE J STENT PLACEMENT ROOM 287 performed by Merrick Baker MD at [facility]    LITHOTRIPSY Left 6/12/2019    LEFT ESWL (CONF # [id number]) performed by Merrick Baker MD at [facility]    LITHOTRIPSY N/A 7/10/2019    HOLMIUM LASER LITHOTRIPSY, STONE EXTRACTION, LT URETERAL STENT INSERTION. performed by Merrick Baker MD at [address] [facility] Left 7/10/2019    LEFT FLEXIBLE URETEROSCOPY, CYSTOSCOPY RETROGRADE performed by Merrick Baker MD at [address] [facility]       Previous Medications    ALBUTEROL (PROVENTIL) (2.5 MG/3ML) 0.083% NEBULIZER SOLUTION    Albuterol albuterol (PROVENTIL) (2.5 MG/3ML) 0.083% nebulizer solution Indications: Type 2 diabetes mellitus without complication, with long-term current use of insulin (HCC) Take 3 mLs by nebulization 4 times daily 120 each 5 10/11/2018 Active 10-  [address] (91257)    AMLODIPINE (NORVASC) 10 MG TABLET    Take 1 tablet by mouth daily    AMMONIUM LACTATE (LAC-HYDRIN) 12 % CREAM    Apply topically as needed.     ASPIRIN (ASPIRIN ADULT LOW STRENGTH) 81 MG EC TABLET    TAKE 1 TABLET BY MOUTH DAILY    ATORVASTATIN (LIPITOR) 40 MG TABLET    Take 1 tablet by mouth at bedtime    BLOOD GLUCOSE MONITORING SUPPL (ONE TOUCH ULTRA MINI) W/DEVICE KIT    1 kit by Does not apply route three times daily    BLOOD GLUCOSE TEST STRIPS (TRUE METRIX BLOOD GLUCOSE TEST) STRIP    1 each by In Vitro route 3 times daily As needed. FENOFIBRATE (TRIGLIDE) 160 MG TABLET    TAKE 1 TABLET BY MOUTH EVERY NIGHT    FUROSEMIDE (LASIX) 40 MG TABLET    TAKE 1 TABLET BY MOUTH EVERY DAY. HOLD UNTIL SEEN BY PCP FOR FOLLOWUP    GABAPENTIN (NEURONTIN) 300 MG CAPSULE    Take by mouth. GLIPIZIDE (GLUCOTROL) 10 MG TABLET    TAKE 1 TABLET BY MOUTH TWICE DAILY BEFORE MEALS    HYDROXYZINE (VISTARIL) 50 MG CAPSULE    Take 1 capsule by mouth 3 times daily as needed for Itching    INSULIN GLARGINE (LANTUS SOLOSTAR) 100 UNIT/ML INJECTION PEN    ADMINISTER 55 UNITS UNDER THE SKIN EVERY NIGHT    INSULIN PEN NEEDLE (B-D UF III MINI PEN NEEDLES) 31G X 5 MM MISC    1 each by Does not apply route daily    INSULIN SYRINGE-NEEDLE U-100 (INSULIN SYRINGE 1CC/31GX5/16\") 31G X 5/16\" 1 ML MISC    Use once daily to administer Lantus    ISOSORBIDE MONONITRATE (IMDUR) 30 MG EXTENDED RELEASE TABLET    Take 1 tablet by mouth daily    LANCETS MISC    Use TID    LISINOPRIL (PRINIVIL;ZESTRIL) 20 MG TABLET    TAKE 2 TABLETS BY MOUTH DAILY    METOPROLOL TARTRATE (LOPRESSOR) 50 MG TABLET    TAKE 1 TABLET BY MOUTH TWICE DAILY    MUPIROCIN (BACTROBAN) 2 % OINTMENT    Apply topically as needed     OMEPRAZOLE (PRILOSEC) 20 MG DELAYED RELEASE CAPSULE    Take 1 capsule by mouth daily    SERTRALINE (ZOLOFT) 100 MG TABLET    Take 1 tablet by mouth daily    SITAGLIPTIN (JANUVIA) 100 MG TABLET    Take 1 tablet by mouth daily    TERBINAFINE (LAMISIL) 1 % CREAM    Apply topically 2 times daily Apply topically 2 times daily.     VITAMIN D (ERGOCALCIFEROL) 1.25 MG (55419 UT) CAPS CAPSULE    Take 50,000 Units by mouth once a week        ALLERGIES     Coreg [carvedilol]    FAMILY HISTORY       Family History   Problem Relation Age of Onset    Heart Disease Mother     Kidney Disease Mother     Hypertension Mother     Diabetes Mother     No Known Problems Sister     No Known Problems Brother     No Known Problems Brother           SOCIAL HISTORY       Social History     Socioeconomic History    Marital status: Legally      Spouse name: None    Number of children: None    Years of education: None    Highest education level: None   Tobacco Use    Smoking status: Former     Packs/day: 1.00     Years: 25.00     Pack years: 25.00     Types: Cigarettes     Quit date: 2008     Years since quittin.9    Smokeless tobacco: Never   Substance and Sexual Activity    Alcohol use: No    Drug use: Never    Sexual activity: Yes     Partners: Female       SCREENINGS         Lynd Coma Scale  Eye Opening: Spontaneous  Best Verbal Response: Oriented  Best Motor Response: Obeys commands  Lynd Coma Scale Score: 15                     CIWA Assessment  BP: (!) 142/68  Heart Rate: 80                 PHYSICAL EXAM    (up to 7 for level 4, 8 or more for level 5)     ED Triage Vitals [22 2218]   BP Temp Temp Source Heart Rate Resp SpO2 Height Weight   (!) 141/79 97.9 °F (36.6 °C) Oral 89 22 (!) 84 % 5' 11\" (1.803 m) (!) 340 lb (154.2 kg)       Physical Exam  Constitutional:       Appearance: He is obese. He is ill-appearing (Chronically). He is not toxic-appearing or diaphoretic. HENT:      Head: Normocephalic and atraumatic. Nose: Nose normal.      Mouth/Throat:      Mouth: Mucous membranes are moist.   Eyes:      General: No scleral icterus. Extraocular Movements: Extraocular movements intact. Pupils: Pupils are equal, round, and reactive to light. Neck:      Vascular: No JVD. Cardiovascular:      Rate and Rhythm: Normal rate and regular rhythm. Pulses: Normal pulses. Heart sounds: No friction rub. Pulmonary:      Effort: No tachypnea or accessory muscle usage. Breath sounds: No stridor. No wheezing. Comments: Diminished possibly secondary to body habitus  Abdominal:      General: There is no distension. Tenderness: There is no abdominal tenderness. Musculoskeletal:      Right lower leg: Edema present.       Left lower leg: Edema present. Comments: Reproducible bilateral intercostal tenderness   Skin:     Capillary Refill: Capillary refill takes less than 2 seconds. Findings: No rash. Neurological:      Mental Status: He is alert and oriented to person, place, and time. Psychiatric:         Mood and Affect: Mood normal.       DIAGNOSTIC RESULTS     EKG: All EKG's are interpreted by the Emergency Department Physician who either signs or Co-signs this chart in the absence of a cardiologist.    Normal sinus rhythm, rate 80, incomplete left bundle branch block, , LVH, nonspecific ST-T wave change, no STEMI. Baseline artifact present. RADIOLOGY:   Non-plain film images such as CT, Ultrasound and MRI are read by the radiologist. Plain radiographic images are visualized and preliminarily interpreted by the emergency physician with the below findings:      Interpretation per the Radiologist below, if available at the time of this note:    XR CHEST PORTABLE   Final Result   No acute process. Cardiomegaly.                ED BEDSIDE ULTRASOUND:   Performed by ED Physician - none    LABS:  Labs Reviewed   CBC WITH AUTO DIFFERENTIAL - Abnormal; Notable for the following components:       Result Value    RBC 4.53 (*)     Hemoglobin 12.9 (*)     Hematocrit 39.2 (*)     MCHC 32.9 (*)     RDW 16.4 (*)     All other components within normal limits   COMPREHENSIVE METABOLIC PANEL W/ REFLEX TO MG FOR LOW K - Abnormal; Notable for the following components:    Glucose 373 (*)     BUN 31 (*)     Creatinine 2.15 (*)     Est, Glom Filt Rate 33.4 (*)     Alkaline Phosphatase 139 (*)     All other components within normal limits   TROPONIN - Abnormal; Notable for the following components:    Troponin 0.102 (*)     All other components within normal limits    Narrative:     Diogenes Ribeiro tel. [phone],  TROP results called to and read back by DR MUNOZ, 11/03/2022 00:02, by [facility]   POCT ARTERIAL - Abnormal; Notable for the following components:    POC Glucose 344 (*)     POC Creatinine 2.1 (*)     Est, Glom Filt Rate 34 (*)     pO2, Arterial 59 (*)     O2 Sat, Arterial 89 (*)     All other components within normal limits   COVID-19, RAPID   CULTURE, BLOOD 1   CULTURE, BLOOD 2   LIPASE   BRAIN NATRIURETIC PEPTIDE    Narrative:     Luz Marina Soares  [facility] tel. [phone],  TROP results called to and read back by DR MUNOZ, 11/03/2022 00:02, by [facility]   PROTIME-INR   APTT   D-DIMER, QUANTITATIVE       All other labs were within normal range or not returned as of this dictation. EMERGENCY DEPARTMENT COURSE and DIFFERENTIAL DIAGNOSIS/MDM:   Vitals:    Vitals:    11/02/22 2218 11/02/22 2346 11/03/22 0000   BP: (!) 141/79  (!) 142/68   Pulse: 89 91 80   Resp: 22 20    Temp: 97.9 °F (36.6 °C)     TempSrc: Oral     SpO2: (!) 84%  94%   Weight: (!) 340 lb (154.2 kg)     Height: 5' 11\" (1.803 m)             MDM  Patient presents to the emergency department with shortness of breath, he does have reproducible atypical pleuritic type discomfort. He denies typical chest pain. Elevated troponin most likely secondary to CKD, CHF, and strain secondary to recent hypoxia. No indication for emergent cardiac catheterization, will trend troponin. He was recently diagnosed with COVID. He is resting comfortably on 5 L nasal cannula. Would probably benefit from hospital nasal cannula. No hypercapnia or acidemia. Will defer anticoagulation to hospitalist as patient cannot tolerate a CTA tonight. REASSESSMENT      Agreeable to admission      CONSULTS:  None    PROCEDURES:  Unless otherwise noted below, none     Procedures      FINAL IMPRESSION      1. Chronic kidney disease, unspecified CKD stage    2. Acute respiratory failure with hypoxia [facility])          DISPOSITION/PLAN   DISPOSITION Decision To Admit 11/02/2022 11:48:41 PM      PATIENT REFERRED TO:  No follow-up provider specified.     DISCHARGE MEDICATIONS:  New Prescriptions    No medications on file     Controlled Substances Monitoring:     No flowsheet data found.     (Please note that portions of this note were completed with a voice recognition program.  Efforts were made to edit the dictations but occasionally words are mis-transcribed.)    Ginny Ignacio MD (electronically signed)  Attending Emergency Physician            Ginny Ignacio MD  11/03/22 0963

## 2022-11-03 NOTE — PROGRESS NOTES
Pt does not know home meds. Wife to bring in list in the am. NP aware.  Electronically signed by Diamond Stevenson RN on 11/3/2022 at 1:57 AM

## 2022-11-04 LAB
ALBUMIN SERPL-MCNC: 3.5 G/DL (ref 3.5–4.6)
ALP BLD-CCNC: 114 U/L (ref 35–104)
ALT SERPL-CCNC: 15 U/L (ref 0–41)
ANION GAP SERPL CALCULATED.3IONS-SCNC: 18 MEQ/L (ref 9–15)
AST SERPL-CCNC: 23 U/L (ref 0–40)
BASOPHILS ABSOLUTE: 0 K/UL (ref 0–0.2)
BASOPHILS RELATIVE PERCENT: 0.4 %
BILIRUB SERPL-MCNC: 0.5 MG/DL (ref 0.2–0.7)
BUN BLDV-MCNC: 48 MG/DL (ref 8–23)
CALCIUM SERPL-MCNC: 9.2 MG/DL (ref 8.5–9.9)
CHLORIDE BLD-SCNC: 103 MEQ/L (ref 95–107)
CO2: 19 MEQ/L (ref 20–31)
CREAT SERPL-MCNC: 2.37 MG/DL (ref 0.7–1.2)
EOSINOPHILS ABSOLUTE: 0 K/UL (ref 0–0.7)
EOSINOPHILS RELATIVE PERCENT: 0.2 %
GFR SERPL CREATININE-BSD FRML MDRD: 29.7 ML/MIN/{1.73_M2}
GLOBULIN: 3.5 G/DL (ref 2.3–3.5)
GLUCOSE BLD-MCNC: 141 MG/DL (ref 70–99)
GLUCOSE BLD-MCNC: 148 MG/DL (ref 70–99)
GLUCOSE BLD-MCNC: 158 MG/DL (ref 70–99)
GLUCOSE BLD-MCNC: 175 MG/DL (ref 70–99)
GLUCOSE BLD-MCNC: 243 MG/DL (ref 70–99)
HCT VFR BLD CALC: 39.9 % (ref 42–52)
HEMOGLOBIN: 13.1 G/DL (ref 14–18)
LACTATE DEHYDROGENASE: 299 U/L (ref 135–225)
LYMPHOCYTES ABSOLUTE: 1.9 K/UL (ref 1–4.8)
LYMPHOCYTES RELATIVE PERCENT: 18.2 %
MAGNESIUM: 2.6 MG/DL (ref 1.7–2.4)
MCH RBC QN AUTO: 28.3 PG (ref 27–31.3)
MCHC RBC AUTO-ENTMCNC: 32.9 % (ref 33–37)
MCV RBC AUTO: 85.9 FL (ref 79–92.2)
MONOCYTES ABSOLUTE: 0.9 K/UL (ref 0.2–0.8)
MONOCYTES RELATIVE PERCENT: 8.4 %
NEUTROPHILS ABSOLUTE: 7.4 K/UL (ref 1.4–6.5)
NEUTROPHILS RELATIVE PERCENT: 72.8 %
PDW BLD-RTO: 16.4 % (ref 11.5–14.5)
PERFORMED ON: ABNORMAL
PLATELET # BLD: 368 K/UL (ref 130–400)
POTASSIUM SERPL-SCNC: 4.2 MEQ/L (ref 3.4–4.9)
RBC # BLD: 4.64 M/UL (ref 4.7–6.1)
SODIUM BLD-SCNC: 140 MEQ/L (ref 135–144)
TOTAL PROTEIN: 7 G/DL (ref 6.3–8)
WBC # BLD: 10.2 K/UL (ref 4.8–10.8)

## 2022-11-04 PROCEDURE — 94660 CPAP INITIATION&MGMT: CPT

## 2022-11-04 PROCEDURE — 99233 SBSQ HOSP IP/OBS HIGH 50: CPT | Performed by: INTERNAL MEDICINE

## 2022-11-04 PROCEDURE — 83735 ASSAY OF MAGNESIUM: CPT

## 2022-11-04 PROCEDURE — 6370000000 HC RX 637 (ALT 250 FOR IP): Performed by: PHYSICIAN ASSISTANT

## 2022-11-04 PROCEDURE — APPSS30 APP SPLIT SHARED TIME 16-30 MINUTES: Performed by: PHYSICIAN ASSISTANT

## 2022-11-04 PROCEDURE — 36415 COLL VENOUS BLD VENIPUNCTURE: CPT

## 2022-11-04 PROCEDURE — 1210000000 HC MED SURG R&B

## 2022-11-04 PROCEDURE — 83615 LACTATE (LD) (LDH) ENZYME: CPT

## 2022-11-04 PROCEDURE — 6370000000 HC RX 637 (ALT 250 FOR IP): Performed by: INTERNAL MEDICINE

## 2022-11-04 PROCEDURE — 99232 SBSQ HOSP IP/OBS MODERATE 35: CPT | Performed by: INTERNAL MEDICINE

## 2022-11-04 PROCEDURE — 99231 SBSQ HOSP IP/OBS SF/LOW 25: CPT | Performed by: INTERNAL MEDICINE

## 2022-11-04 PROCEDURE — 85025 COMPLETE CBC W/AUTO DIFF WBC: CPT

## 2022-11-04 PROCEDURE — 80053 COMPREHEN METABOLIC PANEL: CPT

## 2022-11-04 PROCEDURE — 6360000002 HC RX W HCPCS: Performed by: INTERNAL MEDICINE

## 2022-11-04 PROCEDURE — 2700000000 HC OXYGEN THERAPY PER DAY

## 2022-11-04 PROCEDURE — 2580000003 HC RX 258: Performed by: NURSE PRACTITIONER

## 2022-11-04 PROCEDURE — 6360000002 HC RX W HCPCS: Performed by: NURSE PRACTITIONER

## 2022-11-04 RX ORDER — METOPROLOL TARTRATE 50 MG/1
50 TABLET, FILM COATED ORAL 2 TIMES DAILY
Status: DISCONTINUED | OUTPATIENT
Start: 2022-11-04 | End: 2022-11-04

## 2022-11-04 RX ORDER — HYDRALAZINE HYDROCHLORIDE 25 MG/1
25 TABLET, FILM COATED ORAL EVERY 8 HOURS SCHEDULED
Status: DISCONTINUED | OUTPATIENT
Start: 2022-11-04 | End: 2022-11-07 | Stop reason: HOSPADM

## 2022-11-04 RX ORDER — BENZONATATE 100 MG/1
100 CAPSULE ORAL 3 TIMES DAILY PRN
Status: DISCONTINUED | OUTPATIENT
Start: 2022-11-04 | End: 2022-11-07 | Stop reason: HOSPADM

## 2022-11-04 RX ORDER — METOPROLOL SUCCINATE 25 MG/1
25 TABLET, EXTENDED RELEASE ORAL DAILY
Status: DISCONTINUED | OUTPATIENT
Start: 2022-11-04 | End: 2022-11-07 | Stop reason: HOSPADM

## 2022-11-04 RX ORDER — FUROSEMIDE 40 MG/1
40 TABLET ORAL DAILY
Status: DISCONTINUED | OUTPATIENT
Start: 2022-11-04 | End: 2022-11-07 | Stop reason: HOSPADM

## 2022-11-04 RX ADMIN — INSULIN GLARGINE 20 UNITS: 100 INJECTION, SOLUTION SUBCUTANEOUS at 21:36

## 2022-11-04 RX ADMIN — METOPROLOL TARTRATE 12.5 MG: 25 TABLET, FILM COATED ORAL at 08:55

## 2022-11-04 RX ADMIN — GABAPENTIN 300 MG: 300 CAPSULE ORAL at 21:31

## 2022-11-04 RX ADMIN — FUROSEMIDE 40 MG: 10 INJECTION, SOLUTION INTRAMUSCULAR; INTRAVENOUS at 08:55

## 2022-11-04 RX ADMIN — HYDRALAZINE HYDROCHLORIDE 25 MG: 25 TABLET, FILM COATED ORAL at 21:31

## 2022-11-04 RX ADMIN — SACUBITRIL AND VALSARTAN 1 TABLET: 24; 26 TABLET, FILM COATED ORAL at 21:32

## 2022-11-04 RX ADMIN — SACUBITRIL AND VALSARTAN 1 TABLET: 24; 26 TABLET, FILM COATED ORAL at 15:53

## 2022-11-04 RX ADMIN — HYDRALAZINE HYDROCHLORIDE 25 MG: 25 TABLET, FILM COATED ORAL at 13:35

## 2022-11-04 RX ADMIN — BENZONATATE 100 MG: 100 CAPSULE ORAL at 15:53

## 2022-11-04 RX ADMIN — INSULIN LISPRO 8 UNITS: 100 INJECTION, SOLUTION INTRAVENOUS; SUBCUTANEOUS at 18:17

## 2022-11-04 RX ADMIN — SODIUM CHLORIDE, PRESERVATIVE FREE 10 ML: 5 INJECTION INTRAVENOUS at 21:31

## 2022-11-04 RX ADMIN — METOPROLOL SUCCINATE 25 MG: 25 TABLET, FILM COATED, EXTENDED RELEASE ORAL at 15:49

## 2022-11-04 RX ADMIN — SODIUM CHLORIDE, PRESERVATIVE FREE 10 ML: 5 INJECTION INTRAVENOUS at 08:56

## 2022-11-04 RX ADMIN — ATORVASTATIN CALCIUM 40 MG: 40 TABLET, FILM COATED ORAL at 21:31

## 2022-11-04 RX ADMIN — INSULIN LISPRO 8 UNITS: 100 INJECTION, SOLUTION INTRAVENOUS; SUBCUTANEOUS at 09:06

## 2022-11-04 RX ADMIN — EMPAGLIFLOZIN 10 MG: 10 TABLET, FILM COATED ORAL at 08:55

## 2022-11-04 RX ADMIN — INSULIN GLARGINE 20 UNITS: 100 INJECTION, SOLUTION SUBCUTANEOUS at 09:06

## 2022-11-04 RX ADMIN — ENOXAPARIN SODIUM 150 MG: 150 INJECTION SUBCUTANEOUS at 08:55

## 2022-11-04 RX ADMIN — INSULIN LISPRO 8 UNITS: 100 INJECTION, SOLUTION INTRAVENOUS; SUBCUTANEOUS at 13:35

## 2022-11-04 RX ADMIN — ASPIRIN 81 MG: 81 TABLET, CHEWABLE ORAL at 08:55

## 2022-11-04 RX ADMIN — ENOXAPARIN SODIUM 150 MG: 150 INJECTION SUBCUTANEOUS at 21:31

## 2022-11-04 ASSESSMENT — ENCOUNTER SYMPTOMS
VOMITING: 0
COLOR CHANGE: 0
ABDOMINAL PAIN: 0
NAUSEA: 0
CHEST TIGHTNESS: 0
COUGH: 1
SHORTNESS OF BREATH: 1
GASTROINTESTINAL NEGATIVE: 1

## 2022-11-04 NOTE — PROGRESS NOTES
INPATIENT PROGRESS NOTES    PATIENT NAME: Yaakov Opitz Curet  MRN: [id number]  SERVICE DATE:  2022   SERVICE TIME:  1:54 PM      PRIMARY SERVICE: Pulmonary Disease    CHIEF COMPLAINTS: Shortness of breath    INTERVAL HPI: Patient seen and examined at bedside, Interval Notes, orders reviewed. Nursing notes noted  Patient is Latvian-speaking only, online interpretation service used  Patient report continued to have dyspnea on exertion, however in general improved, minimal cough, no hemoptysis, no chest pain, no fever no chills    Review of system:     GI Abdominal pain No  Skin Rash No    Social History     Tobacco Use    Smoking status: Former     Packs/day: 1.00     Years: 25.00     Pack years: 25.00     Types: Cigarettes     Quit date: 2008     Years since quittin.9    Smokeless tobacco: Never   Substance Use Topics    Alcohol use: No         Problem Relation Age of Onset    Heart Disease Mother     Kidney Disease Mother     Hypertension Mother     Diabetes Mother     No Known Problems Sister     No Known Problems Brother     No Known Problems Brother          OBJECTIVE    Body mass index is 44.71 kg/m². PHYSICAL EXAM:  Vitals:  BP (!) 163/86   Pulse 80   Temp 97.9 °F (36.6 °C) (Oral)   Resp 20   Ht 5' 11\" (1.803 m)   Wt (!) 320 lb 9.6 oz (145.4 kg)   SpO2 95%   BMI 44.71 kg/m²     General: alert, cooperative, no distress  Head: normocephalic, atraumatic  Eyes:No gross abnormalities. ENT:  MMM no lesions  Neck:  supple and no masses  Chest : clear to auscultation bilaterally- no wheezes, rales or rhonchi, normal air movement, no respiratory distress  Heart[de-identified] Heart sounds are normal.  Regular rate and rhythm without murmur, gallop or rub. ABD:  symmetric, soft, non-tender  Musculoskeletal : no cyanosis, no clubbing, and no edema  Neuro:  Grossly normal  Skin: No rashes or nodules noted.   Lymph node:  no cervical nodes  Urology: No Bob   Psychiatric: appropriate    DATA:   Recent Labs     11/03/22  0448 11/04/22  0446   WBC 10.0 10.2   HGB 13.6* 13.1*   HCT 41.7* 39.9*   MCV 87.4 85.9    368     Recent Labs     11/03/22  0448 11/04/22  0446    140   K 5.0* 4.2    103   CO2 22 19*   BUN 32* 48*   CREATININE 2.28* 2.37*   GLUCOSE 284* 141*   CALCIUM 9.4 9.2   PROT 7.6 7.0   LABALBU 3.8 3.5   BILITOT 0.5 0.5   ALKPHOS 134* 114*   AST 17 23   ALT 17 15   LABGLOM 31.1* 29.7*   GLOB 3.8* 3.5       MV Settings:          Recent Labs     11/02/22  2339   PHART 7.363   HMO6YJP 40   PO2ART 59*   YAI9BVS 22.5   BEART -3   [id number] 89*       O2 Device: Nasal cannula  O2 Flow Rate (L/min): 5 L/min    ADULT DIET; Regular; 4 carb choices (60 gm/meal)     MEDICATIONS during current hospitalization:    Continuous Infusions:   dextrose      sodium chloride         Scheduled Meds:   metoprolol tartrate  50 mg Oral BID    hydrALAZINE  25 mg Oral 3 times per day    enoxaparin  1 mg/kg SubCUTAneous BID    sodium chloride flush  5-40 mL IntraVENous 2 times per day    furosemide  40 mg IntraVENous Daily    empagliflozin  10 mg Oral Daily    atorvastatin  40 mg Oral Nightly    aspirin  81 mg Oral Daily    insulin lispro  0-16 Units SubCUTAneous 4x Daily AC & HS    insulin glargine  20 Units SubCUTAneous BID    insulin lispro  8 Units SubCUTAneous TID WC    gabapentin  300 mg Oral Nightly       PRN Meds:benzonatate, glucose, dextrose bolus **OR** dextrose bolus, glucagon (rDNA), dextrose, sodium chloride flush, sodium chloride, ondansetron **OR** ondansetron, polyethylene glycol, acetaminophen **OR** acetaminophen, technetium tetrofosmin, technetium tetrofosmin, sodium chloride flush, hydrOXYzine pamoate    Radiology  CT CHEST WO CONTRAST    Result Date: 11/3/2022  EXAMINATION: CT OF THE CHEST WITHOUT CONTRAST 11/3/2022 12:13 pm TECHNIQUE: CT of the chest was performed without the administration of intravenous contrast. Multiplanar reformatted images are provided for review.  Automated exposure control, iterative reconstruction, and/or weight based adjustment of the mA/kV was utilized to reduce the radiation dose to as low as reasonably achievable. COMPARISON: Chest x-ray 11/02/2022 HISTORY: ORDERING SYSTEM PROVIDED HISTORY: Abnormal chest x-ray TECHNOLOGIST PROVIDED HISTORY: Reason for exam:->Abnormal chest x-ray What reading provider will be dictating this exam?->CRC FINDINGS: Mediastinum: Cardiomegaly. No pericardial effusion. Pacemaker wires. Atherosclerotic disease. No thoracic aortic aneurysm. Enlarged pulmonary trunk measuring roughly 4 cm, however motion artifact limits evaluation. Subcentimeter short axis lymph nodes. Unremarkable visualized thyroid and esophagus. Lungs/pleura: No pleural effusion or pneumothorax. Low lung volumes. Expiratory phase appearance of the trachea. Mild bilateral bronchial wall thickening. Bilateral perihilar mild ground-glass opacity. Linear bands of atelectasis versus scarring in both lungs. No solid pulmonary consolidation. Upper Abdomen: Atherosclerotic disease. Left renal scarring/atrophy, partially imaged. Soft Tissues/Bones: Degenerative changes of the spine. DISH. Degenerative changes of the shoulders, partially imaged. Minimal pulmonary vascular congestion. Cardiomegaly. Enlarged pulmonary trunk is suggestive of pulmonary arterial hypertension. Atherosclerotic disease. Pacemaker. NM LUNG VENT/PERFUSION (VQ)    Result Date: 11/3/2022  EXAMINATION: NUCLEAR MEDICINE VENTILATION PERFUSION SCAN. 11/3/2022 TECHNIQUE: 1 millicuries aerosolized Tc99m DTPA was administered via mask prior to planar imaging of the lungs in multiple projections. Then, 5.3 millicuries of Tc 28K MAA was administered intravenously prior to planar imaging of the lungs in similar projections. COMPARISON: Chest radiograph 11/02/2022.  HISTORY: ORDERING SYSTEM PROVIDED HISTORY: R/O PE TECHNOLOGIST PROVIDED HISTORY: Reason for exam:->R/O PE What reading provider will be dictating this exam?->CRC FINDINGS: VENTILATION: Heterogeneous distribution of radiopharmaceutical is seen bilaterally. Aggregation of radiopharmaceutical is seen centrally likely secondary to airway turbulence. Diminished activity is seen at the left base. PERFUSION: Heterogeneity of perfusion is demonstrated. There is diminished activity within the lateral right mid to lower lung zone, Siddhartha proportion to the ventilation pattern. There is also diminished activity in the right upper to mid lung zone out of proportion to ventilation. Decreased activity is seen at the lateral aspect of the left upper lung zone out of proportion to ventilation. High probability for pulmonary embolism. Findings were discussed with Dr. Oanh Alas at approximately 1:29 p.m. on 11/03/2022     XR CHEST PORTABLE    Result Date: 11/2/2022  EXAMINATION: ONE XRAY VIEW OF THE CHEST 11/2/2022 10:47 pm COMPARISON: None. HISTORY: ORDERING SYSTEM PROVIDED HISTORY: SOB TECHNOLOGIST PROVIDED HISTORY: Reason for exam:->SOB What reading provider will be dictating this exam?->CRC FINDINGS: The lungs are without acute focal process. There is no effusion or pneumothorax. Cardiomegaly. Left-sided transvenous pacer device. The osseous structures are without acute process. Degenerative changes left glenohumeral joint. No acute process. Cardiomegaly. XR CHEST PORTABLE    Result Date: 10/13/2022  EXAMINATION: ONE XRAY VIEW OF THE CHEST 10/13/2022 7:33 pm COMPARISON: 07/31/2021 HISTORY: ORDERING SYSTEM PROVIDED HISTORY: SOB TECHNOLOGIST PROVIDED HISTORY: Reason for exam:->SOB What reading provider will be dictating this exam?->CRC FINDINGS: The lungs are without acute focal process. There is no effusion or pneumothorax. Cardiomegaly. The osseous structures are without acute process. Left-sided transvenous pacer device. No acute process. Cardiomegaly.              IMPRESSION AND SUGGESTION:  Patient is at risk due to   Acute provoked PE, due to COVID-19 infection  LON  Abnormal CT, nodule versus infiltrate right lower lobe  Obesity    Recommendation  Continue Lovenox, consider transition to Eliquis  Patient will need 6-month  CPAP while asleep  Watch volume status, maintain euvolemic  Will need follow-up CT chest in 6 to 8 weeks to confirm resolution of abnormality in the right lower lobe        Electronically signed by Kristy Mandujano MD,  [facility]   on 11/4/2022 at 1:54 PM

## 2022-11-04 NOTE — PROGRESS NOTES
Infectious Disease     Patient Name: Haroon Mayer  Date: 11/4/2022  YOB: 1960  Medical Record Number: [id number]      History of COVID  Hypoxia    Presented 11/3/2021 shortness of breath with hypoxia increasing shortness of breath for 3 days especially with exertion cough nonproductive no blood  Diagnosed with COVID-19 on 10/13/2022    Normal CBC  COVID-19 + 10/13/2022 COVID-19 - 11/2/2022  Elevated creatinine elevated alk phos  D-dimer elevated at 1.96      EXAMINATION:   ONE XRAY VIEW OF THE CHEST       11/2/2022 10:47 pm       COMPARISON:   None. HISTORY:   ORDERING SYSTEM PROVIDED HISTORY: SOB   TECHNOLOGIST PROVIDED HISTORY:   Reason for exam:->SOB   What reading provider will be dictating this exam?->CRC       FINDINGS:   The lungs are without acute focal process. There is no effusion or   pneumothorax. Cardiomegaly. Left-sided transvenous pacer device. The   osseous structures are without acute process. Degenerative changes left   glenohumeral joint. Impression   No acute process. Cardiomegaly. 93% saturation on 10 L nasal cannula    Review of Systems   Constitutional:  Negative for chills, diaphoresis, fatigue and fever. Respiratory:  Positive for cough and shortness of breath. Cardiovascular: Negative. Gastrointestinal: Negative. Physical Exam  Cardiovascular:      Heart sounds: Normal heart sounds. No murmur heard. Pulmonary:      Effort: Pulmonary effort is normal. No respiratory distress. Breath sounds: Normal breath sounds. No stridor. No wheezing or rales. Abdominal:      General: Abdomen is flat. Bowel sounds are normal. There is no distension. Palpations: There is no mass. Tenderness: There is no abdominal tenderness. There is no guarding. Blood pressure (!) 163/86, pulse 80, temperature 97.9 °F (36.6 °C), temperature source Oral, resp.  rate 20, height 5' 11\" (1.803 m), weight (!) 320 lb 9.6 oz (145.4 kg), SpO2 95 %.      .   Lab Results   Component Value Date    WBC 10.2 11/04/2022    HGB 13.1 (L) 11/04/2022    HCT 39.9 (L) 11/04/2022    MCV 85.9 11/04/2022     11/04/2022     Lab Results   Component Value Date/Time     11/04/2022 04:46 AM    K 4.2 11/04/2022 04:46 AM    K 4.2 11/02/2022 11:00 PM     11/04/2022 04:46 AM    CO2 19 11/04/2022 04:46 AM    BUN 48 11/04/2022 04:46 AM    CREATININE 2.37 11/04/2022 04:46 AM    GLUCOSE 141 11/04/2022 04:46 AM    GLUCOSE 140 04/17/2020 09:49 AM    CALCIUM 9.2 11/04/2022 04:46 AM                PLAN:  History of COVID  Hypoxia    No evidence of pneumonia  No antibiotics no antivirals  VQ scan positive high probability PE

## 2022-11-04 NOTE — CONSULTS
[address]                      [address] [facility], [address] [facility]                                  CONSULTATION    PATIENT NAME: Stevie Espinal                 :        1960  MED REC NO:   [id number]                            ROOM:       [id number]  ACCOUNT NO:   [de-identified]                           ADMIT DATE: 2022  PROVIDER:     Saida Sahu MD    CONSULT DATE:  2022    ENDOCRINE CONSULTATION    REFERRING PROVIDER:  Jose Oliveros DO    REASON FOR CONSULTATION:  Uncontrolled diabetes worsened with steroids. Chief complaint and history of present illness: The patient is a  68-year-old male with known history of diabetes, morbid obesity,  admitted with shortness of breath, hypoxia, recently had COVID 2 weeks  ago. Denies any orthopnea. Initial admitting diagnosis was respiratory  failure with hypoxia, elevated troponin, morbid obesity. The patient  was started with 6 mg of Decadron, one dose. Also was put on Jardiance  and Humalog coverage plus low dose of Lantus 10 units twice a day. Blood sugars have been staying between 200-300 range. According to the  patient, he has had diabetes for close to 10 years or so. Hemoglobin  A1c was elevated at 10.7. Prior A1c is between 8-9 range. Chemistries were reviewed. Sodium was 139, potassium 5.0, chloride 102,  CO2 of 22, BUN 32, creatinine 2.28. PAST MEDICAL HISTORY:  Significant for type 2 diabetes, morbid obesity,  history of acute kidney injury, heart failure, ICD placement, left  ureteral catheter, morbid obesity, sleep apnea. PAST SURGICAL HISTORY:  Cardiac defibrillator placement, cystoscopy,  lithotripsy, ureter surgery. FAMILY HISTORY:  Diabetes, hypertension, kidney disease, heart disease. PERSONAL AND SOCIAL HISTORY:  Former smoker. Denies any substance abuse  or alcohol.     MEDICATIONS:  Here include Decadron 6 mg IV, Lipitor, Lovenox, Lasix,  gabapentin, Lantus Humalog coverage, Lopressor. ALLERGIES:  Jacklynn Gobble. REVIEW OF SYSTEMS:  Other than shortness of breath, 14-point review of  systems was negative. PHYSICAL EXAMINATION:  GENERAL:  The patient is alert, awake, oriented x3 in no obvious  distress. VITAL SIGNS:  Blood pressure was 149/79, pulse rate was 92, respiratory  rate 18, temperature 98. HEENT:  Normocephalic, atraumatic. Pupils equal and reactive to light. Oral mucosa was moist.  NECK:  Supple. CHEST:  Lungs were showing scattered baseline crackles. CARDIOVASCULAR:  Heart sounds were normal.  No murmurs or thrills were  present. ABDOMEN:  Soft, significantly obese. Bowel sounds are present. No  organomegaly or tenderness. EXTREMITIES:  Lower extremities reveal trace edema, chronic changes  bilateral lower legs. MUSCULOSKELETAL:  No joint swelling. NEUROLOGIC:  Cranial nerves I through XII were intact. SKIN INTACT NO RASHES/BREAKS   PSYCHIATRIC:  Normal affect, cognition. LABORATORY DATA:  As above. ASSESSMENT:  Uncontrolled diabetes worsened due to steroids, morbid  obesity, recent COVID infection, history of chronic kidney disease. PLAN:  Increase Lantus to 20 units twice daily, Humalog 8 units with  each meals. Continue Humalog coverage. A1c goal of 7 or lower. Adjust  dose of insulin to keep sugars between 140-200. Avoid hypoglycemia. Total time spent was 50 minutes. Thank you for the consult.         Uma Umanzor MD    D: 11/03/2022 22:45:59       T: 11/03/2022 22:48:29     [email or web address]  Job#: [id number]     Doc#: [id number]    CC:

## 2022-11-04 NOTE — PROGRESS NOTES
Progress Note  Date:2022       DVRZ:[id number]  Patient Name:oJsr Lemus     YOB: 1960     Age:62 y.o. Subjective      Feeling much better overnight. O2 down to 5L from 12L yesterday. High suspicion for acute PE on VQ scan 11/3, already on full dose [facility]  Patient feeling significantly better since previous day. Objective         Vitals Last 24 Hours:  TEMPERATURE:  Temp  Av.9 °F (36.6 °C)  Min: 97.9 °F (36.6 °C)  Max: 97.9 °F (36.6 °C)  RESPIRATIONS RANGE: Resp  Av.3  Min: 18  Max: 20  PULSE OXIMETRY RANGE: SpO2  Av %  Min: 94 %  Max: 100 %  PULSE RANGE: Pulse  Av.6  Min: 80  Max: 92  BLOOD PRESSURE RANGE: Systolic (35OXU), NBB:963 , Min:149 , AWE:971   ; Diastolic (73IPX), EIS:96, Min:79, Max:86    I/O (24Hr): Intake/Output Summary (Last 24 hours) at 2022 1402  Last data filed at 2022 1048  Gross per 24 hour   Intake 360 ml   Output 1250 ml   Net -890 ml     Objective  Constitutional: Obese adult male sitting up in bedside chair in no acute distress  Head: NCAT  Eyes: PERRLA, EOMI  Neck: Trachea midline, phonation normal (in Slovak, via )  Cardiovascular: RRR. No significant murmurs appreciated. Warm and well perfused peripherally. Pulmonary: Normal rate and effort of respiration on O2 by NC. Lung sounds distant secondary to body habitus. No coughing during exam.  Abdomen: Moderately obese, soft, nontense. Hypoactive bowel sounds. Neurologic: Alert, grossly oriented.       Labs/Imaging/Diagnostics    Labs:  CBC:  Recent Labs     22  2245 22  2339 22  0448 22  0446   WBC 8.7  --  10.0 10.2   RBC 4.53*  --  4.77 4.64*   HGB 12.9* 15.0 13.6* 13.1*   HCT 39.2*  --  41.7* 39.9*   MCV 86.6  --  87.4 85.9   RDW 16.4*  --  16.4* 16.4*     --  360 368     CHEMISTRIES:  Recent Labs     22  23022  2339 22  0448 22  0446     --  139 140   K 4.2  --  5.0* 4.2     --  102 103   CO2 27  --  22 19*   BUN 31*  --  32* 48*   CREATININE 2.15* 2.1* 2.28* 2.37*   GLUCOSE 373*  --  284* 141*   MG  --   --  2.8* 2.6*     PT/INR:  Recent Labs     11/02/22  2245   PROTIME 13.7   INR 1.0     APTT:  Recent Labs     11/02/22 2245   APTT 38.2*     LIVER PROFILE:  Recent Labs     11/02/22  2300 11/03/22  0448 11/04/22  0446   AST 19 17 23   ALT 18 17 15   BILITOT 0.4 0.5 0.5   ALKPHOS 139* 134* 114*       Imaging Last 24 Hours:    CT CHEST WO CONTRAST  Result Date: 11/3/2022  EXAMINATION: CT OF THE CHEST WITHOUT CONTRAST 11/3/2022 12:13 pm TECHNIQUE: CT of the chest was performed without the administration of intravenous contrast. Multiplanar reformatted images are provided for review. Automated exposure control, iterative reconstruction, and/or weight based adjustment of the mA/kV was utilized to reduce the radiation dose to as low as reasonably achievable. COMPARISON: Chest x-ray 11/02/2022 HISTORY: ORDERING SYSTEM PROVIDED HISTORY: Abnormal chest x-ray TECHNOLOGIST PROVIDED HISTORY: Reason for exam:->Abnormal chest x-ray What reading provider will be dictating this exam?->CRC FINDINGS: Mediastinum: Cardiomegaly. No pericardial effusion. Pacemaker wires. Atherosclerotic disease. No thoracic aortic aneurysm. Enlarged pulmonary trunk measuring roughly 4 cm, however motion artifact limits evaluation. Subcentimeter short axis lymph nodes. Unremarkable visualized thyroid and esophagus. Lungs/pleura: No pleural effusion or pneumothorax. Low lung volumes. Expiratory phase appearance of the trachea. Mild bilateral bronchial wall thickening. Bilateral perihilar mild ground-glass opacity. Linear bands of atelectasis versus scarring in both lungs. No solid pulmonary consolidation. Upper Abdomen: Atherosclerotic disease. Left renal scarring/atrophy, partially imaged. Soft Tissues/Bones: Degenerative changes of the spine. DISH. Degenerative changes of the shoulders, partially imaged.    Minimal pulmonary vascular congestion. Cardiomegaly. Enlarged pulmonary trunk is suggestive of pulmonary arterial hypertension. Atherosclerotic disease. Pacemaker. NM LUNG VENT/PERFUSION (VQ)  Result Date: 11/3/2022  EXAMINATION: NUCLEAR MEDICINE VENTILATION PERFUSION SCAN. 11/3/2022 TECHNIQUE: 1 millicuries aerosolized Tc99m DTPA was administered via mask prior to planar imaging of the lungs in multiple projections. Then, 5.3 millicuries of Tc 22S MAA was administered intravenously prior to planar imaging of the lungs in similar projections. COMPARISON: Chest radiograph 11/02/2022. HISTORY: ORDERING SYSTEM PROVIDED HISTORY: R/O PE TECHNOLOGIST PROVIDED HISTORY: Reason for exam:->R/O PE What reading provider will be dictating this exam?->CRC FINDINGS: VENTILATION: Heterogeneous distribution of radiopharmaceutical is seen bilaterally. Aggregation of radiopharmaceutical is seen centrally likely secondary to airway turbulence. Diminished activity is seen at the left base. PERFUSION: Heterogeneity of perfusion is demonstrated. There is diminished activity within the lateral right mid to lower lung zone, Siddhartha proportion to the ventilation pattern. There is also diminished activity in the right upper to mid lung zone out of proportion to ventilation. Decreased activity is seen at the lateral aspect of the left upper lung zone out of proportion to ventilation. High probability for pulmonary embolism. Findings were discussed with Dr. Yann Londono at approximately 1:29 p.m. on 11/03/2022     XR CHEST PORTABLE  Result Date: 11/2/2022  EXAMINATION: ONE XRAY VIEW OF THE CHEST 11/2/2022 10:47 pm COMPARISON: None. HISTORY: ORDERING SYSTEM PROVIDED HISTORY: SOB TECHNOLOGIST PROVIDED HISTORY: Reason for exam:->SOB What reading provider will be dictating this exam?->CRC FINDINGS: The lungs are without acute focal process. There is no effusion or pneumothorax. Cardiomegaly. Left-sided transvenous pacer device.   The osseous structures are without acute process. Degenerative changes left glenohumeral joint. No acute process. Cardiomegaly. Assessment//Plan           Hospital Problems             Last Modified POA    * (Principal) Acute respiratory failure with hypoxia (HCC) 11/3/2022 Yes    SOB (shortness of breath) 11/3/2022 Yes    History of COVID-19 11/3/2022 Yes    Hypoxia 11/3/2022 Yes     Assessment & Plan    Acute Problems:  Post-COVID syndrome  Acute respiratory failure with hypoxia  Elevated troponin on admission  Likely acute PE for V/Q scan  Acute on chronic CHF with cardiomyopathy and hx AICD  DM2    Chronic Problems:  CKD  HTN  HLD  Depression  Morbid obesity  PAD    Plan:  Continue O2 PRN for hypoxia  Full dose Lovenox anticoagulation for high suspicion of acute PE on VQ scan. Diuresis per cardiology  Diabetic management per Endocrinology  Strict I/Os  CPAP while sleeping, per Pulm  Patient trending improvement. Continue other interventions with no significant changes at present.       Electronically signed by Fer Cruz DO on 11/4/22 at 2:02 PM EDT

## 2022-11-05 LAB
ALBUMIN SERPL-MCNC: 3.4 G/DL (ref 3.5–4.6)
ALP BLD-CCNC: 116 U/L (ref 35–104)
ALT SERPL-CCNC: 16 U/L (ref 0–41)
ANION GAP SERPL CALCULATED.3IONS-SCNC: 16 MEQ/L (ref 9–15)
AST SERPL-CCNC: 22 U/L (ref 0–40)
BASOPHILS ABSOLUTE: 0.1 K/UL (ref 0–0.2)
BASOPHILS RELATIVE PERCENT: 1.2 %
BILIRUB SERPL-MCNC: 0.4 MG/DL (ref 0.2–0.7)
BUN BLDV-MCNC: 61 MG/DL (ref 8–23)
CALCIUM SERPL-MCNC: 8.9 MG/DL (ref 8.5–9.9)
CHLORIDE BLD-SCNC: 102 MEQ/L (ref 95–107)
CO2: 20 MEQ/L (ref 20–31)
CREAT SERPL-MCNC: 2.56 MG/DL (ref 0.7–1.2)
EOSINOPHILS ABSOLUTE: 0.3 K/UL (ref 0–0.7)
EOSINOPHILS RELATIVE PERCENT: 2.8 %
GFR SERPL CREATININE-BSD FRML MDRD: 27.1 ML/MIN/{1.73_M2}
GLOBULIN: 3.5 G/DL (ref 2.3–3.5)
GLUCOSE BLD-MCNC: 139 MG/DL (ref 70–99)
GLUCOSE BLD-MCNC: 139 MG/DL (ref 70–99)
GLUCOSE BLD-MCNC: 154 MG/DL (ref 70–99)
GLUCOSE BLD-MCNC: 176 MG/DL (ref 70–99)
GLUCOSE BLD-MCNC: 195 MG/DL (ref 70–99)
GLUCOSE BLD-MCNC: 204 MG/DL (ref 70–99)
HCT VFR BLD CALC: 41.2 % (ref 42–52)
HEMOGLOBIN: 13.3 G/DL (ref 14–18)
LACTATE DEHYDROGENASE: 218 U/L (ref 135–225)
LYMPHOCYTES ABSOLUTE: 3.1 K/UL (ref 1–4.8)
LYMPHOCYTES RELATIVE PERCENT: 27.8 %
MAGNESIUM: 2.6 MG/DL (ref 1.7–2.4)
MCH RBC QN AUTO: 27.8 PG (ref 27–31.3)
MCHC RBC AUTO-ENTMCNC: 32.2 % (ref 33–37)
MCV RBC AUTO: 86.6 FL (ref 79–92.2)
MONOCYTES ABSOLUTE: 1.1 K/UL (ref 0.2–0.8)
MONOCYTES RELATIVE PERCENT: 9.9 %
NEUTROPHILS ABSOLUTE: 6.4 K/UL (ref 1.4–6.5)
NEUTROPHILS RELATIVE PERCENT: 58.3 %
PDW BLD-RTO: 16.5 % (ref 11.5–14.5)
PERFORMED ON: ABNORMAL
PLATELET # BLD: 365 K/UL (ref 130–400)
POTASSIUM SERPL-SCNC: 4.3 MEQ/L (ref 3.4–4.9)
RBC # BLD: 4.76 M/UL (ref 4.7–6.1)
SODIUM BLD-SCNC: 138 MEQ/L (ref 135–144)
TOTAL PROTEIN: 6.9 G/DL (ref 6.3–8)
WBC # BLD: 11.1 K/UL (ref 4.8–10.8)

## 2022-11-05 PROCEDURE — 2580000003 HC RX 258: Performed by: NURSE PRACTITIONER

## 2022-11-05 PROCEDURE — 94660 CPAP INITIATION&MGMT: CPT

## 2022-11-05 PROCEDURE — 83615 LACTATE (LD) (LDH) ENZYME: CPT

## 2022-11-05 PROCEDURE — 6370000000 HC RX 637 (ALT 250 FOR IP): Performed by: INTERNAL MEDICINE

## 2022-11-05 PROCEDURE — 99232 SBSQ HOSP IP/OBS MODERATE 35: CPT | Performed by: INTERNAL MEDICINE

## 2022-11-05 PROCEDURE — 80053 COMPREHEN METABOLIC PANEL: CPT

## 2022-11-05 PROCEDURE — 6370000000 HC RX 637 (ALT 250 FOR IP): Performed by: PHYSICIAN ASSISTANT

## 2022-11-05 PROCEDURE — 1210000000 HC MED SURG R&B

## 2022-11-05 PROCEDURE — 83735 ASSAY OF MAGNESIUM: CPT

## 2022-11-05 PROCEDURE — 6360000002 HC RX W HCPCS: Performed by: NURSE PRACTITIONER

## 2022-11-05 PROCEDURE — 85025 COMPLETE CBC W/AUTO DIFF WBC: CPT

## 2022-11-05 PROCEDURE — 2700000000 HC OXYGEN THERAPY PER DAY

## 2022-11-05 PROCEDURE — 36415 COLL VENOUS BLD VENIPUNCTURE: CPT

## 2022-11-05 RX ADMIN — HYDRALAZINE HYDROCHLORIDE 25 MG: 25 TABLET, FILM COATED ORAL at 07:00

## 2022-11-05 RX ADMIN — ASPIRIN 81 MG: 81 TABLET, CHEWABLE ORAL at 08:51

## 2022-11-05 RX ADMIN — SACUBITRIL AND VALSARTAN 1 TABLET: 24; 26 TABLET, FILM COATED ORAL at 08:51

## 2022-11-05 RX ADMIN — GABAPENTIN 300 MG: 300 CAPSULE ORAL at 21:21

## 2022-11-05 RX ADMIN — SODIUM CHLORIDE, PRESERVATIVE FREE 5 ML: 5 INJECTION INTRAVENOUS at 08:52

## 2022-11-05 RX ADMIN — INSULIN LISPRO 8 UNITS: 100 INJECTION, SOLUTION INTRAVENOUS; SUBCUTANEOUS at 08:56

## 2022-11-05 RX ADMIN — INSULIN GLARGINE 20 UNITS: 100 INJECTION, SOLUTION SUBCUTANEOUS at 08:56

## 2022-11-05 RX ADMIN — INSULIN LISPRO 8 UNITS: 100 INJECTION, SOLUTION INTRAVENOUS; SUBCUTANEOUS at 12:07

## 2022-11-05 RX ADMIN — SACUBITRIL AND VALSARTAN 1 TABLET: 24; 26 TABLET, FILM COATED ORAL at 21:21

## 2022-11-05 RX ADMIN — ENOXAPARIN SODIUM 150 MG: 150 INJECTION SUBCUTANEOUS at 21:21

## 2022-11-05 RX ADMIN — ATORVASTATIN CALCIUM 40 MG: 40 TABLET, FILM COATED ORAL at 21:21

## 2022-11-05 RX ADMIN — INSULIN GLARGINE 20 UNITS: 100 INJECTION, SOLUTION SUBCUTANEOUS at 21:22

## 2022-11-05 RX ADMIN — SODIUM CHLORIDE, PRESERVATIVE FREE 10 ML: 5 INJECTION INTRAVENOUS at 21:22

## 2022-11-05 RX ADMIN — EMPAGLIFLOZIN 10 MG: 10 TABLET, FILM COATED ORAL at 08:52

## 2022-11-05 RX ADMIN — ENOXAPARIN SODIUM 150 MG: 150 INJECTION SUBCUTANEOUS at 08:52

## 2022-11-05 RX ADMIN — FUROSEMIDE 40 MG: 40 TABLET ORAL at 08:51

## 2022-11-05 RX ADMIN — INSULIN LISPRO 8 UNITS: 100 INJECTION, SOLUTION INTRAVENOUS; SUBCUTANEOUS at 17:25

## 2022-11-05 RX ADMIN — INSULIN LISPRO 4 UNITS: 100 INJECTION, SOLUTION INTRAVENOUS; SUBCUTANEOUS at 12:07

## 2022-11-05 RX ADMIN — METOPROLOL SUCCINATE 25 MG: 25 TABLET, FILM COATED, EXTENDED RELEASE ORAL at 08:52

## 2022-11-05 RX ADMIN — HYDRALAZINE HYDROCHLORIDE 25 MG: 25 TABLET, FILM COATED ORAL at 15:13

## 2022-11-05 RX ADMIN — HYDRALAZINE HYDROCHLORIDE 25 MG: 25 TABLET, FILM COATED ORAL at 21:21

## 2022-11-05 ASSESSMENT — ENCOUNTER SYMPTOMS
DIARRHEA: 0
COUGH: 0
VOMITING: 0
NAUSEA: 0
SHORTNESS OF BREATH: 1
SHORTNESS OF BREATH: 0

## 2022-11-05 NOTE — CARE COORDINATION
D/c plan is for the patient to go home [facility], freedom of choice provided and will need a home O2 eval. Patient currently on 4L O2. Plans to d/c home tomorrow or Monday once medically cleared for d/c. CM to follow for any new d/c needs, home O2 eval, and changes to the d/c plan.

## 2022-11-05 NOTE — PROGRESS NOTES
Progress Note  Date:2022       YLWL:[id number]/[id number]  Patient Name:Josr Lemus     YOB: 1960     Age:62 y.o. Chief complaint uncontrolled type 2 diabetes    Subjective    Subjective:  Symptoms:  Stable. He reports shortness of breath. Diet:  Adequate intake. No nausea or vomiting. Activity level: Impaired due to weakness. Pain:  He reports no pain. Review of Systems   Respiratory:  Positive for shortness of breath. Gastrointestinal:  Negative for nausea and vomiting. All other systems reviewed and are negative. Objective         Vitals Last 24 Hours:  TEMPERATURE:  Temp  Av.1 °F (36.7 °C)  Min: 97.5 °F (36.4 °C)  Max: 98.6 °F (37 °C)  RESPIRATIONS RANGE: Resp  Av.3  Min: 19  Max: 22  PULSE OXIMETRY RANGE: SpO2  Av.8 %  Min: 94 %  Max: 100 %  PULSE RANGE: Pulse  Av  Min: 82  Max: 89  BLOOD PRESSURE RANGE: Systolic (35FHG), ZEJ:348 , Min:123 , SITA:294   ; Diastolic (80PFM), QXS:14, Min:69, Max:76    I/O (24Hr): Intake/Output Summary (Last 24 hours) at 2022 0945  Last data filed at 2022 1048  Gross per 24 hour   Intake 360 ml   Output 500 ml   Net -140 ml     Objective:  General Appearance:  Ill-appearing. Vital signs: (most recent): Blood pressure 123/69, pulse 88, temperature 97.5 °F (36.4 °C), temperature source Oral, resp. rate 20, height 5' 11\" (1.803 m), weight (!) 320 lb 9.6 oz (145.4 kg), SpO2 94 %. Vital signs are normal.    HEENT: Normal HEENT exam.    Lungs:  Increased effort. Heart: Normal rate. Abdomen: Abdomen is soft. Extremities: Normal range of motion. Neurological: Patient is alert. Skin:  No rash.    Labs/Imaging/Diagnostics    Labs:  CBC:  Recent Labs     22  0448 22  0446 22  0449   WBC 10.0 10.2 11.1*   RBC 4.77 4.64* 4.76   HGB 13.6* 13.1* 13.3*   HCT 41.7* 39.9* 41.2*   MCV 87.4 85.9 86.6   RDW 16.4* 16.4* 16.5*    368 365     CHEMISTRIES:  Recent Labs     22  044 11/04/22 0446 11/05/22 0449    140 138   K 5.0* 4.2 4.3    103 102   CO2 22 19* 20   BUN 32* 48* 61*   CREATININE 2.28* 2.37* 2.56*   GLUCOSE 284* 141* 176*   MG 2.8* 2.6* 2.6*     PT/INR:  Recent Labs     11/02/22 2245   PROTIME 13.7   INR 1.0     APTT:  Recent Labs     11/02/22 2245   APTT 38.2*     LIVER PROFILE:  Recent Labs     11/03/22 0448 11/04/22 0446 11/05/22 0449   AST 17 23 22   ALT 17 15 16   BILITOT 0.5 0.5 0.4   ALKPHOS 134* 114* 116*       Imaging Last 24 Hours:  CT CHEST WO CONTRAST    Result Date: 11/3/2022  EXAMINATION: CT OF THE CHEST WITHOUT CONTRAST 11/3/2022 12:13 pm TECHNIQUE: CT of the chest was performed without the administration of intravenous contrast. Multiplanar reformatted images are provided for review. Automated exposure control, iterative reconstruction, and/or weight based adjustment of the mA/kV was utilized to reduce the radiation dose to as low as reasonably achievable. COMPARISON: Chest x-ray 11/02/2022 HISTORY: ORDERING SYSTEM PROVIDED HISTORY: Abnormal chest x-ray TECHNOLOGIST PROVIDED HISTORY: Reason for exam:->Abnormal chest x-ray What reading provider will be dictating this exam?->CRC FINDINGS: Mediastinum: Cardiomegaly. No pericardial effusion. Pacemaker wires. Atherosclerotic disease. No thoracic aortic aneurysm. Enlarged pulmonary trunk measuring roughly 4 cm, however motion artifact limits evaluation. Subcentimeter short axis lymph nodes. Unremarkable visualized thyroid and esophagus. Lungs/pleura: No pleural effusion or pneumothorax. Low lung volumes. Expiratory phase appearance of the trachea. Mild bilateral bronchial wall thickening. Bilateral perihilar mild ground-glass opacity. Linear bands of atelectasis versus scarring in both lungs. No solid pulmonary consolidation. Upper Abdomen: Atherosclerotic disease. Left renal scarring/atrophy, partially imaged. Soft Tissues/Bones: Degenerative changes of the spine. DISH.   Degenerative changes of the shoulders, partially imaged. Minimal pulmonary vascular congestion. Cardiomegaly. Enlarged pulmonary trunk is suggestive of pulmonary arterial hypertension. Atherosclerotic disease. Pacemaker. NM LUNG VENT/PERFUSION (VQ)    Result Date: 11/3/2022  EXAMINATION: NUCLEAR MEDICINE VENTILATION PERFUSION SCAN. 11/3/2022 TECHNIQUE: 1 millicuries aerosolized Tc99m DTPA was administered via mask prior to planar imaging of the lungs in multiple projections. Then, 5.3 millicuries of Tc 84O MAA was administered intravenously prior to planar imaging of the lungs in similar projections. COMPARISON: Chest radiograph 11/02/2022. HISTORY: ORDERING SYSTEM PROVIDED HISTORY: R/O PE TECHNOLOGIST PROVIDED HISTORY: Reason for exam:->R/O PE What reading provider will be dictating this exam?->CRC FINDINGS: VENTILATION: Heterogeneous distribution of radiopharmaceutical is seen bilaterally. Aggregation of radiopharmaceutical is seen centrally likely secondary to airway turbulence. Diminished activity is seen at the left base. PERFUSION: Heterogeneity of perfusion is demonstrated. There is diminished activity within the lateral right mid to lower lung zone, Siddhartha proportion to the ventilation pattern. There is also diminished activity in the right upper to mid lung zone out of proportion to ventilation. Decreased activity is seen at the lateral aspect of the left upper lung zone out of proportion to ventilation. High probability for pulmonary embolism. Findings were discussed with Dr. Vincent Dominguez at approximately 1:29 p.m. on 11/03/2022     Assessment//Plan           Hospital Problems             Last Modified POA    * (Principal) Acute respiratory failure with hypoxia ([address] 11/3/2022 Yes    SOB (shortness of breath) 11/3/2022 Yes    History of COVID-19 11/3/2022 Yes    Hypoxia 11/3/2022 Yes     Assessment:    Condition: In stable condition. Unchanged.    (Uncontrolled type 2 diabetes  Hypoxemia  Status post COVID  Pulmonary embolism after COVID  Morbid obesity  ). Plan:    (Continue Lantus 20 units twice a day  Continue Humalog 8 units with each meals  Continue Jardiance  Patient continues to remain oxygen support  Reviewed other nursing consultants note total time spent 25 minutes).      Electronically signed by Kamila Membreno MD on 11/5/22 at 9:45 AM EDT

## 2022-11-05 NOTE — PROGRESS NOTES
Pulmonary Progress Note    2022 10:42 AM    Subjective:   Admit Date: 2022  PCP: Andre Pop DO    Chief Complaint   Patient presents with    Shortness of Breath     X3 days      Interval History: [employer] online video translation service was used in this encounter. Has been on 4-5 L oxyen. Went to bathroom with no oxygen and he felt well. Continues to be on Lovenox. 14 points review of systems has been obtained and negative except to was mentioned in HPI.      Medications:   Scheduled Meds:   hydrALAZINE  25 mg Oral 3 times per day    sacubitril-valsartan  1 tablet Oral BID    metoprolol succinate  25 mg Oral Daily    furosemide  40 mg Oral Daily    enoxaparin  1 mg/kg SubCUTAneous BID    sodium chloride flush  5-40 mL IntraVENous 2 times per day    empagliflozin  10 mg Oral Daily    atorvastatin  40 mg Oral Nightly    aspirin  81 mg Oral Daily    insulin lispro  0-16 Units SubCUTAneous 4x Daily AC & HS    insulin glargine  20 Units SubCUTAneous BID    insulin lispro  8 Units SubCUTAneous TID WC    gabapentin  300 mg Oral Nightly     Continuous Infusions:   dextrose      sodium chloride           Objective:   Vitals:   Temp (24hrs), Av.1 °F (36.7 °C), Min:97.5 °F (36.4 °C), Max:98.6 °F (37 °C)    /69   Pulse 88   Temp 97.5 °F (36.4 °C) (Oral)   Resp 20   Ht 5' 11\" (1.803 m)   Wt (!) 320 lb 9.6 oz (145.4 kg)   SpO2 94%   BMI 44.71 kg/m²   I/O:24HR INTAKE/OUTPUT:    Intake/Output Summary (Last 24 hours) at 2022 1042  Last data filed at 2022 1048  Gross per 24 hour   Intake 360 ml   Output 500 ml   Net -140 ml      0701 -  0700  In: 360 [P.O.:360]  Out: 850 [Urine:850]  CVP:           Physical Exam:  General appearance - alert, well appearing, and in no distress  Mental status - alert, oriented to person, place, and time  Eyes - pupils equal and reactive, extraocular eye movements intact  Nose - normal and patent, no erythema, discharge or polyps  Neck -thick neck, supple, no significant adenopathy  Chest -diminished bilaterally  to auscultation, no wheezes, rales or rhonchi, symmetric air entry  Heart - normal rate, regular rhythm, normal S1, S2, no murmurs, rubs, clicks or gallops  Abdomen - soft, nontender, nondistended, no masses or organomegaly  Rectal - deferred, not clinically indicated  Neurological - alert, oriented, normal speech, no focal findings or movement disorder noted, motor and sensory grossly normal bilaterally  Musculoskeletal - no joint tenderness, deformity or swelling  Extremities - peripheral pulses normal, no pedal edema, no clubbing or cyanosis  Skin - normal coloration and turgor, no rashes, no suspicious skin lesions noted              BMP:    Recent Labs     11/03/22 0448 11/04/22 0446 11/05/22 0449    140 138   K 5.0* 4.2 4.3    103 102   CO2 22 19* 20   BUN 32* 48* 61*   CREATININE 2.28* 2.37* 2.56*   GLUCOSE 284* 141* 176*    . MG:3,PHOS:3)@  Ionized Calcium: No results found for: IONCA  CBC:   Recent Labs     11/04/22 0446 11/05/22 0449   WBC 10.2 11.1*   HGB 13.1* 13.3*    365      ABG: No results for input(s): PH, PCO2, PO2 in the last 72 hours. Assessment and Plan:       Impression:    -Acute hypoxic respiratory failure currently requiring 4 L of oxygen.  -Acute provoked pulmonary emboli.  -Chronic renal failure.  -Abnormal CT chest.  -Obstructive sleep apnea. Recommendations:    -Continue anticoagulation. Duration will be 6 months. Can be transitioned to [address]. -Wean off oxygen. Goal saturation above 90%. Will need oxygen assessment prior to discharge. -Maintain on the dry side. -CPAP at nighttime.  -We will need follow-up CT chest in 6 to 8 weeks to follow-up on the infiltrates in right lower lobe.               Electronically signed by Rogelio Bailey MD on 11/5/2022 at 10:42 AM

## 2022-11-05 NOTE — PROGRESS NOTES
Progress Note  Date:2022       NMOF:[id number]  Patient Name:Josr Lemus     YOB: 1960     Age:62 y.o. Subjective    Subjective:  Symptoms:  No shortness of breath, malaise, cough, chest pain, weakness, headache, chest pressure, anorexia, diarrhea or anxiety. Diet:  No nausea or vomiting. Review of Systems   Respiratory:  Negative for cough and shortness of breath. Cardiovascular:  Negative for chest pain. Gastrointestinal:  Negative for anorexia, diarrhea, nausea and vomiting. Neurological:  Negative for weakness. Objective         Vitals Last 24 Hours:  TEMPERATURE:  Temp  Av.1 °F (36.7 °C)  Min: 97.5 °F (36.4 °C)  Max: 98.6 °F (37 °C)  RESPIRATIONS RANGE: Resp  Av.3  Min: 19  Max: 22  PULSE OXIMETRY RANGE: SpO2  Av.7 %  Min: 94 %  Max: 100 %  PULSE RANGE: Pulse  Av.8  Min: 82  Max: 89  BLOOD PRESSURE RANGE: Systolic (58SIU), CYY:700 , Min:123 , FBJ:043   ; Diastolic (85ABB), HTW:42, Min:69, Max:76    I/O (24Hr): Intake/Output Summary (Last 24 hours) at 2022 0844  Last data filed at 2022 1048  Gross per 24 hour   Intake 360 ml   Output 500 ml   Net -140 ml     Objective:  General Appearance:  Comfortable, well-appearing and in no acute distress. Vital signs: (most recent): Blood pressure 123/69, pulse 88, temperature 97.5 °F (36.4 °C), temperature source Oral, resp. rate 20, height 5' 11\" (1.803 m), weight (!) 320 lb 9.6 oz (145.4 kg), SpO2 100 %. HEENT: Normal HEENT exam.    Lungs: There are decreased breath sounds. Heart: S1 normal and S2 normal.    Abdomen: Abdomen is soft. Bowel sounds are normal.   There is no epigastric area or suprapubic area tenderness. Pulses: Distal pulses are intact. Neurological: Patient is alert and oriented to person, place and time. Pupils:  Pupils are equal, round, and reactive to light.     Labs/Imaging/Diagnostics    Labs:  CBC:  Recent Labs     22  0448 22  0446 11/05/22 0449   WBC 10.0 10.2 11.1*   RBC 4.77 4.64* 4.76   HGB 13.6* 13.1* 13.3*   HCT 41.7* 39.9* 41.2*   MCV 87.4 85.9 86.6   RDW 16.4* 16.4* 16.5*    368 365     CHEMISTRIES:  Recent Labs     11/03/22 0448 11/04/22 0446 11/05/22 0449    140 138   K 5.0* 4.2 4.3    103 102   CO2 22 19* 20   BUN 32* 48* 61*   CREATININE 2.28* 2.37* 2.56*   GLUCOSE 284* 141* 176*   MG 2.8* 2.6* 2.6*     PT/INR:  Recent Labs     11/02/22  2245   PROTIME 13.7   INR 1.0     APTT:  Recent Labs     11/02/22 2245   APTT 38.2*     LIVER PROFILE:  Recent Labs     11/03/22 0448 11/04/22 0446 11/05/22 0449   AST 17 23 22   ALT 17 15 16   BILITOT 0.5 0.5 0.4   ALKPHOS 134* 114* 116*       Imaging Last 24 Hours:  CT CHEST WO CONTRAST    Result Date: 11/3/2022  EXAMINATION: CT OF THE CHEST WITHOUT CONTRAST 11/3/2022 12:13 pm TECHNIQUE: CT of the chest was performed without the administration of intravenous contrast. Multiplanar reformatted images are provided for review. Automated exposure control, iterative reconstruction, and/or weight based adjustment of the mA/kV was utilized to reduce the radiation dose to as low as reasonably achievable. COMPARISON: Chest x-ray 11/02/2022 HISTORY: ORDERING SYSTEM PROVIDED HISTORY: Abnormal chest x-ray TECHNOLOGIST PROVIDED HISTORY: Reason for exam:->Abnormal chest x-ray What reading provider will be dictating this exam?->CRC FINDINGS: Mediastinum: Cardiomegaly. No pericardial effusion. Pacemaker wires. Atherosclerotic disease. No thoracic aortic aneurysm. Enlarged pulmonary trunk measuring roughly 4 cm, however motion artifact limits evaluation. Subcentimeter short axis lymph nodes. Unremarkable visualized thyroid and esophagus. Lungs/pleura: No pleural effusion or pneumothorax. Low lung volumes. Expiratory phase appearance of the trachea. Mild bilateral bronchial wall thickening. Bilateral perihilar mild ground-glass opacity.   Linear bands of atelectasis versus scarring in both lungs. No solid pulmonary consolidation. Upper Abdomen: Atherosclerotic disease. Left renal scarring/atrophy, partially imaged. Soft Tissues/Bones: Degenerative changes of the spine. DISH. Degenerative changes of the shoulders, partially imaged. Minimal pulmonary vascular congestion. Cardiomegaly. Enlarged pulmonary trunk is suggestive of pulmonary arterial hypertension. Atherosclerotic disease. Pacemaker. NM LUNG VENT/PERFUSION (VQ)    Result Date: 11/3/2022  EXAMINATION: NUCLEAR MEDICINE VENTILATION PERFUSION SCAN. 11/3/2022 TECHNIQUE: 1 millicuries aerosolized Tc99m DTPA was administered via mask prior to planar imaging of the lungs in multiple projections. Then, 5.3 millicuries of Tc 25U MAA was administered intravenously prior to planar imaging of the lungs in similar projections. COMPARISON: Chest radiograph 11/02/2022. HISTORY: ORDERING SYSTEM PROVIDED HISTORY: R/O PE TECHNOLOGIST PROVIDED HISTORY: Reason for exam:->R/O PE What reading provider will be dictating this exam?->CRC FINDINGS: VENTILATION: Heterogeneous distribution of radiopharmaceutical is seen bilaterally. Aggregation of radiopharmaceutical is seen centrally likely secondary to airway turbulence. Diminished activity is seen at the left base. PERFUSION: Heterogeneity of perfusion is demonstrated. There is diminished activity within the lateral right mid to lower lung zone, Siddhartha proportion to the ventilation pattern. There is also diminished activity in the right upper to mid lung zone out of proportion to ventilation. Decreased activity is seen at the lateral aspect of the left upper lung zone out of proportion to ventilation. High probability for pulmonary embolism.  Findings were discussed with Dr. Yann Londono at approximately 1:29 p.m. on 11/03/2022     Assessment//Plan           Hospital Problems             Last Modified POA    * (Principal) Acute respiratory failure with hypoxia ([facility] [address] 11/3/2022 Yes    SOB (shortness of breath) 11/3/2022 Yes    History of COVID-19 11/3/2022 Yes    Hypoxia 11/3/2022 Yes   Acute PE  Acute hypoxic respiratory failure  Acute on chronic decopensated HF  DM with hyperglycmia  Assessment & Plan  11/5: Continue with anticoagulation, taper down oxygen as tolerated. Home O2 eval prior to discharge. No overnight events no new complaints. Feeling better. C/w current care.   Electronically signed by Jamie Briceno MD on 11/5/22 at 8:44 AM EDT

## 2022-11-06 VITALS
OXYGEN SATURATION: 97 % | HEIGHT: 71 IN | RESPIRATION RATE: 20 BRPM | HEART RATE: 88 BPM | DIASTOLIC BLOOD PRESSURE: 65 MMHG | BODY MASS INDEX: 44.1 KG/M2 | TEMPERATURE: 97.5 F | SYSTOLIC BLOOD PRESSURE: 158 MMHG | WEIGHT: 315 LBS

## 2022-11-06 PROBLEM — N18.9 CHRONIC KIDNEY DISEASE: Status: ACTIVE | Noted: 2022-11-06

## 2022-11-06 PROBLEM — I25.10 CORONARY ARTERY DISEASE INVOLVING NATIVE HEART WITHOUT ANGINA PECTORIS: Status: ACTIVE | Noted: 2022-11-06

## 2022-11-06 PROBLEM — I26.99 ACUTE PULMONARY EMBOLISM WITHOUT ACUTE COR PULMONALE (HCC): Status: ACTIVE | Noted: 2022-11-06

## 2022-11-06 PROBLEM — R06.02 SHORTNESS OF BREATH: Status: ACTIVE | Noted: 2022-11-06

## 2022-11-06 LAB
ALBUMIN SERPL-MCNC: 3.4 G/DL (ref 3.5–4.6)
ALP BLD-CCNC: 126 U/L (ref 35–104)
ALT SERPL-CCNC: 23 U/L (ref 0–41)
ANION GAP SERPL CALCULATED.3IONS-SCNC: 16 MEQ/L (ref 9–15)
AST SERPL-CCNC: 27 U/L (ref 0–40)
BASOPHILS ABSOLUTE: 0.1 K/UL (ref 0–0.2)
BASOPHILS RELATIVE PERCENT: 1.5 %
BILIRUB SERPL-MCNC: 0.4 MG/DL (ref 0.2–0.7)
BUN BLDV-MCNC: 73 MG/DL (ref 8–23)
CALCIUM SERPL-MCNC: 9.2 MG/DL (ref 8.5–9.9)
CHLORIDE BLD-SCNC: 103 MEQ/L (ref 95–107)
CO2: 21 MEQ/L (ref 20–31)
CREAT SERPL-MCNC: 2.93 MG/DL (ref 0.7–1.2)
EOSINOPHILS ABSOLUTE: 0.4 K/UL (ref 0–0.7)
EOSINOPHILS RELATIVE PERCENT: 4.4 %
GFR SERPL CREATININE-BSD FRML MDRD: 23 ML/MIN/{1.73_M2}
GLOBULIN: 3.4 G/DL (ref 2.3–3.5)
GLUCOSE BLD-MCNC: 145 MG/DL (ref 70–99)
GLUCOSE BLD-MCNC: 147 MG/DL (ref 70–99)
GLUCOSE BLD-MCNC: 170 MG/DL (ref 70–99)
GLUCOSE BLD-MCNC: 182 MG/DL (ref 70–99)
GLUCOSE BLD-MCNC: 86 MG/DL (ref 70–99)
HCT VFR BLD CALC: 40.3 % (ref 42–52)
HEMOGLOBIN: 13.4 G/DL (ref 14–18)
LACTATE DEHYDROGENASE: 224 U/L (ref 135–225)
LYMPHOCYTES ABSOLUTE: 2.4 K/UL (ref 1–4.8)
LYMPHOCYTES RELATIVE PERCENT: 25 %
MAGNESIUM: 2.7 MG/DL (ref 1.7–2.4)
MCH RBC QN AUTO: 28.7 PG (ref 27–31.3)
MCHC RBC AUTO-ENTMCNC: 33.2 % (ref 33–37)
MCV RBC AUTO: 86.4 FL (ref 79–92.2)
MONOCYTES ABSOLUTE: 1 K/UL (ref 0.2–0.8)
MONOCYTES RELATIVE PERCENT: 10.2 %
NEUTROPHILS ABSOLUTE: 5.6 K/UL (ref 1.4–6.5)
NEUTROPHILS RELATIVE PERCENT: 58.9 %
PDW BLD-RTO: 16.5 % (ref 11.5–14.5)
PERFORMED ON: ABNORMAL
PERFORMED ON: NORMAL
PLATELET # BLD: 339 K/UL (ref 130–400)
POTASSIUM SERPL-SCNC: 4.5 MEQ/L (ref 3.4–4.9)
RBC # BLD: 4.67 M/UL (ref 4.7–6.1)
SODIUM BLD-SCNC: 140 MEQ/L (ref 135–144)
TOTAL PROTEIN: 6.8 G/DL (ref 6.3–8)
WBC # BLD: 9.5 K/UL (ref 4.8–10.8)

## 2022-11-06 PROCEDURE — 6370000000 HC RX 637 (ALT 250 FOR IP): Performed by: INTERNAL MEDICINE

## 2022-11-06 PROCEDURE — 85025 COMPLETE CBC W/AUTO DIFF WBC: CPT

## 2022-11-06 PROCEDURE — 2700000000 HC OXYGEN THERAPY PER DAY

## 2022-11-06 PROCEDURE — 83615 LACTATE (LD) (LDH) ENZYME: CPT

## 2022-11-06 PROCEDURE — 80053 COMPREHEN METABOLIC PANEL: CPT

## 2022-11-06 PROCEDURE — 6370000000 HC RX 637 (ALT 250 FOR IP): Performed by: PHYSICIAN ASSISTANT

## 2022-11-06 PROCEDURE — 99232 SBSQ HOSP IP/OBS MODERATE 35: CPT | Performed by: INTERNAL MEDICINE

## 2022-11-06 PROCEDURE — 1210000000 HC MED SURG R&B

## 2022-11-06 PROCEDURE — 2580000003 HC RX 258: Performed by: NURSE PRACTITIONER

## 2022-11-06 PROCEDURE — 83735 ASSAY OF MAGNESIUM: CPT

## 2022-11-06 PROCEDURE — 2580000003 HC RX 258: Performed by: INTERNAL MEDICINE

## 2022-11-06 PROCEDURE — 94660 CPAP INITIATION&MGMT: CPT

## 2022-11-06 PROCEDURE — 6360000002 HC RX W HCPCS: Performed by: NURSE PRACTITIONER

## 2022-11-06 PROCEDURE — 36415 COLL VENOUS BLD VENIPUNCTURE: CPT

## 2022-11-06 RX ORDER — SODIUM CHLORIDE, SODIUM LACTATE, POTASSIUM CHLORIDE, CALCIUM CHLORIDE 600; 310; 30; 20 MG/100ML; MG/100ML; MG/100ML; MG/100ML
INJECTION, SOLUTION INTRAVENOUS CONTINUOUS
Status: DISPENSED | OUTPATIENT
Start: 2022-11-06 | End: 2022-11-06

## 2022-11-06 RX ADMIN — EMPAGLIFLOZIN 10 MG: 10 TABLET, FILM COATED ORAL at 08:10

## 2022-11-06 RX ADMIN — ENOXAPARIN SODIUM 150 MG: 150 INJECTION SUBCUTANEOUS at 08:10

## 2022-11-06 RX ADMIN — SODIUM CHLORIDE, POTASSIUM CHLORIDE, SODIUM LACTATE AND CALCIUM CHLORIDE: 600; 310; 30; 20 INJECTION, SOLUTION INTRAVENOUS at 11:58

## 2022-11-06 RX ADMIN — SACUBITRIL AND VALSARTAN 1 TABLET: 24; 26 TABLET, FILM COATED ORAL at 08:10

## 2022-11-06 RX ADMIN — SODIUM CHLORIDE, PRESERVATIVE FREE 10 ML: 5 INJECTION INTRAVENOUS at 08:11

## 2022-11-06 RX ADMIN — INSULIN GLARGINE 20 UNITS: 100 INJECTION, SOLUTION SUBCUTANEOUS at 08:10

## 2022-11-06 RX ADMIN — GABAPENTIN 300 MG: 300 CAPSULE ORAL at 20:37

## 2022-11-06 RX ADMIN — ASPIRIN 81 MG: 81 TABLET, CHEWABLE ORAL at 08:10

## 2022-11-06 RX ADMIN — APIXABAN 10 MG: 5 TABLET, FILM COATED ORAL at 20:37

## 2022-11-06 RX ADMIN — HYDRALAZINE HYDROCHLORIDE 25 MG: 25 TABLET, FILM COATED ORAL at 20:37

## 2022-11-06 RX ADMIN — INSULIN GLARGINE 20 UNITS: 100 INJECTION, SOLUTION SUBCUTANEOUS at 20:37

## 2022-11-06 RX ADMIN — HYDRALAZINE HYDROCHLORIDE 25 MG: 25 TABLET, FILM COATED ORAL at 08:32

## 2022-11-06 RX ADMIN — FUROSEMIDE 40 MG: 40 TABLET ORAL at 08:10

## 2022-11-06 RX ADMIN — INSULIN LISPRO 8 UNITS: 100 INJECTION, SOLUTION INTRAVENOUS; SUBCUTANEOUS at 12:00

## 2022-11-06 RX ADMIN — ATORVASTATIN CALCIUM 40 MG: 40 TABLET, FILM COATED ORAL at 20:37

## 2022-11-06 RX ADMIN — SACUBITRIL AND VALSARTAN 1 TABLET: 24; 26 TABLET, FILM COATED ORAL at 20:37

## 2022-11-06 RX ADMIN — INSULIN LISPRO 8 UNITS: 100 INJECTION, SOLUTION INTRAVENOUS; SUBCUTANEOUS at 08:11

## 2022-11-06 RX ADMIN — HYDRALAZINE HYDROCHLORIDE 25 MG: 25 TABLET, FILM COATED ORAL at 13:19

## 2022-11-06 RX ADMIN — METOPROLOL SUCCINATE 25 MG: 25 TABLET, FILM COATED, EXTENDED RELEASE ORAL at 08:10

## 2022-11-06 ASSESSMENT — PAIN SCALES - GENERAL: PAINLEVEL_OUTOF10: 0

## 2022-11-06 NOTE — PROGRESS NOTES
4.5    102 103   CO2 19* 20 21   BUN 48* 61* 73*   CREATININE 2.37* 2.56* 2.93*   GLUCOSE 141* 176* 182*   MG 2.6* 2.6* 2.7*       PT/INR:  No results for input(s): PROTIME, INR in the last 72 hours. APTT:  No results for input(s): APTT in the last 72 hours. LIVER PROFILE:  Recent Labs     11/04/22  0446 11/05/22  0449 11/06/22  0541   AST 23 22 27   ALT 15 16 23   BILITOT 0.5 0.4 0.4   ALKPHOS 114* 116* 126*         Imaging Last 24 Hours:    CT CHEST WO CONTRAST  Result Date: 11/3/2022  EXAMINATION: CT OF THE CHEST WITHOUT CONTRAST 11/3/2022 12:13 pm TECHNIQUE: CT of the chest was performed without the administration of intravenous contrast. Multiplanar reformatted images are provided for review. Automated exposure control, iterative reconstruction, and/or weight based adjustment of the mA/kV was utilized to reduce the radiation dose to as low as reasonably achievable. COMPARISON: Chest x-ray 11/02/2022 HISTORY: ORDERING SYSTEM PROVIDED HISTORY: Abnormal chest x-ray TECHNOLOGIST PROVIDED HISTORY: Reason for exam:->Abnormal chest x-ray What reading provider will be dictating this exam?->CRC FINDINGS: Mediastinum: Cardiomegaly. No pericardial effusion. Pacemaker wires. Atherosclerotic disease. No thoracic aortic aneurysm. Enlarged pulmonary trunk measuring roughly 4 cm, however motion artifact limits evaluation. Subcentimeter short axis lymph nodes. Unremarkable visualized thyroid and esophagus. Lungs/pleura: No pleural effusion or pneumothorax. Low lung volumes. Expiratory phase appearance of the trachea. Mild bilateral bronchial wall thickening. Bilateral perihilar mild ground-glass opacity. Linear bands of atelectasis versus scarring in both lungs. No solid pulmonary consolidation. Upper Abdomen: Atherosclerotic disease. Left renal scarring/atrophy, partially imaged. Soft Tissues/Bones: Degenerative changes of the spine. DISH. Degenerative changes of the shoulders, partially imaged. Minimal pulmonary vascular congestion. Cardiomegaly. Enlarged pulmonary trunk is suggestive of pulmonary arterial hypertension. Atherosclerotic disease. Pacemaker. NM LUNG VENT/PERFUSION (VQ)  Result Date: 11/3/2022  EXAMINATION: NUCLEAR MEDICINE VENTILATION PERFUSION SCAN. 11/3/2022 TECHNIQUE: 1 millicuries aerosolized Tc99m DTPA was administered via mask prior to planar imaging of the lungs in multiple projections. Then, 5.3 millicuries of Tc 62T MAA was administered intravenously prior to planar imaging of the lungs in similar projections. COMPARISON: Chest radiograph 11/02/2022. HISTORY: ORDERING SYSTEM PROVIDED HISTORY: R/O PE TECHNOLOGIST PROVIDED HISTORY: Reason for exam:->R/O PE What reading provider will be dictating this exam?->CRC FINDINGS: VENTILATION: Heterogeneous distribution of radiopharmaceutical is seen bilaterally. Aggregation of radiopharmaceutical is seen centrally likely secondary to airway turbulence. Diminished activity is seen at the left base. PERFUSION: Heterogeneity of perfusion is demonstrated. There is diminished activity within the lateral right mid to lower lung zone, Siddhartha proportion to the ventilation pattern. There is also diminished activity in the right upper to mid lung zone out of proportion to ventilation. Decreased activity is seen at the lateral aspect of the left upper lung zone out of proportion to ventilation. High probability for pulmonary embolism. Findings were discussed with Dr. Adele Mckeon at approximately 1:29 p.m. on 11/03/2022     XR CHEST PORTABLE  Result Date: 11/2/2022  EXAMINATION: ONE XRAY VIEW OF THE CHEST 11/2/2022 10:47 pm COMPARISON: None. HISTORY: ORDERING SYSTEM PROVIDED HISTORY: SOB TECHNOLOGIST PROVIDED HISTORY: Reason for exam:->SOB What reading provider will be dictating this exam?->CRC FINDINGS: The lungs are without acute focal process. There is no effusion or pneumothorax. Cardiomegaly. Left-sided transvenous pacer device.   The osseous structures are without acute process. Degenerative changes left glenohumeral joint. No acute process. Cardiomegaly. Assessment//Plan           Hospital Problems             Last Modified POA    * (Principal) Acute respiratory failure with hypoxia (HCC) 11/3/2022 Yes    SOB (shortness of breath) 11/3/2022 Yes    History of COVID-19 11/3/2022 Yes    Hypoxia 11/3/2022 Yes   Assessment & Plan    Acute Problems:  Post-COVID syndrome  Acute respiratory failure with hypoxia  Elevated troponin on admission  Likely acute PE for V/Q scan  Acute on chronic CHF with cardiomyopathy and hx AICD  DM2    Chronic Problems:  CKD  HTN  HLD  Depression  Morbid obesity  PAD    Plan:  Continue O2 PRN for hypoxia  Full dose Lovenox anticoagulation for high suspicion of acute PE on VQ scan. Diuresis per cardiology  Diabetic management per Endocrinology  Strict I/Os  CPAP while sleeping, per Pulm  Patient trending improvement. Continue other interventions with no significant changes at present. 11/6: Supplemental IVF for 12hrs for progressive worsening renal function. Home O2 eval next AM.  Transition today to high dose Eliquis (10mg PO BID) for 1 week, followed by maintenance dose (5 mg p.o. daily) for 6 months per pulmonology recommendations.       Electronically signed by Nohemi Huerta DO on 11/6/2022 at 11:21 AM

## 2022-11-06 NOTE — PROGRESS NOTES
Infectious Disease Progress Note       Patient is a followup regarding COVID history with subsequent complication of hypoxia and shortness of breath - VQ with high probability of PE    Of note, he has a PPM and cardiomegaly   Feels ok right now. Family at bedside. All questions answered to the best of my ability. Not currently on any antibiotics. General: Patient appears in no acute distress, pleasant and cooperative, morbidly obese, O2 NC  No rashes or itching  No evidence of thrush. Dentition is fair. No oral lesions. Heart: S1 S2  Lungs: diminished bilaterally. Abdomen: soft, ND, NTTP, +BS  Extrem: no pain  Neuro exam: CN II-XII intact    Subjectively, no new complaints at this time. Lab Results   Component Value Date    WBC 11.1 (H) 11/05/2022    HGB 13.3 (L) 11/05/2022    HCT 41.2 (L) 11/05/2022    MCV 86.6 11/05/2022     11/05/2022     Lab Results   Component Value Date/Time     11/05/2022 04:49 AM    K 4.3 11/05/2022 04:49 AM    K 4.2 11/02/2022 11:00 PM     11/05/2022 04:49 AM    CO2 20 11/05/2022 04:49 AM    BUN 61 11/05/2022 04:49 AM    CREATININE 2.56 11/05/2022 04:49 AM    GLUCOSE 176 11/05/2022 04:49 AM    GLUCOSE 140 04/17/2020 09:49 AM    CALCIUM 8.9 11/05/2022 04:49 AM        WBC trends are being monitored. Antibiotic doses are being adjusted per most recent renal labs.      Vitals:    11/05/22 2257   BP:    Pulse:    Resp: 19   Temp:    SpO2:            Patient Active Problem List   Diagnosis    Diabetes mellitus ([address]    Hyperlipidemia    Hypertension    Chronic bilateral low back pain without sciatica    Cardiomyopathy ([address]    Pacemaker    Diabetic neuropathy associated with type 2 diabetes mellitus ([address]    Morbid obesity (HCC)    LON (obstructive sleep apnea)    Chronic systolic heart failure ([address]    ICD (implantable cardioverter-defibrillator) in place    Anxiety and depression    Gastroesophageal reflux disease without esophagitis    Keratoderma of foot, acquired    CKD stage 3 due to type 2 diabetes mellitus (HCC)    Vitamin D deficiency    Chronic left shoulder pain    Congestive heart failure (HCC)    Anxiety    Secondary hyperparathyroidism ([facility] [address]    COVID-19    Acute respiratory failure with hypoxia (HCC)    SOB (shortness of breath)    History of COVID-19    Hypoxia           ASSESSMENT:  COVID history with subsequent complication of hypoxia and shortness of breath - VQ with high probability of PE      PLAN:  O2 support  Anticoagulation per primary  Optimal glycemic control  Discussed [facility] longterm covid study participation if qualifies - they have agreed to be contacted regarding participation. Follow up with ID PRN  No abx.      Imaging and labs were reviewed per medical records and any ID pertinent labs were also addressed        Abby Pineda DO

## 2022-11-06 NOTE — PROGRESS NOTES
Direct oral anticoagulant (DOAC) - Initial Pharmacy Review  New start? Yes  DOAC/dose: apixaban 10mg BId x 7 days then 5mg BID  Indication: treatment dvt/PE    Recent Labs     11/05/22 0449 11/06/22  0541   HGB 13.3* 13.4*   HCT 41.2* 40.3*    339     No results for input(s): INR in the last 72 hours. Recent Labs     11/05/22 0449 11/06/22 0541   CREATININE 2.56* 2.93*     Estimated Creatinine Clearance: 37 mL/min (A) (based on SCr of 2.93 mg/dL (H)). Significant Drug-Drug Interactions: No interactions/no new drug interactions identified requiring action. Notes: No obvious intervention needed.  Clarifying when to start first dose with lovenox given this am.     Luis Felipe Pittman, [id number] [facility] PharmD

## 2022-11-06 NOTE — PROGRESS NOTES
Pulmonary Progress Note    2022 11:23 AM    Subjective:   Admit Date: 2022  PCP: Darlene Garcia DO    Chief Complaint   Patient presents with    Shortness of Breath     X3 days      Interval History: No major issues overnight. Continues to be on anticoagulation. Continues to be on oxygen at 2 L.    14 points review of systems has been obtained and negative except to was mentioned in HPI. Medications:   Scheduled Meds:   apixaban  10 mg Oral BID    Followed by    Shyann Guzman ON 2022] apixaban  5 mg Oral BID    hydrALAZINE  25 mg Oral 3 times per day    sacubitril-valsartan  1 tablet Oral BID    metoprolol succinate  25 mg Oral Daily    furosemide  40 mg Oral Daily    sodium chloride flush  5-40 mL IntraVENous 2 times per day    empagliflozin  10 mg Oral Daily    atorvastatin  40 mg Oral Nightly    aspirin  81 mg Oral Daily    insulin lispro  0-16 Units SubCUTAneous 4x Daily AC & HS    insulin glargine  20 Units SubCUTAneous BID    insulin lispro  8 Units SubCUTAneous TID WC    gabapentin  300 mg Oral Nightly     Continuous Infusions:   lactated ringers      dextrose      sodium chloride           Objective:   Vitals:   Temp (24hrs), Av.4 °F (36.3 °C), Min:97.3 °F (36.3 °C), Max:97.5 °F (36.4 °C)    BP (!) 142/76   Pulse 93   Temp 97.3 °F (36.3 °C) (Oral)   Resp 18   Ht 5' 11\" (1.803 m)   Wt (!) 320 lb 9.6 oz (145.4 kg)   SpO2 96%   BMI 44.71 kg/m²   I/O:24HR INTAKE/OUTPUT:  No intake or output data in the 24 hours ending 22 1123    No intake/output data recorded.   CVP:           Physical Exam:  General appearance - alert, well appearing, and in no distress  Mental status - alert, oriented to person, place, and time  Eyes - pupils equal and reactive, extraocular eye movements intact  Nose - normal and patent, no erythema, discharge or polyps  Neck -thick neck, supple, no significant adenopathy  Chest -diminished bilaterally  to auscultation, no wheezes, rales or rhonchi, symmetric air entry  Heart - normal rate, regular rhythm, normal S1, S2, no murmurs, rubs, clicks or gallops  Abdomen - soft, nontender, nondistended, no masses or organomegaly  Rectal - deferred, not clinically indicated  Neurological - alert, oriented, normal speech, no focal findings or movement disorder noted, motor and sensory grossly normal bilaterally  Musculoskeletal - no joint tenderness, deformity or swelling  Extremities - peripheral pulses normal, no pedal edema, no clubbing or cyanosis  Skin - normal coloration and turgor, no rashes, no suspicious skin lesions noted              BMP:    Recent Labs     11/04/22 0446 11/05/22 0449 11/06/22  0541    138 140   K 4.2 4.3 4.5    102 103   CO2 19* 20 21   BUN 48* 61* 73*   CREATININE 2.37* 2.56* 2.93*   GLUCOSE 141* 176* 182*      . MG:3,PHOS:3)@  Ionized Calcium: No results found for: IONCA  CBC:   Recent Labs     11/05/22 0449 11/06/22  0541   WBC 11.1* 9.5   HGB 13.3* 13.4*    339        ABG: No results for input(s): PH, PCO2, PO2 in the last 72 hours. Assessment and Plan:       Impression:    -Acute hypoxic respiratory failure currently requiring 2L of oxygen.  -Acute provoked pulmonary emboli.  -Chronic renal failure.  -Abnormal CT chest.  -Obstructive sleep apnea. Recommendations:    -Continue anticoagulation. Duration will be 6 months. Can be transitioned to [address]. -Wean off oxygen. Goal saturation above 90%. Will need oxygen assessment prior to discharge. -Maintain on the dry side. -CPAP at nighttime.  -We will need follow-up CT chest in 6 to 8 weeks to follow-up on the infiltrates in right lower lobe. -Okay for discharge from pulm standpoint.               Electronically signed by Arpit Dior MD on 11/6/2022 at 11:23 AM

## 2022-11-06 NOTE — PROGRESS NOTES
Pt denies needs or distress at this time. Denies CP or SOB at this time. HS snack given. Pt with son at bedside. Alert and oriented. Call light in reach.

## 2022-11-06 NOTE — PROGRESS NOTES
Patient assessed and charted on by this RN. VSS. A&Ox4. Morning medications administered tolerated well. Son at bedside. Decreased from 4 liters to 3 liters. 1 hour later 97-98% on the 3 liters decreased to 2 liters. Will continue to monitor. 1020 - Patient 89-92% on RA.  Back on 2 liters via NC.

## 2022-11-07 LAB
ALBUMIN SERPL-MCNC: 3.3 G/DL (ref 3.5–4.6)
ALP BLD-CCNC: 99 U/L (ref 35–104)
ALT SERPL-CCNC: 27 U/L (ref 0–41)
ANION GAP SERPL CALCULATED.3IONS-SCNC: 12 MEQ/L (ref 9–15)
AST SERPL-CCNC: 30 U/L (ref 0–40)
BASOPHILS ABSOLUTE: 0.1 K/UL (ref 0–0.2)
BASOPHILS RELATIVE PERCENT: 1.1 %
BILIRUB SERPL-MCNC: 0.6 MG/DL (ref 0.2–0.7)
BUN BLDV-MCNC: 68 MG/DL (ref 8–23)
CALCIUM SERPL-MCNC: 8.9 MG/DL (ref 8.5–9.9)
CHLORIDE BLD-SCNC: 107 MEQ/L (ref 95–107)
CO2: 21 MEQ/L (ref 20–31)
CREAT SERPL-MCNC: 2.2 MG/DL (ref 0.7–1.2)
EOSINOPHILS ABSOLUTE: 0.5 K/UL (ref 0–0.7)
EOSINOPHILS RELATIVE PERCENT: 4.7 %
GFR SERPL CREATININE-BSD FRML MDRD: 32.5 ML/MIN/{1.73_M2}
GLOBULIN: 3.1 G/DL (ref 2.3–3.5)
GLUCOSE BLD-MCNC: 110 MG/DL (ref 70–99)
GLUCOSE BLD-MCNC: 173 MG/DL (ref 70–99)
HCT VFR BLD CALC: 38.5 % (ref 42–52)
HEMOGLOBIN: 12.6 G/DL (ref 14–18)
LACTATE DEHYDROGENASE: 223 U/L (ref 135–225)
LYMPHOCYTES ABSOLUTE: 2 K/UL (ref 1–4.8)
LYMPHOCYTES RELATIVE PERCENT: 21.1 %
MAGNESIUM: 2.6 MG/DL (ref 1.7–2.4)
MCH RBC QN AUTO: 28.2 PG (ref 27–31.3)
MCHC RBC AUTO-ENTMCNC: 32.8 % (ref 33–37)
MCV RBC AUTO: 86 FL (ref 79–92.2)
MONOCYTES ABSOLUTE: 0.9 K/UL (ref 0.2–0.8)
MONOCYTES RELATIVE PERCENT: 9.8 %
NEUTROPHILS ABSOLUTE: 6 K/UL (ref 1.4–6.5)
NEUTROPHILS RELATIVE PERCENT: 63.3 %
PDW BLD-RTO: 16.4 % (ref 11.5–14.5)
PERFORMED ON: ABNORMAL
PLATELET # BLD: 345 K/UL (ref 130–400)
POTASSIUM SERPL-SCNC: 3.8 MEQ/L (ref 3.4–4.9)
RBC # BLD: 4.48 M/UL (ref 4.7–6.1)
SODIUM BLD-SCNC: 140 MEQ/L (ref 135–144)
TOTAL PROTEIN: 6.4 G/DL (ref 6.3–8)
WBC # BLD: 9.6 K/UL (ref 4.8–10.8)

## 2022-11-07 PROCEDURE — 83615 LACTATE (LD) (LDH) ENZYME: CPT

## 2022-11-07 PROCEDURE — 80053 COMPREHEN METABOLIC PANEL: CPT

## 2022-11-07 PROCEDURE — 99232 SBSQ HOSP IP/OBS MODERATE 35: CPT | Performed by: PHYSICIAN ASSISTANT

## 2022-11-07 PROCEDURE — 6370000000 HC RX 637 (ALT 250 FOR IP): Performed by: INTERNAL MEDICINE

## 2022-11-07 PROCEDURE — 36415 COLL VENOUS BLD VENIPUNCTURE: CPT

## 2022-11-07 PROCEDURE — 2700000000 HC OXYGEN THERAPY PER DAY

## 2022-11-07 PROCEDURE — 6370000000 HC RX 637 (ALT 250 FOR IP): Performed by: PHYSICIAN ASSISTANT

## 2022-11-07 PROCEDURE — 83735 ASSAY OF MAGNESIUM: CPT

## 2022-11-07 PROCEDURE — 85025 COMPLETE CBC W/AUTO DIFF WBC: CPT

## 2022-11-07 RX ORDER — HYDRALAZINE HYDROCHLORIDE 25 MG/1
25 TABLET, FILM COATED ORAL EVERY 8 HOURS SCHEDULED
Qty: 90 TABLET | Refills: 3 | Status: ON HOLD | OUTPATIENT
Start: 2022-11-07

## 2022-11-07 RX ORDER — METOPROLOL SUCCINATE 25 MG/1
25 TABLET, EXTENDED RELEASE ORAL DAILY
Qty: 30 TABLET | Refills: 3 | Status: ON HOLD | OUTPATIENT
Start: 2022-11-08 | End: 2022-11-20 | Stop reason: HOSPADM

## 2022-11-07 RX ADMIN — SACUBITRIL AND VALSARTAN 1 TABLET: 24; 26 TABLET, FILM COATED ORAL at 08:24

## 2022-11-07 RX ADMIN — INSULIN GLARGINE 20 UNITS: 100 INJECTION, SOLUTION SUBCUTANEOUS at 08:27

## 2022-11-07 RX ADMIN — HYDRALAZINE HYDROCHLORIDE 25 MG: 25 TABLET, FILM COATED ORAL at 13:04

## 2022-11-07 RX ADMIN — APIXABAN 10 MG: 5 TABLET, FILM COATED ORAL at 08:24

## 2022-11-07 RX ADMIN — ASPIRIN 81 MG: 81 TABLET, CHEWABLE ORAL at 08:25

## 2022-11-07 RX ADMIN — INSULIN LISPRO 8 UNITS: 100 INJECTION, SOLUTION INTRAVENOUS; SUBCUTANEOUS at 08:34

## 2022-11-07 RX ADMIN — HYDRALAZINE HYDROCHLORIDE 25 MG: 25 TABLET, FILM COATED ORAL at 05:30

## 2022-11-07 RX ADMIN — EMPAGLIFLOZIN 10 MG: 10 TABLET, FILM COATED ORAL at 08:25

## 2022-11-07 RX ADMIN — INSULIN LISPRO 8 UNITS: 100 INJECTION, SOLUTION INTRAVENOUS; SUBCUTANEOUS at 13:04

## 2022-11-07 RX ADMIN — FUROSEMIDE 40 MG: 40 TABLET ORAL at 08:24

## 2022-11-07 RX ADMIN — METOPROLOL SUCCINATE 25 MG: 25 TABLET, FILM COATED, EXTENDED RELEASE ORAL at 08:25

## 2022-11-07 NOTE — PROGRESS NOTES
11/07/22 0754   Resting (Room Air)   SpO2 97   HR 72   During Walk (Room Air)   SpO2 94   HR 74   Walk/Assistance Device Ambulation   Rate of Dyspnea 0   After Walk   Does the Patient Qualify for Home O2 No   Does the Patient Need Portable Oxygen Tanks No

## 2022-11-07 NOTE — CARE COORDINATION
CONTACTED [facility] TO INQUIRE IF ELIQUIS IS COVERED UNDER PT'S INSURANCE. 5MG COVERED FOR 60  TABS. THEY WOULD NEED RX FAXED TO [employer]. INFORMED PT'S NURSE. WOULD ALSO PROVIDE ELIQUIS FREE 30 DAY TRIAL OFFER. CM TO FOLLOW.

## 2022-11-07 NOTE — PROGRESS NOTES
CLINICAL PHARMACY NOTE: MEDS TO BEDS    Total # of Prescriptions Filled: 1   The following medications were delivered to the patient:  Eliquis Starter Pack    Additional Documentation:

## 2022-11-07 NOTE — PROGRESS NOTES
Endocrinology Progress Note    Assessment and Plan:   Assessment-  Type 2 insulin-dependent diabetes  Hyperglycemia  History of COVID with hypoxia and SOB  PE    Plan-  Continue Lantus 20 units twice daily  Continue Humalog 8 units 3 times daily before meals  Continue high-dose sliding scale Humalog coverage  Monitor glycemic control closely, avoid hypoglycemia  Patient to be discharged home on basal and bolus insulin 4 shots a day. Will order continuous glucose monitor as an outpatient  Patient needs a glucometer, strips, and lancets upon discharge    POC Glucose:   Recent Labs     11/06/22  0802 11/06/22  1142 11/06/22  1700 11/06/22 2010 11/07/22  1238   POCGLU 145* 147* 86 170* 173*     HGBA1C:  Lab Results   Component Value Date    LABA1C 10.7 (H) 11/03/2022    LABA1C 8.4 (H) 04/29/2022    LABA1C 7.9 (H) 10/20/2021     CBC:   Recent Labs     11/06/22  0541 11/07/22  0528   WBC 9.5 9.6   HGB 13.4* 12.6*    345     CMP:    Lab Results   Component Value Date     11/07/2022    K 3.8 11/07/2022     11/07/2022    CO2 21 11/07/2022    BUN 68 (H) 11/07/2022    CREATININE 2.20 (H) 11/07/2022    GLUCOSE 110 (H) 11/07/2022    CALCIUM 8.9 11/07/2022    PROT 6.4 11/07/2022    LABALBU 3.3 (L) 11/07/2022    BILITOT 0.6 11/07/2022    ALKPHOS 99 11/07/2022    AST 30 11/07/2022    ALT 27 11/07/2022    LABGLOM 32.5 (L) 11/07/2022    GFRAA 35.6 (L) 10/13/2022    GLOB 3.1 11/07/2022       Lab Results   Component Value Date    TSH 2.21 04/17/2020    TSHREFLEX 0.980 05/29/2019     No results found for: TPOABS      CC:   Chief Complaint   Patient presents with    Shortness of Breath     X3 days        Subjective: Interval History: Patient is a 53-year-old type 2 insulin-dependent diabetic male with a history of COVID-19. Presented to the ER with worsening shortness of breath and hypoxia and was admitted. He was given steroids with subsequent hyperglycemia.   Basal and bolus insulin were started with improving glycemic control. Home medications included Lantus, and glimepiride. With a worsening hemoglobin A1c. He will be discharged home on the Lantus and Humalog. This interview was done utilizing the  iPad    Review of systems: denies polyuria, polydipsia, ABD pain, flank pain, N/V/D, or diaphoresis  Medications:   Scheduled Meds:   apixaban  10 mg Oral BID    Followed by    Tati Villafana ON 11/13/2022] apixaban  5 mg Oral BID    hydrALAZINE  25 mg Oral 3 times per day    sacubitril-valsartan  1 tablet Oral BID    metoprolol succinate  25 mg Oral Daily    furosemide  40 mg Oral Daily    sodium chloride flush  5-40 mL IntraVENous 2 times per day    empagliflozin  10 mg Oral Daily    atorvastatin  40 mg Oral Nightly    aspirin  81 mg Oral Daily    insulin lispro  0-16 Units SubCUTAneous 4x Daily AC & HS    insulin glargine  20 Units SubCUTAneous BID    insulin lispro  8 Units SubCUTAneous TID WC    gabapentin  300 mg Oral Nightly     Continuous Infusions:   dextrose      sodium chloride         Objective:   Vitals: BP (!) 158/65   Pulse 88   Temp 97.5 °F (36.4 °C) (Oral)   Resp 20   Ht 5' 11\" (1.803 m)   Wt (!) 320 lb 9.6 oz (145.4 kg)   SpO2 97%   BMI 44.71 kg/m²    Wt Readings from Last 3 Encounters:   11/03/22 (!) 320 lb 9.6 oz (145.4 kg)   04/26/22 (!) 341 lb (154.7 kg)   09/03/21 (!) 337 lb (152.9 kg)        General appearance: alert, appears stated age, cooperative, and no distress  Skin: Skin color, texture, turgor normal. No rashes or lesions. Neck: no lymphadenopathy, thyroid normal size, non-tender,  without nodularity  Lungs: clear to auscultation bilaterally  Heart: regular rate and rhythm, S1, S2 normal, no murmur, click, rub or gallop  Abdomen: soft, non-tender. Bowel sounds normal. No masses,  no organomegaly.   Extremities: extremities normal, atraumatic, no cyanosis or edema    Patient Active Problem List:     Diabetes mellitus ([facility]     Hyperlipidemia     Hypertension     Chronic bilateral low back pain without sciatica     Cardiomyopathy ([address]     Pacemaker     Diabetic neuropathy associated with type 2 diabetes mellitus ([address]     Morbid obesity (HCC)     LON (obstructive sleep apnea)     Chronic systolic heart failure ([address]     ICD (implantable cardioverter-defibrillator) in place     Anxiety and depression     Gastroesophageal reflux disease without esophagitis     Keratoderma of foot, acquired     CKD stage 3 due to type 2 diabetes mellitus ([address]     Vitamin D deficiency     Chronic left shoulder pain     Congestive heart failure ([address]     Anxiety     Secondary hyperparathyroidism ([address]     COVID-19     Acute respiratory failure with hypoxia (HCC)     SOB (shortness of breath)     History of COVID-19     Hypoxia     Chronic kidney disease     Acute pulmonary embolism without acute cor pulmonale ([address]     Coronary artery disease involving native heart without angina pectoris     Shortness of breath        Electronically signed by WU Lock on 11/7/2022 at 1:50 PM

## 2022-11-07 NOTE — CARE COORDINATION
MET W/PT WITH FAMILY MEMBER ON THE PHONE TO ASSESS NEEDS AND DISCUSS DISCHARGE PLAN. PT DOES NOT QUALIFY FOR HOME O2. PT DENIES HHC NEEDS. PLAN IS HOME W/FAMILY. PT WILL F/U WITH PULMONARY AT DISCHARGE FOR CPAP INSTRUCTION. WILL FOLLOW.

## 2022-11-07 NOTE — PROGRESS NOTES
Infectious Disease Progress Note       Patient is a followup regarding COVID history with subsequent complication of hypoxia and shortness of breath - VQ with high probability of PE    Of note, he has a PPM and cardiomegaly   Feels ok right now. General: Patient appears in no acute distress, pleasant and cooperative, morbidly obese, O2 NC  No rashes or itching  No evidence of thrush. Dentition is fair. No oral lesions. Heart: S1 S2  Lungs: diminished bilaterally. Abdomen: soft, ND, NTTP, +BS  Extrem: no pain  Neuro exam: CN II-XII intact      Lab Results   Component Value Date    WBC 9.5 11/06/2022    HGB 13.4 (L) 11/06/2022    HCT 40.3 (L) 11/06/2022    MCV 86.4 11/06/2022     11/06/2022     Lab Results   Component Value Date/Time     11/06/2022 05:41 AM    K 4.5 11/06/2022 05:41 AM    K 4.2 11/02/2022 11:00 PM     11/06/2022 05:41 AM    CO2 21 11/06/2022 05:41 AM    BUN 73 11/06/2022 05:41 AM    CREATININE 2.93 11/06/2022 05:41 AM    GLUCOSE 182 11/06/2022 05:41 AM    GLUCOSE 140 04/17/2020 09:49 AM    CALCIUM 9.2 11/06/2022 05:41 AM        WBC trends are being monitored. Antibiotic doses are being adjusted per most recent renal labs.      Vitals:    11/06/22 1915   BP: (!) 158/65   Pulse: 88   Resp: 20   Temp: 97.5 °F (36.4 °C)   SpO2: 97%           Patient Active Problem List   Diagnosis    Diabetes mellitus (HCC)    Hyperlipidemia    Hypertension    Chronic bilateral low back pain without sciatica    Cardiomyopathy ([address]    Pacemaker    Diabetic neuropathy associated with type 2 diabetes mellitus ([address]    Morbid obesity (HCC)    LON (obstructive sleep apnea)    Chronic systolic heart failure ([address]    ICD (implantable cardioverter-defibrillator) in place    Anxiety and depression    Gastroesophageal reflux disease without esophagitis    Keratoderma of foot, acquired    CKD stage 3 due to type 2 diabetes mellitus ([address]    Vitamin D deficiency    Chronic left shoulder pain    Congestive heart failure (HCC)    Anxiety    Secondary hyperparathyroidism ([facility]    COVID-19    Acute respiratory failure with hypoxia (HCC)    SOB (shortness of breath)    History of COVID-19    Hypoxia    Chronic kidney disease    Acute pulmonary embolism without acute cor pulmonale (HCC)    Coronary artery disease involving native heart without angina pectoris    Shortness of breath       ASSESSMENT:  COVID history with subsequent complication of hypoxia and shortness of breath - VQ with high probability of PE      PLAN:  O2 support  Anticoagulation per primary  Optimal glycemic control    Follow up with ID PRN  No abx.      Imaging and labs were reviewed per medical records and any ID pertinent labs were also addressed        Abby Pineda DO

## 2022-11-07 NOTE — FLOWSHEET NOTE
Patients daughter Padmini Howard notified of outpatient bloodwork and 3 medications at [facility] to be picked up asap. States she will take care of making sure her dad gets these.

## 2022-11-07 NOTE — FLOWSHEET NOTE
Discharge instructions given in [place] and [facility] [place] New medication explained to patient and his daughter. Eliquis starter pack given per out [facility] pharmacy. IV/telemetry removed. Denies questions.

## 2022-11-07 NOTE — PLAN OF CARE
Problem: Chronic Conditions and Co-morbidities  Goal: Patient's chronic conditions and co-morbidity symptoms are monitored and maintained or improved  Outcome: Progressing     Problem: Pain  Goal: Verbalizes/displays adequate comfort level or baseline comfort level  Outcome: Progressing     Problem: Safety - Adult  Goal: Free from fall injury  Outcome: Progressing     Problem: Respiratory - Adult  Goal: Achieves optimal ventilation and oxygenation  Outcome: Progressing     Problem: Cardiovascular - Adult  Goal: Maintains optimal cardiac output and hemodynamic stability  Outcome: Progressing  Goal: Absence of cardiac dysrhythmias or at baseline  Outcome: Progressing     Problem: Metabolic/Fluid and Electrolytes - Adult  Goal: Electrolytes maintained within normal limits  Outcome: Progressing  Goal: Hemodynamic stability and optimal renal function maintained  Outcome: Progressing  Goal: Glucose maintained within prescribed range  Outcome: Progressing

## 2022-11-08 LAB
BLOOD CULTURE, ROUTINE: NORMAL
CULTURE, BLOOD 2: NORMAL

## 2022-11-08 NOTE — DISCHARGE SUMMARY
Hospital Medicine Discharge Summary    Domingo Brothers  :  1960  MRN:  [id number]    Admit date:  2022  Discharge date:  2022    Admitting Physician:  No admitting provider for patient encounter. Primary Care Physician:  Mendy Grimaldo DO      Discharge Diagnoses:    Principal Problem:    Acute respiratory failure with hypoxia ([address]  Active Problems:    SOB (shortness of breath)    History of COVID-19    Hypoxia    Chronic kidney disease    Acute pulmonary embolism without acute cor pulmonale (HCC)    Coronary artery disease involving native heart without angina pectoris    Shortness of breath  Resolved Problems:    * No resolved hospital problems. *      Hospital Course:   Domingo Brothers is a 57 y.o. male that was admitted and treated at [facility] for 2 weeks of worsening shortness of breath dyspnea following COVID-19. Patient has had persistent shortness of breath came to the ER for evaluation was found to have an elevated D-dimer however given his chronic kidney disease cannot go for CTA chest.  Patient had a VQ scan showing high risk for pulmonary emboli so he was started on Eliquis this was started on a loading dose and then 10 mg twice daily x1 week followed by 5 mg twice daily thereafter. Patient did not qualify for home O2 on discharge he was started on hydralazine metoprolol and Entresto to be continued during hospitalization. Patient was seen evaluated by pulmonology he is also seen by cardiology as he has a history of nonischemic cardiomyopathy status post AICD placement. He will be continued on Lasix. Pt was started on lantus 2oU BID and humalg 8U TID ac by endocrinology.      Principal Problem:    Acute respiratory failure with hypoxia (HCC)  Active Problems:    SOB (shortness of breath)    History of COVID-19    Hypoxia    Chronic kidney disease    Acute pulmonary embolism without acute cor pulmonale (HCC)    Coronary artery disease involving native heart without angina pectoris    Shortness of breath  Resolved Problems:    * No resolved hospital problems. *      Patient was seen by the following consultants while admitted to [facility]:   Consults:  [address] TO PULMONOLOGY  IP CONSULT TO INFECTIOUS DISEASES  IP CONSULT TO ENDOCRINOLOGY    Physical Exam:   General appearance: No apparent distress, appears stated age and cooperative. HEENT: Pupils equal, round, and reactive to light. Conjunctivae/corneas clear. Neck: Supple, with full range of motion. No jugular venous distention. Trachea midline. Respiratory: Decreased air movement no accessory muscle use few scattered wheezes no rales no rhonchi  Cardiovascular: Regular rate and rhythm with normal S1/S2 without murmurs, rubs or gallops. Abdomen: Soft, non-tender, non-distended with normal bowel sounds. Musculoskeletal: No clubbing, cyanosis or edema bilaterally. Full range of motion without deformity. Skin: Skin color, texture, turgor normal.  No rashes or lesions. Neuro: Non Focal. Symetrical motor and tone. Nl Comprehension, Alert,awake and oriented.   Psychiatric: Alert and oriented, thought content appropriate, normal insight  Peripheral Pulses: +2 palpable, equal bilaterally    Significant Diagnostic Studies:VQ Scan showing high suspicion for PE    Discharge Medications:         Medication List        START taking these medications      apixaban starter pack 5 MG Tbpk tablet  Commonly known as: Eliquis DVT/PE Starter Pack  Take 1 tablet by mouth See Admin Instructions     hydrALAZINE 25 MG tablet  Commonly known as: APRESOLINE  Take 1 tablet by mouth every 8 hours     insulin lispro (1 Unit Dial) 100 UNIT/ML Sopn  Commonly known as: HumaLOG KwikPen  Inject 8 Units into the skin 3 times daily (before meals)     metoprolol succinate 25 MG extended release tablet  Commonly known as: TOPROL XL  Take 1 tablet by mouth daily     sacubitril-valsartan 24-26 MG per tablet  Commonly known as: ENTRESTO  Take 1 tablet by mouth 2 times daily            CHANGE how you take these medications      * B-D UF III MINI PEN NEEDLES 31G X 5 MM Misc  Generic drug: Insulin Pen Needle  1 each by Does not apply route daily  What changed: Another medication with the same name was added. Make sure you understand how and when to take each. * [employer] Pen Needles 31G X 6 MM Misc  Generic drug: Insulin Pen Needle  1 each by Does not apply route 5 times daily  What changed: You were already taking a medication with the same name, and this prescription was added. Make sure you understand how and when to take each. blood glucose test strips  Test 5 times a day & as needed for symptoms of irregular blood glucose. Dispense sufficient amount for indicated testing frequency plus additional to accommodate PRN testing needs. What changed:   how much to take  how to take this  when to take this  additional instructions     furosemide 40 MG tablet  Commonly known as: LASIX  TAKE 1 TABLET BY MOUTH EVERY DAY. HOLD UNTIL SEEN BY PCP FOR FOLLOWUP  What changed: additional instructions     Lantus SoloStar 100 UNIT/ML injection pen  Generic drug: insulin glargine  Inject 20 Units into the skin 2 times daily  What changed:   how much to take  how to take this  when to take this  additional instructions           * This list has 2 medication(s) that are the same as other medications prescribed for you. Read the directions carefully, and ask your doctor or other care provider to review them with you.                 CONTINUE taking these medications      albuterol (2.5 MG/3ML) 0.083% nebulizer solution  Commonly known as: PROVENTIL     aspirin 81 MG EC tablet  Commonly known as: Aspirin Adult Low Strength  TAKE 1 TABLET BY MOUTH DAILY     atorvastatin 40 MG tablet  Commonly known as: LIPITOR  Take 1 tablet by mouth at bedtime     fenofibrate 160 MG tablet  Commonly known as: TRIGLIDE  TAKE 1 TABLET BY MOUTH EVERY NIGHT     gabapentin 300 MG capsule  Commonly known as: NEURONTIN     INSULIN SYRINGE 1CC/31GX5/16\" 31G X 5/16\" 1 ML Misc  Use once daily to administer Lantus     Lancets Misc  Use TID     omeprazole 20 MG delayed release capsule  Commonly known as: PRILOSEC  Take 1 capsule by mouth daily     ONE TOUCH ULTRA MINI w/Device Kit  1 kit by Does not apply route three times daily            STOP taking these medications      amLODIPine 10 MG tablet  Commonly known as: NORVASC     ammonium lactate 12 % cream  Commonly known as: Lac-Hydrin     glipiZIDE 10 MG tablet  Commonly known as: GLUCOTROL     hydrOXYzine pamoate 50 MG capsule  Commonly known as: VISTARIL     isosorbide mononitrate 30 MG extended release tablet  Commonly known as: IMDUR     lisinopril 20 MG tablet  Commonly known as: PRINIVIL;ZESTRIL     metoprolol tartrate 50 MG tablet  Commonly known as: LOPRESSOR     mupirocin 2 % ointment  Commonly known as: BACTROBAN     sertraline 100 MG tablet  Commonly known as: ZOLOFT     SITagliptin 100 MG tablet  Commonly known as: Januvia     terbinafine 1 % cream  Commonly known as: LAMISIL     vitamin D 1.25 MG (33089 UT) Caps capsule  Commonly known as: ERGOCALCIFEROL               Where to Get Your Medications        These medications were sent to [address] [place] [address]      Phone: [phone]   apixaban starter pack 5 MG Tbpk tablet       These medications were sent to [place] [address] [address], [address] [facility] [phone]  [address] [place] [phone]      Phone: [phone]   blood glucose test strips  hydrALAZINE 25 MG tablet  insulin lispro (1 Unit Dial) 100 UNIT/ML Sopn  [employer] Pen Needles 31G X 6 MM Misc  Lantus SoloStar 100 UNIT/ML injection pen  metoprolol succinate 25 MG extended release tablet  sacubitril-valsartan 24-26 MG per tablet Disposition:   Discharged to Home. Any [facility] needs that were indicated and/or required as been addressed and set up by Social Work. Condition at discharge: Pt was medically stable at the time of discharge. Activity: activity as tolerated    Total time taken for discharging this patient: 40 minutes. Greater than 70% of time was spent focused exclusively on this patient. Time was taken to review chart, discuss plans with consultants, reconciling medications, discussing plan answering questions with patient.      Rakesh Sage DO  11/9/2022, 3:47 PM

## 2022-11-09 RX ORDER — GLUCOSAMINE HCL/CHONDROITIN SU 500-400 MG
CAPSULE ORAL
Qty: 300 STRIP | Refills: 2 | Status: ON HOLD | OUTPATIENT
Start: 2022-11-09

## 2022-11-09 RX ORDER — INSULIN GLARGINE 100 [IU]/ML
20 INJECTION, SOLUTION SUBCUTANEOUS 2 TIMES DAILY
Qty: 5 ADJUSTABLE DOSE PRE-FILLED PEN SYRINGE | Refills: 3 | Status: ON HOLD | OUTPATIENT
Start: 2022-11-09

## 2022-11-09 RX ORDER — PEN NEEDLE, DIABETIC 31 G X1/4"
1 NEEDLE, DISPOSABLE MISCELLANEOUS
Qty: 100 EACH | Refills: 3 | Status: ON HOLD | OUTPATIENT
Start: 2022-11-09

## 2022-11-09 RX ORDER — INSULIN LISPRO 100 [IU]/ML
8 INJECTION, SOLUTION INTRAVENOUS; SUBCUTANEOUS
Qty: 1 ADJUSTABLE DOSE PRE-FILLED PEN SYRINGE | Refills: 2 | Status: ON HOLD | OUTPATIENT
Start: 2022-11-09

## 2022-11-17 NOTE — FLOWSHEET NOTE
Shift assessment complete using  IPAD. Patient is a/ox4, VSS currently on 10L high flow bubbler. Dyspnea with exertion noted and slight non productive cough.      Discussed need for home medications and son will be in today with home medication list. Explained to patient to let me know as soon as son arrives so I can complete his home medication list. 17-Nov-2022 17:45

## 2022-11-18 ENCOUNTER — HOSPITAL ENCOUNTER (INPATIENT)
Age: 62
LOS: 2 days | Discharge: HOME HEALTH | DRG: 641 | End: 2022-11-20
Attending: EMERGENCY MEDICINE | Admitting: INTERNAL MEDICINE
Payer: MEDICARE

## 2022-11-18 ENCOUNTER — APPOINTMENT (OUTPATIENT)
Dept: GENERAL RADIOLOGY | Age: 62
DRG: 641 | End: 2022-11-18
Payer: MEDICARE

## 2022-11-18 DIAGNOSIS — R00.0 TACHYCARDIA: ICD-10-CM

## 2022-11-18 DIAGNOSIS — I50.21 ACUTE SYSTOLIC CONGESTIVE HEART FAILURE (HCC): ICD-10-CM

## 2022-11-18 DIAGNOSIS — R07.9 CHEST PAIN, UNSPECIFIED TYPE: Primary | ICD-10-CM

## 2022-11-18 PROBLEM — I95.9 HYPOTENSION: Status: ACTIVE | Noted: 2022-11-18

## 2022-11-18 LAB
ALBUMIN SERPL-MCNC: 3.5 G/DL (ref 3.5–4.6)
ALP BLD-CCNC: 145 U/L (ref 35–104)
ALT SERPL-CCNC: 22 U/L (ref 0–41)
ANION GAP SERPL CALCULATED.3IONS-SCNC: 15 MEQ/L (ref 9–15)
ANTI-XA UNFRAC HEPARIN: 1.28 IU/ML
APTT: 120.1 SEC (ref 24.4–36.8)
APTT: 44.6 SEC (ref 24.4–36.8)
APTT: 63 SEC (ref 24.4–36.8)
AST SERPL-CCNC: 23 U/L (ref 0–40)
BASOPHILS ABSOLUTE: 0.1 K/UL (ref 0–0.2)
BASOPHILS RELATIVE PERCENT: 1.4 %
BILIRUB SERPL-MCNC: 0.3 MG/DL (ref 0.2–0.7)
BUN BLDV-MCNC: 47 MG/DL (ref 8–23)
CALCIUM SERPL-MCNC: 8.6 MG/DL (ref 8.5–9.9)
CHLORIDE BLD-SCNC: 98 MEQ/L (ref 95–107)
CO2: 21 MEQ/L (ref 20–31)
CREAT SERPL-MCNC: 2.27 MG/DL (ref 0.7–1.2)
EKG ATRIAL RATE: 136 BPM
EKG Q-T INTERVAL: 328 MS
EKG QRS DURATION: 102 MS
EKG QTC CALCULATION (BAZETT): 496 MS
EKG R AXIS: -24 DEGREES
EKG T AXIS: 80 DEGREES
EKG VENTRICULAR RATE: 138 BPM
EOSINOPHILS ABSOLUTE: 0.5 K/UL (ref 0–0.7)
EOSINOPHILS RELATIVE PERCENT: 5.6 %
GFR SERPL CREATININE-BSD FRML MDRD: 31.3 ML/MIN/{1.73_M2}
GLOBULIN: 3.2 G/DL (ref 2.3–3.5)
GLUCOSE BLD-MCNC: 154 MG/DL (ref 70–99)
GLUCOSE BLD-MCNC: 169 MG/DL (ref 70–99)
GLUCOSE BLD-MCNC: 206 MG/DL (ref 70–99)
GLUCOSE BLD-MCNC: 224 MG/DL (ref 70–99)
HCT VFR BLD CALC: 39.1 % (ref 42–52)
HEMOGLOBIN: 12.5 G/DL (ref 14–18)
INR BLD: 1.2
LACTIC ACID: 1.6 MMOL/L (ref 0.5–2.2)
LYMPHOCYTES ABSOLUTE: 2.9 K/UL (ref 1–4.8)
LYMPHOCYTES RELATIVE PERCENT: 32.7 %
MAGNESIUM: 2.4 MG/DL (ref 1.7–2.4)
MCH RBC QN AUTO: 28.1 PG (ref 27–31.3)
MCHC RBC AUTO-ENTMCNC: 32 % (ref 33–37)
MCV RBC AUTO: 87.9 FL (ref 79–92.2)
MONOCYTES ABSOLUTE: 0.6 K/UL (ref 0.2–0.8)
MONOCYTES RELATIVE PERCENT: 7.2 %
NEUTROPHILS ABSOLUTE: 4.7 K/UL (ref 1.4–6.5)
NEUTROPHILS RELATIVE PERCENT: 53.1 %
PDW BLD-RTO: 16.8 % (ref 11.5–14.5)
PERFORMED ON: ABNORMAL
PLATELET # BLD: 355 K/UL (ref 130–400)
POTASSIUM SERPL-SCNC: 4.2 MEQ/L (ref 3.4–4.9)
PRO-BNP: 2237 PG/ML
PRO-BNP: 387 PG/ML
PROCALCITONIN: 0.21 NG/ML (ref 0–0.15)
PROTHROMBIN TIME: 15.3 SEC (ref 12.3–14.9)
RBC # BLD: 4.45 M/UL (ref 4.7–6.1)
SODIUM BLD-SCNC: 134 MEQ/L (ref 135–144)
TOTAL PROTEIN: 6.7 G/DL (ref 6.3–8)
TROPONIN: 0.01 NG/ML (ref 0–0.01)
TROPONIN: 0.03 NG/ML (ref 0–0.01)
WBC # BLD: 8.8 K/UL (ref 4.8–10.8)

## 2022-11-18 PROCEDURE — 36415 COLL VENOUS BLD VENIPUNCTURE: CPT

## 2022-11-18 PROCEDURE — 2000000000 HC ICU R&B

## 2022-11-18 PROCEDURE — 2500000003 HC RX 250 WO HCPCS: Performed by: EMERGENCY MEDICINE

## 2022-11-18 PROCEDURE — 85730 THROMBOPLASTIN TIME PARTIAL: CPT

## 2022-11-18 PROCEDURE — 2580000003 HC RX 258

## 2022-11-18 PROCEDURE — 71045 X-RAY EXAM CHEST 1 VIEW: CPT

## 2022-11-18 PROCEDURE — 93005 ELECTROCARDIOGRAM TRACING: CPT | Performed by: INTERNAL MEDICINE

## 2022-11-18 PROCEDURE — 83880 ASSAY OF NATRIURETIC PEPTIDE: CPT

## 2022-11-18 PROCEDURE — 84484 ASSAY OF TROPONIN QUANT: CPT

## 2022-11-18 PROCEDURE — 85520 HEPARIN ASSAY: CPT

## 2022-11-18 PROCEDURE — 83605 ASSAY OF LACTIC ACID: CPT

## 2022-11-18 PROCEDURE — 99222 1ST HOSP IP/OBS MODERATE 55: CPT | Performed by: INTERNAL MEDICINE

## 2022-11-18 PROCEDURE — 85610 PROTHROMBIN TIME: CPT

## 2022-11-18 PROCEDURE — 99285 EMERGENCY DEPT VISIT HI MDM: CPT

## 2022-11-18 PROCEDURE — 6370000000 HC RX 637 (ALT 250 FOR IP): Performed by: INTERNAL MEDICINE

## 2022-11-18 PROCEDURE — 6360000002 HC RX W HCPCS: Performed by: EMERGENCY MEDICINE

## 2022-11-18 PROCEDURE — 80053 COMPREHEN METABOLIC PANEL: CPT

## 2022-11-18 PROCEDURE — 83735 ASSAY OF MAGNESIUM: CPT

## 2022-11-18 PROCEDURE — 93005 ELECTROCARDIOGRAM TRACING: CPT | Performed by: EMERGENCY MEDICINE

## 2022-11-18 PROCEDURE — 6360000002 HC RX W HCPCS: Performed by: INTERNAL MEDICINE

## 2022-11-18 PROCEDURE — 96375 TX/PRO/DX INJ NEW DRUG ADDON: CPT

## 2022-11-18 PROCEDURE — 85025 COMPLETE CBC W/AUTO DIFF WBC: CPT

## 2022-11-18 PROCEDURE — 99291 CRITICAL CARE FIRST HOUR: CPT | Performed by: INTERNAL MEDICINE

## 2022-11-18 PROCEDURE — 2580000003 HC RX 258: Performed by: INTERNAL MEDICINE

## 2022-11-18 PROCEDURE — 84145 PROCALCITONIN (PCT): CPT

## 2022-11-18 PROCEDURE — 87040 BLOOD CULTURE FOR BACTERIA: CPT

## 2022-11-18 PROCEDURE — 96374 THER/PROPH/DIAG INJ IV PUSH: CPT

## 2022-11-18 RX ORDER — SODIUM CHLORIDE 9 MG/ML
INJECTION, SOLUTION INTRAVENOUS
Status: COMPLETED
Start: 2022-11-18 | End: 2022-11-18

## 2022-11-18 RX ORDER — HEPARIN SODIUM 10000 [USP'U]/100ML
5-30 INJECTION, SOLUTION INTRAVENOUS CONTINUOUS
Status: DISCONTINUED | OUTPATIENT
Start: 2022-11-18 | End: 2022-11-18

## 2022-11-18 RX ORDER — ONDANSETRON 2 MG/ML
4 INJECTION INTRAMUSCULAR; INTRAVENOUS ONCE
Status: COMPLETED | OUTPATIENT
Start: 2022-11-18 | End: 2022-11-18

## 2022-11-18 RX ORDER — ACETAMINOPHEN 650 MG/1
650 SUPPOSITORY RECTAL EVERY 6 HOURS PRN
Status: DISCONTINUED | OUTPATIENT
Start: 2022-11-18 | End: 2022-11-20 | Stop reason: HOSPADM

## 2022-11-18 RX ORDER — ACETAMINOPHEN 325 MG/1
650 TABLET ORAL EVERY 6 HOURS PRN
Status: DISCONTINUED | OUTPATIENT
Start: 2022-11-18 | End: 2022-11-20 | Stop reason: HOSPADM

## 2022-11-18 RX ORDER — HEPARIN SODIUM 1000 [USP'U]/ML
10000 INJECTION, SOLUTION INTRAVENOUS; SUBCUTANEOUS PRN
Status: DISCONTINUED | OUTPATIENT
Start: 2022-11-18 | End: 2022-11-20 | Stop reason: HOSPADM

## 2022-11-18 RX ORDER — HEPARIN SODIUM 10000 [USP'U]/100ML
5-30 INJECTION, SOLUTION INTRAVENOUS CONTINUOUS
Status: DISCONTINUED | OUTPATIENT
Start: 2022-11-18 | End: 2022-11-19

## 2022-11-18 RX ORDER — SODIUM CHLORIDE 0.9 % (FLUSH) 0.9 %
5-40 SYRINGE (ML) INJECTION PRN
Status: DISCONTINUED | OUTPATIENT
Start: 2022-11-18 | End: 2022-11-20 | Stop reason: HOSPADM

## 2022-11-18 RX ORDER — HEPARIN SODIUM 10000 [USP'U]/100ML
5-30 INJECTION, SOLUTION INTRAVENOUS CONTINUOUS
Status: DISCONTINUED | OUTPATIENT
Start: 2022-11-18 | End: 2022-11-18 | Stop reason: DRUGHIGH

## 2022-11-18 RX ORDER — HEPARIN SODIUM 1000 [USP'U]/ML
10000 INJECTION, SOLUTION INTRAVENOUS; SUBCUTANEOUS PRN
Status: DISCONTINUED | OUTPATIENT
Start: 2022-11-18 | End: 2022-11-18 | Stop reason: HOSPADM

## 2022-11-18 RX ORDER — ONDANSETRON 4 MG/1
4 TABLET, ORALLY DISINTEGRATING ORAL EVERY 8 HOURS PRN
Status: DISCONTINUED | OUTPATIENT
Start: 2022-11-18 | End: 2022-11-20 | Stop reason: HOSPADM

## 2022-11-18 RX ORDER — DEXTROSE MONOHYDRATE 100 MG/ML
INJECTION, SOLUTION INTRAVENOUS CONTINUOUS PRN
Status: DISCONTINUED | OUTPATIENT
Start: 2022-11-18 | End: 2022-11-20 | Stop reason: HOSPADM

## 2022-11-18 RX ORDER — INSULIN GLARGINE 100 [IU]/ML
20 INJECTION, SOLUTION SUBCUTANEOUS 2 TIMES DAILY
Status: DISCONTINUED | OUTPATIENT
Start: 2022-11-18 | End: 2022-11-19

## 2022-11-18 RX ORDER — HEPARIN SODIUM 1000 [USP'U]/ML
5000 INJECTION, SOLUTION INTRAVENOUS; SUBCUTANEOUS PRN
Status: DISCONTINUED | OUTPATIENT
Start: 2022-11-18 | End: 2022-11-18 | Stop reason: HOSPADM

## 2022-11-18 RX ORDER — SODIUM CHLORIDE 0.9 % (FLUSH) 0.9 %
5-40 SYRINGE (ML) INJECTION EVERY 12 HOURS SCHEDULED
Status: DISCONTINUED | OUTPATIENT
Start: 2022-11-18 | End: 2022-11-20 | Stop reason: HOSPADM

## 2022-11-18 RX ORDER — INSULIN LISPRO 100 [IU]/ML
0-4 INJECTION, SOLUTION INTRAVENOUS; SUBCUTANEOUS NIGHTLY
Status: DISCONTINUED | OUTPATIENT
Start: 2022-11-18 | End: 2022-11-20 | Stop reason: HOSPADM

## 2022-11-18 RX ORDER — IPRATROPIUM BROMIDE AND ALBUTEROL SULFATE 2.5; .5 MG/3ML; MG/3ML
1 SOLUTION RESPIRATORY (INHALATION) EVERY 4 HOURS PRN
Status: DISCONTINUED | OUTPATIENT
Start: 2022-11-18 | End: 2022-11-20 | Stop reason: HOSPADM

## 2022-11-18 RX ORDER — 0.9 % SODIUM CHLORIDE 0.9 %
1000 INTRAVENOUS SOLUTION INTRAVENOUS ONCE
Status: COMPLETED | OUTPATIENT
Start: 2022-11-18 | End: 2022-11-18

## 2022-11-18 RX ORDER — SODIUM CHLORIDE 9 MG/ML
INJECTION, SOLUTION INTRAVENOUS PRN
Status: DISCONTINUED | OUTPATIENT
Start: 2022-11-18 | End: 2022-11-20 | Stop reason: HOSPADM

## 2022-11-18 RX ORDER — INSULIN LISPRO 100 [IU]/ML
0-8 INJECTION, SOLUTION INTRAVENOUS; SUBCUTANEOUS
Status: DISCONTINUED | OUTPATIENT
Start: 2022-11-18 | End: 2022-11-20 | Stop reason: HOSPADM

## 2022-11-18 RX ORDER — SODIUM CHLORIDE 9 MG/ML
INJECTION, SOLUTION INTRAVENOUS
Status: DISPENSED
Start: 2022-11-18 | End: 2022-11-18

## 2022-11-18 RX ORDER — HEPARIN SODIUM 1000 [USP'U]/ML
5000 INJECTION, SOLUTION INTRAVENOUS; SUBCUTANEOUS PRN
Status: DISCONTINUED | OUTPATIENT
Start: 2022-11-18 | End: 2022-11-20 | Stop reason: HOSPADM

## 2022-11-18 RX ORDER — ONDANSETRON 2 MG/ML
4 INJECTION INTRAMUSCULAR; INTRAVENOUS EVERY 6 HOURS PRN
Status: DISCONTINUED | OUTPATIENT
Start: 2022-11-18 | End: 2022-11-20 | Stop reason: HOSPADM

## 2022-11-18 RX ORDER — METOPROLOL TARTRATE 5 MG/5ML
5 INJECTION INTRAVENOUS ONCE
Status: COMPLETED | OUTPATIENT
Start: 2022-11-18 | End: 2022-11-18

## 2022-11-18 RX ORDER — POLYETHYLENE GLYCOL 3350 17 G/17G
17 POWDER, FOR SOLUTION ORAL DAILY PRN
Status: DISCONTINUED | OUTPATIENT
Start: 2022-11-18 | End: 2022-11-20 | Stop reason: HOSPADM

## 2022-11-18 RX ORDER — HEPARIN SODIUM 10000 [USP'U]/100ML
5-30 INJECTION, SOLUTION INTRAVENOUS CONTINUOUS
Status: DISCONTINUED | OUTPATIENT
Start: 2022-11-18 | End: 2022-11-18 | Stop reason: HOSPADM

## 2022-11-18 RX ORDER — MORPHINE SULFATE 4 MG/ML
4 INJECTION, SOLUTION INTRAMUSCULAR; INTRAVENOUS ONCE
Status: COMPLETED | OUTPATIENT
Start: 2022-11-18 | End: 2022-11-18

## 2022-11-18 RX ADMIN — HEPARIN SODIUM AND DEXTROSE 14 UNITS/KG/HR: 10000; 5 INJECTION INTRAVENOUS at 15:54

## 2022-11-18 RX ADMIN — Medication 1000 ML: at 07:00

## 2022-11-18 RX ADMIN — METOPROLOL TARTRATE 5 MG: 5 INJECTION, SOLUTION INTRAVENOUS at 05:04

## 2022-11-18 RX ADMIN — INSULIN GLARGINE 20 UNITS: 100 INJECTION, SOLUTION SUBCUTANEOUS at 14:40

## 2022-11-18 RX ADMIN — INSULIN GLARGINE 20 UNITS: 100 INJECTION, SOLUTION SUBCUTANEOUS at 21:26

## 2022-11-18 RX ADMIN — HEPARIN SODIUM 5000 UNITS: 1000 INJECTION INTRAVENOUS; SUBCUTANEOUS at 19:09

## 2022-11-18 RX ADMIN — Medication 10 ML: at 20:41

## 2022-11-18 RX ADMIN — MORPHINE SULFATE 4 MG: 4 INJECTION, SOLUTION INTRAMUSCULAR; INTRAVENOUS at 05:02

## 2022-11-18 RX ADMIN — ONDANSETRON 4 MG: 2 INJECTION INTRAMUSCULAR; INTRAVENOUS at 05:01

## 2022-11-18 RX ADMIN — HEPARIN SODIUM AND DEXTROSE 14 UNITS/KG/HR: 10000; 5 INJECTION INTRAVENOUS at 08:13

## 2022-11-18 RX ADMIN — SODIUM CHLORIDE 1000 ML: 9 INJECTION, SOLUTION INTRAVENOUS at 07:00

## 2022-11-18 RX ADMIN — HEPARIN SODIUM AND DEXTROSE 16 UNITS/KG/HR: 10000; 5 INJECTION INTRAVENOUS at 19:16

## 2022-11-18 ASSESSMENT — ENCOUNTER SYMPTOMS
WHEEZING: 0
NAUSEA: 0
EYES NEGATIVE: 1
ALLERGIC/IMMUNOLOGIC NEGATIVE: 1
VOMITING: 0
EYE DISCHARGE: 0
PHOTOPHOBIA: 0
GASTROINTESTINAL NEGATIVE: 1
ABDOMINAL DISTENTION: 0
SHORTNESS OF BREATH: 1
COUGH: 0
CHEST TIGHTNESS: 1
SORE THROAT: 0
RHINORRHEA: 0
ABDOMINAL PAIN: 0

## 2022-11-18 ASSESSMENT — PAIN SCALES - GENERAL
PAINLEVEL_OUTOF10: 0
PAINLEVEL_OUTOF10: 8
PAINLEVEL_OUTOF10: 4
PAINLEVEL_OUTOF10: 0
PAINLEVEL_OUTOF10: 8
PAINLEVEL_OUTOF10: 0

## 2022-11-18 ASSESSMENT — PAIN - FUNCTIONAL ASSESSMENT
PAIN_FUNCTIONAL_ASSESSMENT: NONE - DENIES PAIN
PAIN_FUNCTIONAL_ASSESSMENT: 0-10
PAIN_FUNCTIONAL_ASSESSMENT: NONE - DENIES PAIN
PAIN_FUNCTIONAL_ASSESSMENT: 0-10
PAIN_FUNCTIONAL_ASSESSMENT: NONE - DENIES PAIN
PAIN_FUNCTIONAL_ASSESSMENT: NONE - DENIES PAIN

## 2022-11-18 ASSESSMENT — PAIN DESCRIPTION - LOCATION
LOCATION: CHEST;ABDOMEN
LOCATION: CHEST;ABDOMEN

## 2022-11-18 ASSESSMENT — PAIN DESCRIPTION - ORIENTATION
ORIENTATION: MID;UPPER
ORIENTATION: MID;UPPER

## 2022-11-18 ASSESSMENT — PAIN DESCRIPTION - DESCRIPTORS
DESCRIPTORS: BURNING
DESCRIPTORS: BURNING

## 2022-11-18 NOTE — ED NOTES
Pt pale & diaphoretic, denies chest pain or shortness of breath, BS completed at 154, EKG completed, Dr Evette Castro at bedside, SBP 70's, pt placed in trendelenburg, 1 Liter NS hung via pressure bag per verbal order by Dr Evette Castro, 2nd IV being attempted at this time, continue to monitor     [facility], RN  11/18/22 0101

## 2022-11-18 NOTE — ED NOTES
Pt alert & oriented x 4, denies pain at this time, denies shortness of breath, pt & son updated with admission plans, Dr Fallon Du notified of /115, no new orders received at this time     [facility], [address] [place] [address]  11/18/22 7437

## 2022-11-18 NOTE — ED NOTES
Lab notified of needed labs. Phlebotomist will be to patient room as soon as possible.       Tito Lawler RN  11/18/22 0631

## 2022-11-18 NOTE — CARE COORDINATION
11/18/22    From:HOME W SEVERAL FAMILY MEMBERS [place] SPEAKING BRY DIXON PRN--C-PAP NO HOME 02    Admit: HYPOTENSION    PMH:WOOD, CHF, DM, HLP, HTN, ICD LON, LITHROPSY    Anticipated Discharge Disposition [facility]    Patient Mobility or PT/OT ordered: ICU ADMIT    Consults: CARDIOLOGY    Clinical: ST-120'S--2L    Barriers to Discharge:  CONSULTS NEED TO SEE  PERSISTENT TACHYCARDIA   HEPARIN GTT APTT DUE 1330--1930  TROP Q6'--0.012  NEEDS ECHO DONE    Assessments: CMI DONE

## 2022-11-18 NOTE — CONSULTS
Inpatient consult to Critical Care  Consult performed by: Nimesh Ace MD  Consult ordered by: Jesus Manuel Duke DO          Admit Date: 11/18/2022    PCP:  Yehuda Tatum DO         CHIEF COMPLAINT / HPI:                The patient is a 57 y.o. male with significant past medical history of systolic heart failure, CKD, diabetes, LON and recently diagnosed provoked pulmonary emboli who was discharged from the hospital a week ago on Eliquis. He presented with chest pain this a.m. This was associated with tachycardia and shortness of breath. Pain was radiating to left shoulder and was associated with palpitation. He has been tachycardic since he arrived in the ER heart rate has been between 120 and 130. Had some hypotension and was given 500 cc bolus with improvement. His initial troponin was 0.012. He had a chest x-ray which I reviewed personally and interpreted the results independently. He has some vascular congestion. There is a trace pleural effusion bilaterally. Is currently on 2 L of oxygen. He arrived in the ICU. His heart rate is in the 130s. Appears to be in SVT with occasional PVCs. Labs overall has been stable comparing to last admission. He denies any fever, cough or chills.        Past Medical History:      Diagnosis Date    Acute kidney injury superimposed on CKD ([address] 08/15/2019    Anxiety and depression 04/30/2019    Cardiomyopathy ([address]     Chronic systolic heart failure ([address] 04/30/2019    Diabetes mellitus ([address]     Diabetic neuropathy associated with type 2 diabetes mellitus ([address] 12/13/2017    Heart failure, NYHA class 4 ([address]     Hyperlipidemia     Hypertension     ICD (implantable cardioverter-defibrillator) in place 04/30/2019    Left ureteral calculus     Morbid obesity ([address] 12/19/2017    LON (obstructive sleep apnea)     Pulmonary embolism ([address]     ON ELIQUIS    Pyelonephritis 08/04/2019    Ureteral calculus     Ureteric stone         Past Surgical History: Procedure Laterality Date    CARDIAC DEFIBRILLATOR PLACEMENT  12/2008    CYSTOSCOPY INSERTION / REMOVAL STENT / STONE Left 5/30/2019    CYSTOSCOPY LEFT DOUBLE J STENT PLACEMENT ROOM 287 performed by Zaina Delarosa MD at [facility]    LITHOTRIPSY Left 6/12/2019    LEFT ESWL (CONF # [id number]) performed by Zania Delarosa MD at [facility]    LITHOTRIPSY N/A 7/10/2019    HOLMIUM LASER LITHOTRIPSY, STONE EXTRACTION, LT URETERAL STENT INSERTION. performed by Zaina Delarosa MD at [address] [place] [address] Left 7/10/2019    LEFT FLEXIBLE URETEROSCOPY, CYSTOSCOPY RETROGRADE performed by Zaina Delarosa MD at [facility]       Current Medications:     sodium chloride flush  5-40 mL IntraVENous 2 times per day    insulin glargine  20 Units SubCUTAneous BID    insulin lispro  0-8 Units SubCUTAneous TID WC    insulin lispro  0-4 Units SubCUTAneous Nightly     Home Meds:  Prior to Admission medications    Medication Sig Start Date End Date Taking? Authorizing Provider   insulin glargine (LANTUS SOLOSTAR) 100 UNIT/ML injection pen Inject 20 Units into the skin 2 times daily 11/9/22   Jackelin Fair value D Griffin, DO   insulin lispro, 1 Unit Dial, (HUMALOG KWIKPEN) 100 UNIT/ML SOPN Inject 8 Units into the skin 3 times daily (before meals) 11/9/22   Josue EZEQUIEL Moya, DO   Insulin Pen Needle ([employer] PEN NEEDLES) 31G X 6 MM MISC 1 each by Does not apply route 5 times daily 11/9/22   Jackelin Fair value D Griffin, DO   blood glucose monitor strips Test 5 times a day & as needed for symptoms of irregular blood glucose. Dispense sufficient amount for indicated testing frequency plus additional to accommodate PRN testing needs.  11/9/22   Bethany Moya, DO   apixaban starter pack (ELIQUIS DVT/PE STARTER PACK) 5 MG TBPK tablet Take 1 tablet by mouth See Admin Instructions 11/7/22   Bethany Moya, DO   hydrALAZINE (APRESOLINE) 25 MG tablet Take 1 tablet by mouth every 8 hours 11/7/22   Scan D Griffin, DO   metoprolol succinate (TOPROL XL) 25 MG extended release tablet Take 1 tablet rashes, no suspicious skin lesions noted         DATA:    CBC:   Recent Labs     11/18/22 0445   WBC 8.8   HGB 12.5*   HCT 39.1*        BMP:    Recent Labs     11/18/22 0445   *   K 4.2   CL 98   CO2 21   BUN 47*   CREATININE 2.27*   GLUCOSE 224*   CALCIUM 8.6   MG 2.4     HEPATIC:   Recent Labs     11/18/22 0445   AST 23   ALT 22   BILITOT 0.3   ALKPHOS 145*        Recent Labs     11/18/22 0445   TROPONINI 0.012*                 Radiology Review:    CXR portable: Results for orders placed during the hospital encounter of 11/18/22    XR CHEST PORTABLE    Narrative  EXAMINATION:  ONE XRAY VIEW OF THE CHEST    11/18/2022 4:59 am    COMPARISON:  11/02/2022    HISTORY:  ORDERING SYSTEM PROVIDED HISTORY: SOB  TECHNOLOGIST PROVIDED HISTORY:  Reason for exam:->SOB  What reading provider will be dictating this exam?->CRC    FINDINGS:  Left-sided cardiac device and leads unchanged. There is stable cardiomegaly. There increased interstitial opacities in the bilateral lower lungs. No  pneumothorax or pleural effusion. Impression  Increased interstitial opacities within the bilateral lower lungs, which  could represent mild pulmonary edema versus pneumonia. Clinical correlation  recommended. Echo:    Assessment/Plan           Impression:    -Chest pain, shortness of breath and palpitation. His chest pain is a bit atypical since it is reproducible with palpitation.  -Palpitation, SVT and junctional rhythm. Cardiology has been consulted. -Rule out sepsis. -Recent pulmonary emboli. Presumed to have been compliant with Eliquis.  -Increased troponin, questionable due to mass versus true acute coronary syndrome.  -Chronic kidney disease.  -Sleep disordered breathing.  -Diabetes. Recommendations:    -Admit to the ICU for hemodynamic and neuro monitoring.  -Agree with heparin GTT. -Oxygen to keep saturation above 90%.   -Trend cardiac enzymes.  -Consider beta-blocker if continues to be tachycardic.  -Echocardiogram ordered.  -Strict intake and output measurement. Watch kidney function closely. -Septic work-up. Check UA and blood cultures.  -Check procalcitonin.  -Check lactic acid. Full Code    Excluding procedures, the total critical care time caring for this patient with life threatening, unstable organ failure, including direct patient contact, review of medical record, management of life support systems, review of data including imaging and labs, discussions with other team members, patient's family and physicians at least 33 minutes so far today.      Electronically signed by Arpit Dior MD on 11/18/2022 at 4:41 PM

## 2022-11-18 NOTE — ED NOTES
Bedside report to [facility] Theresetonya Chang Darellbrandonjose, [address] [facility]  11/18/22 0180

## 2022-11-18 NOTE — H&P
Department of Internal Medicine  General Internal Medicine  Attending History and Physical      CHIEF COMPLAINT:  Chest pain    Reason for Admission:  Chest pain, elevated troponin, sob, tachycardia, hypotension    History Obtained From:  patient, family member - daughter    HISTORY OF PRESENT ILLNESS:      The patient is a 57 y.o. male with significant past medical history of HTN/HLD, CKD3, CHF sp AICD, IDDM, recent diagnosis of PE on Eliquis who presents with chest pain, sob and racing heart since 3AM this morning. Pt speaks Liberian. Daughter at bedside to translate for history. Pt was recent discharged from [address] on 11/7 after being admitted for hypoxia and sob. At that time, he had a VQ scan due to his renal function which showed high probability of PE. Cardiology was consulted at that time as was pulmonology. Pt was stabilized and sent home without O2 (did not qualify) to start Eliquis. Since then, he has been more sob which is worse with exertion. This AM, he was awoken with sharp midsternal chest pain which radiated to his neck and L shoulder with associated palpitations and sob so he came to the ED. In the ED, pt was tachycardic but rest of vitals were initially stable. Pt was given small bolus of fluid, IV Lopressor and morphine to aid with pain. Labs were drawn and showed mildly elevated troponin at 0.012, BMP with baseline CKD3 and CXR with bibasilar opacities. Pt was going to be admitted to the floor, but during stay in the ED, BP dropped to 70/32. He was given small amount of IVF due to CHF and BP remained low so patient was admitted to the ICU for close monitoring. Heparin drip started and STAT echo ordered per cardiology request. At the time of exam, no chest pain, no sob at rest. Pt does admit to racing heart.     Past Medical History:        Diagnosis Date    Acute kidney injury superimposed on CKD ([address] 08/15/2019    Anxiety and depression 04/30/2019    Cardiomyopathy ([address]     Chronic systolic heart failure [facility] 04/30/2019    Diabetes mellitus ([facility]     Diabetic neuropathy associated with type 2 diabetes mellitus ([facility] 12/13/2017    Heart failure, NYHA class 4 (HCC)     Hyperlipidemia     Hypertension     ICD (implantable cardioverter-defibrillator) in place 04/30/2019    Left ureteral calculus     Morbid obesity ([facility] 12/19/2017    LON (obstructive sleep apnea)     Pulmonary embolism ([facility]     ON ELIQUIS    Pyelonephritis 08/04/2019    Ureteral calculus     Ureteric stone      Past Surgical History:        Procedure Laterality Date    CARDIAC DEFIBRILLATOR PLACEMENT  12/2008    CYSTOSCOPY INSERTION / REMOVAL STENT / STONE Left 5/30/2019    CYSTOSCOPY LEFT DOUBLE J STENT PLACEMENT ROOM 287 performed by Sarabjit Smith MD at [place] Left 6/12/2019    LEFT ESWL (CONF # [id number]) performed by Sarabjit Smith MD at [facility]    LITHOTRIPSY N/A 7/10/2019    HOLMIUM LASER LITHOTRIPSY, STONE EXTRACTION, LT URETERAL STENT INSERTION. performed by Sarabjit Smith MD at [address] [place] [address] Left 7/10/2019    LEFT FLEXIBLE URETEROSCOPY, CYSTOSCOPY RETROGRADE performed by Sarabjit Smith MD at [facility]       Medications Prior to Admission:    Not in a hospital admission. Allergies:  Coreg [carvedilol] and Januvia [sitagliptin]    Social History: Former smoker, no etoh, no drugs    Family History:       Problem Relation Age of Onset    Heart Disease Mother     Kidney Disease Mother     Hypertension Mother     Diabetes Mother     No Known Problems Sister     No Known Problems Brother     No Known Problems Brother      REVIEW OF SYSTEMS:  12 point ROS was negative unless otherwise noted in the HPI   PHYSICAL EXAM:    Vitals:  BP (!) 80/46   Pulse (!) 122   Temp 98.4 °F (36.9 °C) (Oral)   Resp 18   Ht 5' 11\" (1.803 m)   Wt (!) 330 lb 9.6 oz (150 kg)   SpO2 97%   BMI 46.11 kg/m²     Constitutional: Awake and alert in no acute distress.  Lying in bed comfortably, morbidly obese  Head: Normocephalic, atraumatic  Eyes: EOMI, PERRLA  ENT: moist mucous membranes, nasal cannula  Neck: neck supple, trachea midline  Lungs: Good inspiratory effort, no wheeze, no rhonchi, no rales  Heart: tachycardic, normal S1 and S2  GI: Soft, non-distended, non tender, no guarding, no rebound, +BS  MSK: no edema noted  Skin: warm, dry  Psych: appropriate affect     DATA:  CBC:   Lab Results   Component Value Date/Time    WBC 8.8 11/18/2022 04:45 AM    RBC 4.45 11/18/2022 04:45 AM    HGB 12.5 11/18/2022 04:45 AM    HCT 39.1 11/18/2022 04:45 AM    MCV 87.9 11/18/2022 04:45 AM    MCH 28.1 11/18/2022 04:45 AM    MCHC 32.0 11/18/2022 04:45 AM    RDW 16.8 11/18/2022 04:45 AM     11/18/2022 04:45 AM     CMP:    Lab Results   Component Value Date/Time     11/18/2022 04:45 AM    K 4.2 11/18/2022 04:45 AM    K 4.2 11/02/2022 11:00 PM    CL 98 11/18/2022 04:45 AM    CO2 21 11/18/2022 04:45 AM    BUN 47 11/18/2022 04:45 AM    CREATININE 2.27 11/18/2022 04:45 AM    GFRAA 35.6 10/13/2022 06:45 PM    LABGLOM 31.3 11/18/2022 04:45 AM    GLUCOSE 224 11/18/2022 04:45 AM    GLUCOSE 96 11/14/2022 11:22 AM    PROT 6.7 11/18/2022 04:45 AM    LABALBU 3.5 11/18/2022 04:45 AM    LABALBU 3.3 11/14/2022 11:22 AM    CALCIUM 8.6 11/18/2022 04:45 AM    BILITOT 0.3 11/18/2022 04:45 AM    ALKPHOS 145 11/18/2022 04:45 AM    AST 23 11/18/2022 04:45 AM    ALT 22 11/18/2022 04:45 AM     Troponin:    Lab Results   Component Value Date/Time    TROPONINI 0.012 11/18/2022 04:45 AM     ASSESSMENT AND PLAN:      # Chest pain  - rule out ACS  - initial troponin 0.012. Will trend  - recent PE  - cardiology consulted - no repeat echo as one done last admission  - medical management    # Hypotension  - possibly cardiogenic. Given small amount of fluids due to CHF and BP remained low.  Will admit to the ICU for closer monitoring  - monitor volume status closely  - holding BP meds  - intensivist consulted    # Hx of PE  - heparin drip in case of intervention. Resume Eliquis when ok from cardiology stand    # HTN/HLD  - holding BP meds. Cont statin    # IDDM  - cont Lantus, ISS    # CKD3  - stable, monitor  - avoid nephrotoxic meds    DVT: on heparin drip    Disposition: Pt with chest pain, tachycardia, sob and hypotension. Need to rule out cardiogenic causes. Admit to the ICU. Cardiology and pulm consulted. Monitor volume status closely. Anticipated length of stay greater than 48 hours.       Cleavon Spotted, DO  Internal Medicine   Hospitalist    >45 minutes in total care time

## 2022-11-18 NOTE — CARE COORDINATION
[facility] Case Management Initial Discharge Assessment    Met with FRIEND WHO IS CAREGIVER RUSS MOREIRA  to discuss discharge plan. PCP: Yehuda Tatum DO                                Date of Last Visit: WED    [facility] Patient: No        [facility] Notified: no    If no PCP, list provided? N/A    Discharge Planning    Living Arrangements: independently at home    Who do you live with? SEVERAL OTHER FAMILY MEMBERS    Who helps you with your care:  self, friend, or [facility] [place] [facility]    If lives at home:     Do you have any barriers navigating in your home? no    Patient can perform ADL? Yes AMBULATES WITH CANE PRN, NEEDS ASSISTANCE WITH SOME DRESSING FRIEND SETS UP ALL HIS MED'S PT ADMINISTERS OWN INSULIN WITH PEN & CAN MANAGE       Current Services (outpatient and in home) :  None    Dialysis: No    Is transportation available to get to your appointments? Yes FRIEND NANCYANJANA TAKES TO ALL APPTS    DME Equipment:  yes - CAEN GLUCOMETER CHECKS QID    Respiratory equipment: CPAP without O2 and Nebulizer    Respiratory provider:  yes - [address] [place] [address] [place]:  yes - Laura 6 with Medication Assistance Program?  No      Patient agreeable to [facility]? Yes, [address] [place] [address]     Patient agreeable to SNF/Rehab? Declined    Other discharge needs identified? Other CM TO ASSESS FOR ANY NEW NEEDS    Does Patient Have a High-Risk for Readmission Diagnosis (CHF, PN, MI, COPD)? Yes     If Yes,      Initial Discharge Plan? (Note: please see concurrent daily documentation for any updates after initial note).     INITIAL PLAN RETURN HOME WITH FRATERNAL [facility] [address] WILL NEED ORDER    Readmission Risk              Risk of Unplanned Readmission:  17         Electronically signed by Jono Ramirez RN on 11/18/2022 at 11:04 AM

## 2022-11-18 NOTE — ACP (ADVANCE CARE PLANNING)
Advance Care Planning     Advance Care Planning Clinical Specialist  Conversation Note      Date of ACP Conversation: 11/18/2022    Conversation Conducted with: Patient with Decision Making Capacity    ACP Clinical Specialist: Kathy Jean RN        Healthcare Decision Maker:     Current Designated Healthcare Decision Maker:     Primary Decision Maker: Vladimir Zambrano - Child - [phone]    Secondary Decision Maker: Natacha Tiwari - Other - [phone]    Click here to complete Healthcare Decision Makers including section of the Healthcare Decision Maker Relationship (ie \"Primary\")      Care Preferences    Hospitalization: \"If your health worsens and it becomes clear that your chance of recovery is unlikely, what would your preference be regarding hospitalization? \"    Choice:  [x] The patient wants hospitalization. [] The patient prefers comfort-focused treatment without hospitalization. Ventilation: \"If you were in your present state of health and suddenly became very ill and were unable to breathe on your own, what would your preference be about the use of a ventilator (breathing machine) if it were available to you? \"      If the patient would desire the use of ventilator (breathing machine), answer \"yes\". If not, \"no\": yes    \"If your health worsens and it becomes clear that your chance of recovery is unlikely, what would your preference be about the use of a ventilator (breathing machine) if it were available to you? \"     Would the patient desire the use of ventilator (breathing machine)?: PT DID NOT WANT TO ANSWER AT THE TIME      Resuscitation  \"CPR works best to restart the heart when there is a sudden event, like a heart attack, in someone who is otherwise healthy. Unfortunately, CPR does not typically restart the heart for people who have serious health conditions or who are very sick. \"    \"In the event your heart stopped as a result of an underlying serious health condition, would you want attempts to be made to restart your heart (answer \"yes\" for attempt to resuscitate) or would you prefer a natural death (answer \"no\" for do not attempt to resuscitate)? \" yes       [x] Yes   [] No   Educated Patient / Coats Grandchild regarding differences between Advance Directives and portable DNR orders.     Length of ACP Conversation in minutes:  10    Conversation Outcomes:  [x] ACP discussion completed  [] Existing advance directive reviewed with patient; no changes to patient's previously recorded wishes  [] New Advance Directive completed  [] Portable Do Not Rescitate prepared for Provider review and signature  [] POLST/POST/MOLST/MOST prepared for Provider review and signature      Follow-up plan:    [] Schedule follow-up conversation to continue planning  [] Referred individual to Provider for additional questions/concerns   [] Advised patient/agent/surrogate to review completed ACP document and update if needed with changes in condition, patient preferences or care setting    [x] This note routed to one or more involved healthcare providers

## 2022-11-18 NOTE — CONSULTS
Chief Complaint   Patient presents with    Chest Pain     H/o \"heart arrhythmia\" developed chest pain and tightness that radiates to neck at 0100, associated SOB, family reports compliance with medications        Patient is a 57 y.o. male who presents with a chief complaint of shortness of breath and chest pain. Patient is followed on a regular basis by Dr. Sejal Sapp DO. Patient with past medical history of recovered nonischemic cardiomyopathy, status post AICD, hypertension, hyperlipidemia, diabetes, multiple medical problems. Apparently patient had a hypotensive episode and diaphoretic in the emergency department that responded well to IV fluids. He was recently seen in November 2022 for similar complaints and had a high probability VQ scan for pulm embolism plan was placed on Eliquis. He has mildly elevated cardiac enzyme in setting of chronic kidney disease. Patient with history of mild infrapopliteal PAD. Most recent echocardiogram this month with normal LV function no significant abnormality.     Past Medical History:   Diagnosis Date    Acute kidney injury superimposed on CKD ([address] 08/15/2019    Anxiety and depression 04/30/2019    Cardiomyopathy ([address]     Chronic systolic heart failure ([address] 04/30/2019    Diabetes mellitus ([address]     Diabetic neuropathy associated with type 2 diabetes mellitus ([address] 12/13/2017    Heart failure, NYHA class 4 ([address]     Hyperlipidemia     Hypertension     ICD (implantable cardioverter-defibrillator) in place 04/30/2019    Left ureteral calculus     Morbid obesity ([address] 12/19/2017    LON (obstructive sleep apnea)     Pulmonary embolism ([address]     ON ELIQUIS    Pyelonephritis 08/04/2019    Ureteral calculus     Ureteric stone       Patient Active Problem List   Diagnosis    Diabetes mellitus ([address]    Hyperlipidemia    Hypertension    Chronic bilateral low back pain without sciatica    Cardiomyopathy ([address]    Pacemaker    Diabetic neuropathy associated with type 2 diabetes mellitus ([facility]    Morbid obesity (HCC)    LON (obstructive sleep apnea)    Chronic systolic heart failure (HCC)    ICD (implantable cardioverter-defibrillator) in place    Anxiety and depression    Gastroesophageal reflux disease without esophagitis    Keratoderma of foot, acquired    CKD stage 3 due to type 2 diabetes mellitus ([facility]    Vitamin D deficiency    Chronic left shoulder pain    Congestive heart failure (HCC)    Anxiety    Secondary hyperparathyroidism ([facility]    COVID-19    Acute respiratory failure with hypoxia (HCC)    SOB (shortness of breath)    History of COVID-19    Hypoxia    Chronic kidney disease    Acute pulmonary embolism without acute cor pulmonale (HCC)    Coronary artery disease involving native heart without angina pectoris    Shortness of breath    Hypotension       Past Surgical History:   Procedure Laterality Date    CARDIAC DEFIBRILLATOR PLACEMENT  2008    CYSTOSCOPY INSERTION / REMOVAL STENT / STONE Left 2019    CYSTOSCOPY LEFT DOUBLE J STENT PLACEMENT ROOM 287 performed by Carmela Mccoy MD at [facility]    LITHOTRIPSY Left 2019    LEFT ESWL (CONF # [id number]) performed by Carmela Mccoy MD at [facility]    LITHOTRIPSY N/A 7/10/2019    HOLMIUM LASER LITHOTRIPSY, STONE EXTRACTION, LT URETERAL STENT INSERTION.  performed by Carmela Mccoy MD at [address] [place] [address] Left 7/10/2019    LEFT FLEXIBLE URETEROSCOPY, CYSTOSCOPY RETROGRADE performed by Carmela Mccoy MD at [facility] History     Socioeconomic History    Marital status: Legally      Spouse name: None    Number of children: None    Years of education: None    Highest education level: None   Tobacco Use    Smoking status: Former     Packs/day: 1.00     Years: 25.00     Pack years: 25.00     Types: Cigarettes     Quit date: 2008     Years since quittin.9    Smokeless tobacco: Never   Substance and Sexual Activity    Alcohol use: No    Drug use: Never    Sexual activity: Yes Partners: Female       Family History   Problem Relation Age of Onset    Heart Disease Mother     Kidney Disease Mother     Hypertension Mother     Diabetes Mother     No Known Problems Sister     No Known Problems Brother     No Known Problems Brother        Current Facility-Administered Medications   Medication Dose Route Frequency Provider Last Rate Last Admin    heparin (porcine) injection 10,000 Units  10,000 Units IntraVENous PRN Diann Gonzales MD        heparin (porcine) injection 5,000 Units  5,000 Units IntraVENous PRN Diann Gonzales MD        heparin 25,000 units in dextrose 5% 250 mL (premix) infusion  5-30 Units/kg/hr IntraVENous Continuous Diann Gonzales MD 21 mL/hr at 11/18/22 0813 14 Units/kg/hr at 11/18/22 0813    sodium chloride 0.9 % infusion              Current Outpatient Medications   Medication Sig Dispense Refill    insulin glargine (LANTUS SOLOSTAR) 100 UNIT/ML injection pen Inject 20 Units into the skin 2 times daily 5 Adjustable Dose Pre-filled Pen Syringe 3    insulin lispro, 1 Unit Dial, (HUMALOG KWIKPEN) 100 UNIT/ML SOPN Inject 8 Units into the skin 3 times daily (before meals) 1 Adjustable Dose Pre-filled Pen Syringe 2    Insulin Pen Needle ([employer] PEN NEEDLES) 31G X 6 MM MISC 1 each by Does not apply route 5 times daily 100 each 3    blood glucose monitor strips Test 5 times a day & as needed for symptoms of irregular blood glucose. Dispense sufficient amount for indicated testing frequency plus additional to accommodate PRN testing needs.  300 strip 2    apixaban starter pack (ELIQUIS DVT/PE STARTER PACK) 5 MG TBPK tablet Take 1 tablet by mouth See Admin Instructions 74 tablet 0    hydrALAZINE (APRESOLINE) 25 MG tablet Take 1 tablet by mouth every 8 hours 90 tablet 3    metoprolol succinate (TOPROL XL) 25 MG extended release tablet Take 1 tablet by mouth daily 30 tablet 3    sacubitril-valsartan (ENTRESTO) 24-26 MG per tablet Take 1 tablet by mouth 2 times daily 60 tablet 2    furosemide (LASIX) 40 MG tablet TAKE 1 TABLET BY MOUTH EVERY DAY. HOLD UNTIL SEEN BY PCP FOR FOLLOWUP (Patient taking differently: TAKE 1 TABLET BY MOUTH EVERY DAY.) 90 tablet 3    aspirin (ASPIRIN ADULT LOW STRENGTH) 81 MG EC tablet TAKE 1 TABLET BY MOUTH DAILY 90 tablet 3    atorvastatin (LIPITOR) 40 MG tablet Take 1 tablet by mouth at bedtime 90 tablet 3    Insulin Pen Needle (B-D UF III MINI PEN NEEDLES) 31G X 5 MM MISC 1 each by Does not apply route daily 100 each 4    fenofibrate (TRIGLIDE) 160 MG tablet TAKE 1 TABLET BY MOUTH EVERY NIGHT 90 tablet 4    albuterol (PROVENTIL) (2.5 MG/3ML) 0.083% nebulizer solution Albuterol albuterol (PROVENTIL) (2.5 MG/3ML) 0.083% nebulizer solution Indications: Type 2 diabetes mellitus without complication, with long-term current use of insulin (HCC) Take 3 mLs by nebulization 4 times daily 120 each 5 10/11/2018 Active 10-  [address] ([address])      gabapentin (NEURONTIN) 300 MG capsule Take by mouth nightly. omeprazole (PRILOSEC) 20 MG delayed release capsule Take 1 capsule by mouth daily 90 capsule 4    Blood Glucose Monitoring Suppl (ONE TOUCH ULTRA MINI) w/Device KIT 1 kit by Does not apply route three times daily 1 kit 5    Lancets MISC Use  each 3    Insulin Syringe-Needle U-100 (INSULIN SYRINGE 1CC/31GX5/16\") 31G X 5/16\" 1 ML MISC Use once daily to administer Lantus 100 each 1       ALLERGIES: Coreg [carvedilol] and Januvia [sitagliptin]    Review of Systems   Constitutional: Negative. Negative for chills and fever. HENT: Negative. Eyes: Negative. Respiratory:  Positive for shortness of breath. Negative for wheezing. Cardiovascular:  Positive for chest pain. Negative for palpitations and leg swelling. Gastrointestinal: Negative. Negative for abdominal pain, nausea and vomiting. Endocrine: Negative. Genitourinary: Negative. Musculoskeletal: Negative. Skin: Negative. Negative for rash.    Allergic/Immunologic: Negative. Neurological: Negative. Negative for dizziness, weakness and headaches. Hematological: Negative. Psychiatric/Behavioral: Negative. VITALS:  Blood pressure (!) 80/46, pulse (!) 122, temperature 98.4 °F (36.9 °C), temperature source Oral, resp. rate 18, height 5' 11\" (1.803 m), weight (!) 330 lb 9.6 oz (150 kg), SpO2 97 %. Body mass index is 46.11 kg/m². Physical Exam  Vitals and nursing note reviewed. Constitutional:       Appearance: He is well-developed. He is obese. He is not diaphoretic. HENT:      Head: Normocephalic and atraumatic. Eyes:      Pupils: Pupils are equal, round, and reactive to light. Neck:      Thyroid: No thyromegaly. Vascular: No JVD. Trachea: No tracheal deviation. Cardiovascular:      Rate and Rhythm: Normal rate and regular rhythm. Chest Wall: PMI is not displaced. Pulses: Intact distal pulses. Heart sounds: Normal heart sounds. Heart sounds not distant. No murmur heard. No friction rub. No gallop. No S3 sounds. Pulmonary:      Effort: No respiratory distress. Breath sounds: No wheezing or rales. Chest:      Chest wall: No tenderness. Abdominal:      General: Bowel sounds are normal. There is no distension. Palpations: Abdomen is soft. There is no mass. Tenderness: There is no abdominal tenderness. There is no guarding or rebound. Musculoskeletal:         General: Normal range of motion. Cervical back: Normal range of motion and neck supple. Skin:     General: Skin is warm and dry. Coloration: Skin is not pale. Findings: No erythema or rash. Neurological:      Mental Status: He is alert and oriented to person, place, and time. Cranial Nerves: No cranial nerve deficit. Psychiatric:         Behavior: Behavior normal.         Thought Content:  Thought content normal.         Judgment: Judgment normal.       LABS:  Recent Results (from the past 24 hour(s))   Troponin    Collection Time: 11/18/22  4:45 AM   Result Value Ref Range    Troponin 0.012 (HH) 0.000 - 0.010 ng/mL   CBC with Auto Differential    Collection Time: 11/18/22  4:45 AM   Result Value Ref Range    WBC 8.8 4.8 - 10.8 K/uL    RBC 4.45 (L) 4.70 - 6.10 M/uL    Hemoglobin 12.5 (L) 14.0 - 18.0 g/dL    Hematocrit 39.1 (L) 42.0 - 52.0 %    MCV 87.9 79.0 - 92.2 fL    MCH 28.1 27.0 - 31.3 pg    MCHC 32.0 (L) 33.0 - 37.0 %    RDW 16.8 (H) 11.5 - 14.5 %    Platelets 352 611 - 037 K/uL    Neutrophils % 53.1 %    Lymphocytes % 32.7 %    Monocytes % 7.2 %    Eosinophils % 5.6 %    Basophils % 1.4 %    Neutrophils Absolute 4.7 1.4 - 6.5 K/uL    Lymphocytes Absolute 2.9 1.0 - 4.8 K/uL    Monocytes Absolute 0.6 0.2 - 0.8 K/uL    Eosinophils Absolute 0.5 0.0 - 0.7 K/uL    Basophils Absolute 0.1 0.0 - 0.2 K/uL   CMP    Collection Time: 11/18/22  4:45 AM   Result Value Ref Range    Sodium 134 (L) 135 - 144 mEq/L    Potassium 4.2 3.4 - 4.9 mEq/L    Chloride 98 95 - 107 mEq/L    CO2 21 20 - 31 mEq/L    Anion Gap 15 9 - 15 mEq/L    Glucose 224 (H) 70 - 99 mg/dL    BUN 47 (H) 8 - 23 mg/dL    Creatinine 2.27 (H) 0.70 - 1.20 mg/dL    Est, Glom Filt Rate 31.3 (L) >60    Calcium 8.6 8.5 - 9.9 mg/dL    Total Protein 6.7 6.3 - 8.0 g/dL    Albumin 3.5 3.5 - 4.6 g/dL    Total Bilirubin 0.3 0.2 - 0.7 mg/dL    Alkaline Phosphatase 145 (H) 35 - 104 U/L    ALT 22 0 - 41 U/L    AST 23 0 - 40 U/L    Globulin 3.2 2.3 - 3.5 g/dL   Magnesium    Collection Time: 11/18/22  4:45 AM   Result Value Ref Range    Magnesium 2.4 1.7 - 2.4 mg/dL   Brain Natriuretic Peptide    Collection Time: 11/18/22  4:45 AM   Result Value Ref Range    Pro- pg/mL   EKG 12 Lead    Collection Time: 11/18/22  5:16 AM   Result Value Ref Range    Ventricular Rate 138 BPM    Atrial Rate 136 BPM    QRS Duration 102 ms    Q-T Interval 328 ms    QTc Calculation (Bazett) 496 ms    R Axis -24 degrees    T Axis 80 degrees   POCT Glucose    Collection Time: 11/18/22  6:37 AM   Result Value Ref Range    POC Glucose 154 (H) 70 - 99 mg/dl    Performed on ACCU-CHEK    APTT    Collection Time: 11/18/22  7:30 AM   Result Value Ref Range    aPTT 44.6 (H) 24.4 - 36.8 sec     Troponin:   Lab Results   Component Value Date/Time    TROPONINI 0.012 11/18/2022 04:45 AM       EKG:*Tachycardia/SVT with poor R wave progression across precordial leads positive LVH      ASSESSMENT:    Atypical Chest pain/CP is reproducible with palpation. shortness of breath questionable etiology. Rule out underlying cardiac ischemia versus other-      Status post recent hyperbole VQ scan on oral anticoagulation. Mildly elevated cardiac enzyme. Rule out cardioischemia versus demand ischemia    Chronic kidney disease    History of recovered cardiomyopathy/nonischemic    Diabetes mellitus    Hypertension    Hyperlipidemia    PLAN:   As always, aggressive risk factor modification is strongly recommended. We should adhere to the JNC VIII guidelines for HTN management and the NCEPATP III guidelines for LDL-C management. ACS orders  Monitor on telemetry  Coronary evaluation when clinically feasible. Most likely stress test as outpatient. Maximize cardiac medications  Cont with DOAC. IV diuresis as tolerated. Monitor I's and O's and renal function. Maintain potassium between 4-5 and magnesium greater than 2  GI/DVT prophylaxis  No need for echocardiogram as one was performed 2 weeks ago. CHF teaching  Follow up in CHF clinic one week post discharge. Further recommendations to follow. Thank you for allowing me to participate in the care of your patient, please don't hesitate to contact me if you have any further questions.     Electronically signed by Peg Mata DO on 11/18/2022 at 12:04 PM

## 2022-11-18 NOTE — ED NOTES
Labs obtained by this RN, labeled and sent to lab via tube system.       Eren Gifford RN  11/18/22 [id number]

## 2022-11-18 NOTE — ED PROVIDER NOTES
[address] [facility] ED  eMERGENCY dEPARTMENT eNCOUnter      Pt Name: Ursula Landa  MRN: [id number]  [place] 1960  Date of evaluation: 11/18/2022  Provider: Thora Collet, MD    CHIEF COMPLAINT       Chief Complaint   Patient presents with    Chest Pain     H/o \"heart arrhythmia\" developed chest pain and tightness that radiates to neck at 0100, associated SOB, family reports compliance with medications         HISTORY OF PRESENT ILLNESS   (Location/Symptom, Timing/Onset,Context/Setting, Quality, Duration, Modifying Factors, Severity)  Note limiting factors. Ursula Landa is a 57 y.o. male who presents to the emergency department evaluation of chest pain and tightness that started approximately 2 hours prior to arrival.  Patient has extensive heart disease including a recent admission for shortness of breath, hypoxia and pulmonary embolism. Started on Eliquis. He has a pacemaker and defibrillator in place. Compliant with medications he arrives here with his son who helps with interpretation    HPI    NursingNotes were reviewed. REVIEW OF SYSTEMS    (2-9 systems for level 4, 10 or more for level 5)     Review of Systems   Constitutional:  Negative for chills and diaphoresis. HENT:  Negative for congestion, ear pain, mouth sores, rhinorrhea and sore throat. Eyes:  Negative for photophobia and discharge. Respiratory:  Positive for chest tightness and shortness of breath. Negative for cough and wheezing. Cardiovascular:  Negative for chest pain and palpitations. Gastrointestinal:  Negative for abdominal distention, abdominal pain, nausea and vomiting. Endocrine: Negative for cold intolerance. Genitourinary:  Negative for difficulty urinating. Musculoskeletal:  Negative for arthralgias. Skin:  Negative for pallor and rash. Allergic/Immunologic: Negative for immunocompromised state. Neurological:  Negative for dizziness and syncope.    Hematological:  Negative for [place], Urszula ([id number])    APIXABAN STARTER PACK (ELIQUIS DVT/PE STARTER PACK) 5 MG TBPK TABLET    Take 1 tablet by mouth See Admin Instructions    ASPIRIN (ASPIRIN ADULT LOW STRENGTH) 81 MG EC TABLET    TAKE 1 TABLET BY MOUTH DAILY    ATORVASTATIN (LIPITOR) 40 MG TABLET    Take 1 tablet by mouth at bedtime    BLOOD GLUCOSE MONITOR STRIPS    Test 5 times a day & as needed for symptoms of irregular blood glucose. Dispense sufficient amount for indicated testing frequency plus additional to accommodate PRN testing needs. BLOOD GLUCOSE MONITORING SUPPL (ONE TOUCH ULTRA MINI) W/DEVICE KIT    1 kit by Does not apply route three times daily    FENOFIBRATE (TRIGLIDE) 160 MG TABLET    TAKE 1 TABLET BY MOUTH EVERY NIGHT    FUROSEMIDE (LASIX) 40 MG TABLET    TAKE 1 TABLET BY MOUTH EVERY DAY. HOLD UNTIL SEEN BY PCP FOR FOLLOWUP    GABAPENTIN (NEURONTIN) 300 MG CAPSULE    Take by mouth nightly.     HYDRALAZINE (APRESOLINE) 25 MG TABLET    Take 1 tablet by mouth every 8 hours    INSULIN GLARGINE (LANTUS SOLOSTAR) 100 UNIT/ML INJECTION PEN    Inject 20 Units into the skin 2 times daily    INSULIN LISPRO, 1 UNIT DIAL, (HUMALOG KWIKPEN) 100 UNIT/ML SOPN    Inject 8 Units into the skin 3 times daily (before meals)    INSULIN PEN NEEDLE (B-D UF III MINI PEN NEEDLES) 31G X 5 MM MISC    1 each by Does not apply route daily    INSULIN PEN NEEDLE ([employer] PEN NEEDLES) 31G X 6 MM MISC    1 each by Does not apply route 5 times daily    INSULIN SYRINGE-NEEDLE U-100 (INSULIN SYRINGE 1CC/31GX5/16\") 31G X 5/16\" 1 ML MISC    Use once daily to administer Lantus    LANCETS MISC    Use TID    METOPROLOL SUCCINATE (TOPROL XL) 25 MG EXTENDED RELEASE TABLET    Take 1 tablet by mouth daily    OMEPRAZOLE (PRILOSEC) 20 MG DELAYED RELEASE CAPSULE    Take 1 capsule by mouth daily    SACUBITRIL-VALSARTAN (ENTRESTO) 24-26 MG PER TABLET    Take 1 tablet by mouth 2 times daily       ALLERGIES     Coreg [carvedilol] and Januvia [sitagliptin]    FAMILY HISTORY Family History   Problem Relation Age of Onset    Heart Disease Mother     Kidney Disease Mother     Hypertension Mother     Diabetes Mother     No Known Problems Sister     No Known Problems Brother     No Known Problems Brother           SOCIAL HISTORY       Social History     Socioeconomic History    Marital status: Legally      Spouse name: None    Number of children: None    Years of education: None    Highest education level: None   Tobacco Use    Smoking status: Former     Packs/day: 1.00     Years: 25.00     Pack years: 25.00     Types: Cigarettes     Quit date: 2008     Years since quittin.9    Smokeless tobacco: Never   Substance and Sexual Activity    Alcohol use: No    Drug use: Never    Sexual activity: Yes     Partners: Female       SCREENINGS    Audra Coma Scale  Eye Opening: Spontaneous  Best Verbal Response: Oriented  Best Motor Response: Obeys commands  Audra Coma Scale Score: 15 @FLOW([id number])@      PHYSICAL EXAM    (up to 7 for level 4, 8 or more for level 5)     ED Triage Vitals [22 0408]   BP Temp Temp Source Heart Rate Resp SpO2 Height Weight   108/72 98.4 °F (36.9 °C) Oral (!) 137 20 95 % 5' 11\" (1.803 m) (!) 330 lb 9.6 oz (150 kg)       Physical Exam  Vitals and nursing note reviewed. Constitutional:       Appearance: He is well-developed. He is not ill-appearing. HENT:      Head: Normocephalic. Nose: Nose normal.      Mouth/Throat:      Mouth: Mucous membranes are moist.   Eyes:      Conjunctiva/sclera: Conjunctivae normal.      Pupils: Pupils are equal, round, and reactive to light. Cardiovascular:      Rate and Rhythm: Regular rhythm. Tachycardia present. Heart sounds: Normal heart sounds. Pulmonary:      Effort: Pulmonary effort is normal.      Breath sounds: Normal breath sounds. Abdominal:      General: Bowel sounds are normal.      Palpations: Abdomen is soft. Tenderness: There is no abdominal tenderness. There is no guarding. Musculoskeletal:         General: Normal range of motion. Cervical back: Normal range of motion and neck supple. Right lower leg: No edema. Left lower leg: No edema. Skin:     General: Skin is warm and dry. Capillary Refill: Capillary refill takes less than 2 seconds. Neurological:      General: No focal deficit present. Mental Status: He is alert and oriented to person, place, and time. Psychiatric:         Mood and Affect: Mood normal.       DIAGNOSTIC RESULTS     EKG: All EKG's are interpreted by the Emergency Department Physician who either signs or Co-signsthis chart in the absence of a cardiologist.    EKG shows sinus tachycardia rate of 138. Versus SVT. There is a PVC noted which would be consistent with SVT. However the patient's rate is consistent and narrow complex. Normal axis. No acute ST or T wave changes. Abnormal EKG. EKG #2 shows sinus tachycardia rate of 114. Nonspecific ST-T wave changes. Normal axis. Abnormal EKG. No acute ischemia. RADIOLOGY:   Non-plain filmimages such as CT, Ultrasound and MRI are read by the radiologist. Plain radiographic images are visualized and preliminarily interpreted by the emergency physician with the below findings:      Interpretation per the Radiologist below, if available at the time ofthis note:    XR CHEST PORTABLE   Final Result   Increased interstitial opacities within the bilateral lower lungs, which   could represent mild pulmonary edema versus pneumonia. Clinical correlation   recommended.                ED BEDSIDE ULTRASOUND:   Performed by ED Physician - none    LABS:  Labs Reviewed   TROPONIN - Abnormal; Notable for the following components:       Result Value    Troponin 0.012 (*)     All other components within normal limits    Narrative:     Sina Velarde tel. [phone],  TROP results called to and read back by DR KARLY MEDINA, AN AFFILIATE OF [facility] [employer] [facility] [facility], 11/18/2022 05:47, by [facility]   CBC WITH AUTO DIFFERENTIAL - Abnormal; Notable for the following components:    RBC 4.45 (*)     Hemoglobin 12.5 (*)     Hematocrit 39.1 (*)     MCHC 32.0 (*)     RDW 16.8 (*)     All other components within normal limits   COMPREHENSIVE METABOLIC PANEL - Abnormal; Notable for the following components:    Sodium 134 (*)     Glucose 224 (*)     BUN 47 (*)     Creatinine 2.27 (*)     Est, Glom Filt Rate 31.3 (*)     Alkaline Phosphatase 145 (*)     All other components within normal limits    Narrative:     Sina Velarde tel. [phone],  TROP results called to and read back by DR KARLY MEDINA, AN AFFILIATE OF [facility], 11/18/2022 05:47, by [facility]   POCT GLUCOSE - Abnormal; Notable for the following components:    POC Glucose 154 (*)     All other components within normal limits   MAGNESIUM    Narrative:     Sina Velarde tel. [phone],  TROP results called to and read back by DR KARLY MEDINA, AN AFFILIATE OF [facility], 11/18/2022 05:47, by [facility]    Narrative:     Sina Velarde tel. [phone],  TROP results called to and read back by DR KARLY MEDINA, AN AFFILIATE OF [facility], 11/18/2022 05:47, by [facility]       All other labs were within normal range or not returned as of this dictation. EMERGENCY DEPARTMENT COURSE and DIFFERENTIAL DIAGNOSIS/MDM:   Vitals:    Vitals:    11/18/22 0615 11/18/22 0644 11/18/22 0648 11/18/22 0700   BP:  (!) 70/32 (!) 79/38 85/61   Pulse: (!) 120 (!) 117 (!) 117 (!) 121   Resp: 20 20 20 16   Temp:       TempSrc:       SpO2: 94% 94% 94% 100%   Weight:       Height:         ED Course as of 11/18/22 0718   Fri Nov 18, 2022   0615 Troponin(!!): 0.012 [RK]      ED Course User Index  [RK] Autumn Lynn MD     MDM  Patient with persistent tachycardia. Does not appear to be SVT as it responded to Lopressor. Chest x-ray is suggestive of mild congestive heart failure and he has the existing pulmonary embolism. Based on his abnormal vital signs and symptoms he will need re-admission and likely echocardiogram and other cardiology testing.     6:45am called to patient's room for diaphoresis and hypotension. Patient had a brief episode of diaphoresis and hypotension lasting around 10 minutes. Responding well to bolus of fluids. He remains tachycardic in the 115-120 range. Discussed the current patient's status/treatment plan with the hospitalist Dr. ALBERT [facility] [employer] and the patient's cardiologist Dr. Leia Schumacher. We are all in agreement the patient should be further anticoagulated and echocardiogram done this morning. Placed in the ICU due to his abnormal vital signs. CRITICAL CARE TIME   Total Critical Care time was 34 minutes, excluding separately reportableprocedures. There was a high probability of clinicallysignificant/life threatening deterioration in the patient's condition which required my urgent intervention. CONSULTS:  IP CONSULT TO CARDIOLOGY  IP CONSULT TO CRITICAL CARE    PROCEDURES:  Unless otherwise noted below, none     Procedures    FINAL IMPRESSION      1. Chest pain, unspecified type    2. Acute systolic congestive heart failure ([address]    3. Tachycardia          DISPOSITION/PLAN   DISPOSITION Admitted 11/18/2022 07:04:30 AM      PATIENT REFERRED TO:  No follow-up provider specified.     DISCHARGE MEDICATIONS:  New Prescriptions    No medications on file          (Please note that portions of this note were completed with a voice recognition program.  Efforts were made to edit the dictations but occasionally words are mis-transcribed.)    Angelo Medellin MD (electronically signed)  Attending Emergency Physician          Angelo Medellin MD  11/18/22 1570

## 2022-11-19 LAB
ANION GAP SERPL CALCULATED.3IONS-SCNC: 14 MEQ/L (ref 9–15)
APTT: >150 SEC (ref 24.4–36.8)
BASOPHILS ABSOLUTE: 0.1 K/UL (ref 0–0.2)
BASOPHILS RELATIVE PERCENT: 1.3 %
BILIRUBIN URINE: NEGATIVE
BLOOD, URINE: NEGATIVE
BUN BLDV-MCNC: 55 MG/DL (ref 8–23)
CALCIUM SERPL-MCNC: 8.6 MG/DL (ref 8.5–9.9)
CHLORIDE BLD-SCNC: 102 MEQ/L (ref 95–107)
CLARITY: CLEAR
CO2: 21 MEQ/L (ref 20–31)
COLOR: YELLOW
CREAT SERPL-MCNC: 3.3 MG/DL (ref 0.7–1.2)
EOSINOPHILS ABSOLUTE: 0.3 K/UL (ref 0–0.7)
EOSINOPHILS RELATIVE PERCENT: 3.8 %
GFR SERPL CREATININE-BSD FRML MDRD: 20 ML/MIN/{1.73_M2}
GLUCOSE BLD-MCNC: 133 MG/DL (ref 70–99)
GLUCOSE BLD-MCNC: 165 MG/DL (ref 70–99)
GLUCOSE BLD-MCNC: 45 MG/DL (ref 70–99)
GLUCOSE BLD-MCNC: 49 MG/DL (ref 70–99)
GLUCOSE BLD-MCNC: 66 MG/DL (ref 70–99)
GLUCOSE URINE: NEGATIVE MG/DL
HCT VFR BLD CALC: 37.2 % (ref 42–52)
HEMOGLOBIN: 12.1 G/DL (ref 14–18)
KETONES, URINE: ABNORMAL MG/DL
LEUKOCYTE ESTERASE, URINE: NEGATIVE
LYMPHOCYTES ABSOLUTE: 2.4 K/UL (ref 1–4.8)
LYMPHOCYTES RELATIVE PERCENT: 29.8 %
MCH RBC QN AUTO: 28.8 PG (ref 27–31.3)
MCHC RBC AUTO-ENTMCNC: 32.6 % (ref 33–37)
MCV RBC AUTO: 88.2 FL (ref 79–92.2)
MONOCYTES ABSOLUTE: 0.8 K/UL (ref 0.2–0.8)
MONOCYTES RELATIVE PERCENT: 10.2 %
NEUTROPHILS ABSOLUTE: 4.3 K/UL (ref 1.4–6.5)
NEUTROPHILS RELATIVE PERCENT: 54.9 %
NITRITE, URINE: NEGATIVE
PDW BLD-RTO: 17.1 % (ref 11.5–14.5)
PERFORMED ON: ABNORMAL
PH UA: 5 (ref 5–9)
PLATELET # BLD: 333 K/UL (ref 130–400)
POTASSIUM REFLEX MAGNESIUM: 4.5 MEQ/L (ref 3.4–4.9)
PROTEIN UA: NEGATIVE MG/DL
RBC # BLD: 4.21 M/UL (ref 4.7–6.1)
SODIUM BLD-SCNC: 137 MEQ/L (ref 135–144)
SPECIFIC GRAVITY UA: 1.01 (ref 1–1.03)
TROPONIN: 0.08 NG/ML (ref 0–0.01)
UROBILINOGEN, URINE: 0.2 E.U./DL
WBC # BLD: 7.9 K/UL (ref 4.8–10.8)

## 2022-11-19 PROCEDURE — 93005 ELECTROCARDIOGRAM TRACING: CPT | Performed by: INTERNAL MEDICINE

## 2022-11-19 PROCEDURE — 85730 THROMBOPLASTIN TIME PARTIAL: CPT

## 2022-11-19 PROCEDURE — 99221 1ST HOSP IP/OBS SF/LOW 40: CPT | Performed by: INTERNAL MEDICINE

## 2022-11-19 PROCEDURE — 36415 COLL VENOUS BLD VENIPUNCTURE: CPT

## 2022-11-19 PROCEDURE — 84484 ASSAY OF TROPONIN QUANT: CPT

## 2022-11-19 PROCEDURE — 99233 SBSQ HOSP IP/OBS HIGH 50: CPT | Performed by: INTERNAL MEDICINE

## 2022-11-19 PROCEDURE — 94664 DEMO&/EVAL PT USE INHALER: CPT

## 2022-11-19 PROCEDURE — 81003 URINALYSIS AUTO W/O SCOPE: CPT

## 2022-11-19 PROCEDURE — 6370000000 HC RX 637 (ALT 250 FOR IP): Performed by: INTERNAL MEDICINE

## 2022-11-19 PROCEDURE — 85025 COMPLETE CBC W/AUTO DIFF WBC: CPT

## 2022-11-19 PROCEDURE — 2700000000 HC OXYGEN THERAPY PER DAY

## 2022-11-19 PROCEDURE — 2580000003 HC RX 258: Performed by: INTERNAL MEDICINE

## 2022-11-19 PROCEDURE — 2060000000 HC ICU INTERMEDIATE R&B

## 2022-11-19 PROCEDURE — 6360000002 HC RX W HCPCS: Performed by: INTERNAL MEDICINE

## 2022-11-19 PROCEDURE — 80048 BASIC METABOLIC PNL TOTAL CA: CPT

## 2022-11-19 RX ORDER — INSULIN GLARGINE 100 [IU]/ML
20 INJECTION, SOLUTION SUBCUTANEOUS NIGHTLY
Status: DISCONTINUED | OUTPATIENT
Start: 2022-11-20 | End: 2022-11-20 | Stop reason: HOSPADM

## 2022-11-19 RX ORDER — ATORVASTATIN CALCIUM 80 MG/1
80 TABLET, FILM COATED ORAL NIGHTLY
Status: DISCONTINUED | OUTPATIENT
Start: 2022-11-19 | End: 2022-11-20 | Stop reason: HOSPADM

## 2022-11-19 RX ORDER — ASPIRIN 81 MG/1
81 TABLET ORAL DAILY
Status: DISCONTINUED | OUTPATIENT
Start: 2022-11-19 | End: 2022-11-20 | Stop reason: HOSPADM

## 2022-11-19 RX ADMIN — METOPROLOL TARTRATE 12.5 MG: 25 TABLET, FILM COATED ORAL at 21:22

## 2022-11-19 RX ADMIN — METOPROLOL TARTRATE 12.5 MG: 25 TABLET, FILM COATED ORAL at 09:28

## 2022-11-19 RX ADMIN — HEPARIN SODIUM AND DEXTROSE 13 UNITS/KG/HR: 10000; 5 INJECTION INTRAVENOUS at 07:46

## 2022-11-19 RX ADMIN — APIXABAN 5 MG: 5 TABLET, FILM COATED ORAL at 21:22

## 2022-11-19 RX ADMIN — ATORVASTATIN CALCIUM 80 MG: 80 TABLET, FILM COATED ORAL at 21:22

## 2022-11-19 RX ADMIN — SACUBITRIL AND VALSARTAN 1 TABLET: 24; 26 TABLET, FILM COATED ORAL at 21:21

## 2022-11-19 RX ADMIN — SACUBITRIL AND VALSARTAN 1 TABLET: 24; 26 TABLET, FILM COATED ORAL at 11:22

## 2022-11-19 RX ADMIN — APIXABAN 5 MG: 5 TABLET, FILM COATED ORAL at 09:28

## 2022-11-19 RX ADMIN — Medication 10 ML: at 21:21

## 2022-11-19 RX ADMIN — ASPIRIN 81 MG: 81 TABLET, COATED ORAL at 09:28

## 2022-11-19 ASSESSMENT — PAIN SCALES - GENERAL
PAINLEVEL_OUTOF10: 0

## 2022-11-19 NOTE — FLOWSHEET NOTE
1700- This RN retrieved patient from ED and brought to ICU. Family at bedside in ED and escorted to ICU waiting room until assessment completed. EKG obtained d/t fast HR in 130s. EKG showed sinus tach. PP +2. BLE edema +1. Dr. Army Owens notified and no new orders received. Patient is alert oriented, and resting in bed comfortably. Skin is intact and mepis applied to sacrum and heels. Patient wearing 2L NC for comfort and lung sounds are clear but diminished bilaterally. Heparin infusing at initial dose and labs obtained for xa and aptt. Assessment completed and family brought back to bedside for admission translation. Patients bedside table and call light in reach. 1845- Secure message sent to Dr. Angel Parker for elevated troponin 0.034. Dr. Angel Parker updated on HR 120s-130s and no new orders received. 1900- Heparin bolus 5000 units given and infusion increased +2 units/kg/hr based on appt algorithm. Report given to Damon Lim RN.        Electronically signed by Pako Wilson RN on 11/18/2022 at 7:30 PM

## 2022-11-19 NOTE — PROGRESS NOTES
0800 Assessment complete. Patient remains alert and oriented x 4. Denies pain or shortness of breath. On 2 liters oxygen per nasal cannula and maintaining pulsox in mid to upper 90's. NSR on telemetry and BP stable. Heparin infusing per MAR. Dr. Vila Living to bedside and updated - patient ok to resume diet and for transfer to [facility] Dr. Ramirez Stands to bedside and updated. Heparin drip discontinued and eliquis resumed. Patient resting comfortably with no complaints. 1110 Patient's blood glucose 45. Given juice and sandwich. 1200 Patient ate snack as well as lunch tray. Continues to deny pain or SOB. Family at bedside visiting. 1400 Patient transferred to [facility] by wheelchair. Family remains at bedside.

## 2022-11-19 NOTE — PROGRESS NOTES
Department of Internal Medicine  General Internal Medicine  Attending Progress Note      SUBJECTIVE:  Pt seen and examined. BP improved. No chest pain.  Restarted Eliquis per cardiology    OBJECTIVE      Medications    Current Facility-Administered Medications: aspirin EC tablet 81 mg, 81 mg, Oral, Daily  atorvastatin (LIPITOR) tablet 80 mg, 80 mg, Oral, Nightly  metoprolol tartrate (LOPRESSOR) tablet 12.5 mg, 12.5 mg, Oral, BID  apixaban (ELIQUIS) tablet 5 mg, 5 mg, Oral, BID  sacubitril-valsartan (ENTRESTO) 24-26 MG per tablet 1 tablet, 1 tablet, Oral, BID  [START ON 11/20/2022] insulin glargine (LANTUS) injection vial 20 Units, 20 Units, SubCUTAneous, Nightly  sodium chloride flush 0.9 % injection 5-40 mL, 5-40 mL, IntraVENous, 2 times per day  sodium chloride flush 0.9 % injection 5-40 mL, 5-40 mL, IntraVENous, PRN  0.9 % sodium chloride infusion, , IntraVENous, PRN  ondansetron (ZOFRAN-ODT) disintegrating tablet 4 mg, 4 mg, Oral, Q8H PRN **OR** ondansetron (ZOFRAN) injection 4 mg, 4 mg, IntraVENous, Q6H PRN  polyethylene glycol (GLYCOLAX) packet 17 g, 17 g, Oral, Daily PRN  acetaminophen (TYLENOL) tablet 650 mg, 650 mg, Oral, Q6H PRN **OR** acetaminophen (TYLENOL) suppository 650 mg, 650 mg, Rectal, Q6H PRN  ipratropium-albuterol (DUONEB) nebulizer solution 1 ampule, 1 ampule, Inhalation, Q4H PRN  heparin (porcine) injection 10,000 Units, 10,000 Units, IntraVENous, PRN  heparin (porcine) injection 5,000 Units, 5,000 Units, IntraVENous, PRN  insulin lispro (HUMALOG) injection vial 0-8 Units, 0-8 Units, SubCUTAneous, TID WC  insulin lispro (HUMALOG) injection vial 0-4 Units, 0-4 Units, SubCUTAneous, Nightly  glucose chewable tablet 16 g, 4 tablet, Oral, PRN  dextrose bolus 10% 125 mL, 125 mL, IntraVENous, PRN **OR** dextrose bolus 10% 250 mL, 250 mL, IntraVENous, PRN  glucagon (rDNA) injection 1 mg, 1 mg, SubCUTAneous, PRN  dextrose 10 % infusion, , IntraVENous, Continuous PRN  Physical    VITALS:  /64 Pulse 77   Temp 98.2 °F (36.8 °C) (Oral)   Resp 13   Ht 5' 11\" (1.803 m)   Wt (!) 333 lb 1.8 oz (151.1 kg)   SpO2 99%   BMI 46.46 kg/m²   Constitutional: Awake and alert in no acute distress. Lying in bed comfortably, morbidly obese  Head: Normocephalic, atraumatic  Eyes: EOMI, PERRLA  ENT: moist mucous membranes, nasal cannula  Neck: neck supple, trachea midline  Lungs: Good inspiratory effort, no wheeze, no rhonchi, no rales  Heart: tachycardic, normal S1 and S2  GI: Soft, non-distended, non tender, no guarding, no rebound, +BS  MSK: no edema noted  Skin: warm, dry  Psych: appropriate affect   Data    CBC:   Lab Results   Component Value Date/Time    WBC 7.9 11/19/2022 04:29 AM    RBC 4.21 11/19/2022 04:29 AM    HGB 12.1 11/19/2022 04:29 AM    HCT 37.2 11/19/2022 04:29 AM    MCV 88.2 11/19/2022 04:29 AM    MCH 28.8 11/19/2022 04:29 AM    MCHC 32.6 11/19/2022 04:29 AM    RDW 17.1 11/19/2022 04:29 AM     11/19/2022 04:29 AM     CMP:    Lab Results   Component Value Date/Time     11/19/2022 04:29 AM    K 4.5 11/19/2022 04:29 AM     11/19/2022 04:29 AM    CO2 21 11/19/2022 04:29 AM    BUN 55 11/19/2022 04:29 AM    CREATININE 3.30 11/19/2022 04:29 AM    GFRAA 35.6 10/13/2022 06:45 PM    LABGLOM 20.0 11/19/2022 04:29 AM    GLUCOSE 49 11/19/2022 04:29 AM    GLUCOSE 96 11/14/2022 11:22 AM    PROT 6.7 11/18/2022 04:45 AM    LABALBU 3.5 11/18/2022 04:45 AM    LABALBU 3.3 11/14/2022 11:22 AM    CALCIUM 8.6 11/19/2022 04:29 AM    BILITOT 0.3 11/18/2022 04:45 AM    ALKPHOS 145 11/18/2022 04:45 AM    AST 23 11/18/2022 04:45 AM    ALT 22 11/18/2022 04:45 AM       ASSESSMENT AND PLAN      # Chest pain  - ruled out ACS  - initial troponin 0.012. Flatl trend  - recent PE  - cardiology consulted - no repeat echo as one done last admission  - medical management     # Hypotension- resolved  - possibly cardiogenic. Given small amount of fluids due to CHF. Initially admitted to the ICU. Not requiring pressors. Kezia De La Rosa for transfer to the floor  - monitor volume status closely  - intensivist consulted     # Hx of PE  - heparin drip started initially in case of intervention. Resumed Eliquis per cardiology      # HTN/HLD  - resume BP meds. Cont statin     # IDDM  - cont Lantus, ISS  - decreased lantus frequency to daily due to hypoglycemia     # WOOD on CKD3  - baseline Cr 2.3. 3.30 today  - nephrology consulted  - avoid nephrotoxic meds    DVT: Eliquis    Disposition: Hypotension, possibly due to dehydration as pt with poor po intake recently. BP now improved. Move to the floor later today. Now with WOOD likely due to hypotension which is now improved. Nephrology consulted. Will consider discharge once renal function improved and BP remains stable.        Samanta Bill DO  Internal Medicine   Hospitalist    >35 minutes in total care time

## 2022-11-19 NOTE — PROGRESS NOTES
Shift summary:  1900: Bedside report received from Rodney Erickson RN. Heparin bouls given (see emar) and heparin gtt increased +2 units/kg/hr, dual sign off with Rodney Erickson RN.  2000: Head to toe assessment complete with the use of Ipad , patient is alert and oriented x4, Denied any chest pain or shortness of breath at this time. Patient stated he has not urinated since 3 am yesterday. Patient given the urinal in attempt to void but was not able to. Bladder scanner showed 210mls of urine in the bladder at this time. Patient remains tachycardic in the 120's at this time. 2100: Patient in normal sinus rhythm at this time HR in the 80's. (See flowsheets for vitals.)  5642-5227: Uneventful, patient when asleep oxygen saturation will drop to the high 80's, but will come back up to the high 90's when awoken. 0000: Head to toe reassessment complete, no changes documented. 0100: Called down to lab to notify that a patient needs blood drawn, they notified this RN they will send someone. 0110: In room using  to let patient know we will be drawing blood soon. Patient has no questions for this RN and is calm, and denies pain. 0134: Perfect served Dr. Arpit Ortiz regarding patient's inability to void, and bladder scan amount of 450ml's, no orders received at this time. 0145: 45 minutes after calling lab for a lab draw as scheduled, no  has drawn the blood, Called lab notifying them that the blood has not been drawn yet and they stated they are sending someone up again. 0215: Called lab for a 3rd to notify them that no one has come up to draw the labs. 0245:Phlebotomist in room at this time drawing coagulation lab.  0353: APTT resulted, call received from [facility] in lab reporting APTT >150 at this time.  notified of APTT result. Heparin gtt on hold at this time. 0530: Heparin gtt decreased by 3units/kg/hr and restarted, dual signoff with Dylan.   0710: Handoff bedside report given to Keshawn Fermin, [facility].

## 2022-11-19 NOTE — CONSULTS
Spiritual Care Services     Summary of Visit:    Pt is being transferred to [address] [place] [address], pt son was present in the room, the pt was given a copy of HCPOA in [address] [place] [address] for review, pt not ready to complete document at this time,     Encounter Summary  Encounter Overview/Reason : Initial Encounter, Advance Care Planning  Service Provided For[de-identified] Patient and family together  Referral/Consult From[de-identified] Physician, Nurse  Support System: Children, Family members  Complexity of Encounter: Moderate  Begin Time: 1300  End Time : 1315  Total Time Calculated: 15 min        Spiritual/Emotional needs  Type: Spiritual Support                 Advance Care Planning  Type: ACP conversation (Pt was given hard copy of HCPOA for review, pt being transferred to [address] [place] [address])    Spiritual Assessment/Intervention/Outcomes:    Assessment: Calm    Intervention: Active listening    Outcome: Receptive      Care Plan:    Plan and Referrals  Plan/Referrals: Continue to visit, (comment)    HCPOA as needed      Spiritual Care Services   Electronically signed by Chaplain hSawn on 11/19/2022 at 1:47 PM.    To reach a  for emotional and spiritual support, place an [facility] consult request.   If a  is needed immediately, dial 0 and ask to page the on-call .

## 2022-11-19 NOTE — PROGRESS NOTES
[facility] AT [place] Respiratory Therapy Evaluation   Current Order:  duo q4 prn      Home Regimen: no      Ordering Physician: maria ines  Re-evaluation Date:  eval done     Diagnosis: hypotension        Patient Status: Stable / Unstable + Physician notified    The following MDI Criteria must be met in order to convert aerosol to MDI with spacer. If unable to meet, MDI will be converted to aerosol:  []  Patient able to demonstrate the ability to use MDI effectively  []  Patient alert and cooperative  []  Patient able to take deep breath with 5-10 second hold  []  Medication(s) available in this delivery method   []  Peak flow greater than or equal to 200 ml/min            Current Order Substituted To  (same drug, same frequency)   Aerosol to MDI [] Albuterol Sulfate 0.083% unit dose by aerosol Albuterol Sulfate MDI 2 puffs by inhalation with spacer    [] Levalbuterol 1.25 mg unit dose by aerosol Levalbuterol MDI 2 puffs by inhalation with spacer    [] Levalbuterol 0.63 mg unit dose by aerosol Levalbuterol MDI 2 puffs by inhalation with spacer    [] Ipratropium Bromide 0.02% unit dose by aerosol Ipratropium Bromide MDI 2 puffs by inhalation with spacer    [] Duoneb (Ipratropium + Albuterol) unit dose by aerosol Ipratropium MDI + Albuterol MDI 2 puffs by inhalation w/spacer   MDI to Aerosol [] Albuterol Sulfate MDI Albuterol Sulfate 0.083% unit dose by aerosol    [] Levalbuterol MDI 2 puffs by inhalation Levalbuterol 1.25 mg unit dose by aerosol    [] Ipratropium Bromide MDI by inhalation Ipratropium Bromide 0.02% unit dose by aerosol    [] Combivent (Ipratropium + Albuterol) MDI by inhalation Duoneb (Ipratropium + Albuterol) unit dose by aerosol       Treatment Assessment [Frequency/Schedule]:  Change frequency to: ______________no change____________________________________per Protocol, P&T, MEC      Points 0 1 2 3 4   Pulmonary Status  Non-Smoker  []   Smoking history   < 20 pack years  []   Smoking history  ?  20 pack years  []   Pulmonary Disorder  (acute or chronic)  [x]   Severe or Chronic w/ Exacerbation  []     Surgical Status No [x]   Surgeries     General []   Surgery Lower []   Abdominal Thoracic or []   Upper Abdominal Thoracic with  PulmonaryDisorder  []     Chest X-ray Clear/Not  Ordered     []  Chronic Changes  Results Pending  []  Infiltrates, atelectasis, pleural effusion, or edema  [x]  Infiltrates in more than one lobe []  Infiltrate + Atelectasis, &/or pleural effusion  []    Respiratory Pattern Regular,  RR = 12-20 []  Increased,  RR = 21-25 []  [place], irregular,  or RR = 26-30 []  Decreased FEV1  or RR = 31-35 []  Severe SOB, use  of accessory muscles, or RR ? 35  []    Mental Status Alert, oriented,  Cooperative [x]  Confused but Follows commands []  Lethargic or unable to follow commands []  Obtunded  []  Comatose  []    Breath Sounds Clear to  auscultation  [x]  Decreased unilaterally or  in bases only []  Decreased  bilaterally  []  Crackles or intermittent wheezes []  Wheezes []    Cough Strong, Spontan., & nonproductive [x]  Strong,  spontaneous, &  productive []  Weak,  Nonproductive []  Weak, productive or  with wheezes []  No spontaneous  cough or may require suctioning []    Level of Activity Ambulatory [x]  Ambulatory w/ Assist  []  Non-ambulatory []  Paraplegic []  Quadriplegic []    Total    Score:__5_____     Triage Score:_____5___      Tri       Triage:     1. (>20) Freq: Q3    2. (16-20) Freq: Q4   3. (11-15) Freq: QID & Albuterol Q2 PRN    4. (6-10) Freq: TID & Albuterol Q2 PRN    5. (0-5) Freq Q4prn

## 2022-11-19 NOTE — CONSULTS
Inpatient consult to Cardiology  Consult performed by:  Jenifer Howard MD  Consult ordered by: Richa Sandra DO        Patient Name: Judy Nielson Date: 2022  4:27 AM  MR #: [id number]  : 1960    Attending Physician: Richa Sandra DO  Reason for consult: Chest pain    History of Presenting Illness:      Dionicio Johnson is a 57 y.o. male on hospital day 1 who follows up in clinic with Dr. Regis Meigs with a history of PE on Eliquis, history of nonischemic cardiomyopathy status post ICD, morbid obese, hypertension, hyperlipidemia, LON, PAD who was admitted to the hospital for chest pain    Troponins detected at 0.03 which are flat and nonsignificant (troponins are actually coming down from 0.12 weeks ago)  EKG sinus rhythm without signs of ischemia      TTE 11/3/2022 EF 45%    History Obtained From:  patient, electronic medical record  History:      Past Medical History:   Diagnosis Date    Acute kidney injury superimposed on CKD ([address] 08/15/2019    Anxiety and depression 2019    Cardiomyopathy ([address]     Chronic systolic heart failure ([address] 2019    Diabetes mellitus ([address]     Diabetic neuropathy associated with type 2 diabetes mellitus ([address] 2017    Heart failure, NYHA class 4 ([address]     Hyperlipidemia     Hypertension     ICD (implantable cardioverter-defibrillator) in place 2019    Left ureteral calculus     Morbid obesity ([address] 2017    LON (obstructive sleep apnea)     Pulmonary embolism ([address]     ON ELIQUIS    Pyelonephritis 2019    Ureteral calculus     Ureteric stone      Past Surgical History:   Procedure Laterality Date    CARDIAC DEFIBRILLATOR PLACEMENT  2008    CYSTOSCOPY INSERTION / REMOVAL STENT / STONE Left 2019    CYSTOSCOPY LEFT DOUBLE J STENT PLACEMENT ROOM 287 performed by Main Benson MD at [place] Left 2019    LEFT ESWL (CONF # [id number]) performed by Main Benson MD at [address] [place] [address] 7/10/2019    HOLMIUM LASER LITHOTRIPSY, STONE EXTRACTION, LT URETERAL STENT INSERTION.  performed by Daylin Garcia MD at [address] [place] Left 7/10/2019    LEFT FLEXIBLE URETEROSCOPY, CYSTOSCOPY RETROGRADE performed by Daylin Garcia MD at [address] History  Family History   Problem Relation Age of Onset    Heart Disease Mother     Kidney Disease Mother     Hypertension Mother     Diabetes Mother     No Known Problems Sister     No Known Problems Brother     No Known Problems Brother      [] Unable to obtain due to ventilated and/ or neurologic status  Social History     Socioeconomic History    Marital status: Legally      Spouse name: Not on file    Number of children: Not on file    Years of education: Not on file    Highest education level: Not on file   Occupational History    Not on file   Tobacco Use    Smoking status: Former     Packs/day: 1.00     Years: 25.00     Pack years: 25.00     Types: Cigarettes     Quit date: 2008     Years since quittin.9    Smokeless tobacco: Never   Substance and Sexual Activity    Alcohol use: No    Drug use: Never    Sexual activity: Yes     Partners: Female   Other Topics Concern    Not on file   Social History Narrative    Not on file     Social Determinants of Health     Financial Resource Strain: Not on file   Food Insecurity: Not on file   Transportation Needs: Not on file   Physical Activity: Not on file   Stress: Not on file   Social Connections: Not on file   Intimate Partner Violence: Not on file   Housing Stability: Not on file      Home Medications:      Medications Prior to Admission: insulin glargine (LANTUS SOLOSTAR) 100 UNIT/ML injection pen, Inject 20 Units into the skin 2 times daily  insulin lispro, 1 Unit Dial, (HUMALOG KWIKPEN) 100 UNIT/ML SOPN, Inject 8 Units into the skin 3 times daily (before meals)  Insulin Pen Needle ([employer] PEN NEEDLES) 31G X 6 MM MISC, 1 each by Does not apply route 5 times daily  blood glucose monitor strips, Test 5 times a day & as needed for symptoms of irregular blood glucose. Dispense sufficient amount for indicated testing frequency plus additional to accommodate PRN testing needs. apixaban starter pack (ELIQUIS DVT/PE STARTER PACK) 5 MG TBPK tablet, Take 1 tablet by mouth See Admin Instructions  hydrALAZINE (APRESOLINE) 25 MG tablet, Take 1 tablet by mouth every 8 hours  metoprolol succinate (TOPROL XL) 25 MG extended release tablet, Take 1 tablet by mouth daily  sacubitril-valsartan (ENTRESTO) 24-26 MG per tablet, Take 1 tablet by mouth 2 times daily  furosemide (LASIX) 40 MG tablet, TAKE 1 TABLET BY MOUTH EVERY DAY. HOLD UNTIL SEEN BY PCP FOR FOLLOWUP (Patient taking differently: TAKE 1 TABLET BY MOUTH EVERY DAY.)  aspirin (ASPIRIN ADULT LOW STRENGTH) 81 MG EC tablet, TAKE 1 TABLET BY MOUTH DAILY  atorvastatin (LIPITOR) 40 MG tablet, Take 1 tablet by mouth at bedtime  Insulin Pen Needle (B-D UF III MINI PEN NEEDLES) 31G X 5 MM MISC, 1 each by Does not apply route daily  fenofibrate (TRIGLIDE) 160 MG tablet, TAKE 1 TABLET BY MOUTH EVERY NIGHT  albuterol (PROVENTIL) (2.5 MG/3ML) 0.083% nebulizer solution, Albuterol albuterol (PROVENTIL) (2.5 MG/3ML) 0.083% nebulizer solution Indications: Type 2 diabetes mellitus without complication, with long-term current use of insulin (HCC) Take 3 mLs by nebulization 4 times daily 120 each 5 10/11/2018 [id number]  gabapentin (NEURONTIN) 300 MG capsule, Take by mouth nightly.   omeprazole (PRILOSEC) 20 MG delayed release capsule, Take 1 capsule by mouth daily  Blood Glucose Monitoring Suppl (ONE TOUCH ULTRA MINI) w/Device KIT, 1 kit by Does not apply route three times daily  Lancets MISC, Use TID  Insulin Syringe-Needle U-100 (INSULIN SYRINGE 1CC/31GX5/16\") 31G X 5/16\" 1 ML MISC, Use once daily to administer Lantus    Current Hospital Medications:   Scheduled Meds:   aspirin  81 mg Oral Daily    atorvastatin  80 mg Oral Nightly    metoprolol tartrate  12.5 mg Oral BID    apixaban  5 mg Oral BID    sodium chloride flush  5-40 mL IntraVENous 2 times per day    insulin glargine  20 Units SubCUTAneous BID    insulin lispro  0-8 Units SubCUTAneous TID WC    insulin lispro  0-4 Units SubCUTAneous Nightly     Continuous Infusions:   sodium chloride      dextrose       PRN Meds:.sodium chloride flush, sodium chloride, ondansetron **OR** ondansetron, polyethylene glycol, acetaminophen **OR** acetaminophen, ipratropium-albuterol, heparin (porcine), heparin (porcine), glucose, dextrose bolus **OR** dextrose bolus, glucagon (rDNA), dextrose   sodium chloride      dextrose        Allergies: Allergies   Allergen Reactions    Coreg [Carvedilol] Other (See Comments)     \"hyper\", rapid pulse    Januvia [Sitagliptin]       Review of Systems:       [x] CV, Resp, Neuro, , and all other systems reviewed and negative other than listed in HPI. Objective Findings:     Vitals:   Vitals:    11/19/22 0700 11/19/22 0800 11/19/22 0812 11/19/22 0900   BP: (!) 107/50 (!) 125/56     Pulse: 89 85 86 88   Resp: 13 16 17 16   Temp:  97.8 °F (36.6 °C)     TempSrc:  Oral     SpO2: 97% 94% 94% 97%   Weight:       Height:            Physical Examination:  General: Alert oriented x3 no acute distress  HEENT: Normocephalic, normal scleral icterus. Neck: No JVD. Heart: Regular, no murmur, no rub/gallop. No RV heave. Lungs: Clear to ascultation, no rales/wheezing/rhonchi. Good chest wall excursion. Abdomen: Obese protuberant abdomen, soft nontender nondistended  Extremities: No clubbing/cyanosis, no edema. 1+ pitting edema bilaterally  Skin: Warm, dry, normal turgor, no rash, no bruise, no petichiae. Neuro: No myoclonus or tremor.    Psych: Normal affect    Results/ Medications reviewed 11/19/2022, 9:44 AM     Laboratory, Microbiology, Pathology, Radiology, Cardiology, Medications and Transcriptions reviewed  Scheduled Meds:   aspirin  81 mg Oral Daily atorvastatin  80 mg Oral Nightly    metoprolol tartrate  12.5 mg Oral BID    apixaban  5 mg Oral BID    sodium chloride flush  5-40 mL IntraVENous 2 times per day    insulin glargine  20 Units SubCUTAneous BID    insulin lispro  0-8 Units SubCUTAneous TID WC    insulin lispro  0-4 Units SubCUTAneous Nightly     Continuous Infusions:   sodium chloride      dextrose         Recent Labs     11/18/22  0445 11/19/22  0429   WBC 8.8 7.9   HGB 12.5* 12.1*   HCT 39.1* 37.2*   MCV 87.9 88.2    333     Recent Labs     11/18/22  0445 11/19/22  0429   * 137   K 4.2 4.5   CL 98 102   CO2 21 21   BUN 47* 55*   CREATININE 2.27* 3.30*     Recent Labs     11/18/22 0445 11/18/22  1634   PROT 6.7  --    INR  --  1.2     No results found for this or any previous visit. Impression:   Positive troponins. Troponins flat and not significant at 0.03 in the setting of worsening creatinine  Heart failure with improved ejection fraction status post ICD  History of PE on Eliquis  Diabetes  Hypertension  Hyperlipidemia  COPD  LON  PAD    TTE 11/3/2022 EF 45%  Plan:   Okay to transfer patient to a telemetry floor bed  Start aspirin and atorvastatin for secondary prevention of PAD  Start metoprolol  Start Entresto  Resume Eliquis  No echocardiogram needed at this time patient underwent echocardiogram 2 weeks ago with borderline EF  Ischemic evaluation as an outpatient  Please keep potassium between 4 and 5 magnesium above 2  Please keep hemoglobin above 8  Recommend nephrology consult  Diuretic management as per nephrology  Comments: Thank you for allowing us to participate in the care of this patient. Will continue to follow. Please call if questions or concerns arise.     Electronically signed by Sadaf Adams MD on 11/19/2022 at 9:44 AM    Please note this report has been partially produced using speech recognition software and may cause contain errors related to that system including grammar, punctuation and spelling as well as words and phrases that may seem inappropriate. If there are questions or concerns please feel free to contact me to clarify.

## 2022-11-19 NOTE — PROGRESS NOTES
Pulmonary & Critical Care Medicine ICU Progress Note  Chief complaint : Chest pain    Subjunctive/24 hour events :   Patient seen and examined during multidisciplinary rounds with RN, charge nurse, RT, pharmacy, dietitian, and social service. Patient had an uneventful night. He is currently on 2 L nasal cannula O2 sats 97%. He was on a heparin drip that has been DC'd and Eliquis resumed. No fever overnight and urine output 750. He is tolerating a p.o. diet and last bowel movement 2022. Social History     Tobacco Use    Smoking status: Former     Packs/day: 1.00     Years: 25.00     Pack years: 25.00     Types: Cigarettes     Quit date: 2008     Years since quittin.9    Smokeless tobacco: Never   Substance Use Topics    Alcohol use: No         Problem Relation Age of Onset    Heart Disease Mother     Kidney Disease Mother     Hypertension Mother     Diabetes Mother     No Known Problems Sister     No Known Problems Brother     No Known Problems Brother        No results for input(s): PHART, MYO7JFS, PO2ART in the last 67 hours. MV Settings:     / / /            IV:   sodium chloride      dextrose         Vitals:  BP (!) 125/56   Pulse 88   Temp 97.8 °F (36.6 °C) (Oral)   Resp 16   Ht 5' 11\" (1.803 m)   Wt (!) 333 lb 1.8 oz (151.1 kg)   SpO2 97%   BMI 46.46 kg/m²    Tmax:       Intake/Output Summary (Last 24 hours) at 2022 1004  Last data filed at 2022 0911  Gross per 24 hour   Intake 496.16 ml   Output 750 ml   Net -253.84 ml       EXAM:    General: alert, cooperative  Head: normocephalic, atraumatic  Eyes:No gross abnormalities. ENT:  MMM no lesions  Neck:  supple and no masses  Chest : Good air movement no wheezing no rales nontender tympanic  Heart[de-identified] Heart sounds are normal.  Regular rate and rhythm without murmur, gallop or rub.   ABD:  bowel sounds normal, soft, non-tender  Musculoskeletal : no cyanosis, no clubbing, and no edema  Neuro:  Grossly normal  Skin: No rashes or nodules noted. Lymph node:  no cervical nodes  Urology: No Bob   Psychiatric: appropriate    Medications:  Scheduled Meds:   aspirin  81 mg Oral Daily    atorvastatin  80 mg Oral Nightly    metoprolol tartrate  12.5 mg Oral BID    apixaban  5 mg Oral BID    sacubitril-valsartan  1 tablet Oral BID    sodium chloride flush  5-40 mL IntraVENous 2 times per day    insulin glargine  20 Units SubCUTAneous BID    insulin lispro  0-8 Units SubCUTAneous TID WC    insulin lispro  0-4 Units SubCUTAneous Nightly       PRN Meds:  sodium chloride flush, sodium chloride, ondansetron **OR** ondansetron, polyethylene glycol, acetaminophen **OR** acetaminophen, ipratropium-albuterol, heparin (porcine), heparin (porcine), glucose, dextrose bolus **OR** dextrose bolus, glucagon (rDNA), dextrose    Results: reviewed by me   CBC:   Recent Labs     11/18/22  0445 11/19/22  0429   WBC 8.8 7.9   HGB 12.5* 12.1*   HCT 39.1* 37.2*   MCV 87.9 88.2    333     BMP:   Recent Labs     11/18/22  0445 11/19/22  0429   * 137   K 4.2 4.5   CL 98 102   CO2 21 21   BUN 47* 55*   CREATININE 2.27* 3.30*     LIVER PROFILE:   Recent Labs     11/18/22  0445   AST 23   ALT 22   BILITOT 0.3   ALKPHOS 145*     PT/INR:   Recent Labs     11/18/22  1634   PROTIME 15.3*   INR 1.2     APTT:   Recent Labs     11/18/22  1402 11/18/22  1634 11/19/22  0316   APTT 120.1* 63.0* >150.0*     UA:  Recent Labs     11/19/22  0657   COLORU Yellow   PHUR 5.0   CLARITYU Clear   SPECGRAV 1.015   LEUKOCYTESUR Negative   UROBILINOGEN 0.2   BILIRUBINUR Negative   BLOODU Negative   GLUCOSEU Negative       Cultures:    CT CHEST WO CONTRAST    Result Date: 11/3/2022  EXAMINATION: CT OF THE CHEST WITHOUT CONTRAST 11/3/2022 12:13 pm TECHNIQUE: CT of the chest was performed without the administration of intravenous contrast. Multiplanar reformatted images are provided for review.  Automated exposure control, iterative reconstruction, and/or weight based adjustment of the mA/kV was utilized to reduce the radiation dose to as low as reasonably achievable. COMPARISON: Chest x-ray 11/02/2022 HISTORY: ORDERING SYSTEM PROVIDED HISTORY: Abnormal chest x-ray TECHNOLOGIST PROVIDED HISTORY: Reason for exam:->Abnormal chest x-ray What reading provider will be dictating this exam?->CRC FINDINGS: Mediastinum: Cardiomegaly. No pericardial effusion. Pacemaker wires. Atherosclerotic disease. No thoracic aortic aneurysm. Enlarged pulmonary trunk measuring roughly 4 cm, however motion artifact limits evaluation. Subcentimeter short axis lymph nodes. Unremarkable visualized thyroid and esophagus. Lungs/pleura: No pleural effusion or pneumothorax. Low lung volumes. Expiratory phase appearance of the trachea. Mild bilateral bronchial wall thickening. Bilateral perihilar mild ground-glass opacity. Linear bands of atelectasis versus scarring in both lungs. No solid pulmonary consolidation. Upper Abdomen: Atherosclerotic disease. Left renal scarring/atrophy, partially imaged. Soft Tissues/Bones: Degenerative changes of the spine. DISH. Degenerative changes of the shoulders, partially imaged. Minimal pulmonary vascular congestion. Cardiomegaly. Enlarged pulmonary trunk is suggestive of pulmonary arterial hypertension. Atherosclerotic disease. Pacemaker. NM LUNG VENT/PERFUSION (VQ)    Result Date: 11/3/2022  EXAMINATION: NUCLEAR MEDICINE VENTILATION PERFUSION SCAN. 11/3/2022 TECHNIQUE: 1 millicuries aerosolized Tc99m DTPA was administered via mask prior to planar imaging of the lungs in multiple projections. Then, 5.3 millicuries of Tc 08H MAA was administered intravenously prior to planar imaging of the lungs in similar projections. COMPARISON: Chest radiograph 11/02/2022.  HISTORY: ORDERING SYSTEM PROVIDED HISTORY: R/O PE TECHNOLOGIST PROVIDED HISTORY: Reason for exam:->R/O PE What reading provider will be dictating this exam?->CRC FINDINGS: VENTILATION: Heterogeneous distribution of radiopharmaceutical is seen bilaterally. Aggregation of radiopharmaceutical is seen centrally likely secondary to airway turbulence. Diminished activity is seen at the left base. PERFUSION: Heterogeneity of perfusion is demonstrated. There is diminished activity within the lateral right mid to lower lung zone, Siddhartha proportion to the ventilation pattern. There is also diminished activity in the right upper to mid lung zone out of proportion to ventilation. Decreased activity is seen at the lateral aspect of the left upper lung zone out of proportion to ventilation. High probability for pulmonary embolism. Findings were discussed with Dr. Yanira Mujica at approximately 1:29 p.m. on 11/03/2022     XR CHEST PORTABLE    Result Date: 11/18/2022  EXAMINATION: ONE XRAY VIEW OF THE CHEST 11/18/2022 4:59 am COMPARISON: 11/02/2022 HISTORY: ORDERING SYSTEM PROVIDED HISTORY: SOB TECHNOLOGIST PROVIDED HISTORY: Reason for exam:->SOB What reading provider will be dictating this exam?->CRC FINDINGS: Left-sided cardiac device and leads unchanged. There is stable cardiomegaly. There increased interstitial opacities in the bilateral lower lungs. No pneumothorax or pleural effusion. Increased interstitial opacities within the bilateral lower lungs, which could represent mild pulmonary edema versus pneumonia. Clinical correlation recommended. XR CHEST PORTABLE    Result Date: 11/2/2022  EXAMINATION: ONE XRAY VIEW OF THE CHEST 11/2/2022 10:47 pm COMPARISON: None. HISTORY: ORDERING SYSTEM PROVIDED HISTORY: SOB TECHNOLOGIST PROVIDED HISTORY: Reason for exam:->SOB What reading provider will be dictating this exam?->CRC FINDINGS: The lungs are without acute focal process. There is no effusion or pneumothorax. Cardiomegaly. Left-sided transvenous pacer device. The osseous structures are without acute process. Degenerative changes left glenohumeral joint. No acute process. Cardiomegaly.      Most recent    Chest CT      WITH CONTRAST:No results found for this or any previous visit. WITHOUT CONTRAST: Results for orders placed during the hospital encounter of 11/02/22    CT CHEST WO CONTRAST    Narrative  EXAMINATION:  CT OF THE CHEST WITHOUT CONTRAST 11/3/2022 12:13 pm    TECHNIQUE:  CT of the chest was performed without the administration of intravenous  contrast. Multiplanar reformatted images are provided for review. Automated  exposure control, iterative reconstruction, and/or weight based adjustment of  the mA/kV was utilized to reduce the radiation dose to as low as reasonably  achievable. COMPARISON:  Chest x-ray 11/02/2022    HISTORY:  ORDERING SYSTEM PROVIDED HISTORY: Abnormal chest x-ray  TECHNOLOGIST PROVIDED HISTORY:  Reason for exam:->Abnormal chest x-ray  What reading provider will be dictating this exam?->CRC    FINDINGS:  Mediastinum: Cardiomegaly. No pericardial effusion. Pacemaker wires. Atherosclerotic disease. No thoracic aortic aneurysm. Enlarged pulmonary  trunk measuring roughly 4 cm, however motion artifact limits evaluation. Subcentimeter short axis lymph nodes. Unremarkable visualized thyroid and  esophagus. Lungs/pleura: No pleural effusion or pneumothorax. Low lung volumes. Expiratory phase appearance of the trachea. Mild bilateral bronchial wall  thickening. Bilateral perihilar mild ground-glass opacity. Linear bands of  atelectasis versus scarring in both lungs. No solid pulmonary consolidation. Upper Abdomen: Atherosclerotic disease. Left renal scarring/atrophy,  partially imaged. Soft Tissues/Bones: Degenerative changes of the spine. DISH. Degenerative  changes of the shoulders, partially imaged. Impression  Minimal pulmonary vascular congestion. Cardiomegaly. Enlarged pulmonary trunk is suggestive of pulmonary arterial hypertension. Atherosclerotic disease. Pacemaker.       CXR      2-view: Results for orders placed during the hospital encounter of 20    XR CHEST STANDARD (2 VW)    Narrative  EXAMINATION: XR CHEST (2 VW)    CLINICAL HISTORY: Post pacemaker placement, follow-up. COMPARISONS: 2020    FINDINGS:    Two views of the chest are submitted. There is a left-sided ICD device. Leads overlying the cardiac silhouette. Unchanged. The cardiac silhouette is enlarged. .  Pulmonary vascular unremarkable. Right sided trachea. Small areas atelectasis both bases. No focal infiltrates. No effusions. No Pneumothoraces. Impression  NO ACUTE ACTIVE CARDIOPULMONARY PROCESS      Results for orders placed during the hospital encounter of 20    XR CHEST STANDARD (2 VW)    Narrative  Patient MRN: [id number]    : 1960    Age:  62 years    Gender: Male    Order Date: 2020 4:15 PM.    Exam: XR CHEST (2 VW)    Number of Views: 2    Indication:  Shortness of breath with chest pain    Comparison: 2019    Findings: Stable, enlarged cardiac mediastinal silhouette with mild central pulmonary vascular congestion and nonspecific bibasilar airspace opacities. Small amount left pleural fluid. Stable appearance of cardiac AICD device. No pneumothorax. Impression  Impression:  1. Suspected bibasilar infiltrate/pneumonia. 2. Stable, enlarged cardiomediastinal silhouette with mild central pulmonary vascular congestion. 3. Suspected small amount left pleural effusion. Portable: Results for orders placed during the hospital encounter of 22    XR CHEST PORTABLE    Narrative  EXAMINATION:  ONE XRAY VIEW OF THE CHEST    2022 4:59 am    COMPARISON:  2022    HISTORY:  ORDERING SYSTEM PROVIDED HISTORY: SOB  TECHNOLOGIST PROVIDED HISTORY:  Reason for exam:->SOB  What reading provider will be dictating this exam?->CRC    FINDINGS:  Left-sided cardiac device and leads unchanged. There is stable cardiomegaly. There increased interstitial opacities in the bilateral lower lungs.   No  pneumothorax or pleural effusion. Impression  Increased interstitial opacities within the bilateral lower lungs, which  could represent mild pulmonary edema versus pneumonia. Clinical correlation  recommended. Echo No results found for this or any previous visit. Assessment: This is a critically ill patient at risk of deterioration / death , needing close ICU monitoring and intervention due to below noted problems   Chest pain, has since resolved  SVT  History of a recent pulmonary emboli, on Eliquis  Chronic kidney disease baseline 2.2  Elevated troponin  Diabetes      Recommendations  Wean O2 to keep sats greater than 91%  Heparin drip DC'd and Eliquis restarted  Trend troponin  Echo discontinued, patient had one 2 weeks ago  Monitor electrolytes and replace as needed  Cardioprotective medications per cardiology  Change Lantus to just once a day, patient was hypoglycemic at 49 this a.m.   Transfer to cardiology floor today              Electronically signed by JESUS Sebastian - GINNA,  [facility] ,on 11/19/2022 at 10:04 AM

## 2022-11-20 VITALS
WEIGHT: 315 LBS | OXYGEN SATURATION: 97 % | SYSTOLIC BLOOD PRESSURE: 153 MMHG | HEART RATE: 79 BPM | TEMPERATURE: 98.2 F | DIASTOLIC BLOOD PRESSURE: 76 MMHG | BODY MASS INDEX: 44.1 KG/M2 | RESPIRATION RATE: 18 BRPM | HEIGHT: 71 IN

## 2022-11-20 LAB
ANION GAP SERPL CALCULATED.3IONS-SCNC: 11 MEQ/L (ref 9–15)
BASOPHILS ABSOLUTE: 0.1 K/UL (ref 0–0.2)
BASOPHILS RELATIVE PERCENT: 1.4 %
BUN BLDV-MCNC: 55 MG/DL (ref 8–23)
CALCIUM SERPL-MCNC: 8.5 MG/DL (ref 8.5–9.9)
CHLORIDE BLD-SCNC: 105 MEQ/L (ref 95–107)
CO2: 22 MEQ/L (ref 20–31)
CREAT SERPL-MCNC: 2.66 MG/DL (ref 0.7–1.2)
EOSINOPHILS ABSOLUTE: 0.3 K/UL (ref 0–0.7)
EOSINOPHILS RELATIVE PERCENT: 4.2 %
GFR SERPL CREATININE-BSD FRML MDRD: 25.9 ML/MIN/{1.73_M2}
GLUCOSE BLD-MCNC: 111 MG/DL (ref 70–99)
GLUCOSE BLD-MCNC: 130 MG/DL (ref 70–99)
GLUCOSE BLD-MCNC: 144 MG/DL (ref 70–99)
HCT VFR BLD CALC: 34 % (ref 42–52)
HEMOGLOBIN: 11.5 G/DL (ref 14–18)
LYMPHOCYTES ABSOLUTE: 1.8 K/UL (ref 1–4.8)
LYMPHOCYTES RELATIVE PERCENT: 23.9 %
MCH RBC QN AUTO: 29 PG (ref 27–31.3)
MCHC RBC AUTO-ENTMCNC: 33.9 % (ref 33–37)
MCV RBC AUTO: 85.5 FL (ref 79–92.2)
MONOCYTES ABSOLUTE: 0.7 K/UL (ref 0.2–0.8)
MONOCYTES RELATIVE PERCENT: 9.4 %
NEUTROPHILS ABSOLUTE: 4.7 K/UL (ref 1.4–6.5)
NEUTROPHILS RELATIVE PERCENT: 61.1 %
PDW BLD-RTO: 16.7 % (ref 11.5–14.5)
PERFORMED ON: ABNORMAL
PERFORMED ON: ABNORMAL
PLATELET # BLD: 321 K/UL (ref 130–400)
POTASSIUM REFLEX MAGNESIUM: 5.1 MEQ/L (ref 3.4–4.9)
RBC # BLD: 3.98 M/UL (ref 4.7–6.1)
SODIUM BLD-SCNC: 138 MEQ/L (ref 135–144)
WBC # BLD: 7.7 K/UL (ref 4.8–10.8)

## 2022-11-20 PROCEDURE — 94664 DEMO&/EVAL PT USE INHALER: CPT

## 2022-11-20 PROCEDURE — 94761 N-INVAS EAR/PLS OXIMETRY MLT: CPT

## 2022-11-20 PROCEDURE — 6370000000 HC RX 637 (ALT 250 FOR IP): Performed by: INTERNAL MEDICINE

## 2022-11-20 PROCEDURE — 85025 COMPLETE CBC W/AUTO DIFF WBC: CPT

## 2022-11-20 PROCEDURE — 80048 BASIC METABOLIC PNL TOTAL CA: CPT

## 2022-11-20 PROCEDURE — 99233 SBSQ HOSP IP/OBS HIGH 50: CPT | Performed by: INTERNAL MEDICINE

## 2022-11-20 PROCEDURE — 2580000003 HC RX 258: Performed by: INTERNAL MEDICINE

## 2022-11-20 PROCEDURE — 99232 SBSQ HOSP IP/OBS MODERATE 35: CPT | Performed by: INTERNAL MEDICINE

## 2022-11-20 PROCEDURE — 36415 COLL VENOUS BLD VENIPUNCTURE: CPT

## 2022-11-20 RX ORDER — FUROSEMIDE 40 MG/1
40 TABLET ORAL 2 TIMES DAILY
Status: DISCONTINUED | OUTPATIENT
Start: 2022-11-20 | End: 2022-11-20 | Stop reason: HOSPADM

## 2022-11-20 RX ORDER — CARVEDILOL 12.5 MG/1
12.5 TABLET ORAL 2 TIMES DAILY
Status: DISCONTINUED | OUTPATIENT
Start: 2022-11-20 | End: 2022-11-20 | Stop reason: HOSPADM

## 2022-11-20 RX ORDER — FUROSEMIDE 40 MG/1
40 TABLET ORAL DAILY
Status: DISCONTINUED | OUTPATIENT
Start: 2022-11-20 | End: 2022-11-20

## 2022-11-20 RX ADMIN — APIXABAN 5 MG: 5 TABLET, FILM COATED ORAL at 08:59

## 2022-11-20 RX ADMIN — METOPROLOL TARTRATE 12.5 MG: 25 TABLET, FILM COATED ORAL at 08:58

## 2022-11-20 RX ADMIN — SACUBITRIL AND VALSARTAN 1 TABLET: 24; 26 TABLET, FILM COATED ORAL at 08:58

## 2022-11-20 RX ADMIN — ASPIRIN 81 MG: 81 TABLET, COATED ORAL at 08:58

## 2022-11-20 RX ADMIN — FUROSEMIDE 40 MG: 40 TABLET ORAL at 08:58

## 2022-11-20 RX ADMIN — Medication 10 ML: at 08:59

## 2022-11-20 NOTE — CARE COORDINATION
SPOKE [address] [place].  THEY ARE AWARE OF REFERRAL AND WILL CHECK WITH THE OFFICE IN THE MORNING

## 2022-11-20 NOTE — PLAN OF CARE
Problem: Discharge Planning  Goal: Discharge to home or other facility with appropriate resources  11/19/2022 2006 by Lyle Lopez RN  Outcome: Progressing  11/19/2022 1011 by Nilton Verma RN  Outcome: Progressing  Flowsheets (Taken 11/19/2022 0800)  Discharge to home or other facility with appropriate resources: Identify barriers to discharge with patient and caregiver     Problem: Safety - Adult  Goal: Free from fall injury  11/19/2022 2006 by Lyle Lopez RN  Outcome: Progressing  11/19/2022 1011 by Nilton Verma RN  Outcome: Progressing     Problem: ABCDS Injury Assessment  Goal: Absence of physical injury  11/19/2022 2006 by Lyle Lopez RN  Outcome: Progressing  11/19/2022 1011 by Nilton Verma RN  Outcome: Progressing     Problem: Neurosensory - Adult  Goal: Achieves maximal functionality and self care  11/19/2022 2006 by Lyle Lopez RN  Outcome: Progressing  11/19/2022 1011 by Nilton Verma RN  Outcome: Progressing  Flowsheets (Taken 11/19/2022 0800)  Achieves maximal functionality and self care: Monitor swallowing and airway patency with patient fatigue and changes in neurological status     Problem: Respiratory - Adult  Goal: Achieves optimal ventilation and oxygenation  11/19/2022 2006 by Lyle Lopez RN  Outcome: Progressing  11/19/2022 1011 by Nilton Verma RN  Outcome: Progressing  Flowsheets (Taken 11/19/2022 0800)  Achieves optimal ventilation and oxygenation:   Assess for changes in respiratory status   Position to facilitate oxygenation and minimize respiratory effort   Oxygen supplementation based on oxygen saturation or arterial blood gases   Assess and instruct to report shortness of breath or any respiratory difficulty     Problem: Cardiovascular - Adult  Goal: Maintains optimal cardiac output and hemodynamic stability  11/19/2022 2006 by Lyle Lopez RN  Outcome: Progressing  11/19/2022 1011 by Nilton Verma RN  Outcome: Progressing  Flowsheets (Taken 11/19/2022 0800)  Maintains optimal cardiac output and hemodynamic stability:   Monitor blood pressure and heart rate   Monitor urine output and notify Licensed Independent Practitioner for values outside of normal range   Assess for signs of decreased cardiac output  Goal: Absence of cardiac dysrhythmias or at baseline  11/19/2022 2006 by Black Seymour RN  Outcome: Progressing  11/19/2022 1011 by Bethany Nye RN  Outcome: Progressing  Flowsheets (Taken 11/19/2022 0800)  Absence of cardiac dysrhythmias or at baseline:   Monitor cardiac rate and rhythm   Assess for signs of decreased cardiac output     Problem: Skin/Tissue Integrity - Adult  Goal: Skin integrity remains intact  11/19/2022 2006 by Black Seymour RN  Outcome: Progressing  11/19/2022 1011 by Bethany Nye RN  Outcome: Progressing  Flowsheets (Taken 11/19/2022 0800)  Skin Integrity Remains Intact:   Monitor for areas of redness and/or skin breakdown   Assess vascular access sites hourly     Problem: Musculoskeletal - Adult  Goal: Return mobility to safest level of function  11/19/2022 2006 by Black Seymour RN  Outcome: Progressing  11/19/2022 1011 by Bethany Nye RN  Outcome: Progressing  Flowsheets (Taken 11/19/2022 0800)  Return Mobility to Safest Level of Function: Assess patient stability and activity tolerance for standing, transferring and ambulating with or without assistive devices     Problem: Gastrointestinal - Adult  Goal: Maintains adequate nutritional intake  11/19/2022 2006 by Black Seymour RN  Outcome: Progressing  11/19/2022 1011 by Bethany Nye RN  Outcome: Progressing  Flowsheets (Taken 11/19/2022 0800)  Maintains adequate nutritional intake:   Monitor percentage of each meal consumed   Monitor intake and output, weight and lab values     Problem: Genitourinary - Adult  Goal: Absence of urinary retention  11/19/2022 2006 by Black Seymour RN  Outcome: Progressing  11/19/2022 1011 by Bethany Nye RN  Outcome: Progressing  Flowsheets (Taken 11/19/2022 0800)  Absence of urinary retention:   Assess patients ability to void and empty bladder   Monitor intake/output and perform bladder scan as needed     Problem: Infection - Adult  Goal: Absence of infection at discharge  11/19/2022 2006 by Franchesca Bravo RN  Outcome: Progressing  11/19/2022 1011 by Inocencia Ching RN  Outcome: Progressing  Flowsheets (Taken 11/19/2022 0800)  Absence of infection at discharge:   Assess and monitor for signs and symptoms of infection   Monitor lab/diagnostic results   Administer medications as ordered     Problem: Metabolic/Fluid and Electrolytes - Adult  Goal: Electrolytes maintained within normal limits  11/19/2022 2006 by Franchesca Bravo RN  Outcome: Progressing  11/19/2022 1011 by Inocencia Ching RN  Outcome: Progressing  Flowsheets (Taken 11/19/2022 0800)  Electrolytes maintained within normal limits: Monitor labs and assess patient for signs and symptoms of electrolyte imbalances  Goal: Hemodynamic stability and optimal renal function maintained  11/19/2022 2006 by Franchesca Bravo RN  Outcome: Progressing  11/19/2022 1011 by Inocencia Ching RN  Outcome: Progressing  Flowsheets (Taken 11/19/2022 0800)  Hemodynamic stability and optimal renal function maintained:   Monitor labs and assess for signs and symptoms of volume excess or deficit   Monitor intake, output and patient weight  Goal: Glucose maintained within prescribed range  11/19/2022 2006 by Franchesca Bravo RN  Outcome: Progressing  11/19/2022 1011 by Inocencia Ching RN  Outcome: Progressing  Flowsheets (Taken 11/19/2022 0800)  Glucose maintained within prescribed range:   Monitor blood glucose as ordered   Administer ordered medications to maintain glucose within target range     Problem: Hematologic - Adult  Goal: Maintains hematologic stability  11/19/2022 2006 by Franchesca Bravo RN  Outcome: Progressing  11/19/2022 1011 by Inocencia Ching RN  Outcome: Progressing  Flowsheets (Taken 11/19/2022 0800)  Maintains hematologic stability:   Assess for signs and symptoms of bleeding or hemorrhage   Monitor labs for bleeding or clotting disorders

## 2022-11-20 NOTE — PROGRESS NOTES
Pulmonary Progress Note    2022 12:51 PM    Subjective:   Admit Date: 2022  PCP: Candy Bowers DO    Chief Complaint   Patient presents with    Chest Pain     H/o \"heart arrhythmia\" developed chest pain and tightness that radiates to neck at 0100, associated SOB, family reports compliance with medications     Interval History: Has been doing well. Currently on room air. Denies any chest pain or shortness of breath. Switch back to oral anticoagulation. Wants to go home. 14 points review of systems has been obtained and negative except to was mentioned in HPI. Medications:   Scheduled Meds:   carvedilol  12.5 mg Oral BID    furosemide  40 mg Oral BID    aspirin  81 mg Oral Daily    atorvastatin  80 mg Oral Nightly    apixaban  5 mg Oral BID    sacubitril-valsartan  1 tablet Oral BID    insulin glargine  20 Units SubCUTAneous Nightly    sodium chloride flush  5-40 mL IntraVENous 2 times per day    insulin lispro  0-8 Units SubCUTAneous TID WC    insulin lispro  0-4 Units SubCUTAneous Nightly     Continuous Infusions:   sodium chloride      dextrose           Objective:   Vitals:   Temp (24hrs), Av.4 °F (36.9 °C), Min:98.2 °F (36.8 °C), Max:98.5 °F (36.9 °C)    BP (!) 153/76   Pulse 79   Temp 98.2 °F (36.8 °C) (Oral)   Resp 18   Ht 5' 11\" (1.803 m)   Wt (!) 333 lb 1.8 oz (151.1 kg)   SpO2 96%   BMI 46.46 kg/m²   I/O:24HR INTAKE/OUTPUT:  No intake or output data in the 24 hours ending 22 1251   0701 -  0700  In: 546.4 [P.O.:480; I.V.:66.4]  Out: 325   CVP:               Physical Exam:   General appearance -ill appearing.   Macanese-speaking  Mental status - alert, oriented to person, place, and time  Eyes - pupils equal and reactive, extraocular eye movements intact  Nose - normal and patent, no erythema, discharge or polyps  Neck - supple, no significant adenopathy  Chest -diminished bilaterally to auscultation, no wheezes, rales or rhonchi, symmetric air entry  Heart -tachycardic, regular rhythm, normal S1, S2, no murmurs, rubs, clicks or gallops  Abdomen - soft, nontender, nondistended, no masses or organomegaly  Rectal - deferred, not clinically indicated  Neurological - alert, oriented, normal speech, no focal findings or movement disorder noted, motor and sensory grossly normal bilaterally  Musculoskeletal - no joint tenderness, deformity or swelling  Extremities - peripheral pulses normal, trace pedal edema, no clubbing or cyanosis  Skin - normal coloration and turgor, no rashes, no suspicious skin lesions noted        BMP:    Recent Labs     11/18/22  0445 11/19/22 0429 11/20/22  0505   * 137 138   K 4.2 4.5 5.1*   CL 98 102 105   CO2 21 21 22   BUN 47* 55* 55*   CREATININE 2.27* 3.30* 2.66*   GLUCOSE 224* 49* 130*    . MG:3,PHOS:3)@  Ionized Calcium: No results found for: IONCA  CBC:   Recent Labs     11/19/22 0429 11/20/22  0505   WBC 7.9 7.7   HGB 12.1* 11.5*    321      ABG: No results for input(s): PH, PCO2, PO2 in the last 72 hours. Assessment and Plan:       Impression:     -Chest pain, shortness of breath and palpitation. His chest pain is a bit atypical since it is reproducible with palpitation. This has resolved. - Palpitation, SVT and junctional rhythm. This has resolved. - Recent pulmonary emboli. Presumed to have been compliant with Eliquis. - Chronic kidney disease.  - Sleep disordered breathing.  - Diabetes. Recommendations:     -Continue anticoagulation.  -Trend cardiac enzymes.  -Continue with BB  - Strict intake and output measurement. Watch kidney function closely. -Sepsis work up negative so far  - PAP at night time  - O2 for sat>90%  -Oxygen assessment before discharge. He is stable for discharge from pulmonary standpoint. Discussed with his son on the phone.   Electronically signed by Rey Yee MD on 11/20/2022 at 12:51 PM

## 2022-11-20 NOTE — PROGRESS NOTES
11/20/22 1418   Resting (Room Air)   SpO2 97   HR 83   Resting (On O2)   Comments patient does not require O2 while resting   During Walk (Room Air)   SpO2 94   HR 93   Rate of Dyspnea 0   Comments patient does not require O2 upon ambulation   After Walk   SpO2 95   HR 90   FIO2 (%) 21   Does the Patient Qualify for Home O2 No   Does the Patient Need Portable Oxygen Tanks No

## 2022-11-20 NOTE — DISCHARGE SUMMARY
Physician Discharge Summary     Patient ID:  Dario Brenner  [id number]  51 y.o.  1960    Admit date: 11/18/2022    Discharge date : 11/20/22     Admitting Physician: Jesus Manuel Duke DO     Discharge Physician: Jesus Manuel Duke DO     Admission Diagnoses: Tachycardia [R00.0]  Hypotension [M40.4]  Acute systolic congestive heart failure ([address] [I50.21]  Chest pain, unspecified type [R07.9]    Discharge Diagnoses: Chest pain, hypotension, dehydration    Admission Condition: fair    Discharged Condition: good    Hospital Course: 57 y.o. male with significant past medical history of HTN/HLD, CKD3, CHF sp AICD, IDDM, recent diagnosis of PE on Eliquis who presents with chest pain, sob and racing heart since 3AM this morning. Pt speaks Bhutanese. Daughter at bedside to translate for history. Pt was recent discharged from [facility] on 11/7 after being admitted for hypoxia and sob. At that time, he had a VQ scan due to his renal function which showed high probability of PE. Cardiology was consulted at that time as was pulmonology. Pt was stabilized and sent home without O2 (did not qualify) to start Eliquis. Since then, he has been more sob which is worse with exertion. This AM, he was awoken with sharp midsternal chest pain which radiated to his neck and L shoulder with associated palpitations and sob so he came to the ED. In the ED, pt was tachycardic but rest of vitals were initially stable. Pt was given small bolus of fluid, IV Lopressor and morphine to aid with pain. Labs were drawn and showed mildly elevated troponin at 0.012, BMP with baseline CKD3 and CXR with bibasilar opacities. Pt was going to be admitted to the floor, but during stay in the ED, BP dropped to 70/32. He was given small amount of IVF due to CHF and BP remained low so patient was admitted to the ICU for close monitoring. Heparin drip started and STAT echo ordered per cardiology request. Echo cancelled after chart review showed one 2 weeks ago. Cardiology reviewed and believed hypotension and tachycardia to be related to dehydration and gave gentle IVF. BP improved in the ICU without pressors. Eliquis resumed and heparin drip stopped. Pt developed WOOD on HD#2 which improved with improvement in his BP. Pt transferred to the floor where he remained stable and cardiology was ok for discharge on 11/20 in stable and improved condition. Hypotension and tachycardia likely 2/2 dehydration in the setting of poor po intake and home diuretics. Pt will follow up with cardiology and PCP after discharge. Consults: cardiology, pulmonary/intensive care, and nephrology    Discharge Exam:  Constitutional: Awake and alert in no acute distress.  Lying in bed comfortably, morbidly obese  Head: Normocephalic, atraumatic  Eyes: EOMI, PERRLA  ENT: moist mucous membranes, nasal cannula  Neck: neck supple, trachea midline  Lungs: Good inspiratory effort, no wheeze, no rhonchi, no rales  Heart: tachycardic, normal S1 and S2  GI: Soft, non-distended, non tender, no guarding, no rebound, +BS  MSK: no edema noted  Skin: warm, dry  Psych: appropriate affect     Labs:   Recent Labs     11/18/22  0445 11/19/22  0429 11/20/22  0505   WBC 8.8 7.9 7.7   HGB 12.5* 12.1* 11.5*   HCT 39.1* 37.2* 34.0*    333 321     Recent Labs     11/18/22  0445 11/19/22  0429 11/20/22  0505   * 137 138   K 4.2 4.5 5.1*   CL 98 102 105   CO2 21 21 22   BUN 47* 55* 55*   CREATININE 2.27* 3.30* 2.66*   CALCIUM 8.6 8.6 8.5     Recent Labs     11/18/22  0445   AST 23   ALT 22   BILITOT 0.3   ALKPHOS 145*     Recent Labs     11/18/22  1634   INR 1.2     Recent Labs     11/18/22  0445 11/18/22  1634 11/19/22  0429   TROPONINI 0.012* 0.034* 0.080*       Urinalysis:   Lab Results   Component Value Date/Time    NITRU Negative 11/19/2022 06:57 AM    WBCUA 0-2 09/06/2019 09:54 AM    BACTERIA Negative 09/06/2019 09:54 AM    RBCUA 3-5 09/06/2019 09:54 AM    BLOODU Negative 11/19/2022 06:57 AM    SPECGRAV 1.015 11/19/2022 06:57 AM    GLUCOSEU Negative 11/19/2022 06:57 AM       Radiology:   Most recent    Chest CT      WITH CONTRAST:No results found for this or any previous visit. WITHOUT CONTRAST: Results for orders placed during the hospital encounter of 11/02/22    CT CHEST WO CONTRAST    Narrative  EXAMINATION:  CT OF THE CHEST WITHOUT CONTRAST 11/3/2022 12:13 pm    TECHNIQUE:  CT of the chest was performed without the administration of intravenous  contrast. Multiplanar reformatted images are provided for review. Automated  exposure control, iterative reconstruction, and/or weight based adjustment of  the mA/kV was utilized to reduce the radiation dose to as low as reasonably  achievable. COMPARISON:  Chest x-ray 11/02/2022    HISTORY:  ORDERING SYSTEM PROVIDED HISTORY: Abnormal chest x-ray  TECHNOLOGIST PROVIDED HISTORY:  Reason for exam:->Abnormal chest x-ray  What reading provider will be dictating this exam?->CRC    FINDINGS:  Mediastinum: Cardiomegaly. No pericardial effusion. Pacemaker wires. Atherosclerotic disease. No thoracic aortic aneurysm. Enlarged pulmonary  trunk measuring roughly 4 cm, however motion artifact limits evaluation. Subcentimeter short axis lymph nodes. Unremarkable visualized thyroid and  esophagus. Lungs/pleura: No pleural effusion or pneumothorax. Low lung volumes. Expiratory phase appearance of the trachea. Mild bilateral bronchial wall  thickening. Bilateral perihilar mild ground-glass opacity. Linear bands of  atelectasis versus scarring in both lungs. No solid pulmonary consolidation. Upper Abdomen: Atherosclerotic disease. Left renal scarring/atrophy,  partially imaged. Soft Tissues/Bones: Degenerative changes of the spine. DISH. Degenerative  changes of the shoulders, partially imaged. Impression  Minimal pulmonary vascular congestion. Cardiomegaly.     Enlarged pulmonary trunk is suggestive of pulmonary arterial hypertension. Atherosclerotic disease. Pacemaker. CXR      2-view: Results for orders placed during the hospital encounter of 20    XR CHEST STANDARD (2 VW)    Narrative  EXAMINATION: XR CHEST (2 VW)    CLINICAL HISTORY: Post pacemaker placement, follow-up. COMPARISONS: 2020    FINDINGS:    Two views of the chest are submitted. There is a left-sided ICD device. Leads overlying the cardiac silhouette. Unchanged. The cardiac silhouette is enlarged. .  Pulmonary vascular unremarkable. Right sided trachea. Small areas atelectasis both bases. No focal infiltrates. No effusions. No Pneumothoraces. Impression  NO ACUTE ACTIVE CARDIOPULMONARY PROCESS      Results for orders placed during the hospital encounter of 20    XR CHEST STANDARD (2 VW)    Narrative  Patient MRN: [id number]    : 1960    Age:  62 years    Gender: Male    Order Date: 2020 4:15 PM.    Exam: XR CHEST (2 VW)    Number of Views: 2    Indication:  Shortness of breath with chest pain    Comparison: 2019    Findings: Stable, enlarged cardiac mediastinal silhouette with mild central pulmonary vascular congestion and nonspecific bibasilar airspace opacities. Small amount left pleural fluid. Stable appearance of cardiac AICD device. No pneumothorax. Impression  Impression:  1. Suspected bibasilar infiltrate/pneumonia. 2. Stable, enlarged cardiomediastinal silhouette with mild central pulmonary vascular congestion. 3. Suspected small amount left pleural effusion. Portable: Results for orders placed during the hospital encounter of 22    XR CHEST PORTABLE    Narrative  EXAMINATION:  ONE XRAY VIEW OF THE CHEST    2022 4:59 am    COMPARISON:  2022    HISTORY:  ORDERING SYSTEM PROVIDED HISTORY: SOB  TECHNOLOGIST PROVIDED HISTORY:  Reason for exam:->SOB  What reading provider will be dictating this exam?->CRC    FINDINGS:  Left-sided cardiac device and leads unchanged.   There is stable cardiomegaly. There increased interstitial opacities in the bilateral lower lungs. No  pneumothorax or pleural effusion. Impression  Increased interstitial opacities within the bilateral lower lungs, which  could represent mild pulmonary edema versus pneumonia. Clinical correlation  recommended. Echo No results found for this or any previous visit. Disposition: home    In process/preliminary results:  Outstanding Order Results       Date and Time Order Name Status Description    11/19/2022  9:04 AM EKG 12 Lead Preliminary     11/18/2022  6:14 PM Culture, Blood 1 Preliminary             Patient Instructions:   Current Discharge Medication List        START taking these medications    Details   metoprolol tartrate (LOPRESSOR) 25 MG tablet Take 0.5 tablets by mouth 2 times daily  Qty: 60 tablet, Refills: 3           CONTINUE these medications which have NOT CHANGED    Details   insulin glargine (LANTUS SOLOSTAR) 100 UNIT/ML injection pen Inject 20 Units into the skin 2 times daily  Qty: 5 Adjustable Dose Pre-filled Pen Syringe, Refills: 3    Comments: May substitute if necessary'      insulin lispro, 1 Unit Dial, (HUMALOG KWIKPEN) 100 UNIT/ML SOPN Inject 8 Units into the skin 3 times daily (before meals)  Qty: 1 Adjustable Dose Pre-filled Pen Syringe, Refills: 2    Comments: May substitute if necessary      !! Insulin Pen Needle ([employer] PEN NEEDLES) 31G X 6 MM MISC 1 each by Does not apply route 5 times daily  Qty: 100 each, Refills: 3      blood glucose monitor strips Test 5 times a day & as needed for symptoms of irregular blood glucose. Dispense sufficient amount for indicated testing frequency plus additional to accommodate PRN testing needs. Qty: 300 strip, Refills: 2    Comments: Brand per patient preference. May round up to next available package size.       apixaban starter pack (ELIQUIS DVT/PE STARTER PACK) 5 MG TBPK tablet Take 1 tablet by mouth See Admin Instructions  Qty: 74 tablet, Refills: 0      hydrALAZINE (APRESOLINE) 25 MG tablet Take 1 tablet by mouth every 8 hours  Qty: 90 tablet, Refills: 3      sacubitril-valsartan (ENTRESTO) 24-26 MG per tablet Take 1 tablet by mouth 2 times daily  Qty: 60 tablet, Refills: 2      furosemide (LASIX) 40 MG tablet TAKE 1 TABLET BY MOUTH EVERY DAY. HOLD UNTIL SEEN BY PCP FOR FOLLOWUP  Qty: 90 tablet, Refills: 3    Associated Diagnoses: Dilated cardiomyopathy (HCC)      aspirin (ASPIRIN ADULT LOW STRENGTH) 81 MG EC tablet TAKE 1 TABLET BY MOUTH DAILY  Qty: 90 tablet, Refills: 3    Associated Diagnoses: Dilated cardiomyopathy (HCC)      atorvastatin (LIPITOR) 40 MG tablet Take 1 tablet by mouth at bedtime  Qty: 90 tablet, Refills: 3      !! Insulin Pen Needle (B-D UF III MINI PEN NEEDLES) 31G X 5 MM MISC 1 each by Does not apply route daily  Qty: 100 each, Refills: 4    Associated Diagnoses: Type 2 diabetes mellitus with diabetic neuropathy, with long-term current use of insulin (HCC)      fenofibrate (TRIGLIDE) 160 MG tablet TAKE 1 TABLET BY MOUTH EVERY NIGHT  Qty: 90 tablet, Refills: 4    Associated Diagnoses: Dilated cardiomyopathy (HCC)      albuterol (PROVENTIL) (2.5 MG/3ML) 0.083% nebulizer solution Albuterol albuterol (PROVENTIL) (2.5 MG/3ML) 0.083% nebulizer solution Indications: Type 2 diabetes mellitus without complication, with long-term current use of insulin (HCC) Take 3 mLs by nebulization 4 times daily 120 each 5 10/11/2018 Active 10-  [address] ([id number])      gabapentin (NEURONTIN) 300 MG capsule Take by mouth nightly.       omeprazole (PRILOSEC) 20 MG delayed release capsule Take 1 capsule by mouth daily  Qty: 90 capsule, Refills: 4    Associated Diagnoses: Gastroesophageal reflux disease without esophagitis      Blood Glucose Monitoring Suppl (ONE TOUCH ULTRA MINI) w/Device KIT 1 kit by Does not apply route three times daily  Qty: 1 kit, Refills: 5      Lancets MISC Use TID  Qty: 100 each, Refills: 3      Insulin Syringe-Needle U-100 (INSULIN SYRINGE 1CC/31GX5/16\") 31G X 5/16\" 1 ML MISC Use once daily to administer Lantus  Qty: 100 each, Refills: 1    Associated Diagnoses: Type 2 diabetes mellitus without complication, with long-term current use of insulin ([facility]       ! ! - Potential duplicate medications found. Please discuss with provider. STOP taking these medications       metoprolol succinate (TOPROL XL) 25 MG extended release tablet Comments:   Reason for Stopping:             Activity: activity as tolerated  Diet: cardiac diet  Wound Care: none needed    Follow-up with Chago Jolly DO  in 1 week.     DC time 35 minutes    Signed:  Electronically signed by Oleg Quezada DO on 11/20/2022 at 9:53 AM

## 2022-11-20 NOTE — CONSULTS
Renal consult    WOOD on CKD 3a-b  Hyperkalemia  Cardiorenal  Hypotension admission  OHD defib   cardiomyopathy  Hx Kidney stones  Hypertension  DM type-2  Obese    Bui orders written  labs reviewed  will start loop lower dose  follow K+

## 2022-11-20 NOTE — CONSULTS
[address]                      [address], [address] [facility]                                  CONSULTATION    PATIENT NAME: Brigid Monroy                 :        1960  MED REC NO:   [id number]                            ROOM:       E651  ACCOUNT NO:   [de-identified]                           ADMIT DATE: 2022  PROVIDER:     Spenser Benitez DO    CONSULT DATE:  2022    RENAL CONSULTATION    HISTORY OF PRESENT ILLNESS:  A 52-year-old British morbidly obese male  admitted to the hospital with chest discomfort and palpitations. The  patient being seen for azotemia. He has a baseline GFR of 40 mL per  minute from previous of records six months ago. He has known heart  failure with ejection fraction of 45% and AICD in place. He is diabetic  with hypertension and hyperlipidemia. He was recently diagnosed with a  pulmonary embolism. He is on Eliquis therapy. The patient appears to  be in no distress sitting up in chair. It is difficult to obtain a  history. No blood or pain on urination. He does not use medications  other than what is prescribed him. PAST MEDICAL HISTORY:  CKD IIIa/b, history of kidney stones,  cardiomyopathy, decreased ejection fraction, diabetes mellitus type 2,  hypertension, hyperlipidemia, AICD placement, history of kidney stones,  LON. PAST SURGICAL HISTORY:  Cardiac defibrillator placement, cystoscopy, and  lithotripsy x2. HABITS:  Former smoker, stopped years ago. No alcohol or opioids. MEDICATIONS:  At the time of admission to the hospital would be;  Triglide, baby aspirin, Apresoline, Entresto, Lantus, Lopressor,  Prilosec, Neurontin. ALLERGIES TO MEDICATIONS:  Cheri Archer and Severa Fetters. PHYSICAL EXAMINATION:  VITAL SIGNS:  Height 5 feet 11 inches, 333 pounds. Blood pressure  140/60, heart rate 80, respirations 16. On admission to the hospital,  the patient had labile blood pressures as low as 80/50.   The patient has  been in sinus rhythm since admission. HEENT:  Normocephalic. Pupils are reactive to light. NECK:  Supple. Difficult to evaluate due to size, but no obvious JVD,  bruits, or adenopathy. CHEST:  Lungs are clear. No wheezing, rales, or rhonchi. He had no  accessory muscle use. CARDIOVASCULAR:  Heart is distant with a 1/6 systolic murmur. Left  upper quadrant chest wall defibrillator. ABDOMEN:  Grossly obese. Difficult to reevaluate. No evidence of  guarding or rigidity. No ecchymosis. No evidence of hernia. EXTREMITIES:  Lower limb showed 1+ edema. Skin is warm and dry. The  patient moves all limbs. LABORATORY DATA:  Urine shows no protein or blood. IMPRESSION:  1.  CKD IIIa/b with cardiorenal.  2.  Hypotension on admission. 3.  Organic heart disease. Ejection fraction of 45%. Hypotension noted  with admission. 4.  Diabetes mellitus type 2.  5.  Hypertension. 6.  Hyperlipidemia. 7.  LON. 8.  Recent pulmonary embolism. 9.  History of kidney stones. PLAN:  Maintain good blood pressure maintenance for any hypotension. Follow for any changes in arrhythmias. Avoid nephrotoxic agents. We  will follow along with you for changes in renal function, most likely  related to cardiorenal effect and hypotension on admission. Diuresis as  needed. _____ I and Os, daily weights.         Ela Quick DO    D: 11/20/2022 8:33:15       T: 11/20/2022 8:38:00     [id number]  Job#: [id number]     Doc#: [id number]    CC:

## 2022-11-20 NOTE — DISCHARGE INSTR - COC
Continuity of Care Form    Patient Name: Tommy Christie   :  1960  MRN:  [id number]    Admit date:  2022  Discharge date:  22    Code Status Order: Full Code   Advance Directives:     Admitting Physician:  Sharon Sandoval DO  PCP: Joseph Whipple DO    Discharging Nurse: [facility] Unit/Room#: B098/B058-60  Discharging Unit Phone Number: [phone]    Emergency Contact:   Extended Emergency Contact Information  Primary Emergency Contact: [address] [place] [address] Phone: [phone]  Relation: Other   needed? No  Secondary Emergency Contact: Josr Whaley  [place] Phone: [phone]  Relation: Child  Preferred language: Swedish   needed? No    Past Surgical History:  Past Surgical History:   Procedure Laterality Date    CARDIAC DEFIBRILLATOR PLACEMENT  2008    CYSTOSCOPY INSERTION / REMOVAL STENT / STONE Left 2019    CYSTOSCOPY LEFT DOUBLE J STENT PLACEMENT ROOM 287 performed by Gomez Vo MD at [facility]    LITHOTRIPSY Left 2019    LEFT ESWL (CONF # [id number]) performed by Gomez Vo MD at [facility]    LITHOTRIPSY N/A 7/10/2019    HOLMIUM LASER LITHOTRIPSY, STONE EXTRACTION, LT URETERAL STENT INSERTION.  performed by Gomez Vo MD at [address] [place] [address] Left 7/10/2019    LEFT FLEXIBLE URETEROSCOPY, CYSTOSCOPY RETROGRADE performed by Gomez Vo MD at [facility]       Immunization History:   Immunization History   Administered Date(s) Administered    COVID-19, PFIZER PURPLE top, DILUTE for use, (age 15 y+), 30mcg/0.3mL 2021, 2021, 2022    Influenza Virus Vaccine 10/01/2019    Pneumococcal Polysaccharide ([id number]) 2019       Active Problems:  Patient Active Problem List   Diagnosis Code    Diabetes mellitus ([address] E11.9    Hyperlipidemia E78.5    Hypertension I10    Chronic bilateral low back pain without sciatica M54.50, G89.29    Cardiomyopathy ([facility] I42.9    Pacemaker Z95.0    Diabetic neuropathy associated with type 2 diabetes mellitus (HCC) E11.40    Morbid obesity (HCC) E66.01    LON (obstructive sleep apnea) G47.33    Chronic systolic heart failure (HCC) I50.22    ICD (implantable cardioverter-defibrillator) in place Z95.810    Anxiety and depression F41.9, F32. 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    CKD stage 3 due to type 2 diabetes mellitus (HCC) E11.22, N18.30    Vitamin D deficiency E55.9    Chronic left shoulder pain M25.512, G89.29    Congestive heart failure (HCC) I50.9    Anxiety F41.9    Secondary hyperparathyroidism (HCC) N25.81    COVID-19 U07.1    Acute respiratory failure with hypoxia ([facility]) J96.01    SOB (shortness of breath) R06.02    History of COVID-19 Z86.16    Hypoxia R09.02    Chronic kidney disease N18.9    Acute pulmonary embolism without acute cor pulmonale ([facility]) I26.99    Coronary artery disease involving native heart without angina pectoris I25.10    Shortness of breath R06.02    Hypotension I95.9    Chest pain R07.9       Isolation/Infection:   Isolation            No Isolation          Patient Infection Status       Infection Onset Added Last Indicated Last Indicated By Review Planned Expiration Resolved Resolved By    None active    Resolved    COVID-19 (Rule Out) 22 COVID-19, Rapid (Ordered)   22 Rule-Out Test Resulted    COVID-19 10/13/22 10/13/22 10/13/22 COVID-19, Rapid   10/27/22     COVID-19 (Rule Out) 10/13/22 10/13/22 10/13/22 COVID-19, Rapid (Ordered)   10/13/22 Rule-Out Test Resulted            Nurse Assessment:  Last Vital Signs: BP (!) 153/76   Pulse 79   Temp 98.2 °F (36.8 °C) (Oral)   Resp 18   Ht 5' 11\" (1.803 m)   Wt (!) 333 lb 1.8 oz (151.1 kg)   SpO2 97%   BMI 46.46 kg/m²     Last documented pain score (0-10 scale): Pain Level: 0  Last Weight:   Wt Readings from Last 1 Encounters:   22 (!) 333 lb 1.8 oz (151.1 kg)     Mental Status:  alert    IV Access:  - None    Nursing Mobility/ADLs:  Walking   Independent  Transfer  Independent  Bathing  Independent  Dressing  Independent  [address]  Med Delivery   whole    Wound Care Documentation and Therapy:  Incision 06/12/19 Back Left (Active)   Number of days: 4069       Incision 07/10/19 Penis (Active)   Number of days: 0583        Elimination:  Continence: Bowel: Yes  Bladder: Yes  Urinary Catheter: None   Colostomy/Ileostomy/Ileal Conduit: No       Date of Last BM: 11/19/22  No intake or output data in the 24 hours ending 11/20/22 1429  I/O last 3 completed shifts: In: 976.2 [P.O.:480; I.V.:496.2]  Out: 1075 [Urine:750; Emesis/NG output:325]    Safety Concerns:     None    Impairments/Disabilities:      Language Barrier - Indian speaking    Nutrition Therapy:  Current Nutrition Therapy:   - Oral Diet:  General and Carb Control 4 carbs/meal (1800kcals/day)    Routes of Feeding: Oral  Liquids: Thin Liquids  Daily Fluid Restriction: no  Last Modified Barium Swallow with Video (Video Swallowing Test): not done    Treatments at the Time of Hospital Discharge:   Respiratory Treatments: as needed  Oxygen Therapy:  is not on home oxygen therapy. Ventilator:    - No ventilator support    Rehab Therapies: {THERAPEUTIC INTERVENTION:8117238079}  Weight Bearing Status/Restrictions: No weight bearing restrictions  Other Medical Equipment (for information only, NOT a DME order):  none  Other Treatments: ***    Patient's personal belongings (please select all that are sent with patient):  Cell phone, clothing    RN SIGNATURE:  Electronically signed by Gayatri Berry on 11/20/22 at 2:32 PM EST    CASE MANAGEMENT/SOCIAL WORK SECTION    Inpatient Status Date: ***    Readmission Risk Assessment Score:  Readmission Risk              Risk of Unplanned Readmission:  21           Discharging to Facility/ Agency   Name:   Address:  Phone:  Fax:    Dialysis Facility (if applicable)   Name:  Address:  Dialysis Schedule:  Phone:  Fax:    / signature: {Esignature:[id number]}    PHYSICIAN SECTION    Prognosis: {Prognosis:4409227747}    Condition at Discharge: Gael Arriaga Patient Condition:[id number]}    Rehab Potential (if transferring to Rehab): {Prognosis:4991277657}    Recommended Labs or Other Treatments After Discharge: ***    Physician Certification: I certify the above information and transfer of Julio Varma  is necessary for the continuing treatment of the diagnosis listed and that he requires {Admit to Appropriate Level of Care:56208} for {GREATER/LESS:191662473} 30 days.      Update Admission H&P: {CHP DME Changes in GJPMQ:623545374}    PHYSICIAN SIGNATURE:  {Esignature:360038450}

## 2022-11-20 NOTE — PROGRESS NOTES
Cardiology progress note    Patient Name: Alex Navarro Date: 2022  4:27 AM  MR #: [id number]  : 1960    Attending Physician: Miki Gonzalez DO  Reason for consult: Chest pain    History of Presenting Illness:      Lacy Christian is a 57 y.o. male on hospital day 2 who follows up in clinic with Dr. Charity Berry with a history of PE on Eliquis, history of nonischemic cardiomyopathy status post ICD, morbid obese, hypertension, hyperlipidemia, LON, PAD who was admitted to the hospital for chest pain    Troponins detected at 0.03 which are flat and nonsignificant (troponins are actually coming down from 0.12 weeks ago)  EKG sinus rhythm without signs of ischemia      TTE 11/3/2022 EF 45%    History Obtained From:  patient, electronic medical record  =================  2022  Patient laying in bed looks comfortable  Denies chest pain  Telemetry sinus rhythm    History:      Past Medical History:   Diagnosis Date    Acute kidney injury superimposed on CKD ([address] 08/15/2019    Anxiety and depression 2019    Cardiomyopathy ([address]     Chronic systolic heart failure ([address] 2019    Diabetes mellitus ([address]     Diabetic neuropathy associated with type 2 diabetes mellitus ([address] 2017    Heart failure, NYHA class 4 ([address]     Hyperlipidemia     Hypertension     ICD (implantable cardioverter-defibrillator) in place 2019    Left ureteral calculus     Morbid obesity ([address] 2017    LON (obstructive sleep apnea)     Pulmonary embolism ([address]     ON ELIQUIS    Pyelonephritis 2019    Ureteral calculus     Ureteric stone      Past Surgical History:   Procedure Laterality Date    CARDIAC DEFIBRILLATOR PLACEMENT  2008    CYSTOSCOPY INSERTION / REMOVAL STENT / STONE Left 2019    CYSTOSCOPY LEFT DOUBLE J STENT PLACEMENT ROOM 287 performed by Elsie Guzman MD at [facility]    LITHOTRIPSY Left 2019    LEFT ESWL (CONF # [id number]) performed by Elsie Guzman MD at [facility] LITHOTRIPSY N/A 7/10/2019    HOLMIUM LASER LITHOTRIPSY, STONE EXTRACTION, LT URETERAL STENT INSERTION.  performed by Main Benson MD at [address] Left 7/10/2019    LEFT FLEXIBLE URETEROSCOPY, CYSTOSCOPY RETROGRADE performed by Main Benson MD at [address] History  Family History   Problem Relation Age of Onset    Heart Disease Mother     Kidney Disease Mother     Hypertension Mother     Diabetes Mother     No Known Problems Sister     No Known Problems Brother     No Known Problems Brother      [] Unable to obtain due to ventilated and/ or neurologic status  Social History     Socioeconomic History    Marital status: Legally      Spouse name: Not on file    Number of children: Not on file    Years of education: Not on file    Highest education level: Not on file   Occupational History    Not on file   Tobacco Use    Smoking status: Former     Packs/day: 1.00     Years: 25.00     Pack years: 25.00     Types: Cigarettes     Quit date: 2008     Years since quittin.9    Smokeless tobacco: Never   Substance and Sexual Activity    Alcohol use: No    Drug use: Never    Sexual activity: Yes     Partners: Female   Other Topics Concern    Not on file   Social History Narrative    Not on file     Social Determinants of Health     Financial Resource Strain: Not on file   Food Insecurity: Not on file   Transportation Needs: Not on file   Physical Activity: Not on file   Stress: Not on file   Social Connections: Not on file   Intimate Partner Violence: Not on file   Housing Stability: Not on file      Home Medications:      Medications Prior to Admission: insulin glargine (LANTUS SOLOSTAR) 100 UNIT/ML injection pen, Inject 20 Units into the skin 2 times daily  insulin lispro, 1 Unit Dial, (HUMALOG KWIKPEN) 100 UNIT/ML SOPN, Inject 8 Units into the skin 3 times daily (before meals)  Insulin Pen Needle ([employer] PEN NEEDLES) 31G X 6 MM MISC, 1 each by Does not apply route 5 times daily  blood glucose monitor strips, Test 5 times a day & as needed for symptoms of irregular blood glucose. Dispense sufficient amount for indicated testing frequency plus additional to accommodate PRN testing needs. apixaban starter pack (ELIQUIS DVT/PE STARTER PACK) 5 MG TBPK tablet, Take 1 tablet by mouth See Admin Instructions  hydrALAZINE (APRESOLINE) 25 MG tablet, Take 1 tablet by mouth every 8 hours  sacubitril-valsartan (ENTRESTO) 24-26 MG per tablet, Take 1 tablet by mouth 2 times daily  [DISCONTINUED] metoprolol succinate (TOPROL XL) 25 MG extended release tablet, Take 1 tablet by mouth daily  furosemide (LASIX) 40 MG tablet, TAKE 1 TABLET BY MOUTH EVERY DAY. HOLD UNTIL SEEN BY PCP FOR FOLLOWUP (Patient taking differently: TAKE 1 TABLET BY MOUTH EVERY DAY.)  aspirin (ASPIRIN ADULT LOW STRENGTH) 81 MG EC tablet, TAKE 1 TABLET BY MOUTH DAILY  atorvastatin (LIPITOR) 40 MG tablet, Take 1 tablet by mouth at bedtime  Insulin Pen Needle (B-D UF III MINI PEN NEEDLES) 31G X 5 MM MISC, 1 each by Does not apply route daily  fenofibrate (TRIGLIDE) 160 MG tablet, TAKE 1 TABLET BY MOUTH EVERY NIGHT  albuterol (PROVENTIL) (2.5 MG/3ML) 0.083% nebulizer solution, Albuterol albuterol (PROVENTIL) (2.5 MG/3ML) 0.083% nebulizer solution Indications: Type 2 diabetes mellitus without complication, with long-term current use of insulin (HCC) Take 3 mLs by nebulization 4 times daily 120 each 5 10/11/2018 Active 10-  [address] (91294)  gabapentin (NEURONTIN) 300 MG capsule, Take by mouth nightly.   omeprazole (PRILOSEC) 20 MG delayed release capsule, Take 1 capsule by mouth daily  Blood Glucose Monitoring Suppl (ONE TOUCH ULTRA MINI) w/Device KIT, 1 kit by Does not apply route three times daily  Lancets MISC, Use TID  Insulin Syringe-Needle U-100 (INSULIN SYRINGE 1CC/31GX5/16\") 31G X 5/16\" 1 ML MISC, Use once daily to administer Lantus    Current Hospital Medications:   Scheduled Meds:   furosemide  40 mg Oral Daily    aspirin  81 mg Oral Daily    atorvastatin  80 mg Oral Nightly    metoprolol tartrate  12.5 mg Oral BID    apixaban  5 mg Oral BID    sacubitril-valsartan  1 tablet Oral BID    insulin glargine  20 Units SubCUTAneous Nightly    sodium chloride flush  5-40 mL IntraVENous 2 times per day    insulin lispro  0-8 Units SubCUTAneous TID WC    insulin lispro  0-4 Units SubCUTAneous Nightly     Continuous Infusions:   sodium chloride      dextrose       PRN Meds:.sodium chloride flush, sodium chloride, ondansetron **OR** ondansetron, polyethylene glycol, acetaminophen **OR** acetaminophen, ipratropium-albuterol, heparin (porcine), heparin (porcine), glucose, dextrose bolus **OR** dextrose bolus, glucagon (rDNA), dextrose   sodium chloride      dextrose        Allergies: Allergies   Allergen Reactions    Coreg [Carvedilol] Other (See Comments)     \"hyper\", rapid pulse    Januvia [Sitagliptin]       Review of Systems:       [x] CV, Resp, Neuro, , and all other systems reviewed and negative other than listed in HPI. Objective Findings:     Vitals:   Vitals:    11/19/22 1200 11/19/22 1550 11/19/22 1952 11/20/22 0805   BP: (!) 145/72 133/64 135/66 (!) 153/76   Pulse: 76 77 82 79   Resp: 13  18    Temp: 98.2 °F (36.8 °C)  98.5 °F (36.9 °C) 98.2 °F (36.8 °C)   TempSrc: Oral  Oral Oral   SpO2: 99% 99% 98% 96%   Weight:       Height:            Physical Examination:  General: Alert oriented x3 no acute distress  HEENT: Normocephalic, normal scleral icterus. Neck: No JVD. Heart: Regular, no murmur, no rub/gallop. No RV heave. Lungs: Clear to ascultation, no rales/wheezing/rhonchi. Good chest wall excursion. Abdomen: Obese protuberant abdomen, soft nontender nondistended  Extremities: No clubbing/cyanosis, no edema. 1+ pitting edema bilaterally  Skin: Warm, dry, normal turgor, no rash, no bruise, no petichiae. Neuro: No myoclonus or tremor.    Psych: Normal affect    Results/ Medications reviewed 11/20/2022, 9:11 AM     Laboratory, Microbiology, Pathology, Radiology, Cardiology, Medications and Transcriptions reviewed  Scheduled Meds:   furosemide  40 mg Oral Daily    aspirin  81 mg Oral Daily    atorvastatin  80 mg Oral Nightly    metoprolol tartrate  12.5 mg Oral BID    apixaban  5 mg Oral BID    sacubitril-valsartan  1 tablet Oral BID    insulin glargine  20 Units SubCUTAneous Nightly    sodium chloride flush  5-40 mL IntraVENous 2 times per day    insulin lispro  0-8 Units SubCUTAneous TID WC    insulin lispro  0-4 Units SubCUTAneous Nightly     Continuous Infusions:   sodium chloride      dextrose         Recent Labs     11/18/22 0445 11/19/22 0429 11/20/22  0505   WBC 8.8 7.9 7.7   HGB 12.5* 12.1* 11.5*   HCT 39.1* 37.2* 34.0*   MCV 87.9 88.2 85.5    333 321       Recent Labs     11/18/22 0445 11/19/22 0429 11/20/22  0505   * 137 138   K 4.2 4.5 5.1*   CL 98 102 105   CO2 21 21 22   BUN 47* 55* 55*   CREATININE 2.27* 3.30* 2.66*       Recent Labs     11/18/22 0445 11/18/22  1634   PROT 6.7  --    INR  --  1.2       No results found for this or any previous visit. Impression:   Positive troponins. Troponins flat and not significant at 0.03 in the setting of worsening creatinine  Heart failure with improved ejection fraction status post ICD  History of PE on Eliquis  Diabetes  Hypertension  Hyperlipidemia  COPD  LON  PAD    TTE 11/3/2022 EF 45%  Plan:   Continue telemetry  Continue aspirin and atorvastatin for secondary prevention of PAD  Continue Coreg  Continue Entresto  Continue Eliquis  No echocardiogram needed at this time patient underwent echocardiogram 2 weeks ago with borderline EF  Ischemic evaluation as an outpatient  Please keep potassium between 4 and 5 magnesium above 2  Please keep hemoglobin above 8  Recommend nephrology consult  Diuretic management as per nephrology  From cardiology standpoint patient is stable to be discharged at any time.   Cardiology will sign off please please reconsult if further questions arise  Patient will need to follow-up in clinic with Dr. Cecily Weiner in 2 weeks and at the heart failure clinic in 1 week  Comments: Thank you for allowing us to participate in the care of this patient. Will continue to follow. Please call if questions or concerns arise. Electronically signed by Saravanan Godinez MD on 11/20/2022 at 9:11 AM    Please note this report has been partially produced using speech recognition software and may cause contain errors related to that system including grammar, punctuation and spelling as well as words and phrases that may seem inappropriate. If there are questions or concerns please feel free to contact me to clarify.

## 2022-11-21 ENCOUNTER — OFFICE VISIT (OUTPATIENT)
Dept: PULMONOLOGY | Age: 62
End: 2022-11-21
Payer: MEDICARE

## 2022-11-21 VITALS
WEIGHT: 315 LBS | DIASTOLIC BLOOD PRESSURE: 84 MMHG | HEIGHT: 71 IN | HEART RATE: 86 BPM | SYSTOLIC BLOOD PRESSURE: 146 MMHG | TEMPERATURE: 97 F | RESPIRATION RATE: 16 BRPM | OXYGEN SATURATION: 98 % | BODY MASS INDEX: 44.1 KG/M2

## 2022-11-21 DIAGNOSIS — G47.33 OSA (OBSTRUCTIVE SLEEP APNEA): Primary | ICD-10-CM

## 2022-11-21 DIAGNOSIS — Z87.891 HISTORY OF SMOKING: ICD-10-CM

## 2022-11-21 DIAGNOSIS — E66.01 CLASS 3 SEVERE OBESITY DUE TO EXCESS CALORIES WITHOUT SERIOUS COMORBIDITY WITH BODY MASS INDEX (BMI) OF 45.0 TO 49.9 IN ADULT (HCC): ICD-10-CM

## 2022-11-21 DIAGNOSIS — R93.89 ABNORMAL CT OF THE CHEST: ICD-10-CM

## 2022-11-21 DIAGNOSIS — R06.02 SOB (SHORTNESS OF BREATH): ICD-10-CM

## 2022-11-21 DIAGNOSIS — Z09 HOSPITAL DISCHARGE FOLLOW-UP: ICD-10-CM

## 2022-11-21 DIAGNOSIS — I26.99 ACUTE PULMONARY EMBOLISM WITHOUT ACUTE COR PULMONALE, UNSPECIFIED PULMONARY EMBOLISM TYPE (HCC): ICD-10-CM

## 2022-11-21 LAB
EKG ATRIAL RATE: 114 BPM
EKG ATRIAL RATE: 87 BPM
EKG P AXIS: 54 DEGREES
EKG P-R INTERVAL: 186 MS
EKG Q-T INTERVAL: 350 MS
EKG Q-T INTERVAL: 390 MS
EKG QRS DURATION: 100 MS
EKG QRS DURATION: 106 MS
EKG QTC CALCULATION (BAZETT): 469 MS
EKG QTC CALCULATION (BAZETT): 482 MS
EKG R AXIS: -16 DEGREES
EKG R AXIS: 0 DEGREES
EKG T AXIS: 63 DEGREES
EKG T AXIS: 75 DEGREES
EKG VENTRICULAR RATE: 114 BPM
EKG VENTRICULAR RATE: 87 BPM

## 2022-11-21 PROCEDURE — 99215 OFFICE O/P EST HI 40 MIN: CPT | Performed by: INTERNAL MEDICINE

## 2022-11-21 PROCEDURE — 3078F DIAST BP <80 MM HG: CPT | Performed by: INTERNAL MEDICINE

## 2022-11-21 PROCEDURE — 1111F DSCHRG MED/CURRENT MED MERGE: CPT | Performed by: INTERNAL MEDICINE

## 2022-11-21 PROCEDURE — 3074F SYST BP LT 130 MM HG: CPT | Performed by: INTERNAL MEDICINE

## 2022-11-21 NOTE — PROGRESS NOTES
ELIQUIS    Pyelonephritis 2019    Ureteral calculus     Ureteric stone      Past Surgical History:   Procedure Laterality Date    CARDIAC DEFIBRILLATOR PLACEMENT  2008    CYSTOSCOPY INSERTION / REMOVAL STENT / STONE Left 2019    CYSTOSCOPY LEFT DOUBLE J STENT PLACEMENT ROOM 287 performed by Mason Stewart MD at [facility]    LITHOTRIPSY Left 2019    LEFT ESWL (CONF # [id number]) performed by Mason Stewart MD at [facility]    LITHOTRIPSY N/A 7/10/2019    HOLMIUM LASER LITHOTRIPSY, STONE EXTRACTION, LT URETERAL STENT INSERTION.  performed by Mason Stewart MD at [address] Left 7/10/2019    LEFT FLEXIBLE URETEROSCOPY, CYSTOSCOPY RETROGRADE performed by Mason Stewart MD at [address] History   Problem Relation Age of Onset    Heart Disease Mother     Kidney Disease Mother     Hypertension Mother     Diabetes Mother     No Known Problems Sister     No Known Problems Brother     No Known Problems Brother      Social History     Socioeconomic History    Marital status: Legally      Spouse name: Not on file    Number of children: Not on file    Years of education: Not on file    Highest education level: Not on file   Occupational History    Not on file   Tobacco Use    Smoking status: Former     Packs/day: 1.00     Years: 25.00     Pack years: 25.00     Types: Cigarettes     Quit date: 2008     Years since quittin.9    Smokeless tobacco: Never   Vaping Use    Vaping Use: Never used   Substance and Sexual Activity    Alcohol use: No    Drug use: Never    Sexual activity: Yes     Partners: Female   Other Topics Concern    Not on file   Social History Narrative    Not on file     Social Determinants of Health     Financial Resource Strain: Not on file   Food Insecurity: Not on file   Transportation Needs: Not on file   Physical Activity: Not on file   Stress: Not on file   Social Connections: Not on file   Intimate Partner Violence: Not on file   Housing Stability: Not on file         Review of Systems      ROS: 10 organs review of system is done including general, psychological, ENT, hematological, endocrine, respiratory, cardiovascular, gastrointestinal,musculoskeletal, neurological,  allergy and Immunology is done and is otherwise negative. Current Outpatient Medications   Medication Sig Dispense Refill    metoprolol tartrate (LOPRESSOR) 25 MG tablet Take 0.5 tablets by mouth 2 times daily 60 tablet 3    insulin glargine (LANTUS SOLOSTAR) 100 UNIT/ML injection pen Inject 20 Units into the skin 2 times daily 5 Adjustable Dose Pre-filled Pen Syringe 3    insulin lispro, 1 Unit Dial, (HUMALOG KWIKPEN) 100 UNIT/ML SOPN Inject 8 Units into the skin 3 times daily (before meals) 1 Adjustable Dose Pre-filled Pen Syringe 2    Insulin Pen Needle ([employer] PEN NEEDLES) 31G X 6 MM MISC 1 each by Does not apply route 5 times daily 100 each 3    blood glucose monitor strips Test 5 times a day & as needed for symptoms of irregular blood glucose. Dispense sufficient amount for indicated testing frequency plus additional to accommodate PRN testing needs. 300 strip 2    apixaban starter pack (ELIQUIS DVT/PE STARTER PACK) 5 MG TBPK tablet Take 1 tablet by mouth See Admin Instructions 74 tablet 0    hydrALAZINE (APRESOLINE) 25 MG tablet Take 1 tablet by mouth every 8 hours 90 tablet 3    sacubitril-valsartan (ENTRESTO) 24-26 MG per tablet Take 1 tablet by mouth 2 times daily 60 tablet 2    furosemide (LASIX) 40 MG tablet TAKE 1 TABLET BY MOUTH EVERY DAY.  HOLD UNTIL SEEN BY PCP FOR FOLLOWUP (Patient taking differently: TAKE 1 TABLET BY MOUTH EVERY DAY.) 90 tablet 3    aspirin (ASPIRIN ADULT LOW STRENGTH) 81 MG EC tablet TAKE 1 TABLET BY MOUTH DAILY 90 tablet 3    atorvastatin (LIPITOR) 40 MG tablet Take 1 tablet by mouth at bedtime 90 tablet 3    Insulin Pen Needle (B-D UF III MINI PEN NEEDLES) 31G X 5 MM MISC 1 each by Does not apply route daily 100 each 4    fenofibrate (TRIGLIDE) 160 MG tablet TAKE 1 TABLET BY MOUTH EVERY NIGHT 90 tablet 4    albuterol (PROVENTIL) (2.5 MG/3ML) 0.083% nebulizer solution Albuterol albuterol (PROVENTIL) (2.5 MG/3ML) 0.083% nebulizer solution Indications: Type 2 diabetes mellitus without complication, with long-term current use of insulin (HCC) Take 3 mLs by nebulization 4 times daily 120 each 5 10/11/2018 Active 10-  [address] 28 (95747)      gabapentin (NEURONTIN) 300 MG capsule Take by mouth nightly. omeprazole (PRILOSEC) 20 MG delayed release capsule Take 1 capsule by mouth daily 90 capsule 4    Blood Glucose Monitoring Suppl (ONE TOUCH ULTRA MINI) w/Device KIT 1 kit by Does not apply route three times daily 1 kit 5    Lancets MISC Use  each 3    Insulin Syringe-Needle U-100 (INSULIN SYRINGE 1CC/31GX5/16\") 31G X 5/16\" 1 ML MISC Use once daily to administer Lantus 100 each 1     No current facility-administered medications for this visit. Objective:     Vitals:    11/21/22 1550 11/21/22 1559   BP: (!) 144/82 (!) 146/84   Site: Right Upper Arm Left Upper Arm   Position: Sitting Sitting   Cuff Size: Large Adult Large Adult   Pulse: 86    Resp: 16    Temp: 97 °F (36.1 °C)    SpO2: 98%    Weight: (!) 331 lb 6.4 oz (150.3 kg)    Height: 5' 11\" (1.803 m)          Physical Exam  Constitutional:       Appearance: He is well-developed. HENT:      Head: Normocephalic and atraumatic. Eyes:      General:         Left eye: No discharge. Conjunctiva/sclera: Conjunctivae normal.      Pupils: Pupils are equal, round, and reactive to light. Neck:      Vascular: No JVD. Cardiovascular:      Rate and Rhythm: Normal rate and regular rhythm. Heart sounds: Normal heart sounds. No murmur heard. No friction rub. No gallop. Pulmonary:      Effort: Pulmonary effort is normal. No respiratory distress. Breath sounds: Normal breath sounds. No wheezing or rales. Chest:      Chest wall: No tenderness.    Abdominal:      General: Bowel sounds are normal.      Palpations: Abdomen is soft. Musculoskeletal:         General: No tenderness or deformity. Cervical back: Normal range of motion and neck supple. Right lower leg: No edema. Left lower leg: No edema. Skin:     General: Skin is warm and dry. Neurological:      Mental Status: He is alert and oriented to person, place, and time. Psychiatric:         Judgment: Judgment normal.       Imaging studies reviewed and interpreted by me ED chest without contrast, October 3, shows no mass, possible nodule in the right lower lobe  Lab results reviewed in chart  PFT none  ECHO: November 2022, EF 45%, 2+ MR    Assessment and Plan       Diagnosis Orders   1. LON (obstructive sleep apnea)        2. Abnormal CT of the chest  CT CHEST WO CONTRAST      3. SOB (shortness of breath)        4. Hospital discharge follow-up  [place] DISCHARGE MEDS RECONCILED W/ CURRENT OUTPATIENT MED LIST      5. Acute pulmonary embolism without acute cor pulmonale, unspecified pulmonary embolism type (HCC)        6. Class 3 severe obesity due to excess calories without serious comorbidity with body mass index (BMI) of 45.0 to 49.9 in adult ([facility] [address]        7. History of smoking          Counseling: CPAP/BiPAP uses, He advised to use CPAP at least 5-6 hours every night.     New CPAP mask is ordered, full facemask  Discussed with the patient risk of stroke, heart failure and pulmonary complication in patient with untreated moderate to severe sleep apnea  Abnormal CT, will obtain follow-up CT scan in 4 weeks and I will see him after that  Shortness of breath, likely multifactorial secondary to underlying LON, obesity, and possible COPD, will obtain PFT and evaluate on follow-up  Weight loss strongly recommended  Continue Eliquis, will plan 6 months of treatment, will evaluate D-dimer at the end of the 6 months,  History of smoking, we will plan CT lung cancer screening on yearly basis, follow-up CT in 4 weeks for possible nodule right lower lobe           Orders Placed This Encounter   Procedures    CT CHEST WO CONTRAST     Standing Status:   Future     Standing Expiration Date:   11/21/2023    [place] DISCHARGE MEDS RECONCILED W/ CURRENT OUTPATIENT MED LIST     No orders of the defined types were placed in this encounter. Discussed with patient the importance of exercise and weight control and  overall health and well-being. Reviewed with the patient: current clinical status, medications, activities and diet. Side effects, adverse effects of the medication prescribed today, as well as treatment plan and result expectations have been discussed with the patient who expresses understanding and desires to proceed. Return in about 6 weeks (around 1/2/2023). I spent 40 min with this patient, greater the 50% of this time was spent in counseling and/or coordinating of care.       Darleen Tejada MD

## 2022-11-22 ENCOUNTER — OFFICE VISIT (OUTPATIENT)
Dept: CARDIOLOGY CLINIC | Age: 62
End: 2022-11-22
Payer: MEDICARE

## 2022-11-22 VITALS
DIASTOLIC BLOOD PRESSURE: 90 MMHG | HEART RATE: 84 BPM | OXYGEN SATURATION: 99 % | BODY MASS INDEX: 46.3 KG/M2 | WEIGHT: 315 LBS | SYSTOLIC BLOOD PRESSURE: 158 MMHG

## 2022-11-22 DIAGNOSIS — I25.10 CORONARY ARTERY DISEASE INVOLVING NATIVE CORONARY ARTERY OF NATIVE HEART WITHOUT ANGINA PECTORIS: ICD-10-CM

## 2022-11-22 DIAGNOSIS — I42.0 DILATED CARDIOMYOPATHY (HCC): ICD-10-CM

## 2022-11-22 DIAGNOSIS — E11.22 CKD STAGE 3 DUE TO TYPE 2 DIABETES MELLITUS (HCC): Chronic | ICD-10-CM

## 2022-11-22 DIAGNOSIS — E66.01 MORBID OBESITY (HCC): ICD-10-CM

## 2022-11-22 DIAGNOSIS — I50.33 ACUTE ON CHRONIC DIASTOLIC CONGESTIVE HEART FAILURE (HCC): Primary | ICD-10-CM

## 2022-11-22 DIAGNOSIS — N18.30 CKD STAGE 3 DUE TO TYPE 2 DIABETES MELLITUS (HCC): Chronic | ICD-10-CM

## 2022-11-22 DIAGNOSIS — I10 PRIMARY HYPERTENSION: ICD-10-CM

## 2022-11-22 DIAGNOSIS — R07.2 PRECORDIAL PAIN: ICD-10-CM

## 2022-11-22 DIAGNOSIS — Z95.810 ICD (IMPLANTABLE CARDIOVERTER-DEFIBRILLATOR) IN PLACE: Chronic | ICD-10-CM

## 2022-11-22 PROBLEM — Z95.0 PACEMAKER: Status: RESOLVED | Noted: 2017-12-13 | Resolved: 2022-11-22

## 2022-11-22 LAB
EKG ATRIAL RATE: 129 BPM
EKG Q-T INTERVAL: 328 MS
EKG QRS DURATION: 100 MS
EKG QTC CALCULATION (BAZETT): 480 MS
EKG R AXIS: -16 DEGREES
EKG T AXIS: 74 DEGREES
EKG VENTRICULAR RATE: 129 BPM

## 2022-11-22 PROCEDURE — 3074F SYST BP LT 130 MM HG: CPT | Performed by: INTERNAL MEDICINE

## 2022-11-22 PROCEDURE — 99214 OFFICE O/P EST MOD 30 MIN: CPT | Performed by: INTERNAL MEDICINE

## 2022-11-22 PROCEDURE — 3078F DIAST BP <80 MM HG: CPT | Performed by: INTERNAL MEDICINE

## 2022-11-22 PROCEDURE — 3046F HEMOGLOBIN A1C LEVEL >9.0%: CPT | Performed by: INTERNAL MEDICINE

## 2022-11-22 ASSESSMENT — ENCOUNTER SYMPTOMS
SHORTNESS OF BREATH: 0
ALLERGIC/IMMUNOLOGIC NEGATIVE: 1
WHEEZING: 0
GASTROINTESTINAL NEGATIVE: 1
EYES NEGATIVE: 1
VOMITING: 0
NAUSEA: 0
ABDOMINAL PAIN: 0
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
## 2022-11-24 LAB — BLOOD CULTURE, ROUTINE: NORMAL

## 2022-11-26 ENCOUNTER — APPOINTMENT (OUTPATIENT)
Dept: GENERAL RADIOLOGY | Age: 62
DRG: 250 | End: 2022-11-26
Payer: MEDICARE

## 2022-11-26 ENCOUNTER — HOSPITAL ENCOUNTER (INPATIENT)
Age: 62
LOS: 4 days | Discharge: HOME | DRG: 250 | End: 2022-12-03
Attending: INTERNAL MEDICINE | Admitting: INTERNAL MEDICINE
Payer: MEDICARE

## 2022-11-26 ENCOUNTER — TELEPHONE (OUTPATIENT)
Dept: OTHER | Facility: CLINIC | Age: 62
End: 2022-11-26

## 2022-11-26 DIAGNOSIS — I42.0 DILATED CARDIOMYOPATHY (HCC): ICD-10-CM

## 2022-11-26 DIAGNOSIS — R00.0 TACHYCARDIA: ICD-10-CM

## 2022-11-26 DIAGNOSIS — R77.8 ELEVATED TROPONIN: ICD-10-CM

## 2022-11-26 DIAGNOSIS — R07.9 CHEST PAIN, UNSPECIFIED TYPE: Primary | ICD-10-CM

## 2022-11-26 PROBLEM — R79.89 ELEVATED TROPONIN: Status: ACTIVE | Noted: 2022-11-26

## 2022-11-26 PROBLEM — I25.10 CORONARY ARTERY DISEASE INVOLVING NATIVE HEART WITHOUT ANGINA PECTORIS: Chronic | Status: ACTIVE | Noted: 2022-11-06

## 2022-11-26 PROBLEM — N18.4 CKD STAGE 4 DUE TO TYPE 2 DIABETES MELLITUS (HCC): Status: ACTIVE | Noted: 2019-08-15

## 2022-11-26 PROBLEM — E11.22 CKD STAGE 4 DUE TO TYPE 2 DIABETES MELLITUS (HCC): Status: ACTIVE | Noted: 2019-08-15

## 2022-11-26 LAB
ADENOVIRUS BY PCR: NOT DETECTED
ALBUMIN SERPL-MCNC: 3.4 G/DL (ref 3.5–4.6)
ALP BLD-CCNC: 98 U/L (ref 35–104)
ALT SERPL-CCNC: 17 U/L (ref 0–41)
ANION GAP SERPL CALCULATED.3IONS-SCNC: 11 MEQ/L (ref 9–15)
APTT: 42.8 SEC (ref 24.4–36.8)
AST SERPL-CCNC: 20 U/L (ref 0–40)
BASOPHILS ABSOLUTE: 0 K/UL (ref 0–0.2)
BASOPHILS RELATIVE PERCENT: 0.5 %
BILIRUB SERPL-MCNC: 0.5 MG/DL (ref 0.2–0.7)
BORDETELLA PARAPERTUSSIS BY PCR: NOT DETECTED
BORDETELLA PERTUSSIS BY PCR: NOT DETECTED
BUN BLDV-MCNC: 46 MG/DL (ref 8–23)
CALCIUM SERPL-MCNC: 9 MG/DL (ref 8.5–9.9)
CHLAMYDOPHILIA PNEUMONIAE BY PCR: NOT DETECTED
CHLORIDE BLD-SCNC: 100 MEQ/L (ref 95–107)
CO2: 22 MEQ/L (ref 20–31)
CORONAVIRUS 229E BY PCR: NOT DETECTED
CORONAVIRUS HKU1 BY PCR: NOT DETECTED
CORONAVIRUS NL63 BY PCR: NOT DETECTED
CORONAVIRUS OC43 BY PCR: NOT DETECTED
CREAT SERPL-MCNC: 2.43 MG/DL (ref 0.7–1.2)
EOSINOPHILS ABSOLUTE: 0.1 K/UL (ref 0–0.7)
EOSINOPHILS RELATIVE PERCENT: 1.6 %
GFR SERPL CREATININE-BSD FRML MDRD: 28.8 ML/MIN/{1.73_M2}
GLOBULIN: 3.2 G/DL (ref 2.3–3.5)
GLUCOSE BLD-MCNC: 228 MG/DL (ref 70–99)
HCT VFR BLD CALC: 38.2 % (ref 42–52)
HEMOGLOBIN: 12.6 G/DL (ref 14–18)
HUMAN METAPNEUMOVIRUS BY PCR: NOT DETECTED
HUMAN RHINOVIRUS/ENTEROVIRUS BY PCR: NOT DETECTED
INFLUENZA A BY PCR: NOT DETECTED
INFLUENZA B BY PCR: NOT DETECTED
INR BLD: 1.2
LYMPHOCYTES ABSOLUTE: 1.5 K/UL (ref 1–4.8)
LYMPHOCYTES RELATIVE PERCENT: 17.4 %
MCH RBC QN AUTO: 29 PG (ref 27–31.3)
MCHC RBC AUTO-ENTMCNC: 33.1 % (ref 33–37)
MCV RBC AUTO: 87.6 FL (ref 79–92.2)
MONOCYTES ABSOLUTE: 0.5 K/UL (ref 0.2–0.8)
MONOCYTES RELATIVE PERCENT: 5.9 %
MYCOPLASMA PNEUMONIAE BY PCR: NOT DETECTED
NEUTROPHILS ABSOLUTE: 6.5 K/UL (ref 1.4–6.5)
NEUTROPHILS RELATIVE PERCENT: 74.6 %
PARAINFLUENZA VIRUS 1 BY PCR: NOT DETECTED
PARAINFLUENZA VIRUS 2 BY PCR: NOT DETECTED
PARAINFLUENZA VIRUS 3 BY PCR: NOT DETECTED
PARAINFLUENZA VIRUS 4 BY PCR: NOT DETECTED
PDW BLD-RTO: 16.9 % (ref 11.5–14.5)
PLATELET # BLD: 384 K/UL (ref 130–400)
POTASSIUM SERPL-SCNC: 4.8 MEQ/L (ref 3.4–4.9)
PRO-BNP: 1282 PG/ML
PROTHROMBIN TIME: 14.8 SEC (ref 12.3–14.9)
RBC # BLD: 4.36 M/UL (ref 4.7–6.1)
RESPIRATORY SYNCYTIAL VIRUS BY PCR: NOT DETECTED
SARS-COV-2, PCR: NOT DETECTED
SODIUM BLD-SCNC: 133 MEQ/L (ref 135–144)
TOTAL CK: 48 U/L (ref 0–190)
TOTAL PROTEIN: 6.6 G/DL (ref 6.3–8)
TROPONIN: 0.04 NG/ML (ref 0–0.01)
TROPONIN: <0.01 NG/ML (ref 0–0.01)
WBC # BLD: 8.7 K/UL (ref 4.8–10.8)

## 2022-11-26 PROCEDURE — 93005 ELECTROCARDIOGRAM TRACING: CPT | Performed by: INTERNAL MEDICINE

## 2022-11-26 PROCEDURE — 93005 ELECTROCARDIOGRAM TRACING: CPT | Performed by: PHYSICIAN ASSISTANT

## 2022-11-26 PROCEDURE — 96361 HYDRATE IV INFUSION ADD-ON: CPT

## 2022-11-26 PROCEDURE — 0202U NFCT DS 22 TRGT SARS-COV-2: CPT

## 2022-11-26 PROCEDURE — 84484 ASSAY OF TROPONIN QUANT: CPT

## 2022-11-26 PROCEDURE — 6370000000 HC RX 637 (ALT 250 FOR IP): Performed by: PHYSICIAN ASSISTANT

## 2022-11-26 PROCEDURE — 85730 THROMBOPLASTIN TIME PARTIAL: CPT

## 2022-11-26 PROCEDURE — 96374 THER/PROPH/DIAG INJ IV PUSH: CPT

## 2022-11-26 PROCEDURE — 99285 EMERGENCY DEPT VISIT HI MDM: CPT

## 2022-11-26 PROCEDURE — 83880 ASSAY OF NATRIURETIC PEPTIDE: CPT

## 2022-11-26 PROCEDURE — 82550 ASSAY OF CK (CPK): CPT

## 2022-11-26 PROCEDURE — 80053 COMPREHEN METABOLIC PANEL: CPT

## 2022-11-26 PROCEDURE — 85610 PROTHROMBIN TIME: CPT

## 2022-11-26 PROCEDURE — 96376 TX/PRO/DX INJ SAME DRUG ADON: CPT

## 2022-11-26 PROCEDURE — 85025 COMPLETE CBC W/AUTO DIFF WBC: CPT

## 2022-11-26 PROCEDURE — 2580000003 HC RX 258: Performed by: PHYSICIAN ASSISTANT

## 2022-11-26 PROCEDURE — G0378 HOSPITAL OBSERVATION PER HR: HCPCS

## 2022-11-26 PROCEDURE — 71045 X-RAY EXAM CHEST 1 VIEW: CPT

## 2022-11-26 PROCEDURE — 36415 COLL VENOUS BLD VENIPUNCTURE: CPT

## 2022-11-26 PROCEDURE — 2500000003 HC RX 250 WO HCPCS: Performed by: PHYSICIAN ASSISTANT

## 2022-11-26 RX ORDER — METOPROLOL TARTRATE 5 MG/5ML
5 INJECTION INTRAVENOUS ONCE
Status: COMPLETED | OUTPATIENT
Start: 2022-11-26 | End: 2022-11-26

## 2022-11-26 RX ORDER — 0.9 % SODIUM CHLORIDE 0.9 %
500 INTRAVENOUS SOLUTION INTRAVENOUS ONCE
Status: COMPLETED | OUTPATIENT
Start: 2022-11-26 | End: 2022-11-26

## 2022-11-26 RX ORDER — ASPIRIN 325 MG
325 TABLET ORAL ONCE
Status: COMPLETED | OUTPATIENT
Start: 2022-11-26 | End: 2022-11-26

## 2022-11-26 RX ADMIN — ASPIRIN 325 MG: 325 TABLET ORAL at 14:42

## 2022-11-26 RX ADMIN — METOPROLOL TARTRATE 5 MG: 5 INJECTION, SOLUTION INTRAVENOUS at 15:36

## 2022-11-26 RX ADMIN — SODIUM CHLORIDE 500 ML: 9 INJECTION, SOLUTION INTRAVENOUS at 14:42

## 2022-11-26 RX ADMIN — METOPROLOL TARTRATE 5 MG: 5 INJECTION, SOLUTION INTRAVENOUS at 17:53

## 2022-11-26 RX ADMIN — SODIUM CHLORIDE 500 ML: 9 INJECTION, SOLUTION INTRAVENOUS at 16:26

## 2022-11-26 ASSESSMENT — PAIN DESCRIPTION - LOCATION
LOCATION: CHEST
LOCATION: HEAD
LOCATION: CHEST

## 2022-11-26 ASSESSMENT — PAIN - FUNCTIONAL ASSESSMENT
PAIN_FUNCTIONAL_ASSESSMENT: 0-10
PAIN_FUNCTIONAL_ASSESSMENT: 0-10

## 2022-11-26 ASSESSMENT — ENCOUNTER SYMPTOMS
EYE DISCHARGE: 0
SHORTNESS OF BREATH: 0
CONSTIPATION: 0
TROUBLE SWALLOWING: 0
ABDOMINAL PAIN: 0
NAUSEA: 0
SORE THROAT: 0
COLOR CHANGE: 0
PHOTOPHOBIA: 0
RHINORRHEA: 0
DIARRHEA: 0
CHEST TIGHTNESS: 0
COUGH: 0
ABDOMINAL DISTENTION: 0
VOMITING: 0
BACK PAIN: 0

## 2022-11-26 ASSESSMENT — PAIN SCALES - GENERAL
PAINLEVEL_OUTOF10: 4
PAINLEVEL_OUTOF10: 8
PAINLEVEL_OUTOF10: 2

## 2022-11-26 ASSESSMENT — PAIN DESCRIPTION - DESCRIPTORS
DESCRIPTORS: ACHING

## 2022-11-26 NOTE — ED PROVIDER NOTES
[address] [facility]  eMERGENCY dEPARTMENT eNCOUnter      Pt Name: Tommy Christie  MRN: [id number]  Arm[place]gfnatasha 1960  Date of evaluation: 11/26/2022  Provider: Jimmy Lawson PA-C    CHIEF COMPLAINT       Chief Complaint   Patient presents with    Chest Pain     With shortness of breath         HISTORY OF PRESENT ILLNESS   (Location/Symptom, Timing/Onset,Context/Setting, Quality, Duration, Modifying Factors, Severity)  Note limiting factors. Tommy Christie is a 57 y.o. male who presents to the emergency department complaint of chest pain which patient states started around 12:30 PM today for him along with some mild shortness of breath, patient states pain is under his neck, radiates into his chest, does cause some shortness of breath, no cough or fevers. Patient rates his current pain as an 8 out of 10, he states he has not taken anything at home for pain control. Patient states Dr. Justus Rehman is his cardiologist, he states he was seen approximately 1 week ago for chest pain, does have an outpatient follow-up visit. Medical history significant for diabetes, hypertension, obstructive sleep apnea, chronic systolic heart failure, diabetes, kidney stone, pulmonary embolism, hyperlipidemia, morbid obesity, cardiomyopathy, anxiety depression, AICD, pyelonephritis    Chart review notable for recent admission for tachycardia, chest pain, hypotension. Evaluated by cardiology at that time and not thought to be typical of cardiac, pursue likely stress test in the outpatient setting. Most recent echocardiogram appreciated 11/2/2022 EF 45%, 1-2+ MR, atrial fibrillation. Patient had also seen Dr. Justus Rehman in the outpatient setting in the interim period between last admission and today's visit and reportedly doing well. Tachycardia at that time thought to be due to dehydration. Had been decreased on daily Lopressor dose secondary to hypotension episodes. HPI    NursingNotes were reviewed.     REVIEW OF SYSTEMS (2-9 systems for level 4, 10 or more for level 5)     Review of Systems   Constitutional:  Negative for activity change and appetite change. HENT:  Negative for congestion, ear discharge, ear pain, nosebleeds, rhinorrhea and sore throat. Eyes:  Negative for discharge. Respiratory:  Negative for shortness of breath. Cardiovascular:  Positive for chest pain. Negative for palpitations and leg swelling. Gastrointestinal:  Negative for abdominal distention, abdominal pain, constipation, diarrhea, nausea and vomiting. Genitourinary:  Negative for difficulty urinating and dysuria. Musculoskeletal:  Negative for arthralgias. Skin:  Negative for color change, pallor and rash. Neurological:  Negative for dizziness, tremors, syncope, weakness, numbness and headaches. Psychiatric/Behavioral:  Negative for agitation and confusion. Except as noted above the remainder of the review of systems was reviewed and negative.        PAST MEDICAL HISTORY     Past Medical History:   Diagnosis Date    Acute kidney injury superimposed on CKD ([address] 08/15/2019    Anxiety and depression 04/30/2019    Cardiomyopathy ([address]     Chronic systolic heart failure ([address] 04/30/2019    Diabetes mellitus ([address]     Diabetic neuropathy associated with type 2 diabetes mellitus ([address] 12/13/2017    Heart failure, NYHA class 4 ([address]     Hyperlipidemia     Hypertension     ICD (implantable cardioverter-defibrillator) in place 04/30/2019    Left ureteral calculus     Morbid obesity ([address] 12/19/2017    LON (obstructive sleep apnea)     Pulmonary embolism ([address]     ON ELIQUIS    Pyelonephritis 08/04/2019    Ureteral calculus     Ureteric stone          SURGICALHISTORY       Past Surgical History:   Procedure Laterality Date    CARDIAC DEFIBRILLATOR PLACEMENT  12/2008    CYSTOSCOPY INSERTION / REMOVAL STENT / STONE Left 5/30/2019    CYSTOSCOPY LEFT DOUBLE J STENT PLACEMENT ROOM 287 performed by Rick Narvaez MD at [facility] LITHOTRIPSY Left 6/12/2019    LEFT ESWL (CONF # [id number]) performed by Abigail Peña MD at [facility]    LITHOTRIPSY N/A 7/10/2019    HOLMIUM LASER LITHOTRIPSY, STONE EXTRACTION, LT URETERAL STENT INSERTION. performed by Abigail Peña MD at [address] Left 7/10/2019    LEFT FLEXIBLE URETEROSCOPY, CYSTOSCOPY RETROGRADE performed by Abigail Peña MD at [facility]       Current Discharge Medication List        CONTINUE these medications which have NOT CHANGED    Details   apixaban (ELIQUIS) 5 MG TABS tablet Take 1 tablet by mouth 2 times daily  Qty: 180 tablet, Refills: 5    Associated Diagnoses: Acute pulmonary embolism without acute cor pulmonale, unspecified pulmonary embolism type (HCC)      metoprolol tartrate (LOPRESSOR) 25 MG tablet Take 0.5 tablets by mouth 2 times daily  Qty: 60 tablet, Refills: 3      insulin glargine (LANTUS SOLOSTAR) 100 UNIT/ML injection pen Inject 20 Units into the skin 2 times daily  Qty: 5 Adjustable Dose Pre-filled Pen Syringe, Refills: 3    Comments: May substitute if necessary'      insulin lispro, 1 Unit Dial, (HUMALOG KWIKPEN) 100 UNIT/ML SOPN Inject 8 Units into the skin 3 times daily (before meals)  Qty: 1 Adjustable Dose Pre-filled Pen Syringe, Refills: 2    Comments: May substitute if necessary      !! Insulin Pen Needle ([employer] PEN NEEDLES) 31G X 6 MM MISC 1 each by Does not apply route 5 times daily  Qty: 100 each, Refills: 3      blood glucose monitor strips Test 5 times a day & as needed for symptoms of irregular blood glucose. Dispense sufficient amount for indicated testing frequency plus additional to accommodate PRN testing needs. Qty: 300 strip, Refills: 2    Comments: Brand per patient preference. May round up to next available package size.       hydrALAZINE (APRESOLINE) 25 MG tablet Take 1 tablet by mouth every 8 hours  Qty: 90 tablet, Refills: 3      sacubitril-valsartan (ENTRESTO) 24-26 MG per tablet Take 1 tablet by mouth 2 times daily  Qty: 60 tablet, Refills: 2      furosemide (LASIX) 40 MG tablet TAKE 1 TABLET BY MOUTH EVERY DAY. HOLD UNTIL SEEN BY PCP FOR FOLLOWUP  Qty: 90 tablet, Refills: 3    Associated Diagnoses: Dilated cardiomyopathy (HCC)      aspirin (ASPIRIN ADULT LOW STRENGTH) 81 MG EC tablet TAKE 1 TABLET BY MOUTH DAILY  Qty: 90 tablet, Refills: 3    Associated Diagnoses: Dilated cardiomyopathy (HCC)      atorvastatin (LIPITOR) 40 MG tablet Take 1 tablet by mouth at bedtime  Qty: 90 tablet, Refills: 3      !! Insulin Pen Needle (B-D UF III MINI PEN NEEDLES) 31G X 5 MM MISC 1 each by Does not apply route daily  Qty: 100 each, Refills: 4    Associated Diagnoses: Type 2 diabetes mellitus with diabetic neuropathy, with long-term current use of insulin ([facility])      fenofibrate (TRIGLIDE) 160 MG tablet TAKE 1 TABLET BY MOUTH EVERY NIGHT  Qty: 90 tablet, Refills: 4    Associated Diagnoses: Dilated cardiomyopathy (HCC)      albuterol (PROVENTIL) (2.5 MG/3ML) 0.083% nebulizer solution Albuterol albuterol (PROVENTIL) (2.5 MG/3ML) 0.083% nebulizer solution Indications: Type 2 diabetes mellitus without complication, with long-term current use of insulin ([facility]) Take 3 mLs by nebulization 4 times daily 120 each 5 10/11/2018 Active 10-  [address] 28 (63977)      gabapentin (NEURONTIN) 300 MG capsule Take by mouth nightly.       omeprazole (PRILOSEC) 20 MG delayed release capsule Take 1 capsule by mouth daily  Qty: 90 capsule, Refills: 4    Associated Diagnoses: Gastroesophageal reflux disease without esophagitis      Blood Glucose Monitoring Suppl (ONE TOUCH ULTRA MINI) w/Device KIT 1 kit by Does not apply route three times daily  Qty: 1 kit, Refills: 5      Lancets MISC Use TID  Qty: 100 each, Refills: 3      Insulin Syringe-Needle U-100 (INSULIN SYRINGE 1CC/31GX5/16\") 31G X 5/16\" 1 ML MISC Use once daily to administer Lantus  Qty: 100 each, Refills: 1    Associated Diagnoses: Type 2 diabetes mellitus without complication, with long-term current use of insulin ([facility]       ! ! - Potential duplicate medications found. Please discuss with provider. ALLERGIES     Coreg [carvedilol] and Januvia [sitagliptin]    FAMILY HISTORY       Family History   Problem Relation Age of Onset    Heart Disease Mother     Kidney Disease Mother     Hypertension Mother     Diabetes Mother     No Known Problems Sister     No Known Problems Brother     No Known Problems Brother           SOCIAL HISTORY       Social History     Socioeconomic History    Marital status: Legally    Tobacco Use    Smoking status: Former     Packs/day: 1.00     Years: 25.00     Pack years: 25.00     Types: Cigarettes     Quit date: 2008     Years since quittin.9    Smokeless tobacco: Never   Vaping Use    Vaping Use: Never used   Substance and Sexual Activity    Alcohol use: No    Drug use: Never    Sexual activity: Yes     Partners: Female       SCREENINGS    [place] Coma Scale  Eye Opening: Spontaneous  Best Verbal Response: Oriented  Best Motor Response: Obeys commands  [place] Coma Scale Score: 15 @FLOW(11461525)@      PHYSICAL EXAM    (up to 7 for level 4, 8 or more for level 5)     ED Triage Vitals   BP Temp Temp Source Heart Rate Resp SpO2 Height Weight   22 1408 22 1406 22 1406 22 1406 22 1406 22 1406 -- --   (!) 73/45 98.1 °F (36.7 °C) Oral (!) 120 22 96 %         Physical Exam  Vitals and nursing note reviewed. Constitutional:       General: He is not in acute distress. Appearance: He is well-developed. He is not ill-appearing, toxic-appearing or diaphoretic. HENT:      Head: Normocephalic. Right Ear: Tympanic membrane normal.      Left Ear: Tympanic membrane normal.      Nose: No congestion. Mouth/Throat:      Mouth: Mucous membranes are moist.      Pharynx: No oropharyngeal exudate or posterior oropharyngeal erythema.    Eyes:      Extraocular Movements: Extraocular movements intact. Conjunctiva/sclera: Conjunctivae normal.      Pupils: Pupils are equal, round, and reactive to light. Neck:      Vascular: No JVD. Trachea: No tracheal deviation. Cardiovascular:      Rate and Rhythm: Tachycardia present. Pulses: Normal pulses. Heart sounds: Normal heart sounds. No murmur heard. No friction rub. No gallop. Pulmonary:      Effort: Pulmonary effort is normal. No tachypnea, accessory muscle usage, respiratory distress or retractions. Breath sounds: Normal breath sounds. No stridor. No wheezing, rhonchi or rales. Comments: Lung sounds are clear in all fields, there is no wheezes rales or rhonchi, no accessory muscle use, retractions, room air saturation 96%  Chest:      Chest wall: No tenderness. Abdominal:      General: Abdomen is flat. Bowel sounds are normal. There is no distension or abdominal bruit. Palpations: There is no shifting dullness, fluid wave, hepatomegaly, splenomegaly, mass or pulsatile mass. Tenderness: There is no abdominal tenderness. There is no right CVA tenderness, left CVA tenderness, guarding or rebound. Negative signs include Mcdaniel's sign, Rovsing's sign and McBurney's sign. Musculoskeletal:         General: No deformity. Cervical back: Normal range of motion and neck supple. No rigidity. Right lower leg: No edema. Left lower leg: No edema. Skin:     General: Skin is warm and dry. Capillary Refill: Capillary refill takes less than 2 seconds. Coloration: Skin is not jaundiced. Neurological:      General: No focal deficit present. Mental Status: He is alert and oriented to person, place, and time. Mental status is at baseline. Cranial Nerves: No cranial nerve deficit. Sensory: No sensory deficit. Motor: No weakness.       Coordination: Coordination normal.   Psychiatric:         Mood and Affect: Mood normal.       DIAGNOSTIC RESULTS     EKG: All EKG's are interpreted by the Emergency Department Physician who either signs or Co-signsthis chart in the absence of a cardiologist.    EKG shows tachycardia 133 bpm T wave inversion in leads aVL no ventricular ectopy  ms    EKG shows sinus tachycardia with a first-degree AV block at 120 bpm there is T wave inversions in aVL, no other acute ST segment normality no ventricular ectopy  ms    RADIOLOGY:   Non-plain filmimages such as CT, Ultrasound and MRI are read by the radiologist. Plain radiographic images are visualized and preliminarily interpreted by the emergency physician with the below findings:    Nonacute, stable cardiomegaly    Interpretation per the Radiologist below, if available at the time ofthis note:    XR CHEST PORTABLE   Final Result   Cardiomegaly. There are no findings of failure or pneumonia.                ED BEDSIDE ULTRASOUND:   Performed by ED Physician - none    LABS:  Labs Reviewed   CBC WITH AUTO DIFFERENTIAL - Abnormal; Notable for the following components:       Result Value    RBC 4.36 (*)     Hemoglobin 12.6 (*)     Hematocrit 38.2 (*)     RDW 16.9 (*)     All other components within normal limits   COMPREHENSIVE METABOLIC PANEL - Abnormal; Notable for the following components:    Sodium 133 (*)     Glucose 228 (*)     BUN 46 (*)     Creatinine 2.43 (*)     Est, Glom Filt Rate 28.8 (*)     Albumin 3.4 (*)     All other components within normal limits   APTT - Abnormal; Notable for the following components:    aPTT 42.8 (*)     All other components within normal limits   TROPONIN - Abnormal; Notable for the following components:    Troponin 0.035 (*)     All other components within normal limits    Narrative:     CALL  [facility]  [facility] tel. [phone],  Troponin results called to and read back by Dorothy WASHBURN, 11/26/2022  18:23, by BRANT   TROPONIN - Abnormal; Notable for the following components:    Troponin 0.126 (*)     All other components within normal limits    Narrative:     [facility] [place] [facility] tel. [phone],  Chemistry results called to and read back by RN, 11/27/2022 01:45, by [facility]   TROPONIN - Abnormal; Notable for the following components:    Troponin 0.206 (*)     All other components within normal limits    Narrative:     Khai Alas tel. [phone],  Chemistry results called to and read back by RN, 11/27/2022 04:28, by [facility]   CBC WITH AUTO DIFFERENTIAL - Abnormal; Notable for the following components:    RBC 4.00 (*)     Hemoglobin 11.1 (*)     Hematocrit 35.4 (*)     MCHC 31.4 (*)     RDW 16.9 (*)     All other components within normal limits   COMPREHENSIVE METABOLIC PANEL W/ REFLEX TO MG FOR LOW K - Abnormal; Notable for the following components:    Anion Gap 8 (*)     Glucose 161 (*)     BUN 54 (*)     Creatinine 2.59 (*)     Est, Glom Filt Rate 26.7 (*)     Total Protein 6.2 (*)     Albumin 3.2 (*)     All other components within normal limits   LIPID PANEL - Abnormal; Notable for the following components:    HDL 29 (*)     All other components within normal limits   POCT GLUCOSE - Abnormal; Notable for the following components:    POC Glucose 260 (*)     All other components within normal limits   POCT GLUCOSE - Abnormal; Notable for the following components:    POC Glucose 130 (*)     All other components within normal limits   RESPIRATORY PANEL, MOLECULAR, WITH COVID-19   TROPONIN   CK   PROTIME-INR   BRAIN NATRIURETIC PEPTIDE   URINALYSIS   POCT GLUCOSE   POCT GLUCOSE   POCT GLUCOSE   POCT GLUCOSE   POCT GLUCOSE       All other labs were within normal range or not returned as of this dictation.     EMERGENCY DEPARTMENT COURSE and DIFFERENTIAL DIAGNOSIS/MDM:   Vitals:    Vitals:    11/26/22 2300 11/27/22 0015 11/27/22 0200 11/27/22 0434   BP:   (!) 115/46    Pulse: (!) 134 83 76    Resp: 16   18   Temp:       TempSrc:    Oral   SpO2: 94%  97%    Weight:  (!) 330 lb 3.2 oz (149.8 kg)     Height:  5' 11\" (1.803 m)         Spoke with Dr. Mayte Ramirez of cardiology who reviewed patient's chart and does not believe it is cardiac in nature at this time will be on consult     Pt persistently tachycardic -130         MDM  Number of Diagnoses or Management Options  Chest pain, unspecified type  Elevated troponin  Tachycardia  Diagnosis management comments: Patient presents emerged part with complaint of chest pain which started around 12:30 PM this afternoon along with some mild shortness of breath. Patient states his pain is 8 out of 10 on arrival to the ED. He states he had a very similar episode approximately 1 week ago, was admitted to the hospital and seen by cardiology. At that time it was felt that his chest pain, tachycardia, and hypotension was due to dehydration, he was hydrated, and then discharged home. Patient states that he did have a change in his blood pressure medication decreasing his metoprolol according to the patient. On arrival he was given multiple small doses of IV fluids, which did very little to decrease his heart rate, but his pressures did seem to stabilize and improved. Patient's chest pain did go away, after the administration of aspirin, and Lopressor x2 doses. Initial cardiac enzymes are negative, repeat set at approximately 2 hours now shows a positive troponin of 0.035. Patient still pain-free at this time. He will be readmitted back into the hospital for further evaluation management for chest pain, tachycardia, hypotension, positive troponin. Amount and/or Complexity of Data Reviewed  Decide to obtain previous medical records or to obtain history from someone other than the patient: yes        CRITICAL CARE TIME   Total Critical Care time was 0 minutes, excluding separately reportableprocedures. There was a high probability of clinicallysignificant/life threatening deterioration in the patient's condition which required my urgent intervention.      CONSULTS:  None    PROCEDURES:  Unless otherwise noted below, none     Procedures    FINAL IMPRESSION      1. Chest pain, unspecified type    2. Elevated troponin    3. Tachycardia          DISPOSITION/PLAN   DISPOSITION Admitted 11/26/2022 10:59:59 PM      PATIENT REFERRED TO:  No follow-up provider specified.     DISCHARGE MEDICATIONS:  Current Discharge Medication List             (Please note that portions of this note were completed with a voice recognition program.  Efforts were made to edit the dictations but occasionally words are mis-transcribed.)    Keven Guidry PA-C (electronically signed)  Attending Emergency Physician         Keven Guidry PA-C  11/27/22 0106

## 2022-11-26 NOTE — ED NOTES
Patient c/o chest pain, started this morning at 0430. Patient states has been having intermittent chest pain and SOB since having Covid in October.       Selina Chadwick RN  11/26/22 3921

## 2022-11-27 LAB
ALBUMIN SERPL-MCNC: 3.2 G/DL (ref 3.5–4.6)
ALP BLD-CCNC: 88 U/L (ref 35–104)
ALT SERPL-CCNC: 17 U/L (ref 0–41)
ANION GAP SERPL CALCULATED.3IONS-SCNC: 8 MEQ/L (ref 9–15)
AST SERPL-CCNC: 19 U/L (ref 0–40)
BASOPHILS ABSOLUTE: 0.1 K/UL (ref 0–0.2)
BASOPHILS RELATIVE PERCENT: 0.9 %
BILIRUB SERPL-MCNC: 0.3 MG/DL (ref 0.2–0.7)
BUN BLDV-MCNC: 54 MG/DL (ref 8–23)
CALCIUM SERPL-MCNC: 8.7 MG/DL (ref 8.5–9.9)
CHLORIDE BLD-SCNC: 105 MEQ/L (ref 95–107)
CHOLESTEROL, TOTAL: 91 MG/DL (ref 0–199)
CO2: 22 MEQ/L (ref 20–31)
CREAT SERPL-MCNC: 2.59 MG/DL (ref 0.7–1.2)
EOSINOPHILS ABSOLUTE: 0.1 K/UL (ref 0–0.7)
EOSINOPHILS RELATIVE PERCENT: 2 %
GFR SERPL CREATININE-BSD FRML MDRD: 26.7 ML/MIN/{1.73_M2}
GLOBULIN: 3 G/DL (ref 2.3–3.5)
GLUCOSE BLD-MCNC: 124 MG/DL (ref 70–99)
GLUCOSE BLD-MCNC: 130 MG/DL (ref 70–99)
GLUCOSE BLD-MCNC: 161 MG/DL (ref 70–99)
GLUCOSE BLD-MCNC: 165 MG/DL (ref 70–99)
GLUCOSE BLD-MCNC: 260 MG/DL (ref 70–99)
GLUCOSE BLD-MCNC: 77 MG/DL (ref 70–99)
GLUCOSE BLD-MCNC: 97 MG/DL (ref 70–99)
HCT VFR BLD CALC: 35.4 % (ref 42–52)
HDLC SERPL-MCNC: 29 MG/DL (ref 40–59)
HEMOGLOBIN: 11.1 G/DL (ref 14–18)
LDL CHOLESTEROL CALCULATED: 45 MG/DL (ref 0–129)
LYMPHOCYTES ABSOLUTE: 2.4 K/UL (ref 1–4.8)
LYMPHOCYTES RELATIVE PERCENT: 31.5 %
MCH RBC QN AUTO: 27.8 PG (ref 27–31.3)
MCHC RBC AUTO-ENTMCNC: 31.4 % (ref 33–37)
MCV RBC AUTO: 88.5 FL (ref 79–92.2)
MONOCYTES ABSOLUTE: 0.6 K/UL (ref 0.2–0.8)
MONOCYTES RELATIVE PERCENT: 8.6 %
NEUTROPHILS ABSOLUTE: 4.3 K/UL (ref 1.4–6.5)
NEUTROPHILS RELATIVE PERCENT: 57 %
PDW BLD-RTO: 16.9 % (ref 11.5–14.5)
PERFORMED ON: ABNORMAL
PERFORMED ON: NORMAL
PERFORMED ON: NORMAL
PLATELET # BLD: 357 K/UL (ref 130–400)
POTASSIUM REFLEX MAGNESIUM: 4.5 MEQ/L (ref 3.4–4.9)
RBC # BLD: 4 M/UL (ref 4.7–6.1)
SODIUM BLD-SCNC: 135 MEQ/L (ref 135–144)
TOTAL PROTEIN: 6.2 G/DL (ref 6.3–8)
TRIGL SERPL-MCNC: 84 MG/DL (ref 0–150)
TROPONIN: 0.13 NG/ML (ref 0–0.01)
TROPONIN: 0.17 NG/ML (ref 0–0.01)
TROPONIN: 0.21 NG/ML (ref 0–0.01)
WBC # BLD: 7.5 K/UL (ref 4.8–10.8)

## 2022-11-27 PROCEDURE — 6370000000 HC RX 637 (ALT 250 FOR IP): Performed by: INTERNAL MEDICINE

## 2022-11-27 PROCEDURE — G0378 HOSPITAL OBSERVATION PER HR: HCPCS

## 2022-11-27 PROCEDURE — 80053 COMPREHEN METABOLIC PANEL: CPT

## 2022-11-27 PROCEDURE — 84484 ASSAY OF TROPONIN QUANT: CPT

## 2022-11-27 PROCEDURE — 93005 ELECTROCARDIOGRAM TRACING: CPT

## 2022-11-27 PROCEDURE — 80061 LIPID PANEL: CPT

## 2022-11-27 PROCEDURE — 36415 COLL VENOUS BLD VENIPUNCTURE: CPT

## 2022-11-27 PROCEDURE — 85025 COMPLETE CBC W/AUTO DIFF WBC: CPT

## 2022-11-27 RX ORDER — INSULIN LISPRO 100 [IU]/ML
0-8 INJECTION, SOLUTION INTRAVENOUS; SUBCUTANEOUS EVERY 4 HOURS
Status: DISCONTINUED | OUTPATIENT
Start: 2022-11-27 | End: 2022-12-03 | Stop reason: HOSPADM

## 2022-11-27 RX ORDER — FUROSEMIDE 40 MG/1
40 TABLET ORAL DAILY
Status: DISCONTINUED | OUTPATIENT
Start: 2022-11-27 | End: 2022-11-29

## 2022-11-27 RX ORDER — ONDANSETRON 4 MG/1
4 TABLET, ORALLY DISINTEGRATING ORAL EVERY 8 HOURS PRN
Status: DISCONTINUED | OUTPATIENT
Start: 2022-11-27 | End: 2022-12-03 | Stop reason: HOSPADM

## 2022-11-27 RX ORDER — INSULIN LISPRO 100 [IU]/ML
8 INJECTION, SOLUTION INTRAVENOUS; SUBCUTANEOUS
Status: DISCONTINUED | OUTPATIENT
Start: 2022-11-27 | End: 2022-12-03 | Stop reason: HOSPADM

## 2022-11-27 RX ORDER — ACETAMINOPHEN 325 MG/1
650 TABLET ORAL EVERY 6 HOURS PRN
Status: DISCONTINUED | OUTPATIENT
Start: 2022-11-27 | End: 2022-12-02

## 2022-11-27 RX ORDER — ONDANSETRON 2 MG/ML
4 INJECTION INTRAMUSCULAR; INTRAVENOUS EVERY 6 HOURS PRN
Status: DISCONTINUED | OUTPATIENT
Start: 2022-11-27 | End: 2022-12-03 | Stop reason: HOSPADM

## 2022-11-27 RX ORDER — ASPIRIN 81 MG/1
81 TABLET ORAL DAILY
Status: DISCONTINUED | OUTPATIENT
Start: 2022-11-27 | End: 2022-12-03 | Stop reason: HOSPADM

## 2022-11-27 RX ORDER — POLYETHYLENE GLYCOL 3350 17 G/17G
17 POWDER, FOR SOLUTION ORAL DAILY PRN
Status: DISCONTINUED | OUTPATIENT
Start: 2022-11-27 | End: 2022-12-03 | Stop reason: HOSPADM

## 2022-11-27 RX ORDER — DEXTROSE MONOHYDRATE 100 MG/ML
INJECTION, SOLUTION INTRAVENOUS CONTINUOUS PRN
Status: DISCONTINUED | OUTPATIENT
Start: 2022-11-27 | End: 2022-12-03 | Stop reason: HOSPADM

## 2022-11-27 RX ORDER — ACETAMINOPHEN 650 MG/1
650 SUPPOSITORY RECTAL EVERY 6 HOURS PRN
Status: DISCONTINUED | OUTPATIENT
Start: 2022-11-27 | End: 2022-12-03 | Stop reason: HOSPADM

## 2022-11-27 RX ORDER — ATORVASTATIN CALCIUM 40 MG/1
40 TABLET, FILM COATED ORAL NIGHTLY
Status: DISCONTINUED | OUTPATIENT
Start: 2022-11-27 | End: 2022-12-03 | Stop reason: HOSPADM

## 2022-11-27 RX ORDER — NITROGLYCERIN 0.4 MG/1
0.4 TABLET SUBLINGUAL EVERY 5 MIN PRN
Status: DISCONTINUED | OUTPATIENT
Start: 2022-11-27 | End: 2022-12-03 | Stop reason: HOSPADM

## 2022-11-27 RX ORDER — INSULIN GLARGINE 100 [IU]/ML
20 INJECTION, SOLUTION SUBCUTANEOUS 2 TIMES DAILY
Status: DISCONTINUED | OUTPATIENT
Start: 2022-11-27 | End: 2022-12-03 | Stop reason: HOSPADM

## 2022-11-27 RX ORDER — ASPIRIN 81 MG/1
81 TABLET, CHEWABLE ORAL DAILY
Status: DISCONTINUED | OUTPATIENT
Start: 2022-11-27 | End: 2022-11-27 | Stop reason: ALTCHOICE

## 2022-11-27 RX ORDER — FENOFIBRATE 160 MG/1
160 TABLET ORAL DAILY
Status: DISCONTINUED | OUTPATIENT
Start: 2022-11-27 | End: 2022-12-03 | Stop reason: HOSPADM

## 2022-11-27 RX ADMIN — INSULIN GLARGINE 20 UNITS: 100 INJECTION, SOLUTION SUBCUTANEOUS at 10:31

## 2022-11-27 RX ADMIN — ATORVASTATIN CALCIUM 40 MG: 40 TABLET, FILM COATED ORAL at 20:48

## 2022-11-27 RX ADMIN — FUROSEMIDE 40 MG: 40 TABLET ORAL at 10:30

## 2022-11-27 RX ADMIN — METOPROLOL TARTRATE 25 MG: 25 TABLET, FILM COATED ORAL at 10:30

## 2022-11-27 RX ADMIN — APIXABAN 5 MG: 5 TABLET, FILM COATED ORAL at 20:48

## 2022-11-27 RX ADMIN — SACUBITRIL AND VALSARTAN 1 TABLET: 24; 26 TABLET, FILM COATED ORAL at 20:48

## 2022-11-27 RX ADMIN — ASPIRIN 81 MG: 81 TABLET, COATED ORAL at 10:30

## 2022-11-27 RX ADMIN — METOPROLOL TARTRATE 25 MG: 25 TABLET, FILM COATED ORAL at 20:48

## 2022-11-27 RX ADMIN — INSULIN GLARGINE 20 UNITS: 100 INJECTION, SOLUTION SUBCUTANEOUS at 20:48

## 2022-11-27 RX ADMIN — APIXABAN 5 MG: 5 TABLET, FILM COATED ORAL at 10:30

## 2022-11-27 RX ADMIN — SACUBITRIL AND VALSARTAN 1 TABLET: 24; 26 TABLET, FILM COATED ORAL at 10:30

## 2022-11-27 RX ADMIN — FENOFIBRATE 160 MG: 160 TABLET ORAL at 20:56

## 2022-11-27 NOTE — H&P
[place] [address] MEDICINE    HISTORY AND PHYSICAL EXAM    PATIENT NAME:  Nellie Lemus    MRN:  [id number]  SERVICE DATE:  11/26/2022   SERVICE TIME:  11:00 PM    Primary Care Physician: Tristan Adams DO     SUBJECTIVE  CHIEF COMPLAINT:  Chest Pain (With shortness of breath)       HPI: Sam Johnston is a 57 y.o. male  has a past medical history of Acute kidney injury superimposed on CKD ([address] (08/15/2019), Anxiety and depression (04/30/2019), Cardiomyopathy ([address] Chronic systolic heart failure ([address] (04/30/2019), Diabetes mellitus ([address] Diabetic neuropathy associated with type 2 diabetes mellitus ([address] (12/13/2017), Heart failure, NYHA class 4 ([facility] Hyperlipidemia, Hypertension, ICD (implantable cardioverter-defibrillator) in place (04/30/2019), Left ureteral calculus, Morbid obesity ([address] (12/19/2017), LON (obstructive sleep apnea), Pulmonary embolism ([address] Pyelonephritis (08/04/2019), Ureteral calculus, and Ureteric stone. that is being admitted for Tachycardia. Deuce Gonzalez was seen in the emergency department on 11/18 for chest pain, tachycardia, and acute congestive heart failure. He had a high probability for PE on V/Q scan. He was started on Eliquis and sent home. He didn't qualify for home O2. He started having chest pain at 12:30 Saturday afternoon. He was eating bread fat when he began to have chest pain. It was midsternal and radiates up his neck and down the left arm and rated 8/10. He checked his HR at home and it was 144. He had a recent covid infection in October. The last echo showed EF of 45%. He was negative orthostatic in ED. He has had normal intake and output. HPI  While in the emergency department, labs were remarkable for troponin 0.035 (baseline negative), SCR 2.43 at baseline, GFR 28.8, glucose 228, HGB 12.6, EKG showed sinus tach.       PAST MEDICAL HISTORY:    Past Medical History:   Diagnosis Date    Acute kidney injury superimposed on CKD ([facility] 08/15/2019    Anxiety and depression 04/30/2019    Cardiomyopathy ([facility]     Chronic systolic heart failure ([facility] 04/30/2019    Diabetes mellitus ([facility]     Diabetic neuropathy associated with type 2 diabetes mellitus ([facility] 12/13/2017    Heart failure, NYHA class 4 ([facility]     Hyperlipidemia     Hypertension     ICD (implantable cardioverter-defibrillator) in place 04/30/2019    Left ureteral calculus     Morbid obesity ([facility] 12/19/2017    LON (obstructive sleep apnea)     Pulmonary embolism ([facility]     ON ELIQUIS    Pyelonephritis 08/04/2019    Ureteral calculus     Ureteric stone      PAST SURGICAL HISTORY:    Past Surgical History:   Procedure Laterality Date    CARDIAC DEFIBRILLATOR PLACEMENT  12/2008    CYSTOSCOPY INSERTION / REMOVAL STENT / STONE Left 5/30/2019    CYSTOSCOPY LEFT DOUBLE J STENT PLACEMENT ROOM 287 performed by Daylin Garcia MD at [facility] Left 6/12/2019    LEFT ESWL (CONF # [id number]) performed by Daylin Garcia MD at [facility]    LITHOTRIPSY N/A 7/10/2019    HOLMIUM LASER LITHOTRIPSY, STONE EXTRACTION, LT URETERAL STENT INSERTION.  performed by Daylin Garcia MD at [facility] Left 7/10/2019    LEFT FLEXIBLE URETEROSCOPY, CYSTOSCOPY RETROGRADE performed by Daylin Garcia MD at [address]:    Family History   Problem Relation Age of Onset    Heart Disease Mother     Kidney Disease Mother     Hypertension Mother     Diabetes Mother     No Known Problems Sister     No Known Problems Brother     No Known Problems Brother      SOCIAL HISTORY:    Social History     Socioeconomic History    Marital status: Legally      Spouse name: Not on file    Number of children: Not on file    Years of education: Not on file    Highest education level: Not on file   Occupational History    Not on file   Tobacco Use    Smoking status: Former     Packs/day: 1.00     Years: 25.00     Pack years: 25.00     Types: Cigarettes     Quit date: 12/1/2008     Years since quittin.9    Smokeless tobacco: Never   Vaping Use    Vaping Use: Never used   Substance and Sexual Activity    Alcohol use: No    Drug use: Never    Sexual activity: Yes     Partners: Female   Other Topics Concern    Not on file   Social History Narrative    Not on file     Social Determinants of Health     Financial Resource Strain: Not on file   Food Insecurity: Not on file   Transportation Needs: Not on file   Physical Activity: Not on file   Stress: Not on file   Social Connections: Not on file   Intimate Partner Violence: Not on file   Housing Stability: Not on file     MEDICATIONS:   Prior to Admission medications    Medication Sig Start Date End Date Taking? Authorizing Provider   apixaban (ELIQUIS) 5 MG TABS tablet Take 1 tablet by mouth 2 times daily 22  Ofe Swartz MD   metoprolol tartrate (LOPRESSOR) 25 MG tablet Take 0.5 tablets by mouth 2 times daily 22   [facility] DO   insulin glargine (LANTUS SOLOSTAR) 100 UNIT/ML injection pen Inject 20 Units into the skin 2 times daily 22   Yolande Moya, DO   insulin lispro, 1 Unit Dial, (HUMALOG KWIKPEN) 100 UNIT/ML SOPN Inject 8 Units into the skin 3 times daily (before meals) 22   Josue Moya, DO   Insulin Pen Needle ([employer] PEN NEEDLES) 31G X 6 MM MISC 1 each by Does not apply route 5 times daily 22   Yolande Moya, DO   blood glucose monitor strips Test 5 times a day & as needed for symptoms of irregular blood glucose. Dispense sufficient amount for indicated testing frequency plus additional to accommodate PRN testing needs. 22   Petra Moya, DO   hydrALAZINE (APRESOLINE) 25 MG tablet Take 1 tablet by mouth every 8 hours 22   Petra Moya, DO   sacubitril-valsartan (ENTRESTO) 24-26 MG per tablet Take 1 tablet by mouth 2 times daily 22   Petra Moya, DO   furosemide (LASIX) 40 MG tablet TAKE 1 TABLET BY MOUTH EVERY DAY.  HOLD UNTIL SEEN BY PCP FOR FOLLOWUP  Patient taking differently: TAKE 1 TABLET BY MOUTH EVERY DAY. 4/26/22   Norman J Holiday, DO   aspirin (ASPIRIN ADULT LOW STRENGTH) 81 MG EC tablet TAKE 1 TABLET BY MOUTH DAILY 4/26/22   [facility], DO   atorvastatin (LIPITOR) 40 MG tablet Take 1 tablet by mouth at bedtime 4/26/22   Norman J Holiday, DO   Insulin Pen Needle (B-D UF III MINI PEN NEEDLES) 31G X 5 MM MISC 1 each by Does not apply route daily 4/21/21   Abhishek Ibrahim MD   fenofibrate (TRIGLIDE) 160 MG tablet TAKE 1 TABLET BY MOUTH EVERY NIGHT 11/24/20   Abhishek Ibrahim MD   albuterol (PROVENTIL) (2.5 MG/3ML) 0.083% nebulizer solution Albuterol albuterol (PROVENTIL) (2.5 MG/3ML) 0.083% nebulizer solution Indications: Type 2 diabetes mellitus without complication, with long-term current use of insulin (HCC) Take 3 mLs by nebulization 4 times daily 120 each 5 10/11/2018 Active 10-  [address] ([id number]) 10/11/18   Historical Provider, MD   gabapentin (NEURONTIN) 300 MG capsule Take by mouth nightly. 10/11/18   Historical Provider, MD   omeprazole (PRILOSEC) 20 MG delayed release capsule Take 1 capsule by mouth daily 8/10/20   Abhishek Ibrahim MD   Blood Glucose Monitoring Suppl (ONE TOUCH ULTRA MINI) w/Device KIT 1 kit by Does not apply route three times daily 10/11/18   Haylie Spencer DO   Lancets MISC Use TID 10/11/18   Haylie Spencer DO   Insulin Syringe-Needle U-100 (INSULIN SYRINGE 1CC/31GX5/16\") 31G X 5/16\" 1 ML MISC Use once daily to administer Lantus 12/14/17   Haylie Spencer DO       ALLERGIES: Coreg [carvedilol] and Januvia [sitagliptin]    REVIEW OF SYSTEM:   Review of Systems   Constitutional:  Negative for chills, fatigue and fever. HENT:  Negative for congestion, rhinorrhea, sore throat and trouble swallowing. Eyes:  Negative for photophobia and visual disturbance. Respiratory:  Negative for cough, chest tightness and shortness of breath. Cardiovascular:  Positive for chest pain.  Negative for palpitations and leg swelling. Gastrointestinal:  Negative for abdominal distention, abdominal pain, constipation, diarrhea, nausea and vomiting. Genitourinary:  Negative for difficulty urinating, flank pain and hematuria. Musculoskeletal:  Negative for back pain and gait problem. Skin:  Negative for color change and wound. Neurological:  Negative for dizziness, syncope, speech difficulty, weakness, light-headedness, numbness and headaches. Psychiatric/Behavioral:  Negative for behavioral problems and confusion. OBJECTIVE  PHYSICAL EXAM: /75   Pulse (!) 130   Temp 98.1 °F (36.7 °C) (Oral)   Resp 20   SpO2 97%   Physical Exam  Vitals and nursing note reviewed. Constitutional:       General: He is awake. He is not in acute distress. Appearance: Normal appearance. He is well-developed. He is obese. He is not ill-appearing, toxic-appearing or diaphoretic. HENT:      Head: Normocephalic and atraumatic. Nose: Nose normal. No congestion or rhinorrhea. Mouth/Throat:      Lips: Pink. Mouth: Mucous membranes are dry. Tongue: Tongue does not deviate from midline. Pharynx: Oropharynx is clear. No oropharyngeal exudate or posterior oropharyngeal erythema. Eyes:      General: Lids are normal. No visual field deficit or scleral icterus. Extraocular Movements: Extraocular movements intact. Right eye: No nystagmus. Left eye: No nystagmus. Conjunctiva/sclera: Conjunctivae normal.   Neck:      Thyroid: No thyromegaly. Vascular: No JVD. Trachea: Trachea and phonation normal.   Cardiovascular:      Rate and Rhythm: Normal rate and regular rhythm. Pulses:           Radial pulses are 2+ on the right side and 2+ on the left side. Dorsalis pedis pulses are 2+ on the right side and 1+ on the left side. Posterior tibial pulses are 2+ on the right side and 1+ on the left side.       Heart sounds: Normal heart sounds, S1 normal and S2 normal. No murmur heard.  Pulmonary:      Effort: Pulmonary effort is normal. No respiratory distress. Breath sounds: Normal breath sounds and air entry. No stridor or decreased air movement. No decreased breath sounds, wheezing, rhonchi or rales. Chest:      Chest wall: No tenderness. Abdominal:      General: Abdomen is flat. Bowel sounds are normal. There is no distension. Palpations: Abdomen is soft. Tenderness: There is no abdominal tenderness. There is no guarding. Musculoskeletal:         General: No swelling, tenderness, deformity or signs of injury. Normal range of motion. Cervical back: Normal range of motion and neck supple. No rigidity or tenderness. Right lower le+ Edema present. Left lower le+ Edema present. Feet:      Right foot:      Skin integrity: Skin integrity normal.      Left foot:      Skin integrity: Skin integrity normal.   Skin:     General: Skin is warm and dry. Capillary Refill: Capillary refill takes less than 2 seconds. Coloration: Skin is not jaundiced or pale. Findings: No bruising, erythema, lesion or rash. Nails: There is no clubbing. Neurological:      General: No focal deficit present. Mental Status: He is alert and oriented to person, place, and time. Mental status is at baseline. Sensory: Sensation is intact. Motor: Motor function is intact. No weakness. Psychiatric:         Attention and Perception: Attention and perception normal.         Mood and Affect: Mood and affect normal.         Speech: Speech normal.         Behavior: Behavior normal. Behavior is cooperative. Thought Content: Thought content normal.         Cognition and Memory: Cognition and memory normal.         Judgment: Judgment normal.     Neurologic Exam     Mental Status   Oriented to person, place, and time.    Speech: speech is normal      DATA:     Diagnostic tests reviewed for today's visit:    Most recent labs and imaging results reviewed.      LABS:    Recent Results (from the past 24 hour(s))   CBC with Auto Differential    Collection Time: 11/26/22  2:30 PM   Result Value Ref Range    WBC 8.7 4.8 - 10.8 K/uL    RBC 4.36 (L) 4.70 - 6.10 M/uL    Hemoglobin 12.6 (L) 14.0 - 18.0 g/dL    Hematocrit 38.2 (L) 42.0 - 52.0 %    MCV 87.6 79.0 - 92.2 fL    MCH 29.0 27.0 - 31.3 pg    MCHC 33.1 33.0 - 37.0 %    RDW 16.9 (H) 11.5 - 14.5 %    Platelets 467 151 - 642 K/uL    Neutrophils % 74.6 %    Lymphocytes % 17.4 %    Monocytes % 5.9 %    Eosinophils % 1.6 %    Basophils % 0.5 %    Neutrophils Absolute 6.5 1.4 - 6.5 K/uL    Lymphocytes Absolute 1.5 1.0 - 4.8 K/uL    Monocytes Absolute 0.5 0.2 - 0.8 K/uL    Eosinophils Absolute 0.1 0.0 - 0.7 K/uL    Basophils Absolute 0.0 0.0 - 0.2 K/uL   CMP    Collection Time: 11/26/22  2:30 PM   Result Value Ref Range    Sodium 133 (L) 135 - 144 mEq/L    Potassium 4.8 3.4 - 4.9 mEq/L    Chloride 100 95 - 107 mEq/L    CO2 22 20 - 31 mEq/L    Anion Gap 11 9 - 15 mEq/L    Glucose 228 (H) 70 - 99 mg/dL    BUN 46 (H) 8 - 23 mg/dL    Creatinine 2.43 (H) 0.70 - 1.20 mg/dL    Est, Glom Filt Rate 28.8 (L) >60    Calcium 9.0 8.5 - 9.9 mg/dL    Total Protein 6.6 6.3 - 8.0 g/dL    Albumin 3.4 (L) 3.5 - 4.6 g/dL    Total Bilirubin 0.5 0.2 - 0.7 mg/dL    Alkaline Phosphatase 98 35 - 104 U/L    ALT 17 0 - 41 U/L    AST 20 0 - 40 U/L    Globulin 3.2 2.3 - 3.5 g/dL   Troponin    Collection Time: 11/26/22  2:30 PM   Result Value Ref Range    Troponin <0.010 0.000 - 0.010 ng/mL   CK    Collection Time: 11/26/22  2:30 PM   Result Value Ref Range    Total CK 48 0 - 190 U/L   Protime-INR    Collection Time: 11/26/22  2:30 PM   Result Value Ref Range    Protime 14.8 12.3 - 14.9 sec    INR 1.2    APTT    Collection Time: 11/26/22  2:30 PM   Result Value Ref Range    aPTT 42.8 (H) 24.4 - 36.8 sec   Brain Natriuretic Peptide    Collection Time: 11/26/22  2:30 PM   Result Value Ref Range    Pro-BNP 1,282 pg/mL   Troponin    Collection Time: 11/26/22  5:30 PM   Result Value Ref Range    Troponin 0.035 (HH) 0.000 - 0.010 ng/mL   EKG 12 Lead    Collection Time: 11/26/22  6:05 PM   Result Value Ref Range    Ventricular Rate 120 BPM    Atrial Rate 120 BPM    P-R Interval 216 ms    QRS Duration 98 ms    Q-T Interval 328 ms    QTc Calculation (Bazett) 463 ms    R Axis -22 degrees    T Axis 56 degrees       IMAGING:   XR CHEST PORTABLE    Result Date: 11/26/2022  Cardiomegaly. There are no findings of failure or pneumonia. VTE Prophylaxis: on eliquis    ASSESSMENT AND PLAN    Principal Problem:      Tachycardia - Sinus tachycardia -134 in ED, given 10mg IV lopressor in ED and 1L bolus. First troponin negative, second elevated at 0.035,   Plan: admit, cardiology consult, maximize electrolytes, telemetry monitoring, troponin, restart home BB. Active Problems:      Chest pain with elevated troponin - given 1L bolus, 10mg IV lopressor and ASA in ED. First troponin negative, second elevated at 0.035,   Plan: trend troponin, restart home meds, continue anticoagulation, NTG for chest pain, cardiology consult. Hyperlipidemia - takes fenofibrate at home, no history of MI, recent history of PE. Plan: Will resume home meds, as tolerated, when home med list is completed by nursing. Hypertension -  BP 73/45 on admission, now 113/75, denies blurred vision, does have chest pain. Given 1L bolus, and 10 mg ivp lopressor in ED. Takes lopressor, hydralazine, lasix at home. Plan: Will resume home meds, as tolerated, when home med list is completed by nursing, cardiology consult. Chronic systolic heart failure (HCC) - Pro-BNP 1,282, denies SOB, having CP, lungs clear 1+ non pitting edema BLE. Takes entresto, and lasix at home. Most recent echo showed EF of 45%, followed by Holiday. Plan: Will resume home meds, as tolerated, when home med list is completed by nursing, cardiology consult, monitor for fluid overload.       CKD stage 4 due to type 2 diabetes mellitus ([facility] - SCR 2.43, near baseline, GFR 28.8 also near baseline. On lantus, humalog, lasix, fenofibrate. Most recent A1C was 9.8 on 11/14/22. Plan: Hold p.o. antidiabetic medication, Medium dose sliding scale coverage, Q4 while NPO then Accu-Cheks AC at bedtime when started on a diet, diabetic diet when no longer NPO, hypoglycemia rescue meds as needed, continue lasix and fenofibrate. Coronary artery disease involving native heart without angina pectoris - given 10 mg IV lopressor, ASA in ED. First troponin negative, second elevated at 0.035, no history of MI found, cardiomyopathy and recent PE on eliquis. Plan: Will resume home meds, as tolerated, when home med list is completed by nursing, telemetry monitoring, cardiology consult.          Plan of care discussed with: patient and adult child    SIGNATURE: JESUS Lynn - CNP  DATE: November 26, 2022  TIME: 11:00 PM     La Nena Landin DO - supervising physician

## 2022-11-27 NOTE — PROGRESS NOTES
Hospitalist Progress Note      Date of Admission: 11/26/2022  Chief Complaint:    Chief Complaint   Patient presents with    Chest Pain     With shortness of breath     Subjective:  No new complaints. No nausea, vomiting, chest pain, or headache      Medications:    Infusion Medications    dextrose       Scheduled Medications    insulin lispro  0-8 Units SubCUTAneous Q4H    aspirin  81 mg Oral Daily    apixaban  5 mg Oral BID    atorvastatin  40 mg Oral Nightly    fenofibrate  160 mg Oral Daily    furosemide  40 mg Oral Daily    insulin glargine  20 Units SubCUTAneous BID    insulin lispro  8 Units SubCUTAneous TID AC    metoprolol tartrate  25 mg Oral BID    sacubitril-valsartan  1 tablet Oral BID     PRN Meds: ondansetron **OR** ondansetron, acetaminophen **OR** acetaminophen, polyethylene glycol, nitroGLYCERIN, glucose, dextrose bolus **OR** dextrose bolus, glucagon (rDNA), dextrose  No intake or output data in the 24 hours ending 11/27/22 1833  Exam:  BP (!) 145/59   Pulse 72   Temp 97.7 °F (36.5 °C) (Oral)   Resp 18   Ht 5' 11\" (1.803 m)   Wt (!) 330 lb 3.2 oz (149.8 kg)   SpO2 97%   BMI 46.05 kg/m²   Head: Normocephalic, atraumatic  Sclera clear  Neck JVD flat  Lungs: normal effort of breathing    Labs:   Recent Labs     11/26/22  1430 11/27/22  0325   WBC 8.7 7.5   HGB 12.6* 11.1*   HCT 38.2* 35.4*    357     Recent Labs     11/26/22  1430 11/27/22  0325   * 135   K 4.8 4.5    105   CO2 22 22   BUN 46* 54*   CREATININE 2.43* 2.59*   CALCIUM 9.0 8.7   AST 20 19   ALT 17 17   BILITOT 0.5 0.3   ALKPHOS 98 88     Recent Labs     11/26/22  1430   INR 1.2     Recent Labs     11/26/22  1430 11/26/22  1730 11/27/22  0023 11/27/22  0325   CKTOTAL 48  --   --   --    TROPONINI <0.010 0.035* 0.126* 0.206*     Radiology:  XR CHEST PORTABLE   Final Result   Cardiomegaly. There are no findings of failure or pneumonia.            Assessment/Plan:    Chest pain: Initial troponin was undetectable, second troponin was mild elevation, third troponin was significantly more elevated and continued to uptrend. Although hypotension in the background of CKD may help explain some of this rise, it is telling that the patient's initial troponin was undetectable and therefore the patient does not have chronic troponinemia. His transient hypotension and tachycardia could be considered a surrogate for stress test.  Patient's GFR has been in the low 30s to upper 20s, and therefore is not far from baseline; nevertheless he is at risk for contrast-induced nephropathy. - will order echocardiogram to evaluate for wall motion abnormality, will hold off on further noninvasive ischemic testing given recent history of PE and substantial rise in troponin under physiological stress (tachycardia, transient hypotension) which likely favors direct cardiac catheterization.      - patient may benefit from cardiology review of meds given recent hypotensive episode     HTN: monitor BP, adjust meds as needed  DM: monitor glucose accordion, titrate meds as needed  Class iii obesity    35 minutes total care time, >1/2 in unit/floor time and care coordination     Ronnie Olivera MD

## 2022-11-27 NOTE — FLOWSHEET NOTE
0015: Pt to unit via wheelchair; ambulates with steady gait. Primarily Scottish-speaking so admission completed via video . Pt denies pain or SOB at this time. Currently has a HR of 80 and is in NSR per monitor tech. Dr. Mayda Escalante at bedside to see pt and also used video . Pt informed this RN that he takes meds at home but does not know what they are; Dr. Mayda Escalante informed. 0445: Troponins elevated: 0.035, 0.126, 0.206> EKG done and VS stable. Dr. Poonam Singh notified via secure message; awaiting reply. 4375: Informed by JOSE Low, that pt will be seen by [facility]. Called [facility] answering service and spoke to Dr. Char Garcia, who is on-call. Informed him of trops; no new orders received.      Electronically signed by Pina England RN on 11/27/2022 at 5:38 AM

## 2022-11-27 NOTE — CARE COORDINATION
[facility] [place] Case Management Initial Discharge Assessment    Met with Patient and Family to discuss discharge plan. PCP: July Ott DO                                Date of Last Visit: RECENT    [facility] Patient: No        [facility] Notified: no    If no PCP, list provided? N/A    Discharge Planning    Living Arrangements: independently at home    Who do you live with? FAMILY    Who helps you with your care:  self, family, or friend    If lives at home:     Do you have any barriers navigating in your home? no    Patient can perform ADL? Yes, AMBULATES WITH CANE PRN    Current Services (outpatient and in home) :  None    Dialysis: No    Is transportation available to get to your appointments? Yes, FRIEND TAKES TO APPTS    DME Equipment:  yes - CANE, GLUCOMETER    Respiratory equipment: CPAP without O2 and Nebulizer    Respiratory provider:  yes - Lolita Solis:  yes - MANE Rodney [address] with Medication Assistance Program?  No      [facility] Yes, [address] [place] [address]    Patient agreeable to SNF/Rehab? Declined    Other discharge needs identified? N/A    Does Patient Have a High-Risk for Readmission Diagnosis (CHF, PN, MI, COPD)? No      Initial Discharge Plan? (Note: please see concurrent daily documentation for any updates after initial note). MET WITH PT AND SON AT BEDSIDE TO DISCUSS DISCHARGE PLANNING. PT PLANS TO RETURN HOME WITH FAMILY AND [facility].      Readmission Risk              Risk of Unplanned Readmission:  0         Electronically signed by Ivonne Pickett RN on 11/27/2022 at 12:21 PM

## 2022-11-28 LAB
ALBUMIN SERPL-MCNC: 3.2 G/DL (ref 3.5–4.6)
ALP BLD-CCNC: 88 U/L (ref 35–104)
ALT SERPL-CCNC: 18 U/L (ref 0–41)
ANION GAP SERPL CALCULATED.3IONS-SCNC: 13 MEQ/L (ref 9–15)
AST SERPL-CCNC: 22 U/L (ref 0–40)
BASOPHILS ABSOLUTE: 0.2 K/UL (ref 0–0.2)
BASOPHILS RELATIVE PERCENT: 2.1 %
BILIRUB SERPL-MCNC: 0.3 MG/DL (ref 0.2–0.7)
BUN BLDV-MCNC: 50 MG/DL (ref 8–23)
CALCIUM SERPL-MCNC: 8.5 MG/DL (ref 8.5–9.9)
CHLORIDE BLD-SCNC: 106 MEQ/L (ref 95–107)
CO2: 22 MEQ/L (ref 20–31)
CREAT SERPL-MCNC: 2.25 MG/DL (ref 0.7–1.2)
EKG ATRIAL RATE: 120 BPM
EKG ATRIAL RATE: 133 BPM
EKG ATRIAL RATE: 73 BPM
EKG P AXIS: 67 DEGREES
EKG P-R INTERVAL: 216 MS
EKG P-R INTERVAL: 218 MS
EKG Q-T INTERVAL: 328 MS
EKG Q-T INTERVAL: 334 MS
EKG Q-T INTERVAL: 418 MS
EKG QRS DURATION: 100 MS
EKG QRS DURATION: 106 MS
EKG QRS DURATION: 98 MS
EKG QTC CALCULATION (BAZETT): 460 MS
EKG QTC CALCULATION (BAZETT): 463 MS
EKG QTC CALCULATION (BAZETT): 497 MS
EKG R AXIS: -14 DEGREES
EKG R AXIS: -22 DEGREES
EKG R AXIS: -3 DEGREES
EKG T AXIS: 56 DEGREES
EKG T AXIS: 78 DEGREES
EKG T AXIS: 98 DEGREES
EKG VENTRICULAR RATE: 120 BPM
EKG VENTRICULAR RATE: 133 BPM
EKG VENTRICULAR RATE: 73 BPM
EOSINOPHILS ABSOLUTE: 0.4 K/UL (ref 0–0.7)
EOSINOPHILS RELATIVE PERCENT: 4.1 %
GFR SERPL CREATININE-BSD FRML MDRD: 31.6 ML/MIN/{1.73_M2}
GLOBULIN: 3 G/DL (ref 2.3–3.5)
GLUCOSE BLD-MCNC: 157 MG/DL (ref 70–99)
GLUCOSE BLD-MCNC: 185 MG/DL (ref 70–99)
GLUCOSE BLD-MCNC: 74 MG/DL (ref 70–99)
GLUCOSE BLD-MCNC: 87 MG/DL (ref 70–99)
GLUCOSE BLD-MCNC: 95 MG/DL (ref 70–99)
GLUCOSE BLD-MCNC: 95 MG/DL (ref 70–99)
HCT VFR BLD CALC: 34.6 % (ref 42–52)
HEMOGLOBIN: 11.2 G/DL (ref 14–18)
LYMPHOCYTES ABSOLUTE: 2.4 K/UL (ref 1–4.8)
LYMPHOCYTES RELATIVE PERCENT: 23.8 %
MCH RBC QN AUTO: 28.2 PG (ref 27–31.3)
MCHC RBC AUTO-ENTMCNC: 32.3 % (ref 33–37)
MCV RBC AUTO: 87.3 FL (ref 79–92.2)
MONOCYTES ABSOLUTE: 0.9 K/UL (ref 0.2–0.8)
MONOCYTES RELATIVE PERCENT: 8.7 %
NEUTROPHILS ABSOLUTE: 6.2 K/UL (ref 1.4–6.5)
NEUTROPHILS RELATIVE PERCENT: 61.3 %
PDW BLD-RTO: 16.8 % (ref 11.5–14.5)
PERFORMED ON: ABNORMAL
PERFORMED ON: ABNORMAL
PERFORMED ON: NORMAL
PLATELET # BLD: 352 K/UL (ref 130–400)
POTASSIUM REFLEX MAGNESIUM: 4.4 MEQ/L (ref 3.4–4.9)
RBC # BLD: 3.96 M/UL (ref 4.7–6.1)
SODIUM BLD-SCNC: 141 MEQ/L (ref 135–144)
TOTAL PROTEIN: 6.2 G/DL (ref 6.3–8)
WBC # BLD: 10.1 K/UL (ref 4.8–10.8)

## 2022-11-28 PROCEDURE — 36415 COLL VENOUS BLD VENIPUNCTURE: CPT

## 2022-11-28 PROCEDURE — 93010 ELECTROCARDIOGRAM REPORT: CPT | Performed by: INTERNAL MEDICINE

## 2022-11-28 PROCEDURE — 2580000003 HC RX 258: Performed by: INTERNAL MEDICINE

## 2022-11-28 PROCEDURE — 96375 TX/PRO/DX INJ NEW DRUG ADDON: CPT

## 2022-11-28 PROCEDURE — 6370000000 HC RX 637 (ALT 250 FOR IP): Performed by: INTERNAL MEDICINE

## 2022-11-28 PROCEDURE — 6360000002 HC RX W HCPCS: Performed by: PHYSICIAN ASSISTANT

## 2022-11-28 PROCEDURE — 80053 COMPREHEN METABOLIC PANEL: CPT

## 2022-11-28 PROCEDURE — 93005 ELECTROCARDIOGRAM TRACING: CPT

## 2022-11-28 PROCEDURE — 6360000002 HC RX W HCPCS: Performed by: INTERNAL MEDICINE

## 2022-11-28 PROCEDURE — G0378 HOSPITAL OBSERVATION PER HR: HCPCS

## 2022-11-28 PROCEDURE — APPSS45 APP SPLIT SHARED TIME 31-45 MINUTES: Performed by: PHYSICIAN ASSISTANT

## 2022-11-28 PROCEDURE — C8923 2D TTE W OR W/O FOL W/CON,CO: HCPCS

## 2022-11-28 PROCEDURE — 85025 COMPLETE CBC W/AUTO DIFF WBC: CPT

## 2022-11-28 PROCEDURE — 2500000003 HC RX 250 WO HCPCS: Performed by: INTERNAL MEDICINE

## 2022-11-28 PROCEDURE — 96376 TX/PRO/DX INJ SAME DRUG ADON: CPT

## 2022-11-28 RX ORDER — METOPROLOL TARTRATE 50 MG/1
100 TABLET, FILM COATED ORAL 2 TIMES DAILY
Status: DISCONTINUED | OUTPATIENT
Start: 2022-11-28 | End: 2022-11-28

## 2022-11-28 RX ORDER — METOPROLOL TARTRATE 50 MG/1
100 TABLET, FILM COATED ORAL 2 TIMES DAILY
Status: DISCONTINUED | OUTPATIENT
Start: 2022-11-28 | End: 2022-12-03

## 2022-11-28 RX ORDER — DIGOXIN 0.25 MG/ML
250 INJECTION INTRAMUSCULAR; INTRAVENOUS DAILY
Status: DISCONTINUED | OUTPATIENT
Start: 2022-11-28 | End: 2022-11-29

## 2022-11-28 RX ORDER — METOPROLOL TARTRATE 50 MG/1
50 TABLET, FILM COATED ORAL 2 TIMES DAILY
Status: DISCONTINUED | OUTPATIENT
Start: 2022-11-28 | End: 2022-11-28

## 2022-11-28 RX ORDER — LIDOCAINE 4 G/G
1 PATCH TOPICAL DAILY
Status: DISCONTINUED | OUTPATIENT
Start: 2022-11-28 | End: 2022-12-03 | Stop reason: HOSPADM

## 2022-11-28 RX ORDER — HYDROXYZINE PAMOATE 25 MG/1
25 CAPSULE ORAL ONCE
Status: COMPLETED | OUTPATIENT
Start: 2022-11-28 | End: 2022-11-28

## 2022-11-28 RX ORDER — METOPROLOL TARTRATE 5 MG/5ML
5 INJECTION INTRAVENOUS ONCE
Status: COMPLETED | OUTPATIENT
Start: 2022-11-28 | End: 2022-11-28

## 2022-11-28 RX ORDER — DILTIAZEM HYDROCHLORIDE 60 MG/1
30 TABLET, FILM COATED ORAL EVERY 6 HOURS SCHEDULED
Status: DISCONTINUED | OUTPATIENT
Start: 2022-11-28 | End: 2022-11-28

## 2022-11-28 RX ORDER — GUAIFENESIN/DEXTROMETHORPHAN 100-10MG/5
5 SYRUP ORAL EVERY 4 HOURS PRN
Status: DISCONTINUED | OUTPATIENT
Start: 2022-11-28 | End: 2022-12-03 | Stop reason: HOSPADM

## 2022-11-28 RX ADMIN — METOPROLOL TARTRATE 25 MG: 25 TABLET, FILM COATED ORAL at 08:30

## 2022-11-28 RX ADMIN — SACUBITRIL AND VALSARTAN 1 TABLET: 24; 26 TABLET, FILM COATED ORAL at 08:30

## 2022-11-28 RX ADMIN — METOPROLOL TARTRATE 5 MG: 5 INJECTION, SOLUTION INTRAVENOUS at 11:06

## 2022-11-28 RX ADMIN — APIXABAN 5 MG: 5 TABLET, FILM COATED ORAL at 21:11

## 2022-11-28 RX ADMIN — FUROSEMIDE 40 MG: 40 TABLET ORAL at 08:30

## 2022-11-28 RX ADMIN — ATORVASTATIN CALCIUM 40 MG: 40 TABLET, FILM COATED ORAL at 21:12

## 2022-11-28 RX ADMIN — INSULIN LISPRO 8 UNITS: 100 INJECTION, SOLUTION INTRAVENOUS; SUBCUTANEOUS at 18:19

## 2022-11-28 RX ADMIN — SACUBITRIL AND VALSARTAN 1 TABLET: 24; 26 TABLET, FILM COATED ORAL at 21:12

## 2022-11-28 RX ADMIN — DEXTROSE MONOHYDRATE 150 MG: 50 INJECTION, SOLUTION INTRAVENOUS at 18:28

## 2022-11-28 RX ADMIN — AMIODARONE HYDROCHLORIDE 1 MG/MIN: 50 INJECTION, SOLUTION INTRAVENOUS at 18:39

## 2022-11-28 RX ADMIN — HYDROXYZINE PAMOATE 25 MG: 25 CAPSULE ORAL at 02:05

## 2022-11-28 RX ADMIN — METOPROLOL TARTRATE 100 MG: 50 TABLET, FILM COATED ORAL at 21:12

## 2022-11-28 RX ADMIN — DIGOXIN 250 MCG: 0.25 INJECTION INTRAMUSCULAR; INTRAVENOUS at 13:49

## 2022-11-28 RX ADMIN — FENOFIBRATE 160 MG: 160 TABLET ORAL at 08:30

## 2022-11-28 RX ADMIN — INSULIN GLARGINE 20 UNITS: 100 INJECTION, SOLUTION SUBCUTANEOUS at 08:36

## 2022-11-28 RX ADMIN — INSULIN LISPRO 8 UNITS: 100 INJECTION, SOLUTION INTRAVENOUS; SUBCUTANEOUS at 13:49

## 2022-11-28 RX ADMIN — GUAIFENESIN SYRUP AND DEXTROMETHORPHAN 5 ML: 100; 10 SYRUP ORAL at 10:42

## 2022-11-28 RX ADMIN — ASPIRIN 81 MG: 81 TABLET, COATED ORAL at 08:30

## 2022-11-28 RX ADMIN — APIXABAN 5 MG: 5 TABLET, FILM COATED ORAL at 08:30

## 2022-11-28 ASSESSMENT — ENCOUNTER SYMPTOMS
COLOR CHANGE: 0
SHORTNESS OF BREATH: 0
NAUSEA: 0
ABDOMINAL DISTENTION: 0
CHEST TIGHTNESS: 1
VOMITING: 0

## 2022-11-28 NOTE — CARE COORDINATION
CALLED [facility], PT IS NOT CURRENT AND THEY ARE UNABLE TO ACCEPT PT D/T HIS INSURANCE. WILL NEED ANOTHER C.

## 2022-11-28 NOTE — CONSULTS
Consult Note  Patient: Mickey Lemus  Unit/Bed: N667/R051-09  YOB: 1960  MRN: [id number]  Acct: [de-identified]   Admitting Diagnosis: Tachycardia [R00.0]  Elevated troponin [R77.8]  Chest pain, unspecified type [R07.9]  Date:  11/26/2022  Hospital Day: 0      Chief Complaint:  Chest pain; SOB    History of Present Illness: This is a pleasant 55-year-old  male with past medical history significant for prior history of recovered nonischemic cardiomyopathy but recent echo on 11/3/2022 showing mildly reduced LV systolic function with EF of 45%, history of initial ICD implant approximately 11 years ago while living in [facility] with recent battery change by Dr. Read July in 2020 hypertension, dyslipidemia, diabetes, CKD, and chronic systolic congestive heart failure who presented to [facility] ER on 11/26/2022 with chief complaint of chest pain.  of device used to translate for patient as he is Kittitian-speaking. Patient reports that on day of presentation he was experiencing rapid heartbeat at home with heart rate into the 140s. This was associated with tightness in his chest and neck. Also, he was experiencing a nonproductive cough. He states that he took one of his heart rate medications and shortly thereafter heart rate was down into the 80s. Due to the symptoms he presented to ER for further evaluation. On presentation to the emergency room, patient hypotensive with blood pressure 73/45, heart rate tachycardic at 120, respiratory rate 22, pulse ox of 96%, temperature 98.1 °F.  He was treated with IV fluid bolus with improvement in his blood pressure. Sodium 133, potassium 4.8, chloride 100, total CO2 22, BUN elevated 46, creatinine elevated 2.43, GFR low at 28.8, glucose 228. Initial troponin negative at 0.012 but repeat troponin mildly elevated 0.035. proBNP 1282. WBC 8.7, hemoglobin 12.6, hematocrit 38.2, platelets 650. PCR viral panel unremarkable.   Chest x-ray revealed cardiomegaly with no findings of heart failure or pneumonia. EKG revealed SVT versus sinus tach with heart rate 133. He was subsequently admitted for further evaluation. Cardiology has been consulted for evaluation of elevated troponin and tachycardia. Serial troponins have been elevated x4 at 0.035, 0.126, 0.206 and 0.167. He is currently chest pain-free and denying any other complaints such as shortness of breath or palpitations. He denies any worsening lower extremity edema, orthopnea, PND, dizziness, lightheadedness, syncope or fever. Currently on telemetry he is tachycardic with heart rate 120s to 130s-questionable sinus tach versus SVT. He was treated with dose of IV Lopressor with temporary improvement in heart rate per nursing staff. Of note, patient's Lopressor was reduced during recent hospitalization in October due to hypotensive episodes. Most recent echocardiogram on 11/3/2022 showed mildly reduced LV systolic function with EF of 45%. No recent ischemic evaluation. Patient states that his last heart catheterization was approximately 11 years ago while living in [facility] and reportedly negative.     Allergies   Allergen Reactions    Coreg [Carvedilol] Other (See Comments)     \"hyper\", rapid pulse    Januvia [Sitagliptin]        Current Facility-Administered Medications   Medication Dose Route Frequency Provider Last Rate Last Admin    guaiFENesin-dextromethorphan (ROBITUSSIN DM) 100-10 MG/5ML syrup 5 mL  5 mL Oral Q4H PRN Blessing Ngo MD   5 mL at 11/28/22 1042    digoxin (LANOXIN) injection 250 mcg  250 mcg IntraVENous Daily Clarke Blizzard, [place]        metoprolol tartrate (LOPRESSOR) tablet 50 mg  50 mg Oral BID Clarke Blizzard, [place]        ondansetron (ZOFRAN-ODT) disintegrating tablet 4 mg  4 mg Oral Q8H PRN JESUS Castro CNP        Or    ondansetron (ZOFRAN) injection 4 mg  4 mg IntraVENous Q6H PRN JESUS Suazo CNP        acetaminophen (TYLENOL) tablet 650 mg 650 mg Oral Q6H PRN JESUS Lynn CNP        Or    acetaminophen (TYLENOL) suppository 650 mg  650 mg Rectal Q6H PRN JESUS Castro CNP        polyethylene glycol (GLYCOLAX) packet 17 g  17 g Oral Daily PRN JESUS Castro CNP        nitroGLYCERIN (NITROSTAT) SL tablet 0.4 mg  0.4 mg SubLINGual Q5 Min PRN JESUS Castro CNP        insulin lispro (HUMALOG) injection vial 0-8 Units  0-8 Units SubCUTAneous Q4H JESUS Castro CNP        glucose chewable tablet 16 g  4 tablet Oral PRN JESUS Casrto CNP        dextrose bolus 10% 125 mL  125 mL IntraVENous PRN JESUS Castro CNP        Or    dextrose bolus 10% 250 mL  250 mL IntraVENous PRN JESUS Lynn CNP        glucagon (rDNA) injection 1 mg  1 mg SubCUTAneous PRN JESUS Lynn CNP        dextrose 10 % infusion   IntraVENous Continuous PRN JESUS Lynn CNP        aspirin EC tablet 81 mg  81 mg Oral Daily Santos Jose Sedar, DO   81 mg at 11/28/22 0830    apixaban (ELIQUIS) tablet 5 mg  5 mg Oral BID Santos Jose Sedar, DO   5 mg at 11/28/22 0830    atorvastatin (LIPITOR) tablet 40 mg  40 mg Oral Nightly Josue NIEVES Sedar, DO   40 mg at 11/27/22 2048    fenofibrate (TRIGLIDE) tablet 160 mg  160 mg Oral Daily Josue NIEVES Sedar, DO   160 mg at 11/28/22 0830    furosemide (LASIX) tablet 40 mg  40 mg Oral Daily Josue NIEVES Sedar, DO   40 mg at 11/28/22 0830    insulin glargine (LANTUS) injection vial 20 Units  20 Units SubCUTAneous BID Santos Jose Sedar, DO   20 Units at 11/28/22 0836    insulin lispro (HUMALOG) injection vial 8 Units  8 Units SubCUTAneous TID AC Josue NIEVES Sedar, DO        sacubitril-valsartan (ENTRESTO) 24-26 MG per tablet 1 tablet  1 tablet Oral BID Santos Sanford Sedar, DO   1 tablet at 11/28/22 0830       PMHx:  Past Medical History:   Diagnosis Date    Acute kidney injury superimposed on CKD ([facility] 08/15/2019    Anxiety and depression 04/30/2019    Cardiomyopathy ([facility]     Chronic systolic heart failure ([facility] 04/30/2019    Diabetes mellitus ([facility]     Diabetic neuropathy associated with type 2 diabetes mellitus ([facility] 2017    Heart failure, NYHA class 4 ([facility]     Hyperlipidemia     Hypertension     ICD (implantable cardioverter-defibrillator) in place 2019    Left ureteral calculus     Morbid obesity ([facility] 2017    LON (obstructive sleep apnea)     Pulmonary embolism ([facility]     ON ELIQUIS    Pyelonephritis 2019    Ureteral calculus     Ureteric stone        PSHx:  Past Surgical History:   Procedure Laterality Date    CARDIAC DEFIBRILLATOR PLACEMENT  2008    CYSTOSCOPY INSERTION / REMOVAL STENT / STONE Left 2019    CYSTOSCOPY LEFT DOUBLE J STENT PLACEMENT ROOM 287 performed by Abigail Peña MD at [place] Left 2019    LEFT ESWL (CONF # [id number]) performed by Abigail Peña MD at [facility]    LITHOTRIPSY N/A 7/10/2019    HOLMIUM LASER LITHOTRIPSY, STONE EXTRACTION, LT URETERAL STENT INSERTION. performed by Abigail Peña MD at [address] [place] [address] Left 7/10/2019    LEFT FLEXIBLE URETEROSCOPY, CYSTOSCOPY RETROGRADE performed by Abigail Peña MD at [facility] Hx:  Social History     Socioeconomic History    Marital status: Legally    Tobacco Use    Smoking status: Former     Packs/day: 1.00     Years: 25.00     Pack years: 25.00     Types: Cigarettes     Quit date: 2008     Years since quittin.0    Smokeless tobacco: Never   Vaping Use    Vaping Use: Never used   Substance and Sexual Activity    Alcohol use: No    Drug use: Never    Sexual activity: Yes     Partners: Female       Family Hx:  Family History   Problem Relation Age of Onset    Heart Disease Mother     Kidney Disease Mother     Hypertension Mother     Diabetes Mother     No Known Problems Sister     No Known Problems Brother     No Known Problems Brother        Review of Systems:   Review of Systems   Constitutional:  Negative for activity change, fatigue and fever.    HENT:  Negative for congestion. Respiratory:  Positive for chest tightness (with rapid HR on 11/26/22). Negative for shortness of breath. Cardiovascular:  Negative for chest pain and leg swelling. Gastrointestinal:  Negative for abdominal distention, nausea and vomiting. Genitourinary:  Negative for difficulty urinating. Musculoskeletal:  Negative for arthralgias. Skin:  Negative for color change. Neurological:  Negative for dizziness, syncope and light-headedness. Psychiatric/Behavioral:  Negative for agitation. Physical Examination:    BP (!) 157/70   Pulse (!) 154   Temp 97.7 °F (36.5 °C)   Resp 18   Ht 5' 11\" (1.803 m)   Wt (!) 330 lb 3.2 oz (149.8 kg)   SpO2 99%   BMI 46.05 kg/m²    Physical Exam  Constitutional:       General: He is not in acute distress. Appearance: He is obese. HENT:      Head: Normocephalic and atraumatic. Cardiovascular:      Rate and Rhythm: Regular rhythm. Tachycardia present. Pulmonary:      Effort: Pulmonary effort is normal. No respiratory distress. Breath sounds: No wheezing, rhonchi or rales. Comments: Diffusely diminished breath sounds  Abdominal:      Palpations: Abdomen is soft. Comments: Obese abdomen   Musculoskeletal:         General: Normal range of motion. Cervical back: Normal range of motion and neck supple. Right lower leg: Edema (trace) present. Left lower leg: Edema (trace) present. Skin:     General: Skin is warm and dry. Neurological:      General: No focal deficit present. Mental Status: He is alert and oriented to person, place, and time. Cranial Nerves: No cranial nerve deficit.    Psychiatric:         Mood and Affect: Mood normal.         Behavior: Behavior normal.       LABS:  CBC:  Lab Results   Component Value Date/Time    WBC 10.1 11/28/2022 05:29 AM    RBC 3.96 11/28/2022 05:29 AM    HGB 11.2 11/28/2022 05:29 AM    HCT 34.6 11/28/2022 05:29 AM    MCV 87.3 11/28/2022 05:29 AM    MCH 28.2 11/28/2022 05:29 AM    MCHC 32.3 11/28/2022 05:29 AM    RDW 16.8 11/28/2022 05:29 AM     11/28/2022 05:29 AM     CBC with Differential:   Lab Results   Component Value Date/Time    WBC 10.1 11/28/2022 05:29 AM    RBC 3.96 11/28/2022 05:29 AM    HGB 11.2 11/28/2022 05:29 AM    HCT 34.6 11/28/2022 05:29 AM     11/28/2022 05:29 AM    MCV 87.3 11/28/2022 05:29 AM    MCH 28.2 11/28/2022 05:29 AM    MCHC 32.3 11/28/2022 05:29 AM    RDW 16.8 11/28/2022 05:29 AM    BANDSPCT 14 08/04/2019 06:30 PM    METASPCT 1 08/04/2019 06:30 PM    LYMPHOPCT 23.8 11/28/2022 05:29 AM    MONOPCT 8.7 11/28/2022 05:29 AM    BASOPCT 2.1 11/28/2022 05:29 AM    MONOSABS 0.9 11/28/2022 05:29 AM    LYMPHSABS 2.4 11/28/2022 05:29 AM    EOSABS 0.4 11/28/2022 05:29 AM    BASOSABS 0.2 11/28/2022 05:29 AM     CMP:    Lab Results   Component Value Date/Time     11/28/2022 05:29 AM    K 4.4 11/28/2022 05:29 AM     11/28/2022 05:29 AM    CO2 22 11/28/2022 05:29 AM    BUN 50 11/28/2022 05:29 AM    CREATININE 2.25 11/28/2022 05:29 AM    GFRAA 35.6 10/13/2022 06:45 PM    LABGLOM 31.6 11/28/2022 05:29 AM    GLUCOSE 95 11/28/2022 05:29 AM    GLUCOSE 96 11/14/2022 11:22 AM    PROT 6.2 11/28/2022 05:29 AM    LABALBU 3.2 11/28/2022 05:29 AM    LABALBU 3.3 11/14/2022 11:22 AM    CALCIUM 8.5 11/28/2022 05:29 AM    BILITOT 0.3 11/28/2022 05:29 AM    ALKPHOS 88 11/28/2022 05:29 AM    AST 22 11/28/2022 05:29 AM    ALT 18 11/28/2022 05:29 AM     BMP:    Lab Results   Component Value Date/Time     11/28/2022 05:29 AM    K 4.4 11/28/2022 05:29 AM     11/28/2022 05:29 AM    CO2 22 11/28/2022 05:29 AM    BUN 50 11/28/2022 05:29 AM    LABALBU 3.2 11/28/2022 05:29 AM    LABALBU 3.3 11/14/2022 11:22 AM    CREATININE 2.25 11/28/2022 05:29 AM    CALCIUM 8.5 11/28/2022 05:29 AM    GFRAA 35.6 10/13/2022 06:45 PM    LABGLOM 31.6 11/28/2022 05:29 AM    GLUCOSE 95 11/28/2022 05:29 AM    GLUCOSE 96 11/14/2022 11:22 AM     Magnesium:    Lab Results Component Value Date/Time    MG 2.4 11/18/2022 04:45 AM     Troponin:    Lab Results   Component Value Date/Time    TROPONINI 0.167 11/27/2022 07:04 PM       Radiology:  XR CHEST PORTABLE    Result Date: 11/26/2022  EXAMINATION: ONE XRAY VIEW OF THE CHEST 11/26/2022 2:27 pm COMPARISON: 11/18/2022 HISTORY: ORDERING SYSTEM PROVIDED HISTORY: chest pain TECHNOLOGIST PROVIDED HISTORY: Reason for exam:->chest pain What reading provider will be dictating this exam?->CRC FINDINGS: The heart is enlarged. There is a cardiac pacer on the left The lungs are clear. There is no focal infiltrate or effusion. Cardiomegaly. There are no findings of failure or pneumonia. Echocardiogram 11/3/22:   Conclusions   Summary   Normal mitral valve structure and function. 1-2+ MR   Left ventricular ejection fraction is visually estimated at 45% by use of   Definity. otherwise TDS.    Pt is in AF      Signature   ----------------------------------------------------------------   Electronically signed by Gianni Rodriguez MD(Interpreting   physician) on 11/03/2022 10:36 AM   ----------------------------------------------------------------    EKG 11/27/22: SR 73, 1st degree AVB, baseline artifact, QTc 460ms    Telemetry 11/28/22: ST vs SVT 120s-130s; SVT with HR 140s-160s at 3am and 8am today      Assessment:    Active Hospital Problems    Diagnosis Date Noted    Tachycardia [R00.0] 11/26/2022     Priority: Medium    Elevated troponin [R77.8] 11/26/2022     Priority: Medium    Chest pain [R07.9] 11/18/2022     Priority: Medium    Coronary artery disease involving native heart without angina pectoris [I25.10] 11/06/2022     Priority: Medium    CKD stage 4 due to type 2 diabetes mellitus ([facility] [Q61.63, N18.4] 08/15/2019     Priority: Low    Chronic systolic heart failure ([facility] [I50.22] 04/30/2019     Priority: Low    Hyperlipidemia [E78.5]      Priority: Low    Hypertension [I10]      Priority: Low     Elevated troponin (0.035, 0.126, 0.206, 0.167)  Chest pain r/o cardiac ischemia  Dyspnea  SVT vs A-fib/flutter RVR  High probability of PE per VQ scan 11/3/22--on OAC with Eliquis  Chronic HFrEF--appears compensated currently  Hx recovered NICMP--recent echo 11/3/22 with EF 45%  Hx ICD--s/p battery change on 4/23/20 with Dr. Sridhar Fortune  CKD III-IV  Hx HTN--hypotensive on presentation. Dyslipidemia  DM  LON on CPAP at HS  Morbid obesity  ? Hx A-fib--noted to be in A-fib per echo report 11/3/22 but no prior documented Hx of A-fib  Former smoker    Plan:  Continue current medications-aspirin 81 mg p.o. daily, increase Lopressor 50 mg p.o. twice daily, Lasix 40 mg p.o. daily, Entresto 24/26 mg p.o. twice daily, Lipitor 40 mg p.o. nightly, oral anticoagulation with Eliquis 5 mg p.o. twice daily, insulin as directed  Will give digoxin 250 mcg IV x1 now  Will consider further titration of Lopressor in future if heart rate remains uncontrolled and BP tolerates.   Cardiac/diabetic/less than 2 g sodium diet recommended  Recommend 8661-4659 mL daily fluid restriction  Check daily weight and strict intake and output  Weight loss recommended  Monitor on telemetry for any tachycardia or bradycardia arrhythmias  Maintain potassium greater than 4, magnesium greater than 2  GI/DVT prophylaxis  Will likely need event monitor as outpatient to further evaluate for underlying cardiac arrhythmias/SVT  Coronary/ischemic evaluation per Dr. Janette Robbins  Further recommendations to follow            Electronically signed by WU Hedrick on 11/28/2022 at 1:06 PM

## 2022-11-28 NOTE — PROGRESS NOTES
Physician Progress Note    2022   4:18 PM    Name:  Nyasia Reyes  MRN:    [id number]     IP Day: 0     Admit Date: 2022  2:08 PM  PCP: Luís Conley DO    Code Status:  Full Code    Subjective:     No complaints at this time. He last had chest discomfort on Saturday. Family notes he will occasionally have coughing fits where it appears difficult for him to breathe.     Current Facility-Administered Medications   Medication Dose Route Frequency Provider Last Rate Last Admin    guaiFENesin-dextromethorphan (ROBITUSSIN DM) 100-10 MG/5ML syrup 5 mL  5 mL Oral Q4H PRN Sara King MD   5 mL at 22 1042    digoxin (LANOXIN) injection 250 mcg  250 mcg IntraVENous Daily Carmen Pelayo   250 mcg at 22 1349    metoprolol tartrate (LOPRESSOR) tablet 50 mg  50 mg Oral BID WU Pelayo        lidocaine 4 % external patch 1 patch  1 patch TransDERmal Daily Sivakumar Velasquez DO        ondansetron (ZOFRAN-ODT) disintegrating tablet 4 mg  4 mg Oral Q8H PRN JESUS Castro CNP        Or    ondansetron (ZOFRAN) injection 4 mg  4 mg IntraVENous Q6H PRN Brionna JESUS CNP        acetaminophen (TYLENOL) tablet 650 mg  650 mg Oral Q6H PRN Brionna Stanton, APRN - CNP        Or    acetaminophen (TYLENOL) suppository 650 mg  650 mg Rectal Q6H PRN JESUS Castro CNP        polyethylene glycol (GLYCOLAX) packet 17 g  17 g Oral Daily PRN JESUS Castro CNP        nitroGLYCERIN (NITROSTAT) SL tablet 0.4 mg  0.4 mg SubLINGual Q5 Min PRN JESUS Castro CNP        insulin lispro (HUMALOG) injection vial 0-8 Units  0-8 Units SubCUTAneous Q4H JESUS Castro CNP        glucose chewable tablet 16 g  4 tablet Oral PRN JESUS Castro CNP        dextrose bolus 10% 125 mL  125 mL IntraVENous PRN Brionna , APRN - CNP        Or    dextrose bolus 10% 250 mL  250 mL IntraVENous PRN Brionna , JESUS - CNP        glucagon (rDNA) injection 1 mg  1 mg SubCUTAneous PRN Marta López, APRN - CNP        dextrose 10 % infusion   IntraVENous Continuous PRN Marta López, APRN - CNP        aspirin EC tablet 81 mg  81 mg Oral Daily Serene Soda Sedar, DO   81 mg at 22 0830    apixaban (ELIQUIS) tablet 5 mg  5 mg Oral BID Serene Soda Sedar, DO   5 mg at 22 0830    atorvastatin (LIPITOR) tablet 40 mg  40 mg Oral Nightly Josue D Sedar, DO   40 mg at 22 2048    fenofibrate (TRIGLIDE) tablet 160 mg  160 mg Oral Daily Serene Soda Sedar, DO   160 mg at 22 0830    furosemide (LASIX) tablet 40 mg  40 mg Oral Daily [place] Sedar, DO   40 mg at 22 0830    insulin glargine (LANTUS) injection vial 20 Units  20 Units SubCUTAneous BID Serene Soda Sedar, DO   20 Units at 22 0836    insulin lispro (HUMALOG) injection vial 8 Units  8 Units SubCUTAneous TID AC Josue D Sedar, DO   8 Units at 22 1349    sacubitril-valsartan (ENTRESTO) 24-26 MG per tablet 1 tablet  1 tablet Oral BID Serene Soda Sedar, DO   1 tablet at 22 0830       Physical Examination:      Vitals:  /88   Pulse (!) 154   Temp 98.4 °F (36.9 °C)   Resp 18   Ht 5' 11\" (1.803 m)   Wt (!) 330 lb 3.2 oz (149.8 kg)   SpO2 96%   BMI 46.05 kg/m²   Temp (24hrs), Av.1 °F (36.7 °C), Min:97.7 °F (36.5 °C), Max:98.4 °F (36.9 °C)      General appearance: alert, cooperative and no distress. Obese  Mental Status: oriented to person, place and time and normal affect  Lungs: clear to auscultation bilaterally, normal effort  Heart: regular rate and rhythm, no murmur  Abdomen: soft, nontender, nondistended, bowel sounds present, no masses  Extremities: no edema, redness, tenderness in the calves.  Cap refill <2s  Skin: no gross lesions, rashes    Data:     Labs:  Recent Labs     22  0325 22  0529   WBC 7.5 10.1   HGB 11.1* 11.2*    352     Recent Labs     22  0529    141   K 4.5 4.4    106   CO2 22 22   BUN 54* 50*   CREATININE 2.59* 2.25*   GLUCOSE 161* 95     Recent Labs

## 2022-11-29 PROBLEM — I47.10 SVT (SUPRAVENTRICULAR TACHYCARDIA): Status: ACTIVE | Noted: 2022-11-29

## 2022-11-29 PROBLEM — I47.1 SVT (SUPRAVENTRICULAR TACHYCARDIA) (HCC): Status: ACTIVE | Noted: 2022-11-29

## 2022-11-29 LAB
ALBUMIN SERPL-MCNC: 3.4 G/DL (ref 3.5–4.6)
ALP BLD-CCNC: 90 U/L (ref 35–104)
ALT SERPL-CCNC: 22 U/L (ref 0–41)
ANION GAP SERPL CALCULATED.3IONS-SCNC: 11 MEQ/L (ref 9–15)
AST SERPL-CCNC: 26 U/L (ref 0–40)
BASOPHILS ABSOLUTE: 0.1 K/UL (ref 0–0.2)
BASOPHILS RELATIVE PERCENT: 1.4 %
BILIRUB SERPL-MCNC: 0.4 MG/DL (ref 0.2–0.7)
BUN BLDV-MCNC: 55 MG/DL (ref 8–23)
CALCIUM SERPL-MCNC: 8.8 MG/DL (ref 8.5–9.9)
CHLORIDE BLD-SCNC: 105 MEQ/L (ref 95–107)
CO2: 22 MEQ/L (ref 20–31)
CREAT SERPL-MCNC: 2.65 MG/DL (ref 0.7–1.2)
EKG ATRIAL RATE: 115 BPM
EKG ATRIAL RATE: 47 BPM
EKG Q-T INTERVAL: 320 MS
EKG Q-T INTERVAL: 338 MS
EKG QRS DURATION: 102 MS
EKG QRS DURATION: 112 MS
EKG QTC CALCULATION (BAZETT): 487 MS
EKG QTC CALCULATION (BAZETT): 495 MS
EKG R AXIS: -11 DEGREES
EKG R AXIS: -18 DEGREES
EKG T AXIS: 79 DEGREES
EKG T AXIS: 84 DEGREES
EKG VENTRICULAR RATE: 125 BPM
EKG VENTRICULAR RATE: 144 BPM
EOSINOPHILS ABSOLUTE: 0.5 K/UL (ref 0–0.7)
EOSINOPHILS RELATIVE PERCENT: 6.3 %
GFR SERPL CREATININE-BSD FRML MDRD: 26 ML/MIN/{1.73_M2}
GLOBULIN: 2.8 G/DL (ref 2.3–3.5)
GLUCOSE BLD-MCNC: 120 MG/DL (ref 70–99)
GLUCOSE BLD-MCNC: 137 MG/DL (ref 70–99)
GLUCOSE BLD-MCNC: 70 MG/DL (ref 70–99)
GLUCOSE BLD-MCNC: 72 MG/DL (ref 70–99)
GLUCOSE BLD-MCNC: 76 MG/DL (ref 70–99)
HCT VFR BLD CALC: 34.1 % (ref 42–52)
HEMOGLOBIN: 11 G/DL (ref 14–18)
LYMPHOCYTES ABSOLUTE: 2 K/UL (ref 1–4.8)
LYMPHOCYTES RELATIVE PERCENT: 23.5 %
MCH RBC QN AUTO: 28.2 PG (ref 27–31.3)
MCHC RBC AUTO-ENTMCNC: 32.3 % (ref 33–37)
MCV RBC AUTO: 87.2 FL (ref 79–92.2)
MONOCYTES ABSOLUTE: 0.8 K/UL (ref 0.2–0.8)
MONOCYTES RELATIVE PERCENT: 10 %
NEUTROPHILS ABSOLUTE: 4.9 K/UL (ref 1.4–6.5)
NEUTROPHILS RELATIVE PERCENT: 58.8 %
PDW BLD-RTO: 16.7 % (ref 11.5–14.5)
PERFORMED ON: ABNORMAL
PERFORMED ON: ABNORMAL
PERFORMED ON: NORMAL
PERFORMED ON: NORMAL
PLATELET # BLD: 355 K/UL (ref 130–400)
POTASSIUM REFLEX MAGNESIUM: 4.4 MEQ/L (ref 3.4–4.9)
RBC # BLD: 3.91 M/UL (ref 4.7–6.1)
SODIUM BLD-SCNC: 138 MEQ/L (ref 135–144)
TOTAL PROTEIN: 6.2 G/DL (ref 6.3–8)
WBC # BLD: 8.4 K/UL (ref 4.8–10.8)

## 2022-11-29 PROCEDURE — 6360000002 HC RX W HCPCS: Performed by: INTERNAL MEDICINE

## 2022-11-29 PROCEDURE — 6370000000 HC RX 637 (ALT 250 FOR IP): Performed by: INTERNAL MEDICINE

## 2022-11-29 PROCEDURE — 2060000000 HC ICU INTERMEDIATE R&B

## 2022-11-29 PROCEDURE — 6360000002 HC RX W HCPCS: Performed by: PHYSICIAN ASSISTANT

## 2022-11-29 PROCEDURE — 96376 TX/PRO/DX INJ SAME DRUG ADON: CPT

## 2022-11-29 PROCEDURE — 85025 COMPLETE CBC W/AUTO DIFF WBC: CPT

## 2022-11-29 PROCEDURE — 6370000000 HC RX 637 (ALT 250 FOR IP)

## 2022-11-29 PROCEDURE — 2580000003 HC RX 258: Performed by: INTERNAL MEDICINE

## 2022-11-29 PROCEDURE — APPSS30 APP SPLIT SHARED TIME 16-30 MINUTES: Performed by: PHYSICIAN ASSISTANT

## 2022-11-29 PROCEDURE — 36415 COLL VENOUS BLD VENIPUNCTURE: CPT

## 2022-11-29 PROCEDURE — 80053 COMPREHEN METABOLIC PANEL: CPT

## 2022-11-29 PROCEDURE — 93283 PRGRMG EVAL IMPLANTABLE DFB: CPT

## 2022-11-29 PROCEDURE — 93010 ELECTROCARDIOGRAM REPORT: CPT | Performed by: INTERNAL MEDICINE

## 2022-11-29 RX ORDER — AMIODARONE HYDROCHLORIDE 200 MG/1
200 TABLET ORAL 2 TIMES DAILY
Status: DISCONTINUED | OUTPATIENT
Start: 2022-11-29 | End: 2022-12-01

## 2022-11-29 RX ORDER — FUROSEMIDE 10 MG/ML
40 INJECTION INTRAMUSCULAR; INTRAVENOUS DAILY
Status: DISCONTINUED | OUTPATIENT
Start: 2022-11-29 | End: 2022-12-03

## 2022-11-29 RX ADMIN — FENOFIBRATE 160 MG: 160 TABLET ORAL at 08:48

## 2022-11-29 RX ADMIN — AMIODARONE HYDROCHLORIDE 0.5 MG/MIN: 50 INJECTION, SOLUTION INTRAVENOUS at 00:19

## 2022-11-29 RX ADMIN — INSULIN LISPRO 8 UNITS: 100 INJECTION, SOLUTION INTRAVENOUS; SUBCUTANEOUS at 17:56

## 2022-11-29 RX ADMIN — ATORVASTATIN CALCIUM 40 MG: 40 TABLET, FILM COATED ORAL at 20:38

## 2022-11-29 RX ADMIN — METOPROLOL TARTRATE 100 MG: 50 TABLET, FILM COATED ORAL at 20:38

## 2022-11-29 RX ADMIN — AMIODARONE HYDROCHLORIDE 200 MG: 200 TABLET ORAL at 20:38

## 2022-11-29 RX ADMIN — INSULIN LISPRO 8 UNITS: 100 INJECTION, SOLUTION INTRAVENOUS; SUBCUTANEOUS at 12:26

## 2022-11-29 RX ADMIN — SACUBITRIL AND VALSARTAN 1 TABLET: 24; 26 TABLET, FILM COATED ORAL at 09:12

## 2022-11-29 RX ADMIN — DIGOXIN 250 MCG: 0.25 INJECTION INTRAMUSCULAR; INTRAVENOUS at 08:48

## 2022-11-29 RX ADMIN — INSULIN GLARGINE 20 UNITS: 100 INJECTION, SOLUTION SUBCUTANEOUS at 09:12

## 2022-11-29 RX ADMIN — METOPROLOL TARTRATE 100 MG: 50 TABLET, FILM COATED ORAL at 08:48

## 2022-11-29 RX ADMIN — APIXABAN 5 MG: 5 TABLET, FILM COATED ORAL at 20:38

## 2022-11-29 RX ADMIN — SACUBITRIL AND VALSARTAN 1 TABLET: 24; 26 TABLET, FILM COATED ORAL at 20:38

## 2022-11-29 RX ADMIN — ACETAMINOPHEN 650 MG: 325 TABLET ORAL at 18:03

## 2022-11-29 RX ADMIN — ASPIRIN 81 MG: 81 TABLET, COATED ORAL at 08:48

## 2022-11-29 RX ADMIN — APIXABAN 5 MG: 5 TABLET, FILM COATED ORAL at 08:48

## 2022-11-29 RX ADMIN — FUROSEMIDE 40 MG: 10 INJECTION, SOLUTION INTRAMUSCULAR; INTRAVENOUS at 08:48

## 2022-11-29 ASSESSMENT — ENCOUNTER SYMPTOMS
ABDOMINAL DISTENTION: 1
COLOR CHANGE: 0
SHORTNESS OF BREATH: 0
NAUSEA: 0
CHEST TIGHTNESS: 0
ABDOMINAL PAIN: 0
VOMITING: 0

## 2022-11-29 NOTE — PROGRESS NOTES
Physician Progress Note    11/29/2022   3:23 PM    Name:  Elham Allen  MRN:    [id number]      Day: 0     Admit Date: 11/26/2022  2:08 PM  PCP: Sejal Sapp DO    Code Status:  Full Code    Subjective:     No complaints at this time. He last had chest discomfort on Saturday. Family notes he will occasionally have coughing fits where it appears difficult for him to breathe.     Current Facility-Administered Medications   Medication Dose Route Frequency Provider Last Rate Last Admin    furosemide (LASIX) injection 40 mg  40 mg IntraVENous Daily Nayeli Garcia DO   40 mg at 11/29/22 0848    guaiFENesin-dextromethorphan (ROBITUSSIN DM) 100-10 MG/5ML syrup 5 mL  5 mL Oral Q4H PRN Madyson Perdomo MD   5 mL at 11/28/22 1042    lidocaine 4 % external patch 1 patch  1 patch TransDERmal Daily Earmon Mikey, DO   1 patch at 11/29/22 0849    metoprolol tartrate (LOPRESSOR) tablet 100 mg  100 mg Oral BID Ronnell Garcia DO   100 mg at 11/29/22 0848    amiodarone (CORDARONE) 450 mg in dextrose 5 % 250 mL infusion  0.5 mg/min IntraVENous Continuous Ronnell Garcia DO 16.7 mL/hr at 11/29/22 0019 0.5 mg/min at 11/29/22 0019    ondansetron (ZOFRAN-ODT) disintegrating tablet 4 mg  4 mg Oral Q8H PRN JESUS Pablo CNP        Or    ondansetron (ZOFRAN) injection 4 mg  4 mg IntraVENous Q6H PRN JESUS Pablo CNP        acetaminophen (TYLENOL) tablet 650 mg  650 mg Oral Q6H PRN JESUS Pablo CNP        Or    acetaminophen (TYLENOL) suppository 650 mg  650 mg Rectal Q6H PRN JESUS Castro CNP        polyethylene glycol (GLYCOLAX) packet 17 g  17 g Oral Daily PRN JESUS Castro CNP        nitroGLYCERIN (NITROSTAT) SL tablet 0.4 mg  0.4 mg SubLINGual Q5 Min PRN JESUS Catsro CNP        insulin lispro (HUMALOG) injection vial 0-8 Units  0-8 Units SubCUTAneous Q4H Maranda Addison APRN - CNP        glucose chewable tablet 16 g  4 tablet Oral PRN Rolanda Lucero, JESUS - CNP        dextrose bolus 10% 125 mL  125 mL IntraVENous PRN JESUS Fu CNP        Or    dextrose bolus 10% 250 mL  250 mL IntraVENous PRN JESUS Castro CNP        glucagon (rDNA) injection 1 mg  1 mg SubCUTAneous PRN JESUS Fu CNP        dextrose 10 % infusion   IntraVENous Continuous PRN JESUS Fu CNP        aspirin EC tablet 81 mg  81 mg Oral Daily Waynette Sings Sedar, DO   81 mg at 22 0848    apixaban (ELIQUIS) tablet 5 mg  5 mg Oral BID Waynette Sings Sedar, DO   5 mg at 22 0848    atorvastatin (LIPITOR) tablet 40 mg  40 mg Oral Nightly Josue D Sedar, DO   40 mg at 22    fenofibrate (TRIGLIDE) tablet 160 mg  160 mg Oral Daily Waynette Sings Sedar, DO   160 mg at 22 0848    insulin glargine (LANTUS) injection vial 20 Units  20 Units SubCUTAneous BID Waytimothye Lui sEnriques Sedar, DO   20 Units at 22 0836    insulin lispro (HUMALOG) injection vial 8 Units  8 Units SubCUTAneous TID AC Josue D Sedar, DO   8 Units at 22 1819    sacubitril-valsartan (ENTRESTO) 24-26 MG per tablet 1 tablet  1 tablet Oral BID Waytimothye Sings Sedar, DO   1 tablet at 22       Physical Examination:      Vitals:  BP (!) 159/72   Pulse 64   Temp 97.5 °F (36.4 °C) (Oral)   Resp 18   Ht 5' 11\" (1.803 m)   Wt (!) 326 lb 9.6 oz (148.1 kg)   SpO2 93%   BMI 45.55 kg/m²   Temp (24hrs), Av.8 °F (36.6 °C), Min:97.5 °F (36.4 °C), Max:98 °F (36.7 °C)      General appearance: alert, cooperative and no distress. Obese  Mental Status: oriented to person, place and time and normal affect  Lungs: clear to auscultation bilaterally, normal effort  Heart: regular rate and rhythm, no murmur  Abdomen: soft, nontender, nondistended, bowel sounds present, no masses  Extremities: no edema, redness, tenderness in the calves.  Cap refill <2s  Skin: no gross lesions, rashes    Data:     Labs:  Recent Labs     22  05   WBC 10.1 8.4   HGB 11.2* 11.0*    355     Recent Labs     22    138   K 4.4 4.4    105   CO2 22 22   BUN 50* 55*   CREATININE 2.25* 2.65*   GLUCOSE 95 70     Recent Labs     11/28/22  0529 11/29/22  0554   AST 22 26   ALT 18 22   BILITOT 0.3 0.4   ALKPHOS 88 90       Assessment and Plan:        1. SVT complicated by acute on chronic combined heart failure management per cardiology. Rate controlling agents increased. -TTE reviewed  -IV diuresis  -Cardiac medication optimization per cardiology    2. Elevated troponin possibly due to supply/demand mismatch: Cardiology to consider coronary evaluation    3. Type 2 diabetes with hyperglycemia: Continue current insulin regimen    LON  Class III obesity  Hypertension  Chronic combined heart failure  Paroxysmal atrial fibrillation with RVR  History of suspected PE on anticoagulation  History of AICD  CKD 3-4    Diet: ADULT DIET; Regular; 4 carb choices (60 gm/meal);  Low Sodium (2 gm); 1500 ml  Full Code    >35 minutes in total care time    Electronically signed by Julia Jacobs DO on 11/29/2022 at 3:23 PM

## 2022-11-29 NOTE — CARE COORDINATION
MET WITH PATIENT, FRIEND AND DTR IN LAW. NOTIFIED OF CHANGE TO INPATIENT. IMM GIVEN. DTR IN LAW SPEAKS ENGLISH, DISCUSSED HHC VS OP F/U.  STATES PT WILL BE STAYING WITH HIS FRIEND FOR A SHORT TIME AFTER DISCHARGE. DENIED NEED FOR HHC. PLAN TO FOLLOW UP WITH CHF CLINIC.

## 2022-11-29 NOTE — PROGRESS NOTES
Now sinus rhythm on telemetry. AICD check shows increased OptiVol thresholds consistent with decompensated systolic CHF. Worsened LV function on echocardiogram. Start IV Lasix 40 mg daily.

## 2022-11-29 NOTE — PROGRESS NOTES
Bedside device check completed at this time, Pt has a dual lead Med[employer]onic ICD. Presenting EGM displays AS/VS HR 59, Pacing : AP 3.3%  <0.1% in AAIR=DDDR mode. NO arrhythmias to report since last interrogation on 11/3/22, however OptiVol fluid index has significantly increased since then measuring an index of (140 / over threshold of 60). NO other alerts or observations to report at this time. Battery has 8 years, RA/RV lead impedance , sensing and capture thresholds WNL. Paper copy of report placed in bedside chart.

## 2022-11-29 NOTE — PROGRESS NOTES
Pt complained of discomfort in RAC where IV Amiodarone is infusing. IV flushed, Amiodarone continued infusing without difficulty. Pt continued to complain of discomfort. Offered to change IV sites, pt refused. Secure message sent to the cardiologist. IV amiodarone changed to PO, see order for details. Pt and his wife updated.  used to communicate with the pt throughout this shift. No complaints of SOB, no chest pain reported. Pt ambulates to BR indep with no difficulty. Pt safety maintained.

## 2022-11-29 NOTE — PROGRESS NOTES
Progress Note  Patient: Joyce Mcgill Curet  Unit/Bed: O412/Z928-60  YOB: 1960  MRN: [id number]  Acct: [de-identified]   Admitting Diagnosis: Tachycardia [R00.0]  Elevated troponin [R77.8]  Chest pain, unspecified type [R07.9]  SVT (supraventricular tachycardia) ([address] [I47.1]  Date:  11/26/2022  Hospital Day: 0    Chief Complaint:  Chest pain    Subjective      11/29/22: ICD interrogation completed this morning and no arrhythmias reported since last interrogation on 11/3/2022 however OptiVol fluid index has significantly increased measuring an index of 140. Given significantly elevated OptiVol fluid index consistent with decompensated systolic heart failure, IV Lasix initiated this morning. Also, patient had limited echocardiogram completed yesterday which revealed significant worsening in LV systolic function with EF now 15 to 20%, no LV apical thrombus noted with Definity contrast, trace TR with RVSP of 26 mmHg. Sitting in chair and in no acute distress. Friend and daughter-in-law at bedside. Daughter-in-law helps to translate for patient. Patient states he is feeling well and denies any chest pain, shortness of breath or palpitations. States he was able to sleep flat in bed without any issues last night. Lopressor was increased to 100 mg p.o. twice daily yesterday evening and patient was initiated on IV amiodarone given persistent SVT. He also received 2 doses of digoxin 0.25 mg IV with 1 dose yesterday afternoon and second dose this morning. Currently on telemetry he is a paced with heart rate of 62 bpm.  It appears that he converted from SVT sometime after 1742 yesterday. A.m. labs reviewed. Mild worsening in renal function compared to yesterday with creatinine elevated 2.65, BUN elevated 55 and GFR low at 26.0.      11/28/22:  This is a pleasant 69-year-old  male with past medical history significant for prior history of recovered nonischemic cardiomyopathy but recent echo on 11/3/2022 showing mildly reduced LV systolic function with EF of 45%, history of initial ICD implant approximately 11 years ago while living in [place] with recent battery change by Dr. Caty Diggs in 2020 hypertension, dyslipidemia, diabetes, CKD, and chronic systolic congestive heart failure who presented to [facility] ER on 11/26/2022 with chief complaint of chest pain.  of device used to translate for patient as he is Mongolian-speaking. Patient reports that on day of presentation he was experiencing rapid heartbeat at home with heart rate into the 140s. This was associated with tightness in his chest and neck. Also, he was experiencing a nonproductive cough. He states that he took one of his heart rate medications and shortly thereafter heart rate was down into the 80s. Due to the symptoms he presented to ER for further evaluation. On presentation to the emergency room, patient hypotensive with blood pressure 73/45, heart rate tachycardic at 120, respiratory rate 22, pulse ox of 96%, temperature 98.1 °F.  He was treated with IV fluid bolus with improvement in his blood pressure. Sodium 133, potassium 4.8, chloride 100, total CO2 22, BUN elevated 46, creatinine elevated 2.43, GFR low at 28.8, glucose 228. Initial troponin negative at 0.012 but repeat troponin mildly elevated 0.035. proBNP 1282. WBC 8.7, hemoglobin 12.6, hematocrit 38.2, platelets 130. PCR viral panel unremarkable. Chest x-ray revealed cardiomegaly with no findings of heart failure or pneumonia. EKG revealed SVT versus sinus tach with heart rate 133. He was subsequently admitted for further evaluation. Cardiology has been consulted for evaluation of elevated troponin and tachycardia. Serial troponins have been elevated x4 at 0.035, 0.126, 0.206 and 0.167. He is currently chest pain-free and denying any other complaints such as shortness of breath or palpitations.   He denies any worsening lower extremity edema, orthopnea, PND, dizziness, lightheadedness, syncope or fever. Currently on telemetry he is tachycardic with heart rate 120s to 130s-questionable sinus tach versus SVT. He was treated with dose of IV Lopressor with temporary improvement in heart rate per nursing staff. Of note, patient's Lopressor was reduced during recent hospitalization in October due to hypotensive episodes. Most recent echocardiogram on 11/3/2022 showed mildly reduced LV systolic function with EF of 45%. No recent ischemic evaluation. Patient states that his last heart catheterization was approximately 11 years ago while living in [facility] and reportedly negative. Review of Systems:   Review of Systems   Constitutional:  Negative for activity change and fever. HENT:  Negative for congestion. Respiratory:  Negative for chest tightness and shortness of breath. Cardiovascular:  Positive for leg swelling. Negative for chest pain and palpitations. Gastrointestinal:  Positive for abdominal distention. Negative for abdominal pain, nausea and vomiting. Genitourinary:  Negative for difficulty urinating. Musculoskeletal:  Negative for arthralgias. Skin:  Negative for color change. Neurological:  Negative for dizziness, syncope and light-headedness. Psychiatric/Behavioral:  Negative for agitation. Physical Examination:    BP (!) 157/74   Pulse 67   Temp 98 °F (36.7 °C) (Oral)   Resp 18   Ht 5' 11\" (1.803 m)   Wt (!) 326 lb 9.6 oz (148.1 kg)   SpO2 96%   BMI 45.55 kg/m²    Physical Exam  Constitutional:       General: He is not in acute distress. Appearance: He is obese. HENT:      Head: Normocephalic and atraumatic. Cardiovascular:      Rate and Rhythm: Normal rate and regular rhythm. Pulmonary:      Effort: Pulmonary effort is normal. No respiratory distress. Breath sounds: No wheezing, rhonchi or rales. Abdominal:      Palpations: Abdomen is soft.    Musculoskeletal:      Cervical back: Normal range of motion and neck supple. Right lower leg: Edema (1+) present. Left lower leg: Edema (1+) present. Skin:     General: Skin is warm and dry. Neurological:      General: No focal deficit present. Mental Status: He is alert and oriented to person, place, and time. Cranial Nerves: No cranial nerve deficit.    Psychiatric:         Mood and Affect: Mood normal.         Behavior: Behavior normal.       LABS:  CBC:   Lab Results   Component Value Date/Time    WBC 8.4 11/29/2022 05:54 AM    RBC 3.91 11/29/2022 05:54 AM    HGB 11.0 11/29/2022 05:54 AM    HCT 34.1 11/29/2022 05:54 AM    MCV 87.2 11/29/2022 05:54 AM    MCH 28.2 11/29/2022 05:54 AM    MCHC 32.3 11/29/2022 05:54 AM    RDW 16.7 11/29/2022 05:54 AM     11/29/2022 05:54 AM     CBC with Differential:   Lab Results   Component Value Date/Time    WBC 8.4 11/29/2022 05:54 AM    RBC 3.91 11/29/2022 05:54 AM    HGB 11.0 11/29/2022 05:54 AM    HCT 34.1 11/29/2022 05:54 AM     11/29/2022 05:54 AM    MCV 87.2 11/29/2022 05:54 AM    MCH 28.2 11/29/2022 05:54 AM    MCHC 32.3 11/29/2022 05:54 AM    RDW 16.7 11/29/2022 05:54 AM    BANDSPCT 14 08/04/2019 06:30 PM    METASPCT 1 08/04/2019 06:30 PM    LYMPHOPCT 23.5 11/29/2022 05:54 AM    MONOPCT 10.0 11/29/2022 05:54 AM    BASOPCT 1.4 11/29/2022 05:54 AM    MONOSABS 0.8 11/29/2022 05:54 AM    LYMPHSABS 2.0 11/29/2022 05:54 AM    EOSABS 0.5 11/29/2022 05:54 AM    BASOSABS 0.1 11/29/2022 05:54 AM     CMP:    Lab Results   Component Value Date/Time     11/29/2022 05:54 AM    K 4.4 11/29/2022 05:54 AM     11/29/2022 05:54 AM    CO2 22 11/29/2022 05:54 AM    BUN 55 11/29/2022 05:54 AM    CREATININE 2.65 11/29/2022 05:54 AM    GFRAA 35.6 10/13/2022 06:45 PM    LABGLOM 26.0 11/29/2022 05:54 AM    GLUCOSE 70 11/29/2022 05:54 AM    GLUCOSE 96 11/14/2022 11:22 AM    PROT 6.2 11/29/2022 05:54 AM    LABALBU 3.4 11/29/2022 05:54 AM    LABALBU 3.3 11/14/2022 11:22 AM    CALCIUM 8.8 11/29/2022 05:54 AM    BILITOT 0.4 11/29/2022 05:54 AM    ALKPHOS 90 11/29/2022 05:54 AM    AST 26 11/29/2022 05:54 AM    ALT 22 11/29/2022 05:54 AM     BMP:    Lab Results   Component Value Date/Time     11/29/2022 05:54 AM    K 4.4 11/29/2022 05:54 AM     11/29/2022 05:54 AM    CO2 22 11/29/2022 05:54 AM    BUN 55 11/29/2022 05:54 AM    LABALBU 3.4 11/29/2022 05:54 AM    LABALBU 3.3 11/14/2022 11:22 AM    CREATININE 2.65 11/29/2022 05:54 AM    CALCIUM 8.8 11/29/2022 05:54 AM    GFRAA 35.6 10/13/2022 06:45 PM    LABGLOM 26.0 11/29/2022 05:54 AM    GLUCOSE 70 11/29/2022 05:54 AM    GLUCOSE 96 11/14/2022 11:22 AM     Magnesium:    Lab Results   Component Value Date/Time    MG 2.4 11/18/2022 04:45 AM     Troponin:    Lab Results   Component Value Date/Time    TROPONINI 0.167 11/27/2022 07:04 PM       Radiology:  No results found. Echocardiogram 11/3/22:   Conclusions   Summary   Normal mitral valve structure and function. 1-2+ MR   Left ventricular ejection fraction is visually estimated at 45% by use of   Definity. otherwise TDS. Pt is in AF      Signature   ----------------------------------------------------------------   Electronically signed by Nilda Osorio MD(Interpreting   physician) on 11/03/2022 10:36 AM   ----------------------------------------------------------------    Limited echo 11/28/22:  Conclusions   Summary   Left ventricular ejection fraction is visually estimated at 15-20%. No LV   apical thrombus noted with Definity contrast utilization. Left ventricular size is mildly increased . Impaired relaxation compatible with diastolic dysfunction. ( reversed E/A   ratio)   No evidence of mitral regurgitation. No evidence of aortic valve regurgitation . There is Trace tricuspid regurgitation with estimated RVSP of 26 mm Hg.       Signature   ----------------------------------------------------------------   Electronically signed by Jesse Sagastume DO(Interpreting   physician) on 11/28/2022 06:32 PM      EKG 11/27/22: SR 73, 1st degree AVB, baseline artifact, QTc 460ms     Telemetry 11/28/22: ST vs SVT 120s-130s; SVT with HR 140s-160s at 3am and 8am today  Telemetry 11/29/22: A-paced 62; appears to have converted from SVT to SR/A-pace after 1742 yesterday      Assessment:    Active Hospital Problems    Diagnosis Date Noted    SVT (supraventricular tachycardia) ([facility] [I47.1] 11/29/2022     Priority: Medium    Tachycardia [R00.0] 11/26/2022     Priority: Medium    Elevated troponin [R77.8] 11/26/2022     Priority: Medium    Chest pain [R07.9] 11/18/2022     Priority: Medium    Coronary artery disease involving native heart without angina pectoris [I25.10] 11/06/2022     Priority: Medium    CKD stage 4 due to type 2 diabetes mellitus ([facility] [B86.77, N18.4] 08/15/2019     Priority: Low    Chronic systolic heart failure ([facility] [I50.22] 04/30/2019     Priority: Low    Hyperlipidemia [E78.5]      Priority: Low    Hypertension [I10]      Priority: Low     Elevated troponin (0.035, 0.126, 0.206, 0.167)  Chest pain r/o cardiac ischemia  Dyspnea  SVT vs A-fib/flutter RVR--now A-paced/SR  High probability of PE per VQ scan 11/3/22--on OAC with Eliquis  Chronic HFrEF--appears compensated currently  Hx recovered NICMP--recent echo 11/3/22 with EF 45%--worsening LVF EF now 15-20% per limited echo 11/28/22  Hx ICD--s/p battery change on 4/23/20 with Dr. Nickolas Pagan  CKD III-IV  Hx HTN--hypotensive on presentation. Dyslipidemia  DM  LON on CPAP at   Morbid obesity  ?  Hx A-fib--noted to be in A-fib per echo report 11/3/22 but no prior documented Hx of A-fib  Former smoker      Plan:  Continue current medications-aspirin 81 mg p.o. daily, Lopressor 100 mg p.o. twice daily, continue amiodarone drip per protocol and then transition to PO, Lasix 40mg IV daily, Entresto 24/26 mg p.o. twice daily, Lipitor 40 mg p.o. nightly, oral anticoagulation with Eliquis 5 mg p.o. twice daily, insulin as directed  S/p digoxin 250mcg IV yesterday afternoon and again this morning  Consider addition of SGLT2 inhibitor and (Jardiance) in future to further optimize heart failure management  Cardiac/diabetic/less than 2 g sodium diet recommended  Recommend 1500mL daily fluid restriction given worsening LVF with EF 15-20% now  Check daily weight and strict intake and output  Weight loss recommended  Monitor on telemetry for any tachycardia or bradycardia arrhythmias  Maintain potassium greater than 4, magnesium greater than 2  GI/DVT prophylaxis  S/p ICD interrogation on 11/29/2022 revealing no arrhythmias since last interrogation on 11/3/2022 but OptiVol fluid index significantly increased since then measuring an index of 140 which signifies significant fluid overload  Given further decline in LV function with EF now 15 to 20% per limited echo on 11/28/2022 compared to 45% on 11/3/2022, will likely need coronary evaluation when feasible/renal function permits to rule out underlying cardiac ischemia  May need to consider nephrology consult if renal function continues to decline  Further recommendations to follow        Electronically signed by WU Stone on 11/29/2022 at 11:43 AM

## 2022-11-29 NOTE — PROGRESS NOTES
2056: Notfied Dr. Kathi Delgadillo of patient [facility] blood sugar of 74. Orders to hold 20 units of Lantus.

## 2022-11-29 NOTE — PROGRESS NOTES
Shift assessment complete. Pt ANOX4 on room air and up independently. Patient is still running tachy but has no complaints at this time. Amiodarone drip Is running.

## 2022-11-30 ENCOUNTER — APPOINTMENT (OUTPATIENT)
Dept: ULTRASOUND IMAGING | Age: 62
DRG: 250 | End: 2022-11-30
Payer: MEDICARE

## 2022-11-30 LAB
ALBUMIN SERPL-MCNC: 3.6 G/DL (ref 3.5–4.6)
ALP BLD-CCNC: 101 U/L (ref 35–104)
ALT SERPL-CCNC: 23 U/L (ref 0–41)
ANION GAP SERPL CALCULATED.3IONS-SCNC: 13 MEQ/L (ref 9–15)
AST SERPL-CCNC: 22 U/L (ref 0–40)
BASOPHILS ABSOLUTE: 0.1 K/UL (ref 0–0.2)
BASOPHILS RELATIVE PERCENT: 1.3 %
BILIRUB SERPL-MCNC: 0.4 MG/DL (ref 0.2–0.7)
BUN BLDV-MCNC: 50 MG/DL (ref 8–23)
CALCIUM SERPL-MCNC: 8.8 MG/DL (ref 8.5–9.9)
CHLORIDE BLD-SCNC: 106 MEQ/L (ref 95–107)
CO2: 24 MEQ/L (ref 20–31)
CREAT SERPL-MCNC: 2.32 MG/DL (ref 0.7–1.2)
EOSINOPHILS ABSOLUTE: 0.6 K/UL (ref 0–0.7)
EOSINOPHILS RELATIVE PERCENT: 6.8 %
GFR SERPL CREATININE-BSD FRML MDRD: 30.5 ML/MIN/{1.73_M2}
GLOBULIN: 3.3 G/DL (ref 2.3–3.5)
GLUCOSE BLD-MCNC: 114 MG/DL (ref 70–99)
GLUCOSE BLD-MCNC: 125 MG/DL (ref 70–99)
GLUCOSE BLD-MCNC: 163 MG/DL (ref 70–99)
GLUCOSE BLD-MCNC: 196 MG/DL (ref 70–99)
GLUCOSE BLD-MCNC: 72 MG/DL (ref 70–99)
GLUCOSE BLD-MCNC: 87 MG/DL (ref 70–99)
HCT VFR BLD CALC: 37.9 % (ref 42–52)
HEMOGLOBIN: 12.4 G/DL (ref 14–18)
LYMPHOCYTES ABSOLUTE: 2 K/UL (ref 1–4.8)
LYMPHOCYTES RELATIVE PERCENT: 22.8 %
MCH RBC QN AUTO: 28.4 PG (ref 27–31.3)
MCHC RBC AUTO-ENTMCNC: 32.6 % (ref 33–37)
MCV RBC AUTO: 87 FL (ref 79–92.2)
MONOCYTES ABSOLUTE: 0.9 K/UL (ref 0.2–0.8)
MONOCYTES RELATIVE PERCENT: 9.9 %
NEUTROPHILS ABSOLUTE: 5.2 K/UL (ref 1.4–6.5)
NEUTROPHILS RELATIVE PERCENT: 59.2 %
PDW BLD-RTO: 16.9 % (ref 11.5–14.5)
PERFORMED ON: ABNORMAL
PERFORMED ON: NORMAL
PLATELET # BLD: 387 K/UL (ref 130–400)
POTASSIUM REFLEX MAGNESIUM: 4.3 MEQ/L (ref 3.4–4.9)
RBC # BLD: 4.35 M/UL (ref 4.7–6.1)
SODIUM BLD-SCNC: 143 MEQ/L (ref 135–144)
TOTAL PROTEIN: 6.9 G/DL (ref 6.3–8)
WBC # BLD: 8.7 K/UL (ref 4.8–10.8)

## 2022-11-30 PROCEDURE — 85025 COMPLETE CBC W/AUTO DIFF WBC: CPT

## 2022-11-30 PROCEDURE — 36415 COLL VENOUS BLD VENIPUNCTURE: CPT

## 2022-11-30 PROCEDURE — 80053 COMPREHEN METABOLIC PANEL: CPT

## 2022-11-30 PROCEDURE — 6370000000 HC RX 637 (ALT 250 FOR IP): Performed by: INTERNAL MEDICINE

## 2022-11-30 PROCEDURE — 2060000000 HC ICU INTERMEDIATE R&B

## 2022-11-30 PROCEDURE — 93971 EXTREMITY STUDY: CPT

## 2022-11-30 PROCEDURE — 6360000002 HC RX W HCPCS: Performed by: INTERNAL MEDICINE

## 2022-11-30 PROCEDURE — 99233 SBSQ HOSP IP/OBS HIGH 50: CPT | Performed by: INTERNAL MEDICINE

## 2022-11-30 RX ADMIN — METOPROLOL TARTRATE 100 MG: 50 TABLET, FILM COATED ORAL at 23:22

## 2022-11-30 RX ADMIN — FUROSEMIDE 40 MG: 10 INJECTION, SOLUTION INTRAMUSCULAR; INTRAVENOUS at 09:39

## 2022-11-30 RX ADMIN — AMIODARONE HYDROCHLORIDE 200 MG: 200 TABLET ORAL at 09:32

## 2022-11-30 RX ADMIN — SACUBITRIL AND VALSARTAN 1 TABLET: 24; 26 TABLET, FILM COATED ORAL at 09:32

## 2022-11-30 RX ADMIN — ATORVASTATIN CALCIUM 40 MG: 40 TABLET, FILM COATED ORAL at 21:11

## 2022-11-30 RX ADMIN — INSULIN GLARGINE 20 UNITS: 100 INJECTION, SOLUTION SUBCUTANEOUS at 21:56

## 2022-11-30 RX ADMIN — FENOFIBRATE 160 MG: 160 TABLET ORAL at 09:32

## 2022-11-30 RX ADMIN — APIXABAN 5 MG: 5 TABLET, FILM COATED ORAL at 09:32

## 2022-11-30 RX ADMIN — ASPIRIN 81 MG: 81 TABLET, COATED ORAL at 09:32

## 2022-11-30 RX ADMIN — APIXABAN 5 MG: 5 TABLET, FILM COATED ORAL at 21:11

## 2022-11-30 RX ADMIN — INSULIN LISPRO 8 UNITS: 100 INJECTION, SOLUTION INTRAVENOUS; SUBCUTANEOUS at 17:55

## 2022-11-30 RX ADMIN — METOPROLOL TARTRATE 100 MG: 50 TABLET, FILM COATED ORAL at 09:31

## 2022-11-30 RX ADMIN — SACUBITRIL AND VALSARTAN 1 TABLET: 24; 26 TABLET, FILM COATED ORAL at 21:10

## 2022-11-30 RX ADMIN — AMIODARONE HYDROCHLORIDE 200 MG: 200 TABLET ORAL at 21:10

## 2022-11-30 NOTE — PROGRESS NOTES
Physician Progress Note    11/30/2022   4:45 PM    Name:  Jose Rafael Delgado  MRN:    [id number]      Day: 1     Admit Date: 11/26/2022  2:08 PM  PCP: July Ott DO    Code Status:  Full Code    Subjective:     He tells me he had 2 episodes of diarrhea today and one yesterday. Otherwise he feels well at this time. No chest pain, palpitations, or dyspnea.     Current Facility-Administered Medications   Medication Dose Route Frequency Provider Last Rate Last Admin    furosemide (LASIX) injection 40 mg  40 mg IntraVENous Daily Nayeli Garcia, DO   40 mg at 11/30/22 0444    amiodarone (CORDARONE) tablet 200 mg  200 mg Oral BID Elissa Garcia, DO   200 mg at 11/30/22 0932    guaiFENesin-dextromethorphan (ROBITUSSIN DM) 100-10 MG/5ML syrup 5 mL  5 mL Oral Q4H PRN Chana Reveles MD   5 mL at 11/28/22 1042    lidocaine 4 % external patch 1 patch  1 patch TransDERmal Daily Sandy Jo DO   1 patch at 11/29/22 0849    metoprolol tartrate (LOPRESSOR) tablet 100 mg  100 mg Oral BID Loanthony F Mareddyf, DO   100 mg at 11/30/22 0931    ondansetron (ZOFRAN-ODT) disintegrating tablet 4 mg  4 mg Oral Q8H PRN JESUS Lakhani CNP        Or    ondansetron (ZOFRAN) injection 4 mg  4 mg IntraVENous Q6H PRN JESUS Lakhani CNP        acetaminophen (TYLENOL) tablet 650 mg  650 mg Oral Q6H PRN JESUS Lakhani - CNP   650 mg at 11/29/22 1803    Or    acetaminophen (TYLENOL) suppository 650 mg  650 mg Rectal Q6H PRN JESUS Lakhani CNP        polyethylene glycol (GLYCOLAX) packet 17 g  17 g Oral Daily PRN JESUS Castro CNP        nitroGLYCERIN (NITROSTAT) SL tablet 0.4 mg  0.4 mg SubLINGual Q5 Min PRN JESUS Castro CNP        insulin lispro (HUMALOG) injection vial 0-8 Units  0-8 Units SubCUTAneous Q4H JESUS Castro CNP        glucose chewable tablet 16 g  4 tablet Oral PRN Maranda Azael, APRN - CNP        dextrose bolus 10% 125 mL  125 mL IntraVENous PRN Delfino Gómez, JESUS - CNP        Or    dextrose bolus 10% 250 mL  250 mL IntraVENous PRN Patrick Trevin, APRN - CNP        glucagon (rDNA) injection 1 mg  1 mg SubCUTAneous PRN Patrick Trevin, APRN - CNP        dextrose 10 % infusion   IntraVENous Continuous PRN Patrick Trevin, APRN - CNP        aspirin EC tablet 81 mg  81 mg Oral Daily [facility] [place], DO   81 mg at 22 0932    apixaban (ELIQUIS) tablet 5 mg  5 mg Oral BID [facility] [place], DO   5 mg at 2232    atorvastatin (LIPITOR) tablet 40 mg  40 mg Oral Nightly [facility] [place], DO   40 mg at 22    fenofibrate (TRIGLIDE) tablet 160 mg  160 mg Oral Daily [facility] Sedar, DO   160 mg at 2232    insulin glargine (LANTUS) injection vial 20 Units  20 Units SubCUTAneous BID [facility] Sedar, DO   20 Units at 22 0912    insulin lispro (HUMALOG) injection vial 8 Units  8 Units SubCUTAneous TID  Josue  Sedar, DO   8 Units at 22 1756    sacubitril-valsartan (ENTRESTO) 24-26 MG per tablet 1 tablet  1 tablet Oral BID [facility] Sed[place], DO   1 tablet at 22 0932       Physical Examination:      Vitals:  BP (!) 149/67   Pulse 69   Temp 98.1 °F (36.7 °C) (Oral)   Resp 16   Ht 5' 11\" (1.803 m)   Wt (!) 326 lb 9.6 oz (148.1 kg)   SpO2 99%   BMI 45.55 kg/m²   Temp (24hrs), Av.5 °F (36.9 °C), Min:98.1 °F (36.7 °C), Max:98.8 °F (37.1 °C)      General appearance: alert, cooperative and no distress. Obese  Mental Status: oriented to person, place and time and normal affect  Lungs: clear to auscultation bilaterally, normal effort  Heart: regular rate and rhythm, no murmur  Abdomen: soft, nontender, nondistended, bowel sounds present, no masses  Extremities: no edema, redness, tenderness in the calves.  Cap refill <2s  Skin: no gross lesions, rashes    Data:     Labs:  Recent Labs     22  0554 22  0526   WBC 8.4 8.7   HGB 11.0* 12.4*    387     Recent Labs     22  0554 22  05    143   K 4.4 4.3    106   CO2 22 24   BUN 55* 50*   CREATININE 2.65* 2.32* GLUCOSE 70 72     Recent Labs     11/29/22  0554 11/30/22  0526   AST 26 22   ALT 22 23   BILITOT 0.4 0.4   ALKPHOS 90 101       Assessment and Plan:        1. SVT complicated by acute on chronic combined heart failure management per cardiology. Rate controlling agents increased and amiodarone added. -TTE reviewed  -IV diuresis  -Cardiac medication optimization per cardiology. Can likely go up on afterload control and eventually transition to mortality benefiting beta-blocker (metoprolol succinate or carvedilol)    2. Elevated troponin possibly due to supply/demand mismatch: Cardiology to consider coronary evaluation in the future    3. Type 2 diabetes with hyperglycemia: Continue current insulin regimen    LON  Class III obesity  Hypertension  Chronic combined heart failure  Paroxysmal atrial fibrillation with RVR  History of suspected PE on anticoagulation  History of AICD  CKD 3-4    Diet: ADULT DIET; Regular; 4 carb choices (60 gm/meal);  Low Sodium (2 gm); 1500 ml  Full Code    >35 minutes in total care time    Electronically signed by Imani Madera DO on 11/30/2022 at 4:45 PM

## 2022-12-01 LAB
ALBUMIN SERPL-MCNC: 3.1 G/DL (ref 3.5–4.6)
ALP BLD-CCNC: 90 U/L (ref 35–104)
ALT SERPL-CCNC: 19 U/L (ref 0–41)
ANION GAP SERPL CALCULATED.3IONS-SCNC: 12 MEQ/L (ref 9–15)
AST SERPL-CCNC: 18 U/L (ref 0–40)
BASOPHILS ABSOLUTE: 0.1 K/UL (ref 0–0.2)
BASOPHILS RELATIVE PERCENT: 0.7 %
BILIRUB SERPL-MCNC: 0.4 MG/DL (ref 0.2–0.7)
BUN BLDV-MCNC: 42 MG/DL (ref 8–23)
CALCIUM SERPL-MCNC: 8.5 MG/DL (ref 8.5–9.9)
CHLORIDE BLD-SCNC: 108 MEQ/L (ref 95–107)
CO2: 22 MEQ/L (ref 20–31)
CREAT SERPL-MCNC: 2.02 MG/DL (ref 0.7–1.2)
EOSINOPHILS ABSOLUTE: 0.3 K/UL (ref 0–0.7)
EOSINOPHILS RELATIVE PERCENT: 3.5 %
GFR SERPL CREATININE-BSD FRML MDRD: 36 ML/MIN/{1.73_M2}
GLOBULIN: 3.1 G/DL (ref 2.3–3.5)
GLUCOSE BLD-MCNC: 104 MG/DL (ref 70–99)
GLUCOSE BLD-MCNC: 117 MG/DL (ref 70–99)
GLUCOSE BLD-MCNC: 148 MG/DL (ref 70–99)
GLUCOSE BLD-MCNC: 158 MG/DL (ref 70–99)
GLUCOSE BLD-MCNC: 204 MG/DL (ref 70–99)
GLUCOSE BLD-MCNC: 81 MG/DL (ref 70–99)
GLUCOSE BLD-MCNC: 95 MG/DL (ref 70–99)
HCT VFR BLD CALC: 36.4 % (ref 42–52)
HEMOGLOBIN: 11.8 G/DL (ref 14–18)
LYMPHOCYTES ABSOLUTE: 2.1 K/UL (ref 1–4.8)
LYMPHOCYTES RELATIVE PERCENT: 24.4 %
MCH RBC QN AUTO: 28.2 PG (ref 27–31.3)
MCHC RBC AUTO-ENTMCNC: 32.3 % (ref 33–37)
MCV RBC AUTO: 87.2 FL (ref 79–92.2)
MONOCYTES ABSOLUTE: 1.1 K/UL (ref 0.2–0.8)
MONOCYTES RELATIVE PERCENT: 12.6 %
NEUTROPHILS ABSOLUTE: 5.1 K/UL (ref 1.4–6.5)
NEUTROPHILS RELATIVE PERCENT: 58.8 %
PDW BLD-RTO: 16.8 % (ref 11.5–14.5)
PERFORMED ON: ABNORMAL
PERFORMED ON: NORMAL
PLATELET # BLD: 370 K/UL (ref 130–400)
POTASSIUM REFLEX MAGNESIUM: 4.1 MEQ/L (ref 3.4–4.9)
RBC # BLD: 4.17 M/UL (ref 4.7–6.1)
SODIUM BLD-SCNC: 142 MEQ/L (ref 135–144)
TOTAL PROTEIN: 6.2 G/DL (ref 6.3–8)
WBC # BLD: 8.6 K/UL (ref 4.8–10.8)

## 2022-12-01 PROCEDURE — 6370000000 HC RX 637 (ALT 250 FOR IP): Performed by: INTERNAL MEDICINE

## 2022-12-01 PROCEDURE — 6360000002 HC RX W HCPCS: Performed by: INTERNAL MEDICINE

## 2022-12-01 PROCEDURE — 85025 COMPLETE CBC W/AUTO DIFF WBC: CPT

## 2022-12-01 PROCEDURE — 2060000000 HC ICU INTERMEDIATE R&B

## 2022-12-01 PROCEDURE — 36415 COLL VENOUS BLD VENIPUNCTURE: CPT

## 2022-12-01 PROCEDURE — 99233 SBSQ HOSP IP/OBS HIGH 50: CPT | Performed by: INTERNAL MEDICINE

## 2022-12-01 PROCEDURE — 80053 COMPREHEN METABOLIC PANEL: CPT

## 2022-12-01 PROCEDURE — 6370000000 HC RX 637 (ALT 250 FOR IP)

## 2022-12-01 RX ORDER — AMIODARONE HYDROCHLORIDE 200 MG/1
200 TABLET ORAL DAILY
Status: DISCONTINUED | OUTPATIENT
Start: 2022-12-02 | End: 2022-12-03 | Stop reason: HOSPADM

## 2022-12-01 RX ADMIN — INSULIN LISPRO 8 UNITS: 100 INJECTION, SOLUTION INTRAVENOUS; SUBCUTANEOUS at 09:44

## 2022-12-01 RX ADMIN — INSULIN GLARGINE 20 UNITS: 100 INJECTION, SOLUTION SUBCUTANEOUS at 09:44

## 2022-12-01 RX ADMIN — ATORVASTATIN CALCIUM 40 MG: 40 TABLET, FILM COATED ORAL at 20:45

## 2022-12-01 RX ADMIN — METOPROLOL TARTRATE 100 MG: 50 TABLET, FILM COATED ORAL at 20:46

## 2022-12-01 RX ADMIN — FUROSEMIDE 40 MG: 10 INJECTION, SOLUTION INTRAMUSCULAR; INTRAVENOUS at 09:43

## 2022-12-01 RX ADMIN — SACUBITRIL AND VALSARTAN 1 TABLET: 24; 26 TABLET, FILM COATED ORAL at 20:46

## 2022-12-01 RX ADMIN — SACUBITRIL AND VALSARTAN 1 TABLET: 24; 26 TABLET, FILM COATED ORAL at 09:42

## 2022-12-01 RX ADMIN — AMIODARONE HYDROCHLORIDE 200 MG: 200 TABLET ORAL at 09:42

## 2022-12-01 RX ADMIN — METOPROLOL TARTRATE 100 MG: 50 TABLET, FILM COATED ORAL at 09:43

## 2022-12-01 RX ADMIN — INSULIN LISPRO 8 UNITS: 100 INJECTION, SOLUTION INTRAVENOUS; SUBCUTANEOUS at 16:33

## 2022-12-01 RX ADMIN — APIXABAN 5 MG: 5 TABLET, FILM COATED ORAL at 09:42

## 2022-12-01 RX ADMIN — FENOFIBRATE 160 MG: 160 TABLET ORAL at 09:42

## 2022-12-01 RX ADMIN — INSULIN LISPRO 8 UNITS: 100 INJECTION, SOLUTION INTRAVENOUS; SUBCUTANEOUS at 11:38

## 2022-12-01 RX ADMIN — INSULIN GLARGINE 20 UNITS: 100 INJECTION, SOLUTION SUBCUTANEOUS at 20:46

## 2022-12-01 RX ADMIN — ASPIRIN 81 MG: 81 TABLET, COATED ORAL at 09:42

## 2022-12-01 RX ADMIN — INSULIN LISPRO 2 UNITS: 100 INJECTION, SOLUTION INTRAVENOUS; SUBCUTANEOUS at 20:46

## 2022-12-01 NOTE — PROGRESS NOTES
Cardiology Follow up Note    Subjective:   57 y.o. old male patient admitted with paroxysmal SVT, nonischemic cardiopathy with worsening LV function, EF now 15 to 20%, and acute on chronic systolic CHF. He has mild nonspecific troponin elevation likely due to demand ischemia. At bedside who helps translate. He reports feeling much better today. No chest pain or shortness of breath. Leg edema is improving. Kidney function improving. Review of Systems:   General: Feels better  Cardiovascular: See HPI. No orthopnea or PND. Respiratory: Dyspnea is present with mild degrees of activity. Gastrointestinal: No melena or hematochezia. Genitourinary: No hematuria. Hematological: No easy bruising or bleeding. Vascular: + Lower extremity edema, improving. No claudication. Neurological: No TIA or CVA symptoms. No paresthesias. Musculoskeletal: No chest wall pain. Psychiatric: No anxiety. *All other systems were reviewed and found to be negative unless otherwise noted in the HPI*    Objective:  BP (!) 149/67   Pulse 69   Temp 98.1 °F (36.7 °C) (Oral)   Resp 16   Ht 5' 11\" (1.803 m)   Wt (!) 325 lb 9.6 oz (147.7 kg)   SpO2 99%   BMI 45.41 kg/m²   Vitals stable. Constitutional: alert, cooperative, in no distress  Eyes: Conjunctiva clear; no scleral icterus. PERRLA   Respiratory: decreased breath sounds bilaterally. Musculoskeletal: No chest wall tenderness. No clubbing or cyanosis. Cardiovascular: regular and rhythm. No murmurs. No carotid bruit. No JVD. Pulses 2+ and symmetric. 1+ leg edema. GI: soft, non-tender, mildly distended. Bowel sounds normal. No masses,  no organomegaly  MSK: extremities normal, atraumatic, no clubbing. No chest wall pain. Neurologic: Grossly normal  Skin: No rashes or lesions. No cyanosis.         No intake or output data in the 24 hours ending 11/30/22 1931    Medications:  furosemide, 40 mg, Daily  amiodarone, 200 mg, BID  lidocaine, 1 patch, Daily  metoprolol tartrate, 100 mg, BID  insulin lispro, 0-8 Units, Q4H  aspirin, 81 mg, Daily  apixaban, 5 mg, BID  atorvastatin, 40 mg, Nightly  fenofibrate, 160 mg, Daily  insulin glargine, 20 Units, BID  insulin lispro, 8 Units, TID AC  sacubitril-valsartan, 1 tablet, BID      dextrose      Recent Labs     11/28/22  0529 11/29/22  0554 11/30/22  0526   HGB 11.2* 11.0* 12.4*   WBC 10.1 8.4 8.7    355 387    138 143   K 4.4 4.4 4.3   CO2 22 22 24   BUN 50* 55* 50*   Creatinine down to 2.32    Telemetry: My independent review sinus rhythm with no recurrence of SVT. 2D Echo: EF 15 to 20%      Assessment:  Paroxysmal SVT, now sinus rhythm  NICM with worsened LV dysfunction, EF now 15-20%. Prior EF 45%  Acute on chronic systolic CHF improving  Mild nonspecific troponin elevation, possible demand ischemia. AICD in situ   History of high probability PE, on Eliquis  DM  HTN  LON on CPAP  CKD  Morbid obesity     Recommendations:  Continue metoprolol 100 mg twice daily. Amiodarone changed to 200 mg twice daily. No ACE inhibitor due to CKD. Continue IV Lasix. Will consider switching to oral tomorrow. May need cardiac catheterization however likely as an outpatient if kidney function stays stable. Addendum: Spoke with Dr. Joselo Novak. Prefer to cath patient as inpatient if kidney function continues to improve. Thank you for allowing me to participate in your patient's cardiac care. Should you have questions, please do not hesitate to contact me.      Jory Salvador DO, [facility] [place] [address] [facility] [address] [facility]

## 2022-12-01 NOTE — PROGRESS NOTES
Physician Progress Note    2022   4:17 PM    Name:  Nyasia Reyes  MRN:    [id number]      Day: 2     Admit Date: 2022  2:08 PM  PCP: Luís Conley DO    Code Status:  Full Code    Subjective:     He is doing well. His diarrhea has resolved. No chest pain, dyspnea, palpitations, or abdominal discomfort.     Current Facility-Administered Medications   Medication Dose Route Frequency Provider Last Rate Last Admin    [START ON 2022] amiodarone (CORDARONE) tablet 200 mg  200 mg Oral Daily Loai F Marouf, DO        furosemide (LASIX) injection 40 mg  40 mg IntraVENous Daily Loai F Marouf, DO   40 mg at 22 0943    guaiFENesin-dextromethorphan (ROBITUSSIN DM) 100-10 MG/5ML syrup 5 mL  5 mL Oral Q4H PRN Sara King MD   5 mL at 22 1042    lidocaine 4 % external patch 1 patch  1 patch TransDERmal Daily Nghia Taylor DO   1 patch at 22 0943    metoprolol tartrate (LOPRESSOR) tablet 100 mg  100 mg Oral BID [facility], DO   100 mg at 22 0943    ondansetron (ZOFRAN-ODT) disintegrating tablet 4 mg  4 mg Oral Q8H PRN Brionna Paulding, APRN - CNP        Or    ondansetron (ZOFRAN) injection 4 mg  4 mg IntraVENous Q6H PRN Brionna Paulding, APRN - CNP        acetaminophen (TYLENOL) tablet 650 mg  650 mg Oral Q6H PRN Brionna , APRN - CNP   650 mg at 22 1803    Or    acetaminophen (TYLENOL) suppository 650 mg  650 mg Rectal Q6H PRN Brionna , APRN - CNP        polyethylene glycol (GLYCOLAX) packet 17 g  17 g Oral Daily PRN JESUS Castro CNP        nitroGLYCERIN (NITROSTAT) SL tablet 0.4 mg  0.4 mg SubLINGual Q5 Min PRN JESUS Castro CNP        insulin lispro (HUMALOG) injection vial 0-8 Units  0-8 Units SubCUTAneous Q4H JESUS Castro CNP        glucose chewable tablet 16 g  4 tablet Oral PRN Maranda Azael, APRN - CNP        dextrose bolus 10% 125 mL  125 mL IntraVENous PRN Brionna Paulding, APRN - CNP        Or    dextrose bolus 10% 250 mL  250 mL IntraVENous PRN JESUS Roman CNP        glucagon (rDNA) injection 1 mg  1 mg SubCUTAneous PRN JESUS Roman CNP        dextrose 10 % infusion   IntraVENous Continuous PRN JESUS Roman CNP        aspirin EC tablet 81 mg  81 mg Oral Daily Karine Dawood Sedar, DO   81 mg at 22 7644    [Held by provider] apixaban (ELIQUIS) tablet 5 mg  5 mg Oral BID Karine Dawood Sedar, DO   5 mg at 22 0942    atorvastatin (LIPITOR) tablet 40 mg  40 mg Oral Nightly Karine Dawood Sedar, DO   40 mg at 22 2111    fenofibrate (TRIGLIDE) tablet 160 mg  160 mg Oral Daily Karine Dawood Sedar, DO   160 mg at 22 0942    insulin glargine (LANTUS) injection vial 20 Units  20 Units SubCUTAneous BID Karine Dawood Sedar, DO   20 Units at 22 0944    insulin lispro (HUMALOG) injection vial 8 Units  8 Units SubCUTAneous TID AC Josue D Sedar, DO   8 Units at 22 1138    sacubitril-valsartan (ENTRESTO) 24-26 MG per tablet 1 tablet  1 tablet Oral BID Karine Dawood Sedar, DO   1 tablet at 22 6887       Physical Examination:      Vitals:  BP (!) 163/77   Pulse 70   Temp 99 °F (37.2 °C) (Oral)   Resp 18   Ht 5' 11\" (1.803 m)   Wt (!) 327 lb 11.2 oz (148.6 kg)   SpO2 93%   BMI 45.70 kg/m²   Temp (24hrs), Av.6 °F (37 °C), Min:98.2 °F (36.8 °C), Max:99 °F (37.2 °C)      General appearance: alert, cooperative and no distress. Obese  Mental Status: oriented to person, place and time and normal affect  Lungs: clear to auscultation bilaterally, normal effort  Heart: regular rate and rhythm, no murmur  Abdomen: soft, nontender, nondistended, bowel sounds present, no masses  Extremities: no edema, redness, tenderness in the calves.  Cap refill <2s  Skin: no gross lesions, rashes    Data:     Labs:  Recent Labs     22  0526 22  0543   WBC 8.7 8.6   HGB 12.4* 11.8*    370     Recent Labs     22  0526 22  0543    142   K 4.3 4.1    108*   CO2 24 22   BUN 50* 42*   CREATININE 2.32* 2.02*   GLUCOSE 72 81 Recent Labs     11/30/22  0526 12/01/22  0543   AST 22 18   ALT 23 19   BILITOT 0.4 0.4   ALKPHOS 101 90       Assessment and Plan:        1. SVT complicated by acute on chronic combined heart failure management per cardiology. Rate controlling agents increased and amiodarone added. -TTE reviewed  -IV diuresis with plan for transition to p.o. soon  -Cardiac medication optimization per cardiology. Can likely go up on afterload control and eventually transition to mortality benefiting beta-blocker (metoprolol succinate or carvedilol)  -Left heart catheterization planned for 12/2    2. Elevated troponin possibly due to supply/demand mismatch: Ischemic work-up as above    3. Type 2 diabetes with hyperglycemia: Continue current insulin regimen    4. CKD 3-4: Daily BMP    LON  Class III obesity  Hypertension  Chronic combined heart failure  Paroxysmal atrial fibrillation with RVR  History of suspected PE on anticoagulation  History of AICD    Diet: ADULT DIET; Regular; 4 carb choices (60 gm/meal);  Low Sodium (2 gm); 1500 ml  Full Code    >35 minutes in total care time    Electronically signed by Familia Mayer DO on 12/1/2022 at 4:17 PM

## 2022-12-01 NOTE — PROGRESS NOTES
Patient returned to unit via cart from ultrasound at this time.   Electronically signed by Enrico Bridges RN on 11/30/2022 at 8:14 PM

## 2022-12-01 NOTE — PROGRESS NOTES
Cardiology Follow up Note    Subjective:   57 y.o. old male patient admitted with paroxysmal SVT, nonischemic cardiopathy with worsening LV function, EF now 15 to 20%, and acute on chronic systolic CHF. He has mild nonspecific troponin elevation. EF dropped to 15-20% from 45%    Interval history: Patient seen and examined. Video  assisted with discussion. No chest pain or palpitations overnight. Breathing is much better. Edema significantly improved. No recurrence of SVT on telemetry. Kidney function improved with IV diuresis. Review of Systems:   General: Feels better  Cardiovascular: See HPI. No orthopnea or PND. Respiratory: Dyspnea is present with mild degrees of activity. Gastrointestinal: No melena or hematochezia. Genitourinary: No hematuria. + CKD, improving. Hematological: No easy bruising or bleeding. Vascular: + Lower extremity edema, improving. No claudication. Neurological: No TIA or CVA symptoms. No paresthesias. Musculoskeletal: No chest wall pain. Psychiatric: No anxiety. *All other systems were reviewed and found to be negative unless otherwise noted in the HPI*    Objective:  BP (!) 163/77   Pulse 70   Temp 99 °F (37.2 °C) (Oral)   Resp 18   Ht 5' 11\" (1.803 m)   Wt (!) 327 lb 11.2 oz (148.6 kg)   SpO2 93%   BMI 45.70 kg/m²   Vitals stable. Constitutional: alert, cooperative, in no distress  Eyes: Conjunctiva clear; no scleral icterus. PERRLA   Respiratory: decreased breath sounds bilaterally. Musculoskeletal: No chest wall tenderness. No clubbing or cyanosis. Cardiovascular: regular and rhythm. No murmurs. No carotid bruit. No JVD. Pulses 2+ and symmetric. Minimal leg edema. GI: soft, non-tender, mildly distended. Bowel sounds normal. No masses,  no organomegaly  MSK: extremities normal, atraumatic, no clubbing. No chest wall pain. Neurologic: Grossly normal  Skin: No rashes or lesions. No cyanosis.         No intake or output data in the 24 hours ending 12/01/22 1527    Medications:  furosemide, 40 mg, Daily  amiodarone, 200 mg, BID  lidocaine, 1 patch, Daily  metoprolol tartrate, 100 mg, BID  insulin lispro, 0-8 Units, Q4H  aspirin, 81 mg, Daily  apixaban, 5 mg, BID  atorvastatin, 40 mg, Nightly  fenofibrate, 160 mg, Daily  insulin glargine, 20 Units, BID  insulin lispro, 8 Units, TID AC  sacubitril-valsartan, 1 tablet, BID    dextrose    Recent Labs     11/29/22  0554 11/30/22  0526 12/01/22  0543   HGB 11.0* 12.4* 11.8*   WBC 8.4 8.7 8.6    387 370    143 142   K 4.4 4.3 4.1   CO2 22 24 22   BUN 55* 50* 42*     Creatinine down to 2.02, down from 2.32 yesterday     Telemetry: My independent review sinus rhythm with no recurrence of SVT. 2D Echo: EF 15 to 20%      Assessment:  NICM with worsened LV dysfunction, EF now 15-20%. Prior EF 45%  Acute on chronic systolic CHF, clinically improving. Mild nonspecific troponin elevation, possible demand ischemia vs. Progressive CAD. Paroxysmal SVT, now sinus rhythm on amiodarone   AICD in situ   History of high probability PE, on Eliquis  DM  HTN  LON on CPAP  CKD, improved. 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 Morbid obesity     Recommendations:  Cardiac catheterization recommended for tomorrow with Dr. Darya Goode. Risks/benefits of procedure extensively discussed with patient, daughter (via phone) using video . Risks discussed include potentially serious and/or life threatening bleeding, infection, MI, CVA and worsening renal failure. He is agreeable to proceeding. Keep NPO after MN. Hold Eliquis. Decrease amiodarone to 200 mg daily. No ACE inhibitor due to CKD. Continue IV Lasix. Will consider switching to oral tomorrow. Thank you for allowing me to participate in your patient's cardiac care. Should you have questions, please do not hesitate to contact me.      Piotr Jackson DO, [address] [facility], [facility] [address] [facility]

## 2022-12-02 ENCOUNTER — APPOINTMENT (OUTPATIENT)
Dept: CARDIAC CATH/INVASIVE PROCEDURES | Age: 62
DRG: 250 | End: 2022-12-02
Payer: MEDICARE

## 2022-12-02 LAB
ALBUMIN SERPL-MCNC: 3 G/DL (ref 3.5–4.6)
ALP BLD-CCNC: 86 U/L (ref 35–104)
ALT SERPL-CCNC: 17 U/L (ref 0–41)
ANION GAP SERPL CALCULATED.3IONS-SCNC: 11 MEQ/L (ref 9–15)
AST SERPL-CCNC: 16 U/L (ref 0–40)
BASOPHILS ABSOLUTE: 0.1 K/UL (ref 0–0.2)
BASOPHILS RELATIVE PERCENT: 1.3 %
BILIRUB SERPL-MCNC: 0.4 MG/DL (ref 0.2–0.7)
BUN BLDV-MCNC: 41 MG/DL (ref 8–23)
CALCIUM SERPL-MCNC: 8.5 MG/DL (ref 8.5–9.9)
CHLORIDE BLD-SCNC: 108 MEQ/L (ref 95–107)
CO2: 23 MEQ/L (ref 20–31)
CREAT SERPL-MCNC: 2.15 MG/DL (ref 0.7–1.2)
EOSINOPHILS ABSOLUTE: 0.2 K/UL (ref 0–0.7)
EOSINOPHILS RELATIVE PERCENT: 2.3 %
GFR SERPL CREATININE-BSD FRML MDRD: 33.4 ML/MIN/{1.73_M2}
GLOBULIN: 2.9 G/DL (ref 2.3–3.5)
GLUCOSE BLD-MCNC: 143 MG/DL (ref 70–99)
GLUCOSE BLD-MCNC: 238 MG/DL (ref 70–99)
GLUCOSE BLD-MCNC: 65 MG/DL (ref 70–99)
GLUCOSE BLD-MCNC: 70 MG/DL (ref 70–99)
GLUCOSE BLD-MCNC: 73 MG/DL (ref 70–99)
HCT VFR BLD CALC: 35.1 % (ref 42–52)
HEMOGLOBIN: 11.4 G/DL (ref 14–18)
LYMPHOCYTES ABSOLUTE: 2.5 K/UL (ref 1–4.8)
LYMPHOCYTES RELATIVE PERCENT: 28.8 %
MCH RBC QN AUTO: 28.3 PG (ref 27–31.3)
MCHC RBC AUTO-ENTMCNC: 32.5 % (ref 33–37)
MCV RBC AUTO: 87 FL (ref 79–92.2)
MONOCYTES ABSOLUTE: 1 K/UL (ref 0.2–0.8)
MONOCYTES RELATIVE PERCENT: 11.6 %
NEUTROPHILS ABSOLUTE: 4.9 K/UL (ref 1.4–6.5)
NEUTROPHILS RELATIVE PERCENT: 56 %
PDW BLD-RTO: 16.4 % (ref 11.5–14.5)
PERFORMED ON: ABNORMAL
PERFORMED ON: NORMAL
PLATELET # BLD: 357 K/UL (ref 130–400)
POTASSIUM REFLEX MAGNESIUM: 3.8 MEQ/L (ref 3.4–4.9)
RBC # BLD: 4.04 M/UL (ref 4.7–6.1)
SODIUM BLD-SCNC: 142 MEQ/L (ref 135–144)
TOTAL PROTEIN: 5.9 G/DL (ref 6.3–8)
WBC # BLD: 8.7 K/UL (ref 4.8–10.8)

## 2022-12-02 PROCEDURE — 2580000003 HC RX 258: Performed by: INTERNAL MEDICINE

## 2022-12-02 PROCEDURE — 4A023N7 MEASUREMENT OF CARDIAC SAMPLING AND PRESSURE, LEFT HEART, PERCUTANEOUS APPROACH: ICD-10-PCS | Performed by: INTERNAL MEDICINE

## 2022-12-02 PROCEDURE — 6360000004 HC RX CONTRAST MEDICATION: Performed by: INTERNAL MEDICINE

## 2022-12-02 PROCEDURE — B2111ZZ FLUOROSCOPY OF MULTIPLE CORONARY ARTERIES USING LOW OSMOLAR CONTRAST: ICD-10-PCS | Performed by: INTERNAL MEDICINE

## 2022-12-02 PROCEDURE — 6370000000 HC RX 637 (ALT 250 FOR IP)

## 2022-12-02 PROCEDURE — 027034Z DILATION OF CORONARY ARTERY, ONE ARTERY WITH DRUG-ELUTING INTRALUMINAL DEVICE, PERCUTANEOUS APPROACH: ICD-10-PCS | Performed by: INTERNAL MEDICINE

## 2022-12-02 PROCEDURE — C1894 INTRO/SHEATH, NON-LASER: HCPCS

## 2022-12-02 PROCEDURE — 99152 MOD SED SAME PHYS/QHP 5/>YRS: CPT | Performed by: INTERNAL MEDICINE

## 2022-12-02 PROCEDURE — 93571 IV DOP VEL&/PRESS C FLO 1ST: CPT | Performed by: INTERNAL MEDICINE

## 2022-12-02 PROCEDURE — 2709999900 HC NON-CHARGEABLE SUPPLY

## 2022-12-02 PROCEDURE — 85025 COMPLETE CBC W/AUTO DIFF WBC: CPT

## 2022-12-02 PROCEDURE — 6360000002 HC RX W HCPCS

## 2022-12-02 PROCEDURE — 6370000000 HC RX 637 (ALT 250 FOR IP): Performed by: INTERNAL MEDICINE

## 2022-12-02 PROCEDURE — 92928 PRQ TCAT PLMT NTRAC ST 1 LES: CPT | Performed by: INTERNAL MEDICINE

## 2022-12-02 PROCEDURE — 93458 L HRT ARTERY/VENTRICLE ANGIO: CPT | Performed by: INTERNAL MEDICINE

## 2022-12-02 PROCEDURE — C1874 STENT, COATED/COV W/DEL SYS: HCPCS

## 2022-12-02 PROCEDURE — C1725 CATH, TRANSLUMIN NON-LASER: HCPCS

## 2022-12-02 PROCEDURE — 80053 COMPREHEN METABOLIC PANEL: CPT

## 2022-12-02 PROCEDURE — C1887 CATHETER, GUIDING: HCPCS

## 2022-12-02 PROCEDURE — 93571 IV DOP VEL&/PRESS C FLO 1ST: CPT

## 2022-12-02 PROCEDURE — 92928 PRQ TCAT PLMT NTRAC ST 1 LES: CPT

## 2022-12-02 PROCEDURE — 36415 COLL VENOUS BLD VENIPUNCTURE: CPT

## 2022-12-02 PROCEDURE — 2060000000 HC ICU INTERMEDIATE R&B

## 2022-12-02 PROCEDURE — 93458 L HRT ARTERY/VENTRICLE ANGIO: CPT

## 2022-12-02 PROCEDURE — 02703ZZ DILATION OF CORONARY ARTERY, ONE ARTERY, PERCUTANEOUS APPROACH: ICD-10-PCS | Performed by: INTERNAL MEDICINE

## 2022-12-02 PROCEDURE — C1769 GUIDE WIRE: HCPCS

## 2022-12-02 PROCEDURE — 6360000002 HC RX W HCPCS: Performed by: INTERNAL MEDICINE

## 2022-12-02 RX ORDER — SODIUM CHLORIDE 0.9 % (FLUSH) 0.9 %
5-40 SYRINGE (ML) INJECTION PRN
Status: DISCONTINUED | OUTPATIENT
Start: 2022-12-02 | End: 2022-12-03 | Stop reason: HOSPADM

## 2022-12-02 RX ORDER — MORPHINE SULFATE 2 MG/ML
2 INJECTION, SOLUTION INTRAMUSCULAR; INTRAVENOUS
Status: DISCONTINUED | OUTPATIENT
Start: 2022-12-02 | End: 2022-12-03 | Stop reason: HOSPADM

## 2022-12-02 RX ORDER — SODIUM CHLORIDE 0.9 % (FLUSH) 0.9 %
5-40 SYRINGE (ML) INJECTION EVERY 12 HOURS SCHEDULED
Status: DISCONTINUED | OUTPATIENT
Start: 2022-12-02 | End: 2022-12-03 | Stop reason: HOSPADM

## 2022-12-02 RX ORDER — ASPIRIN 81 MG/1
243 TABLET, CHEWABLE ORAL ONCE
Status: COMPLETED | OUTPATIENT
Start: 2022-12-02 | End: 2022-12-02

## 2022-12-02 RX ORDER — FENTANYL CITRATE 50 UG/ML
25 INJECTION, SOLUTION INTRAMUSCULAR; INTRAVENOUS
Status: DISCONTINUED | OUTPATIENT
Start: 2022-12-02 | End: 2022-12-03 | Stop reason: HOSPADM

## 2022-12-02 RX ORDER — SODIUM CHLORIDE 9 MG/ML
INJECTION, SOLUTION INTRAVENOUS PRN
Status: DISCONTINUED | OUTPATIENT
Start: 2022-12-02 | End: 2022-12-03 | Stop reason: HOSPADM

## 2022-12-02 RX ORDER — ACETAMINOPHEN 325 MG/1
650 TABLET ORAL EVERY 4 HOURS PRN
Status: DISCONTINUED | OUTPATIENT
Start: 2022-12-02 | End: 2022-12-03 | Stop reason: HOSPADM

## 2022-12-02 RX ORDER — SODIUM CHLORIDE 9 MG/ML
INJECTION, SOLUTION INTRAVENOUS CONTINUOUS
Status: DISPENSED | OUTPATIENT
Start: 2022-12-02 | End: 2022-12-02

## 2022-12-02 RX ORDER — MIDAZOLAM HYDROCHLORIDE 1 MG/ML
2 INJECTION INTRAMUSCULAR; INTRAVENOUS
Status: DISCONTINUED | OUTPATIENT
Start: 2022-12-02 | End: 2022-12-03 | Stop reason: HOSPADM

## 2022-12-02 RX ADMIN — Medication 10 ML: at 19:58

## 2022-12-02 RX ADMIN — INSULIN GLARGINE 20 UNITS: 100 INJECTION, SOLUTION SUBCUTANEOUS at 21:24

## 2022-12-02 RX ADMIN — ATORVASTATIN CALCIUM 40 MG: 40 TABLET, FILM COATED ORAL at 20:18

## 2022-12-02 RX ADMIN — METOPROLOL TARTRATE 100 MG: 50 TABLET, FILM COATED ORAL at 20:18

## 2022-12-02 RX ADMIN — SODIUM CHLORIDE: 9 INJECTION, SOLUTION INTRAVENOUS at 18:31

## 2022-12-02 RX ADMIN — IOPAMIDOL 79 ML: 612 INJECTION, SOLUTION INTRAVENOUS at 15:13

## 2022-12-02 RX ADMIN — ASPIRIN 243 MG: 81 TABLET, CHEWABLE ORAL at 15:27

## 2022-12-02 RX ADMIN — AMIODARONE HYDROCHLORIDE 200 MG: 200 TABLET ORAL at 08:09

## 2022-12-02 RX ADMIN — INSULIN LISPRO 2 UNITS: 100 INJECTION, SOLUTION INTRAVENOUS; SUBCUTANEOUS at 21:26

## 2022-12-02 RX ADMIN — SACUBITRIL AND VALSARTAN 1 TABLET: 24; 26 TABLET, FILM COATED ORAL at 08:09

## 2022-12-02 RX ADMIN — TICAGRELOR 180 MG: 90 TABLET ORAL at 15:27

## 2022-12-02 RX ADMIN — Medication 10 ML: at 08:11

## 2022-12-02 RX ADMIN — SACUBITRIL AND VALSARTAN 1 TABLET: 24; 26 TABLET, FILM COATED ORAL at 20:17

## 2022-12-02 RX ADMIN — INSULIN LISPRO 8 UNITS: 100 INJECTION, SOLUTION INTRAVENOUS; SUBCUTANEOUS at 18:32

## 2022-12-02 RX ADMIN — ASPIRIN 81 MG: 81 TABLET, COATED ORAL at 08:09

## 2022-12-02 RX ADMIN — METOPROLOL TARTRATE 100 MG: 50 TABLET, FILM COATED ORAL at 08:09

## 2022-12-02 RX ADMIN — FUROSEMIDE 40 MG: 10 INJECTION, SOLUTION INTRAMUSCULAR; INTRAVENOUS at 18:28

## 2022-12-02 NOTE — FLOWSHEET NOTE
Patient sitting up in bed. Pt is NPO for heart cath today. Pt denies pain. [id number] on telemetry. Chi Coffman

## 2022-12-02 NOTE — PROGRESS NOTES
Patient arrived to Pre/post cathlab from [address] [facility]. IV started in Left hand, IV in R wrist was occluded. Patient prepped for procedure.  Vital signs obtained

## 2022-12-02 NOTE — PROGRESS NOTES
Physician Progress Note    12/2/2022   4:45 PM    Name:  Taina Bailon  MRN:    [id number]      Day: 3     Admit Date: 11/26/2022  2:08 PM  PCP: Floreen Cooks, DO    Code Status:  Full Code    Subjective:     Hungry but otherwise no complaints. Going for heart catheterization in the afternoon.   Current Facility-Administered Medications   Medication Dose Route Frequency Provider Last Rate Last Admin    sodium chloride flush 0.9 % injection 5-40 mL  5-40 mL IntraVENous 2 times per day Leatha Poet, DO   10 mL at 12/02/22 0811    sodium chloride flush 0.9 % injection 5-40 mL  5-40 mL IntraVENous PRN [facility] Marouf, DO        0.9 % sodium chloride infusion   IntraVENous PRN [facility] F Marouf, DO        iopamidol (ISOVUE-300) 61 % injection 100 mL  100 mL Other ONCE PRN Norman J Holiday, DO        [START ON 12/3/2022] ticagrelor (BRILINTA) tablet 90 mg  90 mg Oral BID Norman J Holiday, DO        [START ON 12/3/2022] apixaban (ELIQUIS) tablet 5 mg  5 mg Oral BID Wes J Holiday, DO        0.9 % sodium chloride infusion   IntraVENous Continuous Norman J Holiday, DO        amiodarone (CORDARONE) tablet 200 mg  200 mg Oral Daily Loai F Marouf, DO   200 mg at 12/02/22 0809    furosemide (LASIX) injection 40 mg  40 mg IntraVENous Daily Loai F Marouf, DO   40 mg at 12/01/22 0943    guaiFENesin-dextromethorphan (ROBITUSSIN DM) 100-10 MG/5ML syrup 5 mL  5 mL Oral Q4H PRN Karissa Brunson MD   5 mL at 11/28/22 1042    lidocaine 4 % external patch 1 patch  1 patch TransDERmal Daily Ronnie Malcolm,    1 patch at 12/02/22 0809    metoprolol tartrate (LOPRESSOR) tablet 100 mg  100 mg Oral BID Loai F Marouf, DO   100 mg at 12/02/22 0809    ondansetron (ZOFRAN-ODT) disintegrating tablet 4 mg  4 mg Oral Q8H PRN Denise Payment, APRN - CNP        Or    ondansetron (ZOFRAN) injection 4 mg  4 mg IntraVENous Q6H PRN Denise Payment, APRN - CNP        acetaminophen (TYLENOL) tablet 650 mg  650 mg Oral Q6H PRN Denise Payment, APRN - CNP   650 mg at 11/29/22 1803    Or    acetaminophen (TYLENOL) suppository 650 mg  650 mg Rectal Q6H PRN JESUS Castro CNP        polyethylene glycol (GLYCOLAX) packet 17 g  17 g Oral Daily PRN JESUS Castro CNP        nitroGLYCERIN (NITROSTAT) SL tablet 0.4 mg  0.4 mg SubLINGual Q5 Min PRN JESUS Shoemaker CNP        insulin lispro (HUMALOG) injection vial 0-8 Units  0-8 Units SubCUTAneous Q4H JESUS Castro CNP   2 Units at 22    glucose chewable tablet 16 g  4 tablet Oral PRN JESUS Shoemaker CNP        dextrose bolus 10% 125 mL  125 mL IntraVENous PRN JESUS Shoemaker CNP        Or    dextrose bolus 10% 250 mL  250 mL IntraVENous PRN JESUS Shoemaker CNP        glucagon (rDNA) injection 1 mg  1 mg SubCUTAneous PRN JESUS Shoemaker CNP        dextrose 10 % infusion   IntraVENous Continuous PRN JESUS Shoemaker CNP        aspirin EC tablet 81 mg  81 mg Oral Daily Thersia Rhona Sedar, DO   81 mg at 22 0809    atorvastatin (LIPITOR) tablet 40 mg  40 mg Oral Nightly Joseu NIEVES Sedar, DO   40 mg at 22 204    fenofibrate (TRIGLIDE) tablet 160 mg  160 mg Oral Daily Thersia Rhona Sedar, DO   160 mg at 22 0942    insulin glargine (LANTUS) injection vial 20 Units  20 Units SubCUTAneous BID Thersia Rhona Sedar, DO   20 Units at 22    insulin lispro (HUMALOG) injection vial 8 Units  8 Units SubCUTAneous TID AC Josue NIEVES Sedar, DO   8 Units at 22 1633    sacubitril-valsartan (ENTRESTO) 24-26 MG per tablet 1 tablet  1 tablet Oral BID Thersia Rhona Sedar, DO   1 tablet at 22 0809       Physical Examination:      Vitals:  BP (!) 168/84   Pulse 61   Temp 98.6 °F (37 °C) (Oral)   Resp 16   Ht 5' 11\" (1.803 m)   Wt (!) 327 lb 11.2 oz (148.6 kg)   SpO2 96%   BMI 45.70 kg/m²   Temp (24hrs), Av.4 °F (36.9 °C), Min:98.2 °F (36.8 °C), Max:98.6 °F (37 °C)      General appearance: alert, cooperative and no distress.   Obese  Mental Status: oriented to person, place and time and normal affect  Lungs: clear to auscultation bilaterally, normal effort  Heart: regular rate and rhythm, no murmur  Abdomen: soft, nontender, nondistended, bowel sounds present, no masses  Extremities: no edema, redness, tenderness in the calves. Cap refill <2s  Skin: no gross lesions, rashes    Data:     Labs:  Recent Labs     12/01/22 0543 12/02/22  0531   WBC 8.6 8.7   HGB 11.8* 11.4*    357     Recent Labs     12/01/22 0543 12/02/22  0531    142   K 4.1 3.8   * 108*   CO2 22 23   BUN 42* 41*   CREATININE 2.02* 2.15*   GLUCOSE 81 70     Recent Labs     12/01/22 0543 12/02/22  0531   AST 18 16   ALT 19 17   BILITOT 0.4 0.4   ALKPHOS 90 86       Assessment and Plan:        1. SVT complicated by acute on chronic combined heart failure management per cardiology. Rate controlling agents increased and amiodarone added. -TTE reviewed  -Cardiac medication optimization and diuretics per cardiology. Can likely go up on afterload control and eventually transition to mortality benefiting beta-blocker (metoprolol succinate or carvedilol)  -Left heart catheterization    2. Elevated troponin possibly due to supply/demand mismatch: Ischemic work-up as above    3. Type 2 diabetes with hyperglycemia: Continue current insulin regimen    4.   CKD 3-4: Daily BMP    LON  Class III obesity  Hypertension  Chronic combined heart failure  Paroxysmal atrial fibrillation with RVR  History of suspected PE on anticoagulation  History of AICD    Diet: Diet NPO Exceptions are: Sips of Water with Meds  Full Code    >35 minutes in total care time    Electronically signed by Karan Hernandez DO on 12/2/2022 at 4:45 PM

## 2022-12-02 NOTE — BRIEF OP NOTE
Section of Cardiology  Adult Brief Cardiac Cath Procedure Note        Procedure(s):  LHC, b/l coronary angio, IFR/PCI of mid LAD, IFR=0.89    Pre-operative Diagnosis:  CMP, elev trops, CHF    H&P Status: Completed and reviewed.      Post-operative Diagnosis:      LV EF of 30%  LM normal   LAD calcified mid LAD 60-70%% with + IFR  CX small, no disease  RCA large caliber, mild disease    Findings:  See full report    Complications:  none    Primary Proceduralist:   Dr.Wes Ly DO    Plan    DAPT  RFM  Max med rx    Full procedure note to follow

## 2022-12-02 NOTE — FLOWSHEET NOTE
Patient arrived to [facility] from the cath lab via cart. Pt alert and oriented. Pt denies pain. Right radial PCI  site dressing clean and dry. No hematoma, palpable pulse. One Touch is 143, /80. Meds given. IV fluids started.

## 2022-12-02 NOTE — FLOWSHEET NOTE
Patient went down to the cath lab via wheelchair. Pts wife in attendance. Dr. Stephanie Marino was in to examine the patient. One touch 73, 1 orange juice given.

## 2022-12-03 VITALS
DIASTOLIC BLOOD PRESSURE: 70 MMHG | WEIGHT: 315 LBS | TEMPERATURE: 98.1 F | HEIGHT: 71 IN | RESPIRATION RATE: 20 BRPM | SYSTOLIC BLOOD PRESSURE: 162 MMHG | HEART RATE: 63 BPM | BODY MASS INDEX: 44.1 KG/M2 | OXYGEN SATURATION: 98 %

## 2022-12-03 LAB
ALBUMIN SERPL-MCNC: 3.1 G/DL (ref 3.5–4.6)
ALP BLD-CCNC: 87 U/L (ref 35–104)
ALT SERPL-CCNC: 19 U/L (ref 0–41)
ANION GAP SERPL CALCULATED.3IONS-SCNC: 14 MEQ/L (ref 9–15)
AST SERPL-CCNC: 21 U/L (ref 0–40)
BASOPHILS ABSOLUTE: 0.1 K/UL (ref 0–0.2)
BASOPHILS RELATIVE PERCENT: 1.5 %
BILIRUB SERPL-MCNC: 0.4 MG/DL (ref 0.2–0.7)
BUN BLDV-MCNC: 40 MG/DL (ref 8–23)
CALCIUM SERPL-MCNC: 8.6 MG/DL (ref 8.5–9.9)
CHLORIDE BLD-SCNC: 107 MEQ/L (ref 95–107)
CO2: 21 MEQ/L (ref 20–31)
CREAT SERPL-MCNC: 2.06 MG/DL (ref 0.7–1.2)
EOSINOPHILS ABSOLUTE: 0.2 K/UL (ref 0–0.7)
EOSINOPHILS RELATIVE PERCENT: 2.3 %
GFR SERPL CREATININE-BSD FRML MDRD: 35.1 ML/MIN/{1.73_M2}
GLOBULIN: 3.1 G/DL (ref 2.3–3.5)
GLUCOSE BLD-MCNC: 102 MG/DL (ref 70–99)
GLUCOSE BLD-MCNC: 135 MG/DL (ref 70–99)
GLUCOSE BLD-MCNC: 139 MG/DL (ref 70–99)
GLUCOSE BLD-MCNC: 142 MG/DL (ref 70–99)
HCT VFR BLD CALC: 35.5 % (ref 42–52)
HEMOGLOBIN: 11.7 G/DL (ref 14–18)
LYMPHOCYTES ABSOLUTE: 1.8 K/UL (ref 1–4.8)
LYMPHOCYTES RELATIVE PERCENT: 23.4 %
MCH RBC QN AUTO: 28.5 PG (ref 27–31.3)
MCHC RBC AUTO-ENTMCNC: 33.1 % (ref 33–37)
MCV RBC AUTO: 86 FL (ref 79–92.2)
MONOCYTES ABSOLUTE: 1 K/UL (ref 0.2–0.8)
MONOCYTES RELATIVE PERCENT: 12.8 %
NEUTROPHILS ABSOLUTE: 4.7 K/UL (ref 1.4–6.5)
NEUTROPHILS RELATIVE PERCENT: 60 %
PDW BLD-RTO: 16.5 % (ref 11.5–14.5)
PERFORMED ON: ABNORMAL
PLATELET # BLD: 378 K/UL (ref 130–400)
POTASSIUM REFLEX MAGNESIUM: 3.9 MEQ/L (ref 3.4–4.9)
POTASSIUM SERPL-SCNC: 3.9 MEQ/L (ref 3.4–4.9)
RBC # BLD: 4.13 M/UL (ref 4.7–6.1)
SODIUM BLD-SCNC: 142 MEQ/L (ref 135–144)
TOTAL PROTEIN: 6.2 G/DL (ref 6.3–8)
WBC # BLD: 7.8 K/UL (ref 4.8–10.8)

## 2022-12-03 PROCEDURE — 6370000000 HC RX 637 (ALT 250 FOR IP): Performed by: INTERNAL MEDICINE

## 2022-12-03 PROCEDURE — 99232 SBSQ HOSP IP/OBS MODERATE 35: CPT | Performed by: INTERNAL MEDICINE

## 2022-12-03 PROCEDURE — 36415 COLL VENOUS BLD VENIPUNCTURE: CPT

## 2022-12-03 PROCEDURE — 85025 COMPLETE CBC W/AUTO DIFF WBC: CPT

## 2022-12-03 PROCEDURE — 80053 COMPREHEN METABOLIC PANEL: CPT

## 2022-12-03 PROCEDURE — 2580000003 HC RX 258: Performed by: INTERNAL MEDICINE

## 2022-12-03 RX ORDER — FUROSEMIDE 40 MG/1
40 TABLET ORAL DAILY
Status: DISCONTINUED | OUTPATIENT
Start: 2022-12-03 | End: 2022-12-03 | Stop reason: HOSPADM

## 2022-12-03 RX ORDER — NITROGLYCERIN 0.4 MG/1
0.4 TABLET SUBLINGUAL EVERY 5 MIN PRN
Qty: 25 TABLET | Refills: 3 | Status: SHIPPED | OUTPATIENT
Start: 2022-12-03

## 2022-12-03 RX ORDER — ISOSORBIDE MONONITRATE 60 MG/1
60 TABLET, EXTENDED RELEASE ORAL DAILY
Qty: 30 TABLET | Refills: 3 | Status: SHIPPED | OUTPATIENT
Start: 2022-12-03

## 2022-12-03 RX ORDER — METOPROLOL SUCCINATE 100 MG/1
100 TABLET, EXTENDED RELEASE ORAL DAILY
Status: DISCONTINUED | OUTPATIENT
Start: 2022-12-03 | End: 2022-12-03 | Stop reason: HOSPADM

## 2022-12-03 RX ORDER — AMIODARONE HYDROCHLORIDE 200 MG/1
200 TABLET ORAL DAILY
Qty: 90 TABLET | Refills: 3 | Status: SHIPPED | OUTPATIENT
Start: 2022-12-03

## 2022-12-03 RX ORDER — ISOSORBIDE MONONITRATE 60 MG/1
60 TABLET, EXTENDED RELEASE ORAL DAILY
Status: DISCONTINUED | OUTPATIENT
Start: 2022-12-03 | End: 2022-12-03 | Stop reason: HOSPADM

## 2022-12-03 RX ORDER — METOPROLOL SUCCINATE 100 MG/1
100 TABLET, EXTENDED RELEASE ORAL DAILY
Qty: 30 TABLET | Refills: 3 | Status: SHIPPED | OUTPATIENT
Start: 2022-12-03

## 2022-12-03 RX ADMIN — SACUBITRIL AND VALSARTAN 1 TABLET: 24; 26 TABLET, FILM COATED ORAL at 10:19

## 2022-12-03 RX ADMIN — TICAGRELOR 90 MG: 90 TABLET ORAL at 04:22

## 2022-12-03 RX ADMIN — TICAGRELOR 90 MG: 90 TABLET ORAL at 11:47

## 2022-12-03 RX ADMIN — METOPROLOL SUCCINATE 100 MG: 100 TABLET, EXTENDED RELEASE ORAL at 10:12

## 2022-12-03 RX ADMIN — ASPIRIN 81 MG: 81 TABLET, COATED ORAL at 10:12

## 2022-12-03 RX ADMIN — APIXABAN 5 MG: 5 TABLET, FILM COATED ORAL at 10:12

## 2022-12-03 RX ADMIN — Medication 10 ML: at 10:20

## 2022-12-03 RX ADMIN — FUROSEMIDE 40 MG: 40 TABLET ORAL at 10:12

## 2022-12-03 RX ADMIN — INSULIN GLARGINE 20 UNITS: 100 INJECTION, SOLUTION SUBCUTANEOUS at 10:19

## 2022-12-03 RX ADMIN — ISOSORBIDE MONONITRATE 60 MG: 60 TABLET, EXTENDED RELEASE ORAL at 10:12

## 2022-12-03 RX ADMIN — FENOFIBRATE 160 MG: 160 TABLET ORAL at 10:18

## 2022-12-03 RX ADMIN — AMIODARONE HYDROCHLORIDE 200 MG: 200 TABLET ORAL at 10:12

## 2022-12-03 ASSESSMENT — PAIN SCALES - GENERAL: PAINLEVEL_OUTOF10: 0

## 2022-12-03 ASSESSMENT — ENCOUNTER SYMPTOMS
BLOOD IN STOOL: 0
SHORTNESS OF BREATH: 0
STRIDOR: 0
EYES NEGATIVE: 1
WHEEZING: 0
CHEST TIGHTNESS: 0
NAUSEA: 0
RESPIRATORY NEGATIVE: 1
GASTROINTESTINAL NEGATIVE: 1
COUGH: 0

## 2022-12-03 NOTE — FLOWSHEET NOTE
0020- patient is alert and oriented times 4. VSS. Room air. No complaints of pain. R radial site wnl. Up independent to bathroom. Bed low, locked, call light in reach. No other needs at this time.

## 2022-12-03 NOTE — DISCHARGE INSTR - DIET
Good nutrition is important when healing from an illness, injury, or surgery. Follow any nutrition recommendations given to you during your hospital stay. If you were given an oral nutrition supplement while in the hospital, continue to take this supplement at home. You can take it with meals, in-between meals, and/or before bedtime. These supplements can be purchased at most local grocery stores, pharmacies, and chain [facility]-stores. If you have any questions about your diet or nutrition, call the hospital and ask for the dietitian. Seguir quin dieta diabetica.

## 2022-12-03 NOTE — PROGRESS NOTES
Progress Note  Patient: Erin Sullivan Curet  Unit/Bed: T359/E871-52  YOB: 1960  MRN: [id number]  Acct: [de-identified]   Admitting Diagnosis: Tachycardia [R00.0]  Elevated troponin [R77.8]  Chest pain, unspecified type [R07.9]  SVT (supraventricular tachycardia) ([address] [I47.1]  Admit Date:  11/26/2022  Hospital Day: 4    Chief Complaint: SVT    Histories:  Past Medical History:   Diagnosis Date    Acute kidney injury superimposed on CKD ([address] 08/15/2019    Anxiety and depression 04/30/2019    Cardiomyopathy ([address]     Chronic systolic heart failure ([address] 04/30/2019    Diabetes mellitus ([address]     Diabetic neuropathy associated with type 2 diabetes mellitus ([address] 12/13/2017    Heart failure, NYHA class 4 ([address]     Hyperlipidemia     Hypertension     ICD (implantable cardioverter-defibrillator) in place 04/30/2019    Left ureteral calculus     Morbid obesity ([address] 12/19/2017    LON (obstructive sleep apnea)     Pulmonary embolism ([address]     ON ELIQUIS    Pyelonephritis 08/04/2019    Ureteral calculus     Ureteric stone      Past Surgical History:   Procedure Laterality Date    CARDIAC DEFIBRILLATOR PLACEMENT  12/2008    CYSTOSCOPY INSERTION / REMOVAL STENT / STONE Left 5/30/2019    CYSTOSCOPY LEFT DOUBLE J STENT PLACEMENT ROOM 287 performed by Merrick Baker MD at [facility]    LITHOTRIPSY Left 6/12/2019    LEFT ESWL (CONF # [id number]) performed by Merrick Baker MD at [facility]    LITHOTRIPSY N/A 7/10/2019    HOLMIUM LASER LITHOTRIPSY, STONE EXTRACTION, LT URETERAL STENT INSERTION.  performed by Merrick Baker MD at [address] [facility] Left 7/10/2019    LEFT FLEXIBLE URETEROSCOPY, CYSTOSCOPY RETROGRADE performed by Merrick Baker MD at [address] History   Problem Relation Age of Onset    Heart Disease Mother     Kidney Disease Mother     Hypertension Mother     Diabetes Mother     No Known Problems Sister     No Known Problems Brother     No Known Problems Brother      Social History Socioeconomic History    Marital status: Legally      Spouse name: None    Number of children: None    Years of education: None    Highest education level: None   Tobacco Use    Smoking status: Former     Packs/day: 1.00     Years: 25.00     Pack years: 25.00     Types: Cigarettes     Quit date: 2008     Years since quittin.0    Smokeless tobacco: Never   Vaping Use    Vaping Use: Never used   Substance and Sexual Activity    Alcohol use: No    Drug use: Never    Sexual activity: Yes     Partners: Female       Subjective/HPI  feels well RRA cath site stable no cp not SOB wants to go home no edema    EKG: SR 62        Review of Systems:   Review of Systems   Constitutional: Negative. Negative for diaphoresis and fatigue. HENT: Negative. Eyes: Negative. Respiratory: Negative. Negative for cough, chest tightness, shortness of breath, wheezing and stridor. Cardiovascular: Negative. Negative for chest pain, palpitations and leg swelling. Gastrointestinal: Negative. Negative for blood in stool and nausea. Genitourinary: Negative. Musculoskeletal: Negative. Skin: Negative. Neurological: Negative. Negative for dizziness, syncope, weakness and light-headedness. Hematological: Negative. Psychiatric/Behavioral: Negative. Physical Examination:    BP (!) 162/70   Pulse 63   Temp 98.1 °F (36.7 °C) (Oral)   Resp 20   Ht 5' 11\" (1.803 m)   Wt (!) 323 lb 4.8 oz (146.6 kg)   SpO2 98%   BMI 45.09 kg/m²    Physical Exam   Constitutional: He appears healthy. No distress. HENT:   Normal cephalic and Atraumatic   Eyes: Pupils are equal, round, and reactive to light. Neck: Thyroid normal. No JVD present. No neck adenopathy. No thyromegaly present. Cardiovascular: Normal rate, regular rhythm, normal heart sounds, intact distal pulses and normal pulses. Pulmonary/Chest: Effort normal and breath sounds normal. He has no wheezes. He has no rales.  He exhibits no tenderness. Abdominal: Soft. Bowel sounds are normal. There is no abdominal tenderness. Musculoskeletal:         General: No tenderness or edema. Normal range of motion. Cervical back: Normal range of motion and neck supple. Neurological: He is alert and oriented to person, place, and time. Skin: Skin is warm. No cyanosis. Nails show no clubbing.      LABS:  CBC:   Lab Results   Component Value Date/Time    WBC 7.8 12/03/2022 04:57 AM    RBC 4.13 12/03/2022 04:57 AM    HGB 11.7 12/03/2022 04:57 AM    HCT 35.5 12/03/2022 04:57 AM    MCV 86.0 12/03/2022 04:57 AM    MCH 28.5 12/03/2022 04:57 AM    MCHC 33.1 12/03/2022 04:57 AM    RDW 16.5 12/03/2022 04:57 AM     12/03/2022 04:57 AM     CBC with Differential:    Lab Results   Component Value Date/Time    WBC 7.8 12/03/2022 04:57 AM    RBC 4.13 12/03/2022 04:57 AM    HGB 11.7 12/03/2022 04:57 AM    HCT 35.5 12/03/2022 04:57 AM     12/03/2022 04:57 AM    MCV 86.0 12/03/2022 04:57 AM    MCH 28.5 12/03/2022 04:57 AM    MCHC 33.1 12/03/2022 04:57 AM    RDW 16.5 12/03/2022 04:57 AM    BANDSPCT 14 08/04/2019 06:30 PM    METASPCT 1 08/04/2019 06:30 PM    LYMPHOPCT 23.4 12/03/2022 04:57 AM    MONOPCT 12.8 12/03/2022 04:57 AM    BASOPCT 1.5 12/03/2022 04:57 AM    MONOSABS 1.0 12/03/2022 04:57 AM    LYMPHSABS 1.8 12/03/2022 04:57 AM    EOSABS 0.2 12/03/2022 04:57 AM    BASOSABS 0.1 12/03/2022 04:57 AM     CMP:    Lab Results   Component Value Date/Time     12/03/2022 04:57 AM    K 3.9 12/03/2022 04:57 AM    K 3.9 12/03/2022 04:57 AM     12/03/2022 04:57 AM    CO2 21 12/03/2022 04:57 AM    BUN 40 12/03/2022 04:57 AM    CREATININE 2.06 12/03/2022 04:57 AM    GFRAA 35.6 10/13/2022 06:45 PM    LABGLOM 35.1 12/03/2022 04:57 AM    GLUCOSE 142 12/03/2022 04:57 AM    GLUCOSE 96 11/14/2022 11:22 AM    PROT 6.2 12/03/2022 04:57 AM    LABALBU 3.1 12/03/2022 04:57 AM    LABALBU 3.3 11/14/2022 11:22 AM    CALCIUM 8.6 12/03/2022 04:57 AM    BILITOT 0.4 12/03/2022 04:57 AM    ALKPHOS 87 12/03/2022 04:57 AM    AST 21 12/03/2022 04:57 AM    ALT 19 12/03/2022 04:57 AM     BMP:    Lab Results   Component Value Date/Time     12/03/2022 04:57 AM    K 3.9 12/03/2022 04:57 AM    K 3.9 12/03/2022 04:57 AM     12/03/2022 04:57 AM    CO2 21 12/03/2022 04:57 AM    BUN 40 12/03/2022 04:57 AM    LABALBU 3.1 12/03/2022 04:57 AM    LABALBU 3.3 11/14/2022 11:22 AM    CREATININE 2.06 12/03/2022 04:57 AM    CALCIUM 8.6 12/03/2022 04:57 AM    GFRAA 35.6 10/13/2022 06:45 PM    LABGLOM 35.1 12/03/2022 04:57 AM    GLUCOSE 142 12/03/2022 04:57 AM    GLUCOSE 96 11/14/2022 11:22 AM     Magnesium:    Lab Results   Component Value Date/Time    MG 2.4 11/18/2022 04:45 AM     Troponin:    Lab Results   Component Value Date/Time    TROPONINI 0.167 11/27/2022 07:04 PM        Active Hospital Problems    Diagnosis Date Noted    SVT (supraventricular tachycardia) ([facility] [I47.1] 11/29/2022     Priority: Medium    Tachycardia [R00.0] 11/26/2022     Priority: Medium    Elevated troponin [R77.8] 11/26/2022     Priority: Medium    Chest pain [R07.9] 11/18/2022     Priority: Medium    Coronary artery disease involving native heart without angina pectoris [I25.10] 11/06/2022     Priority: Medium    CKD stage 4 due to type 2 diabetes mellitus ([facility] [L86.30, N18.4] 08/15/2019     Priority: Low    Chronic systolic heart failure ([facility] [I50.22] 04/30/2019     Priority: Low    Hyperlipidemia [E78.5]      Priority: Low    Hypertension [I10]      Priority: Low        Assessment/Plan:  PSVT -  Amiodarone to be continued  CAD s/p LAD VERNELL - DAPT Statin Low fat diet  ICM EF 15-20 by Echo and 30% by Cath. Will need future reassessment for possible ICD need. Change BB to Toprol XL. DM  HPL statin. Low fat diet  PE- on DOAC  HTN - uncontrolled - add Imdur 60  LON CPAP usage  CKD  OK for DC- f/u Dr. Ivette Walker. CV meds reconciled.         Electronically signed by Wong Hutchinson MD on 12/3/2022 at 9:12 AM

## 2022-12-03 NOTE — DISCHARGE SUMMARY
[facility] Medicine Discharge Summary    Yaima Timmons  :  1960  MRN:  [id number]    Admit date:  2022  Discharge date:  12/3/2022    Admitting Physician:  Nae Womack MD  Primary Care Physician:  Jaquan Moody DO    Discharge Diagnoses:    Principal Problem:    Tachycardia  Active Problems:    Coronary artery disease involving native heart without angina pectoris    Chest pain    Elevated troponin    SVT (supraventricular tachycardia) (HCC)    Hyperlipidemia    Hypertension    Chronic systolic heart failure ([facility]    CKD stage 4 due to type 2 diabetes mellitus ([facility]  Resolved Problems:    * No resolved hospital problems. *    Chief Complaint   Patient presents with    Chest Pain     With shortness of breath       Condition: improved   Activity: no restrictions  Diet: cardiac, diabetic  Disposition: home  Functional Status: ambulatory    Significant Findings:     C:  Post-operative Diagnosis:       LV EF of 30%  LM normal   LAD calcified mid LAD 60-70%% with + IFR  CX small, no disease  RCA large caliber, mild disease     Findings:  See full report     Complications:  none     Primary Proceduralist:   Dr.Wes Ly DO     Plan     DAPT  RFM  Max med rx    TTE:   Summary   Left ventricular ejection fraction is visually estimated at 15-20%. No LV   apical thrombus noted with Definity contrast utilization. Left ventricular size is mildly increased . Impaired relaxation compatible with diastolic dysfunction. ( reversed E/A   ratio)   No evidence of mitral regurgitation. No evidence of aortic valve regurgitation . There is Trace tricuspid regurgitation with estimated RVSP of 26 mm Hg.       Hospital Course:   58-year-old male with class III obesity, LON, chronic combined heart failure, paroxysmal atrial fibrillation, history of suspected PE on anticoagulation, and history of AICD was admitted for SVT complicated by acute on chronic combined heart failure with new worsening ejection fraction. The patient was started on amiodarone and had increase in rate controlling agents which eliminate SVT. Due to worsened EF, he had left heart catheterization and stenting of LAD. His cardiac medications were reconciled by cardiology and he was discharged in stable condition on 12/3. Exam on Discharge:   BP (!) 162/70   Pulse 63   Temp 98.1 °F (36.7 °C) (Oral)   Resp 20   Ht 5' 11\" (1.803 m)   Wt (!) 323 lb 4.8 oz (146.6 kg)   SpO2 98%   BMI 45.09 kg/m²   General appearance: alert, cooperative and no distress. Obese  Mental Status: oriented to person, place and time and normal affect  Lungs: clear to auscultation bilaterally, normal effort  Heart: regular rate and rhythm, no murmur  Abdomen: soft, nontender, nondistended, bowel sounds present, no masses  Extremities: no edema, redness, tenderness in the calves  Skin: no gross lesions, rashes    Discharge Medication List:     Medication List        START taking these medications      amiodarone 200 MG tablet  Commonly known as: CORDARONE  Take 1 tablet by mouth daily  Notes to patient: Donny Prim tableta cada jessica     isosorbide mononitrate 60 MG extended release tablet  Commonly known as: IMDUR  Take 1 tablet by mouth daily  Notes to patient: Melisa tableta cada jessica     metoprolol succinate 100 MG extended release tablet  Commonly known as: TOPROL XL  Take 1 tablet by mouth daily  Notes to patient: Melisa tableta cada jessica     nitroGLYCERIN 0.4 MG SL tablet  Commonly known as: NITROSTAT  Place 1 tablet under the tongue every 5 minutes as needed for Chest pain up to max of 3 total doses. If no relief after 1 dose, call 911.   Notes to patient: compa sea necesario para el dolor de pecho     ticagrelor 90 MG Tabs tablet  Commonly known as: BRILINTA  Take 1 tablet by mouth 2 times daily  Notes to patient: [address] al jessica            CHANGE how you take these medications      furosemide 40 MG tablet  Commonly known as: LASIX  TAKE 1 TABLET BY MOUTH EVERY DAY. HOLD UNTIL SEEN BY PCP FOR FOLLOWUP  What changed: additional instructions  Notes to patient: Shasta Sol cada jessica TITO   No lo tome hasta que lo kevin el [facility]            CONTINUE taking these medications      albuterol (2.5 MG/3ML) 0.083% nebulizer solution  Commonly known as: PROVENTIL  Notes to patient: conchita en casa     apixaban 5 MG Tabs tablet  Commonly known as: Eliquis  Take 1 tablet by mouth 2 times daily  Notes to patient: Alissa rios al jessica     aspirin 81 MG EC tablet  Commonly known as: Aspirin Adult Low Strength  TAKE 1 TABLET BY MOUTH DAILY  Notes to patient: Mleisa tableta cada jessica     atorvastatin 40 MG tablet  Commonly known as: LIPITOR  Take 1 tablet by mouth at bedtime  Notes to patient: Melisa tableta cada noche     * B-D UF III MINI PEN NEEDLES 31G X 5 MM Misc  Generic drug: Insulin Pen Needle  1 each by Does not apply route daily     * [employer] Pen Needles 31G X 6 MM Misc  Generic drug: Insulin Pen Needle  1 each by Does not apply route 5 times daily     blood glucose test strips  Test 5 times a day & as needed for symptoms of irregular blood glucose. Dispense sufficient amount for indicated testing frequency plus additional to accommodate PRN testing needs.      fenofibrate 160 MG tablet  Commonly known as: TRIGLIDE  TAKE 1 TABLET BY MOUTH EVERY NIGHT  Notes to patient: Shasta Sol cada noche     gabapentin 300 MG capsule  Commonly known as: NEURONTIN  Notes to patient: Shasta Sol cada noche     insulin lispro (1 Unit Dial) 100 UNIT/ML Sopn  Commonly known as: HumaLOG KwikPen  Inject 8 Units into the skin 3 times daily (before meals)  Notes to patient: stalin steel jessica con las comidas     INSULIN SYRINGE 1CC/31GX5/16\" 31G X 5/16\" 1 ML Misc  Use once daily to administer Lantus     Lancets Misc  Use TID     Lantus SoloStar 100 UNIT/ML injection pen  Generic drug: insulin glargine  Inject 20 Units into the skin 2 times daily  Notes to patient: Jesús steel jessica      omeprazole 20 MG delayed release capsule  Commonly known as: PRILOSEC  Take 1 capsule by mouth daily  Notes to patient: Melisa tableta cadbekah jessica     ONE TOUCH ULTRA MINI w/Device Kit  1 kit by Does not apply route three times daily     sacubitril-valsartan 24-26 MG per tablet  Commonly known as: ENTRESTO  Take 1 tablet by mouth 2 times daily  Notes to patient: [employer] al jessica           * This list has 2 medication(s) that are the same as other medications prescribed for you. Read the directions carefully, and ask your doctor or other care provider to review them with you.                 STOP taking these medications      hydrALAZINE 25 MG tablet  Commonly known as: APRESOLINE     metoprolol tartrate 25 MG tablet  Commonly known as: LOPRESSOR               Where to Get Your Medications        These medications were sent to [facility] [address] [facility] [address]      Phone: [phone]   amiodarone 200 MG tablet  isosorbide mononitrate 60 MG extended release tablet  metoprolol succinate 100 MG extended release tablet  nitroGLYCERIN 0.4 MG SL tablet  ticagrelor 90 MG Tabs tablet         DC time 37 minutes    Signed:  Nida Green DO  12/3/2022, 1:49 PM

## 2022-12-03 NOTE — FLOWSHEET NOTE
1000- AM assessment completed. Patient resting in bed. Denies chest pain. Atrial paced sinus rhythm on portable telemetry monitor. No SOB noted. Pedal pulses present. +1 non-pitting edema on bilateral lower extremities. Bowel sounds present. Lung sounds clear but diminished. 98% on room air. A&Ox3. Mainly Citizen of the [place] speaking but does understand some english. Interpreting device in room. Up independent. Continent of bowel and bladder. Skin is dry, warm, and intact. Radial compression dressing on right arm is clean, dry, and intact. SL #20 left hand. Flushed and patent. VS stable. AM medications provided. Call light within reach. 1215- using interpretor device, reviewed discharge instructions with patient and his son, discussed home going care, follow up appointments, and new medications. Printed information provided regarding new medications and post PCI care. All questions answered. Taken to main entrance via wheelchair for discharge to home.   Electronically signed by Brian Neff on 12/3/2022 at 12:24 PM

## 2022-12-13 ENCOUNTER — HOSPITAL ENCOUNTER (OUTPATIENT)
Dept: CARDIOLOGY | Age: 62
Discharge: HOME | End: 2022-12-13
Payer: MEDICARE

## 2022-12-13 PROCEDURE — 93296 REM INTERROG EVL PM/IDS: CPT

## 2022-12-15 ENCOUNTER — OFFICE VISIT (OUTPATIENT)
Dept: CARDIOLOGY CLINIC | Age: 62
End: 2022-12-15
Payer: MEDICARE

## 2022-12-15 VITALS
RESPIRATION RATE: 16 BRPM | DIASTOLIC BLOOD PRESSURE: 68 MMHG | SYSTOLIC BLOOD PRESSURE: 144 MMHG | WEIGHT: 315 LBS | HEART RATE: 72 BPM | BODY MASS INDEX: 46.03 KG/M2 | OXYGEN SATURATION: 95 %

## 2022-12-15 DIAGNOSIS — I10 HYPERTENSION, UNSPECIFIED TYPE: ICD-10-CM

## 2022-12-15 DIAGNOSIS — I50.22 CHRONIC SYSTOLIC CHF (CONGESTIVE HEART FAILURE), NYHA CLASS 3 (HCC): ICD-10-CM

## 2022-12-15 DIAGNOSIS — I42.9 CARDIOMYOPATHY, UNSPECIFIED TYPE (HCC): ICD-10-CM

## 2022-12-15 DIAGNOSIS — E66.9 DIABETES MELLITUS TYPE 2 IN OBESE (HCC): ICD-10-CM

## 2022-12-15 DIAGNOSIS — E78.5 DYSLIPIDEMIA: ICD-10-CM

## 2022-12-15 DIAGNOSIS — Z95.810 HISTORY OF IMPLANTABLE CARDIAC DEFIBRILLATOR (ICD): ICD-10-CM

## 2022-12-15 DIAGNOSIS — Z98.61 CAD S/P PERCUTANEOUS CORONARY ANGIOPLASTY: ICD-10-CM

## 2022-12-15 DIAGNOSIS — N18.30 STAGE 3 CHRONIC KIDNEY DISEASE, UNSPECIFIED WHETHER STAGE 3A OR 3B CKD (HCC): ICD-10-CM

## 2022-12-15 DIAGNOSIS — Z86.79 HISTORY OF SUPRAVENTRICULAR TACHYCARDIA: ICD-10-CM

## 2022-12-15 DIAGNOSIS — I25.10 CAD S/P PERCUTANEOUS CORONARY ANGIOPLASTY: ICD-10-CM

## 2022-12-15 DIAGNOSIS — Z86.711 HISTORY OF PULMONARY EMBOLISM: ICD-10-CM

## 2022-12-15 DIAGNOSIS — E11.69 DIABETES MELLITUS TYPE 2 IN OBESE (HCC): ICD-10-CM

## 2022-12-15 LAB
ANION GAP SERPL CALCULATED.3IONS-SCNC: 14 MEQ/L (ref 9–15)
BUN BLDV-MCNC: 36 MG/DL (ref 8–23)
CALCIUM SERPL-MCNC: 9 MG/DL (ref 8.5–9.9)
CHLORIDE BLD-SCNC: 103 MEQ/L (ref 95–107)
CO2: 23 MEQ/L (ref 20–31)
CREAT SERPL-MCNC: 2.25 MG/DL (ref 0.7–1.2)
GFR SERPL CREATININE-BSD FRML MDRD: 31.6 ML/MIN/{1.73_M2}
GLUCOSE BLD-MCNC: 155 MG/DL (ref 70–99)
POTASSIUM SERPL-SCNC: 4.3 MEQ/L (ref 3.4–4.9)
PRO-BNP: 1570 PG/ML
SODIUM BLD-SCNC: 140 MEQ/L (ref 135–144)

## 2022-12-15 PROCEDURE — 3046F HEMOGLOBIN A1C LEVEL >9.0%: CPT | Performed by: PHYSICIAN ASSISTANT

## 2022-12-15 PROCEDURE — 3074F SYST BP LT 130 MM HG: CPT | Performed by: PHYSICIAN ASSISTANT

## 2022-12-15 PROCEDURE — 3078F DIAST BP <80 MM HG: CPT | Performed by: PHYSICIAN ASSISTANT

## 2022-12-15 PROCEDURE — 99215 OFFICE O/P EST HI 40 MIN: CPT | Performed by: PHYSICIAN ASSISTANT

## 2022-12-15 ASSESSMENT — ENCOUNTER SYMPTOMS
SHORTNESS OF BREATH: 1
ABDOMINAL DISTENTION: 0
BLOOD IN STOOL: 0
CHEST TIGHTNESS: 0
ABDOMINAL PAIN: 0
COLOR CHANGE: 0

## 2022-12-15 NOTE — PROGRESS NOTES
Patient: Sammie Loyd  YOB: 1960  MRN: [id number]    Chief Complaint:  Chief Complaint   Patient presents with    Established New Doctor     REFERRED TO CHF CLINIC BY DR ROJAS    Congestive Heart Failure    Coronary Artery Disease    Tachycardia     SVT    Edema         Subjective/HPI         12/15/22 CHF clinic visit #1: This is a 49-year-old  male with past medical history significant for systolic congestive heart failure, history of nonischemic cardiomyopathy status post AICD with most recent battery change in 2020 with Dr. Berta Ott, hypertension, dyslipidemia, diabetes, LON on CPAP at bedtime, history of high probability of PE per VQ scan on 11/3/2022 for which patient is on oral anticoagulation with Eliquis, CKD stage III-IV and multiple medical problems who presents for initial CHF clinic evaluation following recent hospital admissions for acute decompensated systolic heart failure. Most recent hospitalization at [facility] was on 11/28/2022 after patient presented with chest pain and palpitations. During this admission he was treated for SVT as well as decompensated systolic heart failure. He had limited echocardiogram completed on 11/28/2022 revealed worsening LV systolic function with EF now 15 to 20% compared to prior EF of 45% per echo on 11/3/2022. During this admission, ICD interrogation showed significantly elevated OptiVol fluid index at 140 so patient was treated with IV Lasix for fluid overload. He also was treated with IV amiodarone as well as digoxin due to persistent SVT and later converted to sinus rhythm. Due to worsening LV dysfunction, patient underwent cardiac catheterization with Dr. Edis Dior on 12/2/2022 which revealed calcified mid LAD with 60 to 70% stenosis status post positive IFR which patient underwent PCI. Patient was stable for discharge on 12/3/2022.   Cardiac/CHF medications at time of discharge included aspirin 81 mg p.o. daily, Brilinta 90 mg p.o. twice daily, Eliquis 5 mg p.o. twice daily, amiodarone 200 mg p.o. daily, Toprol- mg p.o. daily, Imdur 60 mg p.o. daily, Lasix 40 mg p.o. daily, Entresto 24/26 mg p.o. twice daily and sublingual nitroglycerin as needed for chest pain. Patient presents today with his daughter who interprets for him. Since hospital discharge, his shortness of breath has improved and he is feeling better overall. He does experience occasional shortness of breath but nothing persistent. He will also experience some fatigue with exertion. He denies chest pain, palpitations, diaphoresis, paroxysmal nocturnal dyspnea, orthopnea, lower extremity edema, dizziness, lightheadedness, syncope, fever or chills. He is on aspirin and Brilinta due to recent PCI with drug-eluting stent of LAD and he is also on oral anticoagulation with Eliquis due to high probability of PE per VQ scan on 11/3/2022 but denies any bleeding issues. He is compliant with all of his medications. He has been educated at length regarding low-sodium diet and fluid restriction. He reports having had modified his diet specifically sodium intake since his hospitalization. Per daughter, patient has seen nephrology, Dr. Selena Rai, in the past but has not followed recently so new referral will be made at this time. He is following with pulmonology for history of LON as well as recently diagnosed PE per VQ scan and had last office appointment on 11/21/2022. There is plan for repeat CT chest without contrast in future. Most recent labs reviewed and documented below. BMP on 12/3/2022: Sodium 142, potassium 3.9, chloride 107, total CO2 21, BUN elevated 40, creatinine elevated 2.06, GFR low at 35.1, glucose elevated 142  CBC 12/3/2022: WBC 7.8, hemoglobin 11.7, hematocrit 35.5, platelets 881  Lipid panel 11/27/2022:  Total cholesterol 91, triglycerides 84, HDL low at 29, LDL 45  proBNP on 11/26/2022: 1282 compared to 2237 on 11/18/2022  Hemoglobin A1c elevated on 11/3/2022: Elevated 10.7    Most recent diagnostic testing reviewed and documented below. [facility] 12/2/22:  Conclusions  Procedure Summary  LV EF of 30%  LM normal  LAD calcified mid LAD 60-70%% with + IFR and PCI in mid with VERNELL, 0%  residual stenosis, FATEMEH III flow pre and post PCI  CX small, no disease  RCA large caliber, mild disease  Recommendations  Aggressive risk factor management. Maximize medical therapy. Angiographic Findings   Cardiac Arteries and Lesion Findings  LMCA: Normal.  LAD: calcified mid LAD 60-70%% with + IFR    Lesion on Mid LAD: 60% stenosis . Pre procedure FATEMEH III flow was noted. Devices used    - Pact I with \"j\" . 014 190 cm. Number of passes: 1.    - Euphora 3.0x15. 4 inflation(s) to a max pressure of: 16 jesus. - Resolute Ismael 3.5mm x 38mm. 1 inflation(s) to a max pressure of: 18    jesus. - NC EUPHORA BALLOON 4.0MM X 15MM. 3 inflation(s) to a max pressure of:    16 jesus. LCx: small, no disease  RCA: large caliber, mild disease   Coronary Tree   Dominance: Right  LV function assessed as:Abnormal.  Ejection Fraction    - Method: LV gram. EF%: 30.      Limited echo 11/28/22:  Conclusions   Summary   Left ventricular ejection fraction is visually estimated at 15-20%. No LV   apical thrombus noted with Definity contrast utilization. Left ventricular size is mildly increased . Impaired relaxation compatible with diastolic dysfunction. ( reversed E/A   ratio)   No evidence of mitral regurgitation. No evidence of aortic valve regurgitation . There is Trace tricuspid regurgitation with estimated RVSP of 26 mm Hg. Signature   ----------------------------------------------------------------   Electronically signed by Pilo Diane DO(Interpreting   physician) on 11/28/2022 06:32 PM      Echocardiogram 11/3/22:    Conclusions   Summary   Normal mitral valve structure and function.    1-2+ MR   Left ventricular ejection fraction is visually estimated at 45% by use of   Definity. otherwise TDS. Pt is in AF      Signature   ----------------------------------------------------------------   Electronically signed by Drew Erazo MD(Interpreting   physician) on 11/03/2022 10:36 AM   ----------------------------------------------------------------      Vital signs stable in office today. Blood pressure 152/80 on the right and 144/68 on the left, heart rate 72 bpm, pulse ox 95% on room air. Weight is 330 pounds. PMHx:  CAD s/p PCI VERNELL of LAD on 12/2/22  SVT  High probability of PE per VQ scan 11/3/22--on [address] [place] [address] Road with [employer]  Chronic systolic CHF  Worsening cardiomyopathy with EF 15-20% per echo 11/28/22 compared to 45% per echo 11/3/22 and previously recovered NICMP  CKD stage III-IV  HTN  Dyslipidemia  DM  LON on CPAP at HS    PSHx:  ICD--most recent battery change on 4/23/20 with Dr. Johann Gusman Hx:  Former smoker--Quit 12 years ago. 1ppd x 30 years  No alcohol  No drugs    Family Hx:  Mom and father with heart disease and passed from it      Allergies   Allergen Reactions    Coreg [Carvedilol] Other (See Comments)     \"hyper\", rapid pulse    Januvia [Sitagliptin]        Current Outpatient Medications   Medication Sig Dispense Refill    empagliflozin (JARDIANCE) 10 MG tablet Take 1 tablet by mouth daily 30 tablet 3    amiodarone (CORDARONE) 200 MG tablet Take 1 tablet by mouth daily 90 tablet 3    metoprolol succinate (TOPROL XL) 100 MG extended release tablet Take 1 tablet by mouth daily 30 tablet 3    nitroGLYCERIN (NITROSTAT) 0.4 MG SL tablet Place 1 tablet under the tongue every 5 minutes as needed for Chest pain up to max of 3 total doses.  If no relief after 1 dose, call 911. 25 tablet 3    ticagrelor (BRILINTA) 90 MG TABS tablet Take 1 tablet by mouth 2 times daily 60 tablet 3    apixaban (ELIQUIS) 5 MG TABS tablet Take 1 tablet by mouth 2 times daily 180 tablet 5    insulin glargine (LANTUS SOLOSTAR) 100 UNIT/ML injection pen Inject 20 Units into the skin 2 times daily 5 Adjustable Dose Pre-filled Pen Syringe 3    insulin lispro, 1 Unit Dial, (HUMALOG KWIKPEN) 100 UNIT/ML SOPN Inject 8 Units into the skin 3 times daily (before meals) 1 Adjustable Dose Pre-filled Pen Syringe 2    Insulin Pen Needle ([employer] PEN NEEDLES) 31G X 6 MM MISC 1 each by Does not apply route 5 times daily 100 each 3    blood glucose monitor strips Test 5 times a day & as needed for symptoms of irregular blood glucose. Dispense sufficient amount for indicated testing frequency plus additional to accommodate PRN testing needs. 300 strip 2    sacubitril-valsartan (ENTRESTO) 24-26 MG per tablet Take 1 tablet by mouth 2 times daily 60 tablet 2    furosemide (LASIX) 40 MG tablet TAKE 1 TABLET BY MOUTH EVERY DAY. HOLD UNTIL SEEN BY PCP FOR FOLLOWUP (Patient taking differently: TAKE 1 TABLET BY MOUTH EVERY DAY.) 90 tablet 3    aspirin (ASPIRIN ADULT LOW STRENGTH) 81 MG EC tablet TAKE 1 TABLET BY MOUTH DAILY 90 tablet 3    atorvastatin (LIPITOR) 40 MG tablet Take 1 tablet by mouth at bedtime 90 tablet 3    Insulin Pen Needle (B-D UF III MINI PEN NEEDLES) 31G X 5 MM MISC 1 each by Does not apply route daily 100 each 4    fenofibrate (TRIGLIDE) 160 MG tablet TAKE 1 TABLET BY MOUTH EVERY NIGHT 90 tablet 4    albuterol (PROVENTIL) (2.5 MG/3ML) 0.083% nebulizer solution Albuterol albuterol (PROVENTIL) (2.5 MG/3ML) 0.083% nebulizer solution Indications: Type 2 diabetes mellitus without complication, with long-term current use of insulin (HCC) Take 3 mLs by nebulization 4 times daily 120 each 5 10/11/2018 Active 10-  Nayan 28 ([id number])      gabapentin (NEURONTIN) 300 MG capsule Take by mouth nightly.       Blood Glucose Monitoring Suppl (ONE TOUCH ULTRA MINI) w/Device KIT 1 kit by Does not apply route three times daily 1 kit 5    Insulin Syringe-Needle U-100 (INSULIN SYRINGE 1CC/31GX5/16\") 31G X 5/16\" 1 ML MISC Use once daily to administer Lantus 100 each 1    isosorbide mononitrate (IMDUR) 60 MG extended release tablet Take 1 tablet by mouth daily 30 tablet 3    omeprazole (PRILOSEC) 20 MG delayed release capsule Take 1 capsule by mouth daily 90 capsule 4    Lancets MISC Use  each 3     No current facility-administered medications for this visit. Past Medical History:   Diagnosis Date    Acute kidney injury superimposed on CKD ([facility] 08/15/2019    Anxiety and depression 04/30/2019    Cardiomyopathy ([facility]     Chronic systolic heart failure ([facility] 04/30/2019    Diabetes mellitus ([facility]     Diabetic neuropathy associated with type 2 diabetes mellitus ([facility] 12/13/2017    Heart failure, NYHA class 4 ([facility]     Hyperlipidemia     Hypertension     ICD (implantable cardioverter-defibrillator) in place 04/30/2019    Left ureteral calculus     Morbid obesity ([facility] 12/19/2017    LON (obstructive sleep apnea)     Pulmonary embolism ([facility]     ON ELIQUIS    Pyelonephritis 08/04/2019    Ureteral calculus     Ureteric stone        Past Surgical History:   Procedure Laterality Date    CARDIAC DEFIBRILLATOR PLACEMENT  12/2008    CYSTOSCOPY INSERTION / REMOVAL STENT / STONE Left 5/30/2019    CYSTOSCOPY LEFT DOUBLE J STENT PLACEMENT ROOM 287 performed by Abigail Peña MD at [place] Left 6/12/2019    LEFT ESWL (CONF # [id number]) performed by Abigail Peña MD at [facility]    LITHOTRIPSY N/A 7/10/2019    HOLMIUM LASER LITHOTRIPSY, STONE EXTRACTION, LT URETERAL STENT INSERTION.  performed by Abigail Peña MD at [address] [place] [address] Left 7/10/2019    LEFT FLEXIBLE URETEROSCOPY, CYSTOSCOPY RETROGRADE performed by Abigail Peña MD at [facility] History     Socioeconomic History    Marital status: Legally      Spouse name: None    Number of children: None    Years of education: None    Highest education level: None   Tobacco Use    Smoking status: Former     Packs/day: 1.00     Years: 25.00     Pack years: 25.00     Types: Cigarettes     Quit date: 12/1/2008     Years since quittin.0    Smokeless tobacco: Never   Vaping Use    Vaping Use: Never used   Substance and Sexual Activity    Alcohol use: No    Drug use: Never    Sexual activity: Yes     Partners: Female       Family History   Problem Relation Age of Onset    Heart Disease Mother     Kidney Disease Mother     Hypertension Mother     Diabetes Mother     No Known Problems Sister     No Known Problems Brother     No Known Problems Brother          Review of Systems:   Review of Systems   Constitutional:  Positive for fatigue (with exertion). Negative for activity change, chills and unexpected weight change. HENT:  Negative for congestion. Respiratory:  Positive for shortness of breath (improved but still happens occasionally). Negative for chest tightness. Cardiovascular:  Negative for chest pain, palpitations and leg swelling. No orthopnea or PND   Gastrointestinal:  Negative for abdominal distention, abdominal pain and blood in stool. Genitourinary:  Negative for difficulty urinating and hematuria. Musculoskeletal:  Negative for arthralgias and myalgias. Skin:  Negative for color change. Neurological:  Negative for dizziness, syncope and light-headedness. Psychiatric/Behavioral:  Negative for agitation. Physical Examination:    BP (!) 144/68 (Site: Left Upper Arm)   Pulse 72   Resp 16   Wt (!) 330 lb (149.7 kg)   SpO2 95%   BMI 46.03 kg/m²    Physical Exam  Constitutional:       General: He is not in acute distress. Appearance: He is obese. HENT:      Head: Normocephalic and atraumatic. Cardiovascular:      Rate and Rhythm: Normal rate and regular rhythm. Pulmonary:      Effort: Pulmonary effort is normal. No respiratory distress. Breath sounds: No wheezing, rhonchi or rales. Abdominal:      Palpations: Abdomen is soft. Tenderness: There is no abdominal tenderness. Musculoskeletal:         General: Normal range of motion.       Cervical back: Normal range of motion and neck supple. Right lower leg: Edema (trace) present. Left lower leg: Edema (trace) present. Skin:     General: Skin is warm and dry. Neurological:      General: No focal deficit present. Mental Status: He is alert and oriented to person, place, and time. Cranial Nerves: No cranial nerve deficit.    Psychiatric:         Mood and Affect: Mood normal.         Behavior: Behavior normal.       LABS:  CBC:   Lab Results   Component Value Date/Time    WBC 7.8 12/03/2022 04:57 AM    RBC 4.13 12/03/2022 04:57 AM    HGB 11.7 12/03/2022 04:57 AM    HCT 35.5 12/03/2022 04:57 AM    MCV 86.0 12/03/2022 04:57 AM    MCH 28.5 12/03/2022 04:57 AM    MCHC 33.1 12/03/2022 04:57 AM    RDW 16.5 12/03/2022 04:57 AM     12/03/2022 04:57 AM     Lipids:  Lab Results   Component Value Date    CHOL 91 11/27/2022    CHOL 118 04/29/2022    CHOL 106 04/17/2020     Lab Results   Component Value Date    TRIG 84 11/27/2022    TRIG 98 04/29/2022    TRIG 119 04/17/2020     Lab Results   Component Value Date    HDL 29 (L) 11/27/2022    HDL 34 (L) 04/29/2022    HDL 30.0 (A) 04/17/2020     Lab Results   Component Value Date    LDLCHOLESTEROL 52 04/17/2020    LDLCALC 45 11/27/2022    LDLCALC 64 04/29/2022    LDLCALC 39 04/30/2019     Lab Results   Component Value Date    LABVLDL 24 04/17/2020     Lab Results   Component Value Date    CHOLHDLRATIO 3.5 04/17/2020     CMP:    Lab Results   Component Value Date/Time     12/03/2022 04:57 AM    K 3.9 12/03/2022 04:57 AM    K 3.9 12/03/2022 04:57 AM     12/03/2022 04:57 AM    CO2 21 12/03/2022 04:57 AM    BUN 40 12/03/2022 04:57 AM    CREATININE 2.06 12/03/2022 04:57 AM    GFRAA 35.6 10/13/2022 06:45 PM    LABGLOM 35.1 12/03/2022 04:57 AM    GLUCOSE 142 12/03/2022 04:57 AM    GLUCOSE 96 11/14/2022 11:22 AM    PROT 6.2 12/03/2022 04:57 AM    LABALBU 3.1 12/03/2022 04:57 AM    LABALBU 3.3 11/14/2022 11:22 AM    CALCIUM 8.6 12/03/2022 04:57 AM    BILITOT 0.4 12/03/2022 04:57 AM    ALKPHOS 87 12/03/2022 04:57 AM    AST 21 12/03/2022 04:57 AM    ALT 19 12/03/2022 04:57 AM     BMP:    Lab Results   Component Value Date/Time     12/03/2022 04:57 AM    K 3.9 12/03/2022 04:57 AM    K 3.9 12/03/2022 04:57 AM     12/03/2022 04:57 AM    CO2 21 12/03/2022 04:57 AM    BUN 40 12/03/2022 04:57 AM    LABALBU 3.1 12/03/2022 04:57 AM    LABALBU 3.3 11/14/2022 11:22 AM    CREATININE 2.06 12/03/2022 04:57 AM    CALCIUM 8.6 12/03/2022 04:57 AM    GFRAA 35.6 10/13/2022 06:45 PM    LABGLOM 35.1 12/03/2022 04:57 AM    GLUCOSE 142 12/03/2022 04:57 AM    GLUCOSE 96 11/14/2022 11:22 AM     Magnesium:    Lab Results   Component Value Date/Time    MG 2.4 11/18/2022 04:45 AM     TSH:  Lab Results   Component Value Date    TSH 2.21 04/17/2020     . result  No results for input(s): PROBNP in the last 72 hours. No results for input(s): INR in the last 72 hours.             Patient Active Problem List   Diagnosis    Diabetes mellitus ([facility]    Hyperlipidemia    Hypertension    Chronic bilateral low back pain without sciatica    Cardiomyopathy ([facility]    Diabetic neuropathy associated with type 2 diabetes mellitus ([facility]    Morbid obesity (HCC)    LON (obstructive sleep apnea)    Chronic systolic heart failure (HCC)    ICD (implantable cardioverter-defibrillator) in place    Anxiety and depression    Gastroesophageal reflux disease without esophagitis    Keratoderma of foot, acquired    CKD stage 4 due to type 2 diabetes mellitus ([facility]    Vitamin D deficiency    Chronic left shoulder pain    Congestive heart failure (HCC)    Anxiety    Secondary hyperparathyroidism ([facility]    COVID-19    Acute respiratory failure with hypoxia (HCC)    SOB (shortness of breath)    History of COVID-19    Hypoxia    Chronic kidney disease    Acute pulmonary embolism without acute cor pulmonale (HCC)    Coronary artery disease involving native heart without angina pectoris    Shortness of breath    Hypotension    Chest pain Tachycardia    Elevated troponin    SVT (supraventricular tachycardia) ([facility])       Assessment/Plan:     Diagnosis Orders   1. Chronic systolic CHF (congestive heart failure), NYHA class 3 ([address]  [facility]    Basic Metabolic Panel    Brain Natriuretic Peptide    Compensated currently. Add Jardiance to further optimize HF meds. Consider increasing lasix to 40mg daily from M/W/F if renal function stable      2. Cardiomyopathy, unspecified type [facility])  [facility]    Worsening LVF EF 15-20% per limited echo 11/28/22 from 45% on echo 11/3/22 and EF 50% per echo in 2019. 3. CAD S/P percutaneous coronary angioplasty      PCI VERNELL of mid LAD on 12/2/22. Continue DAPT with ASA/Brilinta. 4. History of implantable cardiac defibrillator (ICD)      Maintain follow-up with [facility] device clinic. 5. History of supraventricular tachycardia      Continue Toprol XL 100mg PO daily and amiodarone 200mg PO daily. Avoid OTC stimulants/decongestants      6. Stage 3 chronic kidney disease, unspecified whether stage 3a or 3b CKD ([facility])  Janise Seip, MD, Nephrology, [place]    Basic Metabolic Panel    Brain Natriuretic Peptide    Check BMP to re-evaluate renal function. Refer to nephrology for help with fluid management      7. Hypertension, unspecified type      Continue current meds      8. Dyslipidemia      Continue Lipitor 40mg PO daily      9. Diabetes mellitus type 2 in obese ([facility])      Uncontrolled. HgbA1c elevated at 10.7 on 11/3/22. Management per PCP. Weight loss recommended      10. History of pulmonary embolism      VQ scan 11/3/22 with high probability of PE--on OAC with Eliquis.  Maintain f/u with pulmonology        -Maximize medical therapy--continue dual antiplatelet therapy with aspirin 81 mg p.o. daily and Brilinta 90 mg p.o. twice daily, oral anticoagulation with Eliquis 5 mg p.o. twice daily given VQ scan 11/3/2022 with high probability of PE, Toprol- mg p.o. daily, amiodarone 200 mg p.o. daily, Imdur 60 mg p.o. daily, Lasix 40 mg on Monday/Wednesday/Friday for now, Entresto 24/26 mg p.o. twice daily, Lipitor 40 mg p.o. daily at bedtime, fenofibrate 160 mg p.o. daily at bedtime, add Jardiance 10 mg p.o. daily  **Daughter states that patient was on Lasix 40 mg daily in the past however was previously instructed by Dr. Barrett Gimenez to change to Monday/Wednesday/Friday only due to concern for renal dysfunction. If needed for further diuresis and renal function will tolerate, will increase back to 40 mg daily in future  **Consider discontinuing aspirin after 1/2/23 (1 month of DAPT) to avoid triple therapy as patient also on oral anticoagulation with Eliquis  -Heart failure appears compensated at this time  -Check BMP to reevaluate renal function and electrolytes  -Check proBNP to reevaluate fluid status  -Cardiac/<2 gram sodium diet recommended  -Recommend 1500mL daily fluid restriction   -Instructed patient to weigh self daily every morning upon waking and keep log book of daily weights. Notify office if gaining more than 3 pounds in 48 hours. --Scale provided  -Recommend routine blood pressure monitoring at home.   Advised patient check blood pressure twice daily in a.m. and p.m. prior to taking medications and record log of blood pressure trends to review at next office visit--BP machine provided  -Advised patient to notify office immediately if experiencing any progressive SOB, orthopnea, PND, LE edema or weight gain  -Educated patient on importance of fluid and salt restriction as well as lifestyle modification  -Continue to monitor renal function and electrolytes closely  -Refer to nephrology, Dr. Kaycee Alcantara for ongoing management of CKD and help with fluid management  Per daughter, patient had seen Dr. Kaycee Alcantara in the past but has not followed up recently  -Refer to cardiac rehab  -Maintain routine outpatient follow-up with general cardiologist,  [place]--next appt 1/6/23  **Consider repeat echocardiogram in the next 3 to 6 months to reevaluate LV function  Limited echo 1/28/2022: Severely reduced LV systolic function with EF of 15 to 20%, trace TR, RVSP of 26 mmHg  Echocardiogram 11/3/2022: EF 45%, 1-2+ MR, technically difficult study  [facility] 12/2/22: Left main-normal, LAD-calcified mid 60 to 70% stenosis with positive IFR status post PCI with drug-eluting stent, circumflex-small, no disease, RCA-large caliber with mild disease, EF 30%  -Maintain routine follow-up with [facility] device clinic for ICD checks  -Maintain follow-up with pulmonology as directed  Patient with history of LON on CPAP at bedtime  History of high probability of PE per VQ scan on 11/3/2022  -Maintain routine outpatient follow-up with PCP  -Advised to notify cardiology or CHF clinic immediately with any questions, concerns or changes in condition  -F/U with CHF clinic in 6 weeks or sooner if needed         Counseling: The importance of daily weights, dietary sodium restriction, and contact with cardiology if weight is increased more than 3 lbs in any 48 hour period was stressed. The patient has been advised to contact us if theyexperience progressive SOB, orthopnea, PND or progressive edema.       > 40 minutes spent on chart review and face-to-face time with patient and daughter reviewing history, reviewing recent diagnostic testing, discussing medication adjustments, providing patient education and discussing ongoing plan of care

## 2022-12-15 NOTE — PATIENT INSTRUCTIONS
Continue Lasix 40mg PO on M/W/F for now--may increase back to daily in future if renal function remains stable  Start Jardiance 10mg PO daily    Check labs today (BMP and BNP)    Refer to cardiac rehab  Refer back to nephrology, Dr. Paz Narvaez daily weight every morning and notify CHF clinic if gaining more than 3 pounds in 2 days    -Check blood pressure twice daily in a.m. and p.m. prior to taking medications and keep log of blood pressure trends to review at next office visit  Notify office if BP running low with  mmHg or below prior to taking medications  Notify office if BP running high with  mmHg or above or if DBP 85 mmHg or above    -Recommend 2000 mg daily sodium restriction    -Recommend 1.5 liter (48 ounces) daily fluid restriction

## 2022-12-26 PROBLEM — R79.89 ELEVATED TROPONIN: Status: RESOLVED | Noted: 2022-11-26 | Resolved: 2022-12-26

## 2022-12-26 PROBLEM — R77.8 ELEVATED TROPONIN: Status: RESOLVED | Noted: 2022-11-26 | Resolved: 2022-12-26

## 2023-01-06 ENCOUNTER — OFFICE VISIT (OUTPATIENT)
Dept: CARDIOLOGY CLINIC | Age: 63
End: 2023-01-06
Payer: MEDICARE

## 2023-01-06 VITALS
DIASTOLIC BLOOD PRESSURE: 80 MMHG | HEART RATE: 79 BPM | OXYGEN SATURATION: 96 % | SYSTOLIC BLOOD PRESSURE: 120 MMHG | WEIGHT: 315 LBS | BODY MASS INDEX: 46.58 KG/M2

## 2023-01-06 DIAGNOSIS — I42.0 DILATED CARDIOMYOPATHY (HCC): Primary | ICD-10-CM

## 2023-01-06 DIAGNOSIS — I10 HYPERTENSION, UNSPECIFIED TYPE: Chronic | ICD-10-CM

## 2023-01-06 DIAGNOSIS — E78.2 MIXED HYPERLIPIDEMIA: Chronic | ICD-10-CM

## 2023-01-06 DIAGNOSIS — R06.02 SOB (SHORTNESS OF BREATH): ICD-10-CM

## 2023-01-06 DIAGNOSIS — E66.01 MORBID OBESITY (HCC): ICD-10-CM

## 2023-01-06 DIAGNOSIS — N18.31 STAGE 3A CHRONIC KIDNEY DISEASE (HCC): ICD-10-CM

## 2023-01-06 DIAGNOSIS — I25.10 CORONARY ARTERY DISEASE INVOLVING NATIVE CORONARY ARTERY OF NATIVE HEART WITHOUT ANGINA PECTORIS: Chronic | ICD-10-CM

## 2023-01-06 DIAGNOSIS — E11.22 CKD STAGE 4 DUE TO TYPE 2 DIABETES MELLITUS (HCC): ICD-10-CM

## 2023-01-06 DIAGNOSIS — N18.4 CKD STAGE 4 DUE TO TYPE 2 DIABETES MELLITUS (HCC): ICD-10-CM

## 2023-01-06 PROCEDURE — 3079F DIAST BP 80-89 MM HG: CPT | Performed by: INTERNAL MEDICINE

## 2023-01-06 PROCEDURE — 99214 OFFICE O/P EST MOD 30 MIN: CPT | Performed by: INTERNAL MEDICINE

## 2023-01-06 PROCEDURE — 3074F SYST BP LT 130 MM HG: CPT | Performed by: INTERNAL MEDICINE

## 2023-01-06 ASSESSMENT — ENCOUNTER SYMPTOMS
ABDOMINAL PAIN: 0
ALLERGIC/IMMUNOLOGIC NEGATIVE: 1
EYES NEGATIVE: 1
GASTROINTESTINAL NEGATIVE: 1
NAUSEA: 0
WHEEZING: 0
SHORTNESS OF BREATH: 0
VOMITING: 0
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
## 2023-01-17 ENCOUNTER — OFFICE VISIT (OUTPATIENT)
Dept: PULMONOLOGY | Age: 63
End: 2023-01-17
Payer: MEDICARE

## 2023-01-17 VITALS
SYSTOLIC BLOOD PRESSURE: 112 MMHG | WEIGHT: 315 LBS | BODY MASS INDEX: 44.1 KG/M2 | HEIGHT: 71 IN | DIASTOLIC BLOOD PRESSURE: 72 MMHG | OXYGEN SATURATION: 96 % | RESPIRATION RATE: 16 BRPM | TEMPERATURE: 96.9 F | HEART RATE: 73 BPM

## 2023-01-17 DIAGNOSIS — I26.99 ACUTE PULMONARY EMBOLISM WITHOUT ACUTE COR PULMONALE, UNSPECIFIED PULMONARY EMBOLISM TYPE (HCC): ICD-10-CM

## 2023-01-17 DIAGNOSIS — G47.33 OSA (OBSTRUCTIVE SLEEP APNEA): Primary | ICD-10-CM

## 2023-01-17 DIAGNOSIS — R93.89 ABNORMAL CT OF THE CHEST: ICD-10-CM

## 2023-01-17 DIAGNOSIS — E66.01 CLASS 3 SEVERE OBESITY DUE TO EXCESS CALORIES WITHOUT SERIOUS COMORBIDITY WITH BODY MASS INDEX (BMI) OF 45.0 TO 49.9 IN ADULT (HCC): ICD-10-CM

## 2023-01-17 PROCEDURE — 99213 OFFICE O/P EST LOW 20 MIN: CPT | Performed by: INTERNAL MEDICINE

## 2023-01-17 PROCEDURE — 3074F SYST BP LT 130 MM HG: CPT | Performed by: INTERNAL MEDICINE

## 2023-01-17 PROCEDURE — 3078F DIAST BP <80 MM HG: CPT | Performed by: INTERNAL MEDICINE

## 2023-01-17 NOTE — PROGRESS NOTES
Subjective:     Lake Piña is a 57 y.o. male who complains today of:     Chief Complaint   Patient presents with    Follow-up     2 Month F/U for Abnormal CT Scan of chest and LON on CPAP    Results       HPI  Patient presents for LON    Patient received his new CPAP machine, however he has not been using it due to mechanical issues, he reported that the machine stopped working at the middle of the night creating anxiety, otherwise he denies chest pain, no shortness of breath, no coughing, no nasal congestion postnasal drip and no heartburn. His weight is stable. Allergies:  Coreg [carvedilol] and Januvia [sitagliptin]  Past Medical History:   Diagnosis Date    Acute kidney injury superimposed on CKD ([address] 08/15/2019    Anxiety and depression 04/30/2019    Cardiomyopathy ([address]     Chronic systolic heart failure ([address] 04/30/2019    Diabetes mellitus ([address]     Diabetic neuropathy associated with type 2 diabetes mellitus ([address] 12/13/2017    Heart failure, NYHA class 4 ([address]     Hyperlipidemia     Hypertension     ICD (implantable cardioverter-defibrillator) in place 04/30/2019    Left ureteral calculus     Morbid obesity ([address] 12/19/2017    LON (obstructive sleep apnea)     Pulmonary embolism ([address]     ON ELIQUIS    Pyelonephritis 08/04/2019    Ureteral calculus     Ureteric stone      Past Surgical History:   Procedure Laterality Date    CARDIAC DEFIBRILLATOR PLACEMENT  12/2008    CYSTOSCOPY INSERTION / REMOVAL STENT / STONE Left 5/30/2019    CYSTOSCOPY LEFT DOUBLE J STENT PLACEMENT ROOM 287 performed by Jaylon Coyne MD at [place] Left 6/12/2019    LEFT ESWL (CONF # [id number]) performed by Jaylon Coyne MD at [facility]    LITHOTRIPSY N/A 7/10/2019    HOLMIUM LASER LITHOTRIPSY, STONE EXTRACTION, LT URETERAL STENT INSERTION.  performed by Jaylon Coyne MD at [address] [place] [address] Left 7/10/2019    LEFT FLEXIBLE URETEROSCOPY, CYSTOSCOPY RETROGRADE performed by Jaylon Coyne MD at [address] History   Problem Relation Age of Onset    Heart Disease Mother     Kidney Disease Mother     Hypertension Mother     Diabetes Mother     No Known Problems Sister     No Known Problems Brother     No Known Problems Brother      Social History     Socioeconomic History    Marital status: Legally      Spouse name: Not on file    Number of children: Not on file    Years of education: Not on file    Highest education level: Not on file   Occupational History    Not on file   Tobacco Use    Smoking status: Former     Packs/day: 1.00     Years: 25.00     Pack years: 25.00     Types: Cigarettes     Quit date: 2008     Years since quittin.1    Smokeless tobacco: Never   Vaping Use    Vaping Use: Never used   Substance and Sexual Activity    Alcohol use: No    Drug use: Never    Sexual activity: Yes     Partners: Female   Other Topics Concern    Not on file   Social History Narrative    Not on file     Social Determinants of Health     Financial Resource Strain: Not on file   Food Insecurity: Not on file   Transportation Needs: Not on file   Physical Activity: Not on file   Stress: Not on file   Social Connections: Not on file   Intimate Partner Violence: Not on file   Housing Stability: Not on file         Review of Systems      ROS: 10 organs review of system is done including general, psychological, ENT, hematological, endocrine, respiratory, cardiovascular, gastrointestinal,musculoskeletal, neurological,  allergy and Immunology is done and is otherwise negative.     Current Outpatient Medications   Medication Sig Dispense Refill    empagliflozin (JARDIANCE) 10 MG tablet Take 1 tablet by mouth daily 30 tablet 3    amiodarone (CORDARONE) 200 MG tablet Take 1 tablet by mouth daily 90 tablet 3    metoprolol succinate (TOPROL XL) 100 MG extended release tablet Take 1 tablet by mouth daily 30 tablet 3    nitroGLYCERIN (NITROSTAT) 0.4 MG SL tablet Place 1 tablet under the tongue every 5 minutes as needed for Chest pain up to max of 3 total doses. If no relief after 1 dose, call 911. 25 tablet 3    isosorbide mononitrate (IMDUR) 60 MG extended release tablet Take 1 tablet by mouth daily 30 tablet 3    ticagrelor (BRILINTA) 90 MG TABS tablet Take 1 tablet by mouth 2 times daily 60 tablet 3    insulin glargine (LANTUS SOLOSTAR) 100 UNIT/ML injection pen Inject 20 Units into the skin 2 times daily 5 Adjustable Dose Pre-filled Pen Syringe 3    insulin lispro, 1 Unit Dial, (HUMALOG KWIKPEN) 100 UNIT/ML SOPN Inject 8 Units into the skin 3 times daily (before meals) 1 Adjustable Dose Pre-filled Pen Syringe 2    Insulin Pen Needle ([employer] PEN NEEDLES) 31G X 6 MM MISC 1 each by Does not apply route 5 times daily 100 each 3    blood glucose monitor strips Test 5 times a day & as needed for symptoms of irregular blood glucose. Dispense sufficient amount for indicated testing frequency plus additional to accommodate PRN testing needs. 300 strip 2    sacubitril-valsartan (ENTRESTO) 24-26 MG per tablet Take 1 tablet by mouth 2 times daily 60 tablet 2    furosemide (LASIX) 40 MG tablet TAKE 1 TABLET BY MOUTH EVERY DAY.  HOLD UNTIL SEEN BY PCP FOR FOLLOWUP (Patient taking differently: TAKE 1 TABLET BY MOUTH EVERY DAY.) 90 tablet 3    aspirin (ASPIRIN ADULT LOW STRENGTH) 81 MG EC tablet TAKE 1 TABLET BY MOUTH DAILY 90 tablet 3    atorvastatin (LIPITOR) 40 MG tablet Take 1 tablet by mouth at bedtime 90 tablet 3    Insulin Pen Needle (B-D UF III MINI PEN NEEDLES) 31G X 5 MM MISC 1 each by Does not apply route daily 100 each 4    fenofibrate (TRIGLIDE) 160 MG tablet TAKE 1 TABLET BY MOUTH EVERY NIGHT 90 tablet 4    albuterol (PROVENTIL) (2.5 MG/3ML) 0.083% nebulizer solution Albuterol albuterol (PROVENTIL) (2.5 MG/3ML) 0.083% nebulizer solution Indications: Type 2 diabetes mellitus without complication, with long-term current use of insulin (HCC) Take 3 mLs by nebulization 4 times daily 120 each 5 10/11/2018 Active 10-  [place], [place] ([id number])      gabapentin (NEURONTIN) 300 MG capsule Take by mouth nightly.      omeprazole (PRILOSEC) 20 MG delayed release capsule Take 1 capsule by mouth daily 90 capsule 4    Blood Glucose Monitoring Suppl (ONE TOUCH ULTRA MINI) w/Device KIT 1 kit by Does not apply route three times daily 1 kit 5    Lancets MISC Use  each 3    Insulin Syringe-Needle U-100 (INSULIN SYRINGE 1CC/31GX5/16\") 31G X 5/16\" 1 ML MISC Use once daily to administer Lantus 100 each 1    apixaban (ELIQUIS) 5 MG TABS tablet Take 1 tablet by mouth 2 times daily 180 tablet 5     No current facility-administered medications for this visit.       Objective:     Vitals:    01/17/23 1536   BP: 112/72   Site: Right Upper Arm   Position: Sitting   Cuff Size: Large Adult   Pulse: 73   Resp: 16   Temp: 96.9 °F (36.1 °C)   TempSrc: Infrared   SpO2: 96%   Weight: (!) 336 lb 6.4 oz (152.6 kg)   Height: 5' 11\" (1.803 m)         Physical Exam  Constitutional:       Appearance: He is well-developed.   HENT:      Head: Normocephalic and atraumatic.   Eyes:      General:         Left eye: No discharge.      Conjunctiva/sclera: Conjunctivae normal.      Pupils: Pupils are equal, round, and reactive to light.   Neck:      Vascular: No JVD.   Cardiovascular:      Rate and Rhythm: Normal rate and regular rhythm.      Heart sounds: Normal heart sounds. No murmur heard.    No friction rub. No gallop.   Pulmonary:      Effort: Pulmonary effort is normal. No respiratory distress.      Breath sounds: Normal breath sounds. No wheezing or rales.   Chest:      Chest wall: No tenderness.   Abdominal:      General: Bowel sounds are normal.      Palpations: Abdomen is soft.   Musculoskeletal:         General: No tenderness or deformity.      Cervical back: Normal range of motion and neck supple.      Right lower leg: No edema.      Left lower leg: No edema.   Skin:     General: Skin is warm and dry.   Neurological:      Mental Status: He  is alert and oriented to person, place, and time. Psychiatric:         Judgment: Judgment normal.       Imaging studies reviewed and interpreted by me CT chest October 2022, shows no mass, possible right lower lobe nodule  Lab results reviewed in chart  PFT none  ECHO: November 2022, EF 45%, 2+ MR   Sleep study 2018 shows AHI 10, RDI 45, CPAP 9  Assessment and Plan       Diagnosis Orders   1. LON (obstructive sleep apnea)        2. Abnormal CT of the chest  CT CHEST WO CONTRAST      3. Class 3 severe obesity due to excess calories without serious comorbidity with body mass index (BMI) of 45.0 to 49.9 in adult ([facility]        4. Acute pulmonary embolism without acute cor pulmonale, unspecified pulmonary embolism type (HCC)          LON, mild, on CPAP 9, patient will call [facility] for service, will reevaluate compliance on follow-up  Abnormal CT, will reorder CT chest and reevaluate on follow-up  History of provoked PE November 2022 Will need 6-month of treatment, currently on Eliquis  Weight loss is recommended        Orders Placed This Encounter   Procedures    CT CHEST WO CONTRAST     Standing Status:   Future     Standing Expiration Date:   1/17/2024     No orders of the defined types were placed in this encounter. Discussed with patient the importance of exercise and weight control and  overall health and well-being. Reviewed with the patient: current clinical status, medications, activities and diet. Side effects, adverse effects of the medication prescribed today, as well as treatment plan and result expectations have been discussed with the patient who expresses understanding and desires to proceed. Return in about 29 days (around 2/15/2023).       Apryl Mosqueda MD

## 2023-01-26 ENCOUNTER — OFFICE VISIT (OUTPATIENT)
Dept: CARDIOLOGY CLINIC | Age: 63
End: 2023-01-26
Payer: MEDICARE

## 2023-01-26 VITALS
HEART RATE: 76 BPM | BODY MASS INDEX: 47 KG/M2 | WEIGHT: 315 LBS | RESPIRATION RATE: 16 BRPM | DIASTOLIC BLOOD PRESSURE: 78 MMHG | OXYGEN SATURATION: 99 % | SYSTOLIC BLOOD PRESSURE: 128 MMHG

## 2023-01-26 DIAGNOSIS — E78.5 DYSLIPIDEMIA: ICD-10-CM

## 2023-01-26 DIAGNOSIS — I10 HYPERTENSION, UNSPECIFIED TYPE: ICD-10-CM

## 2023-01-26 DIAGNOSIS — Z95.810 HISTORY OF AUTOMATIC INTERNAL CARDIAC DEFIBRILLATOR (AICD): ICD-10-CM

## 2023-01-26 DIAGNOSIS — Z86.711 HISTORY OF PULMONARY EMBOLUS (PE): ICD-10-CM

## 2023-01-26 DIAGNOSIS — E66.9 DIABETES MELLITUS TYPE 2 IN OBESE (HCC): ICD-10-CM

## 2023-01-26 DIAGNOSIS — E11.69 DIABETES MELLITUS TYPE 2 IN OBESE (HCC): ICD-10-CM

## 2023-01-26 DIAGNOSIS — Z98.61 CAD S/P PERCUTANEOUS CORONARY ANGIOPLASTY: ICD-10-CM

## 2023-01-26 DIAGNOSIS — I25.10 CAD S/P PERCUTANEOUS CORONARY ANGIOPLASTY: ICD-10-CM

## 2023-01-26 DIAGNOSIS — I50.22 CHRONIC SYSTOLIC CHF (CONGESTIVE HEART FAILURE), NYHA CLASS 2 (HCC): ICD-10-CM

## 2023-01-26 DIAGNOSIS — I42.9 CARDIOMYOPATHY, UNSPECIFIED TYPE (HCC): ICD-10-CM

## 2023-01-26 DIAGNOSIS — Z86.79 HISTORY OF PAROXYSMAL SUPRAVENTRICULAR TACHYCARDIA: ICD-10-CM

## 2023-01-26 DIAGNOSIS — N18.30 STAGE 3 CHRONIC KIDNEY DISEASE, UNSPECIFIED WHETHER STAGE 3A OR 3B CKD (HCC): ICD-10-CM

## 2023-01-26 PROCEDURE — 99214 OFFICE O/P EST MOD 30 MIN: CPT | Performed by: PHYSICIAN ASSISTANT

## 2023-01-26 PROCEDURE — 3078F DIAST BP <80 MM HG: CPT | Performed by: PHYSICIAN ASSISTANT

## 2023-01-26 PROCEDURE — 3074F SYST BP LT 130 MM HG: CPT | Performed by: PHYSICIAN ASSISTANT

## 2023-01-26 RX ORDER — FUROSEMIDE 40 MG/1
40 TABLET ORAL 2 TIMES DAILY
Qty: 60 TABLET | Refills: 3 | Status: SHIPPED | OUTPATIENT
Start: 2023-01-26

## 2023-01-26 ASSESSMENT — ENCOUNTER SYMPTOMS
COUGH: 1
COLOR CHANGE: 0
VOMITING: 0
SHORTNESS OF BREATH: 1
ABDOMINAL DISTENTION: 0
CHEST TIGHTNESS: 0
ABDOMINAL PAIN: 0
NAUSEA: 0
BLOOD IN STOOL: 0

## 2023-01-26 NOTE — PATIENT INSTRUCTIONS
-Increase lasix to 40mg PO twice daily    Check labs (BNP and BMP) next week      -Check daily weight every morning and notify CHF clinic if gaining more than 3 pounds in 2 days    -Check blood pressure twice daily in a.m. and p.m. prior to taking medications and keep log of blood pressure trends to review at next office visit  Notify office if BP running low with  mmHg or below prior to taking medications  Notify office if BP running high with  mmHg or above or if DBP 85 mmHg or above    -Recommend 2000 mg daily sodium restriction    -Recommend 1.5 liter (48 ounces) daily fluid restriction

## 2023-01-26 NOTE — PROGRESS NOTES
Patient: Balwinder Arce  YOB: 1960  MRN: [id number]    Chief Complaint:  Chief Complaint   Patient presents with    Congestive Heart Failure     6 week    Discuss Labs     12/15/2022         Subjective/HPI     1/26/23: Here for follow-up of acute on chronic systolic congestive heart failure with EF of 15% per echo 11/28/2022. Patient presents today with his daughter who helps interpret for him. Patient most recently saw Dr. Bebo Keen for follow-up on 1/6/2023 at which time his Lasix was increased from Monday Wednesday Friday to 40 mg daily due to concern for fluid overload. He also had recent appointment with pulmonology on 1/17/2023 at which time CT chest without contrast was ordered and is scheduled to be completed next Tuesday. Also, patient has history of sleep apnea for which she was previously wearing CPAP but apparently the machine stopped working in the middle of the night which is caused him some anxiety so he stopped wearing it. He was provided with number to call [employer] to service his machine. Daughter states that she has yet to call the company. Given his history of PE with previous VQ scan showing high probability of pulmonary embolism, pulmonology recommending 6 months of anticoagulation with Eliquis. Daughter states that she had called nephrology office to schedule follow-up but had to leave a message and when return call was made by the office she was unavailable so she still has yet to schedule his follow-up. Patient states he is doing well overall. Does occasionally have shortness of breath with exertion as well as some orthopnea especially now that he is not wearing his CPAP at bedtime. Also reports abdominal fullness and feeling full quickly when eating.   Denies chest pain, palpitations, diaphoresis, worsening lower extremity edema, dizziness, lightheadedness, syncope, hematochezia or melena, dysuria or hematuria or difficulty urinating, fever or chills. He is compliant with all of his medications. He is compliant with low-sodium diet. He believes he still may be drinking more than recommended fluid allowance but has improved his fluid intake compared to prior. Most recent remote ICD interrogation reviewed and documented below. It does show elevated OptiVol fluid index which is an ongoing issue since 11/20/2022. Unable to find exact OptiVol index level but per the graph level appears to be above 160. Remote ICD check on 12/13/22:  DUAL LEAD REMOTE ICD EVALUATION: RESULTS SINCE 11/29/2022 (PT CHECKED SOMEWHERE OTHER THAN OUR CLINIC)  ALERTS: OPTIVOL INDEX ABOVE THRESHOLD SINCE 11/20/2022 AND ONGOING  PRESENTING RYTHM: AS-VS  BATTERY STATUS: ESTIMATED LONGEVITY 8.0 YEARS  LEAD(S): SENSING AND IMPEDANCES WNL, CAPTURE THRESHOLD WNL, PACING OUTPUTS MAINTAIN SAFETY MARGIN. PACING: ATRIAL 39.3% VENTRICULAR 0.4%  ATRIAL ARRHYTHMIAS: 25 SVT EPISODES, 19 OF WHICH WERE ON 11/28/2022. EGM'S SHOW 1:1 WITH . AF BURDEN: 0.0%  VENTRICULAR ARRHYTHMIAS: NONE  Rx FOLLOW UP IN CLINIC & REMOTELY AS NEEDED: NEXT SCHEDULED IN CLINIC: 03/14/2023  FAXED TO DR. MEDEROS FOR OPTIVOL INDEX. THADDEUS SALGADO      Most recent labs reviewed and documented below. proBNP on 12/15/2022: 1570 compared to 1282 on 11/26/2022 and 2237 on 11/18/2022  BMP on 12/15/2022: Sodium 140, potassium 4.3, chloride 103, total CO2 23, BUN elevated at 36, creatinine elevated 2.25, GFR low at 31.6, glucose 155--renal function electrolytes stable compared to prior. Vital signs stable in office today. Blood pressure 128/78, heart rate 76, pulse ox 99% on room air.   Weight is 337 pounds compared to 330 pounds at last office visit on 12/15/2022.      12/15/22 CHF clinic visit #1: This is a 20-year-old  male with past medical history significant for systolic congestive heart failure, history of nonischemic cardiomyopathy status post AICD with most recent battery change in 2020 with Dr. Lamont Low, hypertension, dyslipidemia, diabetes, LON on CPAP at bedtime, history of high probability of PE per VQ scan on 11/3/2022 for which patient is on oral anticoagulation with Eliquis, CKD stage III-IV and multiple medical problems who presents for initial CHF clinic evaluation following recent hospital admissions for acute decompensated systolic heart failure. Most recent hospitalization at [facility] was on 11/28/2022 after patient presented with chest pain and palpitations. During this admission he was treated for SVT as well as decompensated systolic heart failure. He had limited echocardiogram completed on 11/28/2022 revealed worsening LV systolic function with EF now 15 to 20% compared to prior EF of 45% per echo on 11/3/2022. During this admission, ICD interrogation showed significantly elevated OptiVol fluid index at 140 so patient was treated with IV Lasix for fluid overload. He also was treated with IV amiodarone as well as digoxin due to persistent SVT and later converted to sinus rhythm. Due to worsening LV dysfunction, patient underwent cardiac catheterization with Dr. Hamilton Officer on 12/2/2022 which revealed calcified mid LAD with 60 to 70% stenosis status post positive IFR which patient underwent PCI. Patient was stable for discharge on 12/3/2022. Cardiac/CHF medications at time of discharge included aspirin 81 mg p.o. daily, Brilinta 90 mg p.o. twice daily, Eliquis 5 mg p.o. twice daily, amiodarone 200 mg p.o. daily, Toprol- mg p.o. daily, Imdur 60 mg p.o. daily, Lasix 40 mg p.o. daily, Entresto 24/26 mg p.o. twice daily and sublingual nitroglycerin as needed for chest pain. Patient presents today with his daughter who interprets for him. Since hospital discharge, his shortness of breath has improved and he is feeling better overall. He does experience occasional shortness of breath but nothing persistent. He will also experience some fatigue with exertion.   He denies chest pain, palpitations, diaphoresis, paroxysmal nocturnal dyspnea, orthopnea, lower extremity edema, dizziness, lightheadedness, syncope, fever or chills. He is on aspirin and Brilinta due to recent PCI with drug-eluting stent of LAD and he is also on oral anticoagulation with Eliquis due to high probability of PE per VQ scan on 11/3/2022 but denies any bleeding issues. He is compliant with all of his medications. He has been educated at length regarding low-sodium diet and fluid restriction. He reports having had modified his diet specifically sodium intake since his hospitalization. Per daughter, patient has seen nephrology, Dr. Thien Gómez, in the past but has not followed recently so new referral will be made at this time. He is following with pulmonology for history of LON as well as recently diagnosed PE per VQ scan and had last office appointment on 11/21/2022. There is plan for repeat CT chest without contrast in future. Most recent labs reviewed and documented below. BMP on 12/3/2022: Sodium 142, potassium 3.9, chloride 107, total CO2 21, BUN elevated 40, creatinine elevated 2.06, GFR low at 35.1, glucose elevated 142  CBC 12/3/2022: WBC 7.8, hemoglobin 11.7, hematocrit 35.5, platelets 738  Lipid panel 11/27/2022: Total cholesterol 91, triglycerides 84, HDL low at 29, LDL 45  proBNP on 11/26/2022: 1282 compared to 2237 on 11/18/2022  Hemoglobin A1c elevated on 11/3/2022: Elevated 10.7    Most recent diagnostic testing reviewed and documented below. [facility] 12/2/22:  Conclusions  Procedure Summary  LV EF of 30%  LM normal  LAD calcified mid LAD 60-70%% with + IFR and PCI in mid with VERNELL, 0%  residual stenosis, FATEMEH III flow pre and post PCI  CX small, no disease  RCA large caliber, mild disease  Recommendations  Aggressive risk factor management. Maximize medical therapy.    Angiographic Findings   Cardiac Arteries and Lesion Findings  LMCA: Normal.  LAD: calcified mid LAD 60-70%% with + IFR    Lesion on Mid LAD: 60% stenosis . Pre procedure FATEMEH III flow was noted. Devices used    - [employer] I with \"j\" . 014 190 cm. Number of passes: 1.    - Euphora 3.0x15. 4 inflation(s) to a max pressure of: 16 jesus. - Resolute Ismael 3.5mm x 38mm. 1 inflation(s) to a max pressure of: 18    jesus. - NC EUPHORA BALLOON 4.0MM X 15MM. 3 inflation(s) to a max pressure of:    16 jesus. LCx: small, no disease  RCA: large caliber, mild disease   Coronary Tree   Dominance: Right  LV function assessed as:Abnormal.  Ejection Fraction    - Method: LV gram. EF%: 30.      Limited echo 11/28/22:  Conclusions   Summary   Left ventricular ejection fraction is visually estimated at 15-20%. No LV   apical thrombus noted with Definity contrast utilization. Left ventricular size is mildly increased . Impaired relaxation compatible with diastolic dysfunction. ( reversed E/A   ratio)   No evidence of mitral regurgitation. No evidence of aortic valve regurgitation . There is Trace tricuspid regurgitation with estimated RVSP of 26 mm Hg. Signature   ----------------------------------------------------------------   Electronically signed by Jennifer Garsia DO(Interpreting   physician) on 11/28/2022 06:32 PM      Echocardiogram 11/3/22:    Conclusions   Summary   Normal mitral valve structure and function. 1-2+ MR   Left ventricular ejection fraction is visually estimated at 45% by use of   Definity. otherwise TDS. Pt is in AF      Signature   ----------------------------------------------------------------   Electronically signed by Princess Marquise MD(Interpreting   physician) on 11/03/2022 10:36 AM   ----------------------------------------------------------------      Vital signs stable in office today. Blood pressure 152/80 on the right and 144/68 on the left, heart rate 72 bpm, pulse ox 95% on room air. Weight is 330 pounds.       PMHx:  CAD s/p PCI VERNELL of LAD on 12/2/22  SVT  High probability of PE per VQ scan 11/3/22--on [address] with Eliquis  Chronic systolic CHF  Worsening cardiomyopathy with EF 15-20% per echo 11/28/22 compared to 45% per echo 11/3/22 and previously recovered NICMP  CKD stage III-IV  HTN  Dyslipidemia  DM  LON on CPAP at HS    PSHx:  ICD--most recent battery change on 4/23/20 with Dr. William Whitaker Hx:  Former smoker--Quit 12 years ago. 1ppd x 30 years  No alcohol  No drugs    Family Hx:  Mom and father with heart disease and passed from it      Allergies   Allergen Reactions    Coreg [Carvedilol] Other (See Comments)     \"hyper\", rapid pulse    Januvia [Sitagliptin]        Current Outpatient Medications   Medication Sig Dispense Refill    furosemide (LASIX) 40 MG tablet Take 1 tablet by mouth 2 times daily 60 tablet 3    empagliflozin (JARDIANCE) 10 MG tablet Take 1 tablet by mouth daily 30 tablet 3    amiodarone (CORDARONE) 200 MG tablet Take 1 tablet by mouth daily 90 tablet 3    metoprolol succinate (TOPROL XL) 100 MG extended release tablet Take 1 tablet by mouth daily 30 tablet 3    nitroGLYCERIN (NITROSTAT) 0.4 MG SL tablet Place 1 tablet under the tongue every 5 minutes as needed for Chest pain up to max of 3 total doses. If no relief after 1 dose, call 911. 25 tablet 3    isosorbide mononitrate (IMDUR) 60 MG extended release tablet Take 1 tablet by mouth daily 30 tablet 3    ticagrelor (BRILINTA) 90 MG TABS tablet Take 1 tablet by mouth 2 times daily 60 tablet 3    insulin glargine (LANTUS SOLOSTAR) 100 UNIT/ML injection pen Inject 20 Units into the skin 2 times daily 5 Adjustable Dose Pre-filled Pen Syringe 3    insulin lispro, 1 Unit Dial, (HUMALOG KWIKPEN) 100 UNIT/ML SOPN Inject 8 Units into the skin 3 times daily (before meals) 1 Adjustable Dose Pre-filled Pen Syringe 2    Insulin Pen Needle ([employer] PEN NEEDLES) 31G X 6 MM MISC 1 each by Does not apply route 5 times daily 100 each 3    blood glucose monitor strips Test 5 times a day & as needed for symptoms of irregular blood glucose.  Dispense sufficient amount for indicated testing frequency plus additional to accommodate PRN testing needs. 300 strip 2    sacubitril-valsartan (ENTRESTO) 24-26 MG per tablet Take 1 tablet by mouth 2 times daily 60 tablet 2    aspirin (ASPIRIN ADULT LOW STRENGTH) 81 MG EC tablet TAKE 1 TABLET BY MOUTH DAILY 90 tablet 3    atorvastatin (LIPITOR) 40 MG tablet Take 1 tablet by mouth at bedtime 90 tablet 3    Insulin Pen Needle (B-D UF III MINI PEN NEEDLES) 31G X 5 MM MISC 1 each by Does not apply route daily 100 each 4    fenofibrate (TRIGLIDE) 160 MG tablet TAKE 1 TABLET BY MOUTH EVERY NIGHT 90 tablet 4    albuterol (PROVENTIL) (2.5 MG/3ML) 0.083% nebulizer solution Albuterol albuterol (PROVENTIL) (2.5 MG/3ML) 0.083% nebulizer solution Indications: Type 2 diabetes mellitus without complication, with long-term current use of insulin ([facility]) Take 3 mLs by nebulization 4 times daily 120 each 5 10/11/2018 Active 10-  Nayan 28 ([id number])      gabapentin (NEURONTIN) 300 MG capsule Take by mouth nightly. omeprazole (PRILOSEC) 20 MG delayed release capsule Take 1 capsule by mouth daily 90 capsule 4    Blood Glucose Monitoring Suppl (ONE TOUCH ULTRA MINI) w/Device KIT 1 kit by Does not apply route three times daily 1 kit 5    Lancets MISC Use  each 3    Insulin Syringe-Needle U-100 (INSULIN SYRINGE 1CC/31GX5/16\") 31G X 5/16\" 1 ML MISC Use once daily to administer Lantus 100 each 1    apixaban (ELIQUIS) 5 MG TABS tablet Take 1 tablet by mouth 2 times daily 180 tablet 5     No current facility-administered medications for this visit.        Past Medical History:   Diagnosis Date    Acute kidney injury superimposed on CKD ([facility] 08/15/2019    Anxiety and depression 04/30/2019    Card[facility]     Chronic systolic heart failure ([facility] 04/30/2019    Diabetes mellitus ([facility]     Diabetic neuropathy associated with type 2 diabetes mellitus ([facility] 12/13/2017    Heart failure, NYHA class 4 ([facility])     Hyperlipidemia     Hypertension ICD (implantable cardioverter-defibrillator) in place 2019    Left ureteral calculus     Morbid obesity ([facility] [address] 2017    LON (obstructive sleep apnea)     Pulmonary embolism ([facility] [address]     ON ELIQUIS    Pyelonephritis 2019    Ureteral calculus     Ureteric stone        Past Surgical History:   Procedure Laterality Date    CARDIAC DEFIBRILLATOR PLACEMENT  2008    CYSTOSCOPY INSERTION / REMOVAL STENT / STONE Left 2019    CYSTOSCOPY LEFT DOUBLE J STENT PLACEMENT ROOM 287 performed by Saniya Esquivel MD at [facility]    LITHOTRIPSY Left 2019    LEFT ESWL (CONF # [id number]) performed by Saniya Esquivel MD at [facility]    LITHOTRIPSY N/A 7/10/2019    HOLMIUM LASER LITHOTRIPSY, STONE EXTRACTION, LT URETERAL STENT INSERTION. performed by Saniya Esquivel MD at [address] [place] [address] Left 7/10/2019    LEFT FLEXIBLE URETEROSCOPY, CYSTOSCOPY RETROGRADE performed by Saniya Esquivel MD at [facility] History     Socioeconomic History    Marital status: Legally      Spouse name: None    Number of children: None    Years of education: None    Highest education level: None   Tobacco Use    Smoking status: Former     Packs/day: 1.00     Years: 25.00     Pack years: 25.00     Types: Cigarettes     Quit date: 2008     Years since quittin.1    Smokeless tobacco: Never   Vaping Use    Vaping Use: Never used   Substance and Sexual Activity    Alcohol use: No    Drug use: Never    Sexual activity: Yes     Partners: Female       Family History   Problem Relation Age of Onset    Heart Disease Mother     Kidney Disease Mother     Hypertension Mother     Diabetes Mother     No Known Problems Sister     No Known Problems Brother     No Known Problems Brother          Review of Systems:   Review of Systems   Constitutional:  Positive for fatigue (with exertion). Negative for activity change, chills and unexpected weight change. HENT:  Negative for congestion.     Respiratory:  Positive for cough (occasional) and shortness of breath (occasional SOB with exertion). Negative for chest tightness. Cardiovascular:  Negative for chest pain, palpitations and leg swelling.        + orthopnea or PND--not on CPAP right now as machine not working   Gastrointestinal:  Negative for abdominal distention, abdominal pain, blood in stool, nausea and vomiting. Feels full quickly with eating   Genitourinary:  Negative for difficulty urinating and hematuria. Musculoskeletal:  Positive for arthralgias (left shoulder pain and limited rotation). Negative for myalgias. Skin:  Negative for color change. Neurological:  Negative for dizziness, syncope and light-headedness. Psychiatric/Behavioral:  Negative for agitation. Physical Examination:    /78 (Site: Right Upper Arm, Position: Sitting, Cuff Size: Large Adult)   Pulse 76   Resp 16   Wt (!) 337 lb (152.9 kg)   SpO2 99%   BMI 47.00 kg/m²    Physical Exam  Constitutional:       General: He is not in acute distress. Appearance: He is obese. HENT:      Head: Normocephalic and atraumatic. Cardiovascular:      Rate and Rhythm: Normal rate and regular rhythm. Pulmonary:      Effort: Pulmonary effort is normal. No respiratory distress. Breath sounds: No wheezing, rhonchi or rales. Abdominal:      Palpations: Abdomen is soft. Tenderness: There is no abdominal tenderness. Musculoskeletal:         General: Normal range of motion. Cervical back: Normal range of motion and neck supple. Right lower leg: No edema. Left lower leg: No edema. Skin:     General: Skin is warm and dry. Neurological:      General: No focal deficit present. Mental Status: He is alert and oriented to person, place, and time. Cranial Nerves: No cranial nerve deficit.    Psychiatric:         Mood and Affect: Mood normal.         Behavior: Behavior normal.       LABS:  CBC:   Lab Results   Component Value Date/Time    WBC 7.8 12/03/2022 04:57 AM    RBC 4.13 12/03/2022 04:57 AM    HGB 11.7 12/03/2022 04:57 AM    HCT 35.5 12/03/2022 04:57 AM    MCV 86.0 12/03/2022 04:57 AM    MCH 28.5 12/03/2022 04:57 AM    MCHC 33.1 12/03/2022 04:57 AM    RDW 16.5 12/03/2022 04:57 AM     12/03/2022 04:57 AM     Lipids:  Lab Results   Component Value Date    CHOL 91 11/27/2022    CHOL 118 04/29/2022    CHOL 106 04/17/2020     Lab Results   Component Value Date    TRIG 84 11/27/2022    TRIG 98 04/29/2022    TRIG 119 04/17/2020     Lab Results   Component Value Date    HDL 29 (L) 11/27/2022    HDL 34 (L) 04/29/2022    HDL 30.0 (A) 04/17/2020     Lab Results   Component Value Date    LDLCHOLESTEROL 52 04/17/2020    LDLCALC 45 11/27/2022    LDLCALC 64 04/29/2022    LDLCALC 39 04/30/2019     Lab Results   Component Value Date    LABVLDL 24 04/17/2020     Lab Results   Component Value Date    CHOLHDLRATIO 3.5 04/17/2020     CMP:    Lab Results   Component Value Date/Time     12/15/2022 12:47 PM    K 4.3 12/15/2022 12:47 PM    K 3.9 12/03/2022 04:57 AM     12/15/2022 12:47 PM    CO2 23 12/15/2022 12:47 PM    BUN 36 12/15/2022 12:47 PM    CREATININE 2.25 12/15/2022 12:47 PM    GFRAA 35.6 10/13/2022 06:45 PM    LABGLOM 31.6 12/15/2022 12:47 PM    GLUCOSE 155 12/15/2022 12:47 PM    GLUCOSE 96 11/14/2022 11:22 AM    PROT 6.2 12/03/2022 04:57 AM    LABALBU 3.1 12/03/2022 04:57 AM    LABALBU 3.3 11/14/2022 11:22 AM    CALCIUM 9.0 12/15/2022 12:47 PM    BILITOT 0.4 12/03/2022 04:57 AM    ALKPHOS 87 12/03/2022 04:57 AM    AST 21 12/03/2022 04:57 AM    ALT 19 12/03/2022 04:57 AM     BMP:    Lab Results   Component Value Date/Time     12/15/2022 12:47 PM    K 4.3 12/15/2022 12:47 PM    K 3.9 12/03/2022 04:57 AM     12/15/2022 12:47 PM    CO2 23 12/15/2022 12:47 PM    BUN 36 12/15/2022 12:47 PM    LABALBU 3.1 12/03/2022 04:57 AM    LABALBU 3.3 11/14/2022 11:22 AM    CREATININE 2.25 12/15/2022 12:47 PM    CALCIUM 9.0 12/15/2022 12:47 PM GFRAA 35.6 10/13/2022 06:45 PM    LABGLOM 31.6 12/15/2022 12:47 PM    GLUCOSE 155 12/15/2022 12:47 PM    GLUCOSE 96 11/14/2022 11:22 AM     Magnesium:    Lab Results   Component Value Date/Time    MG 2.4 11/18/2022 04:45 AM     TSH:  Lab Results   Component Value Date    TSH 2.21 04/17/2020     . result  No results for input(s): PROBNP in the last 72 hours. No results for input(s): INR in the last 72 hours. Patient Active Problem List   Diagnosis    Diabetes mellitus ([address]    Hyperlipidemia    Hypertension    Chronic bilateral low back pain without sciatica    Cardiomyopathy ([address]    Diabetic neuropathy associated with type 2 diabetes mellitus ([address]    Morbid obesity (HCC)    LON (obstructive sleep apnea)    Chronic systolic heart failure (HCC)    ICD (implantable cardioverter-defibrillator) in place    Anxiety and depression    Gastroesophageal reflux disease without esophagitis    Keratoderma of foot, acquired    CKD stage 4 due to type 2 diabetes mellitus ([address]    Vitamin D deficiency    Chronic left shoulder pain    Congestive heart failure (HCC)    Anxiety    Secondary hyperparathyroidism ([address]    COVID-19    Acute respiratory failure with hypoxia (HCC)    SOB (shortness of breath)    History of COVID-19    Hypoxia    Chronic kidney disease    Acute pulmonary embolism without acute cor pulmonale (HCC)    Coronary artery disease involving native heart without angina pectoris    Shortness of breath    Hypotension    Chest pain    Tachycardia    SVT (supraventricular tachycardia) ([facility])       Assessment/Plan:     Diagnosis Orders   1. Chronic systolic CHF (congestive heart failure), NYHA class 2 (HCC)  Basic Metabolic Panel    Brain Natriuretic Peptide    Concern for persistent fluid overload as Optivol fluid index elevated on ICD. Further increase lasix to 40mg PO BID for now      2.  Cardiomyopathy, unspecified type ([address]      Worsening LVF EF 15-20% per limited echo 11/28/22 from 45% on echo 11/3/22 and EF 50% per echo in 2019      3. CAD S/P percutaneous coronary angioplasty      PCI VERNELL of mid LAD on 12/2/22. Continue DAPT with ASA/Brilinta. 4. History of automatic internal cardiac defibrillator (AICD)      Maintain follow-up with [facility] device clinic. Optivol fluid index remains above threshold per remote interrogation on 12/13/22          5. History of paroxysmal supraventricular tachycardia      Continue Toprol XL 100mg PO daily and amiodarone 200mg PO daily. 6. History of pulmonary embolus (PE)      VQ scan 11/3/22 with high probability of PE--on OAC with Eliquis. Pulm recommends [address] x 6 months      7. Stage 3 chronic kidney disease, unspecified whether stage 3a or 3b CKD (HCC)      Repeat BMP in 1 week following increase in lasix. Referred to nephro at last visit--yet to schedule appt      8. Hypertension, unspecified type      Well controlled. Continue current meds      9. Dyslipidemia      Continue Lipitor      10. Diabetes mellitus type 2 in obese (HCC)      Weight loss recommended.  Management per PCP        -Maximize medical therapy--continue dual antiplatelet therapy with aspirin 81 mg p.o. daily and Brilinta 90 mg p.o. twice daily, oral anticoagulation with Eliquis 5 mg p.o. twice daily given VQ scan 11/3/2022 with high probability of PE, Toprol- mg p.o. daily, amiodarone 200 mg p.o. daily, Imdur 60 mg p.o. daily, increase Lasix to 40 mg twice daily for now (was increased to 40 mg daily from 3 times weekly on 1/6/2023 by Dr. Willow Spencer), Clydene Bethel 24/26 mg p.o. twice daily, Lipitor 40 mg p.o. daily at bedtime, fenofibrate 160 mg p.o. daily at bedtime, Jardiance 10 mg p.o. daily  -Consider further titration and Entresto dose at next visit if ongoing concern for fluid overload, renal function remains stable and BP will tolerate increased dose  -Concern for persistent fluid overload given elevated OptiVol fluid index on recent remote interrogation from 12/13/2022 as well as symptoms of abdominal fullness and orthopnea. Lasix further titrated for now  -Check BMP next week to reevaluate renal function and electrolytes following further increase in Lasix  -Check proBNP next week to reevaluate fluid status  -Cardiac/<2 gram sodium diet recommended  -Recommend 1500mL daily fluid restriction   -Instructed patient to weigh self daily every morning upon waking and keep log book of daily weights. Notify office if gaining more than 3 pounds in 48 hours. -Recommend routine blood pressure monitoring at home.   Advised patient check blood pressure twice daily in a.m. and p.m. prior to taking medications and record log of blood pressure trends to review at next office visit  -Advised patient to notify office immediately if experiencing any progressive SOB, orthopnea, PND, LE edema or weight gain  -Educated patient on importance of fluid and salt restriction as well as lifestyle modification  -Continue to monitor renal function and electrolytes closely  -Referred to nephrology, Dr. Rafaela Mcclendon at initial visit for ongoing management of CKD and help with fluid management--yet to schedule appt  -Maintain routine outpatient follow-up with general cardiologist, Dr. Panda Webb appt 7/13/23  **Consider repeat echocardiogram in the next 3 to 6 months to reevaluate LV function  Limited echo 1/28/2022: Severely reduced LV systolic function with EF of 15 to 20%, trace TR, RVSP of 26 mmHg  Echocardiogram 11/3/2022: EF 45%, 1-2+ MR, technically difficult study  [facility] 12/2/22: Left main-normal, LAD-calcified mid 60 to 70% stenosis with positive IFR status post PCI with drug-eluting stent, circumflex-small, no disease, RCA-large caliber with mild disease, EF 30%  -Maintain routine follow-up with [facility] device clinic for ICD checks  -Maintain follow-up with pulmonology as directed  Patient with history of LON on CPAP at bedtime  History of high probability of PE per VQ scan on 11/3/2022  -Maintain routine follow-up with  [facility] clinic for routine ICD checks  -Maintain routine outpatient follow-up with PCP  -Advised to notify cardiology or CHF clinic immediately with any questions, concerns or changes in condition  -F/U with CHF clinic in 3 months or sooner if needed         Counseling: The importance of daily weights, dietary sodium restriction, and contact with cardiology if weight is increased more than 3 lbs in any 48 hour period was stressed. The patient has been advised to contact us if theyexperience progressive SOB, orthopnea, PND or progressive edema.

## 2023-01-27 DIAGNOSIS — I42.0 DILATED CARDIOMYOPATHY (HCC): ICD-10-CM

## 2023-01-27 RX ORDER — ASPIRIN 81 MG/1
TABLET ORAL
Qty: 90 TABLET | Refills: 3 | Status: SHIPPED | OUTPATIENT
Start: 2023-01-27

## 2023-01-27 NOTE — TELEPHONE ENCOUNTER
Requesting medication refill. Please approve or deny this request.    Rx requested:  Requested Prescriptions     Pending Prescriptions Disp Refills    aspirin (ASPIRIN LOW DOSE) 81 MG EC tablet [Pharmacy Med Name: ASPIRIN 81MG EC LOW DOSE TABLETS] 90 tablet 3     Sig: TAKE 1 TABLET BY MOUTH DAILY         Last Office Visit:   1/6/2023      Next Visit Date:  Future Appointments   Date Time Provider Kathryn Carolina   1/31/2023  1:00 PM Melania [address] [place] [address] Avenue   1/31/2023  3:00 PM CHIPAIN CT ROOM 1 [facility] RAD   2/15/2023  2:00 PM Americo Branch MD [address]   4/27/2023 11:00 AM WU Sage CHIP CHF [facility] [place]   7/13/2023  1:30 PM Norman Rothman, [facility]               Last refill 4/26/22. Please approve or deny.

## 2023-01-31 ENCOUNTER — HOSPITAL ENCOUNTER (OUTPATIENT)
Dept: CT IMAGING | Age: 63
Discharge: HOME | End: 2023-02-02
Payer: MEDICARE

## 2023-01-31 DIAGNOSIS — R93.89 ABNORMAL CT OF THE CHEST: ICD-10-CM

## 2023-01-31 PROCEDURE — 71250 CT THORAX DX C-: CPT

## 2023-02-02 ENCOUNTER — TELEPHONE (OUTPATIENT)
Dept: CARDIOLOGY | Age: 63
End: 2023-02-02

## 2023-02-02 DIAGNOSIS — I50.22 CHRONIC SYSTOLIC CHF (CONGESTIVE HEART FAILURE), NYHA CLASS 2 (HCC): Primary | ICD-10-CM

## 2023-02-02 DIAGNOSIS — N18.30 STAGE 3 CHRONIC KIDNEY DISEASE, UNSPECIFIED WHETHER STAGE 3A OR 3B CKD (HCC): ICD-10-CM

## 2023-02-02 DIAGNOSIS — I50.9 CONGESTIVE HEART FAILURE, UNSPECIFIED HF CHRONICITY, UNSPECIFIED HEART FAILURE TYPE (HCC): Primary | ICD-10-CM

## 2023-02-02 DIAGNOSIS — I50.22 CHRONIC SYSTOLIC CHF (CONGESTIVE HEART FAILURE), NYHA CLASS 2 (HCC): ICD-10-CM

## 2023-02-02 LAB
ANION GAP SERPL CALCULATED.3IONS-SCNC: 14 MEQ/L (ref 9–15)
BUN BLDV-MCNC: 58 MG/DL (ref 8–23)
CALCIUM SERPL-MCNC: 9 MG/DL (ref 8.5–9.9)
CHLORIDE BLD-SCNC: 100 MEQ/L (ref 95–107)
CO2: 28 MEQ/L (ref 20–31)
CREAT SERPL-MCNC: 3.81 MG/DL (ref 0.7–1.2)
GFR SERPL CREATININE-BSD FRML MDRD: 16.8 ML/MIN/{1.73_M2}
GLUCOSE BLD-MCNC: 82 MG/DL (ref 70–99)
POTASSIUM SERPL-SCNC: 4.1 MEQ/L (ref 3.4–4.9)
PRO-BNP: 694 PG/ML
SODIUM BLD-SCNC: 142 MEQ/L (ref 135–144)

## 2023-02-02 RX ORDER — FUROSEMIDE 40 MG/1
40 TABLET ORAL DAILY
Qty: 90 TABLET | Refills: 2
Start: 2023-02-02

## 2023-02-02 RX ORDER — FUROSEMIDE 40 MG/1
40 TABLET ORAL 2 TIMES DAILY
Qty: 180 TABLET | Refills: 2 | Status: SHIPPED | OUTPATIENT
Start: 2023-02-02 | End: 2023-02-02

## 2023-02-02 NOTE — PROGRESS NOTES
Reviewed labs from today. Worsening function with creatinine elevated 3.81, BUN elevated 58 and GFR low at 16.8 compared to 2.25, 36 and 31.3 respectively on 12/15/2022. proBNP today improved to 694 compared to 1570 on 12/15/2022. WOOD likely related to overdiuresis given recent increase in Lasix dose from 40 mg 3 times weekly to 40 mg daily at appointment with Dr. Oma Reno on 1/6/2023 and subsequent increase to 40 mg twice daily on 1/26/2023 visit with me. Patient will be advised to decrease Lasix back to 40 mg p.o. daily for now. Repeat BMP will be completed in 1 week to reevaluate renal function. Will be advised to call and schedule nephrology appointment as soon as possible as patient was referred previously and had not yet scheduled as of appointment with CHF clinic on 1/26/2023.     Electronically signed by WU Schwartz on 2/2/2023 at 1:50 PM

## 2023-02-02 NOTE — TELEPHONE ENCOUNTER
I reviewed patient's labs from today February 2, 2023. Patient noted to have worsening renal function with creatinine elevated at 3.81, BUN elevated at 58 and GFR low at 16.8 compared to 2.25, 36 and 31.6 respectively on 12/15/2022. proBNP stable/improved from prior currently at 694 compared to 1570 on 12/15/2022. WOOD possibly related to overdiuresis given recent increase in Lasix dose. Patient was previously taking Lasix 40 mg daily on Monday/Wednesday/Friday but this was increased to 40 mg daily by Dr. Laquita Lacey at appointment on 1/6/2023 and was subsequently increased to 40 mg twice a day at office appointment with me on 1/26/2023. Office staff called to advise patient/daughter of my instructions. Patient is to decrease Lasix to 40 mg p.o. daily and repeat BMP in 1 week. Also advised to call and schedule appointment with nephrology as soon as possible as he was previously referred by me however at his appointment on 1/26/2023 he had yet to schedule nephrology evaluation.     Electronically signed by WU Dietrich on 2/2/2023 at 2:11 PM

## 2023-02-02 NOTE — TELEPHONE ENCOUNTER
Requesting medication refill. Please approve or deny this request.    Rx requested:  Requested Prescriptions     Pending Prescriptions Disp Refills    sacubitril-valsartan (ENTRESTO) 24-26 MG per tablet 60 tablet 2     Sig: Take 1 tablet by mouth 2 times daily         Last Office Visit:   1/26/2023      Next Visit Date:  Future Appointments   Date Time Provider Department Center   2/15/2023  2:00 PM MD Cecilio Mills   4/27/2023 11:00 AM WU Angulo   7/13/2023  1:30 PM DO Cecilio Loera               Last refill 11/7/2022. Please approve or deny.

## 2023-02-02 NOTE — TELEPHONE ENCOUNTER
Pharmacy requesting 90 days supply       Requesting medication refill.  Please approve or deny this request.    Rx requested:  Requested Prescriptions     Pending Prescriptions Disp Refills    furosemide (LASIX) 40 MG tablet 180 tablet 2     Sig: Take 1 tablet by mouth 2 times daily         Last Office Visit:   1/26/2023      Next Visit Date:  Future Appointments   Date Time Provider Kathryn Figueroa   2/15/2023  2:00 PM Monica Jain MD [address]   4/27/2023 11:00 AM Carmen Mccabe [facility] EMERGENCY MEDICAL [place] AT [place]   7/13/2023  1:30 PM Norman Guo [facility]

## 2023-02-14 RX ORDER — METOPROLOL SUCCINATE 100 MG/1
100 TABLET, EXTENDED RELEASE ORAL DAILY
Qty: 90 TABLET | Refills: 3 | Status: SHIPPED | OUTPATIENT
Start: 2023-02-14

## 2023-02-14 NOTE — TELEPHONE ENCOUNTER
Requesting medication refill. Please approve or deny this request.    Rx requested:  Requested Prescriptions     Pending Prescriptions Disp Refills    metoprolol succinate (TOPROL XL) 100 MG extended release tablet [Pharmacy Med Name: METOPROLOL ER SUCCINATE 100MG TABS] 90 tablet      Sig: TAKE 1 TABLET BY MOUTH DAILY         Last Office Visit:   Visit date not found      Next Visit Date:  Future Appointments   Date Time Provider Kathryn Santiagoi   2/15/2023 10:30 AM Santiago Velázquez [address] [place] [address]   2/15/2023  2:00 PM Garret Alamo MD  [address]   4/27/2023 11:00 AM Carmen Bolden [facility] EMERGENCY [facility] [place]   7/13/2023  1:30 PM Norman Martin [facility]               Last refill 01/04/22. Please approve or deny.

## 2023-02-15 ENCOUNTER — OFFICE VISIT (OUTPATIENT)
Dept: PULMONOLOGY | Age: 63
End: 2023-02-15
Payer: MEDICARE

## 2023-02-15 ENCOUNTER — HOSPITAL ENCOUNTER (OUTPATIENT)
Dept: CARDIAC REHAB | Age: 63
Setting detail: THERAPIES SERIES
Discharge: HOME | End: 2023-02-15
Payer: MEDICARE

## 2023-02-15 VITALS
OXYGEN SATURATION: 97 % | HEIGHT: 71 IN | TEMPERATURE: 96.9 F | WEIGHT: 315 LBS | HEART RATE: 73 BPM | BODY MASS INDEX: 44.1 KG/M2 | SYSTOLIC BLOOD PRESSURE: 144 MMHG | DIASTOLIC BLOOD PRESSURE: 82 MMHG | RESPIRATION RATE: 16 BRPM

## 2023-02-15 VITALS
WEIGHT: 315 LBS | OXYGEN SATURATION: 97 % | RESPIRATION RATE: 16 BRPM | HEART RATE: 65 BPM | HEIGHT: 71 IN | BODY MASS INDEX: 44.1 KG/M2

## 2023-02-15 DIAGNOSIS — G47.33 OSA (OBSTRUCTIVE SLEEP APNEA): Primary | ICD-10-CM

## 2023-02-15 DIAGNOSIS — I26.99 ACUTE PULMONARY EMBOLISM WITHOUT ACUTE COR PULMONALE, UNSPECIFIED PULMONARY EMBOLISM TYPE (HCC): ICD-10-CM

## 2023-02-15 DIAGNOSIS — R93.89 ABNORMAL CT OF THE CHEST: ICD-10-CM

## 2023-02-15 DIAGNOSIS — E66.01 CLASS 3 SEVERE OBESITY DUE TO EXCESS CALORIES WITHOUT SERIOUS COMORBIDITY WITH BODY MASS INDEX (BMI) OF 45.0 TO 49.9 IN ADULT (HCC): ICD-10-CM

## 2023-02-15 PROCEDURE — G0422 INTENS CARDIAC REHAB W/EXERC: HCPCS

## 2023-02-15 PROCEDURE — 3077F SYST BP >= 140 MM HG: CPT | Performed by: INTERNAL MEDICINE

## 2023-02-15 PROCEDURE — 99213 OFFICE O/P EST LOW 20 MIN: CPT | Performed by: INTERNAL MEDICINE

## 2023-02-15 PROCEDURE — G0423 INTENS CARDIAC REHAB NO EXER: HCPCS

## 2023-02-15 PROCEDURE — 3078F DIAST BP <80 MM HG: CPT | Performed by: INTERNAL MEDICINE

## 2023-02-15 PROCEDURE — 1123F ACP DISCUSS/DSCN MKR DOCD: CPT | Performed by: INTERNAL MEDICINE

## 2023-02-15 ASSESSMENT — PATIENT HEALTH QUESTIONNAIRE - PHQ9
1. LITTLE INTEREST OR PLEASURE IN DOING THINGS: 1
8. MOVING OR SPEAKING SO SLOWLY THAT OTHER PEOPLE COULD HAVE NOTICED. OR THE OPPOSITE, BEING SO FIGETY OR RESTLESS THAT YOU HAVE BEEN MOVING AROUND A LOT MORE THAN USUAL: 0
SUM OF ALL RESPONSES TO PHQ QUESTIONS 1-9: 5
SUM OF ALL RESPONSES TO PHQ QUESTIONS 1-9: 5
3. TROUBLE FALLING OR STAYING ASLEEP: 2
7. TROUBLE CONCENTRATING ON THINGS, SUCH AS READING THE NEWSPAPER OR WATCHING TELEVISION: 0
5. POOR APPETITE OR OVEREATING: 1
SUM OF ALL RESPONSES TO PHQ QUESTIONS 1-9: 5
10. IF YOU CHECKED OFF ANY PROBLEMS, HOW DIFFICULT HAVE THESE PROBLEMS MADE IT FOR YOU TO DO YOUR WORK, TAKE CARE OF THINGS AT HOME, OR GET ALONG WITH OTHER PEOPLE: 0
6. FEELING BAD ABOUT YOURSELF - OR THAT YOU ARE A FAILURE OR HAVE LET YOURSELF OR YOUR FAMILY DOWN: 0
SUM OF ALL RESPONSES TO PHQ QUESTIONS 1-9: 5
4. FEELING TIRED OR HAVING LITTLE ENERGY: 1
9. THOUGHTS THAT YOU WOULD BE BETTER OFF DEAD, OR OF HURTING YOURSELF: 0
2. FEELING DOWN, DEPRESSED OR HOPELESS: 0
SUM OF ALL RESPONSES TO PHQ9 QUESTIONS 1 & 2: 1

## 2023-02-15 ASSESSMENT — EJECTION FRACTION: EF_VALUE: 15

## 2023-02-15 ASSESSMENT — EXERCISE STRESS TEST
PEAK_BP: 140/82
PEAK_HR: 100
PEAK_RPD: 1
PEAK_BP: 140/82
PEAK_RPE: 8

## 2023-02-15 ASSESSMENT — NEW YORK HEART ASSOCIATION (NYHA) CLASSIFICATION: NYHA FUNCTIONAL CLASS: CLASS IV

## 2023-02-15 NOTE — CARDIO/PULMONARY
[address] [facility] Cardiac Rehab Intake Note    Patient arrived to OP Phase II with admitting DX: CHF EF 15-20%    Patient has PMH of: WOOD, anxiety and depression, CHF, DM, diabetic neuropathy, HF NYHA class 4, HLD, HTN, ICD, Obesity, LON, PE, pyelonephritis, left ureteral calculus, ureteric stone    Physical examination: Heart sounds normal, lungs sound clear, BLE edema level 1, strong pulse BUE. LLE swelling and slight pain with no visible discoloration following patient dropping heavy item on foot. Patient has seen podiatry regarding this. Patient reports cramping and pain in both legs at night, note to be sent to cardiology with these symptoms. Exercise: Patient tolerated warm up, six-minute walk test, 10 minutes of sustained CV exercise on Nu-Step seated stepper, and cool-down. Patient complaints/signs/symptoms: slight pain in RLE after walking, ceased after patient was seated. POC: Patient will attend OP Phase II program:   ICR 3x weekly for 12 weeks or until 36 sessions are completed.       services used     DON Boyle  Cardiac Rehab Coordinator

## 2023-02-15 NOTE — PROGRESS NOTES
Subjective:     Mariela Buenrostro is a 70 y.o. male who complains today of:     Chief Complaint   Patient presents with    Follow-up     1 Month F/U for LON on CPAP       HPI  Patient presents for follow-up    Doing good, no chest pain, no shortness of breath, no coughing, no daytime sleepiness or tiredness, weight is stable, no nasal congestion or postnasal drip, no heartburn. No lower extremity edema, he has not been using his CPAP since January 1, he is on Eliquis for PE diagnosed November 2022, provoked after COVID-19 infection. He is scheduled his repeat sleep study on February 21            Allergies:  Coreg [carvedilol] and Januvia [sitagliptin]  Past Medical History:   Diagnosis Date    Acute kidney injury superimposed on CKD ([address] 08/15/2019    Anxiety and depression 04/30/2019    Cardiomyopathy ([address]     Chronic systolic heart failure ([address] 04/30/2019    Diabetes mellitus ([address]     Diabetic neuropathy associated with type 2 diabetes mellitus ([address] 12/13/2017    Heart failure, NYHA class 4 ([address]     Hyperlipidemia     Hypertension     ICD (implantable cardioverter-defibrillator) in place 04/30/2019    Left ureteral calculus     Morbid obesity ([address] 12/19/2017    LON (obstructive sleep apnea)     Pulmonary embolism ([address]     ON ELIQUIS    Pyelonephritis 08/04/2019    Ureteral calculus     Ureteric stone      Past Surgical History:   Procedure Laterality Date    CARDIAC DEFIBRILLATOR PLACEMENT  12/2008    CYSTOSCOPY INSERTION / REMOVAL STENT / STONE Left 5/30/2019    CYSTOSCOPY LEFT DOUBLE J STENT PLACEMENT ROOM 287 performed by Alejandro Wright MD at [facility] Left 6/12/2019    LEFT ESWL (CONF # [id number]) performed by Alejandro Wright MD at [facility]    LITHOTRIPSY N/A 7/10/2019    HOLMIUM LASER LITHOTRIPSY, STONE EXTRACTION, LT URETERAL STENT INSERTION.  performed by Alejandro Wright MD at [address] [place] [address] Left 7/10/2019    LEFT FLEXIBLE URETEROSCOPY, CYSTOSCOPY RETROGRADE performed by Patrice Lindsey MD at [address] History   Problem Relation Age of Onset    Heart Disease Mother     Kidney Disease Mother     Hypertension Mother     Diabetes Mother     No Known Problems Sister     No Known Problems Brother     No Known Problems Brother      Social History     Socioeconomic History    Marital status: Legally      Spouse name: Not on file    Number of children: Not on file    Years of education: Not on file    Highest education level: Not on file   Occupational History    Not on file   Tobacco Use    Smoking status: Former     Packs/day: 1.00     Years: 25.00     Pack years: 25.00     Types: Cigarettes     Quit date: 2008     Years since quittin.2    Smokeless tobacco: Never   Vaping Use    Vaping Use: Never used   Substance and Sexual Activity    Alcohol use: No    Drug use: Never    Sexual activity: Yes     Partners: Female   Other Topics Concern    Not on file   Social History Narrative    Not on file     Social Determinants of Health     Financial Resource Strain: Not on file   Food Insecurity: Not on file   Transportation Needs: Not on file   Physical Activity: Not on file   Stress: Not on file   Social Connections: Not on file   Intimate Partner Violence: Not on file   Housing Stability: Not on file         Review of Systems      ROS: 10 organs review of system is done including general, psychological, ENT, hematological, endocrine, respiratory, cardiovascular, gastrointestinal,musculoskeletal, neurological,  allergy and Immunology is done and is otherwise negative.     Current Outpatient Medications   Medication Sig Dispense Refill    metoprolol succinate (TOPROL XL) 100 MG extended release tablet TAKE 1 TABLET BY MOUTH DAILY 90 tablet 3    sacubitril-valsartan (ENTRESTO) 24-26 MG per tablet Take 1 tablet by mouth 2 times daily 60 tablet 5    furosemide (LASIX) 40 MG tablet Take 1 tablet by mouth daily 90 tablet 2    aspirin (ASPIRIN LOW DOSE) 81 MG EC tablet TAKE 1 TABLET BY MOUTH DAILY 90 tablet 3    empagliflozin (JARDIANCE) 10 MG tablet Take 1 tablet by mouth daily 30 tablet 3    amiodarone (CORDARONE) 200 MG tablet Take 1 tablet by mouth daily 90 tablet 3    nitroGLYCERIN (NITROSTAT) 0.4 MG SL tablet Place 1 tablet under the tongue every 5 minutes as needed for Chest pain up to max of 3 total doses. If no relief after 1 dose, call 911. 25 tablet 3    isosorbide mononitrate (IMDUR) 60 MG extended release tablet Take 1 tablet by mouth daily 30 tablet 3    ticagrelor (BRILINTA) 90 MG TABS tablet Take 1 tablet by mouth 2 times daily 60 tablet 3    insulin glargine (LANTUS SOLOSTAR) 100 UNIT/ML injection pen Inject 20 Units into the skin 2 times daily 5 Adjustable Dose Pre-filled Pen Syringe 3    insulin lispro, 1 Unit Dial, (HUMALOG KWIKPEN) 100 UNIT/ML SOPN Inject 8 Units into the skin 3 times daily (before meals) 1 Adjustable Dose Pre-filled Pen Syringe 2    Insulin Pen Needle ([employer] PEN NEEDLES) 31G X 6 MM MISC 1 each by Does not apply route 5 times daily 100 each 3    blood glucose monitor strips Test 5 times a day & as needed for symptoms of irregular blood glucose. Dispense sufficient amount for indicated testing frequency plus additional to accommodate PRN testing needs.  300 strip 2    atorvastatin (LIPITOR) 40 MG tablet Take 1 tablet by mouth at bedtime 90 tablet 3    Insulin Pen Needle (B-D UF III MINI PEN NEEDLES) 31G X 5 MM MISC 1 each by Does not apply route daily 100 each 4    fenofibrate (TRIGLIDE) 160 MG tablet TAKE 1 TABLET BY MOUTH EVERY NIGHT 90 tablet 4    albuterol (PROVENTIL) (2.5 MG/3ML) 0.083% nebulizer solution Albuterol albuterol (PROVENTIL) (2.5 MG/3ML) 0.083% nebulizer solution Indications: Type 2 diabetes mellitus without complication, with long-term current use of insulin (HCC) Take 3 mLs by nebulization 4 times daily 120 each 5 10/11/2018 Active 10-  [address] 28 (53252)      gabapentin (NEURONTIN) 300 MG capsule Take by mouth nightly. omeprazole (PRILOSEC) 20 MG delayed release capsule Take 1 capsule by mouth daily 90 capsule 4    Blood Glucose Monitoring Suppl (ONE TOUCH ULTRA MINI) w/Device KIT 1 kit by Does not apply route three times daily 1 kit 5    Lancets MISC Use  each 3    Insulin Syringe-Needle U-100 (INSULIN SYRINGE 1CC/31GX5/16\") 31G X 5/16\" 1 ML MISC Use once daily to administer Lantus 100 each 1    apixaban (ELIQUIS) 5 MG TABS tablet Take 1 tablet by mouth 2 times daily 180 tablet 5     No current facility-administered medications for this visit. Objective:     Vitals:    02/15/23 1408 02/15/23 1420   BP: (!) 142/78 (!) 144/82   Site: Left Lower Arm Right Lower Arm   Position: Sitting Sitting   Cuff Size: Medium Adult Medium Adult   Pulse: 73    Resp: 16    Temp: 96.9 °F (36.1 °C)    TempSrc: Infrared    SpO2: 97%    Weight: (!) 347 lb 3.2 oz (157.5 kg)    Height: 5' 11\" (1.803 m)          Physical Exam  Constitutional:       Appearance: He is well-developed. HENT:      Head: Normocephalic and atraumatic. Eyes:      General:         Left eye: No discharge. Conjunctiva/sclera: Conjunctivae normal.      Pupils: Pupils are equal, round, and reactive to light. Neck:      Vascular: No JVD. Cardiovascular:      Rate and Rhythm: Normal rate and regular rhythm. Heart sounds: Normal heart sounds. No murmur heard. No friction rub. No gallop. Pulmonary:      Effort: Pulmonary effort is normal. No respiratory distress. Breath sounds: Normal breath sounds. No wheezing or rales. Chest:      Chest wall: No tenderness. Abdominal:      General: Bowel sounds are normal.      Palpations: Abdomen is soft. Musculoskeletal:         General: No tenderness or deformity. Cervical back: Normal range of motion and neck supple. Right lower leg: No edema. Left lower leg: No edema. Skin:     General: Skin is warm and dry.    Neurological:      Mental Status: He is alert and oriented to person, place, and time. Psychiatric:         Judgment: Judgment normal.       Imaging studies reviewed and interpreted by me CT chest January 2023, no mass or nodule, emphysema noted  Lab results reviewed in chart  PFT none  ECHO: November 2022 EF 45%, 2+ MR  Sleep study 2018 shows AHI 10, RDI 45, CPAP 9  Assessment and Plan       Diagnosis Orders   1. LON (obstructive sleep apnea)        2. Class 3 severe obesity due to excess calories without serious comorbidity with body mass index (BMI) of 45.0 to 49.9 in adult ([facility]        3. Acute pulmonary embolism without acute cor pulmonale, unspecified pulmonary embolism type (HCC)          Mild LON, patient lost weight since then, has a repeat study pending, I will see him after that, currently is not using CPAP, device is not working and he cannot tolerate. Abnormal CT, follow-up CT scan shows no mass or nodule  Weight loss is recommended  History of provoked PE, in November, post COVID infection, will complete 6 months of treatment continue same for now, no active bleed      No orders of the defined types were placed in this encounter. No orders of the defined types were placed in this encounter. Discussed with patient the importance of exercise and weight control and  overall health and well-being. Reviewed with the patient: current clinical status, medications, activities and diet. Side effects, adverse effects of the medication prescribed today, as well as treatment plan and result expectations have been discussed with the patient who expresses understanding and desires to proceed. Return in about 2 months (around 4/15/2023).       Jasmeet Noel MD

## 2023-02-17 ENCOUNTER — HOSPITAL ENCOUNTER (OUTPATIENT)
Dept: CARDIAC REHAB | Age: 63
Setting detail: THERAPIES SERIES
Discharge: HOME | End: 2023-02-17
Payer: MEDICARE

## 2023-02-17 PROCEDURE — G0423 INTENS CARDIAC REHAB NO EXER: HCPCS

## 2023-02-17 PROCEDURE — G0422 INTENS CARDIAC REHAB W/EXERC: HCPCS

## 2023-02-17 NOTE — CARDIO/PULMONARY
Rate Your Plate Dietician Review     Rate your plate score & interpretation: Score:46  There are some ways you  can make your eating habits healthier. Patient eats mostly refined grains such as white rice, white bread, crackers and cereals. Patient includes limited amounts of fruits and vegetables. Patient enjoys large portions of red meats and poultry with skin. Patient includes full-fat dairy products, such as cheese, milk and yogurt,  Cooking methods frying in corn oil, butter, or stick margarine. Usually eats out 1-2 times a week. Nutrition recommendations:  Educate patient on food choices and cooking methods that are lower in saturated fats, sodium and cholesterol. Educate patient on portion sizes using the picture portion control guidelines. Review dairy products patient enjoys and offer alternative options lower in fat and sodium. Provide guidelines for \"Eating- Out\" that will encourage heart healthy choices. Follow Up:   One-on-one with the dietitian. Invite son to attend as well. Attend [employer] classes on-site, conducted by the dietitian. Discuss changes made in his diet to promote heart healthy eating. Support patient's efforts to improve his diet by reducing his sodium and fat intake. Encourage patient to continue making simple changes in his diet to assure long-term success.

## 2023-02-17 NOTE — PROGRESS NOTES
COVID Screening completed. Patient returns for 2nd session, patient denies complaints from 1st session. Patient tolerates exercise well with independent weight training.  Patient and son attends [employer]\" Electronically signed by Breana Keen RN on 2/17/2023 at 3:05 PM

## 2023-02-17 NOTE — CARDIO/PULMONARY
Sherman DAVID.:  1960  Acct Number: [de-identified]  MRN:  [id number]                [facility] COOKING SCHOOL WORKSHOP             Date: 2023        Session #7    Todays class covered:      () Adding Flavor     () Fast Breakfasts     () Salads and Dressings     () Soups and Sauces     () Simple Sides     () Appetizers and Snacks     () Delicious Desserts     (x) Plant Based Proteins     () Fast Evening Meals     () Weekend Breakfasts     () Efficiency Cooking     () [facility]     Patients were shown how to choose, prep, and cook; substitutions and other options were given. Samples were offered. Recipes were given and questions answered. The patient above was in the [employer] for 60 minutes.       Electronically signed by Heidy Burnett RD on 2023 at 4:11 PM

## 2023-02-20 ENCOUNTER — HOSPITAL ENCOUNTER (OUTPATIENT)
Dept: CARDIAC REHAB | Age: 63
Setting detail: THERAPIES SERIES
Discharge: HOME | End: 2023-02-20
Payer: MEDICARE

## 2023-02-20 PROCEDURE — G0422 INTENS CARDIAC REHAB W/EXERC: HCPCS

## 2023-02-20 PROCEDURE — G0423 INTENS CARDIAC REHAB NO EXER: HCPCS

## 2023-02-20 NOTE — PROGRESS NOTES
Video Education Report - ICR/CR    Name:  Douglas Martinez     Date:  2/20/2023  MRN: [id number]     Session #:  1  Session Length: 32:28 min    Recommended Videos        []01 [employer] - Program Overview   34:22    []02 Overview of [employer] Eating Plan             34:10    []03 Becoming a [place]   33:08     []04 Diseases of Our Time - Part 1   34:22    []05 Calorie Density     33:39   []06 Label Reading - Part 1    32:15   []07 Move it      32.54   []08 Healthy Minds, Bodies, Hearts   32:14   [x]09 Dining Out - Part 1    32:28   []10 Heart Disease Risk Reduction   56:90   []93 Metabolic Syndrome and Belly Fat  31:52   []12 Facts on Fat     35:29   []13 Diseases of Our Time - Part 2   33:07   []14 Biology of Weight Control   32:36   []15 Biomechanical Limitations   35:20   []16 Nutrition Action Plan    34:23    Additional Videos         []17 Hypertension & Heart Disease   32:39        []18 Cooking Breakfasts and Snacks  32:00   []19 Planning Your Eating Strategy   33:30   []20 Label Reading - Part 2    32:36  []21 Cooking Soups and Desserts   31:41  []22 How Our Thoughts Can Heal Our Hearts 33:05  []23 Targeting Your Nutrition Priorities  33:58  []24 Healthy Salads & Dressings   35:32  []25 Dining Out - Part 2    32:35  []26 Cooking Dinner and Sides   35:06  []27 Sleep Disorders     33:14  []28 Menu Workshop     32:06  []29 Decoding Lab Results    32:42     []30 Vitamins and Minerals    32:54  []31 Exercise Action Plan    32:26  []32 Body Composition    34:03  []33 Improving Performance    32:13  []34 Fueling a Healthy Body    31:32  []35 Introduction to Yoga    33:47  []36 Aging-Enhancing the Quality of Your Life 33:22  []37 Smoking Cessation    36:19    Comments:  Video completed

## 2023-02-21 ENCOUNTER — HOSPITAL ENCOUNTER (OUTPATIENT)
Dept: SLEEP CENTER | Age: 63
Discharge: HOME | End: 2023-02-23
Payer: MEDICARE

## 2023-02-21 PROCEDURE — 95811 POLYSOM 6/>YRS CPAP 4/> PARM: CPT

## 2023-02-22 ENCOUNTER — APPOINTMENT (OUTPATIENT)
Dept: CARDIAC REHAB | Age: 63
End: 2023-02-22
Payer: MEDICARE

## 2023-02-24 ENCOUNTER — HOSPITAL ENCOUNTER (OUTPATIENT)
Dept: CARDIAC REHAB | Age: 63
Setting detail: THERAPIES SERIES
Discharge: HOME | End: 2023-02-24
Payer: MEDICARE

## 2023-02-24 PROCEDURE — G0423 INTENS CARDIAC REHAB NO EXER: HCPCS

## 2023-02-24 PROCEDURE — G0422 INTENS CARDIAC REHAB W/EXERC: HCPCS

## 2023-02-24 NOTE — CARDIO/PULMONARY
Yolanda DAVID.:  1960  Acct Number: [de-identified]  MRN:  [id number]                [facility] COOKING SCHOOL WORKSHOP             Date: 2023        Session # 9   Todays class covered:      () Adding Flavor     () Fast Breakfasts     () Salads and Dressings     () Soups and Sauces     () Simple Sides     () Appetizers and Snacks     () Delicious Desserts     () Plant Based Proteins     (X) Fast Evening Meals     () Weekend Breakfasts     () Efficiency Cooking     () One Pot Meals     Patients were shown how to choose, prep, and cook; substitutions and other options were given. Samples were offered. Recipes were given and questions answered. The patient above was in the [employer] for 60 minutes.       Electronically signed by Abdullahi Isabel RD on 2023 at 4:02 PM

## 2023-02-24 NOTE — LETTER
Dr Ivette Walker,    Please review and sign updated Cardiac Rehab ITP from 2/15/2023. This is our continued order for the next 3 weeks. If this is not signed in a timely manner, we may not get reimbursed.      Thank you,  Matt Shell RN

## 2023-02-24 NOTE — CARDIO/PULMONARY
COVID Screening completed. Patient denies complaints, no changes to PMH or medications. Patient tolerates exercise well. Patient attended [employer] \"Fast Evening Meals\".   Electronically signed by Olu Hernandez RN on 2/24/2023 at 2:18 PM

## 2023-02-27 ENCOUNTER — APPOINTMENT (OUTPATIENT)
Dept: CARDIAC REHAB | Age: 63
End: 2023-02-27
Payer: MEDICARE

## 2023-03-01 ENCOUNTER — HOSPITAL ENCOUNTER (OUTPATIENT)
Dept: CARDIAC REHAB | Age: 63
Setting detail: THERAPIES SERIES
Discharge: HOME | End: 2023-03-01
Payer: MEDICARE

## 2023-03-01 PROCEDURE — G0422 INTENS CARDIAC REHAB W/EXERC: HCPCS

## 2023-03-01 NOTE — PROGRESS NOTES
COVID Screening completed. Patient denies complaints, no changes to PMH or medications. Patient tolerates exercise well.  Patient educated on healthy snack options. Handout offered with list of alternative snack choices. Electronically signed by Ssuhma Izaguirre RN on 3/1/2023 at 2:25 PM

## 2023-03-03 ENCOUNTER — HOSPITAL ENCOUNTER (OUTPATIENT)
Dept: CARDIAC REHAB | Age: 63
Setting detail: THERAPIES SERIES
Discharge: HOME | End: 2023-03-03
Payer: MEDICARE

## 2023-03-03 PROCEDURE — G0422 INTENS CARDIAC REHAB W/EXERC: HCPCS

## 2023-03-03 NOTE — CARDIO/PULMONARY
COVID Screening completed. Patient denies complaints, no changes to PMH or medications. Patient tolerates exercise well. Patient unable to attend [employer] class today due to personal obligations. All equipment used in the care for this patient has been cleaned.   Electronically signed by Yvette Devine RN on 3/3/2023 at 1:25 PM

## 2023-03-06 ENCOUNTER — HOSPITAL ENCOUNTER (OUTPATIENT)
Dept: CARDIAC REHAB | Age: 63
Setting detail: THERAPIES SERIES
Discharge: HOME | End: 2023-03-06
Payer: MEDICARE

## 2023-03-06 PROCEDURE — G0422 INTENS CARDIAC REHAB W/EXERC: HCPCS

## 2023-03-06 ASSESSMENT — EJECTION FRACTION: EF_VALUE: 15

## 2023-03-06 ASSESSMENT — EXERCISE STRESS TEST
PEAK_RPD: 1
PEAK_BP: 146/72

## 2023-03-06 NOTE — CARDIO/PULMONARY
COVID Screening completed. Patient denies complaints, no changes to PMH or medications. Patient tolerates exercise well. All equipment used in the care for this patient has been cleaned.   Electronically signed by Armando Garcia RN on 3/6/2023 at 1:28 PM

## 2023-03-08 ENCOUNTER — HOSPITAL ENCOUNTER (OUTPATIENT)
Dept: CARDIAC REHAB | Age: 63
Setting detail: THERAPIES SERIES
Discharge: HOME | End: 2023-03-08
Payer: MEDICARE

## 2023-03-08 PROCEDURE — G0422 INTENS CARDIAC REHAB W/EXERC: HCPCS

## 2023-03-08 NOTE — CARDIO/PULMONARY
COVID Screening completed. Patient denies complaints, no changes to PMH or medications. Patient tolerates exercise well. Upon leaving patient noted to have a brace on his ankle and mentioned having a \"shot\" in his left shoulder. Will clarify with patient at next visit via . All equipment used in the care for this patient has been cleaned.   Electronically signed by Stone Swartz RN on 3/8/2023 at 3:42 PM

## 2023-03-10 ENCOUNTER — APPOINTMENT (OUTPATIENT)
Dept: CARDIAC REHAB | Age: 63
End: 2023-03-10
Payer: MEDICARE

## 2023-03-13 ENCOUNTER — APPOINTMENT (OUTPATIENT)
Dept: CARDIAC REHAB | Age: 63
End: 2023-03-13
Payer: MEDICARE

## 2023-03-13 PROCEDURE — G0422 INTENS CARDIAC REHAB W/EXERC: HCPCS

## 2023-03-13 NOTE — CARDIO/PULMONARY
Patient arrived to Phase II OP CR at [facility]. Patient followed COVID-19 safety protocols in place at facility including but not limited to: social distancing during exercise, sanitizing equipment before and after use, and wearing a mask while in facility. Patient denies complaints. New signs and/or symptoms: Patient reporting pain in shoulder due arthritis and brace right foot due to previous injury.  Patient reporting tr

## 2023-03-13 NOTE — CARDIO/PULMONARY
COVID Screening completed. Patient denies complaints, no changes to PMH or medications. With use of , discussed with patient his recent 3 lb weight gain. Patient reports he obtains a dry weight daily and records it in his log (along with BP and his blood sugar TID). Patient states per his home weight, it has been consistent the past 4-5 days. Discussed with patient the signs/symptoms of heart failure, when to report his weight gain to his physician, and continuing with the daily weight. Patient states he is watching his sodium intake, rinsing canned veggies or using frozen ones, and reading labels at the grocery store. Patient denies any c/o shortness of breath or chest discomfort. Patient states he has only had chest pain twice since his cardiac procedure and it was relieved both times with one nitro. Patient is wearing a right ankle brace for support after dropping a tv on top of his foot. Patient verified that he received a cortisone injection in his left shoulder for arthritic pain. Discussed nutrition priorities including portion sizes/servings of vegetables, fruits, whole grains, dairy, proteins and oils. Discussed limiting sugar and sodium intake. Handout from [facility] provided. Invited patient to cardiac rehab support group this coming Wednesday. All equipment used in the care for this patient has been cleaned.   Electronically signed by Stan Pizano RN on 3/13/2023 at 4:00 PM

## 2023-03-15 ENCOUNTER — APPOINTMENT (OUTPATIENT)
Dept: CARDIAC REHAB | Age: 63
End: 2023-03-15
Payer: MEDICARE

## 2023-03-15 PROCEDURE — G0422 INTENS CARDIAC REHAB W/EXERC: HCPCS

## 2023-03-15 NOTE — PROGRESS NOTES
COVID Screening completed. Patient denies complaints, no changes to PMH or medications. Patient tolerates exercise well.   Electronically signed by Terri Chavez RN on 3/15/2023 at 1:23 PM

## 2023-03-16 ENCOUNTER — HOSPITAL ENCOUNTER (OUTPATIENT)
Dept: CARDIOLOGY | Age: 63
Discharge: HOME | End: 2023-03-16
Payer: MEDICARE

## 2023-03-16 PROCEDURE — 93283 PRGRMG EVAL IMPLANTABLE DFB: CPT

## 2023-03-17 ENCOUNTER — APPOINTMENT (OUTPATIENT)
Dept: CARDIAC REHAB | Age: 63
End: 2023-03-17
Payer: MEDICARE

## 2023-03-17 PROCEDURE — G0422 INTENS CARDIAC REHAB W/EXERC: HCPCS

## 2023-03-17 NOTE — PROGRESS NOTES
COVID Screening completed. Patient denies complaints, no changes to PMH or medications. Patient tolerates exercise well. With us of the  Ipad. Patient had a PM check, he has 40% battery life remaining. Patient is scheduled to have a shoulder injection on 3/28/2023. Patient plans to take off 1 week to recover. Patient requested information on low sodium cheeses, patient offered list and AHA recommendation on Na+ intake.  Rosa Maria TRAVIS spoke with patient on scheduling a 1:1. Electronically signed by Janett Oliver RN on 3/17/2023 at 2:35 PM

## 2023-03-20 ENCOUNTER — APPOINTMENT (OUTPATIENT)
Dept: CARDIAC REHAB | Age: 63
End: 2023-03-20
Payer: MEDICARE

## 2023-03-20 PROCEDURE — G0422 INTENS CARDIAC REHAB W/EXERC: HCPCS

## 2023-03-20 NOTE — CARDIO/PULMONARY
COVID Screening completed. Patient denies complaints, no changes to PMH or medications. Patient tolerates exercise well. Discussed risk factors for heart disease. Educated patient on modifiable risk factors (ie. stress, cholesterol levels, diabetes, sleep, alcohol, smoking, exercise, diet, BP, and weight), versus non-modifiable factors (ie. family history, ethnicity, gender, age, and previous cardiac history). Handout provided. All equipment used in the care for this patient has been cleaned.   Electronically signed by Marissa Ardon RN on 3/20/2023 at 3:57 PM

## 2023-03-21 ENCOUNTER — TELEPHONE (OUTPATIENT)
Dept: PRIMARY CARE | Facility: CLINIC | Age: 63
End: 2023-03-21
Payer: MEDICARE

## 2023-03-21 NOTE — TELEPHONE ENCOUNTER
[facility] CALLING, THEY ARE WANTING TO KNOW IF THE PATIENT IS SUPPOSE TO BE ON THE FOLLOWING MEDICATIONS TOGETHER    ATORVASTATIN  FENOFIBRATE    PLEASE ADVISE.      PHARMACY AWARE THAT YOU ARE OUT OF THE OFFICE.

## 2023-03-22 ENCOUNTER — APPOINTMENT (OUTPATIENT)
Dept: CARDIAC REHAB | Age: 63
End: 2023-03-22
Payer: MEDICARE

## 2023-03-22 PROCEDURE — G0422 INTENS CARDIAC REHAB W/EXERC: HCPCS

## 2023-03-22 NOTE — CARDIO/PULMONARY
COVID Screening completed. Patient denies complaints, no changes to PMH or medications. Patient tolerates exercise well. All equipment used in the care for this patient has been cleaned.   Electronically signed by Farnaz Jacobsen RN on 3/22/2023 at 1:57 PM

## 2023-03-23 DIAGNOSIS — I50.9 CONGESTIVE HEART FAILURE, UNSPECIFIED HF CHRONICITY, UNSPECIFIED HEART FAILURE TYPE (HCC): Primary | ICD-10-CM

## 2023-03-23 DIAGNOSIS — I42.0 DILATED CARDIOMYOPATHY (HCC): ICD-10-CM

## 2023-03-24 ENCOUNTER — APPOINTMENT (OUTPATIENT)
Dept: CARDIAC REHAB | Age: 63
End: 2023-03-24
Payer: MEDICARE

## 2023-03-24 RX ORDER — TICAGRELOR 90 MG/1
TABLET ORAL
Qty: 60 TABLET | Refills: 3 | Status: SHIPPED | OUTPATIENT
Start: 2023-03-24

## 2023-03-24 NOTE — TELEPHONE ENCOUNTER
Requesting medication refill.  Please approve or deny this request.    Rx requested:  Requested Prescriptions     Pending Prescriptions Disp Refills    JARDIANCE 10 MG tablet [Pharmacy Med Name: Milena Adams 10MG TABLETS] 30 tablet 3     Sig: TAKE 1 TABLET BY MOUTH DAILY         Last Office Visit:   1/26/2023      Next Visit Date:  Future Appointments   Date Time Provider Kathryn Figueroa   3/27/2023  2:30 PM SCHEDULE, [address] [facility]   3/29/2023  2:30 PM SCHEDULE, [address] [facility]   3/31/2023  2:30 PM SCHEDULE, [address] [facility]   4/3/2023  2:30 PM SCHEDULE, [address] [facility]   4/5/2023  2:30 PM SCHEDULE, [address] [facility]   4/7/2023  2:30 PM SCHEDULE, [address] [facility]   4/10/2023  2:30 PM SCHEDULE, [address] [facility]   4/12/2023  2:30 PM SCHEDULE, [address] [facility]   4/14/2023  2:30 PM SCHEDULE, [address] [facility]   4/17/2023  2:30 PM Elisa Reyna, [address] [facility]   4/18/2023  2:45 PM Eleanor Mcfarlane MD [address]   4/19/2023  2:30 PM SCHEDULE, [address] [facility]   4/21/2023  2:30 PM SCHEDULE, [address] [facility]   4/24/2023  2:30 PM SCHEDULE, [address] [facility]   4/26/2023  2:30 PM Elisa Reyna, [address] [facility]   4/27/2023 11:00 AM WU Will [facility]   4/28/2023  2:30 PM SCHEDULE, [address] [facility]   5/1/2023  2:30 PM SCHEDULE, [address] [facility]   5/3/2023  2:30 PM SCHEDULE, [address] [facility]   5/5/2023  2:30 PM SCHEDULE, [address] [facility]   5/8/2023  2:30 PM SCHEDULE, [address] [facility]

## 2023-03-24 NOTE — TELEPHONE ENCOUNTER
Requesting medication refill.  Please approve or deny this request.    Rx requested:  Requested Prescriptions     Pending Prescriptions Disp Refills    BRILINTA 90 MG TABS tablet [Pharmacy Med Name: Ashley Thorpe 60 tablet 3     Sig: TAKE 1 TABLET BY MOUTH TWICE DAILY         Last Office Visit:   01/26/2023      Next Visit Date:  Future Appointments   Date Time Provider Kathryn Figueroa   3/24/2023  2:30 PM SCHEDULE, [address] [facility]   3/27/2023  2:30 PM SCHEDULE, [address] [facility]   3/29/2023  2:30 PM SCHEDULE, [address] [facility]   3/31/2023  2:30 PM SCHEDULE, [address] [facility]   4/3/2023  2:30 PM SCHEDULE, [address] [facility]   4/5/2023  2:30 PM SCHEDULE, [address] [facility]   4/7/2023  2:30 PM SCHEDULE, [address] [facility]   4/10/2023  2:30 PM SCHEDULE, [address] [facility]   4/12/2023  2:30 PM SCHEDULE, [address] [facility]   4/14/2023  2:30 PM SCHEDULE, [address] [facility]   4/17/2023  2:30 PM Claudean Gear, [address] [facility]   4/18/2023  2:45 PM Sumeet Schafer MD [address]   4/19/2023  2:30 PM SCHEDULE, [address] [facility]   4/21/2023  2:30 PM SCHEDULE, [address] [facility]   4/24/2023  2:30 PM SCHEDULE, [address] [facility]   4/26/2023  2:30 PM Claudean Gear, [address] [facility]   4/27/2023 11:00 AM WU Ramirez [facility]   4/28/2023  2:30 PM SCHEDULE, [address] [facility]   5/1/2023  2:30 PM SCHEDULE, [address] [facility]   5/3/2023  2:30 PM SCHEDULE, [address] [facility]   5/5/2023  2:30 PM SCHEDULE, [facility] Rhona Mejia

## 2023-03-27 ENCOUNTER — APPOINTMENT (OUTPATIENT)
Dept: CARDIAC REHAB | Age: 63
End: 2023-03-27
Payer: MEDICARE

## 2023-03-27 RX ORDER — ISOSORBIDE MONONITRATE 60 MG/1
60 TABLET, EXTENDED RELEASE ORAL DAILY
Qty: 30 TABLET | Refills: 3 | Status: SHIPPED | OUTPATIENT
Start: 2023-03-27

## 2023-03-27 ASSESSMENT — EXERCISE STRESS TEST
PEAK_BP: 148/70
PEAK_RPD: 1

## 2023-03-27 ASSESSMENT — EJECTION FRACTION: EF_VALUE: 15

## 2023-03-27 NOTE — TELEPHONE ENCOUNTER
PM SCHEDULE, [facility]   5/10/2023  2:30 PM SCHEDULE, [facility]   7/13/2023  1:30 PM DO Cecilio Loera               Please approve or deny.

## 2023-03-29 ENCOUNTER — APPOINTMENT (OUTPATIENT)
Dept: CARDIAC REHAB | Age: 63
End: 2023-03-29
Payer: MEDICARE

## 2023-03-29 RX ORDER — EMPAGLIFLOZIN 10 MG/1
TABLET, FILM COATED ORAL
Qty: 30 TABLET | Refills: 5 | Status: SHIPPED | OUTPATIENT
Start: 2023-03-29

## 2023-03-31 ENCOUNTER — APPOINTMENT (OUTPATIENT)
Dept: CARDIAC REHAB | Age: 63
End: 2023-03-31
Payer: MEDICARE

## 2023-04-03 ENCOUNTER — APPOINTMENT (OUTPATIENT)
Dept: CARDIAC REHAB | Age: 63
End: 2023-04-03
Payer: MEDICARE

## 2023-04-05 ENCOUNTER — HOSPITAL ENCOUNTER (OUTPATIENT)
Dept: CARDIAC REHAB | Age: 63
Setting detail: THERAPIES SERIES
End: 2023-04-05
Payer: MEDICARE

## 2023-04-07 ENCOUNTER — APPOINTMENT (OUTPATIENT)
Dept: CARDIAC REHAB | Age: 63
End: 2023-04-07
Payer: MEDICARE

## 2023-04-10 ENCOUNTER — APPOINTMENT (OUTPATIENT)
Dept: CARDIAC REHAB | Age: 63
End: 2023-04-10
Payer: MEDICARE

## 2023-04-12 ENCOUNTER — APPOINTMENT (OUTPATIENT)
Dept: CARDIAC REHAB | Age: 63
End: 2023-04-12
Payer: MEDICARE

## 2023-04-14 ENCOUNTER — APPOINTMENT (OUTPATIENT)
Dept: CARDIAC REHAB | Age: 63
End: 2023-04-14
Payer: MEDICARE

## 2023-04-17 ENCOUNTER — HOSPITAL ENCOUNTER (OUTPATIENT)
Dept: CARDIAC REHAB | Age: 63
Setting detail: THERAPIES SERIES
Discharge: HOME | End: 2023-04-17
Payer: MEDICARE

## 2023-04-17 ENCOUNTER — APPOINTMENT (OUTPATIENT)
Dept: CARDIAC REHAB | Age: 63
End: 2023-04-17
Payer: MEDICARE

## 2023-04-17 PROCEDURE — G0422 INTENS CARDIAC REHAB W/EXERC: HCPCS

## 2023-04-17 ASSESSMENT — EJECTION FRACTION: EF_VALUE: 15

## 2023-04-17 ASSESSMENT — EXERCISE STRESS TEST: PEAK_RPD: 1

## 2023-04-17 NOTE — CARDIO/PULMONARY
COVID Screening completed. Patient denies complaints, no changes to PMH or medications. Patient tolerates exercise well. Patient returns today, following complications with shoulder and back pain. Weekly education discussion about the heart health benefits of sleep. Handout offered. All equipment used in the care for this patient has been cleaned.   Electronically signed by Han Ortiz RN on 4/17/2023 at 4:15 PM

## 2023-04-19 ENCOUNTER — APPOINTMENT (OUTPATIENT)
Dept: CARDIAC REHAB | Age: 63
End: 2023-04-19
Payer: MEDICARE

## 2023-04-19 ENCOUNTER — HOSPITAL ENCOUNTER (OUTPATIENT)
Dept: CARDIAC REHAB | Age: 63
Setting detail: THERAPIES SERIES
Discharge: HOME | End: 2023-04-19
Payer: MEDICARE

## 2023-04-19 DIAGNOSIS — E78.5 HYPERLIPIDEMIA, UNSPECIFIED HYPERLIPIDEMIA TYPE: ICD-10-CM

## 2023-04-19 PROCEDURE — G0422 INTENS CARDIAC REHAB W/EXERC: HCPCS

## 2023-04-19 RX ORDER — FENOFIBRATE 160 MG/1
TABLET ORAL
Qty: 90 TABLET | Refills: 1 | Status: SHIPPED | OUTPATIENT
Start: 2023-04-19 | End: 2023-10-11

## 2023-04-19 NOTE — PROGRESS NOTES
COVID Screening completed. Patient denies complaints, no changes to PMH or medications. Patient tolerates exercise well.   Electronically signed by Kathleen Chung RN on 4/19/2023 at 2:56 PM

## 2023-04-21 ENCOUNTER — HOSPITAL ENCOUNTER (OUTPATIENT)
Dept: CARDIAC REHAB | Age: 63
Setting detail: THERAPIES SERIES
Discharge: HOME | End: 2023-04-21
Payer: MEDICARE

## 2023-04-21 ENCOUNTER — APPOINTMENT (OUTPATIENT)
Dept: CARDIAC REHAB | Age: 63
End: 2023-04-21
Payer: MEDICARE

## 2023-04-21 PROCEDURE — G0422 INTENS CARDIAC REHAB W/EXERC: HCPCS

## 2023-04-21 NOTE — PROGRESS NOTES
COVID Screening completed. Patient denies complaints, no changes to PMH or medications. Patient tolerates exercise well.   Electronically signed by Reed Leon RN on 4/21/2023 at 1:26 PM

## 2023-04-24 ENCOUNTER — APPOINTMENT (OUTPATIENT)
Dept: CARDIAC REHAB | Age: 63
End: 2023-04-24
Payer: MEDICARE

## 2023-04-26 ENCOUNTER — APPOINTMENT (OUTPATIENT)
Dept: CARDIAC REHAB | Age: 63
End: 2023-04-26
Payer: MEDICARE

## 2023-04-26 ENCOUNTER — HOSPITAL ENCOUNTER (OUTPATIENT)
Dept: CARDIAC REHAB | Age: 63
Setting detail: THERAPIES SERIES
Discharge: HOME | End: 2023-04-26
Payer: MEDICARE

## 2023-04-26 PROCEDURE — G0422 INTENS CARDIAC REHAB W/EXERC: HCPCS

## 2023-04-26 NOTE — CARDIO/PULMONARY
COVID Screening completed. Patient denies complaints, no changes to PMH or medications. Patient tolerates exercise well. Weekly education, home exercise plans and silver sneakers information discussed. Discussed with patient their plan for physical activity after cardiac rehab. [place] Handout offered. All equipment used in the care for this patient has been cleaned.   Electronically signed by Harry Valdes RN on 4/26/2023 at 1:43 PM

## 2023-04-27 ENCOUNTER — OFFICE VISIT (OUTPATIENT)
Dept: CARDIOLOGY CLINIC | Age: 63
End: 2023-04-27
Payer: MEDICARE

## 2023-04-27 VITALS
HEIGHT: 71 IN | RESPIRATION RATE: 15 BRPM | HEART RATE: 75 BPM | WEIGHT: 315 LBS | DIASTOLIC BLOOD PRESSURE: 82 MMHG | OXYGEN SATURATION: 91 % | SYSTOLIC BLOOD PRESSURE: 126 MMHG | BODY MASS INDEX: 44.1 KG/M2

## 2023-04-27 DIAGNOSIS — I50.22 CHRONIC SYSTOLIC CHF (CONGESTIVE HEART FAILURE), NYHA CLASS 2 (HCC): ICD-10-CM

## 2023-04-27 DIAGNOSIS — E11.69 DIABETES MELLITUS TYPE 2 IN OBESE (HCC): Primary | ICD-10-CM

## 2023-04-27 DIAGNOSIS — E66.9 DIABETES MELLITUS TYPE 2 IN OBESE (HCC): Primary | ICD-10-CM

## 2023-04-27 DIAGNOSIS — I25.10 CAD S/P PERCUTANEOUS CORONARY ANGIOPLASTY: ICD-10-CM

## 2023-04-27 DIAGNOSIS — N17.9 ACUTE KIDNEY INJURY SUPERIMPOSED ON CKD (HCC): ICD-10-CM

## 2023-04-27 DIAGNOSIS — E78.5 DYSLIPIDEMIA: ICD-10-CM

## 2023-04-27 DIAGNOSIS — Z98.61 CAD S/P PERCUTANEOUS CORONARY ANGIOPLASTY: ICD-10-CM

## 2023-04-27 DIAGNOSIS — I42.9 CARDIOMYOPATHY, UNSPECIFIED TYPE (HCC): ICD-10-CM

## 2023-04-27 DIAGNOSIS — I10 HYPERTENSION, UNSPECIFIED TYPE: ICD-10-CM

## 2023-04-27 DIAGNOSIS — Z95.810 HISTORY OF IMPLANTABLE CARDIAC DEFIBRILLATOR (ICD): ICD-10-CM

## 2023-04-27 DIAGNOSIS — N18.9 ACUTE KIDNEY INJURY SUPERIMPOSED ON CKD (HCC): ICD-10-CM

## 2023-04-27 DIAGNOSIS — Z86.79 HISTORY OF PSVT (PAROXYSMAL SUPRAVENTRICULAR TACHYCARDIA): ICD-10-CM

## 2023-04-27 DIAGNOSIS — Z86.711 HISTORY OF PULMONARY EMBOLISM: ICD-10-CM

## 2023-04-27 LAB
ANION GAP SERPL CALCULATED.3IONS-SCNC: 13 MEQ/L (ref 9–15)
BNP BLD-MCNC: 998 PG/ML
BUN SERPL-MCNC: 41 MG/DL (ref 8–23)
CALCIUM SERPL-MCNC: 9.4 MG/DL (ref 8.5–9.9)
CHLORIDE SERPL-SCNC: 105 MEQ/L (ref 95–107)
CO2 SERPL-SCNC: 23 MEQ/L (ref 20–31)
CREAT SERPL-MCNC: 2.83 MG/DL (ref 0.7–1.2)
GLUCOSE SERPL-MCNC: 71 MG/DL (ref 70–99)
POTASSIUM SERPL-SCNC: 4.6 MEQ/L (ref 3.4–4.9)
SODIUM SERPL-SCNC: 141 MEQ/L (ref 135–144)

## 2023-04-27 PROCEDURE — 3074F SYST BP LT 130 MM HG: CPT | Performed by: PHYSICIAN ASSISTANT

## 2023-04-27 PROCEDURE — 99214 OFFICE O/P EST MOD 30 MIN: CPT | Performed by: PHYSICIAN ASSISTANT

## 2023-04-27 PROCEDURE — 3079F DIAST BP 80-89 MM HG: CPT | Performed by: PHYSICIAN ASSISTANT

## 2023-04-27 PROCEDURE — 1123F ACP DISCUSS/DSCN MKR DOCD: CPT | Performed by: PHYSICIAN ASSISTANT

## 2023-04-27 ASSESSMENT — ENCOUNTER SYMPTOMS
COLOR CHANGE: 0
COUGH: 1
BLOOD IN STOOL: 0
ABDOMINAL PAIN: 0
VOMITING: 0
ABDOMINAL DISTENTION: 0
CHEST TIGHTNESS: 0
NAUSEA: 0
SHORTNESS OF BREATH: 0

## 2023-04-27 NOTE — PATIENT INSTRUCTIONS
-Continue lasix 40mg daily for now--may take additional Lasix 40mg tablet daily as needed for weight gain of 3 pounds or more in 1-2 days    Check labs today (BMP and BNP)    -Check daily weight every morning and notify CHF clinic if gaining more than 3 pounds in 2 days    -Check blood pressure twice daily in a.m. and p.m. prior to taking medications and keep log of blood pressure trends to review at next office visit  Notify office if BP running low with  mmHg or below prior to taking medications  Notify office if BP running high with  mmHg or above or if DBP 85 mmHg or above    -Recommend 2000 mg daily sodium restriction    -Recommend 1.5 liter (48 ounces) daily fluid restriction

## 2023-04-27 NOTE — PROGRESS NOTES
Could you help get them in?  
Patient: Sharonda Lin  YOB: 1960  MRN: [id number]    Chief Complaint:  Chief Complaint   Patient presents with    Congestive Heart Failure     Diastolic - 3 month f/u          Subjective/HPI     4/27/23: Here for follow-up of chronic systolic congestive heart failure with EF of 15-20% per echo 11/28/2022. At last CHF clinic visit on 1/26/2023, patient's Lasix was increased to 40 mg twice daily from once daily due to concern for fluid overload given persistently elevated OptiVol fluid index level. He had repeat labs completed 1 week after increase in Lasix dose and was found to have WOOD on CKD so he was called and advised to decrease Lasix back to 40 mg daily and also follow-up with nephrology as previously recommended. His daughter presents with him today. She states that she has tried to call and schedule appointment with nephrology numerous times in the past couple months and the nurse in the office refused to schedule her. She is extremely frustrated by this especially in light of patient's worsening WOOD on CKD when labs last checked in February. Due to WOOD on CKD following increase in Lasix dose in February, I am hesitant to further increase Lasix at this time due to concerns for recurrent WODO. I would like help from nephrology to manage patient's fluid status and diuretics. Office staff will contact nephrology office today to schedule patient to see Dr. Angeline Valdivia as soon as possible. Patient is currently following with cardiac rehab and states that he has been doing well overall. He denies any shortness of breath or dyspnea on exertion, palpitations, diaphoresis, paroxysmal nocturnal dyspnea, orthopnea, dizziness, lightheadedness, syncope, fever or chills. He has occasional brief chest pain episodes at night which last a few seconds and then resolved. Denies any exertional chest pain complaints.   He does report increasing lower extremity edema and coughing episodes at
stated

## 2023-04-28 ENCOUNTER — APPOINTMENT (OUTPATIENT)
Dept: CARDIAC REHAB | Age: 63
End: 2023-04-28
Payer: MEDICARE

## 2023-05-01 ENCOUNTER — APPOINTMENT (OUTPATIENT)
Dept: CARDIAC REHAB | Age: 63
End: 2023-05-01
Payer: MEDICARE

## 2023-05-01 ENCOUNTER — HOSPITAL ENCOUNTER (OUTPATIENT)
Dept: CARDIAC REHAB | Age: 63
Setting detail: THERAPIES SERIES
Discharge: HOME | End: 2023-05-01
Payer: MEDICARE

## 2023-05-01 PROCEDURE — G0422 INTENS CARDIAC REHAB W/EXERC: HCPCS

## 2023-05-01 NOTE — CARDIO/PULMONARY
COVID Screening completed. Patient denies complaints, no changes to PMH or medications. Patient tolerates exercise well. Patient will not be able to attend Wednesday due family obligations. All equipment used in the care for this patient has been cleaned.   Electronically signed by Immanuel Chávez RN on 5/1/2023 at 3:32 PM

## 2023-05-03 ENCOUNTER — APPOINTMENT (OUTPATIENT)
Dept: CARDIAC REHAB | Age: 63
End: 2023-05-03
Payer: MEDICARE

## 2023-05-05 ENCOUNTER — APPOINTMENT (OUTPATIENT)
Dept: CARDIAC REHAB | Age: 63
End: 2023-05-05
Payer: MEDICARE

## 2023-05-08 ENCOUNTER — APPOINTMENT (OUTPATIENT)
Dept: CARDIAC REHAB | Age: 63
End: 2023-05-08
Payer: MEDICARE

## 2023-05-08 PROCEDURE — G0422 INTENS CARDIAC REHAB W/EXERC: HCPCS

## 2023-05-08 ASSESSMENT — EXERCISE STRESS TEST
PEAK_BP: 144/60
PEAK_RPD: 1

## 2023-05-08 ASSESSMENT — EJECTION FRACTION: EF_VALUE: 15

## 2023-05-08 NOTE — CARDIO/PULMONARY
COVID Screening completed. Patient denies complaints, no changes to PMH or medications. Patient tolerates exercise well. Weekly education, Discussed \"What is high blood pressure\", high BP risk factors, DASH eating plan, maintaining BP and a healthy weight. Handout provided.  Electronically signed by Tati Ponce RN on 5/8/2023 at 3:57 PM

## 2023-05-10 ENCOUNTER — APPOINTMENT (OUTPATIENT)
Dept: CARDIAC REHAB | Age: 63
End: 2023-05-10
Payer: MEDICARE

## 2023-05-10 PROCEDURE — G0422 INTENS CARDIAC REHAB W/EXERC: HCPCS

## 2023-05-12 ENCOUNTER — APPOINTMENT (OUTPATIENT)
Dept: CARDIAC REHAB | Age: 63
End: 2023-05-12
Payer: MEDICARE

## 2023-05-12 PROCEDURE — G0422 INTENS CARDIAC REHAB W/EXERC: HCPCS

## 2023-05-12 NOTE — PROGRESS NOTES
COVID Screening completed. Patient denies complaints, no changes to PMH or medications. Patient tolerates exercise well.   Electronically signed by Cayden Aguilera RN on 5/12/2023 at 2:17 PM
Patent

## 2023-05-13 NOTE — PROGRESS NOTES
COVID Screening completed. Patient denies complaints, no changes to PMH or medications. Patient tolerates exercise well.   Electronically signed by Kate Cornejo RN on 5/10/2023 at 1:53 PM SHEELA

## 2023-05-15 ENCOUNTER — APPOINTMENT (OUTPATIENT)
Dept: CARDIAC REHAB | Age: 63
End: 2023-05-15
Payer: MEDICARE

## 2023-05-15 PROCEDURE — G0422 INTENS CARDIAC REHAB W/EXERC: HCPCS

## 2023-05-15 NOTE — CARDIO/PULMONARY
COVID Screening completed. Patient denies complaints, no changes to PMH or medications. Patient tolerates exercise well. Weekly Education, discussed balance and strength exercises to aid in falls prevention. Handout offered in [facility]. Patient invited to monthly cardiac rehab support group this coming Wednesday. All equipment used in the care for this patient has been cleaned.   Electronically signed by Estee Ortega RN on 5/15/2023 at 4:24 PM

## 2023-05-17 ENCOUNTER — APPOINTMENT (OUTPATIENT)
Dept: CARDIAC REHAB | Age: 63
End: 2023-05-17
Payer: MEDICARE

## 2023-05-19 ENCOUNTER — APPOINTMENT (OUTPATIENT)
Dept: CARDIAC REHAB | Age: 63
End: 2023-05-19
Payer: MEDICARE

## 2023-05-22 ENCOUNTER — HOSPITAL ENCOUNTER (OUTPATIENT)
Dept: CARDIAC REHAB | Age: 63
Setting detail: THERAPIES SERIES
Discharge: HOME | End: 2023-05-22
Payer: MEDICARE

## 2023-05-22 PROCEDURE — G0422 INTENS CARDIAC REHAB W/EXERC: HCPCS

## 2023-05-22 NOTE — CARDIO/PULMONARY
COVID Screening completed. Patient denies complaints, no changes to PMH or medications. Patient tolerates exercise well. All equipment used in the care for this patient has been cleaned.   Electronically signed by Oliva Mccollum RN on 5/22/2023 at 4:21 PM

## 2023-05-24 ENCOUNTER — HOSPITAL ENCOUNTER (OUTPATIENT)
Dept: CARDIAC REHAB | Age: 63
Setting detail: THERAPIES SERIES
Discharge: HOME | End: 2023-05-24
Payer: MEDICARE

## 2023-05-24 PROCEDURE — G0422 INTENS CARDIAC REHAB W/EXERC: HCPCS

## 2023-05-24 NOTE — CARDIO/PULMONARY
COVID Screening completed. Patient denies changes to PMH or medications. Patient reports falling at home recently, hurting his right knee and left hand. Patient noted to be limping slightly and did not want to do much walking today due to knee discomfort. Patient tolerates modified exercise well. All equipment used in the care for this patient has been cleaned.   Electronically signed by Yonas Howard RN on 5/24/2023 at 4:03 PM

## 2023-05-31 ENCOUNTER — HOSPITAL ENCOUNTER (OUTPATIENT)
Dept: CARDIAC REHAB | Age: 63
Setting detail: THERAPIES SERIES
Discharge: HOME | End: 2023-05-31
Payer: MEDICARE

## 2023-05-31 ASSESSMENT — EXERCISE STRESS TEST
PEAK_RPD: 1
PEAK_BP: 144/60

## 2023-05-31 ASSESSMENT — EJECTION FRACTION: EF_VALUE: 15

## 2023-06-02 ENCOUNTER — APPOINTMENT (OUTPATIENT)
Dept: CARDIAC REHAB | Age: 63
End: 2023-06-02
Payer: MEDICARE

## 2023-06-05 ENCOUNTER — HOSPITAL ENCOUNTER (OUTPATIENT)
Dept: CARDIAC REHAB | Age: 63
Setting detail: THERAPIES SERIES
Discharge: HOME | End: 2023-06-05
Payer: MEDICARE

## 2023-06-05 PROCEDURE — G0422 INTENS CARDIAC REHAB W/EXERC: HCPCS

## 2023-06-05 NOTE — CARDIO/PULMONARY
COVID Screening completed. Patient reports no changes to PMH or medications. Utilized  tablet to discuss any updates with patient. Patient reports chronic pain, otherwise is doing well. Patient tolerates exercise well. Weekly education , discussed hot weather precautions for exercise/yard work; also discussed signs and symptoms of heat exhaustion. Handout provided in [address] [place] [address] All equipment used in the care for this patient has been cleaned.   Electronically signed by Bridgette Gitelman, RN on 6/5/2023 at 3:19 PM

## 2023-06-07 ENCOUNTER — HOSPITAL ENCOUNTER (OUTPATIENT)
Dept: CARDIAC REHAB | Age: 63
Setting detail: THERAPIES SERIES
Discharge: HOME | End: 2023-06-07
Payer: MEDICARE

## 2023-06-07 NOTE — CARDIO/PULMONARY
COVID Screening completed. Patient denies complaints, no changes to PMH or medications. Patient tolerated exercise well.  Electronically signed by Sumanth Russell on 6/7/2023 at 2:37 PM

## 2023-06-12 ENCOUNTER — APPOINTMENT (OUTPATIENT)
Dept: CARDIAC REHAB | Age: 63
End: 2023-06-12
Payer: MEDICARE

## 2023-06-14 ENCOUNTER — APPOINTMENT (OUTPATIENT)
Dept: CARDIAC REHAB | Age: 63
End: 2023-06-14
Payer: MEDICARE

## 2023-06-15 RX ORDER — ISOSORBIDE MONONITRATE 60 MG/1
60 TABLET, EXTENDED RELEASE ORAL DAILY
Qty: 30 TABLET | Refills: 3 | OUTPATIENT
Start: 2023-06-15

## 2023-06-15 NOTE — TELEPHONE ENCOUNTER
PM SCHEDULE, [facility]   7/31/2023  2:00 PM SCHEDULE, [facility]   8/2/2023  2:00 PM SCHEDULE, [facility]   8/4/2023  2:00 PM SCHEDULE, [facility]   8/7/2023  2:00 PM SCHEDULE, [facility]   8/9/2023  2:00 PM SCHEDULE, [facility]   8/11/2023  2:00 PM [place], [facility]   10/10/2023 11:30 AM Camila Cantor, [address] [facility]               Last refill 03/26/2023. Please approve or deny.

## 2023-06-16 ENCOUNTER — APPOINTMENT (OUTPATIENT)
Dept: CARDIAC REHAB | Age: 63
End: 2023-06-16
Payer: MEDICARE

## 2023-06-16 ASSESSMENT — EXERCISE STRESS TEST: PEAK_BP: 152/90

## 2023-06-16 ASSESSMENT — EJECTION FRACTION: EF_VALUE: 30

## 2023-06-19 ENCOUNTER — APPOINTMENT (OUTPATIENT)
Dept: CARDIAC REHAB | Age: 63
End: 2023-06-19
Payer: MEDICARE

## 2023-06-21 ENCOUNTER — APPOINTMENT (OUTPATIENT)
Dept: CARDIAC REHAB | Age: 63
End: 2023-06-21
Payer: MEDICARE

## 2023-06-21 PROCEDURE — G0422 INTENS CARDIAC REHAB W/EXERC: HCPCS

## 2023-06-21 RX ORDER — ISOSORBIDE MONONITRATE 60 MG/1
60 TABLET, EXTENDED RELEASE ORAL DAILY
Qty: 30 TABLET | Refills: 3 | OUTPATIENT
Start: 2023-06-21

## 2023-06-21 NOTE — CARDIO/PULMONARY
COVID Screening completed. Patient denies complaints, no changes to PMH or medications. Patient tolerates exercise well.   Electronically signed by Mosiés Mercado RN on 6/21/2023 at 4:20 PM

## 2023-06-23 ENCOUNTER — APPOINTMENT (OUTPATIENT)
Dept: CARDIAC REHAB | Age: 63
End: 2023-06-23
Payer: MEDICARE

## 2023-06-23 PROCEDURE — G0422 INTENS CARDIAC REHAB W/EXERC: HCPCS

## 2023-06-23 NOTE — PROGRESS NOTES
COVID Screening completed. Patient denies complaints, no changes to PMH or medications. Patient tolerates exercise well. Patient doesn't stay for ICR classes.  Electronically signed by Jo Cantu RN on 6/23/2023 at 3:30 PM

## 2023-06-26 ENCOUNTER — APPOINTMENT (OUTPATIENT)
Dept: CARDIAC REHAB | Age: 63
End: 2023-06-26
Payer: MEDICARE

## 2023-06-26 VITALS — WEIGHT: 315 LBS | BODY MASS INDEX: 44.1 KG/M2 | HEIGHT: 71 IN

## 2023-06-26 PROCEDURE — G0422 INTENS CARDIAC REHAB W/EXERC: HCPCS

## 2023-06-28 ENCOUNTER — APPOINTMENT (OUTPATIENT)
Dept: CARDIAC REHAB | Age: 63
End: 2023-06-28
Payer: MEDICARE

## 2023-06-28 PROCEDURE — G0422 INTENS CARDIAC REHAB W/EXERC: HCPCS

## 2023-06-30 ENCOUNTER — APPOINTMENT (OUTPATIENT)
Dept: CARDIAC REHAB | Age: 63
End: 2023-06-30
Payer: MEDICARE

## 2023-07-03 ENCOUNTER — HOSPITAL ENCOUNTER (OUTPATIENT)
Dept: CARDIAC REHAB | Age: 63
Setting detail: THERAPIES SERIES
Discharge: HOME | End: 2023-07-03
Payer: MEDICARE

## 2023-07-03 PROCEDURE — G0422 INTENS CARDIAC REHAB W/EXERC: HCPCS

## 2023-07-03 NOTE — CARDIO/PULMONARY
COVID Screening completed. Patient denies complaints, no changes to PMH or medications. Patient tolerates exercise well. Patient given discharge paperwork, with instructions in [place] to fill out with his caregiver and return to rehab prior to discharge. All equipment used in the care for this patient has been cleaned.   Electronically signed by Adiel Perdomo RN on 7/3/2023 at 3:13 PM

## 2023-07-05 ENCOUNTER — HOSPITAL ENCOUNTER (OUTPATIENT)
Dept: CARDIAC REHAB | Age: 63
Setting detail: THERAPIES SERIES
Discharge: HOME | End: 2023-07-05
Payer: MEDICARE

## 2023-07-05 PROCEDURE — G0422 INTENS CARDIAC REHAB W/EXERC: HCPCS

## 2023-07-05 NOTE — PROGRESS NOTES
COVID Screening completed. Patient denies complaints, no changes to PMH or medications. Patient tolerates exercise well. Patient returns discharge paperwork. Patient declines ICR workshop.  Electronically signed by Gamal Verdugo RN on 7/5/2023 at 1:50 PM

## 2023-07-10 ENCOUNTER — HOSPITAL ENCOUNTER (OUTPATIENT)
Dept: CARDIAC REHAB | Age: 63
Setting detail: THERAPIES SERIES
Discharge: HOME | End: 2023-07-10
Payer: MEDICARE

## 2023-07-10 PROCEDURE — G0422 INTENS CARDIAC REHAB W/EXERC: HCPCS

## 2023-07-10 ASSESSMENT — EXERCISE STRESS TEST: PEAK_BP: 138/82

## 2023-07-10 ASSESSMENT — EJECTION FRACTION: EF_VALUE: 30

## 2023-07-10 NOTE — CARDIO/PULMONARY
COVID Screening completed. Patient denies complaints, no changes to PMH or medications. Patient tolerates exercise well. Weekly education, discussed fighting stress with healthy habits. Patient offered life stress test. [place] handout from [facility] offered. All equipment used in the care for this patient has been cleaned.   Electronically signed by Julia Boyd RN on 7/10/2023 at 2:13 PM

## 2023-07-17 ENCOUNTER — HOSPITAL ENCOUNTER (OUTPATIENT)
Dept: CARDIAC REHAB | Age: 63
Setting detail: THERAPIES SERIES
Discharge: HOME | End: 2023-07-17
Payer: MEDICARE

## 2023-07-17 PROCEDURE — G0422 INTENS CARDIAC REHAB W/EXERC: HCPCS

## 2023-07-17 RX ORDER — TICAGRELOR 90 MG/1
TABLET ORAL
Qty: 60 TABLET | Refills: 3 | Status: SHIPPED | OUTPATIENT
Start: 2023-07-17

## 2023-07-17 NOTE — PROGRESS NOTES
COVID Screening completed. Patient denies complaints, no changes to PMH or medications. Patient tolerates exercise well. Weekly education: Heart healthy sleep benefits, how poor sleep affects health, and tips for good sleep. Hand out provided in Haitian. OT 90. Patient reminded of discharge paperwork.  Electronically signed by Stephan Palacios RN on 7/17/2023 at 1:56 PM

## 2023-07-19 ENCOUNTER — HOSPITAL ENCOUNTER (OUTPATIENT)
Dept: CARDIAC REHAB | Age: 63
Setting detail: THERAPIES SERIES
Discharge: HOME | End: 2023-07-19
Payer: MEDICARE

## 2023-07-19 PROCEDURE — G0422 INTENS CARDIAC REHAB W/EXERC: HCPCS

## 2023-07-19 NOTE — CARDIO/PULMONARY
COVID Screening completed. Patient denies complaints, no changes to PMH or medications. Patient tolerates exercise well. Patient declines education class today.   Electronically signed by Julio César Melgoza RN on 7/19/2023 at 4:35 PM

## 2023-07-21 ENCOUNTER — HOSPITAL ENCOUNTER (OUTPATIENT)
Dept: CARDIAC REHAB | Age: 63
Setting detail: THERAPIES SERIES
Discharge: HOME | End: 2023-07-21
Payer: MEDICARE

## 2023-07-21 PROCEDURE — G0422 INTENS CARDIAC REHAB W/EXERC: HCPCS

## 2023-07-21 ASSESSMENT — PATIENT HEALTH QUESTIONNAIRE - PHQ9
8. MOVING OR SPEAKING SO SLOWLY THAT OTHER PEOPLE COULD HAVE NOTICED. OR THE OPPOSITE, BEING SO FIGETY OR RESTLESS THAT YOU HAVE BEEN MOVING AROUND A LOT MORE THAN USUAL: 1
SUM OF ALL RESPONSES TO PHQ QUESTIONS 1-9: 13
SUM OF ALL RESPONSES TO PHQ QUESTIONS 1-9: 13
7. TROUBLE CONCENTRATING ON THINGS, SUCH AS READING THE NEWSPAPER OR WATCHING TELEVISION: 1
SUM OF ALL RESPONSES TO PHQ QUESTIONS 1-9: 13
6. FEELING BAD ABOUT YOURSELF - OR THAT YOU ARE A FAILURE OR HAVE LET YOURSELF OR YOUR FAMILY DOWN: 1
SUM OF ALL RESPONSES TO PHQ QUESTIONS 1-9: 12
4. FEELING TIRED OR HAVING LITTLE ENERGY: 2
1. LITTLE INTEREST OR PLEASURE IN DOING THINGS: 1
SUM OF ALL RESPONSES TO PHQ9 QUESTIONS 1 & 2: 2
2. FEELING DOWN, DEPRESSED OR HOPELESS: 1
10. IF YOU CHECKED OFF ANY PROBLEMS, HOW DIFFICULT HAVE THESE PROBLEMS MADE IT FOR YOU TO DO YOUR WORK, TAKE CARE OF THINGS AT HOME, OR GET ALONG WITH OTHER PEOPLE: 1
9. THOUGHTS THAT YOU WOULD BE BETTER OFF DEAD, OR OF HURTING YOURSELF: 1
3. TROUBLE FALLING OR STAYING ASLEEP: 2
5. POOR APPETITE OR OVEREATING: 3

## 2023-07-21 ASSESSMENT — EXERCISE STRESS TEST
PEAK_RPD: 2
PEAK_BP: 156/74
PEAK_HR: 96
PEAK_RPE: 13
PEAK_BP: 156/74

## 2023-07-21 ASSESSMENT — NEW YORK HEART ASSOCIATION (NYHA) CLASSIFICATION: NYHA FUNCTIONAL CLASS: CLASS IV

## 2023-07-21 ASSESSMENT — EJECTION FRACTION: EF_VALUE: 30

## 2023-07-21 NOTE — CARDIO/PULMONARY
*LAST SESSION*  COVID Screening completed. Patient denies complaints, no changes to PMH or medications. Patient tolerates exercise well. Patient aware of signs and symptoms to monitor for, who to report them to, and when to go to ER/ call 911. Patient plans to continue walking at home upon discharge. All equipment used in the care for this patient has been cleaned.   Electronically signed by Valeria Escobedo RN on 7/21/2023 at 3:26 PM

## 2023-07-24 ENCOUNTER — APPOINTMENT (OUTPATIENT)
Dept: CARDIAC REHAB | Age: 63
End: 2023-07-24
Payer: MEDICARE

## 2023-07-25 ENCOUNTER — TELEPHONE (OUTPATIENT)
Dept: CARDIOLOGY CLINIC | Age: 63
End: 2023-07-25

## 2023-07-25 NOTE — TELEPHONE ENCOUNTER
Appointment For: Claudette Murphy ([id number])   Visit Type: [facility] OFFICE VISIT ([id number])      8/25/2023   12:30 PM  15 mins. Dr. Felicita Bundy DO     [address] [facility]      Patient Comments: Follow up     Appointment Scheduled  (Newest Message First)  Batch Job User Anaid  Whaley Mariah, Edith 7 hours ago (12:01 AM)     Mychart, Processor  Josr Jovana Lemus 12 hours ago (7:38 PM)     PM  Appointment Information:      Visit Type: Office Visit          Date: 8/25/2023                  Dept: [facility]                  Provider: Felicita Bundy                  Time: 12:30 PM                  Length: 15 min     Appt Status: Scheduled        Appt Instructions:      Please complete digital registration via the [employer]       This [employer] message has not been read. Jodee Lemus  Patient Appointment Schedule Request Pool 12 hours ago (7:38 PM)       Appointment For: Claudette Murphy ([id number])  Visit Type: [facility] OFFICE VISIT ([id number])     8/25/2023   12:30 PM  15 mins. Dr. Felicita Bundy DO     [address] [facility]     Patient Comments:   Follow up

## 2023-07-26 ENCOUNTER — APPOINTMENT (OUTPATIENT)
Dept: CARDIAC REHAB | Age: 63
End: 2023-07-26
Payer: MEDICARE

## 2023-07-28 ENCOUNTER — APPOINTMENT (OUTPATIENT)
Dept: CARDIAC REHAB | Age: 63
End: 2023-07-28
Payer: MEDICARE

## 2023-07-31 ENCOUNTER — APPOINTMENT (OUTPATIENT)
Dept: CARDIAC REHAB | Age: 63
End: 2023-07-31
Payer: MEDICARE

## 2023-08-08 NOTE — TELEPHONE ENCOUNTER
Requesting medication refill. Please approve or deny this request.    Rx requested:  Requested Prescriptions     Pending Prescriptions Disp Refills    isosorbide mononitrate (IMDUR) 60 MG extended release tablet [Pharmacy Med Name: ISOSORBIDE MONONITRATE 60MG ER TABS] 30 tablet 3     Sig: TAKE 1 TABLET BY MOUTH DAILY         Last Office Visit:   1/6/2023      Next Visit Date:  Future Appointments   Date Time Provider [address]   8/16/2023  1:30 PM [address] [place] [address] EMERGENCY MEDICAL [place] [facility] [place]   8/25/2023 12:30 PM DO Cecilio Brown [facility]   10/10/2023 11:30 AM Shyam Khoury, [address]               Last refill 3/27/23. Please approve or deny.

## 2023-08-09 RX ORDER — ISOSORBIDE MONONITRATE 60 MG/1
60 TABLET, EXTENDED RELEASE ORAL DAILY
Qty: 30 TABLET | Refills: 11 | Status: SHIPPED | OUTPATIENT
Start: 2023-08-09

## 2023-08-16 DIAGNOSIS — E66.9 DIABETES MELLITUS TYPE 2 IN OBESE (HCC): ICD-10-CM

## 2023-08-16 DIAGNOSIS — N17.9 ACUTE KIDNEY INJURY SUPERIMPOSED ON CKD (HCC): ICD-10-CM

## 2023-08-16 DIAGNOSIS — E11.69 DIABETES MELLITUS TYPE 2 IN OBESE (HCC): ICD-10-CM

## 2023-08-16 DIAGNOSIS — E78.5 DYSLIPIDEMIA: ICD-10-CM

## 2023-08-16 DIAGNOSIS — N18.9 ACUTE KIDNEY INJURY SUPERIMPOSED ON CKD (HCC): ICD-10-CM

## 2023-08-16 LAB
ALBUMIN SERPL-MCNC: 4.2 G/DL (ref 3.5–4.6)
ANION GAP SERPL CALCULATED.3IONS-SCNC: 13 MEQ/L (ref 9–15)
BACTERIA URNS QL MICRO: NEGATIVE /HPF
BASOPHILS # BLD: 0.1 K/UL (ref 0–0.2)
BASOPHILS NFR BLD: 1.9 %
BILIRUB UR QL STRIP: NEGATIVE
BUN SERPL-MCNC: 39 MG/DL (ref 8–23)
CALCIUM SERPL-MCNC: 9.3 MG/DL (ref 8.5–9.9)
CHLORIDE SERPL-SCNC: 105 MEQ/L (ref 95–107)
CLARITY UR: CLEAR
CO2 SERPL-SCNC: 27 MEQ/L (ref 20–31)
COLOR UR: YELLOW
CREAT SERPL-MCNC: 3.43 MG/DL (ref 0.7–1.2)
CREAT UR-MCNC: 72.6 MG/DL
EOSINOPHIL # BLD: 0.2 K/UL (ref 0–0.7)
EOSINOPHIL NFR BLD: 3.2 %
EPI CELLS #/AREA URNS AUTO: ABNORMAL /HPF (ref 0–5)
ERYTHROCYTE [DISTWIDTH] IN BLOOD BY AUTOMATED COUNT: 17.7 % (ref 11.5–14.5)
GLUCOSE SERPL-MCNC: 83 MG/DL (ref 70–99)
GLUCOSE UR STRIP-MCNC: 500 MG/DL
HCT VFR BLD AUTO: 39.6 % (ref 42–52)
HGB BLD-MCNC: 13 G/DL (ref 14–18)
HGB UR QL STRIP: NEGATIVE
HYALINE CASTS #/AREA URNS AUTO: ABNORMAL /HPF (ref 0–5)
KETONES UR STRIP-MCNC: NEGATIVE MG/DL
LEUKOCYTE ESTERASE UR QL STRIP: NEGATIVE
LYMPHOCYTES # BLD: 2.5 K/UL (ref 1–4.8)
LYMPHOCYTES NFR BLD: 34.8 %
MCH RBC QN AUTO: 29.2 PG (ref 27–31.3)
MCHC RBC AUTO-ENTMCNC: 32.8 % (ref 33–37)
MCV RBC AUTO: 89.2 FL (ref 79–92.2)
MONOCYTES # BLD: 0.9 K/UL (ref 0.2–0.8)
MONOCYTES NFR BLD: 12.2 %
NEUTROPHILS # BLD: 3.4 K/UL (ref 1.4–6.5)
NEUTS SEG NFR BLD: 47.9 %
NITRITE UR QL STRIP: NEGATIVE
PH UR STRIP: 6.5 [PH] (ref 5–9)
PHOSPHATE SERPL-MCNC: 4.4 MG/DL (ref 2.3–4.8)
PLATELET # BLD AUTO: 303 K/UL (ref 130–400)
POTASSIUM SERPL-SCNC: 4.2 MEQ/L (ref 3.4–4.9)
PROT UR STRIP-MCNC: 30 MG/DL
RBC # BLD AUTO: 4.44 M/UL (ref 4.7–6.1)
RBC #/AREA URNS AUTO: ABNORMAL /HPF (ref 0–5)
SODIUM SERPL-SCNC: 145 MEQ/L (ref 135–144)
SP GR UR STRIP: 1.01 (ref 1–1.03)
URINE REFLEX TO CULTURE: ABNORMAL
UROBILINOGEN UR STRIP-ACNC: 1 E.U./DL
WBC # BLD AUTO: 7 K/UL (ref 4.8–10.8)
WBC #/AREA URNS AUTO: ABNORMAL /HPF (ref 0–5)

## 2023-09-07 ENCOUNTER — OFFICE VISIT (OUTPATIENT)
Dept: CARDIOLOGY CLINIC | Age: 63
End: 2023-09-07
Payer: MEDICARE

## 2023-09-07 VITALS
HEART RATE: 79 BPM | OXYGEN SATURATION: 94 % | HEIGHT: 71 IN | BODY MASS INDEX: 44.1 KG/M2 | WEIGHT: 315 LBS | DIASTOLIC BLOOD PRESSURE: 84 MMHG | SYSTOLIC BLOOD PRESSURE: 162 MMHG

## 2023-09-07 DIAGNOSIS — I50.9 CONGESTIVE HEART FAILURE, UNSPECIFIED HF CHRONICITY, UNSPECIFIED HEART FAILURE TYPE (HCC): ICD-10-CM

## 2023-09-07 DIAGNOSIS — Z98.61 CAD S/P PERCUTANEOUS CORONARY ANGIOPLASTY: ICD-10-CM

## 2023-09-07 DIAGNOSIS — I25.10 CAD S/P PERCUTANEOUS CORONARY ANGIOPLASTY: ICD-10-CM

## 2023-09-07 DIAGNOSIS — Z95.810 HISTORY OF IMPLANTABLE CARDIAC DEFIBRILLATOR (ICD): ICD-10-CM

## 2023-09-07 DIAGNOSIS — E78.5 DYSLIPIDEMIA: ICD-10-CM

## 2023-09-07 DIAGNOSIS — Z86.79 HISTORY OF PSVT (PAROXYSMAL SUPRAVENTRICULAR TACHYCARDIA): ICD-10-CM

## 2023-09-07 DIAGNOSIS — Z79.899 ON AMIODARONE THERAPY: ICD-10-CM

## 2023-09-07 DIAGNOSIS — I10 HYPERTENSION, UNSPECIFIED TYPE: ICD-10-CM

## 2023-09-07 DIAGNOSIS — Z86.711 HISTORY OF PULMONARY EMBOLISM: ICD-10-CM

## 2023-09-07 DIAGNOSIS — I42.9 CARDIOMYOPATHY, UNSPECIFIED TYPE (HCC): ICD-10-CM

## 2023-09-07 DIAGNOSIS — I50.9 CONGESTIVE HEART FAILURE, UNSPECIFIED HF CHRONICITY, UNSPECIFIED HEART FAILURE TYPE (HCC): Primary | ICD-10-CM

## 2023-09-07 DIAGNOSIS — I50.22 CHRONIC SYSTOLIC CHF (CONGESTIVE HEART FAILURE), NYHA CLASS 2 (HCC): ICD-10-CM

## 2023-09-07 LAB
ANION GAP SERPL CALCULATED.3IONS-SCNC: 15 MEQ/L (ref 9–15)
BUN SERPL-MCNC: 64 MG/DL (ref 8–23)
CALCIUM SERPL-MCNC: 8.7 MG/DL (ref 8.5–9.9)
CHLORIDE SERPL-SCNC: 101 MEQ/L (ref 95–107)
CO2 SERPL-SCNC: 22 MEQ/L (ref 20–31)
CREAT SERPL-MCNC: 4.55 MG/DL (ref 0.7–1.2)
GLUCOSE SERPL-MCNC: 184 MG/DL (ref 70–99)
POTASSIUM SERPL-SCNC: 3.9 MEQ/L (ref 3.4–4.9)
SODIUM SERPL-SCNC: 138 MEQ/L (ref 135–144)
TSH REFLEX: 1.28 UIU/ML (ref 0.44–3.86)

## 2023-09-07 PROCEDURE — 99214 OFFICE O/P EST MOD 30 MIN: CPT | Performed by: INTERNAL MEDICINE

## 2023-09-07 PROCEDURE — 93000 ELECTROCARDIOGRAM COMPLETE: CPT | Performed by: INTERNAL MEDICINE

## 2023-09-07 PROCEDURE — 3078F DIAST BP <80 MM HG: CPT | Performed by: INTERNAL MEDICINE

## 2023-09-07 PROCEDURE — 1123F ACP DISCUSS/DSCN MKR DOCD: CPT | Performed by: INTERNAL MEDICINE

## 2023-09-07 PROCEDURE — 3077F SYST BP >= 140 MM HG: CPT | Performed by: INTERNAL MEDICINE

## 2023-09-07 ASSESSMENT — ENCOUNTER SYMPTOMS
SHORTNESS OF BREATH: 0
EYES NEGATIVE: 1
GASTROINTESTINAL NEGATIVE: 1
VOMITING: 0
WHEEZING: 0
NAUSEA: 0
ABDOMINAL PAIN: 0
ALLERGIC/IMMUNOLOGIC NEGATIVE: 1

## 2023-09-07 NOTE — PROGRESS NOTES
home or at a pharmacy, maintain a logbook, and also call us back if blood pressure are above the target ranges or if it is low. Patient clearly understands and agrees to the instructions. We will need to continue to monitor muscle and liver enzymes, BUN, CR, and electrolytes. Details of medical condition explained and patient was warned about adverse consequences of uncontrolled medical conditions and possible side effects of prescribed medications. No follow-ups on file. An  electronic signature was used to authenticate this note.     --Arturo Ly, DO on 9/7/2023 at 1:00 PM  9}

## 2023-09-08 ENCOUNTER — TELEPHONE (OUTPATIENT)
Dept: CARDIOLOGY CLINIC | Age: 63
End: 2023-09-08

## 2023-09-08 NOTE — TELEPHONE ENCOUNTER
Pt daughter called because she saw the labs and she is very worried about them.  She is also requesting a different nephrologist because unable to get into Dr. Corinne Harms until Oct 9th?    [phone]

## 2023-09-12 DIAGNOSIS — I42.0 DILATED CARDIOMYOPATHY (HCC): ICD-10-CM

## 2023-09-12 DIAGNOSIS — I50.9 CONGESTIVE HEART FAILURE, UNSPECIFIED HF CHRONICITY, UNSPECIFIED HEART FAILURE TYPE (HCC): ICD-10-CM

## 2023-09-13 ENCOUNTER — HOSPITAL ENCOUNTER (OUTPATIENT)
Dept: CARDIOLOGY | Age: 63
Discharge: HOME | End: 2023-09-13
Payer: MEDICARE

## 2023-09-13 ENCOUNTER — OFFICE VISIT (OUTPATIENT)
Dept: CARDIOLOGY CLINIC | Age: 63
End: 2023-09-13
Payer: MEDICARE

## 2023-09-13 VITALS
WEIGHT: 315 LBS | DIASTOLIC BLOOD PRESSURE: 70 MMHG | OXYGEN SATURATION: 95 % | RESPIRATION RATE: 18 BRPM | BODY MASS INDEX: 50.21 KG/M2 | SYSTOLIC BLOOD PRESSURE: 128 MMHG | HEART RATE: 68 BPM

## 2023-09-13 DIAGNOSIS — E11.69 DIABETES MELLITUS TYPE 2 IN OBESE (HCC): ICD-10-CM

## 2023-09-13 DIAGNOSIS — N18.9 ACUTE KIDNEY INJURY SUPERIMPOSED ON CKD (HCC): ICD-10-CM

## 2023-09-13 DIAGNOSIS — E66.9 DIABETES MELLITUS TYPE 2 IN OBESE (HCC): ICD-10-CM

## 2023-09-13 DIAGNOSIS — Z98.61 CAD S/P PERCUTANEOUS CORONARY ANGIOPLASTY: ICD-10-CM

## 2023-09-13 DIAGNOSIS — I50.22 CHRONIC SYSTOLIC CHF (CONGESTIVE HEART FAILURE), NYHA CLASS 2 (HCC): ICD-10-CM

## 2023-09-13 DIAGNOSIS — I25.10 CAD S/P PERCUTANEOUS CORONARY ANGIOPLASTY: ICD-10-CM

## 2023-09-13 DIAGNOSIS — I10 HYPERTENSION, UNSPECIFIED TYPE: ICD-10-CM

## 2023-09-13 DIAGNOSIS — Z86.711 HISTORY OF PULMONARY EMBOLISM: ICD-10-CM

## 2023-09-13 DIAGNOSIS — Z86.79 HISTORY OF PSVT (PAROXYSMAL SUPRAVENTRICULAR TACHYCARDIA): ICD-10-CM

## 2023-09-13 DIAGNOSIS — I25.5 ISCHEMIC CARDIOMYOPATHY: ICD-10-CM

## 2023-09-13 DIAGNOSIS — N17.9 ACUTE KIDNEY INJURY SUPERIMPOSED ON CKD (HCC): ICD-10-CM

## 2023-09-13 DIAGNOSIS — Z95.810 HISTORY OF IMPLANTABLE CARDIAC DEFIBRILLATOR (ICD): ICD-10-CM

## 2023-09-13 PROCEDURE — 93296 REM INTERROG EVL PM/IDS: CPT

## 2023-09-13 PROCEDURE — 1123F ACP DISCUSS/DSCN MKR DOCD: CPT | Performed by: PHYSICIAN ASSISTANT

## 2023-09-13 PROCEDURE — 99214 OFFICE O/P EST MOD 30 MIN: CPT | Performed by: PHYSICIAN ASSISTANT

## 2023-09-13 PROCEDURE — 3078F DIAST BP <80 MM HG: CPT | Performed by: PHYSICIAN ASSISTANT

## 2023-09-13 PROCEDURE — 3074F SYST BP LT 130 MM HG: CPT | Performed by: PHYSICIAN ASSISTANT

## 2023-09-13 RX ORDER — EMPAGLIFLOZIN 10 MG/1
TABLET, FILM COATED ORAL
Qty: 90 TABLET | Refills: 3 | Status: SHIPPED | OUTPATIENT
Start: 2023-09-13

## 2023-09-13 ASSESSMENT — ENCOUNTER SYMPTOMS
ABDOMINAL DISTENTION: 0
VOMITING: 0
SHORTNESS OF BREATH: 0
COLOR CHANGE: 0
ABDOMINAL PAIN: 0
NAUSEA: 0
COUGH: 0
BLOOD IN STOOL: 0
CHEST TIGHTNESS: 0

## 2023-09-13 NOTE — TELEPHONE ENCOUNTER
Requesting medication refill. Rx requested:  Requested Prescriptions     Pending Prescriptions Disp Refills    JARDIANCE 10 MG tablet [Pharmacy Med Name: Sherman Prado 10MG TABLETS] 30 tablet 5     Sig: TAKE 1 TABLET BY MOUTH DAILY         Last Office Visit:   4/27/2023      Next Visit Date:  Future Appointments   Date Time Provider [address] [place] [address]   9/13/2023 11:00 AM [facility]   9/13/2023  3:00 PM WU Swanson [facility] [place]   10/10/2023 11:30 AM [facility]   6/7/2024 12:30 PM Norman Smith [facility]               Last refill 03/29/2023.

## 2023-09-13 NOTE — PATIENT INSTRUCTIONS
**Call [phone] to schedule echocardiogram as ordered by Dr. Cruz Code    -Call to schedule follow-up pulmonology, Dr. Stephanie Fuentes regarding need to continue Eliquis for history of pulmonary embolism    -Check daily weight every morning and notify CHF clinic if gaining more than 3 pounds in 2 days    -Check blood pressure twice daily in a.m. and p.m. prior to taking medications and keep log of blood pressure trends to review at next office visit  Notify office if BP running low with  mmHg or below prior to taking medications  Notify office if BP running high with  mmHg or above or if DBP 90 mmHg or above    -Recommend 2000 mg daily sodium restriction    -Recommend 1.5 liter (48 ounces) daily fluid restriction

## 2023-09-13 NOTE — PROGRESS NOTES
Patient: Batsheva Warren  YOB: 1960  MRN: [id number]    Chief Complaint:  Chief Complaint   Patient presents with    3 Month Follow-Up    Congestive Heart Failure    Cardiomyopathy    Edema         Subjective/HPI     9/13/23: Here for follow-up of chronic systolic congestive heart failure with EF of 15 to 20% per echo 11/28/2022. History of ICD implant on 4/23/2020 with Dr. Janette Ahmadi    Patient was last seen in CHF clinic on 4/27/2023 and since that time had canceled multiple appointments. He had most recent follow-up with Dr. Dede Santoyo on 9/7/2023 and due to progressive decline in renal function, he was advised to hold his Entresto at time of this office visit. Also at this visit with Dr. Arlet Ferrari, echocardiogram was ordered to reevaluate LV function. Patient had previously been referred to nephrology, Dr. Pa West but per his daughter who translates for him during visit, patient has had trouble scheduling this appointment. He finally had evaluation with nephrology, Dr. Pa West on 9/11/2023 as daughter was concerned with persistent decline in renal function per most recent blood work on 9/7/2023. Per daughter, patient was advised by nephrology to change his Lasix to Monday/Wednesday/Friday only instead of daily. Also, continue to hold [facility] for now. Patient presents with his daughter who translates for him as he is Pashto-speaking only. Prior to undergoing the recent blood work on 9/7/2023, daughter states that patient had diarrhea for 3 days straight. Further decline in renal function possibly related to meds and dehydration due to diarrhea. Patient is scheduled to follow-up with Dr. Pa West on 10/9/2023 with repeat blood work to be completed on 10/2/2023.   Patient denies shortness of breath or dyspnea exertion, chest pain, palpitations, diaphoresis, paroxysmal nocturnal dyspnea, orthopnea, abdominal pain or distention, worsening lower extremity edema, dizziness,

## 2023-09-25 DIAGNOSIS — Z79.4 OTHER SPECIFIED DIABETES MELLITUS WITHOUT COMPLICATION, WITH LONG-TERM CURRENT USE OF INSULIN (MULTI): ICD-10-CM

## 2023-09-25 DIAGNOSIS — E13.9 OTHER SPECIFIED DIABETES MELLITUS WITHOUT COMPLICATION, WITH LONG-TERM CURRENT USE OF INSULIN (MULTI): ICD-10-CM

## 2023-09-25 RX ORDER — INSULIN LISPRO 100 [IU]/ML
INJECTION, SOLUTION INTRAVENOUS; SUBCUTANEOUS
Qty: 5 EACH | Refills: 0 | Status: SHIPPED | OUTPATIENT
Start: 2023-09-25 | End: 2023-10-30

## 2023-09-25 NOTE — TELEPHONE ENCOUNTER
Rx Refill Request Telephone Encounter    Name:  Dmitriy Sherman  : 1960     Medication Name:  HUMALOG KWIK  UNITS/ML  Quantity (Optional):    1 (5X3 ML PEN)   Directions (Optional):   INJECT 8 UNITS UNDER THE SKIN 3 TIMES DAILY DAILY BEFORE MEALS    Specific Pharmacy location:  [facility] [address]     Date of last appointment:        Pt is out of medication.

## 2023-10-03 DIAGNOSIS — R06.02 SHORTNESS OF BREATH: ICD-10-CM

## 2023-10-03 DIAGNOSIS — E78.5 HYPERLIPIDEMIA, UNSPECIFIED HYPERLIPIDEMIA TYPE: ICD-10-CM

## 2023-10-03 LAB
ANION GAP SERPL CALCULATED.3IONS-SCNC: 15 MEQ/L (ref 9–15)
BUN SERPL-MCNC: 37 MG/DL (ref 8–23)
CALCIUM SERPL-MCNC: 8.9 MG/DL (ref 8.5–9.9)
CHLORIDE SERPL-SCNC: 104 MEQ/L (ref 95–107)
CO2 SERPL-SCNC: 24 MEQ/L (ref 20–31)
CREAT SERPL-MCNC: 3.07 MG/DL (ref 0.7–1.2)
GLUCOSE SERPL-MCNC: 94 MG/DL (ref 70–99)
POTASSIUM SERPL-SCNC: 4.1 MEQ/L (ref 3.4–4.9)
SODIUM SERPL-SCNC: 143 MEQ/L (ref 135–144)

## 2023-10-03 NOTE — TELEPHONE ENCOUNTER
Last seen in February, please call and schedule appointment.  Can send in short supply if needed, just let us know.  Thanks ladies!

## 2023-10-10 ENCOUNTER — OFFICE VISIT (OUTPATIENT)
Dept: ENDOCRINOLOGY | Age: 63
End: 2023-10-10

## 2023-10-10 VITALS
WEIGHT: 315 LBS | DIASTOLIC BLOOD PRESSURE: 72 MMHG | OXYGEN SATURATION: 96 % | HEART RATE: 80 BPM | SYSTOLIC BLOOD PRESSURE: 184 MMHG | BODY MASS INDEX: 44.1 KG/M2 | HEIGHT: 71 IN

## 2023-10-10 DIAGNOSIS — Z79.4 TYPE 2 DIABETES MELLITUS WITH STAGE 3B CHRONIC KIDNEY DISEASE, WITH LONG-TERM CURRENT USE OF INSULIN (HCC): Primary | ICD-10-CM

## 2023-10-10 DIAGNOSIS — E11.22 TYPE 2 DIABETES MELLITUS WITH STAGE 3B CHRONIC KIDNEY DISEASE, WITH LONG-TERM CURRENT USE OF INSULIN (HCC): Primary | ICD-10-CM

## 2023-10-10 DIAGNOSIS — N18.32 TYPE 2 DIABETES MELLITUS WITH STAGE 3B CHRONIC KIDNEY DISEASE, WITH LONG-TERM CURRENT USE OF INSULIN (HCC): Primary | ICD-10-CM

## 2023-10-10 LAB
CHP ED QC CHECK: ABNORMAL
GLUCOSE BLD-MCNC: 135 MG/DL
POC HEMOGLOBIN A1C: 7.7 % (ref 4.2–6.5)

## 2023-10-10 RX ORDER — ACYCLOVIR 400 MG/1
1 TABLET ORAL
Qty: 12 EACH | Refills: 10 | Status: SHIPPED | OUTPATIENT
Start: 2023-10-10

## 2023-10-10 RX ORDER — SACUBITRIL AND VALSARTAN 24; 26 MG/1; MG/1
TABLET, FILM COATED ORAL
COMMUNITY
Start: 2023-10-09

## 2023-10-10 RX ORDER — CALCITRIOL 0.25 UG/1
CAPSULE, LIQUID FILLED ORAL
COMMUNITY
Start: 2023-10-09

## 2023-10-10 ASSESSMENT — ENCOUNTER SYMPTOMS
RHINORRHEA: 0
COUGH: 0
DIARRHEA: 0
SINUS PRESSURE: 0
WHEEZING: 0
SHORTNESS OF BREATH: 0
NAUSEA: 0
SORE THROAT: 0
VOMITING: 0
ABDOMINAL PAIN: 0

## 2023-10-10 NOTE — PROGRESS NOTES
04/17/2020     No results found for: \"TESTOSTERONE\", \"SHBG\", \"TESTFREENM\"  Lab Results   Component Value Date    TSH 2.21 04/17/2020    TSHREFLEX 1.280 09/07/2023    TSHREFLEX 0.980 05/29/2019     No results found for: \"TPOABS\"    Review of Systems   Constitutional:  Negative for chills, fatigue and fever. HENT:  Negative for congestion, ear pain, postnasal drip, rhinorrhea, sinus pressure and sore throat. Eyes:  Negative for visual disturbance. Respiratory:  Negative for cough, shortness of breath and wheezing. Cardiovascular:  Negative for chest pain, palpitations and leg swelling. Gastrointestinal:  Negative for abdominal pain, diarrhea, nausea and vomiting. Endocrine: Negative for cold intolerance, heat intolerance, polydipsia and polyuria. Genitourinary:  Negative for difficulty urinating. Musculoskeletal:  Negative for arthralgias. Skin:  Negative for rash. Allergic/Immunologic: Negative for environmental allergies. Neurological:  Negative for dizziness and headaches. Hematological:  Does not bruise/bleed easily. Psychiatric/Behavioral:  Negative for dysphoric mood. Objective:   Physical Exam  Vitals reviewed. Constitutional:       Appearance: Normal appearance. He is well-developed. He is not ill-appearing. HENT:      Head: Normocephalic and atraumatic. Nose: No congestion. Eyes:      Conjunctiva/sclera: Conjunctivae normal.   Neck:      Thyroid: No thyroid mass, thyromegaly or thyroid tenderness. Cardiovascular:      Rate and Rhythm: Normal rate and regular rhythm. Heart sounds: Normal heart sounds. Pulmonary:      Effort: Pulmonary effort is normal.      Breath sounds: Normal breath sounds. Abdominal:      General: Bowel sounds are normal.      Palpations: Abdomen is soft. Musculoskeletal:         General: Normal range of motion. Cervical back: Normal range of motion and neck supple. Skin:     General: Skin is warm and dry.    Neurological:

## 2023-10-10 NOTE — TELEPHONE ENCOUNTER
Patient's daughter aware (Dmitriy was there but only speaks Serbian)  Made an appointment for 10/16/23  Will need a short supply sent in to [facility] in [place] on [address]  Aware you are out of the office today

## 2023-10-10 NOTE — PATIENT INSTRUCTIONS
Endocrinology-    Check your blood sugars 4 times a day, before meals and at night  Document these numbers in a blood glucose log and bring them with you to your follow-up appointment. If you are prescribed insulin, Do not take your mealtime insulin if your blood sugars less than 120   Call our office if you have blood sugars less than 80 or greater then 300 on two or more occasions  Call our office if you have any questions regarding your blood sugars or insulin dosing regiment  Signs of low blood sugar may include tremors, feeling shaky, sweating, dizziness, confusion and weakness. Check your blood sugar immediatly if you have any of these symptoms. The plan as discussed at your appointment-   Jardiance 10 mg by mouth daily  Lantus  injected 20 units injected before bed   Humalog, inject 3 times daily before meals- 120-140 2 units, 141-160 3 units, 161-180 4 units, 181-200 5 units, 201-220 6 units, 221-240 7 units, 241-260 8 units, 261-280 9 units, 281-300 10 units, 301 or higher 11 units. Dexcom G7 ordered  Glucose logs given to the patient  Repeat labs and follow-up in 3 months    You have been prescribed the Dexcom G7 continuous glucose monitor (CGM). This device reads your glucose levels in the interstitial fluid under your skin. This is not a blood glucose monitor. The concentration of glucose in your blood is sometimes slightly higher than the concentration of glucose in the fluid under your skin. This is a normal variation and is usually only a 5-15 difference. You may notice a greater difference in the numbers between the blood and the device immediately after eating or activity. This again is a normal variance and the levels will usually equal out over time. You can use the number on the Dexcom CGM to monitor your glucose and take your medications or insulin.   If, for example, you get a low or high reading on the Dexcom and are asymptomatic, do a fingerstick and check your blood glucose to

## 2023-10-11 DIAGNOSIS — E78.5 HYPERLIPIDEMIA, UNSPECIFIED HYPERLIPIDEMIA TYPE: ICD-10-CM

## 2023-10-11 RX ORDER — FENOFIBRATE 160 MG/1
TABLET ORAL
Qty: 30 TABLET | Refills: 0 | Status: SHIPPED | OUTPATIENT
Start: 2023-10-11 | End: 2023-10-11

## 2023-10-11 RX ORDER — FENOFIBRATE 160 MG/1
TABLET ORAL
Qty: 90 TABLET | Refills: 0 | Status: SHIPPED | OUTPATIENT
Start: 2023-10-11 | End: 2023-10-16 | Stop reason: SDUPTHER

## 2023-10-16 ENCOUNTER — OFFICE VISIT (OUTPATIENT)
Dept: PRIMARY CARE | Facility: CLINIC | Age: 63
End: 2023-10-16
Payer: MEDICARE

## 2023-10-16 ENCOUNTER — APPOINTMENT (OUTPATIENT)
Dept: PRIMARY CARE | Facility: CLINIC | Age: 63
End: 2023-10-16
Payer: MEDICARE

## 2023-10-16 VITALS
HEIGHT: 71 IN | SYSTOLIC BLOOD PRESSURE: 136 MMHG | WEIGHT: 315 LBS | RESPIRATION RATE: 16 BRPM | TEMPERATURE: 97.9 F | BODY MASS INDEX: 44.1 KG/M2 | DIASTOLIC BLOOD PRESSURE: 80 MMHG | HEART RATE: 68 BPM | OXYGEN SATURATION: 98 %

## 2023-10-16 DIAGNOSIS — E11.29 TYPE 2 DIABETES MELLITUS WITH OTHER DIABETIC KIDNEY COMPLICATION, WITH LONG-TERM CURRENT USE OF INSULIN (MULTI): ICD-10-CM

## 2023-10-16 DIAGNOSIS — E66.01 CLASS 3 SEVERE OBESITY DUE TO EXCESS CALORIES WITH SERIOUS COMORBIDITY AND BODY MASS INDEX (BMI) OF 50.0 TO 59.9 IN ADULT (MULTI): ICD-10-CM

## 2023-10-16 DIAGNOSIS — G62.9 NEUROPATHY: ICD-10-CM

## 2023-10-16 DIAGNOSIS — I10 PRIMARY HYPERTENSION: ICD-10-CM

## 2023-10-16 DIAGNOSIS — Z23 ENCOUNTER FOR IMMUNIZATION: ICD-10-CM

## 2023-10-16 DIAGNOSIS — Z12.5 SCREENING PSA (PROSTATE SPECIFIC ANTIGEN): ICD-10-CM

## 2023-10-16 DIAGNOSIS — N25.81 SECONDARY HYPERPARATHYROIDISM (MULTI): Primary | ICD-10-CM

## 2023-10-16 DIAGNOSIS — E78.5 HYPERLIPIDEMIA, UNSPECIFIED HYPERLIPIDEMIA TYPE: ICD-10-CM

## 2023-10-16 DIAGNOSIS — K21.9 GASTROESOPHAGEAL REFLUX DISEASE, UNSPECIFIED WHETHER ESOPHAGITIS PRESENT: ICD-10-CM

## 2023-10-16 DIAGNOSIS — E78.2 MIXED HYPERLIPIDEMIA: ICD-10-CM

## 2023-10-16 DIAGNOSIS — Z79.4 TYPE 2 DIABETES MELLITUS WITH OTHER DIABETIC KIDNEY COMPLICATION, WITH LONG-TERM CURRENT USE OF INSULIN (MULTI): ICD-10-CM

## 2023-10-16 PROBLEM — G47.30 SLEEP APNEA: Status: ACTIVE | Noted: 2023-10-16

## 2023-10-16 PROBLEM — E11.22 CHRONIC KIDNEY DISEASE DUE TO DIABETES MELLITUS (MULTI): Status: ACTIVE | Noted: 2023-10-16

## 2023-10-16 PROBLEM — M75.102 ROTATOR CUFF TEAR, LEFT: Status: ACTIVE | Noted: 2023-10-16

## 2023-10-16 PROBLEM — R60.9 EDEMA: Status: ACTIVE | Noted: 2023-10-16

## 2023-10-16 PROBLEM — N18.9 CHRONIC KIDNEY DISEASE: Status: ACTIVE | Noted: 2023-10-16

## 2023-10-16 PROBLEM — R06.02 SHORTNESS OF BREATH: Status: ACTIVE | Noted: 2023-10-16

## 2023-10-16 PROBLEM — F41.9 ANXIETY AND DEPRESSION: Status: ACTIVE | Noted: 2023-10-16

## 2023-10-16 PROBLEM — J45.909 ASTHMA (HHS-HCC): Status: ACTIVE | Noted: 2023-10-16

## 2023-10-16 PROBLEM — E55.9 VITAMIN D DEFICIENCY: Status: ACTIVE | Noted: 2023-10-16

## 2023-10-16 PROBLEM — R79.89 ELEVATED D-DIMER: Status: ACTIVE | Noted: 2023-10-16

## 2023-10-16 PROBLEM — M79.602 PAIN OF LEFT UPPER EXTREMITY: Status: ACTIVE | Noted: 2023-10-16

## 2023-10-16 PROBLEM — M79.673 FOOT PAIN: Status: ACTIVE | Noted: 2023-10-16

## 2023-10-16 PROBLEM — M25.519 SHOULDER PAIN: Status: ACTIVE | Noted: 2023-10-16

## 2023-10-16 PROBLEM — F32.A ANXIETY AND DEPRESSION: Status: ACTIVE | Noted: 2023-10-16

## 2023-10-16 PROBLEM — I42.8 NONISCHEMIC CARDIOMYOPATHY (MULTI): Status: ACTIVE | Noted: 2023-10-16

## 2023-10-16 PROBLEM — E11.40: Status: ACTIVE | Noted: 2023-10-16

## 2023-10-16 PROBLEM — I50.20 SYSTOLIC CONGESTIVE HEART FAILURE (MULTI): Status: ACTIVE | Noted: 2023-10-16

## 2023-10-16 PROCEDURE — G0009 ADMIN PNEUMOCOCCAL VACCINE: HCPCS | Performed by: FAMILY MEDICINE

## 2023-10-16 PROCEDURE — G0008 ADMIN INFLUENZA VIRUS VAC: HCPCS | Performed by: FAMILY MEDICINE

## 2023-10-16 PROCEDURE — 99214 OFFICE O/P EST MOD 30 MIN: CPT | Performed by: FAMILY MEDICINE

## 2023-10-16 PROCEDURE — 3008F BODY MASS INDEX DOCD: CPT | Performed by: FAMILY MEDICINE

## 2023-10-16 PROCEDURE — 90670 PCV13 VACCINE IM: CPT | Performed by: FAMILY MEDICINE

## 2023-10-16 PROCEDURE — 1036F TOBACCO NON-USER: CPT | Performed by: FAMILY MEDICINE

## 2023-10-16 PROCEDURE — 3079F DIAST BP 80-89 MM HG: CPT | Performed by: FAMILY MEDICINE

## 2023-10-16 PROCEDURE — 1159F MED LIST DOCD IN RCRD: CPT | Performed by: FAMILY MEDICINE

## 2023-10-16 PROCEDURE — 3075F SYST BP GE 130 - 139MM HG: CPT | Performed by: FAMILY MEDICINE

## 2023-10-16 PROCEDURE — 90662 IIV NO PRSV INCREASED AG IM: CPT | Performed by: FAMILY MEDICINE

## 2023-10-16 PROCEDURE — 3066F NEPHROPATHY DOC TX: CPT | Performed by: FAMILY MEDICINE

## 2023-10-16 RX ORDER — CALCITRIOL 0.25 UG/1
CAPSULE ORAL
COMMUNITY
Start: 2023-10-09

## 2023-10-16 RX ORDER — INSULIN GLARGINE 100 [IU]/ML
INJECTION, SOLUTION SUBCUTANEOUS
COMMUNITY
End: 2023-12-28 | Stop reason: SDUPTHER

## 2023-10-16 RX ORDER — GABAPENTIN 300 MG/1
600 CAPSULE ORAL NIGHTLY
Qty: 180 CAPSULE | Refills: 1 | Status: SHIPPED | OUTPATIENT
Start: 2023-10-16 | End: 2023-12-07

## 2023-10-16 RX ORDER — ALBUTEROL SULFATE 90 UG/1
AEROSOL, METERED RESPIRATORY (INHALATION)
COMMUNITY
Start: 2021-08-18 | End: 2024-01-05 | Stop reason: SDUPTHER

## 2023-10-16 RX ORDER — EMPAGLIFLOZIN 10 MG/1
1 TABLET, FILM COATED ORAL DAILY
COMMUNITY
Start: 2023-09-13

## 2023-10-16 RX ORDER — FENOFIBRATE 160 MG/1
160 TABLET ORAL NIGHTLY
Qty: 90 TABLET | Refills: 1 | Status: SHIPPED | OUTPATIENT
Start: 2023-10-16

## 2023-10-16 RX ORDER — PEN NEEDLE, DIABETIC 30 GX3/16"
NEEDLE, DISPOSABLE MISCELLANEOUS
Qty: 100 EACH | Refills: 2 | Status: SHIPPED | OUTPATIENT
Start: 2023-10-16 | End: 2023-12-28 | Stop reason: SDUPTHER

## 2023-10-16 RX ORDER — OMEPRAZOLE 20 MG/1
20 CAPSULE, DELAYED RELEASE ORAL DAILY
Qty: 90 CAPSULE | Refills: 1 | Status: SHIPPED | OUTPATIENT
Start: 2023-10-16

## 2023-10-16 RX ORDER — ATORVASTATIN CALCIUM 40 MG/1
40 TABLET, FILM COATED ORAL NIGHTLY
COMMUNITY

## 2023-10-16 RX ORDER — ALBUTEROL SULFATE 0.83 MG/ML
SOLUTION RESPIRATORY (INHALATION)
COMMUNITY
End: 2024-01-05 | Stop reason: SDUPTHER

## 2023-10-16 RX ORDER — AMIODARONE HYDROCHLORIDE 200 MG/1
1 TABLET ORAL DAILY
COMMUNITY
Start: 2022-12-03

## 2023-10-16 RX ORDER — ASPIRIN 81 MG/1
81 TABLET ORAL DAILY
COMMUNITY

## 2023-10-16 RX ORDER — PEN NEEDLE, DIABETIC 31 GX5/16"
NEEDLE, DISPOSABLE MISCELLANEOUS
COMMUNITY
Start: 2023-09-07 | End: 2023-10-16 | Stop reason: SDUPTHER

## 2023-10-16 RX ORDER — FUROSEMIDE 40 MG/1
40 TABLET ORAL 2 TIMES DAILY
COMMUNITY

## 2023-10-16 RX ORDER — BLOOD-GLUCOSE METER
EACH MISCELLANEOUS
Qty: 500 EACH | Refills: 1 | Status: SHIPPED | OUTPATIENT
Start: 2023-10-16 | End: 2023-12-28 | Stop reason: SDUPTHER

## 2023-10-16 RX ORDER — BLOOD-GLUCOSE METER
EACH MISCELLANEOUS
COMMUNITY
End: 2023-10-16 | Stop reason: SDUPTHER

## 2023-10-16 RX ORDER — OMEPRAZOLE 20 MG/1
1 CAPSULE, DELAYED RELEASE ORAL DAILY
COMMUNITY
End: 2023-10-16 | Stop reason: SDUPTHER

## 2023-10-16 RX ORDER — HYDRALAZINE HYDROCHLORIDE 25 MG/1
TABLET, FILM COATED ORAL
COMMUNITY
Start: 2022-11-08

## 2023-10-16 RX ORDER — ISOSORBIDE MONONITRATE 60 MG/1
1 TABLET, EXTENDED RELEASE ORAL DAILY
COMMUNITY
Start: 2023-08-09

## 2023-10-16 RX ORDER — GABAPENTIN 300 MG/1
600 CAPSULE ORAL NIGHTLY
COMMUNITY
End: 2023-10-16 | Stop reason: SDUPTHER

## 2023-10-16 RX ORDER — NITROGLYCERIN 0.4 MG/1
0.4 TABLET SUBLINGUAL
COMMUNITY
Start: 2022-12-03

## 2023-10-16 RX ORDER — TICAGRELOR 90 MG/1
1 TABLET ORAL 2 TIMES DAILY
COMMUNITY
Start: 2023-07-17

## 2023-10-16 RX ORDER — LANCETS 33 GAUGE
EACH MISCELLANEOUS
Qty: 500 EACH | Refills: 1 | Status: SHIPPED | OUTPATIENT
Start: 2023-10-16 | End: 2023-12-28 | Stop reason: SDUPTHER

## 2023-10-16 RX ORDER — METOPROLOL SUCCINATE 25 MG/1
1 TABLET, EXTENDED RELEASE ORAL DAILY
COMMUNITY
Start: 2022-11-08 | End: 2023-10-16 | Stop reason: ENTERED-IN-ERROR

## 2023-10-16 RX ORDER — SACUBITRIL AND VALSARTAN 24; 26 MG/1; MG/1
TABLET, FILM COATED ORAL
COMMUNITY
Start: 2022-11-08

## 2023-10-16 RX ORDER — METOPROLOL SUCCINATE 100 MG/1
100 TABLET, EXTENDED RELEASE ORAL DAILY
COMMUNITY
Start: 2023-09-13

## 2023-10-16 RX ORDER — LANCETS 33 GAUGE
EACH MISCELLANEOUS
COMMUNITY
Start: 2022-11-09 | End: 2023-10-16 | Stop reason: SDUPTHER

## 2023-10-16 NOTE — PROGRESS NOTES
"Subjective   Patient ID: Dmitriy Sherman is a 63 y.o. male who presents for Diabetes and Hypertension.    HPI    DM  Does check glucose at home   Today glucose was 105  Eats a generally healthy diet  Exercises sometimes  Currently taking Lantus, Humalog  Denies any hypoglycemic symptoms  Last eye exam 1 year ago.  Does not see a Podiatrist  Last A1C was 9.8% on 11/14/22    HTN follow up   Denies chest pain,SOB  Denies any swelling, headaches, lightheadedness or dizziness  Does occasionally check BP at home   Currently taking Metoprolol  Eats generally healthy diet.  Exercises infrequently.    Would like flu vaccine.     Review of systems  ; Patient seen today for exam denies any problems with headaches or vision, denies any shortness of breath chest pain nausea or vomiting, no black stool no blood in the stool no heartburn type symptoms denies any problems with constipation or diarrhea, and no dysuria-type symptoms    The patient's allergies medications were reviewed with them today    The patient's social family and surgical history or also reviewed here today, along with her past medical history.     Objective     Alert and active in  no acute distress  HEENT TMs clear oropharynx negative nares clear no drainage noted neck supple  With no adenopathy   Heart regular rate and rhythm without murmur and no carotid bruits  Lungs- clear to auscultation bilaterally, no wheeze or rhonchi noted  Thyroid -negative masses or nodularity  Abdomen- soft times four quadrants , bowel sounds positive no masses or organomegaly, negative tenderness guarding or rebound  Neurological exam unremarkable- DTRs in upper and lower extremities within normal limits.   skin -no lesions noted      /82 (BP Location: Right arm, Patient Position: Sitting, BP Cuff Size: Adult)   Pulse 68   Temp 36.6 °C (97.9 °F) (Temporal)   Resp 16   Ht 1.803 m (5' 11\")   Wt (!) 164 kg (361 lb 6.4 oz)   SpO2 98%   BMI 50.41 kg/m²     Allergies " "  Allergen Reactions    Carvedilol Other and Unknown     \"hyper\", rapid pulse    Sitagliptin Swelling       Assessment/Plan   Problem List Items Addressed This Visit       Acid reflux    HTN (hypertension)    Mixed hyperlipidemia    Neuropathy     Other Visit Diagnoses       Type 2 diabetes mellitus with other diabetic kidney complication, with long-term current use of insulin (CMS/[facility])        Screening PSA (prostate specific antigen)        Hyperlipidemia, unspecified hyperlipidemia type        BMI 50.0-59.9, adult (CMS/[facility])        Class 3 severe obesity due to excess calories with serious comorbidity and body mass index (BMI) of 50.0 to 59.9 in adult (CMS/[facility])              Discussed patient's BMI and to institute calorie reduction and increase exercise to decrease risk of diabetes and heart disease in the future.      His A1c on 10/10/2023 was 7.7 from our note from [facility] which was excellent much improved we will need to figure out how to close the gap.  So it is in the chart    We will get his flu shot pneumonia shot and see us in 6 months    If anything worsens or changes please call us at once, follow up in the office as planned,     "

## 2023-10-30 ENCOUNTER — OFFICE VISIT (OUTPATIENT)
Dept: PULMONOLOGY | Age: 63
End: 2023-10-30
Payer: MEDICARE

## 2023-10-30 VITALS
RESPIRATION RATE: 16 BRPM | WEIGHT: 315 LBS | HEART RATE: 75 BPM | HEIGHT: 71 IN | OXYGEN SATURATION: 99 % | DIASTOLIC BLOOD PRESSURE: 84 MMHG | SYSTOLIC BLOOD PRESSURE: 144 MMHG | TEMPERATURE: 97.6 F | BODY MASS INDEX: 44.1 KG/M2

## 2023-10-30 DIAGNOSIS — E13.9 OTHER SPECIFIED DIABETES MELLITUS WITHOUT COMPLICATION, WITH LONG-TERM CURRENT USE OF INSULIN (MULTI): ICD-10-CM

## 2023-10-30 DIAGNOSIS — E66.01 CLASS 3 SEVERE OBESITY DUE TO EXCESS CALORIES WITHOUT SERIOUS COMORBIDITY WITH BODY MASS INDEX (BMI) OF 45.0 TO 49.9 IN ADULT (HCC): ICD-10-CM

## 2023-10-30 DIAGNOSIS — G47.33 OSA (OBSTRUCTIVE SLEEP APNEA): Primary | ICD-10-CM

## 2023-10-30 DIAGNOSIS — Z79.4 OTHER SPECIFIED DIABETES MELLITUS WITHOUT COMPLICATION, WITH LONG-TERM CURRENT USE OF INSULIN (MULTI): ICD-10-CM

## 2023-10-30 DIAGNOSIS — I26.99 ACUTE PULMONARY EMBOLISM WITHOUT ACUTE COR PULMONALE, UNSPECIFIED PULMONARY EMBOLISM TYPE (HCC): ICD-10-CM

## 2023-10-30 PROCEDURE — 3077F SYST BP >= 140 MM HG: CPT | Performed by: INTERNAL MEDICINE

## 2023-10-30 PROCEDURE — 1123F ACP DISCUSS/DSCN MKR DOCD: CPT | Performed by: INTERNAL MEDICINE

## 2023-10-30 PROCEDURE — 3079F DIAST BP 80-89 MM HG: CPT | Performed by: INTERNAL MEDICINE

## 2023-10-30 PROCEDURE — 99213 OFFICE O/P EST LOW 20 MIN: CPT | Performed by: INTERNAL MEDICINE

## 2023-10-30 RX ORDER — INSULIN LISPRO 100 [IU]/ML
INJECTION, SOLUTION INTRAVENOUS; SUBCUTANEOUS
Qty: 6 ML | Refills: 1 | Status: SHIPPED | OUTPATIENT
Start: 2023-10-30 | End: 2023-12-28 | Stop reason: SDUPTHER

## 2023-11-02 RX ORDER — TICAGRELOR 90 MG/1
TABLET ORAL
Qty: 60 TABLET | Refills: 3 | Status: SHIPPED | OUTPATIENT
Start: 2023-11-02

## 2023-11-02 RX ORDER — AMIODARONE HYDROCHLORIDE 200 MG/1
200 TABLET ORAL DAILY
Qty: 90 TABLET | Refills: 3 | Status: SHIPPED | OUTPATIENT
Start: 2023-11-02

## 2023-11-02 NOTE — TELEPHONE ENCOUNTER
Requesting medication refill. Rx requested:  Requested Prescriptions     Pending Prescriptions Disp Refills    amiodarone (CORDARONE) 200 MG tablet [Pharmacy Med Name: AMIODARONE 200MG TABLETS] 90 tablet 3     Sig: TAKE 1 TABLET BY MOUTH DAILY    BRILINTA 90 MG TABS tablet [Pharmacy Med Name: Porras Rily TABLETS] 60 tablet 3     Sig: TAKE 1 TABLET BY MOUTH TWICE DAILY         Last Office Visit:   Visit date not found      Next Visit Date:  Future Appointments   Date Time Provider [address]   11/15/2023  2:30 PM Nicol Bragg [facility] EMERGENCY [facility] [place]   1/10/2024 10:00 AM Vanessa Day   2/6/2024 10:30 AM Sylvia Badillo MD [address] [facility]   6/7/2024 12:30 PM Jose E Ly DO [address] [facility]               Last refill 12/03/2022- Amiodarone  07/17/2023- Brilinta. Please approve or deny.

## 2023-11-13 ENCOUNTER — TELEPHONE (OUTPATIENT)
Dept: PRIMARY CARE | Facility: CLINIC | Age: 63
End: 2023-11-13
Payer: MEDICARE

## 2023-11-15 ENCOUNTER — OFFICE VISIT (OUTPATIENT)
Dept: CARDIOLOGY CLINIC | Age: 63
End: 2023-11-15
Payer: MEDICARE

## 2023-11-15 VITALS
WEIGHT: 315 LBS | SYSTOLIC BLOOD PRESSURE: 132 MMHG | HEART RATE: 76 BPM | DIASTOLIC BLOOD PRESSURE: 62 MMHG | OXYGEN SATURATION: 100 % | RESPIRATION RATE: 16 BRPM | BODY MASS INDEX: 51.19 KG/M2

## 2023-11-15 DIAGNOSIS — I50.22 CHRONIC SYSTOLIC CHF (CONGESTIVE HEART FAILURE), NYHA CLASS 2 (HCC): ICD-10-CM

## 2023-11-15 DIAGNOSIS — E66.9 DIABETES MELLITUS TYPE 2 IN OBESE (HCC): ICD-10-CM

## 2023-11-15 DIAGNOSIS — Z95.810 HISTORY OF IMPLANTABLE CARDIAC DEFIBRILLATOR (ICD): ICD-10-CM

## 2023-11-15 DIAGNOSIS — I25.10 CAD S/P PERCUTANEOUS CORONARY ANGIOPLASTY: ICD-10-CM

## 2023-11-15 DIAGNOSIS — E11.69 DIABETES MELLITUS TYPE 2 IN OBESE (HCC): ICD-10-CM

## 2023-11-15 DIAGNOSIS — Z86.79 HISTORY OF PAROXYSMAL SUPRAVENTRICULAR TACHYCARDIA: ICD-10-CM

## 2023-11-15 DIAGNOSIS — N18.4 CKD (CHRONIC KIDNEY DISEASE) STAGE 4, GFR 15-29 ML/MIN (HCC): ICD-10-CM

## 2023-11-15 DIAGNOSIS — Z86.711 HISTORY OF PULMONARY EMBOLUS (PE): ICD-10-CM

## 2023-11-15 DIAGNOSIS — R07.89 ATYPICAL CHEST PAIN: ICD-10-CM

## 2023-11-15 DIAGNOSIS — Z98.61 CAD S/P PERCUTANEOUS CORONARY ANGIOPLASTY: ICD-10-CM

## 2023-11-15 DIAGNOSIS — I25.5 ISCHEMIC CARDIOMYOPATHY: ICD-10-CM

## 2023-11-15 DIAGNOSIS — I10 HYPERTENSION, UNSPECIFIED TYPE: ICD-10-CM

## 2023-11-15 PROCEDURE — 3051F HG A1C>EQUAL 7.0%<8.0%: CPT | Performed by: PHYSICIAN ASSISTANT

## 2023-11-15 PROCEDURE — 1123F ACP DISCUSS/DSCN MKR DOCD: CPT | Performed by: PHYSICIAN ASSISTANT

## 2023-11-15 PROCEDURE — 99214 OFFICE O/P EST MOD 30 MIN: CPT | Performed by: PHYSICIAN ASSISTANT

## 2023-11-15 PROCEDURE — 3078F DIAST BP <80 MM HG: CPT | Performed by: PHYSICIAN ASSISTANT

## 2023-11-15 PROCEDURE — 3075F SYST BP GE 130 - 139MM HG: CPT | Performed by: PHYSICIAN ASSISTANT

## 2023-11-15 ASSESSMENT — ENCOUNTER SYMPTOMS
COLOR CHANGE: 0
NAUSEA: 0
ABDOMINAL PAIN: 0
ABDOMINAL DISTENTION: 0
BLOOD IN STOOL: 0
COUGH: 0
SHORTNESS OF BREATH: 1
CHEST TIGHTNESS: 0
VOMITING: 0

## 2023-11-15 NOTE — PROGRESS NOTES
Patient: Douglas Wise  YOB: 1960  MRN: [id number]    Chief Complaint:  Chief Complaint   Patient presents with    2 MONTHS    Congestive Heart Failure         Subjective/HPI     11/15/23: Here for follow-up of chronic systolic congestive heart failure with EF of 15 to 20% per echo 11/20/2022. History of ICD on 4/23/2020 with Dr. Danny Beckford. Patient recently establish care with nephrology, Dr. Jenny Leonard on 9/11/2023. At that visit, Lasix was changed to 2 mg on Monday/Wednesday/Friday instead of every other day and he was advised to hold off on Entresto. He had most recent follow-up with nephrology in October 2023 and per daughter was resumed on Entresto 24/26 mg p.o. twice daily at that time. Patient presents today with his daughter who interprets for him. He continues to experience shortness of breath with exertion but states that this is overall improved from prior. Also reports exertional fatigue and tiredness. Has been having intermittent chest pain but states that this usually occurs at night after he gives himself insulin but states that he also had an episode today while driving. He denies any exertional chest pain complaints. Reports mild lower extremity edema overall stable from prior. He is complaining of pain in the bottom of his feet, predominantly right foot, likely related to neuropathy. No open sores or wounds noted on examination of right foot. Denies orthopnea, PND, palpitations, cough, abdominal pain or distention, dysuria hematuria, hematochezia or melena, dizziness, lightheadedness, syncope, fever or chills. Patient has been encouraged to notify office immediately if he develops any exertional chest pain complaints. Current chest pain appears atypical in nature but exertional pain would be more concerning for angina. If exertional chest pain noted in future, may need to consider repeat ischemic evaluation at least by means of stress testing.       Patient

## 2023-11-15 NOTE — PATIENT INSTRUCTIONS
-Check daily weight every morning and notify CHF clinic if gaining more than 3 pounds in 2 days    -Check blood pressure twice daily in a.m. and p.m. prior to taking medications and keep log of blood pressure trends to review at next office visit  Notify office if BP running low with  mmHg or below prior to taking medications  Notify office if BP running high with  mmHg or above or if DBP 90 mmHg or above    -Recommend 2000 mg daily sodium restriction    -Recommend 1.5 liter (48 ounces) daily fluid restriction for months or sooner if needed

## 2023-11-28 ENCOUNTER — HOSPITAL ENCOUNTER (OUTPATIENT)
Dept: CARDIOLOGY | Age: 63
Discharge: HOME | End: 2023-11-28

## 2023-12-07 DIAGNOSIS — G62.9 NEUROPATHY: ICD-10-CM

## 2023-12-07 RX ORDER — GABAPENTIN 300 MG/1
600 CAPSULE ORAL NIGHTLY
Qty: 180 CAPSULE | Refills: 1 | Status: SHIPPED | OUTPATIENT
Start: 2023-12-07

## 2023-12-28 ENCOUNTER — OFFICE VISIT (OUTPATIENT)
Dept: PRIMARY CARE | Facility: CLINIC | Age: 63
End: 2023-12-28
Payer: MEDICARE

## 2023-12-28 VITALS
DIASTOLIC BLOOD PRESSURE: 76 MMHG | BODY MASS INDEX: 44.1 KG/M2 | HEART RATE: 86 BPM | SYSTOLIC BLOOD PRESSURE: 124 MMHG | TEMPERATURE: 97.8 F | OXYGEN SATURATION: 94 % | WEIGHT: 315 LBS | HEIGHT: 71 IN

## 2023-12-28 DIAGNOSIS — E55.9 VITAMIN D DEFICIENCY: ICD-10-CM

## 2023-12-28 DIAGNOSIS — J45.909 ASTHMA, UNSPECIFIED ASTHMA SEVERITY, UNSPECIFIED WHETHER COMPLICATED, UNSPECIFIED WHETHER PERSISTENT (HHS-HCC): ICD-10-CM

## 2023-12-28 DIAGNOSIS — K21.9 GASTROESOPHAGEAL REFLUX DISEASE, UNSPECIFIED WHETHER ESOPHAGITIS PRESENT: ICD-10-CM

## 2023-12-28 DIAGNOSIS — E78.2 MIXED HYPERLIPIDEMIA: ICD-10-CM

## 2023-12-28 DIAGNOSIS — Z79.4 OTHER SPECIFIED DIABETES MELLITUS WITHOUT COMPLICATION, WITH LONG-TERM CURRENT USE OF INSULIN (MULTI): ICD-10-CM

## 2023-12-28 DIAGNOSIS — E66.01 CLASS 3 SEVERE OBESITY DUE TO EXCESS CALORIES WITH SERIOUS COMORBIDITY AND BODY MASS INDEX (BMI) OF 50.0 TO 59.9 IN ADULT (MULTI): ICD-10-CM

## 2023-12-28 DIAGNOSIS — E11.29 TYPE 2 DIABETES MELLITUS WITH OTHER DIABETIC KIDNEY COMPLICATION, WITH LONG-TERM CURRENT USE OF INSULIN (MULTI): Primary | ICD-10-CM

## 2023-12-28 DIAGNOSIS — I10 HYPERTENSION, UNSPECIFIED TYPE: ICD-10-CM

## 2023-12-28 DIAGNOSIS — E13.9 OTHER SPECIFIED DIABETES MELLITUS WITHOUT COMPLICATION, WITH LONG-TERM CURRENT USE OF INSULIN (MULTI): ICD-10-CM

## 2023-12-28 DIAGNOSIS — Z79.4 TYPE 2 DIABETES MELLITUS WITH OTHER DIABETIC KIDNEY COMPLICATION, WITH LONG-TERM CURRENT USE OF INSULIN (MULTI): Primary | ICD-10-CM

## 2023-12-28 DIAGNOSIS — F32.A ANXIETY AND DEPRESSION: ICD-10-CM

## 2023-12-28 DIAGNOSIS — F41.9 ANXIETY AND DEPRESSION: ICD-10-CM

## 2023-12-28 DIAGNOSIS — N48.1: ICD-10-CM

## 2023-12-28 PROCEDURE — 1159F MED LIST DOCD IN RCRD: CPT | Performed by: FAMILY MEDICINE

## 2023-12-28 PROCEDURE — 3008F BODY MASS INDEX DOCD: CPT | Performed by: FAMILY MEDICINE

## 2023-12-28 PROCEDURE — 3066F NEPHROPATHY DOC TX: CPT | Performed by: FAMILY MEDICINE

## 2023-12-28 PROCEDURE — 3074F SYST BP LT 130 MM HG: CPT | Performed by: FAMILY MEDICINE

## 2023-12-28 PROCEDURE — 3078F DIAST BP <80 MM HG: CPT | Performed by: FAMILY MEDICINE

## 2023-12-28 PROCEDURE — 99214 OFFICE O/P EST MOD 30 MIN: CPT | Performed by: FAMILY MEDICINE

## 2023-12-28 PROCEDURE — 1160F RVW MEDS BY RX/DR IN RCRD: CPT | Performed by: FAMILY MEDICINE

## 2023-12-28 PROCEDURE — 1036F TOBACCO NON-USER: CPT | Performed by: FAMILY MEDICINE

## 2023-12-28 RX ORDER — LANCETS 33 GAUGE
EACH MISCELLANEOUS
Qty: 500 EACH | Refills: 5 | Status: SHIPPED | OUTPATIENT
Start: 2023-12-28

## 2023-12-28 RX ORDER — BLOOD-GLUCOSE METER
EACH MISCELLANEOUS
Qty: 500 EACH | Refills: 5 | Status: SHIPPED | OUTPATIENT
Start: 2023-12-28

## 2023-12-28 RX ORDER — INSULIN LISPRO 100 [IU]/ML
INJECTION, SOLUTION INTRAVENOUS; SUBCUTANEOUS
Qty: 6 ML | Refills: 5 | Status: SHIPPED | OUTPATIENT
Start: 2023-12-28 | End: 2024-01-12 | Stop reason: SDUPTHER

## 2023-12-28 RX ORDER — CLOTRIMAZOLE AND BETAMETHASONE DIPROPIONATE 10; .64 MG/G; MG/G
1 CREAM TOPICAL 2 TIMES DAILY
Qty: 45 G | Refills: 1 | Status: SHIPPED | OUTPATIENT
Start: 2023-12-28 | End: 2024-04-26

## 2023-12-28 RX ORDER — INSULIN GLARGINE 100 [IU]/ML
INJECTION, SOLUTION SUBCUTANEOUS
Qty: 3 ML | Refills: 5 | Status: SHIPPED | OUTPATIENT
Start: 2023-12-28

## 2023-12-28 RX ORDER — PEN NEEDLE, DIABETIC 30 GX3/16"
NEEDLE, DISPOSABLE MISCELLANEOUS
Qty: 100 EACH | Refills: 5 | Status: SHIPPED | OUTPATIENT
Start: 2023-12-28

## 2023-12-28 NOTE — PROGRESS NOTES
% Subjective   Patient ID: Dmitriy Sherman is a 63 y.o. male who presents for Diabetes, Hypertension, Hyperlipidemia, and Skin Problem.  HPI  DM  Does check glucose at home   Today glucose was 149 this morning   Eats a generally healthy diet  Exercises daily pt reports he does bicycling   Currently taking Humalog, lantus (nightly), jardiance  A1c on 11-7-23 was 7.9%   Last eye exam sometime this last year   Sugar at home yesterday morning was 84.  Has not seen a Podiatrist/ Does have an appt but does not recall when     Patient reports red/itchy/irritation skin on penis x6 days     HTN and HLD follow up  Denies chest pain,SOB, swelling, headaches, lightheadedness or dizziness.   Eats a generally healthy diet, Exercises.   Does check BP at home.   Currently taking  Hydralazine, metoprolol, atorvastatin, Fenofibrate         Has no other new problem /question.    Review of systems  ; Patient seen today for exam denies any problems with headaches or vision, denies any shortness of breath chest pain nausea or vomiting, no black stool no blood in the stool no heartburn type symptoms denies any problems with constipation or diarrhea, and no dysuria-type symptoms    The patient's allergies medications were reviewed with them today    The patient's social family and surgical history or also reviewed here today, along with her past medical history.     Objective     Alert and active in  no acute distress  HEENT TMs clear oropharynx negative nares clear no drainage noted neck supple  With no adenopathy   Heart regular rate and rhythm without murmur and no carotid bruits  Lungs- clear to auscultation bilaterally, no wheeze or rhonchi noted   From Thyroid -negative masses or nodularity  Abdomen- soft times four quadrants , bowel sounds positive no masses or organomegaly, negative tenderness guarding or rebound  Neurological exam unremarkable- DTRs in upper and lower extremities within normal limits.   skin -no lesions  "noted  Uncircumcised penis shows some areas of he is concerned Paloma Enamorado      /76 (BP Location: Right arm, Patient Position: Sitting, BP Cuff Size: Large adult)   Pulse 86   Temp 36.6 °C (97.8 °F) (Temporal)   Ht 1.803 m (5' 11\")   Wt (!) 164 kg (362 lb 9.6 oz)   SpO2 94%   BMI 50.57 kg/m²     Allergies   Allergen Reactions    Carvedilol Other and Unknown     \"hyper\", rapid pulse    Sitagliptin Swelling       Assessment/Plan   Problem List Items Addressed This Visit       Acid reflux    Anxiety and depression    Asthma    HTN (hypertension)    Mixed hyperlipidemia    Vitamin D deficiency    Class 3 severe obesity due to excess calories with serious comorbidity and body mass index (BMI) of 50.0 to 59.9 in adult (CMS/[facility])    BMI 50.0-59.9, adult (CMS/[facility])     Other Visit Diagnoses       Type 2 diabetes mellitus with other diabetic kidney complication, with long-term current use of insulin (CMS/[facility])    -  Primary    Relevant Medications    insulin glargine (Lantus Solostar U-100 Insulin) 100 unit/mL (3 mL) pen    pen needle, diabetic (BD Ultra-Fine Mini Pen Needle) 31 gauge x 3/16\" needle    lancets (OneTouch Delica Plus Lancet) 33 gauge misc    blood sugar diagnostic (OneTouch Verio test strips) strip    empagliflozin (Jardiance) 25 mg    Other specified diabetes mellitus without complication, with long-term current use of insulin (CMS/[facility])        Relevant Medications    insulin lispro (HumaLOG KwikPen Insulin) 100 unit/mL injection    Acute balanitis due to infection        Relevant Medications    clotrimazole-betamethasone (Lotrisone) cream        He understands to keep that area clean and dry sometimes the Jardiance can do this to maximize his Jardiance dose and cut him back to his Lantus once at night I do not want him to have any lows    His sugars been doing a lot better    His heart is been very stable    Much improved over the last year and a half      He is continue to exercise uses bike and " lose weight      Long talk. Treatment options reviewed.  HTN controlled. GERD stable.  Blood work done through [facility] on 11-7-23 reviewed.  Diabetes fairly controlled. A1C 7.9.  Will increase Jardiance to 25 MG daily. Take Lantus once every evening.   Continue Humalog as prescribed.    Continue and take your medications as prescribed.    Health Maintenance issues discussed.  Influenza, and pneumonia vaccines are up-to-date.    Importance of healthy diet and regular exercise regimen discussed.    If anything worsens or changes please call us at once. Follow up in the office as planned.      Scribe Attestation  By signing my name below, I, Estela DRISCOLL, Scribe   attest that this documentation has been prepared under the direction and in the presence of Edwin Rogers DO.

## 2024-01-03 DIAGNOSIS — N18.32 TYPE 2 DIABETES MELLITUS WITH STAGE 3B CHRONIC KIDNEY DISEASE, WITH LONG-TERM CURRENT USE OF INSULIN (HCC): ICD-10-CM

## 2024-01-03 DIAGNOSIS — E11.22 TYPE 2 DIABETES MELLITUS WITH STAGE 3B CHRONIC KIDNEY DISEASE, WITH LONG-TERM CURRENT USE OF INSULIN (HCC): ICD-10-CM

## 2024-01-03 DIAGNOSIS — Z79.4 TYPE 2 DIABETES MELLITUS WITH STAGE 3B CHRONIC KIDNEY DISEASE, WITH LONG-TERM CURRENT USE OF INSULIN (HCC): ICD-10-CM

## 2024-01-03 DIAGNOSIS — I26.99 ACUTE PULMONARY EMBOLISM WITHOUT ACUTE COR PULMONALE, UNSPECIFIED PULMONARY EMBOLISM TYPE (HCC): ICD-10-CM

## 2024-01-03 LAB
ALBUMIN SERPL-MCNC: 3.8 G/DL (ref 3.5–4.6)
ALP SERPL-CCNC: 130 U/L (ref 35–104)
ALT SERPL-CCNC: 21 U/L (ref 0–41)
ANION GAP SERPL CALCULATED.3IONS-SCNC: 12 MEQ/L (ref 9–15)
AST SERPL-CCNC: 22 U/L (ref 0–40)
BILIRUB SERPL-MCNC: 0.4 MG/DL (ref 0.2–0.7)
BUN SERPL-MCNC: 37 MG/DL (ref 8–23)
CALCIUM SERPL-MCNC: 9.1 MG/DL (ref 8.5–9.9)
CHLORIDE SERPL-SCNC: 107 MEQ/L (ref 95–107)
CO2 SERPL-SCNC: 26 MEQ/L (ref 20–31)
CREAT SERPL-MCNC: 3.03 MG/DL (ref 0.7–1.2)
GLOBULIN SER CALC-MCNC: 3.1 G/DL (ref 2.3–3.5)
GLUCOSE SERPL-MCNC: 141 MG/DL (ref 70–99)
HBA1C MFR BLD: 8.5 % (ref 4.8–5.9)
POTASSIUM SERPL-SCNC: 4.7 MEQ/L (ref 3.4–4.9)
PROT SERPL-MCNC: 6.9 G/DL (ref 6.3–8)
SODIUM SERPL-SCNC: 145 MEQ/L (ref 135–144)

## 2024-01-03 RX ORDER — APIXABAN 5 MG/1
5 TABLET, FILM COATED ORAL 2 TIMES DAILY
Qty: 180 TABLET | Refills: 5 | OUTPATIENT
Start: 2024-01-03

## 2024-01-04 DIAGNOSIS — J45.909 ASTHMA, UNSPECIFIED ASTHMA SEVERITY, UNSPECIFIED WHETHER COMPLICATED, UNSPECIFIED WHETHER PERSISTENT (HHS-HCC): ICD-10-CM

## 2024-01-04 DIAGNOSIS — U07.1 COVID-19: Primary | ICD-10-CM

## 2024-01-04 RX ORDER — METHYLPREDNISOLONE 4 MG/1
TABLET ORAL
Qty: 21 TABLET | Refills: 0 | Status: SHIPPED | OUTPATIENT
Start: 2024-01-04 | End: 2024-01-11

## 2024-01-04 NOTE — TELEPHONE ENCOUNTER
Brii, patient's wife's home care nurse was visiting Dmitriy's wife today and he said he wasn't feeling well. She checked him out and wanted to report his symptoms to you. He's been wheezing, having the chills, couldn't sleep all night. No fever, temperature was 97.9. His BP was 154/74, pulse 84 also has some lower edema. They don't have a home COVID test, all of the ones they have are  and are unable to test for COVID. Wanted to know what you advise that he do?  Thanks    Can call the , Nino Zavala (on HIPAA) as she speaks English. # [phone]    DOL visit with you 23    Preferred [facility] [address] in [place]

## 2024-01-04 NOTE — TELEPHONE ENCOUNTER
Called Nino and she is aware that you sent in Paxlovid and steroid to the pharmacy.    He also needed 2 other rxs  Aware you are finishing up for the day and that you will address these tomorrow    Rx Refill Request Telephone Encounter    Name:  Dmitriy Sherman  : 1960     Medication Name:  Albuterol  Dose (Optional):    90 mcg actuation inhaler  Quantity (Optional):      Directions (Optional):       Medication Name:  Albuterol nebulizer solution  Dose (Optional):   2.5 mg/ 3 ml (0.083%)   Quantity (Optional):      Directions (Optional):       ALLERGIES:   see list     Specific Pharmacy location:  [facility]    Date of last appointment:  23  Date of next appointment:  24    Best number to reach patient:  [phone] Nino

## 2024-01-04 NOTE — TELEPHONE ENCOUNTER
Brii called back and states Izaguirre tested positive for COVID    Preferred pharmacy [facility] in [place]

## 2024-01-05 RX ORDER — ALBUTEROL SULFATE 0.83 MG/ML
2.5 SOLUTION RESPIRATORY (INHALATION) EVERY 6 HOURS PRN
Qty: 75 ML | Refills: 2 | Status: SHIPPED | OUTPATIENT
Start: 2024-01-05

## 2024-01-05 RX ORDER — ALBUTEROL SULFATE 90 UG/1
2 AEROSOL, METERED RESPIRATORY (INHALATION) AS NEEDED
Qty: 6.7 G | Refills: 2 | Status: SHIPPED | OUTPATIENT
Start: 2024-01-05

## 2024-01-07 PROCEDURE — 93295 DEV INTERROG REMOTE 1/2/MLT: CPT | Performed by: INTERNAL MEDICINE

## 2024-01-12 DIAGNOSIS — Z79.4 OTHER SPECIFIED DIABETES MELLITUS WITHOUT COMPLICATION, WITH LONG-TERM CURRENT USE OF INSULIN (MULTI): ICD-10-CM

## 2024-01-12 DIAGNOSIS — E13.9 OTHER SPECIFIED DIABETES MELLITUS WITHOUT COMPLICATION, WITH LONG-TERM CURRENT USE OF INSULIN (MULTI): ICD-10-CM

## 2024-01-12 RX ORDER — INSULIN LISPRO 100 [IU]/ML
INJECTION, SOLUTION INTRAVENOUS; SUBCUTANEOUS
Qty: 6 ML | Refills: 5 | Status: SHIPPED | OUTPATIENT
Start: 2024-01-12

## 2024-01-12 RX ORDER — AMIODARONE HYDROCHLORIDE 200 MG/1
200 TABLET ORAL DAILY
Qty: 90 TABLET | Refills: 3 | Status: SHIPPED | OUTPATIENT
Start: 2024-01-12

## 2024-01-12 NOTE — TELEPHONE ENCOUNTER
Requesting medication refill. Please approve or deny this request.    Rx requested:  Requested Prescriptions     Pending Prescriptions Disp Refills    amiodarone (CORDARONE) 200 MG tablet 90 tablet 3     Sig: Take 1 tablet by mouth daily    ticagrelor (BRILINTA) 90 MG TABS tablet 60 tablet 3     Sig: Take 1 tablet by mouth 2 times daily         Last Office Visit:   09/07/2023      Next Visit Date:  Future Appointments   Date Time Provider Department Center   1/22/2024  4:15 PM Day, Ketan S, PA [place] [facility]   2/6/2024 10:30 AM Gabrielle Ann MD [place] [facility]   3/20/2024  1:30 PM Jewels Gramajo PA [facility]   6/7/2024 12:30 PM Norman Ly DO [place] [facility] [place]               Last refill 11/02/2023. Please approve or deny.

## 2024-01-12 NOTE — TELEPHONE ENCOUNTER
----- Message from Dmitriy Sherman sent at 1/11/2024  5:13 PM EST -----  Regarding: Humalog  injection kwikpen insulin   Contact: [phone]  Hello I was send this text to verify If doctor send over a new prescription for the insulin lispro 100unit /ml injection to the [facility] .     I call the pharmacy they inform they not has not refill in their for me. Also see that in my last visit on 12/28/23 you send me a prescription for it, but pharmacy said they do not received.   Can you please send me a new prescription to the pharmacy.

## 2024-02-08 PROBLEM — R05.9 COUGH: Status: ACTIVE | Noted: 2024-02-08

## 2024-02-08 PROBLEM — M79.603 ARM PAIN: Status: ACTIVE | Noted: 2024-02-08

## 2024-02-20 ENCOUNTER — TELEPHONE (OUTPATIENT)
Dept: PRIMARY CARE | Facility: CLINIC | Age: 64
End: 2024-02-20
Payer: MEDICARE

## 2024-02-20 NOTE — TELEPHONE ENCOUNTER
-HOME HEALTH  CALLED  BECAUSE PATIENT AND  ARE VERY SICK WITH FLU, DIARRHEA.  THEY ARE REQUESTING AN APPOINTMENT WITH YOU TOMORROW VIA VIRTUAL OR PHONE.    PLEASE ADVISE IF A TIME WORKS - THEY ARE AWARE YOU WILL NOT BE BACK TILL TOMORROW.  PLEASE ADVISE    contact info:  [phone]     SPOKE WITH .  SHE IS STATING THAT THE PATIENTS DIARRHEA HAS STOPPED BUT HE IS STILL NOT FEELING GOOD, COUGHING AND SINUS, BODY ACHES , TIREDNESS, BUT FEVER AND DIARRHEA STOPPED.  PLEASE ADVISE IF YOU CAN SEE BOTH HIM AND WIFE IN PERSON IF NOT VIA VV ?  HOME HEALTH  SAID SHE WILL TRY TO BRING THEM IN BUT THEY ARE REFUSING ER-      PLEASE ADVISE

## 2024-02-21 ENCOUNTER — OFFICE VISIT (OUTPATIENT)
Dept: PRIMARY CARE | Facility: CLINIC | Age: 64
End: 2024-02-21
Payer: MEDICARE

## 2024-02-21 VITALS
OXYGEN SATURATION: 93 % | TEMPERATURE: 97 F | DIASTOLIC BLOOD PRESSURE: 80 MMHG | SYSTOLIC BLOOD PRESSURE: 138 MMHG | WEIGHT: 315 LBS | BODY MASS INDEX: 44.1 KG/M2 | RESPIRATION RATE: 16 BRPM | HEART RATE: 64 BPM | HEIGHT: 71 IN

## 2024-02-21 DIAGNOSIS — J06.9 UPPER RESPIRATORY TRACT INFECTION, UNSPECIFIED TYPE: ICD-10-CM

## 2024-02-21 DIAGNOSIS — Z79.4 TYPE 2 DIABETES MELLITUS WITH OTHER DIABETIC KIDNEY COMPLICATION, WITH LONG-TERM CURRENT USE OF INSULIN (MULTI): ICD-10-CM

## 2024-02-21 DIAGNOSIS — N18.4 CKD (CHRONIC KIDNEY DISEASE) STAGE 4, GFR 15-29 ML/MIN (MULTI): ICD-10-CM

## 2024-02-21 DIAGNOSIS — J18.9 PNEUMONIA DUE TO INFECTIOUS ORGANISM, UNSPECIFIED LATERALITY, UNSPECIFIED PART OF LUNG: Primary | ICD-10-CM

## 2024-02-21 DIAGNOSIS — I50.22 CHRONIC SYSTOLIC CONGESTIVE HEART FAILURE (MULTI): ICD-10-CM

## 2024-02-21 DIAGNOSIS — R05.1 ACUTE COUGH: ICD-10-CM

## 2024-02-21 DIAGNOSIS — R06.2 WHEEZING: ICD-10-CM

## 2024-02-21 DIAGNOSIS — E11.29 TYPE 2 DIABETES MELLITUS WITH OTHER DIABETIC KIDNEY COMPLICATION, WITH LONG-TERM CURRENT USE OF INSULIN (MULTI): ICD-10-CM

## 2024-02-21 DIAGNOSIS — R09.89 CHEST CONGESTION: ICD-10-CM

## 2024-02-21 DIAGNOSIS — N25.81 SECONDARY HYPERPARATHYROIDISM (MULTI): ICD-10-CM

## 2024-02-21 PROCEDURE — 3079F DIAST BP 80-89 MM HG: CPT | Performed by: FAMILY MEDICINE

## 2024-02-21 PROCEDURE — 1036F TOBACCO NON-USER: CPT | Performed by: FAMILY MEDICINE

## 2024-02-21 PROCEDURE — 99214 OFFICE O/P EST MOD 30 MIN: CPT | Performed by: FAMILY MEDICINE

## 2024-02-21 PROCEDURE — 1123F ACP DISCUSS/DSCN MKR DOCD: CPT | Performed by: FAMILY MEDICINE

## 2024-02-21 PROCEDURE — 1159F MED LIST DOCD IN RCRD: CPT | Performed by: FAMILY MEDICINE

## 2024-02-21 PROCEDURE — 3075F SYST BP GE 130 - 139MM HG: CPT | Performed by: FAMILY MEDICINE

## 2024-02-21 PROCEDURE — 96372 THER/PROPH/DIAG INJ SC/IM: CPT | Performed by: FAMILY MEDICINE

## 2024-02-21 PROCEDURE — 1158F ADVNC CARE PLAN TLK DOCD: CPT | Performed by: FAMILY MEDICINE

## 2024-02-21 PROCEDURE — 3008F BODY MASS INDEX DOCD: CPT | Performed by: FAMILY MEDICINE

## 2024-02-21 RX ORDER — CEFDINIR 300 MG/1
300 CAPSULE ORAL 2 TIMES DAILY
Qty: 20 CAPSULE | Refills: 0 | Status: SHIPPED | OUTPATIENT
Start: 2024-02-21 | End: 2024-03-02

## 2024-02-21 RX ORDER — METHYLPREDNISOLONE 4 MG/1
TABLET ORAL
Qty: 21 TABLET | Refills: 0 | Status: SHIPPED | OUTPATIENT
Start: 2024-02-21 | End: 2024-02-28

## 2024-02-21 RX ORDER — TRIAMCINOLONE ACETONIDE 40 MG/ML
80 INJECTION, SUSPENSION INTRA-ARTICULAR; INTRAMUSCULAR ONCE
Status: COMPLETED | OUTPATIENT
Start: 2024-02-21 | End: 2024-02-21

## 2024-02-21 RX ADMIN — TRIAMCINOLONE ACETONIDE 80 MG: 40 INJECTION, SUSPENSION INTRA-ARTICULAR; INTRAMUSCULAR at 15:59

## 2024-02-21 NOTE — PROGRESS NOTES
"Subjective   Patient ID: Dmitriy Sherman is a 64 y.o. male who presents for Flu Symptoms, Cough, Fatigue, and Generalized Body Aches.  HPI    Pt is being seen for flu symptoms   Ongoing for 1 week   Symptoms include SOB, chest congestion, cough, diarrhea, very weak, body aches   No appetite, fatigue, tired. Has low energy. Has a hard time walking.  Pt ran fever and chills for 2 days   Pt tested neg for Covid 2 days ago.  Pt tried OTC Tylenol minimal   Pt states that he can not take OTC medicine because of his heart     Son was diagnosed with influenza, and pneumonia.    No other concern /question    Review of systems  ; Patient seen today for exam denies any problems with headaches or vision, denies any shortness of breath chest pain nausea or vomiting, no black stool no blood in the stool no heartburn type symptoms denies any problems with constipation or diarrhea, and no dysuria-type symptoms    The patient's allergies medications were reviewed with them today    The patient's social family and surgical history or also reviewed here today, along with her past medical history.     Objective     Alert and active in  no acute distress  HEENT TMs clear oropharynx negative nares clear no drainage noted neck supple  With no adenopathy   Heart regular rate and rhythm without murmur and no carotid bruits  Lungs- clear to auscultation bilaterally, positive wheeze bilaterally rhonchi in the right lower lobe thyroid -negative masses or nodularity  Abdomen- soft times four quadrants , bowel sounds positive no masses or organomegaly, negative tenderness guarding or rebound  Neurological exam unremarkable- DTRs in upper and lower extremities within normal limits.   skin -no lesions noted      /80 (BP Location: Right arm, Patient Position: Sitting, BP Cuff Size: Large adult)   Pulse 64   Temp 36.1 °C (97 °F) (Temporal)   Resp 16   Ht 1.803 m (5' 11\")   Wt (!) 162 kg (358 lb)   SpO2 93%   BMI 49.93 kg/m² " "    Allergies   Allergen Reactions    Carvedilol Other and Unknown     \"hyper\", rapid pulse    Sitagliptin Swelling       Assessment/Plan   Problem List Items Addressed This Visit       CKD (chronic kidney disease) stage 4, GFR 15-29 ml/min (CMS/[facility])    Systolic congestive heart failure (CMS/[facility])    Secondary hyperparathyroidism (CMS/[facility])    BMI 50.0-59.9, adult (CMS/[facility]ly [facility])    Cough    Relevant Medications    triamcinolone acetonide (Kenalog-40) injection 80 mg (Completed)    cefdinir (Omnicef) 300 mg capsule    methylPREDNISolone (Medrol Dospak) 4 mg tablets     Other Visit Diagnoses       Pneumonia due to infectious organism, unspecified laterality, unspecified part of lung    -  Primary    Relevant Medications    triamcinolone acetonide (Kenalog-40) injection 80 mg (Completed)    cefdinir (Omnicef) 300 mg capsule    methylPREDNISolone (Medrol Dospak) 4 mg tablets    Upper respiratory tract infection, unspecified type        Relevant Medications    triamcinolone acetonide (Kenalog-40) injection 80 mg (Completed)    cefdinir (Omnicef) 300 mg capsule    methylPREDNISolone (Medrol Dospak) 4 mg tablets    Chest congestion        Relevant Medications    triamcinolone acetonide (Kenalog-40) injection 80 mg (Completed)    cefdinir (Omnicef) 300 mg capsule    methylPREDNISolone (Medrol Dospak) 4 mg tablets    Wheezing        Relevant Medications    triamcinolone acetonide (Kenalog-40) injection 80 mg (Completed)    cefdinir (Omnicef) 300 mg capsule    methylPREDNISolone (Medrol Dospak) 4 mg tablets    Type 2 diabetes mellitus with other diabetic kidney complication, with long-term current use of insulin (CMS/HCC)                  Long talk. Treatment options reviewed.  Cough and cold symptoms discussed.  Symptoms consistent with Pneumonia.   Start Cefdinir 300 MG, and Medrol Dospack.  Kenalog 80 MG injection given today.    Continue and take your medications as prescribed.    Importance of healthy diet and regular exercise " regimen discussed.    If anything worsens or changes please call us at once. Follow up in the office as planned.      Scribe Attestation  By signing my name below, I, Estela DRISCOLL, Scribluana   attest that this documentation has been prepared under the direction and in the presence of Edwin Rogers DO.

## 2024-02-23 ENCOUNTER — APPOINTMENT (OUTPATIENT)
Dept: CARDIOLOGY | Facility: CLINIC | Age: 64
End: 2024-02-23
Payer: MEDICARE

## 2024-02-29 ENCOUNTER — TELEPHONE (OUTPATIENT)
Dept: PRIMARY CARE | Facility: CLINIC | Age: 64
End: 2024-02-29
Payer: MEDICARE

## 2024-02-29 DIAGNOSIS — R09.89 CHEST CONGESTION: Primary | ICD-10-CM

## 2024-02-29 RX ORDER — AZITHROMYCIN 250 MG/1
TABLET, FILM COATED ORAL
Qty: 6 TABLET | Refills: 0 | Status: SHIPPED | OUTPATIENT
Start: 2024-02-29 | End: 2024-03-04

## 2024-02-29 NOTE — TELEPHONE ENCOUNTER
Pt's care taker calling, the patient is still feeling a little sick. He is not as bad as he was but still has a little wheezing in his chest. He wanted to know if you could call in another round of antibiotics?    Please advise    Last office visit: 2/21/24  Pharmacy: [facility] [place] [facility]

## 2024-03-12 ENCOUNTER — HOSPITAL ENCOUNTER (OUTPATIENT)
Dept: GENERAL RADIOLOGY | Age: 64
Discharge: HOME | End: 2024-03-14
Payer: MEDICARE

## 2024-03-12 ENCOUNTER — OFFICE VISIT (OUTPATIENT)
Dept: FAMILY MEDICINE CLINIC | Age: 64
End: 2024-03-12
Payer: MEDICARE

## 2024-03-12 VITALS
OXYGEN SATURATION: 95 % | WEIGHT: 315 LBS | DIASTOLIC BLOOD PRESSURE: 80 MMHG | SYSTOLIC BLOOD PRESSURE: 136 MMHG | HEART RATE: 73 BPM | TEMPERATURE: 97.6 F | HEIGHT: 71 IN | RESPIRATION RATE: 18 BRPM | BODY MASS INDEX: 44.1 KG/M2

## 2024-03-12 DIAGNOSIS — B97.89 VIRAL RESPIRATORY ILLNESS: ICD-10-CM

## 2024-03-12 DIAGNOSIS — R53.83 FATIGUE, UNSPECIFIED TYPE: ICD-10-CM

## 2024-03-12 DIAGNOSIS — K59.00 CONSTIPATION, UNSPECIFIED CONSTIPATION TYPE: Primary | ICD-10-CM

## 2024-03-12 DIAGNOSIS — J98.8 VIRAL RESPIRATORY ILLNESS: ICD-10-CM

## 2024-03-12 PROCEDURE — 3079F DIAST BP 80-89 MM HG: CPT | Performed by: NURSE PRACTITIONER

## 2024-03-12 PROCEDURE — 3075F SYST BP GE 130 - 139MM HG: CPT | Performed by: NURSE PRACTITIONER

## 2024-03-12 PROCEDURE — 1123F ACP DISCUSS/DSCN MKR DOCD: CPT | Performed by: NURSE PRACTITIONER

## 2024-03-12 PROCEDURE — 99213 OFFICE O/P EST LOW 20 MIN: CPT | Performed by: NURSE PRACTITIONER

## 2024-03-12 PROCEDURE — 71046 X-RAY EXAM CHEST 2 VIEWS: CPT

## 2024-03-12 RX ORDER — POLYETHYLENE GLYCOL 3350 17 G/17G
17 POWDER, FOR SOLUTION ORAL DAILY
Qty: 510 G | Refills: 0 | Status: SHIPPED | OUTPATIENT
Start: 2024-03-12 | End: 2024-04-11

## 2024-03-12 SDOH — ECONOMIC STABILITY: FOOD INSECURITY: WITHIN THE PAST 12 MONTHS, THE FOOD YOU BOUGHT JUST DIDN'T LAST AND YOU DIDN'T HAVE MONEY TO GET MORE.: NEVER TRUE

## 2024-03-12 SDOH — ECONOMIC STABILITY: INCOME INSECURITY: HOW HARD IS IT FOR YOU TO PAY FOR THE VERY BASICS LIKE FOOD, HOUSING, MEDICAL CARE, AND HEATING?: NOT HARD AT ALL

## 2024-03-12 SDOH — ECONOMIC STABILITY: FOOD INSECURITY: WITHIN THE PAST 12 MONTHS, YOU WORRIED THAT YOUR FOOD WOULD RUN OUT BEFORE YOU GOT MONEY TO BUY MORE.: NEVER TRUE

## 2024-03-12 SDOH — ECONOMIC STABILITY: HOUSING INSECURITY
IN THE LAST 12 MONTHS, WAS THERE A TIME WHEN YOU DID NOT HAVE A STEADY PLACE TO SLEEP OR SLEPT IN A SHELTER (INCLUDING NOW)?: NO

## 2024-03-12 ASSESSMENT — ENCOUNTER SYMPTOMS
ABDOMINAL PAIN: 1
RHINORRHEA: 0
BACK PAIN: 1

## 2024-03-12 ASSESSMENT — PATIENT HEALTH QUESTIONNAIRE - PHQ9: DEPRESSION UNABLE TO ASSESS: PT REFUSES

## 2024-03-14 ENCOUNTER — OFFICE VISIT (OUTPATIENT)
Dept: CARDIOLOGY CLINIC | Age: 64
End: 2024-03-14
Payer: MEDICARE

## 2024-03-14 ENCOUNTER — TELEPHONE (OUTPATIENT)
Dept: CARDIOLOGY CLINIC | Age: 64
End: 2024-03-14

## 2024-03-14 VITALS
HEART RATE: 72 BPM | TEMPERATURE: 97.5 F | RESPIRATION RATE: 18 BRPM | SYSTOLIC BLOOD PRESSURE: 140 MMHG | HEIGHT: 71 IN | OXYGEN SATURATION: 97 % | DIASTOLIC BLOOD PRESSURE: 88 MMHG | BODY MASS INDEX: 44.1 KG/M2 | WEIGHT: 315 LBS

## 2024-03-14 DIAGNOSIS — I25.10 CAD S/P PERCUTANEOUS CORONARY ANGIOPLASTY: ICD-10-CM

## 2024-03-14 DIAGNOSIS — Z98.61 CAD S/P PERCUTANEOUS CORONARY ANGIOPLASTY: ICD-10-CM

## 2024-03-14 DIAGNOSIS — N18.4 CKD (CHRONIC KIDNEY DISEASE) STAGE 4, GFR 15-29 ML/MIN (HCC): ICD-10-CM

## 2024-03-14 DIAGNOSIS — R06.09 DYSPNEA ON EXERTION: ICD-10-CM

## 2024-03-14 DIAGNOSIS — Z95.810 HISTORY OF IMPLANTABLE CARDIAC DEFIBRILLATOR (ICD): ICD-10-CM

## 2024-03-14 DIAGNOSIS — E11.69 DIABETES MELLITUS TYPE 2 IN OBESE (HCC): ICD-10-CM

## 2024-03-14 DIAGNOSIS — I25.5 ISCHEMIC CARDIOMYOPATHY: ICD-10-CM

## 2024-03-14 DIAGNOSIS — I10 HYPERTENSION, UNSPECIFIED TYPE: ICD-10-CM

## 2024-03-14 DIAGNOSIS — Z86.79 HISTORY OF PSVT (PAROXYSMAL SUPRAVENTRICULAR TACHYCARDIA): ICD-10-CM

## 2024-03-14 DIAGNOSIS — E66.9 DIABETES MELLITUS TYPE 2 IN OBESE (HCC): ICD-10-CM

## 2024-03-14 DIAGNOSIS — I50.22 CHRONIC SYSTOLIC CHF (CONGESTIVE HEART FAILURE), NYHA CLASS 2 (HCC): ICD-10-CM

## 2024-03-14 DIAGNOSIS — I50.22 CHRONIC SYSTOLIC CHF (CONGESTIVE HEART FAILURE), NYHA CLASS 3 (HCC): ICD-10-CM

## 2024-03-14 LAB
ANION GAP SERPL CALCULATED.3IONS-SCNC: 11 MEQ/L (ref 9–15)
BNP BLD-MCNC: 7694 PG/ML
BUN SERPL-MCNC: 41 MG/DL (ref 8–23)
CALCIUM SERPL-MCNC: 9.1 MG/DL (ref 8.5–9.9)
CHLORIDE SERPL-SCNC: 102 MEQ/L (ref 95–107)
CO2 SERPL-SCNC: 26 MEQ/L (ref 20–31)
CREAT SERPL-MCNC: 3.1 MG/DL (ref 0.7–1.2)
GLUCOSE SERPL-MCNC: 191 MG/DL (ref 70–99)
POTASSIUM SERPL-SCNC: 4.4 MEQ/L (ref 3.4–4.9)
SODIUM SERPL-SCNC: 139 MEQ/L (ref 135–144)

## 2024-03-14 PROCEDURE — 3052F HG A1C>EQUAL 8.0%<EQUAL 9.0%: CPT | Performed by: PHYSICIAN ASSISTANT

## 2024-03-14 PROCEDURE — 3079F DIAST BP 80-89 MM HG: CPT | Performed by: PHYSICIAN ASSISTANT

## 2024-03-14 PROCEDURE — 1123F ACP DISCUSS/DSCN MKR DOCD: CPT | Performed by: PHYSICIAN ASSISTANT

## 2024-03-14 PROCEDURE — 99214 OFFICE O/P EST MOD 30 MIN: CPT | Performed by: PHYSICIAN ASSISTANT

## 2024-03-14 PROCEDURE — 3077F SYST BP >= 140 MM HG: CPT | Performed by: PHYSICIAN ASSISTANT

## 2024-03-14 ASSESSMENT — ENCOUNTER SYMPTOMS
COUGH: 0
ABDOMINAL PAIN: 0
SHORTNESS OF BREATH: 1
CHEST TIGHTNESS: 0
NAUSEA: 0
WHEEZING: 0
CHEST TIGHTNESS: 0
COUGH: 0
VOMITING: 0
COLOR CHANGE: 0
ABDOMINAL DISTENTION: 1
BLOOD IN STOOL: 0
SHORTNESS OF BREATH: 1

## 2024-03-14 NOTE — PROGRESS NOTES
date: 12/1/1985     Quit date: 12/1/2008     Years since quitting: 15.2    Smokeless tobacco: Never   Vaping Use    Vaping Use: Never used   Substance and Sexual Activity    Alcohol use: No    Drug use: Never    Sexual activity: Yes     Partners: Female     Social Determinants of Health     Financial Resource Strain: Low Risk  (3/12/2024)    Overall Financial Resource Strain (CARDIA)     Difficulty of Paying Living Expenses: Not hard at all   Food Insecurity: No Food Insecurity (3/12/2024)    Hunger Vital Sign     Worried About Running Out of Food in the Last Year: Never true     Ran Out of Food in the Last Year: Never true   Transportation Needs: Unknown (3/12/2024)    PRAPARE - Transportation     Lack of Transportation (Non-Medical): No   Housing Stability: Unknown (3/12/2024)    Housing Stability Vital Sign     Unstable Housing in the Last Year: No       Family History   Problem Relation Age of Onset    Heart Disease Mother     Kidney Disease Mother     Hypertension Mother     Diabetes Mother     No Known Problems Sister     No Known Problems Brother     No Known Problems Brother          Review of Systems:   Review of Systems   Constitutional:  Positive for fatigue (with exertion--worse recently). Negative for activity change, chills and unexpected weight change.   HENT:  Negative for congestion.    Respiratory:  Positive for shortness of breath (with exertion--worse in past week). Negative for cough and chest tightness.    Cardiovascular:  Negative for chest pain, palpitations and leg swelling.        No orthopnea; no PND--cant afford new CPAP machine     Gastrointestinal:  Positive for abdominal distention (feeling \"bloated\"--better since less constipated). Negative for abdominal pain, blood in stool, nausea and vomiting.             Genitourinary:  Negative for difficulty urinating and hematuria.   Musculoskeletal:  Positive for arthralgias (right leg pain radiating from back). Negative for myalgias.   Skin:

## 2024-03-14 NOTE — TELEPHONE ENCOUNTER
Spoke with Nitin informed her patient to take lasix bid today and tomorrow then resume normal dose after on M-W-F. She will contact patient

## 2024-03-14 NOTE — PATIENT INSTRUCTIONS
Echocardiogram ordered to re-evaluate LVF  **Call [phone] to schedule     -Increase lasix to 40mg twice daily x 3 days THEN decrease back to 40mg daily    Check labs today (BMP and BNP)    -Check daily weight every morning and notify CHF clinic if gaining more than 3 pounds in 2 days    -Check blood pressure twice daily in a.m. and p.m. prior to taking medications and keep log of blood pressure trends to review at next office visit  Notify office if BP running low with  mmHg or below prior to taking medications  Notify office if BP running high with  mmHg or above or if DBP 90 mmHg or above    -Recommend 2000 mg daily sodium restriction    -Recommend 1.5  liter (48 ounces) daily fluid restriction

## 2024-03-15 ENCOUNTER — APPOINTMENT (OUTPATIENT)
Dept: ULTRASOUND IMAGING | Age: 64
DRG: 291 | End: 2024-03-15
Payer: MEDICARE

## 2024-03-15 ENCOUNTER — HOSPITAL ENCOUNTER (INPATIENT)
Age: 64
LOS: 2 days | DRG: 291 | End: 2024-03-17
Attending: EMERGENCY MEDICINE | Admitting: INTERNAL MEDICINE
Payer: MEDICARE

## 2024-03-15 ENCOUNTER — HOSPITAL ENCOUNTER (OUTPATIENT)
Age: 64
End: 2024-03-15
Payer: MEDICARE

## 2024-03-15 ENCOUNTER — APPOINTMENT (OUTPATIENT)
Dept: GENERAL RADIOLOGY | Age: 64
DRG: 291 | End: 2024-03-15
Attending: EMERGENCY MEDICINE
Payer: MEDICARE

## 2024-03-15 VITALS
DIASTOLIC BLOOD PRESSURE: 88 MMHG | WEIGHT: 315 LBS | SYSTOLIC BLOOD PRESSURE: 140 MMHG | BODY MASS INDEX: 44.1 KG/M2 | HEIGHT: 71 IN

## 2024-03-15 DIAGNOSIS — R09.02 HYPOXIA: Primary | ICD-10-CM

## 2024-03-15 DIAGNOSIS — R06.09 DYSPNEA ON EXERTION: ICD-10-CM

## 2024-03-15 DIAGNOSIS — I50.20 SYSTOLIC CONGESTIVE HEART FAILURE, UNSPECIFIED HF CHRONICITY (HCC): ICD-10-CM

## 2024-03-15 DIAGNOSIS — E66.01 MORBID OBESITY (HCC): ICD-10-CM

## 2024-03-15 LAB
ALBUMIN SERPL-MCNC: 3.7 G/DL (ref 3.5–4.6)
ALP SERPL-CCNC: 104 U/L (ref 35–104)
ALT SERPL-CCNC: 26 U/L (ref 0–41)
ANION GAP SERPL CALCULATED.3IONS-SCNC: 13 MEQ/L (ref 9–15)
AST SERPL-CCNC: 17 U/L (ref 0–40)
BASE EXCESS ARTERIAL: -2 (ref -3–3)
BASOPHILS # BLD: 0.1 K/UL (ref 0–0.2)
BASOPHILS NFR BLD: 0.5 %
BILIRUB SERPL-MCNC: 0.6 MG/DL (ref 0.2–0.7)
BNP BLD-MCNC: 8991 PG/ML
BUN SERPL-MCNC: 44 MG/DL (ref 8–23)
CALCIUM IONIZED: 1.16 MMOL/L (ref 1.12–1.32)
CALCIUM SERPL-MCNC: 9.1 MG/DL (ref 8.5–9.9)
CHLORIDE SERPL-SCNC: 100 MEQ/L (ref 95–107)
CO2 SERPL-SCNC: 24 MEQ/L (ref 20–31)
CREAT SERPL-MCNC: 3.23 MG/DL (ref 0.7–1.2)
D DIMER PPP FEU-MCNC: 2.57 MG/L FEU (ref 0–0.5)
ECHO AO ROOT DIAM: 3.4 CM
ECHO AO ROOT INDEX: 1.26 CM/M2
ECHO AV AREA PEAK VELOCITY: 2.3 CM2
ECHO AV AREA PEAK VELOCITY: 2.4 CM2
ECHO AV AREA PEAK VELOCITY: 2.8 CM2
ECHO AV AREA PEAK VELOCITY: 2.9 CM2
ECHO AV AREA VTI: 2.7 CM2
ECHO AV AREA/BSA VTI: 1 CM2/M2
ECHO AV CUSP MM: 2.3 CM
ECHO AV MEAN GRADIENT: 3 MMHG
ECHO AV MEAN VELOCITY: 0.8 M/S
ECHO AV PEAK GRADIENT: 5 MMHG
ECHO AV PEAK GRADIENT: 5 MMHG
ECHO AV PEAK VELOCITY: 1.2 M/S
ECHO AV PEAK VELOCITY: 1.2 M/S
ECHO AV VTI: 20.5 CM
ECHO BSA: 2.85 M2
ECHO EST RA PRESSURE: 3 MMHG
ECHO LA DIAMETER INDEX: 1.38 CM/M2
ECHO LA DIAMETER: 3.7 CM
ECHO LA TO AORTIC ROOT RATIO: 1.09
ECHO LA VOL A-L A4C: 49 ML (ref 18–58)
ECHO LA VOL MOD A4C: 46 ML (ref 18–58)
ECHO LA VOLUME INDEX A-L A4C: 18 ML/M2 (ref 16–34)
ECHO LA VOLUME INDEX MOD A4C: 17 ML/M2 (ref 16–34)
ECHO LV E' LATERAL VELOCITY: 8 CM/S
ECHO LV E' SEPTAL VELOCITY: 6 CM/S
ECHO LV FRACTIONAL SHORTENING: 18 % (ref 28–44)
ECHO LV INTERNAL DIMENSION DIASTOLE INDEX: 1.82 CM/M2
ECHO LV INTERNAL DIMENSION DIASTOLIC: 4.9 CM (ref 4.2–5.9)
ECHO LV INTERNAL DIMENSION SYSTOLIC INDEX: 1.49 CM/M2
ECHO LV INTERNAL DIMENSION SYSTOLIC: 4 CM
ECHO LV IVSD: 1.8 CM (ref 0.6–1)
ECHO LV IVSS: 1.9 CM
ECHO LV MASS 2D: 413.7 G (ref 88–224)
ECHO LV MASS INDEX 2D: 153.8 G/M2 (ref 49–115)
ECHO LV POSTERIOR WALL DIASTOLIC: 1.8 CM (ref 0.6–1)
ECHO LV POSTERIOR WALL SYSTOLIC: 2.3 CM
ECHO LV RELATIVE WALL THICKNESS RATIO: 0.73
ECHO LVOT AREA: 4.9 CM2
ECHO LVOT AV VTI INDEX: 0.55
ECHO LVOT DIAM: 2.5 CM
ECHO LVOT MEAN GRADIENT: 1 MMHG
ECHO LVOT PEAK GRADIENT: 1 MMHG
ECHO LVOT PEAK GRADIENT: 2 MMHG
ECHO LVOT PEAK VELOCITY: 0.6 M/S
ECHO LVOT PEAK VELOCITY: 0.7 M/S
ECHO LVOT STROKE VOLUME INDEX: 20.4 ML/M2
ECHO LVOT SV: 55 ML
ECHO LVOT VTI: 11.2 CM
ECHO MV AREA VTI: 3.2 CM2
ECHO MV LVOT VTI INDEX: 1.55
ECHO MV MAX VELOCITY: 1 M/S
ECHO MV MEAN GRADIENT: 1 MMHG
ECHO MV MEAN VELOCITY: 0.4 M/S
ECHO MV PEAK GRADIENT: 4 MMHG
ECHO MV VTI: 17.4 CM
ECHO PV MAX VELOCITY: 0.7 M/S
ECHO PV PEAK GRADIENT: 2 MMHG
ECHO RIGHT VENTRICULAR SYSTOLIC PRESSURE (RVSP): 16 MMHG
ECHO TV REGURGITANT MAX VELOCITY: 1.82 M/S
ECHO TV REGURGITANT PEAK GRADIENT: 13 MMHG
EOSINOPHIL # BLD: 0.1 K/UL (ref 0–0.7)
EOSINOPHIL NFR BLD: 0.7 %
ERYTHROCYTE [DISTWIDTH] IN BLOOD BY AUTOMATED COUNT: 18.1 % (ref 11.5–14.5)
GLOBULIN SER CALC-MCNC: 3.1 G/DL (ref 2.3–3.5)
GLUCOSE BLD-MCNC: 187 MG/DL (ref 70–99)
GLUCOSE BLD-MCNC: 187 MG/DL (ref 70–99)
GLUCOSE BLD-MCNC: 212 MG/DL (ref 70–99)
GLUCOSE SERPL-MCNC: 186 MG/DL (ref 70–99)
HBA1C MFR BLD: 9 % (ref 4.8–5.9)
HCO3 ARTERIAL: 21.8 MMOL/L (ref 21–29)
HCT VFR BLD AUTO: 41.9 % (ref 42–52)
HCT VFR BLD AUTO: 45 % (ref 41–53)
HGB BLD CALC-MCNC: 15.3 GM/DL (ref 13.5–17.5)
HGB BLD-MCNC: 13.5 G/DL (ref 14–18)
INR PPP: 1.1
LACTATE: 0.74 MMOL/L (ref 0.4–2)
LYMPHOCYTES # BLD: 1.6 K/UL (ref 1–4.8)
LYMPHOCYTES NFR BLD: 14.5 %
MCH RBC QN AUTO: 28.2 PG (ref 27–31.3)
MCHC RBC AUTO-ENTMCNC: 32.2 % (ref 33–37)
MCV RBC AUTO: 87.5 FL (ref 79–92.2)
MONOCYTES # BLD: 0.9 K/UL (ref 0.2–0.8)
MONOCYTES NFR BLD: 7.8 %
NEUTROPHILS # BLD: 8.4 K/UL (ref 1.4–6.5)
NEUTS SEG NFR BLD: 76.1 %
O2 SAT, ARTERIAL: 88 % (ref 93–100)
PCO2 ARTERIAL: 30 MM HG (ref 35–45)
PERFORMED ON: ABNORMAL
PH ARTERIAL: 7.46 (ref 7.35–7.45)
PLATELET # BLD AUTO: 309 K/UL (ref 130–400)
PO2 ARTERIAL: 51 MM HG (ref 75–108)
POC CHLORIDE: 109 MEQ/L (ref 99–110)
POC CREATININE: 3.3 MG/DL (ref 0.8–1.3)
POC FIO2: 8
POC SAMPLE TYPE: ABNORMAL
POTASSIUM SERPL-SCNC: 4 MEQ/L (ref 3.4–4.9)
POTASSIUM SERPL-SCNC: 4.1 MEQ/L (ref 3.5–5.1)
PROT SERPL-MCNC: 6.8 G/DL (ref 6.3–8)
PROTHROMBIN TIME: 13.9 SEC (ref 12.3–14.9)
RBC # BLD AUTO: 4.79 M/UL (ref 4.7–6.1)
SODIUM BLD-SCNC: 141 MEQ/L (ref 136–145)
SODIUM SERPL-SCNC: 137 MEQ/L (ref 135–144)
TCO2 ARTERIAL: 23 MMOL/L (ref 21–32)
TROPONIN, HIGH SENSITIVITY: 130 NG/L (ref 0–19)
TROPONIN, HIGH SENSITIVITY: 298 NG/L (ref 0–19)
WBC # BLD AUTO: 11 K/UL (ref 4.8–10.8)

## 2024-03-15 PROCEDURE — 82803 BLOOD GASES ANY COMBINATION: CPT

## 2024-03-15 PROCEDURE — 6370000000 HC RX 637 (ALT 250 FOR IP): Performed by: INTERNAL MEDICINE

## 2024-03-15 PROCEDURE — 80053 COMPREHEN METABOLIC PANEL: CPT

## 2024-03-15 PROCEDURE — 1210000000 HC MED SURG R&B

## 2024-03-15 PROCEDURE — 82330 ASSAY OF CALCIUM: CPT

## 2024-03-15 PROCEDURE — 36415 COLL VENOUS BLD VENIPUNCTURE: CPT

## 2024-03-15 PROCEDURE — 6360000002 HC RX W HCPCS: Performed by: EMERGENCY MEDICINE

## 2024-03-15 PROCEDURE — C8929 TTE W OR WO FOL WCON,DOPPLER: HCPCS

## 2024-03-15 PROCEDURE — 94761 N-INVAS EAR/PLS OXIMETRY MLT: CPT

## 2024-03-15 PROCEDURE — 83880 ASSAY OF NATRIURETIC PEPTIDE: CPT

## 2024-03-15 PROCEDURE — 82435 ASSAY OF BLOOD CHLORIDE: CPT

## 2024-03-15 PROCEDURE — 6360000004 HC RX CONTRAST MEDICATION: Performed by: PHYSICIAN ASSISTANT

## 2024-03-15 PROCEDURE — 85610 PROTHROMBIN TIME: CPT

## 2024-03-15 PROCEDURE — 85014 HEMATOCRIT: CPT

## 2024-03-15 PROCEDURE — 84484 ASSAY OF TROPONIN QUANT: CPT

## 2024-03-15 PROCEDURE — 93005 ELECTROCARDIOGRAM TRACING: CPT | Performed by: EMERGENCY MEDICINE

## 2024-03-15 PROCEDURE — 36600 WITHDRAWAL OF ARTERIAL BLOOD: CPT

## 2024-03-15 PROCEDURE — 83605 ASSAY OF LACTIC ACID: CPT

## 2024-03-15 PROCEDURE — 84295 ASSAY OF SERUM SODIUM: CPT

## 2024-03-15 PROCEDURE — 84132 ASSAY OF SERUM POTASSIUM: CPT

## 2024-03-15 PROCEDURE — 6360000002 HC RX W HCPCS: Performed by: INTERNAL MEDICINE

## 2024-03-15 PROCEDURE — 93306 TTE W/DOPPLER COMPLETE: CPT | Performed by: INTERNAL MEDICINE

## 2024-03-15 PROCEDURE — 83036 HEMOGLOBIN GLYCOSYLATED A1C: CPT

## 2024-03-15 PROCEDURE — 93970 EXTREMITY STUDY: CPT

## 2024-03-15 PROCEDURE — 85379 FIBRIN DEGRADATION QUANT: CPT

## 2024-03-15 PROCEDURE — 82565 ASSAY OF CREATININE: CPT

## 2024-03-15 PROCEDURE — 85025 COMPLETE CBC W/AUTO DIFF WBC: CPT

## 2024-03-15 PROCEDURE — 71045 X-RAY EXAM CHEST 1 VIEW: CPT

## 2024-03-15 PROCEDURE — 99285 EMERGENCY DEPT VISIT HI MDM: CPT

## 2024-03-15 PROCEDURE — 96374 THER/PROPH/DIAG INJ IV PUSH: CPT

## 2024-03-15 PROCEDURE — 94660 CPAP INITIATION&MGMT: CPT

## 2024-03-15 PROCEDURE — 94640 AIRWAY INHALATION TREATMENT: CPT

## 2024-03-15 PROCEDURE — 6370000000 HC RX 637 (ALT 250 FOR IP): Performed by: EMERGENCY MEDICINE

## 2024-03-15 PROCEDURE — 2580000003 HC RX 258: Performed by: INTERNAL MEDICINE

## 2024-03-15 PROCEDURE — 2700000000 HC OXYGEN THERAPY PER DAY

## 2024-03-15 RX ORDER — SODIUM CHLORIDE 9 MG/ML
INJECTION, SOLUTION INTRAVENOUS PRN
Status: DISCONTINUED | OUTPATIENT
Start: 2024-03-15 | End: 2024-03-17 | Stop reason: HOSPADM

## 2024-03-15 RX ORDER — FUROSEMIDE 10 MG/ML
80 INJECTION INTRAMUSCULAR; INTRAVENOUS 2 TIMES DAILY
Status: DISCONTINUED | OUTPATIENT
Start: 2024-03-15 | End: 2024-03-17 | Stop reason: HOSPADM

## 2024-03-15 RX ORDER — METOPROLOL SUCCINATE 100 MG/1
100 TABLET, EXTENDED RELEASE ORAL DAILY
Status: DISCONTINUED | OUTPATIENT
Start: 2024-03-16 | End: 2024-03-17

## 2024-03-15 RX ORDER — MAGNESIUM SULFATE IN WATER 40 MG/ML
2000 INJECTION, SOLUTION INTRAVENOUS PRN
Status: DISCONTINUED | OUTPATIENT
Start: 2024-03-15 | End: 2024-03-17 | Stop reason: HOSPADM

## 2024-03-15 RX ORDER — ONDANSETRON 2 MG/ML
4 INJECTION INTRAMUSCULAR; INTRAVENOUS EVERY 6 HOURS PRN
Status: DISCONTINUED | OUTPATIENT
Start: 2024-03-15 | End: 2024-03-17 | Stop reason: HOSPADM

## 2024-03-15 RX ORDER — PANTOPRAZOLE SODIUM 40 MG/1
40 TABLET, DELAYED RELEASE ORAL
Status: DISCONTINUED | OUTPATIENT
Start: 2024-03-16 | End: 2024-03-17 | Stop reason: HOSPADM

## 2024-03-15 RX ORDER — ACETAMINOPHEN 650 MG/1
650 SUPPOSITORY RECTAL EVERY 6 HOURS PRN
Status: DISCONTINUED | OUTPATIENT
Start: 2024-03-15 | End: 2024-03-17 | Stop reason: HOSPADM

## 2024-03-15 RX ORDER — SODIUM CHLORIDE 0.9 % (FLUSH) 0.9 %
5-40 SYRINGE (ML) INJECTION PRN
Status: DISCONTINUED | OUTPATIENT
Start: 2024-03-15 | End: 2024-03-17 | Stop reason: HOSPADM

## 2024-03-15 RX ORDER — POLYETHYLENE GLYCOL 3350 17 G/17G
17 POWDER, FOR SOLUTION ORAL DAILY PRN
Status: DISCONTINUED | OUTPATIENT
Start: 2024-03-15 | End: 2024-03-17 | Stop reason: HOSPADM

## 2024-03-15 RX ORDER — INSULIN LISPRO 100 [IU]/ML
0-8 INJECTION, SOLUTION INTRAVENOUS; SUBCUTANEOUS
Status: DISCONTINUED | OUTPATIENT
Start: 2024-03-15 | End: 2024-03-17 | Stop reason: HOSPADM

## 2024-03-15 RX ORDER — ALBUTEROL SULFATE 90 UG/1
2 AEROSOL, METERED RESPIRATORY (INHALATION) EVERY 4 HOURS PRN
COMMUNITY
Start: 2024-01-05

## 2024-03-15 RX ORDER — AMIODARONE HYDROCHLORIDE 200 MG/1
200 TABLET ORAL DAILY
Status: DISCONTINUED | OUTPATIENT
Start: 2024-03-16 | End: 2024-03-17 | Stop reason: HOSPADM

## 2024-03-15 RX ORDER — SODIUM CHLORIDE 0.9 % (FLUSH) 0.9 %
5-40 SYRINGE (ML) INJECTION EVERY 12 HOURS SCHEDULED
Status: DISCONTINUED | OUTPATIENT
Start: 2024-03-15 | End: 2024-03-17 | Stop reason: HOSPADM

## 2024-03-15 RX ORDER — IPRATROPIUM BROMIDE AND ALBUTEROL SULFATE 2.5; .5 MG/3ML; MG/3ML
1 SOLUTION RESPIRATORY (INHALATION) ONCE
Status: COMPLETED | OUTPATIENT
Start: 2024-03-15 | End: 2024-03-15

## 2024-03-15 RX ORDER — ASPIRIN 81 MG/1
81 TABLET ORAL DAILY
Status: DISCONTINUED | OUTPATIENT
Start: 2024-03-16 | End: 2024-03-17 | Stop reason: HOSPADM

## 2024-03-15 RX ORDER — DEXTROSE MONOHYDRATE 100 MG/ML
INJECTION, SOLUTION INTRAVENOUS CONTINUOUS PRN
Status: DISCONTINUED | OUTPATIENT
Start: 2024-03-15 | End: 2024-03-17 | Stop reason: HOSPADM

## 2024-03-15 RX ORDER — GLUCAGON 1 MG/ML
1 KIT INJECTION PRN
Status: DISCONTINUED | OUTPATIENT
Start: 2024-03-15 | End: 2024-03-17 | Stop reason: HOSPADM

## 2024-03-15 RX ORDER — ONDANSETRON 4 MG/1
4 TABLET, ORALLY DISINTEGRATING ORAL EVERY 8 HOURS PRN
Status: DISCONTINUED | OUTPATIENT
Start: 2024-03-15 | End: 2024-03-17 | Stop reason: HOSPADM

## 2024-03-15 RX ORDER — FUROSEMIDE 10 MG/ML
80 INJECTION INTRAMUSCULAR; INTRAVENOUS ONCE
Status: COMPLETED | OUTPATIENT
Start: 2024-03-15 | End: 2024-03-15

## 2024-03-15 RX ORDER — IPRATROPIUM BROMIDE AND ALBUTEROL SULFATE 2.5; .5 MG/3ML; MG/3ML
SOLUTION RESPIRATORY (INHALATION)
Status: DISPENSED
Start: 2024-03-15 | End: 2024-03-15

## 2024-03-15 RX ORDER — ENOXAPARIN SODIUM 100 MG/ML
40 INJECTION SUBCUTANEOUS 2 TIMES DAILY
Status: DISCONTINUED | OUTPATIENT
Start: 2024-03-15 | End: 2024-03-17 | Stop reason: HOSPADM

## 2024-03-15 RX ORDER — ATORVASTATIN CALCIUM 40 MG/1
40 TABLET, FILM COATED ORAL NIGHTLY
Status: DISCONTINUED | OUTPATIENT
Start: 2024-03-15 | End: 2024-03-17 | Stop reason: HOSPADM

## 2024-03-15 RX ORDER — ISOSORBIDE MONONITRATE 60 MG/1
60 TABLET, EXTENDED RELEASE ORAL DAILY
Status: DISCONTINUED | OUTPATIENT
Start: 2024-03-16 | End: 2024-03-17 | Stop reason: HOSPADM

## 2024-03-15 RX ORDER — CALCITRIOL 0.25 UG/1
0.25 CAPSULE, LIQUID FILLED ORAL DAILY
Status: DISCONTINUED | OUTPATIENT
Start: 2024-03-16 | End: 2024-03-17 | Stop reason: HOSPADM

## 2024-03-15 RX ORDER — POTASSIUM CHLORIDE 20 MEQ/1
40 TABLET, EXTENDED RELEASE ORAL PRN
Status: DISCONTINUED | OUTPATIENT
Start: 2024-03-15 | End: 2024-03-17 | Stop reason: HOSPADM

## 2024-03-15 RX ORDER — ACETAMINOPHEN 325 MG/1
650 TABLET ORAL EVERY 6 HOURS PRN
Status: DISCONTINUED | OUTPATIENT
Start: 2024-03-15 | End: 2024-03-17 | Stop reason: HOSPADM

## 2024-03-15 RX ORDER — POTASSIUM CHLORIDE 7.45 MG/ML
10 INJECTION INTRAVENOUS PRN
Status: DISCONTINUED | OUTPATIENT
Start: 2024-03-15 | End: 2024-03-17 | Stop reason: HOSPADM

## 2024-03-15 RX ORDER — GABAPENTIN 300 MG/1
300 CAPSULE ORAL NIGHTLY
Status: DISCONTINUED | OUTPATIENT
Start: 2024-03-15 | End: 2024-03-17 | Stop reason: HOSPADM

## 2024-03-15 RX ORDER — INSULIN LISPRO 100 [IU]/ML
0-4 INJECTION, SOLUTION INTRAVENOUS; SUBCUTANEOUS NIGHTLY
Status: DISCONTINUED | OUTPATIENT
Start: 2024-03-15 | End: 2024-03-17 | Stop reason: HOSPADM

## 2024-03-15 RX ADMIN — PERFLUTREN 3 ML: 6.52 INJECTION, SUSPENSION INTRAVENOUS at 11:00

## 2024-03-15 RX ADMIN — IPRATROPIUM BROMIDE AND ALBUTEROL SULFATE 3 DOSE: 2.5; .5 SOLUTION RESPIRATORY (INHALATION) at 11:25

## 2024-03-15 RX ADMIN — FUROSEMIDE 80 MG: 10 INJECTION, SOLUTION INTRAMUSCULAR; INTRAVENOUS at 11:32

## 2024-03-15 RX ADMIN — TICAGRELOR 90 MG: 90 TABLET ORAL at 22:03

## 2024-03-15 RX ADMIN — ATORVASTATIN CALCIUM 40 MG: 40 TABLET, FILM COATED ORAL at 22:02

## 2024-03-15 RX ADMIN — FUROSEMIDE 80 MG: 10 INJECTION, SOLUTION INTRAMUSCULAR; INTRAVENOUS at 16:59

## 2024-03-15 RX ADMIN — ENOXAPARIN SODIUM 40 MG: 100 INJECTION SUBCUTANEOUS at 22:02

## 2024-03-15 RX ADMIN — SODIUM CHLORIDE, PRESERVATIVE FREE 10 ML: 5 INJECTION INTRAVENOUS at 22:06

## 2024-03-15 RX ADMIN — SACUBITRIL AND VALSARTAN 1 TABLET: 24; 26 TABLET, FILM COATED ORAL at 22:01

## 2024-03-15 RX ADMIN — GABAPENTIN 300 MG: 300 CAPSULE ORAL at 22:01

## 2024-03-15 RX ADMIN — ACETAMINOPHEN 325MG 650 MG: 325 TABLET ORAL at 22:51

## 2024-03-15 ASSESSMENT — PAIN - FUNCTIONAL ASSESSMENT: PAIN_FUNCTIONAL_ASSESSMENT: NONE - DENIES PAIN

## 2024-03-15 ASSESSMENT — LIFESTYLE VARIABLES
HOW MANY STANDARD DRINKS CONTAINING ALCOHOL DO YOU HAVE ON A TYPICAL DAY: PATIENT DOES NOT DRINK
HOW OFTEN DO YOU HAVE A DRINK CONTAINING ALCOHOL: NEVER

## 2024-03-15 ASSESSMENT — PAIN SCALES - GENERAL: PAINLEVEL_OUTOF10: 0

## 2024-03-15 NOTE — ED PROVIDER NOTES
[facility]  EMERGENCY DEPARTMENT ENCOUNTER      Pt Name: Josr Lemus  MRN: [id number]  Birthdate 1960  Date of evaluation: 3/15/2024  Provider: Odessa Banuelos DO    CHIEF COMPLAINT       Chief Complaint   Patient presents with    Shortness of Breath         HISTORY OF PRESENT ILLNESS   (Location/Symptom, Timing/Onset, Context/Setting, Quality, Duration, Modifying Factors, Severity)  Note limiting factors.   Josr Lemus is a 64 y.o. male who presents to the emergency department .  Patient was sent to the ER from echocardiogram when they found him to be hypoxic with a pulse ox of 78%.  Patient has been short of breath for 5 days.  History of congestive heart failure cardiomyopathy hypertension hyperlipidemia ejection fraction 15%, chronic kidney disease.    Patient was seen by cardiology yesterday and they were going to increase his Lasix from 40 mg daily to 40 mg twice daily 3 days a week.  Patient has not been having any chest pain.    HPI    Nursing Notes were reviewed.    REVIEW OF SYSTEMS    (2-9 systems for level 4, 10 or more for level 5)     Review of Systems   Constitutional:  Negative for activity change, appetite change, fatigue and fever.   HENT:  Negative for congestion and sore throat.    Eyes:  Negative for pain and visual disturbance.   Respiratory:  Positive for shortness of breath. Negative for chest tightness.    Cardiovascular:  Negative for chest pain.   Gastrointestinal:  Negative for abdominal pain, nausea and vomiting.   Endocrine: Negative for polydipsia.   Genitourinary:  Negative for flank pain and urgency.   Musculoskeletal:  Negative for gait problem and neck stiffness.   Skin:  Negative for rash.   Neurological:  Negative for weakness, light-headedness and headaches.   Psychiatric/Behavioral:  Negative for confusion and sleep disturbance.        Except as noted above the remainder of the review of systems was reviewed and negative.       PAST MEDICAL HISTORY  normal limits   POCT GLUCOSE - Abnormal; Notable for the following components:    POC Glucose 212 (*)     All other components within normal limits   POCT GLUCOSE - Abnormal; Notable for the following components:    POC Glucose 225 (*)     All other components within normal limits   POCT GLUCOSE - Abnormal; Notable for the following components:    POC Glucose 260 (*)     All other components within normal limits   POCT GLUCOSE - Abnormal; Notable for the following components:    POC Glucose 236 (*)     All other components within normal limits   POCT GLUCOSE - Abnormal; Notable for the following components:    POC Glucose 225 (*)     All other components within normal limits   PROTIME-INR   BRAIN NATRIURETIC PEPTIDE    Narrative:     CALL  [facility]  [facility] tel. [phone],  Chemistry,trop results called to and read back by Mariam Hammond RN at [phone],  03/15/2024 12:27, by MADDISON HENSON    Narrative:     Collection has been rescheduled by TERESA at 03/16/2024 05:02 Reason: No   suitable vein for venipuncture   MAGNESIUM   POCT EPOC BLOOD GAS, LACTIC ACID, ICA   POCT GLUCOSE   POCT GLUCOSE   POCT GLUCOSE   POCT GLUCOSE   POCT GLUCOSE   POCT GLUCOSE   POCT GLUCOSE   POCT GLUCOSE   POCT GLUCOSE       All other labs were within normal range or not returned as of this dictation.    EMERGENCY DEPARTMENT COURSE and DIFFERENTIAL DIAGNOSIS/MDM:   Vitals:    Vitals:    03/17/24 0729 03/17/24 0909 03/17/24 1123 03/17/24 1319   BP: 92/63  117/82 (!) 152/120   Pulse: 72  73    Resp: 20      Temp: 97.3 °F (36.3 °C)      TempSrc: Oral  Oral    SpO2: 94% 94%     Weight:       Height:           Patient was sent over from echocardiogram because when they laid him down he became extremely hypoxic.  Patient is super morbidly obese and was found to be in congestive heart failure.  Patient was saturating well on Ventimask 50% FiO2. Patient given IV Lasix while in the ER.  Patient has multiple comorbidities including diabetes hypertension

## 2024-03-15 NOTE — ED TRIAGE NOTES
Pt presents to ED from radiology with increased SOB since Monday. Alert and oriented X4. Son at bedside. Pt denies chest pain.

## 2024-03-15 NOTE — ED NOTES
PT CAME FROM A SCHEDULED ECHO AND BECAME SOB WHEN NURSE PLACED IV. PER NURSE SHE LAID HIM BACK AND HE BECAME 86% ON 6L O2.

## 2024-03-15 NOTE — NURSING NOTE
Patient in echo lab needs IV for definity and bubble study. Patient noted to be very sob with a high respiratory rate. Son at bedside states that is his baseline. This nurse continued to be concerned so did a random sp02 check and he was 77% on room air.  Call to Dr. Garcia and sent patient to the Er for further evaluation. Report given to Mariam TRAVIS

## 2024-03-15 NOTE — FLOWSHEET NOTE
1445- Patient arrived to the floor from ER. Vital Signs obtained and stable. Oxygen saturation 93%. Patient oriented to room and call light. Bed alarm on. Call light in reach.    1503- DR. Santos notified of the elevated troponins.    1530- Assessment complete using a . Respiratory therapist notified patient is still on a venti-mask and asked them to come assess the patient and possibly switch him over to a high flow oxygen.     1544- High flow bubbler 15 L placed on the patient by Tomasz MENDOZA. Oxygen saturation  %.    1645- Dr. Santos in to see patient and wants patient on a Bipap and notified RT Tomasz.    1705- Bipap placed on patient by RT Tomasz.Electronically signed by May Steven RN on 3/15/2024 at 7:46 PM

## 2024-03-15 NOTE — CARE COORDINATION
Case Management Assessment  Initial Evaluation    Date/Time of Evaluation: 3/15/2024 2:27 PM  Assessment Completed by: Karen Lopez RN    If patient is discharged prior to next notation, then this note serves as note for discharge by case management.    Patient Name: Josr Lemus                   YOB: 1960  Diagnosis: Morbid obesity (HCC) [E66.01]  Hypoxia [R09.02]  Acute respiratory failure with hypoxia (HCC) [J96.01]  Systolic congestive heart failure, unspecified HF chronicity (HCC) [I50.20]                   Date / Time: 3/15/2024 11:04 AM    Patient Admission Status: Inpatient   Readmission Risk (Low < 19, Mod (19-27), High > 27): No data recorded  Current PCP: Shelton Bailey, DO  PCP verified by CM? (P) Yes    Chart Reviewed: Yes      History Provided by: (P) Patient  Patient Orientation: (P) Alert and Oriented, Person, Place, Situation, Self    Patient Cognition: (P) Alert    Hospitalization in the last 30 days (Readmission):  No    If yes, Readmission Assessment in  Navigator will be completed.    Advance Directives:      Code Status: Prior   Patient's Primary Decision Maker is: (P) Legal Next of Kin (wife Darleen, son Josr.)    Primary Decision Maker: Josr Whaley - Child - [phone]    Secondary Decision Maker: HARISHMayoromeotonya - Other - [phone]    Discharge Planning:    Patient lives with: (P) Spouse/Significant Other Type of Home: (P) House  Primary Care Giver: (P) Self  Patient Support Systems include: (P) Spouse/Significant Other, Children, Family Members   Current Financial resources: (P) Medicare  Current community resources: (P) None  Current services prior to admission: (P) None            Current DME:              Type of Home Care services:  (P) None    ADLS  Prior functional level: (P) Independent in ADLs/IADLs  Current functional level: (P) Independent in ADLs/IADLs    PT AM-PAC:   /24  OT AM-PAC:   /24    Family can provide assistance at DC: (P) Yes  Would  Telephone Encounter by Lula Arora at 10/03/18 12:18 PM     Author:  Lula Arora Service:  (none) Author Type:       Filed:  10/03/18 12:19 PM Encounter Date:  10/3/2018 Status:  Signed     :  Lula Arora ()              JUSTO QUINONES    Patient Age: 67 year old   Refill request by:[PH1.1T] Phone.  Patient wants a call back? No[PH1.1M]  Refill to be:[PH1.1T] ePrescribed to[PH1.1M]   Pharmacy     [facility] [place] [address] [facility]    [address] [facility] [address]    Phone: [phone][PH1.1T]        Patient has set up a new patient appt to see Dr Baez, first available is 11/6, can  give her a refill?[PH1.1M]    Routed to [facility]      Medication requested to be refilled:   Requested Prescriptions     Pending Prescriptions Disp Refills   • levothyroxine 112 MCG tablet 90 Tab 3     Sig: Take 1 Tab by mouth daily.           Next and Last Visit with Provider and Department  Next visit with BLAYNE WINSTON is on 11/06/2018 at 10:00 AM in [facility] SEQ  Next visit with [facility] is on 11/06/2018 at 10:00 [facility]   Last visit with BLAYNE WINSTON was on No match found  Last visit with [facility] was on No match found      WEIGHT AND HEIGHT: As of 05/31/2018 weight is 237 lbs.(107.502 kg). Height is 5' 6\"(1.676 m).   BMI is 38.27 kg/(m^2) calculated from:     Height 5' 6\" (1.676 m) as of 5/31/18     Weight 237 lb (107.502 kg) as of 5/31/18      Allergies      Allergen   Reactions   • Adhesive Tape  Rash   • Lisinopril  Cough   • Aspirin       unable to take due to ulcers    • Lipitor [Atorvastatin Calcium]       Myalgias      • Penicillins       RASH    • Pravachol [Pravastatin]  Other - See Comments     Myalgias- shoulder/arm pain      Current outpatient prescriptions       Medication  Sig Dispense Refill   • albuterol (VENTOLIN HFA) 108 (90 BASE) MCG/ACT  inhaler INHALE 2 PUFFS TWO TIMES A DAY AS NEEDED (replaces previous) 1 Inhaler 11   • olmesartan (BENICAR) 20 MG tablet Take 1 Tab by mouth daily. 90 Tab 3   • levothyroxine 112 MCG tablet Take 1 Tab by mouth daily. 90 Tab 3   • fluticasone (FLONASE) 50 MCG/ACT nasal spray USE 2 SPRAYS IN EACH NOSTRIL EVERY DAY 16 g 8        ROUTING:[PH1.1T] Patient's physician/staff[PH1.1M]        PCP: Marky Romero MD         INS: Payor: MEDICARE - ASSIGNED / Plan: *No Plan* / Product Type: *No Product type* / Note: This is the primary coverage, but no account was found for this location or the patient's primary location.   ADDRESS:  [address] [place] [address] [address] [place] [address][PH1.1T]       Revision History        User Key Date/Time User Provider Type Action    > PH1.1 10/03/18 12:19 PM Lula Arora  Sign    M - Manual, T - Template             you like Case Management to discuss the discharge plan with any other family members/significant others, and if so, who? (P) Yes (wife Darleen, son Josr.)  Plans to Return to Present Housing: (P) Yes  Other Identified Issues/Barriers to RETURNING to current housing: none  Potential Assistance needed at discharge: (P) Other (Comment) (pt denied any d/c needs in ER.)            Potential DME:    Patient expects to discharge to: (P) House  Plan for transportation at discharge:      Financial    Payor: [facility] MEDICARE / Plan: [facility] MEDICARE ADVANTAGE / Product Type: *No Product type* /     Does insurance require precert for SNF: Yes    Potential assistance Purchasing Medications: (P) No  Meds-to-Beds request:        [email or web address] - [place], [place] - [address] -  [phone] - F [phone]  [address] [address]  Phone: [phone] Fax: [phone]    [facility] - [place], [place] - [address] [facility] [phone] - F [phone]  [facility] [address]  Phone: [phone] Fax: [phone]    [place] - [address] [facility] [phone] - F [phone]  [address] [facility] [address]  Phone: [phone] Fax: [phone]      Notes:    Factors facilitating achievement of predicted outcomes: Family support, Motivated, Cooperative, Pleasant, Sense of humor, and Good insight into deficits    Barriers to discharge: none    Additional Case Management Notes: pt lives with his wife. He has a cane and grab bars. He denied being on home O2 or dialysis. He denied any d/c needs in ER.     Loi [id number] was already in use and was able to translate this conversation with the patient stating understanding.    The Plan for Transition of Care is related to the following treatment goals of Morbid obesity (HCC) [E66.01]  Hypoxia [R09.02]  Acute respiratory failure with  hypoxia (HCC) [J96.01]  Systolic congestive heart failure, unspecified HF chronicity (HCC) [I50.20]    IF APPLICABLE: The Patient and/or patient representative Josr and his family were provided with a choice of provider and agrees with the discharge plan. Freedom of choice list with basic dialogue that supports the patient's individualized plan of care/goals and shares the quality data associated with the providers was provided to:     Patient Representative Name:       The Patient and/or Patient Representative Agree with the Discharge Plan?      Karen Lopez RN  Case Management Department

## 2024-03-15 NOTE — H&P
Hospital Medicine  History and Physical    Patient:  Josr Lemus  MRN: [id number]    CHIEF COMPLAINT:    Chief Complaint   Patient presents with    Shortness of Breath     History Obtained From:  Patient, EMR  Primary Care Physician: Shelton Bailey DO    HISTORY OF PRESENT ILLNESS:   The patient is a 64 y.o. male with PMH of CKD, combined systolic and diastolic CHF, DMII, HTN, HLD who presents with shortness of breath.      Patient has had multiple respiratory issues recently including pneumonia so his PCP recommended him increasing his water intake which has resulted in progressive worsening shortness of breath with [place].  He was getting an echo done today when he became extremely short of breath so he was sent to the ER.  Denies fevers, chills sweats, CP, diarrhea or burning micturition.    Past Medical History:      Diagnosis Date    Acute kidney injury superimposed on CKD (HCC) 08/15/2019    Anxiety and depression 04/30/2019    Cardiomyopathy (HCC)     Chronic systolic heart failure (HCC) 04/30/2019    Diabetes mellitus (HCC)     Diabetic neuropathy associated with type 2 diabetes mellitus (HCC) 12/13/2017    Heart failure, NYHA class 4 (HCC)     Hyperlipidemia     Hypertension     ICD (implantable cardioverter-defibrillator) in place 04/30/2019    Left ureteral calculus     Morbid obesity (HCC) 12/19/2017    LON (obstructive sleep apnea)     Pulmonary embolism ([facility])     ON ELIQUIS    Pyelonephritis 08/04/2019    Ureteral calculus     Ureteric stone        Past Surgical History:      Procedure Laterality Date    CARDIAC DEFIBRILLATOR PLACEMENT  12/2008    CYSTOSCOPY INSERTION / REMOVAL STENT / STONE Left 5/30/2019    CYSTOSCOPY LEFT DOUBLE J STENT PLACEMENT ROOM 287 [place] [facility] [place]    LITHOTRIPSY Left 6/12/2019    LEFT ESWL (CONF # [id number]) [place] [facility] [place]    LITHOTRIPSY N/A 7/10/2019    HOLMIUM LASER LITHOTRIPSY, STONE EXTRACTION, LT URETERAL  \"COLORU\", \"PHUR\", \"LABCAST\", \"WBCUA\", \"RBCUA\", \"MUCUS\", \"TRICHOMONAS\", \"YEAST\", \"BACTERIA\", \"CLARITYU\", \"SPECGRAV\", \"LEUKOCYTESUR\", \"UROBILINOGEN\", \"BILIRUBINUR\", \"BLOODU\", \"GLUCOSEU\", \"AMORPHOUS\" in the last 72 hours.    Invalid input(s): \"KETONESU\"  -----------------------------------------------------------------   Echo (TTE) complete (PRN contrast/bubble/strain/3D)    Result Date: 3/15/2024    Left Ventricle: Severely reduced left ventricular systolic function with a visually estimated EF of 20 - 25%. Left ventricle is mildly dilated. Moderately increased wall thickness. Findings consistent with moderate concentric hypertrophy. Septal flattening in systole consistent with right ventricular pressure overload. Severe global hypokinesis present.   Right Ventricle: Not well visualized. Right ventricle is moderately dilated. Lead present in the right ventricle. Moderately reduced systolic function.   Mitral Valve: Mildly thickened leaflet, at the anterior and posterior leaflets.   Left Atrium: Left atrium is mildly dilated.   Image quality is poor. Contrast used: Definity. Technically difficult study due to patient's body habitus, limited Doppler study due to patient's condition and procedure performed with the patient in a supine position.     XR CHEST PORTABLE    Result Date: 3/15/2024  EXAMINATION: ONE XRAY VIEW OF THE CHEST 3/15/2024 11:47 am COMPARISON: March 12, 2024 HISTORY: ORDERING SYSTEM PROVIDED HISTORY: Chest Pain TECHNOLOGIST PROVIDED HISTORY: Reason for exam:->Chest Pain What reading provider will be dictating this exam?->CRC FINDINGS: The heart is enlarged.  Pulmonary vessels are congested.  No airspace consolidation.  No pneumothorax or pleural effusion. Pacemaker present.     Cardiomegaly with pulmonary vascular congestion. No significant interval change.     Assessment and Plan   Acute on chronic combined systolic and diastolic CHF:  Aggressive diuresis; will use bipap for now for pre load and

## 2024-03-15 NOTE — ED NOTES
Yaniv Monsivais [id number]  Pt states that he would like an ice chip   Pt states that he would like a catheter placed so that he does not have to get up to urinate  Pt educated that we do not do that   Pt educated on the external catheter

## 2024-03-15 NOTE — ACP (ADVANCE CARE PLANNING)
Advance Care Planning     Advance Care Planning Activator (Inpatient)  Conversation Note      Date of ACP Conversation: 3/15/2024     Conversation Conducted with: Patient with Decision Making Capacity    ACP Activator: Karen Lopez, RN        Health Care Decision Maker:     Current Designated Health Care Decision Maker:     Primary Decision Maker: Josr Whaley - [phone]    Primary Decision Maker: Darleen Rodriguez - Spouse - [phone]    Secondary Decision Maker: Nitin MOREIRA - Other - [phone]

## 2024-03-16 LAB
ANION GAP SERPL CALCULATED.3IONS-SCNC: 17 MEQ/L (ref 9–15)
BASOPHILS # BLD: 0 K/UL (ref 0–0.2)
BASOPHILS NFR BLD: 0.3 %
BUN SERPL-MCNC: 53 MG/DL (ref 8–23)
CALCIUM SERPL-MCNC: 9.2 MG/DL (ref 8.5–9.9)
CHLORIDE SERPL-SCNC: 98 MEQ/L (ref 95–107)
CHOLEST SERPL-MCNC: 158 MG/DL (ref 0–199)
CO2 SERPL-SCNC: 22 MEQ/L (ref 20–31)
CREAT SERPL-MCNC: 3.73 MG/DL (ref 0.7–1.2)
EOSINOPHIL # BLD: 0.1 K/UL (ref 0–0.7)
EOSINOPHIL NFR BLD: 0.5 %
ERYTHROCYTE [DISTWIDTH] IN BLOOD BY AUTOMATED COUNT: 18.1 % (ref 11.5–14.5)
GLUCOSE BLD-MCNC: 225 MG/DL (ref 70–99)
GLUCOSE BLD-MCNC: 236 MG/DL (ref 70–99)
GLUCOSE BLD-MCNC: 260 MG/DL (ref 70–99)
GLUCOSE SERPL-MCNC: 212 MG/DL (ref 70–99)
HCT VFR BLD AUTO: 41 % (ref 42–52)
HDLC SERPL-MCNC: 37 MG/DL (ref 40–59)
HGB BLD-MCNC: 13.6 G/DL (ref 14–18)
LDLC SERPL CALC-MCNC: 89 MG/DL (ref 0–129)
LYMPHOCYTES # BLD: 1.9 K/UL (ref 1–4.8)
LYMPHOCYTES NFR BLD: 19.3 %
MAGNESIUM SERPL-MCNC: 2.1 MG/DL (ref 1.7–2.4)
MCH RBC QN AUTO: 28.5 PG (ref 27–31.3)
MCHC RBC AUTO-ENTMCNC: 33.2 % (ref 33–37)
MCV RBC AUTO: 86 FL (ref 79–92.2)
MONOCYTES # BLD: 0.9 K/UL (ref 0.2–0.8)
MONOCYTES NFR BLD: 9.5 %
NEUTROPHILS # BLD: 6.8 K/UL (ref 1.4–6.5)
NEUTS SEG NFR BLD: 70 %
PERFORMED ON: ABNORMAL
PLATELET # BLD AUTO: 292 K/UL (ref 130–400)
POTASSIUM SERPL-SCNC: 4.2 MEQ/L (ref 3.4–4.9)
RBC # BLD AUTO: 4.77 M/UL (ref 4.7–6.1)
SODIUM SERPL-SCNC: 137 MEQ/L (ref 135–144)
TRIGL SERPL-MCNC: 158 MG/DL (ref 0–150)
TROPONIN, HIGH SENSITIVITY: 190 NG/L (ref 0–19)
WBC # BLD AUTO: 9.7 K/UL (ref 4.8–10.8)

## 2024-03-16 PROCEDURE — 94760 N-INVAS EAR/PLS OXIMETRY 1: CPT

## 2024-03-16 PROCEDURE — 84484 ASSAY OF TROPONIN QUANT: CPT

## 2024-03-16 PROCEDURE — 36415 COLL VENOUS BLD VENIPUNCTURE: CPT

## 2024-03-16 PROCEDURE — 94761 N-INVAS EAR/PLS OXIMETRY MLT: CPT

## 2024-03-16 PROCEDURE — 6370000000 HC RX 637 (ALT 250 FOR IP): Performed by: INTERNAL MEDICINE

## 2024-03-16 PROCEDURE — 80061 LIPID PANEL: CPT

## 2024-03-16 PROCEDURE — 1210000000 HC MED SURG R&B

## 2024-03-16 PROCEDURE — 83735 ASSAY OF MAGNESIUM: CPT

## 2024-03-16 PROCEDURE — 80048 BASIC METABOLIC PNL TOTAL CA: CPT

## 2024-03-16 PROCEDURE — 99221 1ST HOSP IP/OBS SF/LOW 40: CPT | Performed by: INTERNAL MEDICINE

## 2024-03-16 PROCEDURE — 2700000000 HC OXYGEN THERAPY PER DAY

## 2024-03-16 PROCEDURE — 2580000003 HC RX 258: Performed by: INTERNAL MEDICINE

## 2024-03-16 PROCEDURE — 51702 INSERT TEMP BLADDER CATH: CPT

## 2024-03-16 PROCEDURE — 51798 US URINE CAPACITY MEASURE: CPT

## 2024-03-16 PROCEDURE — 6360000002 HC RX W HCPCS: Performed by: INTERNAL MEDICINE

## 2024-03-16 PROCEDURE — 85025 COMPLETE CBC W/AUTO DIFF WBC: CPT

## 2024-03-16 RX ORDER — METOLAZONE 5 MG/1
5 TABLET ORAL DAILY
Status: DISCONTINUED | OUTPATIENT
Start: 2024-03-16 | End: 2024-03-17 | Stop reason: HOSPADM

## 2024-03-16 RX ORDER — TRAMADOL HYDROCHLORIDE 50 MG/1
50 TABLET ORAL EVERY 6 HOURS PRN
Status: DISCONTINUED | OUTPATIENT
Start: 2024-03-16 | End: 2024-03-17 | Stop reason: HOSPADM

## 2024-03-16 RX ADMIN — INSULIN LISPRO 4 UNITS: 100 INJECTION, SOLUTION INTRAVENOUS; SUBCUTANEOUS at 16:17

## 2024-03-16 RX ADMIN — SODIUM CHLORIDE, PRESERVATIVE FREE 10 ML: 5 INJECTION INTRAVENOUS at 22:18

## 2024-03-16 RX ADMIN — ENOXAPARIN SODIUM 40 MG: 100 INJECTION SUBCUTANEOUS at 08:02

## 2024-03-16 RX ADMIN — GABAPENTIN 300 MG: 300 CAPSULE ORAL at 22:17

## 2024-03-16 RX ADMIN — PANTOPRAZOLE SODIUM 40 MG: 40 TABLET, DELAYED RELEASE ORAL at 08:02

## 2024-03-16 RX ADMIN — AMIODARONE HYDROCHLORIDE 200 MG: 200 TABLET ORAL at 08:02

## 2024-03-16 RX ADMIN — SACUBITRIL AND VALSARTAN 1 TABLET: 24; 26 TABLET, FILM COATED ORAL at 08:02

## 2024-03-16 RX ADMIN — INSULIN LISPRO 2 UNITS: 100 INJECTION, SOLUTION INTRAVENOUS; SUBCUTANEOUS at 11:18

## 2024-03-16 RX ADMIN — TICAGRELOR 90 MG: 90 TABLET ORAL at 08:02

## 2024-03-16 RX ADMIN — CALCITRIOL 0.25 MCG: 0.25 CAPSULE ORAL at 08:02

## 2024-03-16 RX ADMIN — TICAGRELOR 90 MG: 90 TABLET ORAL at 22:17

## 2024-03-16 RX ADMIN — FUROSEMIDE 80 MG: 10 INJECTION, SOLUTION INTRAMUSCULAR; INTRAVENOUS at 08:02

## 2024-03-16 RX ADMIN — ACETAMINOPHEN 325MG 650 MG: 325 TABLET ORAL at 16:32

## 2024-03-16 RX ADMIN — METOPROLOL SUCCINATE 100 MG: 100 TABLET, EXTENDED RELEASE ORAL at 08:02

## 2024-03-16 RX ADMIN — ISOSORBIDE MONONITRATE 60 MG: 60 TABLET, EXTENDED RELEASE ORAL at 08:02

## 2024-03-16 RX ADMIN — SODIUM CHLORIDE, PRESERVATIVE FREE 10 ML: 5 INJECTION INTRAVENOUS at 08:03

## 2024-03-16 RX ADMIN — ENOXAPARIN SODIUM 40 MG: 100 INJECTION SUBCUTANEOUS at 22:18

## 2024-03-16 RX ADMIN — METOLAZONE 5 MG: 5 TABLET ORAL at 11:18

## 2024-03-16 RX ADMIN — TRAMADOL HYDROCHLORIDE 50 MG: 50 TABLET ORAL at 19:50

## 2024-03-16 RX ADMIN — SACUBITRIL AND VALSARTAN 1 TABLET: 24; 26 TABLET, FILM COATED ORAL at 22:17

## 2024-03-16 RX ADMIN — ASPIRIN 81 MG: 81 TABLET, COATED ORAL at 08:02

## 2024-03-16 RX ADMIN — ATORVASTATIN CALCIUM 40 MG: 40 TABLET, FILM COATED ORAL at 22:17

## 2024-03-16 ASSESSMENT — PAIN - FUNCTIONAL ASSESSMENT: PAIN_FUNCTIONAL_ASSESSMENT: PREVENTS OR INTERFERES SOME ACTIVE ACTIVITIES AND ADLS

## 2024-03-16 ASSESSMENT — PAIN SCALES - GENERAL
PAINLEVEL_OUTOF10: 9
PAINLEVEL_OUTOF10: 10

## 2024-03-16 ASSESSMENT — PAIN DESCRIPTION - LOCATION
LOCATION: BACK
LOCATION: BACK

## 2024-03-16 ASSESSMENT — PAIN DESCRIPTION - ORIENTATION
ORIENTATION: LOWER;MID
ORIENTATION: LOWER;MID

## 2024-03-16 ASSESSMENT — PAIN DESCRIPTION - PAIN TYPE
TYPE: CHRONIC PAIN
TYPE: CHRONIC PAIN

## 2024-03-16 ASSESSMENT — PAIN DESCRIPTION - DESCRIPTORS
DESCRIPTORS: DISCOMFORT
DESCRIPTORS: DISCOMFORT

## 2024-03-16 NOTE — CONSULTS
Spiritual Care Services     Summary of Visit:   visited patient for an advanced directive consult. Patient is [place]an speaking and  used the  services tablet to assist.  gave patient an advanced directive packet and explained its purpose.  gave the patient a [place]an language packet. Patient said he would take the packet home and read over it.  explained how to contact spiritual care for further assistance.    Encounter Summary  Encounter Overview/Reason : Advance Care Planning  Service Provided For:: Patient  Referral/Consult From:: Nurse  Support System: Spouse, Children  Complexity of Encounter: Moderate  Begin Time: 1040  End Time : 1110  Total Time Calculated: 30 min                          Advance Care Planning  Type: ACP conversation    Spiritual Assessment/Intervention/Outcomes:                     Care Plan:         Assist with advanced directives as requested      Spiritual Care Services   Electronically signed by Chaplain Eileen on 3/16/2024 at 11:12 AM.    To reach a  for emotional and spiritual support, place an EPIC consult request.   If a  is needed immediately, dial “0” and ask to page the on-call .

## 2024-03-16 NOTE — PROGRESS NOTES
Hospitalist Progress Note      PCP: Shelton Bailey DO    Date of Admission: 3/15/2024    Chief Complaint:    Chief Complaint   Patient presents with    Shortness of Breath     Subjective:  Patient denies fevers, chills, sweats,  diarrhea or burning micturition.  Comfortable on bipap; has some pleuritic chest pain.  12 point ROS negative other than mentioned above     Medications:  Reviewed    Infusion Medications    dextrose      sodium chloride       Scheduled Medications    metOLazone  5 mg Oral Daily    aspirin  81 mg Oral Daily    atorvastatin  40 mg Oral Nightly    amiodarone  200 mg Oral Daily    sacubitril-valsartan  1 tablet Oral BID    gabapentin  300 mg Oral Nightly    isosorbide mononitrate  60 mg Oral Daily    metoprolol succinate  100 mg Oral Daily    ticagrelor  90 mg Oral BID    pantoprazole  40 mg Oral QAM AC    sodium chloride flush  5-40 mL IntraVENous 2 times per day    enoxaparin  40 mg SubCUTAneous BID    insulin lispro  0-8 Units SubCUTAneous TID WC    insulin lispro  0-4 Units SubCUTAneous Nightly    furosemide  80 mg IntraVENous BID    calcitRIOL  0.25 mcg Oral Daily     PRN Meds: traMADol, glucose, dextrose bolus **OR** dextrose bolus, glucagon (rDNA), dextrose, sodium chloride flush, sodium chloride, ondansetron **OR** ondansetron, polyethylene glycol, acetaminophen **OR** acetaminophen, potassium chloride **OR** potassium alternative oral replacement **OR** potassium chloride, magnesium sulfate      Intake/Output Summary (Last 24 hours) at 3/16/2024 1224  Last data filed at 3/16/2024 1030  Gross per 24 hour   Intake 200 ml   Output 1100 ml   Net -900 ml       Exam:    /69   Pulse 79   Temp 97.5 °F (36.4 °C) (Oral)   Resp 18   Ht 1.8 m (5' 10.87\")   Wt (!) 161.9 kg (357 lb)   SpO2 95%   BMI 49.97 kg/m²     General appearance: on bipap  HEENT:  Conjunctivae/corneas clear.  Neck: Trachea midline.  Respiratory:  Normal respiratory effort. Clear to auscultation  Cardiovascular:  medically stable. SW on board for discharge planning.    Active Hospital Problems    Diagnosis Date Noted    Acute respiratory failure with hypoxia (HCC) [J96.01] 11/03/2022     Priority: Medium        Additional work up or/and treatment plan may be added today or then after based on clinical progression. I am managing a portion of pt care. Some medical issues are handled by other specialists. Additional work up and treatment should be done in out pt setting by pt PCP and other out pt providers.     In addition to examining and evaluating pt, I spent additional time explaining care, normal and abnormal findings, and treatment plan. All of pt questions were answered. Counseling, diet and education were  provided. Case will be discussed with nursing staff when appropriate. Family will be updated if and when appropriate.      Diet: ADULT DIET; Regular; No Added Salt (3-4 gm)    Code Status: Full Code    PT/OT Eval     Electronically signed by Joshua Santos MD on 3/16/2024 at 12:24 PM

## 2024-03-16 NOTE — FLOWSHEET NOTE
Pt has not been able to urinate since 12 pm.  He reports feeling the urge with no output.  Bladder scan only showed 29 mL.  His blood pressure is 89/54, HR 72 bpm.  6 pm Lasix.

## 2024-03-16 NOTE — CONSULTS
Inpatient consult to Cardiology  Consult performed by: Oumar Guzman MD  Consult ordered by: Joshua Santos MD      Patient Name: Josr Lemus  Admit Date: 3/15/2024 11:04 AM  MR #: [id number]  : 1960    Attending Physician: Joshua Santos MD  Reason for consult: Elevated troponins    History of Presenting Illness:      Josr Lemus is a 64 y.o. male on hospital day 1 with a history of ischemic cardiomyopathy EF 20% by TTE  status post ICD, CAD status post multiple PCI's most recent cath  PCI to the mid LAD, morbidly obese, hypertension, hyperlipidemia, diabetes, COPD, LON, who was admitted to the hospital for shortness of breath. History Obtained From:  patient, electronic medical record    Patient reports shortness of breath for the last couple of weeks.  Patient was evaluated 2 days ago at heart failure clinic recommendations were to increase Lasix    Troponins 130, 298, 190  BMP 8900  Chest x-ray pulmonary congestion    History:      Past Medical History:   Diagnosis Date    Acute kidney injury superimposed on CKD ([facility]) 08/15/2019    Anxiety and depression 2019    Cardiomyopathy ([facility])     Chronic systolic heart failure (HCC) 2019    Diabetes mellitus ([facility])     Diabetic neuropathy associated with type 2 diabetes mellitus ([facility]) 2017    Heart failure, NYHA class 4 (HCC)     Hyperlipidemia     Hypertension     ICD (implantable cardioverter-defibrillator) in place 2019    Left ureteral calculus     Morbid obesity (HCC) 2017    LON (obstructive sleep apnea)     Pulmonary embolism ([facility])     ON ELIQUIS    Pyelonephritis 2019    Ureteral calculus     Ureteric stone      Past Surgical History:   Procedure Laterality Date    CARDIAC DEFIBRILLATOR PLACEMENT  2008    CYSTOSCOPY INSERTION / REMOVAL STENT / STONE Left 2019    CYSTOSCOPY LEFT DOUBLE J STENT PLACEMENT ROOM 287 performed by Johnny Wilkinson MD at [facility]    LITHOTRIPSY Left 2019    LEFT  Transportation (Non-Medical): No   Physical Activity: Not on file   Stress: Not on file   Social Connections: Not on file   Intimate Partner Violence: Not on file   Housing Stability: Low Risk  (3/15/2024)    Housing Stability Vital Sign     Unable to Pay for Housing in the Last Year: No     Number of Places Lived in the Last Year: 1     Unstable Housing in the Last Year: No      [] Unable to obtain due to ventilated and/ or neurologic status    Home Medications:      Medications Prior to Admission: albuterol sulfate HFA (PROVENTIL;VENTOLIN;PROAIR) 108 (90 Base) MCG/ACT inhaler, Inhale 2 puffs into the lungs every 4 hours as needed for Wheezing or Shortness of Breath  polyethylene glycol (GLYCOLAX) 17 GM/SCOOP powder, Take 17 g by mouth daily  amiodarone (CORDARONE) 200 MG tablet, Take 1 tablet by mouth daily  ticagrelor (BRILINTA) 90 MG TABS tablet, Take 1 tablet by mouth 2 times daily  ENTRESTO 24-26 MG per tablet, Take 1 tablet by mouth 2 times daily  calcitRIOL (ROCALTROL) 0.25 MCG capsule, Take 1 capsule by mouth daily  Continuous Blood Gluc Sensor (DEXCOM G7 SENSOR) MISC, 1 Device by Does not apply route every 10 days  empagliflozin (JARDIANCE) 10 MG tablet, TAKE 1 TABLET BY MOUTH DAILY (Patient taking differently: Take 2.5 tablets by mouth daily)  isosorbide mononitrate (IMDUR) 60 MG extended release tablet, TAKE 1 TABLET BY MOUTH DAILY  atorvastatin (LIPITOR) 40 MG tablet, TAKE 1 TABLET BY MOUTH AT BEDTIME  metoprolol succinate (TOPROL XL) 100 MG extended release tablet, TAKE 1 TABLET BY MOUTH DAILY  furosemide (LASIX) 40 MG tablet, Take 1 tablet by mouth daily (Patient taking differently: Take 1 tablet by mouth See Admin Instructions 3 times weekly per Dr Holley; mon wed fri)  aspirin (ASPIRIN LOW DOSE) 81 MG EC tablet, TAKE 1 TABLET BY MOUTH DAILY  nitroGLYCERIN (NITROSTAT) 0.4 MG SL tablet, Place 1 tablet under the tongue every 5 minutes as needed for Chest pain up to max of 3 total doses. If no relief

## 2024-03-16 NOTE — CONSULTS
Renal consult dictated    WOOD  CKD-4  Cardiomyopathy with defib  in place pulmonary edema  DM type-2  LON  Cardiomyopathy sys HFrEF 20%   Obesity     Plan Trial maintain evolemic state  watch I&O follow BMP  avoid nephrotoxic exposure add metalozone to loop diuretic

## 2024-03-17 VITALS
TEMPERATURE: 97.3 F | DIASTOLIC BLOOD PRESSURE: 120 MMHG | WEIGHT: 315 LBS | SYSTOLIC BLOOD PRESSURE: 152 MMHG | OXYGEN SATURATION: 94 % | HEART RATE: 73 BPM | HEIGHT: 71 IN | RESPIRATION RATE: 20 BRPM | BODY MASS INDEX: 44.1 KG/M2

## 2024-03-17 LAB
ANION GAP SERPL CALCULATED.3IONS-SCNC: 20 MEQ/L (ref 9–15)
BASOPHILS # BLD: 0 K/UL (ref 0–0.2)
BASOPHILS NFR BLD: 0.4 %
BUN SERPL-MCNC: 73 MG/DL (ref 8–23)
CALCIUM SERPL-MCNC: 8.9 MG/DL (ref 8.5–9.9)
CHLORIDE SERPL-SCNC: 97 MEQ/L (ref 95–107)
CO2 SERPL-SCNC: 19 MEQ/L (ref 20–31)
CREAT SERPL-MCNC: 5.52 MG/DL (ref 0.7–1.2)
EOSINOPHIL # BLD: 0.2 K/UL (ref 0–0.7)
EOSINOPHIL NFR BLD: 1.9 %
ERYTHROCYTE [DISTWIDTH] IN BLOOD BY AUTOMATED COUNT: 18 % (ref 11.5–14.5)
GLUCOSE BLD-MCNC: 225 MG/DL (ref 70–99)
GLUCOSE SERPL-MCNC: 231 MG/DL (ref 70–99)
HCT VFR BLD AUTO: 40.9 % (ref 42–52)
HGB BLD-MCNC: 13.4 G/DL (ref 14–18)
LYMPHOCYTES # BLD: 2.2 K/UL (ref 1–4.8)
LYMPHOCYTES NFR BLD: 27.8 %
MAGNESIUM SERPL-MCNC: 2.4 MG/DL (ref 1.7–2.4)
MCH RBC QN AUTO: 28.5 PG (ref 27–31.3)
MCHC RBC AUTO-ENTMCNC: 32.8 % (ref 33–37)
MCV RBC AUTO: 87 FL (ref 79–92.2)
MONOCYTES # BLD: 0.9 K/UL (ref 0.2–0.8)
MONOCYTES NFR BLD: 11.7 %
NEUTROPHILS # BLD: 4.6 K/UL (ref 1.4–6.5)
NEUTS SEG NFR BLD: 57.7 %
PERFORMED ON: ABNORMAL
PLATELET # BLD AUTO: 256 K/UL (ref 130–400)
POTASSIUM SERPL-SCNC: 4.1 MEQ/L (ref 3.4–4.9)
RBC # BLD AUTO: 4.7 M/UL (ref 4.7–6.1)
SODIUM SERPL-SCNC: 136 MEQ/L (ref 135–144)
WBC # BLD AUTO: 8 K/UL (ref 4.8–10.8)

## 2024-03-17 PROCEDURE — 2500000003 HC RX 250 WO HCPCS: Performed by: INTERNAL MEDICINE

## 2024-03-17 PROCEDURE — 6370000000 HC RX 637 (ALT 250 FOR IP): Performed by: INTERNAL MEDICINE

## 2024-03-17 PROCEDURE — 83735 ASSAY OF MAGNESIUM: CPT

## 2024-03-17 PROCEDURE — 94761 N-INVAS EAR/PLS OXIMETRY MLT: CPT

## 2024-03-17 PROCEDURE — 5A12012 PERFORMANCE OF CARDIAC OUTPUT, SINGLE, MANUAL: ICD-10-PCS | Performed by: INTERNAL MEDICINE

## 2024-03-17 PROCEDURE — 92950 HEART/LUNG RESUSCITATION CPR: CPT

## 2024-03-17 PROCEDURE — 36415 COLL VENOUS BLD VENIPUNCTURE: CPT

## 2024-03-17 PROCEDURE — 31500 INSERT EMERGENCY AIRWAY: CPT

## 2024-03-17 PROCEDURE — 99233 SBSQ HOSP IP/OBS HIGH 50: CPT | Performed by: INTERNAL MEDICINE

## 2024-03-17 PROCEDURE — 80048 BASIC METABOLIC PNL TOTAL CA: CPT

## 2024-03-17 PROCEDURE — 51798 US URINE CAPACITY MEASURE: CPT

## 2024-03-17 PROCEDURE — 2580000003 HC RX 258: Performed by: INTERNAL MEDICINE

## 2024-03-17 PROCEDURE — 85025 COMPLETE CBC W/AUTO DIFF WBC: CPT

## 2024-03-17 PROCEDURE — 6360000002 HC RX W HCPCS: Performed by: INTERNAL MEDICINE

## 2024-03-17 PROCEDURE — 2700000000 HC OXYGEN THERAPY PER DAY

## 2024-03-17 RX ORDER — METOPROLOL SUCCINATE 25 MG/1
25 TABLET, EXTENDED RELEASE ORAL DAILY
Status: DISCONTINUED | OUTPATIENT
Start: 2024-03-18 | End: 2024-03-17 | Stop reason: HOSPADM

## 2024-03-17 RX ORDER — EPINEPHRINE IN SOD CHLOR,ISO 1 MG/10 ML
SYRINGE (ML) INTRAVENOUS
Status: COMPLETED | OUTPATIENT
Start: 2024-03-17 | End: 2024-03-17

## 2024-03-17 RX ORDER — AMIODARONE HYDROCHLORIDE 150 MG/3ML
INJECTION, SOLUTION INTRAVENOUS
Status: COMPLETED | OUTPATIENT
Start: 2024-03-17 | End: 2024-03-17

## 2024-03-17 RX ORDER — DOPAMINE HYDROCHLORIDE 160 MG/100ML
1-20 INJECTION, SOLUTION INTRAVENOUS CONTINUOUS
Status: DISCONTINUED | OUTPATIENT
Start: 2024-03-17 | End: 2024-03-17 | Stop reason: HOSPADM

## 2024-03-17 RX ORDER — METOPROLOL SUCCINATE 25 MG/1
25 TABLET, EXTENDED RELEASE ORAL ONCE
Status: COMPLETED | OUTPATIENT
Start: 2024-03-17 | End: 2024-03-17

## 2024-03-17 RX ORDER — LIDOCAINE HYDROCHLORIDE 20 MG/ML
INJECTION, SOLUTION INTRAVENOUS
Status: COMPLETED | OUTPATIENT
Start: 2024-03-17 | End: 2024-03-17

## 2024-03-17 RX ADMIN — CALCITRIOL 0.25 MCG: 0.25 CAPSULE ORAL at 09:50

## 2024-03-17 RX ADMIN — ASPIRIN 81 MG: 81 TABLET, COATED ORAL at 09:50

## 2024-03-17 RX ADMIN — EPINEPHRINE 1 MG: 0.1 INJECTION, SOLUTION ENDOTRACHEAL; INTRACARDIAC; INTRAVENOUS at 13:27

## 2024-03-17 RX ADMIN — EPINEPHRINE 1 MG: 0.1 INJECTION, SOLUTION ENDOTRACHEAL; INTRACARDIAC; INTRAVENOUS at 13:13

## 2024-03-17 RX ADMIN — AMIODARONE HYDROCHLORIDE 150 MG: 50 INJECTION, SOLUTION INTRAVENOUS at 13:06

## 2024-03-17 RX ADMIN — INSULIN LISPRO 2 UNITS: 100 INJECTION, SOLUTION INTRAVENOUS; SUBCUTANEOUS at 11:39

## 2024-03-17 RX ADMIN — EPINEPHRINE 1 MG: 0.1 INJECTION, SOLUTION ENDOTRACHEAL; INTRACARDIAC; INTRAVENOUS at 13:17

## 2024-03-17 RX ADMIN — TICAGRELOR 90 MG: 90 TABLET ORAL at 09:50

## 2024-03-17 RX ADMIN — PANTOPRAZOLE SODIUM 40 MG: 40 TABLET, DELAYED RELEASE ORAL at 05:11

## 2024-03-17 RX ADMIN — AMIODARONE HYDROCHLORIDE 150 MG: 50 INJECTION, SOLUTION INTRAVENOUS at 13:07

## 2024-03-17 RX ADMIN — AMIODARONE HYDROCHLORIDE 200 MG: 200 TABLET ORAL at 09:50

## 2024-03-17 RX ADMIN — SODIUM CHLORIDE, PRESERVATIVE FREE 10 ML: 5 INJECTION INTRAVENOUS at 09:51

## 2024-03-17 RX ADMIN — LIDOCAINE HYDROCHLORIDE 100 MG: 20 INJECTION, SOLUTION INTRAVENOUS at 13:16

## 2024-03-17 RX ADMIN — EPINEPHRINE 1 MG: 0.1 INJECTION, SOLUTION ENDOTRACHEAL; INTRACARDIAC; INTRAVENOUS at 13:20

## 2024-03-17 RX ADMIN — EPINEPHRINE 1 MG: 0.1 INJECTION, SOLUTION ENDOTRACHEAL; INTRACARDIAC; INTRAVENOUS at 13:23

## 2024-03-17 RX ADMIN — ACETAMINOPHEN 325MG 650 MG: 325 TABLET ORAL at 05:13

## 2024-03-17 RX ADMIN — ENOXAPARIN SODIUM 40 MG: 100 INJECTION SUBCUTANEOUS at 09:50

## 2024-03-17 RX ADMIN — EPINEPHRINE 1 MG: 0.1 INJECTION, SOLUTION ENDOTRACHEAL; INTRACARDIAC; INTRAVENOUS at 13:06

## 2024-03-17 RX ADMIN — EPINEPHRINE 1 MG: 0.1 INJECTION, SOLUTION ENDOTRACHEAL; INTRACARDIAC; INTRAVENOUS at 13:02

## 2024-03-17 RX ADMIN — INSULIN LISPRO 2 UNITS: 100 INJECTION, SOLUTION INTRAVENOUS; SUBCUTANEOUS at 09:50

## 2024-03-17 RX ADMIN — METOPROLOL SUCCINATE 25 MG: 25 TABLET, EXTENDED RELEASE ORAL at 10:54

## 2024-03-17 RX ADMIN — SODIUM BICARBONATE 100 MEQ: 84 INJECTION, SOLUTION INTRAVENOUS at 13:21

## 2024-03-17 ASSESSMENT — PAIN SCALES - GENERAL
PAINLEVEL_OUTOF10: 9
PAINLEVEL_OUTOF10: 9
PAINLEVEL_OUTOF10: 10
PAINLEVEL_OUTOF10: 9

## 2024-03-17 ASSESSMENT — PAIN - FUNCTIONAL ASSESSMENT
PAIN_FUNCTIONAL_ASSESSMENT: PREVENTS OR INTERFERES SOME ACTIVE ACTIVITIES AND ADLS

## 2024-03-17 ASSESSMENT — PAIN DESCRIPTION - DESCRIPTORS
DESCRIPTORS: DISCOMFORT

## 2024-03-17 ASSESSMENT — PAIN DESCRIPTION - ORIENTATION
ORIENTATION: LOWER;MID

## 2024-03-17 ASSESSMENT — PAIN DESCRIPTION - LOCATION
LOCATION: BACK

## 2024-03-17 ASSESSMENT — PAIN DESCRIPTION - PAIN TYPE
TYPE: CHRONIC PAIN

## 2024-03-17 NOTE — PROGRESS NOTES
Cardiology progress note      Patient Name: Josr Lemus  Admit Date: 3/15/2024 11:04 AM  MR #: [id number]  : 1960    Attending Physician: Joshua Santos MD  Reason for consult: Elevated troponins    History of Presenting Illness:      Josr Lemus is a 64 y.o. male on hospital day 2 with a history of ischemic cardiomyopathy EF 20% by TTE  status post ICD, CAD status post multiple PCI's most recent cath  PCI to the mid LAD, morbidly obese, hypertension, hyperlipidemia, diabetes, COPD, LNO, who was admitted to the hospital for shortness of breath. History Obtained From:  patient, electronic medical record    Patient reports shortness of breath for the last couple of weeks.  Patient was evaluated 2 days ago at heart failure clinic recommendations were to increase Lasix    Troponins 130, 298, 190  BMP 8900  Chest x-ray pulmonary congestion  ===========  Hospital course  3/17/2024  Patient laying in bed  Denies chest pain, reports that shortness of breath stable  Kidney function worsening  We will hold off on diuretics today, patient noted borderline hypotensive.  Will hold off on Entresto and decrease metoprolol   nephrology on board    History:      Past Medical History:   Diagnosis Date    Acute kidney injury superimposed on CKD ([facility]) 08/15/2019    Anxiety and depression 2019    Cardiomyopathy ([facility])     Chronic systolic heart failure (HCC) 2019    Diabetes mellitus ([facility])     Diabetic neuropathy associated with type 2 diabetes mellitus ([facility]) 2017    Heart failure, NYHA class 4 ([facility])     Hyperlipidemia     Hypertension     ICD (implantable cardioverter-defibrillator) in place 2019    Left ureteral calculus     Morbid obesity (HCC) 2017    LON (obstructive sleep apnea)     Pulmonary embolism ([facility])     ON ELIQUIS    Pyelonephritis 2019    Ureteral calculus     Ureteric stone      Past Surgical History:   Procedure Laterality Date    CARDIAC DEFIBRILLATOR     sodium chloride flush  5-40 mL IntraVENous 2 times per day    enoxaparin  40 mg SubCUTAneous BID    insulin lispro  0-8 Units SubCUTAneous TID WC    insulin lispro  0-4 Units SubCUTAneous Nightly    [Held by provider] furosemide  80 mg IntraVENous BID    calcitRIOL  0.25 mcg Oral Daily     Continuous Infusions:   [Held by provider] DOPamine Stopped (03/17/24 0934)    dextrose      sodium chloride       PRN Meds:.traMADol, glucose, dextrose bolus **OR** dextrose bolus, glucagon (rDNA), dextrose, sodium chloride flush, sodium chloride, ondansetron **OR** ondansetron, polyethylene glycol, acetaminophen **OR** acetaminophen, potassium chloride **OR** potassium alternative oral replacement **OR** potassium chloride, magnesium sulfate   [Held by provider] DOPamine Stopped (03/17/24 0934)    dextrose      sodium chloride        Allergies:     Allergies   Allergen Reactions    Coreg [Carvedilol] Other (See Comments)     \"hyper\", rapid pulse    Januvia [Sitagliptin]       Review of Systems:       [x] CV, Resp, Neuro, , and all other systems reviewed and negative other than listed in HPI.      Objective Findings:     Vitals:   Vitals:    03/17/24 0549 03/17/24 0729 03/17/24 0909 03/17/24 1123   BP:  92/63  117/82   Pulse:  72  73   Resp:  20     Temp:  97.3 °F (36.3 °C)     TempSrc:  Oral  Oral   SpO2:  94% 94%    Weight: (!) 160.8 kg (354 lb 9.6 oz)      Height:            Physical Examination:  General: Alert oriented x4 no acute distress  HEENT: Normocephalic, No scleral icterus.  Neck: No JVD.  Heart: Regular, no murmur, no rub/gallop. No RV heave.  Lungs: Clear to ascultation, no rales/wheezing/rhonchi. Good chest wall excursion.  Abdomen: Soft non tender non distended   extremities: No clubbing/cyanosis, no edema.   Skin: Warm, dry, normal turgor, no rash, no bruise, no petichiae.  Neuro: No myoclonus or tremor.   Psych: Normal affect    Results/ Medications reviewed 3/17/2024, 12:28 PM     Laboratory,  Microbiology, Pathology, Radiology, Cardiology, Medications and Transcriptions reviewed  Scheduled Meds:   [START ON 3/18/2024] metoprolol succinate  25 mg Oral Daily    [Held by provider] metOLazone  5 mg Oral Daily    aspirin  81 mg Oral Daily    atorvastatin  40 mg Oral Nightly    amiodarone  200 mg Oral Daily    [Held by provider] sacubitril-valsartan  1 tablet Oral BID    gabapentin  300 mg Oral Nightly    [Held by provider] isosorbide mononitrate  60 mg Oral Daily    ticagrelor  90 mg Oral BID    pantoprazole  40 mg Oral QAM AC    sodium chloride flush  5-40 mL IntraVENous 2 times per day    enoxaparin  40 mg SubCUTAneous BID    insulin lispro  0-8 Units SubCUTAneous TID WC    insulin lispro  0-4 Units SubCUTAneous Nightly    [Held by provider] furosemide  80 mg IntraVENous BID    calcitRIOL  0.25 mcg Oral Daily     Continuous Infusions:   [Held by provider] DOPamine Stopped (03/17/24 0934)    dextrose      sodium chloride         Recent Labs     03/15/24  1130 03/16/24  0511 03/17/24  0501   WBC 11.0* 9.7 8.0   HGB 13.5*  15.3 13.6* 13.4*   HCT 41.9* 41.0* 40.9*   MCV 87.5 86.0 87.0    292 256       Recent Labs     03/15/24  1130 03/16/24  0511 03/17/24  0501    137 136   K 4.0 4.2 4.1    98 97   CO2 24 22 19*   BUN 44* 53* 73*   CREATININE 3.23*  3.3* 3.73* 5.52*       Recent Labs     03/15/24  1126 03/15/24  1130   PROT  --  6.8   INR 1.1  --        03/15/24    ECHO (TTE) COMPLETE (PRN CONTRAST/BUBBLE/STRAIN/3D) 03/15/2024 12:32 PM (Final)    Interpretation Summary    Left Ventricle: Severely reduced left ventricular systolic function with a visually estimated EF of 20 - 25%. Left ventricle is mildly dilated. Moderately increased wall thickness. Findings consistent with moderate concentric hypertrophy. Septal flattening in systole consistent with right ventricular pressure overload. Severe global hypokinesis present.    Right Ventricle: Not well visualized. Right ventricle is moderately

## 2024-03-17 NOTE — PROGRESS NOTES
Spiritual Care Services     Summary of Visit:   responded to a Code Blue in patient's room. Patient  after resuscitation attempt.  Patient's wife present and appeared very emotional. Patient and wife mostly speak [place]. Patient's son and neighbors arrived to provide support.  contacted [place] speaking  who arrived to provide support for the family.    Encounter Summary  Encounter Overview/Reason : Crisis, Grief, Loss, and Adjustments  Service Provided For:: Patient and family together  Referral/Consult From:: Multi-disciplinary team  Support System: Spouse, Children  Complexity of Encounter: High  Begin Time: 1300  End Time : 1430  Total Time Calculated: 90 min     Crisis  Type: Code Blue        Grief, Loss, and Adjustments  Type: Death           Advance Care Planning  Type: ACP conversation    Spiritual Assessment/Intervention/Outcomes:    Assessment: Despair, Sad, Shock, Tearful    Intervention: End of Life Care, Grief Care, Prayer (assurance of)/[place]    Outcome: Comfort      Care Plan:    Plan and Referrals  Plan/Referrals: Assisted with grief resources          Spiritual Care Services   Electronically signed by Chaplain Eileen on 3/17/2024 at 3:32 PM.    To reach a  for emotional and spiritual support, place an EPIC consult request.   If a  is needed immediately, dial “0” and ask to page the on-call .

## 2024-03-17 NOTE — PROGRESS NOTES
Hospitalist Progress Note      PCP: Shelton Bailey DO    Date of Admission: 3/15/2024    Chief Complaint:    Chief Complaint   Patient presents with    Shortness of Breath     Subjective:  Patient denies fevers, chills, sweats,  diarrhea or burning micturition.  Still short of breath but better than  yesterday..  12 point ROS negative other than mentioned above     Medications:  Reviewed    Infusion Medications    [Held by provider] DOPamine Stopped (03/17/24 0934)    dextrose      sodium chloride       Scheduled Medications    [START ON 3/18/2024] metoprolol succinate  25 mg Oral Daily    [Held by provider] metOLazone  5 mg Oral Daily    aspirin  81 mg Oral Daily    atorvastatin  40 mg Oral Nightly    amiodarone  200 mg Oral Daily    [Held by provider] sacubitril-valsartan  1 tablet Oral BID    gabapentin  300 mg Oral Nightly    [Held by provider] isosorbide mononitrate  60 mg Oral Daily    ticagrelor  90 mg Oral BID    pantoprazole  40 mg Oral QAM AC    sodium chloride flush  5-40 mL IntraVENous 2 times per day    enoxaparin  40 mg SubCUTAneous BID    insulin lispro  0-8 Units SubCUTAneous TID WC    insulin lispro  0-4 Units SubCUTAneous Nightly    [Held by provider] furosemide  80 mg IntraVENous BID    calcitRIOL  0.25 mcg Oral Daily     PRN Meds: traMADol, glucose, dextrose bolus **OR** dextrose bolus, glucagon (rDNA), dextrose, sodium chloride flush, sodium chloride, ondansetron **OR** ondansetron, polyethylene glycol, acetaminophen **OR** acetaminophen, potassium chloride **OR** potassium alternative oral replacement **OR** potassium chloride, magnesium sulfate      Intake/Output Summary (Last 24 hours) at 3/17/2024 1138  Last data filed at 3/17/2024 0600  Gross per 24 hour   Intake 200 ml   Output 250 ml   Net -50 ml         Exam:    /82   Pulse 73   Temp 97.3 °F (36.3 °C) (Oral)   Resp 20   Ht 1.8 m (5' 10.87\")   Wt (!) 160.8 kg (354 lb 9.6 oz)   SpO2 94%   BMI 49.64 kg/m²     General  consutl cardiology; downtrending now   LON:  Bipap overnight; needs a new device outpatient  Functional Status: Fall precautions. Up with assistance. PT OT  Diet: Cardiac Diabetic   DVT ppx: Lovenox SCDs  Disposition: Dependent on hospital course. Will discharge once medically stable. SW on board for discharge planning.    Active Hospital Problems    Diagnosis Date Noted    Acute respiratory failure with hypoxia (HCC) [J96.01] 11/03/2022     Priority: Medium        Additional work up or/and treatment plan may be added today or then after based on clinical progression. I am managing a portion of pt care. Some medical issues are handled by other specialists. Additional work up and treatment should be done in out pt setting by pt PCP and other out pt providers.     In addition to examining and evaluating pt, I spent additional time explaining care, normal and abnormal findings, and treatment plan. All of pt questions were answered. Counseling, diet and education were  provided. Case will be discussed with nursing staff when appropriate. Family will be updated if and when appropriate.      Diet: ADULT DIET; Regular; No Added Salt (3-4 gm)    Code Status: Full Code    PT/OT Eval     Electronically signed by Joshua Santos MD on 3/17/2024 at 11:38 AM

## 2024-03-17 NOTE — CONSULTS
[facility]                   [address] [place] [address]                              CONSULTATION      PATIENT NAME: MATILDE PATTERSON           : 1960  MED REC NO: [id number]                        ROOM: [id number]  ACCOUNT NO: [id number]                       ADMIT DATE: 03/15/2024  PROVIDER: Guicho Levin DO    RENAL CONSULT    CONSULT DATE:  2024      HISTORY OF PRESENT ILLNESS:  64-year-old  male admitted to the hospital with shortness of breath.  The patient has been followed by Dr. Muller in the past.  He speaks no English whatsoever.  The patient does have a history of underlying heart failure with an ejection fraction of 15%.  The patient has been on diuretic therapy but it is uncertain as to whether the patient has been  taking them.    The patient has known diabetes mellitus type 2, hypertension, and hyperlipidemia.  He has CKD 4.  At the present time, he is wearing a BiPAP mask with O2 saturations that are fine.  On questioning in Pashto, no hematuria or dysuria.  On admission to the hospital, the patient had a  low [id number] with GFR 21 mL. The patient has an elevated proBNP of 9000.    ALLERGIES:  TO MEDICATIONS:  COREG, JANUVIA.      MEDICATIONS:  Bentyl, Cordarone, Brilinta, Entresto, Rocaltrol, Jardiance, Lipitor, Imdur, Lantus, Neurontin.    HABITS:  Quit smoking 15 years ago.  No alcohol abuse.    PAST MEDICAL HISTORY:  CKD 4.  Cardiomyopathy with severe systolic heart failure.  Diabetes mellitus type 2. Obesity. Hypertension. Hyperlipidemia. Implantable cardioverter-defibrillator in place. LON. History of pulmonary embolus, on Eliquis. History of kidney stones.    PAST SURGICAL HISTORY:  Defibrillator placed.  Cystoscopy. Left double J stent placement. ESWL, Retrograde lithotripsy.    REVIEW OF SYSTEMS:  Unable to be performed.    PHYSICAL EXAMINATION:  VITAL SIGNS: The patient is 5 feet 10 inches and 347 pounds. Blood pressure

## 2024-03-17 NOTE — PROGRESS NOTES
Patient had not voided throughout the night with an input of around 200mL. The patient reported he had the urge to pee but was \"unable to\" when he tried. This RN bladder scanned the patient @ 0530 for 191. @ 0600 the patient used their call light to tell this RN that the patient had voided. With use of the urinal the patient voided 200mL of dark, clear yellow urine. Will follow up with day shift regarding care plan.

## 2024-03-17 NOTE — PROGRESS NOTES
This RN spoke with Dr. Levin over the telephone regarding the patient's urine output. This RN reported that as per day shift's instructions, this RN determined that the patient had less than 200mL from [id number] and to remove the arzate catheter. This RN also informed Dr. Levin of the patient's most recent manual blood pressure of 102/78. Dr. Levin acknowledged updates, stated they will look at the chart, and provided no new orders at this time.

## 2024-03-17 NOTE — SIGNIFICANT EVENT
Responded to rapid response on this patient; per RN patient was doing well but then he was helping him up when he became unresponsive.  Upon my eval patient had no pulses; chest compression started and code blue called.  Patient was going between V Fib and V Tach arrest with intermittent AICD shocks.  ACLS was performed for a prolonged period of time and then the family opted to withdraw care.    Over 30 min of CC time were spent on the care of this patient including discussing with family and other providers.    Joshua Santos

## 2024-03-17 NOTE — PROGRESS NOTES
Nephrology Progress Note  Refused bi-pap at night   Assessment:  CKD-5  OHDx HFrEF 20%  Obese  DM type-2  Hypotension  Hyperlipidemia      Plan:Poor urine out-puts  low BP will trial dopamine with entresto already on board    Patient Active Problem List:     Type 2 diabetes mellitus with chronic kidney disease, with long-term current use of insulin (HCC)     Hyperlipidemia     Hypertension     Chronic bilateral low back pain without sciatica     Cardiomyopathy (HCC)     Diabetic neuropathy associated with type 2 diabetes mellitus (HCC)     Morbid obesity (HCC)     LON (obstructive sleep apnea)     Chronic systolic heart failure (HCC)     ICD (implantable cardioverter-defibrillator) in place     Anxiety and depression     Gastroesophageal reflux disease without esophagitis     Keratoderma of foot, acquired     CKD stage 4 due to type 2 diabetes mellitus (HCC)     Vitamin D deficiency     Chronic left shoulder pain     Congestive heart failure (HCC)     Anxiety     Secondary hyperparathyroidism (HCC)     COVID-19     Acute respiratory failure with hypoxia (HCC)     SOB (shortness of breath)     History of COVID-19     Hypoxia     Chronic kidney disease     Acute pulmonary embolism without acute cor pulmonale (HCC)     Coronary artery disease involving native heart without angina pectoris     Shortness of breath     Hypotension     Chest pain     Tachycardia     SVT (supraventricular tachycardia)      Subjective:  Admit Date: 3/15/2024    Interval History: same    Medications:  Scheduled Meds:   [Held by provider] metOLazone  5 mg Oral Daily    aspirin  81 mg Oral Daily    atorvastatin  40 mg Oral Nightly    amiodarone  200 mg Oral Daily    sacubitril-valsartan  1 tablet Oral BID    gabapentin  300 mg Oral Nightly    isosorbide mononitrate  60 mg Oral Daily    metoprolol succinate  100 mg Oral Daily    ticagrelor  90 mg Oral BID    pantoprazole  40 mg Oral QAM AC    sodium chloride flush  5-40 mL IntraVENous 2 times  per day    enoxaparin  40 mg SubCUTAneous BID    insulin lispro  0-8 Units SubCUTAneous TID WC    insulin lispro  0-4 Units SubCUTAneous Nightly    [Held by provider] furosemide  80 mg IntraVENous BID    calcitRIOL  0.25 mcg Oral Daily     Continuous Infusions:   DOPamine      dextrose      sodium chloride         CBC:   Recent Labs     03/16/24  0511 03/17/24  0501   WBC 9.7 8.0   HGB 13.6* 13.4*    256     CMP:    Recent Labs     03/15/24  1130 03/16/24  0511 03/17/24  0501    137 136   K 4.0 4.2 4.1    98 97   CO2 24 22 19*   BUN 44* 53* 73*   CREATININE 3.23*  3.3* 3.73* 5.52*   GLUCOSE 186* 212* 231*   CALCIUM 9.1 9.2 8.9   LABGLOM 20.3*  20* 17.1* 10.7*     Troponin: No results for input(s): \"TROPONINI\" in the last 72 hours.  BNP: No results for input(s): \"BNP\" in the last 72 hours.  INR:   Recent Labs     03/15/24  1126   INR 1.1     Lipids:   Recent Labs     03/16/24  0511   CHOL 158   TRIG 158*   HDL 37*     Liver:   Recent Labs     03/15/24  1130   AST 17   ALT 26   ALKPHOS 104   PROT 6.8   LABALBU 3.7   BILITOT 0.6     Iron:  No results for input(s): \"IRONS\", \"FERRITIN\" in the last 72 hours.    Invalid input(s): \"LABIRONS\"  Urinalysis: No results for input(s): \"UA\" in the last 72 hours.    Objective:  Vitals: BP 92/63   Pulse 72   Temp 97.3 °F (36.3 °C) (Oral)   Resp 20   Ht 1.8 m (5' 10.87\")   Wt (!) 160.8 kg (354 lb 9.6 oz)   SpO2 94%   BMI 49.64 kg/m²    Wt Readings from Last 3 Encounters:   03/17/24 (!) 160.8 kg (354 lb 9.6 oz)   03/15/24 (!) 161.9 kg (357 lb)   03/14/24 (!) 162.1 kg (357 lb 6.4 oz)      24HR INTAKE/OUTPUT:    Intake/Output Summary (Last 24 hours) at 3/17/2024 0816  Last data filed at 3/17/2024 0600  Gross per 24 hour   Intake 200 ml   Output 650 ml   Net -450 ml       General: alert, in no apparent distress  HEENT: normocephalic, atraumatic, anicteric  Neck: supple, no mass  Lungs: non-labored respirations, clear to auscultation bilaterally  Heart: regular

## 2024-03-17 NOTE — FLOWSHEET NOTE
Responded to call light. Pt requested assistance from bed side commode to bed.  Assisted pt to feet, assisted pt with pivot and pt sat on the edge of the bed.  Pt went unresponsive to verbal and painful stimuli.  Called rapid response.    Once help arrived moved pt from seated position to supine in bed.  Pt was pulseless with agonal respirations.  Code blue called (See code in flowsheets).

## 2024-03-18 LAB
EKG ATRIAL RATE: 83 BPM
EKG P AXIS: 52 DEGREES
EKG P-R INTERVAL: 176 MS
EKG Q-T INTERVAL: 408 MS
EKG QRS DURATION: 114 MS
EKG QTC CALCULATION (BAZETT): 479 MS
EKG R AXIS: -24 DEGREES
EKG T AXIS: -11 DEGREES
EKG VENTRICULAR RATE: 83 BPM

## 2024-03-18 PROCEDURE — 93010 ELECTROCARDIOGRAM REPORT: CPT | Performed by: INTERNAL MEDICINE

## 2024-03-26 NOTE — PROGRESS NOTES
Physician Progress Note      PATIENT:               MATILDE PATTERSON  [facility] #:                  [id number]  :                       1960  ADMIT DATE:       3/15/2024 11:04 AM  DISCH DATE:        3/17/2024 5:41 PM  RESPONDING  PROVIDER #:        Joshua Santos MD          QUERY TEXT:    Pt admitted with acute on chronic combined CHF. 3/15 ABGs showed pO2 of 51 on   8L NC. RR up to 29; using accessory muscles per H&P. Pt required up to 15L NBR   and BIPAP. If possible, please document if you were evaluating and/or   treating any of the following:    The medical record reflects the following:  Risk Factors: acute on chronic CHF with EF 20%, COPD, LON  Clinical Indicators: 3/15 ABGs showed pH 7.463 pCO2 30 pO2 51 HCO3 21.8 O2 sat   88 on 8L O2. RR 16-29.  3/15 H&P \"General appearance: Ill appearing; using accessory muscles\",   progressive worsening shortness of breath  Treatment: supplemental O2 up to 15L NRB, BPAP, ABGs, diuretics    Thank you, Lien Jordan RN BSN [facility]  [phone]  Options provided:  -- Likely acute respiratory failure with hypoxia  -- Other - I will add my own diagnosis  -- Disagree - Not applicable / Not valid  -- Disagree - Clinically unable to determine / Unknown  -- Refer to Clinical Documentation Reviewer    PROVIDER RESPONSE TEXT:    This patient was likely in acute respiratory failure with hypoxia.    Query created by: Dilan Jordan on 3/21/2024 3:02 PM      Electronically signed by:  Joshua Santos MD 3/26/2024 2:08 PM

## 2024-04-07 PROCEDURE — 93296 REM INTERROG EVL PM/IDS: CPT | Performed by: INTERNAL MEDICINE

## 2024-04-07 PROCEDURE — 93295 DEV INTERROG REMOTE 1/2/MLT: CPT | Performed by: INTERNAL MEDICINE

## 2024-04-08 NOTE — DISCHARGE SUMMARY
Hospital Medicine Discharge Summary    Josr Lemus  :  1960  MRN:  [id number]    Admit date:  3/15/2024  Discharge date:  3/17/24    Admitting Physician:  Joshua Santos MD  Primary Care Physician:  Shelton Bailey, DO    Discharge Diagnoses:    Acute on chronic combined systolic and diastolic CHF     Chief Complaint   Patient presents with    Shortness of Breath     Hospital Course:   Patient with a history of CKD presented with Acute on chronic combined systolic and diastolic CHF.  He unfortunately had a cardiac arrest and did not survive despite our best effort; family was able to come in and decided to withdraw care after 20 minutes of ACLS.      Patient was seen by the following consultants   Consults:  IP CONSULT TO SPIRITUAL SERVICES  IP CONSULT TO CARDIOLOGY  IP CONSULT TO RESPIRATORY CARE  IP CONSULT TO NEPHROLOGY    Significant Diagnostic Studies:    Refer to chart     Please refer to chart if no studies are shown here    Vascular duplex lower extremity venous bilateral    Result Date: 3/15/2024  EXAMINATION: DUPLEX VENOUS ULTRASOUND OF THE BILATERAL LOWER EXTREMITIES3/15/2024 8:51 pm TECHNIQUE: Duplex ultrasound using B-mode/gray scaled imaging and Doppler spectral analysis and color flow was obtained of the deep venous structures of the bilateral extremities. COMPARISON: None. HISTORY: ORDERING SYSTEM PROVIDED HISTORY: CHEST PAIN, ACUTE, PULMONARY EMBOLISM SUSPECTED FINDINGS: The visualized veins of the bilateral lower extremities are patent and free of echogenic thrombus. The veins demonstrate good compressibility with normal color flow study and spectral analysis.     No evidence of DVT in either lower extremity.     Echo (TTE) complete (PRN contrast/bubble/strain/3D)    Result Date: 3/15/2024    Left Ventricle: Severely reduced left ventricular systolic function with a visually estimated EF of 20 - 25%. Left ventricle is mildly dilated. Moderately increased wall thickness. Findings

## 2024-04-18 ENCOUNTER — APPOINTMENT (OUTPATIENT)
Dept: CARDIOLOGY | Facility: CLINIC | Age: 64
End: 2024-04-18
Payer: MEDICARE

## 2024-06-27 ENCOUNTER — APPOINTMENT (OUTPATIENT)
Dept: PRIMARY CARE | Facility: CLINIC | Age: 64
End: 2024-06-27
Payer: MEDICARE

## (undated) DEVICE — CYSTO/BLADDER IRRIGATION SET, REGULATING CLAMP

## (undated) DEVICE — ELECTRODE PT RET AD L9FT HI MOIST COND ADH HYDRGEL CORDED

## (undated) DEVICE — RENTAL ESWL SERVICES UNILATERAL

## (undated) DEVICE — SONY PRINTER PAPER

## (undated) DEVICE — GUIDEWIRE URO L145CM DIA0.035IN TIP L7CM S STL PTFE BENT

## (undated) DEVICE — DBD-PACK,CYSTOSCOPY,PK VI,AURORA: Brand: MEDLINE

## (undated) DEVICE — TUOHY-BORST SIDE-ARM ADAPTER: Brand: COOK

## (undated) DEVICE — OPEN-END URETERAL CATHETER: Brand: COOK

## (undated) DEVICE — JELLY,LUBE,STERILE,FLIP TOP,TUBE,2-OZ: Brand: MEDLINE

## (undated) DEVICE — TRAY PREP DRY W/ PREM GLV 2 APPL 6 SPNG 2 UNDPD 1 OVERWRAP

## (undated) DEVICE — TOWEL,OR,DSP,ST,BLUE,STD,4/PK,20PK/CS: Brand: MEDLINE

## (undated) DEVICE — GLOVE ORANGE PI 8   MSG9080

## (undated) DEVICE — ANGLED TIP URETERAL CATHETER WITH BENTSON PTFE WIRE GUIDE: Brand: ANGLED TIP

## (undated) DEVICE — LABEL MED MINI W/ MARKER

## (undated) DEVICE — SYRINGE MED 10ML LUERLOCK TIP W/O SFTY DISP

## (undated) DEVICE — Device

## (undated) DEVICE — POUCH DRNGE BG POLYETH FOR SKYTRON UROLOGY TBL

## (undated) DEVICE — EVACUATOR URO BLDR W/ ADPT UROVAC

## (undated) DEVICE — COVER FT SWCH W15XL17IN GRY ALL OEC SYS

## (undated) DEVICE — DILATOR SURG URET 16FR

## (undated) DEVICE — Z CONVERTED USE 2271043 CONTAINER SPEC COLL 4OZ SCR ON LID PEEL PCH

## (undated) DEVICE — GOWN,AURORA,NONREINFORCED,LARGE: Brand: MEDLINE

## (undated) DEVICE — HOOKED-PRONG GRASPING FORCEPS: Brand: TRICEP

## (undated) DEVICE — AMPLATZ ULTRA STIFF WIRE GUIDE: Brand: AMPLATZ

## (undated) DEVICE — CONTAINER,SPECIMEN,OR STERILE,4OZ: Brand: MEDLINE

## (undated) DEVICE — SHEATH URET L36CM OD13FR ID11FR HYDRPHLC 2 TAPR RADPQ KINK

## (undated) DEVICE — NITINOL STONE RETRIEVAL FORCEPS: Brand: GRASPIT

## (undated) DEVICE — MULTI-WIRE STONE SWEEPING DEVICE: Brand: LESLIE PARACHUTE

## (undated) DEVICE — GOWN,SIRUS,POLYRNF,BRTHSLV,XLN/XL,20/CS: Brand: MEDLINE

## (undated) DEVICE — NITINOL STONE RETRIEVAL BASKET: Brand: ESCAPE

## (undated) DEVICE — SINGLE ACTION PUMPING SYSTEM

## (undated) DEVICE — GUIDEWIRE URO L150CM DIA0.035IN TAPR 8CM STR TIP STD SHFT

## (undated) DEVICE — SINGLE-USE DIGITAL FLEXIBLE URETEROSCOPE: Brand: LITHOVUE

## (undated) DEVICE — DUAL LUMEN URETERAL CATHETER

## (undated) DEVICE — GRASPING FORCEPS: Brand: COOK